# Patient Record
Sex: FEMALE | Race: BLACK OR AFRICAN AMERICAN | NOT HISPANIC OR LATINO | Employment: FULL TIME | URBAN - METROPOLITAN AREA
[De-identification: names, ages, dates, MRNs, and addresses within clinical notes are randomized per-mention and may not be internally consistent; named-entity substitution may affect disease eponyms.]

---

## 2017-01-26 ENCOUNTER — HOSPITAL ENCOUNTER (OUTPATIENT)
Dept: RADIOLOGY | Facility: CLINIC | Age: 47
Discharge: HOME | End: 2017-01-26
Attending: PODIATRIST
Payer: COMMERCIAL

## 2017-01-26 ENCOUNTER — OFFICE VISIT (OUTPATIENT)
Dept: PODIATRY | Facility: CLINIC | Age: 47
End: 2017-01-26
Payer: COMMERCIAL

## 2017-01-26 VITALS — WEIGHT: 245.56 LBS | BODY MASS INDEX: 40.91 KG/M2 | HEIGHT: 65 IN

## 2017-01-26 DIAGNOSIS — M25.571 ACUTE RIGHT ANKLE PAIN: ICD-10-CM

## 2017-01-26 DIAGNOSIS — S93.491A SPRAIN OF OTHER LIGAMENT OF RIGHT ANKLE, INITIAL ENCOUNTER: ICD-10-CM

## 2017-01-26 DIAGNOSIS — M24.573 EQUINUS CONTRACTURE OF ANKLE: ICD-10-CM

## 2017-01-26 DIAGNOSIS — M24.573 EQUINUS CONTRACTURE OF ANKLE: Primary | ICD-10-CM

## 2017-01-26 PROCEDURE — 73610 X-RAY EXAM OF ANKLE: CPT | Mod: 26,RT,S$GLB, | Performed by: RADIOLOGY

## 2017-01-26 PROCEDURE — 73610 X-RAY EXAM OF ANKLE: CPT | Mod: TC,PO,RT

## 2017-01-26 PROCEDURE — 1159F MED LIST DOCD IN RCRD: CPT | Mod: S$GLB,,, | Performed by: PODIATRIST

## 2017-01-26 PROCEDURE — 99999 PR PBB SHADOW E&M-EST. PATIENT-LVL III: CPT | Mod: PBBFAC,,, | Performed by: PODIATRIST

## 2017-01-26 PROCEDURE — 73630 X-RAY EXAM OF FOOT: CPT | Mod: 26,RT,S$GLB, | Performed by: RADIOLOGY

## 2017-01-26 PROCEDURE — 99213 OFFICE O/P EST LOW 20 MIN: CPT | Mod: S$GLB,,, | Performed by: PODIATRIST

## 2017-01-26 PROCEDURE — 73630 X-RAY EXAM OF FOOT: CPT | Mod: TC,PO,RT

## 2017-01-26 RX ORDER — MELOXICAM 15 MG/1
15 TABLET ORAL DAILY
Qty: 30 TABLET | Refills: 0 | Status: SHIPPED | OUTPATIENT
Start: 2017-01-26 | End: 2017-03-29 | Stop reason: ALTCHOICE

## 2017-01-26 RX ORDER — TRAMADOL HYDROCHLORIDE 50 MG/1
TABLET ORAL
COMMUNITY
Start: 2017-01-13 | End: 2017-03-29 | Stop reason: SDUPTHER

## 2017-01-26 NOTE — MR AVS SNAPSHOT
[facility]  [address]  [place] [address]  Phone: [phone]                  Phylicia Vásquez   2017 9:15 AM   Office Visit    Description:  Female : 1970   Provider:  Theron Rodarte DPM   Department:  [facility]           Reason for Visit     Ankle Injury           Diagnoses this Visit        Comments    Equinus contracture of ankle    -  Primary     Acute right ankle pain         Sprain of other ligament of right ankle, initial encounter                To Do List           Future Appointments        Provider Department Dept Phone    2017 10:00 AM [facility] XR1 [facility] [phone]    3/9/2017 9:15 AM Theron Rodarte DPM [facility] [phone]    3/29/2017 1:40 PM Ella Toth MD [facility] [phone]      Goals (5 Years of Data)     None       These Medications        Disp Refills Start End    meloxicam (MOBIC) 15 MG tablet 30 tablet 0 2017     Take 1 tablet (15 mg total) by mouth once daily. - Oral    Pharmacy: [facility] [address] [place] [address] Ph #: [phone]         [facility] On Call     [facility] On Call Nurse Care Line -  Assistance  Registered nurses in the [facility] On Call Center provide clinical advisement, health education, appointment booking, and other advisory services.  Call for this free service at [phone].             Medications           Message regarding Medications     Verify the changes and/or additions to your medication regime listed below are the same as discussed with your clinician today.  If any of these changes or additions are incorrect, please notify your healthcare provider.        START taking these NEW medications        Refills    meloxicam (MOBIC) 15 MG tablet 0    Sig: Take 1 tablet (15 mg total) by mouth once daily.    Class: Normal    Route: Oral      STOP taking these medications     ibuprofen  "(ADVIL,MOTRIN) 600 MG tablet Take 1 tablet (600 mg total) by mouth every 6 (six) hours as needed for Pain.    albuterol 90 mcg/actuation inhaler Inhale 1-2 puffs into the lungs every 6 (six) hours as needed for Wheezing.    tretinoin (RETIN-A) 0.025 % cream Apply topically nightly. Adult acne           Verify that the below list of medications is an accurate representation of the medications you are currently taking.  If none reported, the list may be blank. If incorrect, please contact your healthcare provider. Carry this list with you in case of emergency.           Current Medications     gabapentin (NEURONTIN) 300 MG capsule Take 1 capsule (300 mg total) by mouth 3 (three) times daily.    indapamide (LOZOL) 2.5 MG Tab Take 1 tablet (2.5 mg total) by mouth every evening.    tramadol (ULTRAM) 50 mg tablet     clonazePAM (KLONOPIN) 0.5 MG tablet 1 TWICE DAILY AS NEEDED    meloxicam (MOBIC) 15 MG tablet Take 1 tablet (15 mg total) by mouth once daily.           Clinical Reference Information           Vital Signs - Last Recorded  Most recent update: 1/26/2017  9:13 AM by Pastora Azul LPN    Ht Wt LMP BMI       5' 5" (1.651 m) 111.4 kg (245 lb 9.5 oz) 08/14/2014 40.87 kg/m2       Allergies as of 1/26/2017     Butalbital      Immunizations Administered on Date of Encounter - 1/26/2017     None      Orders Placed During Today's Visit      Normal Orders This Visit    Ambulatory consult to Physical Therapy     Future Labs/Procedures Expected by Expires    X-Ray Ankle Complete Right  1/26/2017 1/26/2018    X-Ray Foot Complete Right  1/26/2017 1/26/2018      "

## 2017-01-26 NOTE — PROGRESS NOTES
Subjective:      Patient ID: Phylicia Vásquez is a 46 y.o. female.    Chief Complaint: Ankle Injury (right)    Sharp deep pain / swelling right ankle 2 weeks after fall in lobby at work.  sudden onset, improving minimally over past 2 weeks, aggravated by increased weight bearing, shoe gear, pressure.  riice and tramadol from [facility] visit helped some - relates negative xrays there  OTC pain med not helping.      Review of Systems   Constitution: Negative for chills, diaphoresis, fever, malaise/fatigue and night sweats.   Cardiovascular: Negative for claudication, cyanosis, leg swelling and syncope.   Skin: Negative for color change, dry skin, nail changes, rash, suspicious lesions and unusual hair distribution.   Musculoskeletal: Positive for joint pain and joint swelling. Negative for falls, muscle cramps, muscle weakness and stiffness.   Gastrointestinal: Negative for constipation, diarrhea, nausea and vomiting.   Neurological: Negative for brief paralysis, disturbances in coordination, focal weakness, numbness, paresthesias, sensory change and tremors.           Objective:      Physical Exam   Constitutional: She appears well-developed and well-nourished. She is cooperative. No distress.   Cardiovascular:   Pulses:       Popliteal pulses are 2+ on the right side, and 2+ on the left side.        Dorsalis pedis pulses are 2+ on the right side, and 2+ on the left side.        Posterior tibial pulses are 2+ on the right side, and 2+ on the left side.   Capillary refill 3 seconds all toes/distal feet, all toes/both feet warm to touch.      Negative lymphadenopathy bilateral popliteal fossa and tarsal tunnel.      Negavie lower extremity edema bilateral.     Musculoskeletal:        Right ankle: She exhibits normal range of motion, no swelling, no ecchymosis, no deformity, no laceration and normal pulse. Tenderness. Achilles tendon normal. Achilles tendon exhibits no pain, no defect and normal Bryant's test  results.   Sharp deep focal pain to palpation ATFL right with focal swelling same without deformity or loss of function.  Mild guarding to exam.    Ankle dorsiflexion decreased at <10 degrees bilateral with moderate increase with knee flexion bilateral.    Otherwise, Normal angle, base, station of gait. All ten toes without clubbing, cyanosis, or signs of ischemia.  No pain to palpation bilateral lower extremities.  Range of motion, stability, muscle strength, and muscle tone normal bilateral feet and legs.     Lymphadenopathy: No inguinal adenopathy noted on the right or left side.   Negative lymphadenopathy bilateral popliteal fossa and tarsal tunnel.   Neurological: She is alert. She has normal strength. She displays no atrophy and no tremor. No sensory deficit. She exhibits normal muscle tone. She displays no seizure activity. Gait normal.   Reflex Scores:       Patellar reflexes are 2+ on the right side and 2+ on the left side.       Achilles reflexes are 2+ on the right side and 2+ on the left side.  Negative tinel sign to percussion sural, superficial peroneal, deep peroneal, saphenous, and posterior tibial nerves right and left ankles and feet.     Skin: Skin is warm, dry and intact. No abrasion, no bruising, no burn, no ecchymosis, no laceration, no lesion and no rash noted. She is not diaphoretic. No cyanosis or erythema. No pallor. Nails show no clubbing.     Skin is normal age and health appropriate color, turgor, texture, and temperature bilateral lower extremities without ulceration, hyperpigmentation, discoloration, masses nodules or cords palpated.  No ecchymosis, erythema, edema, or cardinal signs of infection bilateral lower extremities.               Assessment:       Encounter Diagnoses   Name Primary?    Equinus contracture of ankle Yes    Acute right ankle pain     Sprain of other ligament of right ankle, initial encounter          Plan:       Phylicia was seen today for ankle  injury.    Diagnoses and all orders for this visit:    Equinus contracture of ankle  -     X-Ray Ankle Complete Right; Future  -     X-Ray Foot Complete Right; Future  -     Ambulatory consult to Physical Therapy    Acute right ankle pain  -     X-Ray Ankle Complete Right; Future  -     X-Ray Foot Complete Right; Future  -     Ambulatory consult to Physical Therapy    Sprain of other ligament of right ankle, initial encounter    Other orders  -     meloxicam (MOBIC) 15 MG tablet; Take 1 tablet (15 mg total) by mouth once daily.      I counseled the patient on her conditions, their implications and medical management.        RIICE    fx boot right - ambulate to tolerance - wean to shoes when symptoms allow.    Rx PT, meloxicam.    X-ray today          No Follow-up on file.

## 2017-03-29 ENCOUNTER — OFFICE VISIT (OUTPATIENT)
Dept: INTERNAL MEDICINE | Facility: CLINIC | Age: 47
End: 2017-03-29
Payer: COMMERCIAL

## 2017-03-29 VITALS
DIASTOLIC BLOOD PRESSURE: 70 MMHG | WEIGHT: 241.63 LBS | HEART RATE: 97 BPM | SYSTOLIC BLOOD PRESSURE: 100 MMHG | HEIGHT: 65 IN | BODY MASS INDEX: 40.26 KG/M2 | OXYGEN SATURATION: 98 %

## 2017-03-29 DIAGNOSIS — Z90.711 S/P ABDOMINAL SUPRACERVICAL SUBTOTAL HYSTERECTOMY: ICD-10-CM

## 2017-03-29 DIAGNOSIS — K76.0 FATTY LIVER: ICD-10-CM

## 2017-03-29 DIAGNOSIS — Z00.00 ANNUAL PHYSICAL EXAM: Primary | ICD-10-CM

## 2017-03-29 DIAGNOSIS — E07.9 THYROID DYSFUNCTION: ICD-10-CM

## 2017-03-29 DIAGNOSIS — G96.89 SPINAL CORD CYSTS: ICD-10-CM

## 2017-03-29 DIAGNOSIS — I10 ESSENTIAL HYPERTENSION: ICD-10-CM

## 2017-03-29 DIAGNOSIS — E04.2 MULTINODULAR NON-TOXIC GOITER: ICD-10-CM

## 2017-03-29 DIAGNOSIS — M54.14 THORACIC RADICULOPATHY: ICD-10-CM

## 2017-03-29 DIAGNOSIS — E66.01 MORBID OBESITY, UNSPECIFIED OBESITY TYPE: ICD-10-CM

## 2017-03-29 PROCEDURE — 3074F SYST BP LT 130 MM HG: CPT | Mod: S$GLB,,, | Performed by: INTERNAL MEDICINE

## 2017-03-29 PROCEDURE — 3078F DIAST BP <80 MM HG: CPT | Mod: S$GLB,,, | Performed by: INTERNAL MEDICINE

## 2017-03-29 PROCEDURE — 99999 PR PBB SHADOW E&M-EST. PATIENT-LVL III: CPT | Mod: PBBFAC,,, | Performed by: INTERNAL MEDICINE

## 2017-03-29 PROCEDURE — 99396 PREV VISIT EST AGE 40-64: CPT | Mod: S$GLB,,, | Performed by: INTERNAL MEDICINE

## 2017-03-29 RX ORDER — GABAPENTIN 300 MG/1
300 CAPSULE ORAL 3 TIMES DAILY
Qty: 270 CAPSULE | Refills: 3 | Status: SHIPPED | OUTPATIENT
Start: 2017-03-29 | End: 2017-10-04 | Stop reason: ALTCHOICE

## 2017-03-29 RX ORDER — INDAPAMIDE 2.5 MG/1
2.5 TABLET ORAL NIGHTLY
Qty: 90 TABLET | Refills: 3 | Status: SHIPPED | OUTPATIENT
Start: 2017-03-29 | End: 2018-04-02 | Stop reason: SDUPTHER

## 2017-03-29 RX ORDER — CYCLOBENZAPRINE HCL 5 MG
TABLET ORAL
Refills: 2 | COMMUNITY
Start: 2017-03-14 | End: 2017-05-08 | Stop reason: SDUPTHER

## 2017-03-29 RX ORDER — TRAMADOL HYDROCHLORIDE 50 MG/1
50 TABLET ORAL EVERY 4 HOURS PRN
Qty: 120 TABLET | Refills: 2 | Status: SHIPPED | OUTPATIENT
Start: 2017-03-29 | End: 2017-09-28 | Stop reason: SDUPTHER

## 2017-03-29 NOTE — MR AVS SNAPSHOT
[facility]  [address]  [place] [address]  Phone: [phone]  Fax: [phone]                  Phylicia Vásquez   3/29/2017 1:40 PM   Office Visit    Description:  Female : 1970   Provider:  Ella Toth MD   Department:  [facility]           Reason for Visit     Annual Exam           Diagnoses this Visit        Comments    Annual physical exam    -  Primary     Thyroid dysfunction         Thoracic radiculopathy         Spinal cord cysts         S/P abdominal supracervical subtotal hysterectomy         Morbid obesity, unspecified obesity type         Multinodular non-toxic goiter         Essential hypertension         Fatty liver                To Do List           Goals (5 Years of Data)     None      Follow-Up and Disposition     Return in about 6 months (around 2017).       These Medications        Disp Refills Start End    indapamide (LOZOL) 2.5 MG Tab 90 tablet 3 3/29/2017     Take 1 tablet (2.5 mg total) by mouth every evening. Blood pressure and fluid balance - Oral    Pharmacy: [facility] [address] Ph #: [phone]       gabapentin (NEURONTIN) 300 MG capsule 270 capsule 3 3/29/2017     Take 1 capsule (300 mg total) by mouth 3 (three) times daily. - Oral    Pharmacy: [facility] [address] Ph #: [phone]       tramadol (ULTRAM) 50 mg tablet 120 tablet 2 3/29/2017     Take 1 tablet (50 mg total) by mouth every 4 (four) hours as needed for Pain. - Oral    Pharmacy: [facility] [address] Ph #: [phone]         [facility] On Call     [facility] On Call Nurse Care Line -  Assistance  Registered nurses in the [facility] On Call Center provide clinical advisement, health education, appointment booking, and other advisory  "services.  Call for this free service at [phone].             Medications           Message regarding Medications     Verify the changes and/or additions to your medication regime listed below are the same as discussed with your clinician today.  If any of these changes or additions are incorrect, please notify your healthcare provider.        CHANGE how you are taking these medications     Start Taking Instead of    indapamide (LOZOL) 2.5 MG Tab indapamide (LOZOL) 2.5 MG Tab    Dosage:  Take 1 tablet (2.5 mg total) by mouth every evening. Blood pressure and fluid balance Dosage:  Take 1 tablet (2.5 mg total) by mouth every evening.    Reason for Change:  Reorder     tramadol (ULTRAM) 50 mg tablet tramadol (ULTRAM) 50 mg tablet    Dosage:  Take 1 tablet (50 mg total) by mouth every 4 (four) hours as needed for Pain.     Reason for Change:  Reorder       STOP taking these medications     clonazePAM (KLONOPIN) 0.5 MG tablet 1 TWICE DAILY AS NEEDED    meloxicam (MOBIC) 15 MG tablet Take 1 tablet (15 mg total) by mouth once daily.           Verify that the below list of medications is an accurate representation of the medications you are currently taking.  If none reported, the list may be blank. If incorrect, please contact your healthcare provider. Carry this list with you in case of emergency.           Current Medications     cyclobenzaprine (FLEXERIL) 5 MG tablet     gabapentin (NEURONTIN) 300 MG capsule Take 1 capsule (300 mg total) by mouth 3 (three) times daily.    indapamide (LOZOL) 2.5 MG Tab Take 1 tablet (2.5 mg total) by mouth every evening. Blood pressure and fluid balance    tramadol (ULTRAM) 50 mg tablet Take 1 tablet (50 mg total) by mouth every 4 (four) hours as needed for Pain.           Clinical Reference Information           Your Vitals Were     BP Pulse Height Weight Last Period SpO2    100/70 97 5' 5" (1.651 m) 109.6 kg (241 lb 10 oz) 08/14/2014 98%    BMI                40.21 kg/m2        "   Blood Pressure          Most Recent Value    BP  100/70      Allergies as of 3/29/2017     Butalbital      Immunizations Administered on Date of Encounter - 3/29/2017     None      Orders Placed During Today's Visit     Future Labs/Procedures Expected by Expires    CBC auto differential  3/29/2017 3/29/2018    Comprehensive metabolic panel  3/29/2017 5/28/2018    Hemoglobin A1c  3/29/2017 5/28/2018    Lipid panel  3/29/2017 5/28/2018    TSH  3/29/2017 5/28/2018    Uric acid  3/29/2017 5/28/2018      Instructions    Health Maintenance       Date Due Completion Date    TETANUS VACCINE 3/16/1988 ---    Influenza Vaccine 8/1/2016 10/10/2014    Pap Smear 9/24/2018 9/24/2015    Mammogram 10/7/2018 10/7/2016    Lipid Panel 2/4/2021 2/4/2016        1.) to  Loose weight you must sleep more. Black out sleep mask.   2.) pay attention to cravings  Don't have sweets and salty stuff in house.  3.) Cut back on carbs, Potatoes, bread, white rice cut them out. Eat oats instead.           Language Assistance Services     ATTENTION: Language assistance services are available, free of charge. Please call [phone].      ATENCIÓN: Si habla lakhwinder, tiene a matute disposición servicios gratuitos de asistencia lingüística. Llame al [phone].     CHÚ Ý: N?u b?n nói Ti?ng Vi?t, có các d?ch v? h? tr? ngôn ng? mi?n phí dành cho b?n. G?i s? [phone].         [facility] complies with applicable Federal civil rights laws and does not discriminate on the basis of race, color, national origin, age, disability, or sex.

## 2017-03-29 NOTE — PATIENT INSTRUCTIONS
Health Maintenance       Date Due Completion Date    TETANUS VACCINE 3/16/1988 ---    Influenza Vaccine 8/1/2016 10/10/2014    Pap Smear 9/24/2018 9/24/2015    Mammogram 10/7/2018 10/7/2016    Lipid Panel 2/4/2021 2/4/2016        1.) to  Loose weight you must sleep more. Black out sleep mask.   2.) pay attention to cravings  Don't have sweets and salty stuff in house.  3.) Cut back on carbs, Potatoes, bread, white rice cut them out. Eat oats instead.

## 2017-03-31 NOTE — PROGRESS NOTES
Subjective:       Patient ID: Phylicia Vásquez is a 47 y.o. female.    Chief Complaint: Annual Exam   wellness  She is doing well.   has a past medical history of Anxiety; Back pain; HTN (hypertension) (10/18/2012); Insomnia (10/18/2012); Spinal cord cyst, L1 2014; and Thoracic radiculopathy, bulging disc at T10-11 and T11-12.   Entire chart reviewed, PMx, FHx, and Social History updated and reviewed.    HPI  Review of Systems   Constitutional: Negative for activity change, appetite change, chills, fatigue, fever and unexpected weight change.   HENT: Negative for dental problem, hearing loss, tinnitus, trouble swallowing and voice change.    Eyes: Negative for visual disturbance.   Respiratory: Negative for cough, chest tightness, shortness of breath and wheezing.    Cardiovascular: Negative for chest pain, palpitations and leg swelling.   Gastrointestinal: Negative for abdominal pain, blood in stool, constipation, diarrhea and nausea.   Genitourinary: Negative for difficulty urinating, dysuria, frequency and urgency.   Musculoskeletal: Negative for arthralgias, back pain, gait problem, joint swelling, myalgias, neck pain and neck stiffness.   Skin: Negative for rash.   Neurological: Negative for dizziness, tremors, speech difficulty, weakness, light-headedness and headaches.   Psychiatric/Behavioral: Negative for dysphoric mood and sleep disturbance. The patient is not nervous/anxious.        Objective:      Physical Exam   Constitutional: She is oriented to person, place, and time. She appears well-developed and well-nourished. No distress.   HENT:   Head: Normocephalic and atraumatic.   Right Ear: External ear normal.   Left Ear: External ear normal.   Nose: Nose normal.   Mouth/Throat: Oropharynx is clear and moist. No oropharyngeal exudate.   Eyes: Conjunctivae and EOM are normal. Pupils are equal, round, and reactive to light. Right eye exhibits no discharge. No scleral icterus.   Neck: Normal range of  motion and full passive range of motion without pain. Neck supple. No spinous process tenderness and no muscular tenderness present. Carotid bruit is not present. No thyromegaly present.   Cardiovascular: Normal rate, regular rhythm, S1 normal, S2 normal, normal heart sounds and intact distal pulses.  Exam reveals no gallop and no friction rub.    No murmur heard.  Pulmonary/Chest: Effort normal and breath sounds normal. No respiratory distress. She has no wheezes. She has no rales. She exhibits no tenderness.   Abdominal: Soft. Bowel sounds are normal. She exhibits no distension and no mass. There is no tenderness. There is no rebound and no guarding.   Genitourinary: Pelvic exam was performed with patient supine. Uterus is not deviated, not enlarged, not fixed and not tender. Cervix exhibits no motion tenderness, no discharge and no friability. Right adnexum displays no mass, no tenderness and no fullness. Left adnexum displays no mass, no tenderness and no fullness.   Musculoskeletal: Normal range of motion. She exhibits no edema or tenderness.   Lymphadenopathy:        Head (right side): No submental and no submandibular adenopathy present.        Head (left side): No submental and no submandibular adenopathy present.     She has no cervical adenopathy.        Right cervical: No superficial cervical, no deep cervical and no posterior cervical adenopathy present.       Left cervical: No superficial cervical, no deep cervical and no posterior cervical adenopathy present.        Right axillary: No pectoral and no lateral adenopathy present.        Left axillary: No pectoral and no lateral adenopathy present.       Right: No supraclavicular adenopathy present.        Left: No supraclavicular adenopathy present.   Neurological: She is alert and oriented to person, place, and time. She has normal reflexes. No cranial nerve deficit. She exhibits normal muscle tone. Coordination normal.   Skin: Skin is warm and dry. No  rash noted.   Psychiatric: She has a normal mood and affect. Her behavior is normal. Her mood appears not anxious. Her speech is not rapid and/or pressured. She is not agitated. She does not exhibit a depressed mood.   Normal behavior, thought content, insight and judgement.       Assessment:       1. Annual physical exam    2. Thyroid dysfunction    3. Thoracic radiculopathy    4. Spinal cord cyst, 11 mm CSF cyst, in the conus medullaris on the right side at L1 level. .    5. S/P abdominal supracervical subtotal hysterectomy, continues to have regular menses.    6. Morbid obesity, unspecified obesity type    7. Multinodular non-toxic goiter, Subcentimeter nodules May 2012, anterior fat pad.    8. Essential hypertension    9. Fatty liver        Plan:   Phylicia was seen today for annual exam.    Diagnoses and all orders for this visit:    Annual physical exam  -     CBC auto differential; Future  -     Comprehensive metabolic panel; Future  -     Uric acid; Future  -     Lipid panel; Future  -     Hemoglobin A1c; Future  -     TSH; Future    Thyroid dysfunction    Thoracic radiculopathy    Spinal cord cyst, 11 mm CSF cyst, in the conus medullaris on the right side at L1 level. .    S/P abdominal supracervical subtotal hysterectomy, continues to have regular menses.    Morbid obesity, unspecified obesity type.  There are many barriers to weight loss.  The most significant is a lack of sleep.  She works long hours and commutes to the [place]..) to  Loose weight you must sleep more. Black out sleep mask.   2.) pay attention to cravings  Don't have sweets and salty stuff in house.  3.) Cut back on carbs, Potatoes, bread, white rice cut them out. Eat oats instead.        Multinodular non-toxic goiter, Subcentimeter nodules May 2012, anterior fat pad.    Essential hypertension    Fatty liver    Other orders  -     indapamide (LOZOL) 2.5 MG Tab; Take 1 tablet (2.5 mg total) by mouth every evening. Blood  pressure and fluid balance  -     gabapentin (NEURONTIN) 300 MG capsule; Take 1 capsule (300 mg total) by mouth 3 (three) times daily.  -     tramadol (ULTRAM) 50 mg tablet; Take 1 tablet (50 mg total) by mouth every 4 (four) hours as needed for Pain.    Return in about 6 months (around 9/29/2017).

## 2017-04-11 RX ORDER — CYCLOBENZAPRINE HCL 5 MG
TABLET ORAL
Qty: 90 TABLET | Refills: 0 | Status: SHIPPED | OUTPATIENT
Start: 2017-04-11 | End: 2017-05-16 | Stop reason: SDUPTHER

## 2017-05-08 ENCOUNTER — OFFICE VISIT (OUTPATIENT)
Dept: PODIATRY | Facility: CLINIC | Age: 47
End: 2017-05-08
Payer: COMMERCIAL

## 2017-05-08 VITALS — WEIGHT: 241.88 LBS | HEIGHT: 65 IN | BODY MASS INDEX: 40.3 KG/M2

## 2017-05-08 DIAGNOSIS — B35.1 ONYCHOMYCOSIS DUE TO DERMATOPHYTE: Primary | ICD-10-CM

## 2017-05-08 PROCEDURE — 99999 PR PBB SHADOW E&M-EST. PATIENT-LVL II: CPT | Mod: PBBFAC,,, | Performed by: PODIATRIST

## 2017-05-08 PROCEDURE — 1160F RVW MEDS BY RX/DR IN RCRD: CPT | Mod: S$GLB,,, | Performed by: PODIATRIST

## 2017-05-08 PROCEDURE — 99213 OFFICE O/P EST LOW 20 MIN: CPT | Mod: S$GLB,,, | Performed by: PODIATRIST

## 2017-05-08 RX ORDER — CICLOPIROX 80 MG/ML
SOLUTION TOPICAL NIGHTLY
Qty: 6.6 ML | Refills: 11 | Status: SHIPPED | OUTPATIENT
Start: 2017-05-08 | End: 2018-11-14

## 2017-05-08 NOTE — PROGRESS NOTES
Subjective:      Patient ID: Phylicia Vásquez is a 47 y.o. female.    Chief Complaint: Nail Problem    Phylicia is a 47 y.o. female who presents to the clinic complaining of thick and discolored toenails on both feet.  Gradual onset, worsening over past several weeks, aggravated by increased weight bearing, shoe gear, pressure.  No previous medical treatment.  OTC pain med not helping.  Denies trauma and surgery both hallux nails.   Phylicia is inquiring about treatment options.      Review of Systems   Constitution: Negative for chills, diaphoresis, fever, malaise/fatigue and night sweats.   Cardiovascular: Negative for claudication, cyanosis, leg swelling and syncope.   Skin: Positive for nail changes. Negative for color change, dry skin, rash, suspicious lesions and unusual hair distribution.   Musculoskeletal: Negative for falls, joint pain, joint swelling, muscle cramps, muscle weakness and stiffness.   Gastrointestinal: Negative for constipation, diarrhea, nausea and vomiting.   Neurological: Negative for brief paralysis, disturbances in coordination, focal weakness, numbness, paresthesias, sensory change and tremors.           Objective:      Physical Exam   Constitutional: She appears well-developed and well-nourished. She is cooperative. No distress.   Cardiovascular:   Pulses:       Popliteal pulses are 2+ on the right side, and 2+ on the left side.        Dorsalis pedis pulses are 2+ on the right side, and 2+ on the left side.        Posterior tibial pulses are 2+ on the right side, and 2+ on the left side.   Capillary refill 3 seconds all toes/distal feet, all toes/both feet warm to touch.      Negative lymphadenopathy bilateral popliteal fossa and tarsal tunnel.      Negavie lower extremity edema bilateral.     Musculoskeletal:        Right ankle: She exhibits normal range of motion, no swelling, no ecchymosis, no deformity, no laceration and normal pulse. Tenderness. Achilles tendon normal. Achilles  tendon exhibits no pain, no defect and normal Bryant's test results.   Normal angle, base, station of gait. All ten toes without clubbing, cyanosis, or signs of ischemia.  No pain to palpation bilateral lower extremities.  Range of motion, stability, muscle strength, and muscle tone normal bilateral feet and legs.     Lymphadenopathy:   Negative lymphadenopathy bilateral popliteal fossa and tarsal tunnel.   Neurological: She is alert. She has normal strength. She displays no atrophy and no tremor. No sensory deficit. She exhibits normal muscle tone. She displays no seizure activity. Gait normal.   Reflex Scores:       Patellar reflexes are 2+ on the right side and 2+ on the left side.       Achilles reflexes are 2+ on the right side and 2+ on the left side.  Negative tinel sign to percussion sural, superficial peroneal, deep peroneal, saphenous, and posterior tibial nerves right and left ankles and feet.     Skin: Skin is warm, dry and intact. No abrasion, no bruising, no burn, no ecchymosis, no laceration, no lesion and no rash noted. She is not diaphoretic. No cyanosis or erythema. No pallor. Nails show no clubbing.     Skin is normal age and health appropriate color, turgor, texture, and temperature bilateral lower extremities without ulceration, hyperpigmentation, discoloration, masses nodules or cords palpated.  No ecchymosis, erythema, edema, or cardinal signs of infection bilateral lower extremities.    Toenails 1st, 5th  bilateral are hypertrophic, dystrophic, discolored tanish brown with tan, gray crumbly subungual debris.  Tender to distal nail plate pressure, without periungual skin abnormality of each.               Assessment:       Encounter Diagnosis   Name Primary?    Onychomycosis due to dermatophyte Yes         Plan:       Phylicia was seen today for nail problem.    Diagnoses and all orders for this visit:    Onychomycosis due to dermatophyte    Other orders  -     ciclopirox (PENLAC) 8 % Soln;  Apply topically nightly.      I counseled the patient on her conditions, their implications and medical management.        Rx penlac.    Discussed conservative treatment with shoes of adequate dimensions, material, and style to alleviate symptoms and delay or prevent surgical intervention.            Return if symptoms worsen or fail to improve.

## 2017-05-08 NOTE — MR AVS SNAPSHOT
[facility]  [address]  [place] [address]  Phone: [phone]                  Phylicia Vásquez   2017 8:00 AM   Office Visit    Description:  Female : 1970   Provider:  Theron Rodarte DPM   Department:  [facility]           Reason for Visit     Nail Problem           Diagnoses this Visit        Comments    Onychomycosis due to dermatophyte    -  Primary            To Do List           Future Appointments        Provider Department Dept Phone    2017 8:00 AM Theron Rodarte DPM [facility] [phone]    2017 9:45 AM Tosha Gibbs MD [place] [facility] [phone]    2017 3:20 PM Namita Mcdowell [place] [id number] [place] [facility]/ -[phone]      Goals (5 Years of Data)     None      Follow-Up and Disposition     Return if symptoms worsen or fail to improve.       These Medications        Disp Refills Start End    ciclopirox (PENLAC) 8 % Soln 6.6 mL 11 2017     Apply topically nightly. - Topical    Pharmacy: [facility] [address] [place] [address] Ph #: [phone]         [facility]     [facility] Care [facility] -  Assistance  Unless otherwise directed by your provider, please contact [facility], our nurse care line that is available for  assistance.     Registered nurses in the [facility] provide: appointment scheduling, clinical advisement, health education, and other advisory services.  Call: [phone] (toll free)               Medications           Message regarding Medications     Verify the changes and/or additions to your medication regime listed below are the same as discussed with your clinician today.  If any of these changes or additions are incorrect, please notify your healthcare provider.        START taking these NEW medications        Refills    ciclopirox (PENLAC) 8 % Soln 11    Sig: Apply topically nightly.    Class:  "Normal    Route: Topical           Verify that the below list of medications is an accurate representation of the medications you are currently taking.  If none reported, the list may be blank. If incorrect, please contact your healthcare provider. Carry this list with you in case of emergency.           Current Medications     gabapentin (NEURONTIN) 300 MG capsule Take 1 capsule (300 mg total) by mouth 3 (three) times daily.    indapamide (LOZOL) 2.5 MG Tab Take 1 tablet (2.5 mg total) by mouth every evening. Blood pressure and fluid balance    ciclopirox (PENLAC) 8 % Soln Apply topically nightly.    cyclobenzaprine (FLEXERIL) 5 MG tablet TAKE 1 TABLET(5 MG) BY MOUTH THREE TIMES DAILY AS NEEDED FOR MUSCLE SPASMS    tramadol (ULTRAM) 50 mg tablet Take 1 tablet (50 mg total) by mouth every 4 (four) hours as needed for Pain.           Clinical Reference Information           Your Vitals Were     Height Weight Last Period BMI       5' 5" (1.651 m) 109.7 kg (241 lb 13.5 oz) 08/14/2014 40.25 kg/m2       Allergies as of 5/8/2017     Butalbital      Immunizations Administered on Date of Encounter - 5/8/2017     None      Language Assistance Services     ATTENTION: Language assistance services are available, free of charge. Please call [phone].      ATENCIÓN: Si susanna lakhwinder, tiene a matute disposición servicios gratuitos de asistencia lingüística. Llame al [phone].     [facility] Ý: N?u b?n nói Ti?ng Vi?t, có các d?ch v? h? tr? ngôn ng? mi?n phí dành cho b?n. G?i s? [phone].         [facility] complies with applicable Federal civil rights laws and does not discriminate on the basis of race, color, national origin, age, disability, or sex.        "

## 2017-05-16 RX ORDER — CYCLOBENZAPRINE HCL 5 MG
TABLET ORAL
Qty: 90 TABLET | Refills: 0 | Status: SHIPPED | OUTPATIENT
Start: 2017-05-16 | End: 2017-06-15 | Stop reason: SDUPTHER

## 2017-05-23 ENCOUNTER — OFFICE VISIT (OUTPATIENT)
Dept: DERMATOLOGY | Facility: CLINIC | Age: 47
End: 2017-05-23
Payer: COMMERCIAL

## 2017-05-23 VITALS — HEIGHT: 65 IN | WEIGHT: 241 LBS | BODY MASS INDEX: 40.15 KG/M2

## 2017-05-23 DIAGNOSIS — L21.9 SEBORRHEIC DERMATITIS: ICD-10-CM

## 2017-05-23 DIAGNOSIS — L21.9 SEBORRHEIC DERMATITIS OF SCALP: Primary | ICD-10-CM

## 2017-05-23 PROCEDURE — 99999 PR PBB SHADOW E&M-EST. PATIENT-LVL II: CPT | Mod: PBBFAC,,, | Performed by: DERMATOLOGY

## 2017-05-23 PROCEDURE — 1160F RVW MEDS BY RX/DR IN RCRD: CPT | Mod: S$GLB,,, | Performed by: DERMATOLOGY

## 2017-05-23 PROCEDURE — 99213 OFFICE O/P EST LOW 20 MIN: CPT | Mod: S$GLB,,, | Performed by: DERMATOLOGY

## 2017-05-23 RX ORDER — KETOCONAZOLE 2 G/100G
AEROSOL, FOAM TOPICAL
Qty: 100 G | Refills: 2 | Status: SHIPPED | OUTPATIENT
Start: 2017-05-23 | End: 2018-11-14

## 2017-05-23 RX ORDER — FLUOCINOLONE ACETONIDE 0.11 MG/ML
OIL TOPICAL
Qty: 1 BOTTLE | Refills: 2 | Status: SHIPPED | OUTPATIENT
Start: 2017-05-23 | End: 2017-11-08 | Stop reason: SDUPTHER

## 2017-05-23 RX ORDER — KETOCONAZOLE 20 MG/ML
SHAMPOO, SUSPENSION TOPICAL
Qty: 120 ML | Refills: 5 | Status: SHIPPED | OUTPATIENT
Start: 2017-05-23 | End: 2017-12-10 | Stop reason: SDUPTHER

## 2017-05-23 NOTE — PROGRESS NOTES
"  Subjective:       Patient ID:  Phylicia Vásquez is a 47 y.o. female who presents for   Chief Complaint   Patient presents with    Rash     Face    Dry Skin     scalp     Initial visit  C/o dry scalp and "rashes that come and go" eyebrows and sides of nose    Washes hair every other week, theragel dandruff shampoo          Rash  - Initial  Affected locations: face  Duration: 7 years  Signs / symptoms: itching  Severity: mild  Timing: constant  Aggravated by: nothing  Treatments tried: OTC hydrocortisone cream.  Improvement on treatment: mild    Dry Skin  - Initial  Affected locations: scalp  Duration: 7 years  Signs / symptoms: dryness and itching (flaky)  Severity: mild  Aggravated by: scratching  Treatments tried: OTC dandruff hair products.  Improvement on treatment: no relief        Review of Systems   Constitutional: Positive for night sweats. Negative for fever, chills, weight loss, weight gain, fatigue and malaise.   Skin: Positive for itching, rash and dry skin.   Hematologic/Lymphatic: Does not bruise/bleed easily.        Objective:    Physical Exam   Constitutional: She appears well-developed and well-nourished. No distress.   Neurological: She is alert and oriented to person, place, and time. She is not disoriented.   Psychiatric: She has a normal mood and affect.   Skin:   Areas Examined (abnormalities noted in diagram):   Scalp / Hair Palpated and Inspected  Head / Face Inspection Performed              Diagram Legend     Erythematous scaling macule/papule c/w actinic keratosis       Vascular papule c/w angioma      Pigmented verrucoid papule/plaque c/w seborrheic keratosis      Yellow umbilicated papule c/w sebaceous hyperplasia      Irregularly shaped tan macule c/w lentigo     1-2 mm smooth white papules consistent with Milia      Movable subcutaneous cyst with punctum c/w epidermal inclusion cyst      Subcutaneous movable cyst c/w pilar cyst      Firm pink to brown papule c/w dermatofibroma "      Pedunculated fleshy papule(s) c/w skin tag(s)      Evenly pigmented macule c/w junctional nevus     Mildly variegated pigmented, slightly irregular-bordered macule c/w mildly atypical nevus      Flesh colored to evenly pigmented papule c/w intradermal nevus       Pink pearly papule/plaque c/w basal cell carcinoma      Erythematous hyperkeratotic cursted plaque c/w SCC      Surgical scar with no sign of skin cancer recurrence      Open and closed comedones      Inflammatory papules and pustules      Verrucoid papule consistent consistent with wart     Erythematous eczematous patches and plaques     Dystrophic onycholytic nail with subungual debris c/w onychomycosis     Umbilicated papule    Erythematous-base heme-crusted tan verrucoid plaque consistent with inflamed seborrheic keratosis     Erythematous Silvery Scaling Plaque c/w Psoriasis     See annotation      Assessment / Plan:        Seborrheic dermatitis of scalp  Need more freequent hair washing than QOW    -     ketoconazole (NIZORAL) 2 % shampoo; Wash hair with medicated shampoo at least 1-2x/week - let sit on scalp at least 5 minutes prior to rinsing  Dispense: 120 mL; Refill: 5  -     ketoconazole 2 % Foam; AAA face and scalp daily to BID PRN flare  Dispense: 100 g; Refill: 2  -     fluocinolone (DERMA-SMOOTHE) 0.01 % external oil; Apply to scalp nightly  Dispense: 1 Bottle; Refill: 2    Seborrheic dermatitis, face, mild today  HC 1% PRN flare, use short term only    -     ketoconazole 2 % Foam; AAA face and scalp daily to BID PRN flare  Dispense: 100 g; Refill: 2             Return if symptoms worsen or fail to improve.

## 2017-05-30 ENCOUNTER — OFFICE VISIT (OUTPATIENT)
Dept: OBSTETRICS AND GYNECOLOGY | Facility: CLINIC | Age: 47
End: 2017-05-30
Payer: COMMERCIAL

## 2017-05-30 VITALS
HEART RATE: 61 BPM | BODY MASS INDEX: 40.88 KG/M2 | DIASTOLIC BLOOD PRESSURE: 91 MMHG | HEIGHT: 65 IN | WEIGHT: 245.38 LBS | SYSTOLIC BLOOD PRESSURE: 129 MMHG

## 2017-05-30 DIAGNOSIS — Z01.419 ENCOUNTER FOR WELL WOMAN EXAM WITH ROUTINE GYNECOLOGICAL EXAM: Primary | ICD-10-CM

## 2017-05-30 DIAGNOSIS — Z12.39 BREAST CANCER SCREENING: ICD-10-CM

## 2017-05-30 PROCEDURE — 99999 PR PBB SHADOW E&M-EST. PATIENT-LVL III: CPT | Mod: PBBFAC,,, | Performed by: OBSTETRICS & GYNECOLOGY

## 2017-05-30 PROCEDURE — 99396 PREV VISIT EST AGE 40-64: CPT | Mod: S$GLB,,, | Performed by: OBSTETRICS & GYNECOLOGY

## 2017-05-30 PROCEDURE — 88175 CYTOPATH C/V AUTO FLUID REDO: CPT

## 2017-06-02 NOTE — PROGRESS NOTES
SUBJECTIVE:   47 y.o. female   for annual routine Pap and checkup. Patient's last menstrual period was 2014..  She has no unusual complaints.        Past Medical History:   Diagnosis Date    Anxiety     Back pain     HTN (hypertension) 10/18/2012    Insomnia 10/18/2012    Spinal cord cyst, L1 2014     Thoracic radiculopathy, bulging disc at T10-11 and T11-12      Past Surgical History:   Procedure Laterality Date    BREAST SURGERY      breast biopsy     SECTION      CHOLECYSTECTOMY      COLONOSCOPY N/A 10/5/2015    Procedure: COLONOSCOPY;  Surgeon: JERONIMO Cancino MD;  Location: [address];  Service: Endoscopy;  Laterality: N/A;    HYSTERECTOMY      BC--SUPRACERVICAL     Social History     Social History    Marital status: Single     Spouse name: N/A    Number of children: N/A    Years of education: N/A     Occupational History    Not on file.     Social History Main Topics    Smoking status: Never Smoker    Smokeless tobacco: Never Used    Alcohol use No    Drug use: No    Sexual activity: Yes     Partners: Male     Other Topics Concern    Not on file     Social History Narrative    Has worked at the [employer] since it opened and stayed working through [employer].    2011  from her  and moved to [place] [facility] with her sons to live with her aunt.    Her mother is very helpful and involved in her life.    She has never been a smoker and does not drink.        2016    Her eldest son has been working as a  at the [employer] for one year now.  He is doing well in this capacity.  He is thinking about making this his career.        Her youngest son is doing better academically.  She would like to remain living on the [place] [facility] for his schooling.        Her commuting to work is difficult.     Family History   Problem Relation Age of Onset    Cancer Paternal Aunt     Lupus Paternal Aunt     Hypertension Mother     Liver  disease Father     Alcohol abuse Father     Colon cancer Paternal Uncle     Breast cancer Neg Hx     Ovarian cancer Neg Hx     Melanoma Neg Hx     Psoriasis Neg Hx     Eczema Neg Hx      OB History    Para Term  AB Living   4 2 1 1 2 2   SAB TAB Ectopic Multiple Live Births   2 0 0 0 2      # Outcome Date GA Lbr Saul/2nd Weight Sex Delivery Anes PTL Lv   4      M Vag-Spont  Y IRA      Complications: Fetal distress affecting labor   3 Term     M CS-Unspec  N IRA      Complications: Fetal distress affecting labor   2 SAB            1 SAB                     Current Outpatient Prescriptions   Medication Sig Dispense Refill    ciclopirox (PENLAC) 8 % Soln Apply topically nightly. 6.6 mL 11    cyclobenzaprine (FLEXERIL) 5 MG tablet TAKE 1 TABLET(5 MG) BY MOUTH THREE TIMES DAILY AS NEEDED FOR MUSCLE SPASMS 90 tablet 0    fluocinolone (DERMA-SMOOTHE) 0.01 % external oil Apply to scalp nightly 1 Bottle 2    gabapentin (NEURONTIN) 300 MG capsule Take 1 capsule (300 mg total) by mouth 3 (three) times daily. 270 capsule 3    indapamide (LOZOL) 2.5 MG Tab Take 1 tablet (2.5 mg total) by mouth every evening. Blood pressure and fluid balance 90 tablet 3    ketoconazole (NIZORAL) 2 % shampoo Wash hair with medicated shampoo at least 1-2x/week - let sit on scalp at least 5 minutes prior to rinsing 120 mL 5    ketoconazole 2 % Foam AAA face and scalp daily to BID PRN flare 100 g 2    tramadol (ULTRAM) 50 mg tablet Take 1 tablet (50 mg total) by mouth every 4 (four) hours as needed for Pain. 120 tablet 2     No current facility-administered medications for this visit.      Allergies: Butalbital     ROS:  Constitutional: no weight loss, weight gain, fever, fatigue  Eyes:  No vision changes, glasses/contacts  ENT/Mouth: No ulcers, sinus problems, ears ringing, headache  Cardiovascular: No inability to lie flat, chest pain, exercise intolerance, swelling, heart palpitations  Respiratory: No wheezing,  "coughing blood, shortness of breath, or cough  Gastrointestinal: No diarrhea, bloody stool, nausea/vomiting, constipation, gas, hemorrhoids  Genitourinary: No blood in urine, painful urination, urgency of urination, frequency of urination, incomplete emptying, incontinence, abnormal bleeding, painful periods, heavy periods, vaginal discharge, vaginal odor, painful intercourse, sexual problems, bleeding after intercourse.  Musculoskeletal: No muscle weakness  Skin/Breast: No painful breasts, nipple discharge, masses, rash, ulcers  Neurological: No passing out, seizures, numbness, headache  Endocrine: No diabetes, hypothyroid, hyperthyroid, hot flashes, hair loss, abnormal hair growth, ance  Psychiatric: No depression, crying  Hematologic: No bruises, bleeding, swollen lymph nodes, anemia.      OBJECTIVE:   The patient appears well, alert, oriented x 3, in no distress.  BP (!) 129/91   Pulse 61   Ht 5' 5" (1.651 m)   Wt 111.3 kg (245 lb 6 oz)   LMP 08/14/2014   BMI 40.83 kg/m²   NECK: no thyromegaly, trachea midline  SKIN: no acne, striae, hirsutism  BREAST EXAM: breasts appear normal, no suspicious masses, no skin or nipple changes or axillary nodes  ABDOMEN: no hernias, masses, or hepatosplenomegaly  GENITALIA: normal external genitalia, no erythema, no discharge  URETHRA: normal urethra, normal urethral meatus  VAGINA: Normal  CERVIX: no lesions or cervical motion tenderness  UTERUS: uterus absent  ADNEXA: no mass, fullness, tenderness    \  ASSESSMENT:   well woman    PLAN:   mammogram  pap smear  counseled on breast self exam and mammography screening  return annually or prn  "

## 2017-06-15 RX ORDER — CYCLOBENZAPRINE HCL 5 MG
TABLET ORAL
Qty: 90 TABLET | Refills: 0 | Status: SHIPPED | OUTPATIENT
Start: 2017-06-15 | End: 2017-07-16 | Stop reason: SDUPTHER

## 2017-07-17 RX ORDER — CYCLOBENZAPRINE HCL 5 MG
TABLET ORAL
Qty: 90 TABLET | Refills: 0 | Status: SHIPPED | OUTPATIENT
Start: 2017-07-17 | End: 2017-08-18 | Stop reason: SDUPTHER

## 2017-08-18 RX ORDER — CYCLOBENZAPRINE HCL 5 MG
TABLET ORAL
Qty: 90 TABLET | Refills: 0 | Status: SHIPPED | OUTPATIENT
Start: 2017-08-18 | End: 2017-09-20 | Stop reason: SDUPTHER

## 2017-09-15 ENCOUNTER — HOSPITAL ENCOUNTER (EMERGENCY)
Facility: HOSPITAL | Age: 47
Discharge: HOME | End: 2017-09-15
Payer: COMMERCIAL

## 2017-09-15 VITALS
DIASTOLIC BLOOD PRESSURE: 74 MMHG | SYSTOLIC BLOOD PRESSURE: 122 MMHG | HEIGHT: 65 IN | RESPIRATION RATE: 18 BRPM | BODY MASS INDEX: 40.82 KG/M2 | TEMPERATURE: 100 F | HEART RATE: 68 BPM | WEIGHT: 245 LBS | OXYGEN SATURATION: 99 %

## 2017-09-15 DIAGNOSIS — J06.9 VIRAL UPPER RESPIRATORY ILLNESS: Primary | ICD-10-CM

## 2017-09-15 LAB
FLUAV AG SPEC QL IA: NEGATIVE
FLUBV AG SPEC QL IA: NEGATIVE
SPECIMEN SOURCE: NORMAL

## 2017-09-15 PROCEDURE — 99283 EMERGENCY DEPT VISIT LOW MDM: CPT | Mod: 25

## 2017-09-15 PROCEDURE — 87400 INFLUENZA A/B EACH AG IA: CPT | Mod: 59

## 2017-09-15 PROCEDURE — 96372 THER/PROPH/DIAG INJ SC/IM: CPT

## 2017-09-15 PROCEDURE — 63600175 PHARM REV CODE 636 W HCPCS: Performed by: NURSE PRACTITIONER

## 2017-09-15 RX ORDER — DEXAMETHASONE SODIUM PHOSPHATE 4 MG/ML
8 INJECTION, SOLUTION INTRA-ARTICULAR; INTRALESIONAL; INTRAMUSCULAR; INTRAVENOUS; SOFT TISSUE
Status: COMPLETED | OUTPATIENT
Start: 2017-09-15 | End: 2017-09-15

## 2017-09-15 RX ORDER — BENZONATATE 100 MG/1
100 CAPSULE ORAL 3 TIMES DAILY PRN
Qty: 20 CAPSULE | Refills: 0 | Status: SHIPPED | OUTPATIENT
Start: 2017-09-15 | End: 2017-09-25

## 2017-09-15 RX ORDER — AMLODIPINE BESYLATE 5 MG/1
5 TABLET ORAL DAILY
COMMUNITY
End: 2017-10-04 | Stop reason: SDUPTHER

## 2017-09-15 RX ORDER — FLUTICASONE PROPIONATE 50 MCG
1 SPRAY, SUSPENSION (ML) NASAL 2 TIMES DAILY PRN
Qty: 15 G | Refills: 0 | Status: SHIPPED | OUTPATIENT
Start: 2017-09-15 | End: 2021-04-13

## 2017-09-15 RX ADMIN — DEXAMETHASONE SODIUM PHOSPHATE 8 MG: 4 INJECTION, SOLUTION INTRAMUSCULAR; INTRAVENOUS at 10:09

## 2017-09-16 NOTE — ED PROVIDER NOTES
Encounter Date: 9/15/2017       History     Chief Complaint   Patient presents with    URI     nasal congestion, sore throat, cough x 4 days     Phylicia Vásuqez is a 47 year old female with pmh anxiety, HTN presenting to the ED with a three day history of nasal congestion, rhinorrhea, and sore throat. The patient has taken OTC medications without relief of symptoms. She has had a nonproductive cough with no chest pain or shortness of breath. She had vomiting with coughing 3 days ago with none since. She denies abdominal pain, diarrhea, fever.           Review of patient's allergies indicates:   Allergen Reactions    Butalbital Other (See Comments)     Chest pain       Past Medical History:   Diagnosis Date    Anxiety     Back pain     HTN (hypertension) 10/18/2012    Insomnia 10/18/2012    Spinal cord cyst, L1      Thoracic radiculopathy, bulging disc at T10-11 and T11-12      Past Surgical History:   Procedure Laterality Date    BREAST SURGERY      breast biopsy     SECTION      CHOLECYSTECTOMY      COLONOSCOPY N/A 10/5/2015    Procedure: COLONOSCOPY;  Surgeon: JERONIMO Cancino MD;  Location: [facility] ([address] [place] [address]);  Service: Endoscopy;  Laterality: N/A;    HYSTERECTOMY      BC--SUPRACERVICAL     Family History   Problem Relation Age of Onset    Cancer Paternal Aunt     Lupus Paternal Aunt     Hypertension Mother     Liver disease Father     Alcohol abuse Father     Colon cancer Paternal Uncle     Breast cancer Neg Hx     Ovarian cancer Neg Hx     Melanoma Neg Hx     Psoriasis Neg Hx     Eczema Neg Hx      Social History   Substance Use Topics    Smoking status: Never Smoker    Smokeless tobacco: Never Used    Alcohol use No     Review of Systems   Constitutional: Negative for chills and fever.   HENT: Positive for congestion, ear pain, rhinorrhea and sore throat (upon waking).    Eyes: Negative for pain and redness.   Respiratory: Positive for cough. Negative for shortness of  breath.    Cardiovascular: Negative for chest pain and palpitations.   Gastrointestinal: Positive for vomiting (with coughing 3 days ago). Negative for abdominal pain, diarrhea and nausea.   Genitourinary: Negative for dysuria, flank pain, frequency, hematuria and urgency.   Musculoskeletal: Negative for gait problem, myalgias and neck pain.   Skin: Negative for rash.   Neurological: Negative for dizziness, light-headedness and headaches.       Physical Exam     Initial Vitals [09/15/17 0937]   BP Pulse Resp Temp SpO2   124/68 72 16 99.5 °F (37.5 °C) 98 %      MAP       86.67         Physical Exam    Constitutional: Vital signs are normal. She appears well-developed and well-nourished. She is not diaphoretic. She does not have a sickly appearance. No distress.   HENT:   Head: Normocephalic and atraumatic.   Right Ear: Tympanic membrane normal.   Left Ear: Tympanic membrane normal.   Mouth/Throat: Mucous membranes are normal. No oropharyngeal exudate or posterior oropharyngeal erythema.   Nasal congestion    Eyes: Conjunctivae are normal.   Neck: Normal range of motion and full passive range of motion without pain.   Cardiovascular: Normal rate, regular rhythm and normal heart sounds.   Pulmonary/Chest: Breath sounds normal. No respiratory distress.   Abdominal: Soft. Bowel sounds are normal. There is no tenderness.   Musculoskeletal: Normal range of motion.   Neurological: She is alert. Gait normal.   Skin: Skin is warm and dry. Capillary refill takes less than 2 seconds.   Psychiatric: She has a normal mood and affect.         ED Course   Procedures  Labs Reviewed   INFLUENZA A AND B ANTIGEN                   APC / Resident Notes:   Phylicia Vásquez is a 47 year old female presenting to the ED with upper respiratory symptoms. The patient appears well hydrated and nontoxic. She has no adventitious lung sounds on exam and I do not suspect pneumonia. I do not suspect meningitis, sepsis, strep pharyngitis. Her symptoms  are most consistent with viral upper respiratory illness. I discussed symptomatic treatment with the patient and she verbalized understanding. I do not think that antibiotics are necessary at this time. She was given a steroid shot in the ED and provided prescriptions for tessalon and fluticasone. I discussed specific return precautions and she verbalized understanding. Based on my clinical evaluation, I do not appreciate any immediate, emergent, or life threatening condition or etiology that warrants additional workup today and feel that the patient can be discharged with close follow up care.                 ED Course      Clinical Impression:   The encounter diagnosis was Viral upper respiratory illness.    Disposition:   Disposition: Discharged  Condition: Stable                        Xiomara Cantor NP  09/15/17 [id number]

## 2017-09-20 RX ORDER — CYCLOBENZAPRINE HCL 5 MG
TABLET ORAL
Qty: 90 TABLET | Refills: 0 | Status: SHIPPED | OUTPATIENT
Start: 2017-09-20 | End: 2017-11-02 | Stop reason: SDUPTHER

## 2017-09-28 RX ORDER — TRAMADOL HYDROCHLORIDE 50 MG/1
TABLET ORAL
Qty: 120 TABLET | Refills: 0 | Status: SHIPPED | OUTPATIENT
Start: 2017-09-28 | End: 2017-10-04 | Stop reason: ALTCHOICE

## 2017-10-04 ENCOUNTER — IMMUNIZATION (OUTPATIENT)
Dept: INTERNAL MEDICINE | Facility: CLINIC | Age: 47
End: 2017-10-04
Payer: COMMERCIAL

## 2017-10-04 ENCOUNTER — HOSPITAL ENCOUNTER (OUTPATIENT)
Dept: RADIOLOGY | Facility: HOSPITAL | Age: 47
Discharge: HOME | End: 2017-10-04
Attending: OBSTETRICS & GYNECOLOGY
Payer: COMMERCIAL

## 2017-10-04 ENCOUNTER — OFFICE VISIT (OUTPATIENT)
Dept: INTERNAL MEDICINE | Facility: CLINIC | Age: 47
End: 2017-10-04
Payer: COMMERCIAL

## 2017-10-04 VITALS — WEIGHT: 242 LBS | BODY MASS INDEX: 41.32 KG/M2 | HEIGHT: 64 IN

## 2017-10-04 VITALS
WEIGHT: 242.75 LBS | DIASTOLIC BLOOD PRESSURE: 88 MMHG | HEART RATE: 73 BPM | OXYGEN SATURATION: 93 % | SYSTOLIC BLOOD PRESSURE: 130 MMHG | BODY MASS INDEX: 41.44 KG/M2 | HEIGHT: 64 IN

## 2017-10-04 DIAGNOSIS — K76.0 FATTY LIVER: ICD-10-CM

## 2017-10-04 DIAGNOSIS — I10 ESSENTIAL HYPERTENSION: Primary | ICD-10-CM

## 2017-10-04 DIAGNOSIS — E07.9 THYROID DYSFUNCTION: ICD-10-CM

## 2017-10-04 DIAGNOSIS — E66.01 MORBID OBESITY: ICD-10-CM

## 2017-10-04 DIAGNOSIS — Z12.39 BREAST CANCER SCREENING: ICD-10-CM

## 2017-10-04 DIAGNOSIS — D24.1 PAPILLOMA OF RIGHT BREAST: ICD-10-CM

## 2017-10-04 DIAGNOSIS — Z12.39 SCREENING FOR BREAST CANCER: ICD-10-CM

## 2017-10-04 PROCEDURE — 90471 IMMUNIZATION ADMIN: CPT | Mod: S$GLB,,, | Performed by: INTERNAL MEDICINE

## 2017-10-04 PROCEDURE — 77067 SCR MAMMO BI INCL CAD: CPT | Mod: TC

## 2017-10-04 PROCEDURE — 99214 OFFICE O/P EST MOD 30 MIN: CPT | Mod: 25,S$GLB,, | Performed by: INTERNAL MEDICINE

## 2017-10-04 PROCEDURE — 77067 SCR MAMMO BI INCL CAD: CPT | Mod: 26,,, | Performed by: RADIOLOGY

## 2017-10-04 PROCEDURE — 90686 IIV4 VACC NO PRSV 0.5 ML IM: CPT | Mod: S$GLB,,, | Performed by: INTERNAL MEDICINE

## 2017-10-04 PROCEDURE — 99999 PR PBB SHADOW E&M-EST. PATIENT-LVL IV: CPT | Mod: PBBFAC,,, | Performed by: INTERNAL MEDICINE

## 2017-10-04 RX ORDER — AMLODIPINE BESYLATE 5 MG/1
5 TABLET ORAL DAILY
Qty: 90 TABLET | Refills: 3 | Status: SHIPPED | OUTPATIENT
Start: 2017-10-04 | End: 2018-11-14 | Stop reason: ALTCHOICE

## 2017-10-05 ENCOUNTER — TELEPHONE (OUTPATIENT)
Dept: INTERNAL MEDICINE | Facility: CLINIC | Age: 47
End: 2017-10-05

## 2017-10-05 ENCOUNTER — TELEPHONE (OUTPATIENT)
Dept: RADIOLOGY | Facility: HOSPITAL | Age: 47
End: 2017-10-05

## 2017-10-05 RX ORDER — ERGOCALCIFEROL 1.25 MG/1
50000 CAPSULE ORAL
Qty: 12 CAPSULE | Refills: 1 | Status: SHIPPED | OUTPATIENT
Start: 2017-10-05 | End: 2020-06-04 | Stop reason: ALTCHOICE

## 2017-10-05 NOTE — PROGRESS NOTES
Subjective:       Patient ID: Phylicia Vásquez is a 47 y.o. female.    Chief Complaint: Follow-up  I'm delighted to see that she is happy. Her boyfriend looks very cute.  She has not been checking her blood pressure but she is taking her medication every day.  She is slowly losing weight.  HPI  Review of Systems   Constitutional: Negative for activity change, appetite change, chills, fatigue, fever and unexpected weight change.   HENT: Negative for hearing loss.    Eyes: Negative for visual disturbance.   Respiratory: Negative for cough, chest tightness, shortness of breath and wheezing.    Cardiovascular: Negative for chest pain, palpitations and leg swelling.   Gastrointestinal: Negative for abdominal pain, constipation, nausea and vomiting.   Genitourinary: Negative for dysuria, frequency and urgency.   Musculoskeletal: Negative for arthralgias, back pain, gait problem, joint swelling and myalgias.   Skin: Negative for rash.   Neurological: Negative for light-headedness and headaches.   Psychiatric/Behavioral: Negative for dysphoric mood and sleep disturbance. The patient is not nervous/anxious.        Objective:      Physical Exam   Constitutional: She is oriented to person, place, and time. She appears well-developed and well-nourished. No distress.   HENT:   Head: Normocephalic and atraumatic.   Right Ear: External ear normal.   Left Ear: External ear normal.   Nose: Nose normal.   Mouth/Throat: Oropharynx is clear and moist. No oropharyngeal exudate.   Eyes: Conjunctivae and EOM are normal. Pupils are equal, round, and reactive to light. Right eye exhibits no discharge. No scleral icterus.   Neck: Normal range of motion and full passive range of motion without pain. Neck supple. No spinous process tenderness and no muscular tenderness present. Carotid bruit is not present. No thyromegaly present.   Cardiovascular: Normal rate, regular rhythm, S1 normal, S2 normal, normal heart sounds and intact distal  pulses.  Exam reveals no gallop and no friction rub.    No murmur heard.  Pulmonary/Chest: Effort normal and breath sounds normal. No respiratory distress. She has no wheezes. She has no rales. She exhibits no tenderness.   Abdominal: Soft. Bowel sounds are normal. She exhibits no distension and no mass. There is no tenderness. There is no rebound and no guarding.   Genitourinary: Pelvic exam was performed with patient supine. Uterus is not deviated, not enlarged, not fixed and not tender. Cervix exhibits no motion tenderness, no discharge and no friability. Right adnexum displays no mass, no tenderness and no fullness. Left adnexum displays no mass, no tenderness and no fullness.   Musculoskeletal: Normal range of motion. She exhibits no edema or tenderness.   Lymphadenopathy:        Head (right side): No submental and no submandibular adenopathy present.        Head (left side): No submental and no submandibular adenopathy present.     She has no cervical adenopathy.        Right cervical: No superficial cervical, no deep cervical and no posterior cervical adenopathy present.       Left cervical: No superficial cervical, no deep cervical and no posterior cervical adenopathy present.        Right axillary: No pectoral and no lateral adenopathy present.        Left axillary: No pectoral and no lateral adenopathy present.       Right: No supraclavicular adenopathy present.        Left: No supraclavicular adenopathy present.   Neurological: She is alert and oriented to person, place, and time. She has normal reflexes. No cranial nerve deficit. She exhibits normal muscle tone. Coordination normal.   Skin: Skin is warm and dry. No rash noted.   Psychiatric: She has a normal mood and affect. Her behavior is normal. Her mood appears not anxious. Her speech is not rapid and/or pressured. She is not agitated. She does not exhibit a depressed mood.   Normal behavior, thought content, insight and judgement.       Assessment:        1. Essential hypertension    2. Fatty liver    3. Morbid obesity    4. Papilloma of right breast    5. Thyroid dysfunction    6. Screening for breast cancer        Plan:   Phylicia was seen today for follow-up.    Diagnoses and all orders for this visit:    Essential hypertension  -     CBC auto differential; Future  -     Comprehensive metabolic panel; Future  -     Uric acid; Future  -     Lipid panel; Future  -     Vitamin D; Future    Fatty liver  -     Comprehensive metabolic panel; Future  -     Lipid panel; Future  -     Vitamin D; Future    Morbid obesity  -     Lipid panel; Future  -     Hemoglobin A1c; Future  -     Vitamin D; Future    Papilloma of right breast    Thyroid dysfunction  -     TSH; Future  -     Vitamin D; Future    Screening for breast cancer  -     Mammo Digital Screening Bilat with CAD; Future    Other orders  -     amlodipine (NORVASC) 5 MG tablet; Take 1 tablet (5 mg total) by mouth once daily.

## 2017-10-05 NOTE — TELEPHONE ENCOUNTER
Spoke with patient and explained mammogram findings.Patient expressed understanding of results. Patient is scheduled for a abnormal mammogram follow up appointment at [facility] on 10/6/17

## 2017-10-06 ENCOUNTER — HOSPITAL ENCOUNTER (OUTPATIENT)
Dept: RADIOLOGY | Facility: HOSPITAL | Age: 47
Discharge: HOME | End: 2017-10-06
Attending: OBSTETRICS & GYNECOLOGY
Payer: COMMERCIAL

## 2017-10-06 DIAGNOSIS — R92.8 ABNORMAL MAMMOGRAM: ICD-10-CM

## 2017-10-06 PROCEDURE — 77061 BREAST TOMOSYNTHESIS UNI: CPT | Mod: 26,,, | Performed by: RADIOLOGY

## 2017-10-06 PROCEDURE — 77061 BREAST TOMOSYNTHESIS UNI: CPT | Mod: TC

## 2017-10-06 PROCEDURE — 77065 DX MAMMO INCL CAD UNI: CPT | Mod: 26,,, | Performed by: RADIOLOGY

## 2017-10-22 ENCOUNTER — PATIENT MESSAGE (OUTPATIENT)
Dept: INTERNAL MEDICINE | Facility: CLINIC | Age: 47
End: 2017-10-22

## 2017-11-02 RX ORDER — CYCLOBENZAPRINE HCL 5 MG
TABLET ORAL
Qty: 90 TABLET | Refills: 0 | Status: SHIPPED | OUTPATIENT
Start: 2017-11-02 | End: 2018-03-08 | Stop reason: SDUPTHER

## 2017-11-08 DIAGNOSIS — L21.9 SEBORRHEIC DERMATITIS OF SCALP: ICD-10-CM

## 2017-11-09 RX ORDER — FLUOCINOLONE ACETONIDE 0.11 MG/ML
OIL TOPICAL
Qty: 1 BOTTLE | Refills: 1 | Status: SHIPPED | OUTPATIENT
Start: 2017-11-09 | End: 2018-11-14 | Stop reason: SDUPTHER

## 2017-12-10 DIAGNOSIS — L21.9 SEBORRHEIC DERMATITIS OF SCALP: ICD-10-CM

## 2017-12-10 DIAGNOSIS — L21.9 SEBORRHEIC DERMATITIS: ICD-10-CM

## 2017-12-11 RX ORDER — KETOCONAZOLE 20 MG/ML
SHAMPOO, SUSPENSION TOPICAL
Qty: 120 ML | Refills: 0 | Status: SHIPPED | OUTPATIENT
Start: 2017-12-11 | End: 2017-12-23 | Stop reason: SDUPTHER

## 2017-12-23 DIAGNOSIS — L21.9 SEBORRHEIC DERMATITIS OF SCALP: ICD-10-CM

## 2017-12-23 DIAGNOSIS — L21.9 SEBORRHEIC DERMATITIS: ICD-10-CM

## 2017-12-29 RX ORDER — KETOCONAZOLE 20 MG/ML
SHAMPOO, SUSPENSION TOPICAL
Qty: 120 ML | Refills: 0 | Status: SHIPPED | OUTPATIENT
Start: 2017-12-29 | End: 2018-02-10 | Stop reason: SDUPTHER

## 2018-01-26 DIAGNOSIS — L21.9 SEBORRHEIC DERMATITIS: ICD-10-CM

## 2018-01-26 DIAGNOSIS — L21.9 SEBORRHEIC DERMATITIS OF SCALP: ICD-10-CM

## 2018-01-26 RX ORDER — KETOCONAZOLE 20 MG/ML
SHAMPOO, SUSPENSION TOPICAL
Qty: 120 ML | Refills: 0 | Status: SHIPPED | OUTPATIENT
Start: 2018-01-26 | End: 2018-11-14 | Stop reason: SDUPTHER

## 2018-02-10 ENCOUNTER — HOSPITAL ENCOUNTER (EMERGENCY)
Facility: HOSPITAL | Age: 48
Discharge: HOME | End: 2018-02-10
Attending: EMERGENCY MEDICINE
Payer: COMMERCIAL

## 2018-02-10 VITALS
BODY MASS INDEX: 38.65 KG/M2 | WEIGHT: 232 LBS | DIASTOLIC BLOOD PRESSURE: 85 MMHG | RESPIRATION RATE: 17 BRPM | HEIGHT: 65 IN | TEMPERATURE: 100 F | HEART RATE: 93 BPM | SYSTOLIC BLOOD PRESSURE: 174 MMHG | OXYGEN SATURATION: 99 %

## 2018-02-10 DIAGNOSIS — J10.1 INFLUENZA A: Primary | ICD-10-CM

## 2018-02-10 DIAGNOSIS — L21.9 SEBORRHEIC DERMATITIS OF SCALP: ICD-10-CM

## 2018-02-10 DIAGNOSIS — L21.9 SEBORRHEIC DERMATITIS: ICD-10-CM

## 2018-02-10 LAB
DEPRECATED S PYO AG THROAT QL EIA: NEGATIVE
FLUAV AG SPEC QL IA: POSITIVE
FLUBV AG SPEC QL IA: NEGATIVE
SPECIMEN SOURCE: ABNORMAL

## 2018-02-10 PROCEDURE — 87400 INFLUENZA A/B EACH AG IA: CPT

## 2018-02-10 PROCEDURE — 87880 STREP A ASSAY W/OPTIC: CPT

## 2018-02-10 PROCEDURE — 87081 CULTURE SCREEN ONLY: CPT

## 2018-02-10 PROCEDURE — 25000003 PHARM REV CODE 250: Performed by: EMERGENCY MEDICINE

## 2018-02-10 PROCEDURE — 99284 EMERGENCY DEPT VISIT MOD MDM: CPT

## 2018-02-10 RX ORDER — OSELTAMIVIR PHOSPHATE 75 MG/1
75 CAPSULE ORAL 2 TIMES DAILY
Qty: 10 CAPSULE | Refills: 0 | Status: SHIPPED | OUTPATIENT
Start: 2018-02-10 | End: 2018-02-15

## 2018-02-10 RX ORDER — OSELTAMIVIR PHOSPHATE 75 MG/1
75 CAPSULE ORAL
Status: COMPLETED | OUTPATIENT
Start: 2018-02-10 | End: 2018-02-10

## 2018-02-10 RX ADMIN — OSELTAMIVIR PHOSPHATE 75 MG: 75 CAPSULE ORAL at 02:02

## 2018-02-10 NOTE — ED PROVIDER NOTES
Encounter Date: 2/10/2018       History     Chief Complaint   Patient presents with    Influenza     s/s since Thursday      HPI   Patient is a 47-year-old woman who presents emergency department complaining of cough, congestion, body aches and general malaise since Thursday.  She has had many sick contacts at home and at her work with influenza.  Symptoms are constant, severe.  She is worried because she had a friend in her 30s recently die from influenza.  Review of patient's allergies indicates:   Allergen Reactions    Butalbital Other (See Comments)     Chest pain       Past Medical History:   Diagnosis Date    Anxiety     Back pain     HTN (hypertension) 10/18/2012    Insomnia 10/18/2012    Spinal cord cyst, L1      Thoracic radiculopathy, bulging disc at T10-11 and T11-12      Past Surgical History:   Procedure Laterality Date    BREAST BIOPSY Right 2015     SECTION      CHOLECYSTECTOMY      COLONOSCOPY N/A 10/5/2015    Procedure: COLONOSCOPY;  Surgeon: JERONIMO Cancino MD;  Location: [address];  Service: Endoscopy;  Laterality: N/A;    HYSTERECTOMY      BC--SUPRACERVICAL     Family History   Problem Relation Age of Onset    Cancer Paternal Aunt     Lupus Paternal Aunt     Hypertension Mother     Liver disease Father     Alcohol abuse Father     Colon cancer Paternal Uncle     Breast cancer Neg Hx     Ovarian cancer Neg Hx     Melanoma Neg Hx     Psoriasis Neg Hx     Eczema Neg Hx      Social History   Substance Use Topics    Smoking status: Never Smoker    Smokeless tobacco: Never Used    Alcohol use No     Review of Systems  REVIEW OF SYSTEMS  CONSTITUTIONAL: Positive for fever, general malaise, myalgias  HEENT:  Negative for sore throat.   HEART:   Negative for chest pain..  LUNG:  Positive for cough, congestion  ABDOMEN:  Negative for nausea.   :  No discharge, dysuria  EXTREMITIES:  No swelling  NEURO:  Negative for weakness.   SKIN:  Negative for  rash.  Psych: No depression  HEME: Does not bruise/bleed easily.           Physical Exam     Initial Vitals [02/10/18 0138]   BP Pulse Resp Temp SpO2   (!) 174/85 93 17 99.7 °F (37.6 °C) 99 %      MAP       114.67         Physical Exam  Physical Exam   Nursing note and vitals reviewed.   Constitutional: Oriented to person, place, and time. Appears well-developed and well-nourished.   HENT:   Head: Normocephalic and atraumatic.   Eyes: EOM are normal.   Neck: Normal range of motion. Neck supple.   Cardiovascular: Mild tachycardia, no murmur, rubs or gallops.  Pulmonary/Chest: Effort normal. Clear BS b/l   Abdominal: Soft, Non tender, no distension.   Musculoskeletal: Normal range of motion.   Neurological:Alert and oriented to person, place, and time.   Psychiatric: Normal mood and affect. Behavior is normal.         ED Course   Procedures  Labs Reviewed   INFLUENZA A AND B ANTIGEN - Abnormal; Notable for the following:        Result Value    Influenza A Ag, EIA Positive (*)     All other components within normal limits   THROAT SCREEN, RAPID   CULTURE, STREP A,  THROAT          X-Rays:   Independently Interpreted Readings:   Other Readings:  I independently viewed and interpreted the chest xray as normal appearing cardiac and mediastinal sizes and contours. There are no intrapulmonary masses, infiltrates, pneumothorax or pleural effusions seen. The soft tissues and bony structures appear unremarkable.  My overall impression is no acute cardiopulmonary process.      Medical Decision Making:   Initial Assessment:   Patient's 47-year-old woman with cough, congestion and body aches since Thursday and has been around people with influenza.  Influenza A is positive in the ED.  Chest x-ray is negative.  She has no respiratory distress.  She would like to be treated with Tamiflu after discussion of CDC recommendations and her not meeting criteria.  X-rays were discussed.  Prescribed Tamiflu.  Return precautions discussed.   She is discharged improved in no acute distress.                   ED Course      Clinical Impression:   The encounter diagnosis was Influenza A.                           Jace De Paz MD  02/10/18 1950       Jace De Paz MD  02/10/18 1951

## 2018-02-12 LAB — BACTERIA THROAT CULT: NORMAL

## 2018-02-19 RX ORDER — KETOCONAZOLE 20 MG/ML
SHAMPOO, SUSPENSION TOPICAL
Qty: 120 ML | Refills: 0 | Status: SHIPPED | OUTPATIENT
Start: 2018-02-19 | End: 2018-03-08 | Stop reason: SDUPTHER

## 2018-03-08 ENCOUNTER — OFFICE VISIT (OUTPATIENT)
Dept: INTERNAL MEDICINE | Facility: CLINIC | Age: 48
End: 2018-03-08
Payer: COMMERCIAL

## 2018-03-08 VITALS
DIASTOLIC BLOOD PRESSURE: 82 MMHG | WEIGHT: 245.81 LBS | BODY MASS INDEX: 40.96 KG/M2 | HEART RATE: 78 BPM | SYSTOLIC BLOOD PRESSURE: 130 MMHG | HEIGHT: 65 IN

## 2018-03-08 DIAGNOSIS — R05.9 COUGH: ICD-10-CM

## 2018-03-08 DIAGNOSIS — M54.14 THORACIC RADICULITIS: ICD-10-CM

## 2018-03-08 DIAGNOSIS — M54.50 ACUTE MIDLINE LOW BACK PAIN WITHOUT SCIATICA: Primary | ICD-10-CM

## 2018-03-08 PROCEDURE — 3079F DIAST BP 80-89 MM HG: CPT | Mod: S$GLB,,, | Performed by: INTERNAL MEDICINE

## 2018-03-08 PROCEDURE — 3075F SYST BP GE 130 - 139MM HG: CPT | Mod: S$GLB,,, | Performed by: INTERNAL MEDICINE

## 2018-03-08 PROCEDURE — 99213 OFFICE O/P EST LOW 20 MIN: CPT | Mod: S$GLB,,, | Performed by: INTERNAL MEDICINE

## 2018-03-08 PROCEDURE — 99999 PR PBB SHADOW E&M-EST. PATIENT-LVL III: CPT | Mod: PBBFAC,,, | Performed by: INTERNAL MEDICINE

## 2018-03-08 RX ORDER — CYCLOBENZAPRINE HCL 5 MG
TABLET ORAL
Qty: 180 TABLET | Refills: 0 | Status: SHIPPED | OUTPATIENT
Start: 2018-03-08 | End: 2018-06-07 | Stop reason: SDUPTHER

## 2018-03-08 RX ORDER — GABAPENTIN 300 MG/1
300 CAPSULE ORAL 3 TIMES DAILY PRN
Qty: 270 CAPSULE | Refills: 2 | Status: SHIPPED | OUTPATIENT
Start: 2018-03-08 | End: 2018-12-03 | Stop reason: SDUPTHER

## 2018-03-09 NOTE — PROGRESS NOTES
Subjective:       Patient ID: Phylicia Vásquez is a 47 y.o. female.    Chief Complaint: Low-back Pain (few days )  recurrence of an old problem. Right thoracic radiculopathy.  Brought on by recent cough, which is resolving  HPI  Review of Systems   Constitutional: Negative.  Negative for activity change, appetite change, chills, fatigue, fever and unexpected weight change.   HENT: Negative for hearing loss and tinnitus.    Eyes: Negative for visual disturbance.   Respiratory: Negative for cough, shortness of breath and wheezing.    Cardiovascular: Negative.  Negative for chest pain, palpitations and leg swelling.   Gastrointestinal: Negative for abdominal pain, blood in stool, constipation, diarrhea and nausea.   Genitourinary: Negative for dysuria, frequency and urgency.   Musculoskeletal: Positive for back pain. Negative for neck stiffness.   Skin: Negative for rash.   Neurological: Negative for headaches.   Psychiatric/Behavioral: Negative for dysphoric mood and sleep disturbance. The patient is not nervous/anxious.        Objective:      Physical Exam   Constitutional: She is oriented to person, place, and time. She appears well-developed and well-nourished. No distress.   HENT:   Head: Normocephalic and atraumatic.   Right Ear: External ear normal.   Left Ear: External ear normal.   Nose: Nose normal.   Mouth/Throat: Oropharynx is clear and moist. No oropharyngeal exudate.   Eyes: Conjunctivae and EOM are normal. Pupils are equal, round, and reactive to light. Right eye exhibits no discharge. No scleral icterus.   Neck: Normal range of motion and full passive range of motion without pain. Neck supple. No spinous process tenderness and no muscular tenderness present. Carotid bruit is not present. No thyromegaly present.   Cardiovascular: Normal rate, regular rhythm, S1 normal, S2 normal, normal heart sounds and intact distal pulses.  Exam reveals no gallop and no friction rub.    No murmur  heard.  Pulmonary/Chest: Effort normal and breath sounds normal. No respiratory distress. She has no wheezes. She has no rales. She exhibits no tenderness.   Abdominal: Soft. Bowel sounds are normal. She exhibits no distension and no mass. There is no tenderness. There is no rebound and no guarding.   Genitourinary: Pelvic exam was performed with patient supine. Uterus is not deviated, not enlarged, not fixed and not tender. Cervix exhibits no motion tenderness, no discharge and no friability. Right adnexum displays no mass, no tenderness and no fullness. Left adnexum displays no mass, no tenderness and no fullness.   Musculoskeletal: Normal range of motion. She exhibits no edema or tenderness.   Lymphadenopathy:        Head (right side): No submental and no submandibular adenopathy present.        Head (left side): No submental and no submandibular adenopathy present.     She has no cervical adenopathy.        Right cervical: No superficial cervical, no deep cervical and no posterior cervical adenopathy present.       Left cervical: No superficial cervical, no deep cervical and no posterior cervical adenopathy present.        Right axillary: No pectoral and no lateral adenopathy present.        Left axillary: No pectoral and no lateral adenopathy present.       Right: No supraclavicular adenopathy present.        Left: No supraclavicular adenopathy present.   Neurological: She is alert and oriented to person, place, and time. She has normal reflexes. No cranial nerve deficit. She exhibits normal muscle tone. Coordination normal.   Skin: Skin is warm and dry. No rash noted.   Psychiatric: She has a normal mood and affect. Her behavior is normal. Her mood appears not anxious. Her speech is not rapid and/or pressured. She is not agitated. She does not exhibit a depressed mood.   Normal behavior, thought content, insight and judgement.       Assessment:       1. Acute midline low back pain without sciatica    2.  Thoracic radiculitis    3. Cough        Plan:   Phylicia was seen today for low-back pain.    Diagnoses and all orders for this visit:    Acute midline low back pain without sciatica    Thoracic radiculitis,right flank area. In the past, responded to:    Cough    Other orders  -     cyclobenzaprine (FLEXERIL) 5 MG tablet; TAKE 1 TO 2 TABLET(5 MG) BY MOUTH NIGHTLY AS NEEDED FOR MUSCLE SPASM  -     gabapentin (NEURONTIN) 300 MG capsule; Take 1 capsule (300 mg total) by mouth 3 (three) times daily as needed.

## 2018-04-03 RX ORDER — INDAPAMIDE 2.5 MG/1
2.5 TABLET ORAL NIGHTLY
Qty: 90 TABLET | Refills: 3 | Status: SHIPPED | OUTPATIENT
Start: 2018-04-03 | End: 2018-11-14 | Stop reason: ALTCHOICE

## 2018-04-23 RX ORDER — ERGOCALCIFEROL 1.25 MG/1
CAPSULE ORAL
Qty: 12 CAPSULE | Refills: 0 | OUTPATIENT
Start: 2018-04-23

## 2018-06-07 RX ORDER — CYCLOBENZAPRINE HCL 5 MG
TABLET ORAL
Qty: 180 TABLET | Refills: 0 | Status: SHIPPED | OUTPATIENT
Start: 2018-06-07 | End: 2018-09-06 | Stop reason: SDUPTHER

## 2018-09-06 RX ORDER — CYCLOBENZAPRINE HCL 5 MG
TABLET ORAL
Qty: 180 TABLET | Refills: 0 | Status: SHIPPED | OUTPATIENT
Start: 2018-09-06 | End: 2018-11-14 | Stop reason: SDUPTHER

## 2018-10-05 ENCOUNTER — HOSPITAL ENCOUNTER (OUTPATIENT)
Dept: RADIOLOGY | Facility: HOSPITAL | Age: 48
Discharge: HOME | End: 2018-10-05
Attending: INTERNAL MEDICINE
Payer: COMMERCIAL

## 2018-10-05 VITALS — WEIGHT: 245 LBS | HEIGHT: 65 IN | BODY MASS INDEX: 40.82 KG/M2

## 2018-10-05 DIAGNOSIS — Z12.39 SCREENING FOR BREAST CANCER: ICD-10-CM

## 2018-10-05 PROCEDURE — 77067 SCR MAMMO BI INCL CAD: CPT | Mod: 26,,, | Performed by: RADIOLOGY

## 2018-10-05 PROCEDURE — 77067 SCR MAMMO BI INCL CAD: CPT | Mod: TC

## 2018-10-05 PROCEDURE — 77063 BREAST TOMOSYNTHESIS BI: CPT | Mod: 26,,, | Performed by: RADIOLOGY

## 2018-10-05 PROCEDURE — 77063 BREAST TOMOSYNTHESIS BI: CPT | Mod: TC

## 2018-11-14 ENCOUNTER — OFFICE VISIT (OUTPATIENT)
Dept: INTERNAL MEDICINE | Facility: CLINIC | Age: 48
End: 2018-11-14
Payer: COMMERCIAL

## 2018-11-14 ENCOUNTER — IMMUNIZATION (OUTPATIENT)
Dept: INTERNAL MEDICINE | Facility: CLINIC | Age: 48
End: 2018-11-14
Payer: COMMERCIAL

## 2018-11-14 VITALS
HEART RATE: 69 BPM | HEIGHT: 63 IN | OXYGEN SATURATION: 98 % | BODY MASS INDEX: 43.17 KG/M2 | WEIGHT: 243.63 LBS | SYSTOLIC BLOOD PRESSURE: 136 MMHG | DIASTOLIC BLOOD PRESSURE: 98 MMHG

## 2018-11-14 DIAGNOSIS — Z00.00 ANNUAL PHYSICAL EXAM: Primary | ICD-10-CM

## 2018-11-14 DIAGNOSIS — Z12.11 SCREENING FOR COLON CANCER: ICD-10-CM

## 2018-11-14 DIAGNOSIS — R73.9 HYPERGLYCEMIA: ICD-10-CM

## 2018-11-14 DIAGNOSIS — L21.9 SEBORRHEIC DERMATITIS OF SCALP: ICD-10-CM

## 2018-11-14 DIAGNOSIS — L21.9 SEBORRHEIC DERMATITIS: ICD-10-CM

## 2018-11-14 DIAGNOSIS — I10 ESSENTIAL HYPERTENSION: ICD-10-CM

## 2018-11-14 DIAGNOSIS — M51.34 BULGE OF THORACIC DISC WITHOUT MYELOPATHY: ICD-10-CM

## 2018-11-14 DIAGNOSIS — Z12.11 SCREEN FOR COLON CANCER: ICD-10-CM

## 2018-11-14 PROCEDURE — 90471 IMMUNIZATION ADMIN: CPT | Mod: S$GLB,,, | Performed by: INTERNAL MEDICINE

## 2018-11-14 PROCEDURE — 99999 PR PBB SHADOW E&M-EST. PATIENT-LVL III: CPT | Mod: PBBFAC,,, | Performed by: INTERNAL MEDICINE

## 2018-11-14 PROCEDURE — 90686 IIV4 VACC NO PRSV 0.5 ML IM: CPT | Mod: S$GLB,,, | Performed by: INTERNAL MEDICINE

## 2018-11-14 PROCEDURE — 99396 PREV VISIT EST AGE 40-64: CPT | Mod: S$GLB,,, | Performed by: INTERNAL MEDICINE

## 2018-11-14 PROCEDURE — 3075F SYST BP GE 130 - 139MM HG: CPT | Mod: CPTII,S$GLB,, | Performed by: INTERNAL MEDICINE

## 2018-11-14 PROCEDURE — 3080F DIAST BP >= 90 MM HG: CPT | Mod: CPTII,S$GLB,, | Performed by: INTERNAL MEDICINE

## 2018-11-14 RX ORDER — FLUOCINOLONE ACETONIDE 0.11 MG/ML
OIL TOPICAL
Qty: 1 BOTTLE | Refills: 1 | Status: SHIPPED | OUTPATIENT
Start: 2018-11-14 | End: 2019-12-18 | Stop reason: SDUPTHER

## 2018-11-14 RX ORDER — CYCLOBENZAPRINE HCL 5 MG
TABLET ORAL
Qty: 180 TABLET | Refills: 1 | Status: SHIPPED | OUTPATIENT
Start: 2018-11-14 | End: 2019-02-28 | Stop reason: SDUPTHER

## 2018-11-14 RX ORDER — AMLODIPINE AND VALSARTAN 5; 160 MG/1; MG/1
1 TABLET ORAL EVERY MORNING
Qty: 90 TABLET | Refills: 3 | Status: SHIPPED | OUTPATIENT
Start: 2018-11-14 | End: 2019-01-16 | Stop reason: SDUPTHER

## 2018-11-14 RX ORDER — KETOCONAZOLE 20 MG/ML
SHAMPOO, SUSPENSION TOPICAL
Qty: 120 ML | Refills: 0 | Status: SHIPPED | OUTPATIENT
Start: 2018-11-14 | End: 2019-12-18 | Stop reason: SDUPTHER

## 2018-11-19 NOTE — PROGRESS NOTES
Subjective:       Patient ID: Phylicia Vásquez is a 48 y.o. female.    Chief Complaint: Annual Exam (dizzy spells sometimes) and Back Pain   Wellness visit    Doing well.  See social documentation.    She is managing back pain well.    She has not been monitoring her blood pressure.  HPI  Review of Systems   Constitutional: Negative for fever.   Cardiovascular: Negative for chest pain.   Gastrointestinal: Positive for abdominal pain.   Genitourinary: Negative for dysuria, hematuria and pelvic pain.   Musculoskeletal: Positive for back pain.   Neurological: Negative for weakness, numbness and headaches.       Objective:      Physical Exam   Constitutional: She is oriented to person, place, and time. She appears well-developed and well-nourished. No distress.   HENT:   Head: Normocephalic and atraumatic.   Right Ear: External ear normal.   Left Ear: External ear normal.   Nose: Nose normal.   Mouth/Throat: Oropharynx is clear and moist. No oropharyngeal exudate.   Eyes: Conjunctivae and EOM are normal. Pupils are equal, round, and reactive to light. Right eye exhibits no discharge. No scleral icterus.   Neck: Normal range of motion and full passive range of motion without pain. Neck supple. No spinous process tenderness and no muscular tenderness present. Carotid bruit is not present. No thyromegaly present.   Cardiovascular: Normal rate, regular rhythm, S1 normal, S2 normal, normal heart sounds and intact distal pulses. Exam reveals no gallop and no friction rub.   No murmur heard.  Pulmonary/Chest: Effort normal and breath sounds normal. No respiratory distress. She has no wheezes. She has no rales. She exhibits no tenderness.   Abdominal: Soft. Bowel sounds are normal. She exhibits no distension and no mass. There is no tenderness. There is no rebound and no guarding.   Genitourinary: Pelvic exam was performed with patient supine. Uterus is not deviated, not enlarged, not fixed and not tender. Cervix exhibits  no motion tenderness, no discharge and no friability. Right adnexum displays no mass, no tenderness and no fullness. Left adnexum displays no mass, no tenderness and no fullness.   Musculoskeletal: Normal range of motion. She exhibits no edema or tenderness.   Lymphadenopathy:        Head (right side): No submental and no submandibular adenopathy present.        Head (left side): No submental and no submandibular adenopathy present.     She has no cervical adenopathy.        Right cervical: No superficial cervical, no deep cervical and no posterior cervical adenopathy present.       Left cervical: No superficial cervical, no deep cervical and no posterior cervical adenopathy present.        Right axillary: No pectoral and no lateral adenopathy present.        Left axillary: No pectoral and no lateral adenopathy present.       Right: No supraclavicular adenopathy present.        Left: No supraclavicular adenopathy present.   Neurological: She is alert and oriented to person, place, and time. She has normal reflexes. No cranial nerve deficit. She exhibits normal muscle tone. Coordination normal.   Skin: Skin is warm and dry. No rash noted.   Psychiatric: She has a normal mood and affect. Her behavior is normal. Her mood appears not anxious. Her speech is not rapid and/or pressured. She is not agitated. She does not exhibit a depressed mood.   Normal behavior, thought content, insight and judgement.   Nursing note and vitals reviewed.      Assessment:       1. Annual physical exam    2. Bulge of thoracic disc T10-11 and T11-12    3. Essential hypertension    4. BMI 40.0-44.9, adult    5. Screen for colon cancer,     6. Screening for colon cancer    7. Seborrheic dermatitis of scalp    8. Seborrheic dermatitis    9. Hyperglycemia        Plan:   Phylicia was seen today for annual exam and back pain.    Diagnoses and all orders for this visit:    Annual physical exam  -     Comprehensive metabolic panel; Future  -      CBC auto differential; Future  -     Hemoglobin A1c; Future  -     Lipid panel; Future  -     TSH; Future    Bulge of thoracic disc T10-11 and T11-12    Essential hypertension  -     Lipid panel; Future    BMI 40.0-44.9, adult    Screen for colon cancer,     Screening for colon cancer    Seborrheic dermatitis of scalp  -     ketoconazole (NIZORAL) 2 % shampoo; WASH HAIR WITH MEDICATED SHAMPOO AT LEAST 1 TO 2 TIMES A WEEK, LET SIT ON SCALP AT LEAST 5 MINUTES PRIOR TO RINSING  -     fluocinolone (DERMA-SMOOTHE) 0.01 % external oil; Thin film to AA QHS PRN flare    Seborrheic dermatitis  -     ketoconazole (NIZORAL) 2 % shampoo; WASH HAIR WITH MEDICATED SHAMPOO AT LEAST 1 TO 2 TIMES A WEEK, LET SIT ON SCALP AT LEAST 5 MINUTES PRIOR TO RINSING    Hyperglycemia  -     Hemoglobin A1c; Future    Other orders  -     amlodipine-valsartan (EXFORGE) 5-160 mg per tablet; Take 1 tablet by mouth every morning. Blood pressure  -     cyclobenzaprine (FLEXERIL) 5 MG tablet; TAKE 1 TO 2 TABLETS BY MOUTH EVERY NIGHT AT BEDTIME AS NEEDED FOR MUSCLE SPASM    Follow-up in about 3 months (around 2/14/2019) for after labs and also one year for PE.

## 2018-12-03 RX ORDER — GABAPENTIN 300 MG/1
CAPSULE ORAL
Qty: 270 CAPSULE | Refills: 0 | Status: SHIPPED | OUTPATIENT
Start: 2018-12-03 | End: 2019-02-28 | Stop reason: SDUPTHER

## 2018-12-04 RX ORDER — CYCLOBENZAPRINE HCL 5 MG
TABLET ORAL
Qty: 180 TABLET | Refills: 0 | Status: SHIPPED | OUTPATIENT
Start: 2018-12-04 | End: 2019-03-03 | Stop reason: SDUPTHER

## 2019-01-16 RX ORDER — AMLODIPINE AND VALSARTAN 5; 160 MG/1; MG/1
1 TABLET ORAL EVERY MORNING
Qty: 90 TABLET | Refills: 3 | Status: SHIPPED | OUTPATIENT
Start: 2019-01-16 | End: 2019-05-22 | Stop reason: ALTCHOICE

## 2019-01-16 NOTE — TELEPHONE ENCOUNTER
----- Message from Celine Morgan sent at 1/16/2019  9:53 AM CST -----  Contact: Pt  Pt is requesting a callback regarding her blood pressure medication    amlodipine-valsartan (EXFORGE) 5-160 mg per tablet    Pt can be reached at [phone]    Thanks

## 2019-01-17 ENCOUNTER — PATIENT MESSAGE (OUTPATIENT)
Dept: INTERNAL MEDICINE | Facility: CLINIC | Age: 49
End: 2019-01-17

## 2019-01-17 NOTE — TELEPHONE ENCOUNTER
Spoke with pharmacist and she confirmed that the patients medication was not effected in the current recall, spoke with patient and informed her of the message from pharmacy

## 2019-02-07 ENCOUNTER — LAB VISIT (OUTPATIENT)
Dept: LAB | Facility: HOSPITAL | Age: 49
End: 2019-02-07
Attending: INTERNAL MEDICINE
Payer: COMMERCIAL

## 2019-02-07 DIAGNOSIS — I10 ESSENTIAL HYPERTENSION: ICD-10-CM

## 2019-02-07 DIAGNOSIS — R73.9 HYPERGLYCEMIA: ICD-10-CM

## 2019-02-07 DIAGNOSIS — Z00.00 ANNUAL PHYSICAL EXAM: ICD-10-CM

## 2019-02-07 LAB
ALBUMIN SERPL BCP-MCNC: 4 G/DL
ALP SERPL-CCNC: 65 U/L
ALT SERPL W/O P-5'-P-CCNC: 20 U/L
ANION GAP SERPL CALC-SCNC: 10 MMOL/L
AST SERPL-CCNC: 14 U/L
BASOPHILS # BLD AUTO: 0.04 K/UL
BASOPHILS NFR BLD: 0.4 %
BILIRUB SERPL-MCNC: 0.5 MG/DL
BUN SERPL-MCNC: 8 MG/DL
CALCIUM SERPL-MCNC: 10.1 MG/DL
CHLORIDE SERPL-SCNC: 102 MMOL/L
CHOLEST SERPL-MCNC: 175 MG/DL
CHOLEST/HDLC SERPL: 3.2 {RATIO}
CO2 SERPL-SCNC: 28 MMOL/L
CREAT SERPL-MCNC: 0.8 MG/DL
DIFFERENTIAL METHOD: ABNORMAL
EOSINOPHIL # BLD AUTO: 0.2 K/UL
EOSINOPHIL NFR BLD: 2.5 %
ERYTHROCYTE [DISTWIDTH] IN BLOOD BY AUTOMATED COUNT: 14.9 %
EST. GFR  (AFRICAN AMERICAN): >60 ML/MIN/1.73 M^2
EST. GFR  (NON AFRICAN AMERICAN): >60 ML/MIN/1.73 M^2
ESTIMATED AVG GLUCOSE: 123 MG/DL
GLUCOSE SERPL-MCNC: 90 MG/DL
HBA1C MFR BLD HPLC: 5.9 %
HCT VFR BLD AUTO: 38.4 %
HDLC SERPL-MCNC: 54 MG/DL
HDLC SERPL: 30.9 %
HGB BLD-MCNC: 11.9 G/DL
LDLC SERPL CALC-MCNC: 106.4 MG/DL
LYMPHOCYTES # BLD AUTO: 3.2 K/UL
LYMPHOCYTES NFR BLD: 32.8 %
MCH RBC QN AUTO: 25.5 PG
MCHC RBC AUTO-ENTMCNC: 31 G/DL
MCV RBC AUTO: 82 FL
MONOCYTES # BLD AUTO: 0.4 K/UL
MONOCYTES NFR BLD: 3.9 %
NEUTROPHILS # BLD AUTO: 5.8 K/UL
NEUTROPHILS NFR BLD: 60.1 %
NONHDLC SERPL-MCNC: 121 MG/DL
PLATELET # BLD AUTO: 153 K/UL
PMV BLD AUTO: 12.2 FL
POTASSIUM SERPL-SCNC: 4.1 MMOL/L
PROT SERPL-MCNC: 7.9 G/DL
RBC # BLD AUTO: 4.66 M/UL
SODIUM SERPL-SCNC: 140 MMOL/L
TRIGL SERPL-MCNC: 73 MG/DL
TSH SERPL DL<=0.005 MIU/L-ACNC: 2.91 UIU/ML
WBC # BLD AUTO: 9.61 K/UL

## 2019-02-07 PROCEDURE — 85025 COMPLETE CBC W/AUTO DIFF WBC: CPT

## 2019-02-07 PROCEDURE — 36415 COLL VENOUS BLD VENIPUNCTURE: CPT

## 2019-02-07 PROCEDURE — 80053 COMPREHEN METABOLIC PANEL: CPT

## 2019-02-07 PROCEDURE — 80061 LIPID PANEL: CPT

## 2019-02-07 PROCEDURE — 84443 ASSAY THYROID STIM HORMONE: CPT

## 2019-02-07 PROCEDURE — 83036 HEMOGLOBIN GLYCOSYLATED A1C: CPT

## 2019-02-28 ENCOUNTER — OFFICE VISIT (OUTPATIENT)
Dept: INTERNAL MEDICINE | Facility: CLINIC | Age: 49
End: 2019-02-28
Payer: COMMERCIAL

## 2019-02-28 ENCOUNTER — HOSPITAL ENCOUNTER (OUTPATIENT)
Dept: RADIOLOGY | Facility: HOSPITAL | Age: 49
Discharge: HOME | End: 2019-02-28
Attending: INTERNAL MEDICINE
Payer: COMMERCIAL

## 2019-02-28 VITALS
WEIGHT: 240 LBS | HEIGHT: 63 IN | BODY MASS INDEX: 42.52 KG/M2 | HEART RATE: 72 BPM | SYSTOLIC BLOOD PRESSURE: 132 MMHG | OXYGEN SATURATION: 96 % | DIASTOLIC BLOOD PRESSURE: 72 MMHG

## 2019-02-28 DIAGNOSIS — M54.14 THORACIC RADICULITIS: ICD-10-CM

## 2019-02-28 DIAGNOSIS — M54.14 THORACIC RADICULOPATHY: ICD-10-CM

## 2019-02-28 DIAGNOSIS — M25.551 RIGHT HIP PAIN: Primary | ICD-10-CM

## 2019-02-28 DIAGNOSIS — M51.34 BULGE OF THORACIC DISC WITHOUT MYELOPATHY: ICD-10-CM

## 2019-02-28 DIAGNOSIS — E66.01 MORBID OBESITY: ICD-10-CM

## 2019-02-28 DIAGNOSIS — I10 ESSENTIAL HYPERTENSION: ICD-10-CM

## 2019-02-28 DIAGNOSIS — Z90.711 S/P ABDOMINAL SUPRACERVICAL SUBTOTAL HYSTERECTOMY: ICD-10-CM

## 2019-02-28 DIAGNOSIS — Z01.411 ENCOUNTER FOR GYNECOLOGICAL EXAMINATION WITH ABNORMAL FINDING: ICD-10-CM

## 2019-02-28 DIAGNOSIS — R73.9 HYPERGLYCEMIA: ICD-10-CM

## 2019-02-28 DIAGNOSIS — M25.551 RIGHT HIP PAIN: ICD-10-CM

## 2019-02-28 PROCEDURE — 99214 OFFICE O/P EST MOD 30 MIN: CPT | Mod: S$GLB,,, | Performed by: INTERNAL MEDICINE

## 2019-02-28 PROCEDURE — 73521 X-RAY EXAM HIPS BI 2 VIEWS: CPT | Mod: 26,,, | Performed by: RADIOLOGY

## 2019-02-28 PROCEDURE — 3075F PR MOST RECENT SYSTOLIC BLOOD PRESS GE 130-139MM HG: ICD-10-PCS | Mod: CPTII,S$GLB,, | Performed by: INTERNAL MEDICINE

## 2019-02-28 PROCEDURE — 3075F SYST BP GE 130 - 139MM HG: CPT | Mod: CPTII,S$GLB,, | Performed by: INTERNAL MEDICINE

## 2019-02-28 PROCEDURE — 73521 X-RAY EXAM HIPS BI 2 VIEWS: CPT | Mod: TC

## 2019-02-28 PROCEDURE — 99214 PR OFFICE/OUTPT VISIT, EST, LEVL IV, 30-39 MIN: ICD-10-PCS | Mod: S$GLB,,, | Performed by: INTERNAL MEDICINE

## 2019-02-28 PROCEDURE — 3008F BODY MASS INDEX DOCD: CPT | Mod: CPTII,S$GLB,, | Performed by: INTERNAL MEDICINE

## 2019-02-28 PROCEDURE — 3078F PR MOST RECENT DIASTOLIC BLOOD PRESSURE < 80 MM HG: ICD-10-PCS | Mod: CPTII,S$GLB,, | Performed by: INTERNAL MEDICINE

## 2019-02-28 PROCEDURE — 99999 PR PBB SHADOW E&M-EST. PATIENT-LVL IV: CPT | Mod: PBBFAC,,, | Performed by: INTERNAL MEDICINE

## 2019-02-28 PROCEDURE — 3008F PR BODY MASS INDEX (BMI) DOCUMENTED: ICD-10-PCS | Mod: CPTII,S$GLB,, | Performed by: INTERNAL MEDICINE

## 2019-02-28 PROCEDURE — 3078F DIAST BP <80 MM HG: CPT | Mod: CPTII,S$GLB,, | Performed by: INTERNAL MEDICINE

## 2019-02-28 PROCEDURE — 73521 XR HIPS BILATERAL 2 VIEW INCL AP PELVIS: ICD-10-PCS | Mod: 26,,, | Performed by: RADIOLOGY

## 2019-02-28 PROCEDURE — 99999 PR PBB SHADOW E&M-EST. PATIENT-LVL IV: ICD-10-PCS | Mod: PBBFAC,,, | Performed by: INTERNAL MEDICINE

## 2019-02-28 RX ORDER — CYCLOBENZAPRINE HCL 5 MG
TABLET ORAL
Qty: 180 TABLET | Refills: 1 | Status: SHIPPED | OUTPATIENT
Start: 2019-02-28 | End: 2019-07-23

## 2019-02-28 RX ORDER — GABAPENTIN 300 MG/1
CAPSULE ORAL
Qty: 270 CAPSULE | Refills: 3 | Status: SHIPPED | OUTPATIENT
Start: 2019-02-28 | End: 2019-07-23

## 2019-02-28 RX ORDER — VALSARTAN 160 MG/1
160 TABLET ORAL DAILY
Qty: 90 TABLET | Refills: 3 | Status: SHIPPED | OUTPATIENT
Start: 2019-02-28 | End: 2019-05-22 | Stop reason: SDUPTHER

## 2019-02-28 RX ORDER — AMLODIPINE BESYLATE 5 MG/1
5 TABLET ORAL DAILY
Qty: 90 TABLET | Refills: 3 | Status: ON HOLD | OUTPATIENT
Start: 2019-02-28 | End: 2019-11-21 | Stop reason: CLARIF

## 2019-02-28 NOTE — PROGRESS NOTES
Subjective:       Patient ID: Phylicia Vásquez is a 48 y.o. female.    Chief Complaint: Back Pain (mid back pain, patient currently takes Gabapentin and Flexeril to help relieve.) and Hip Pain (R hip pain (sharp and recurrent) radiates down R leg occurs mainly when walking.)  pAIN IS located in the ant groin  Started this week  No injury  No previous right hip problem  Not managed by normal pain meds, gabapentin   HPI  Review of Systems   Constitutional: Negative for activity change, appetite change, chills, fatigue, fever and unexpected weight change.   HENT: Negative for hearing loss.    Eyes: Negative for visual disturbance.   Respiratory: Negative for cough, chest tightness, shortness of breath and wheezing.    Cardiovascular: Negative for chest pain, palpitations and leg swelling.   Gastrointestinal: Negative for abdominal pain, constipation, nausea and vomiting.   Genitourinary: Negative for dysuria, frequency and urgency.   Musculoskeletal: Negative for arthralgias, back pain, gait problem, joint swelling and myalgias.   Skin: Negative for rash.   Neurological: Negative for light-headedness and headaches.   Psychiatric/Behavioral: Negative for dysphoric mood and sleep disturbance. The patient is not nervous/anxious.        Objective:      Physical Exam   Constitutional: She is oriented to person, place, and time. She appears well-developed and well-nourished. No distress.   HENT:   Head: Normocephalic and atraumatic.   Right Ear: External ear normal.   Left Ear: External ear normal.   Nose: Nose normal.   Mouth/Throat: Oropharynx is clear and moist. No oropharyngeal exudate.   Eyes: Conjunctivae and EOM are normal. Pupils are equal, round, and reactive to light. Right eye exhibits no discharge. No scleral icterus.   Neck: Normal range of motion and full passive range of motion without pain. Neck supple. No spinous process tenderness and no muscular tenderness present. Carotid bruit is not present. No  thyromegaly present.   Cardiovascular: Normal rate, regular rhythm, S1 normal, S2 normal, normal heart sounds and intact distal pulses. Exam reveals no gallop and no friction rub.   No murmur heard.  Pulmonary/Chest: Effort normal and breath sounds normal. No respiratory distress. She has no wheezes. She has no rales. She exhibits no tenderness.   Abdominal: Soft. Bowel sounds are normal. She exhibits no distension and no mass. There is no tenderness. There is no rebound and no guarding.   Genitourinary: Pelvic exam was performed with patient supine. Uterus is not deviated, not enlarged, not fixed and not tender. Cervix exhibits no motion tenderness, no discharge and no friability. Right adnexum displays no mass, no tenderness and no fullness. Left adnexum displays no mass, no tenderness and no fullness.   Musculoskeletal: Normal range of motion. She exhibits no edema or tenderness.   Lymphadenopathy:        Head (right side): No submental and no submandibular adenopathy present.        Head (left side): No submental and no submandibular adenopathy present.     She has no cervical adenopathy.        Right cervical: No superficial cervical, no deep cervical and no posterior cervical adenopathy present.       Left cervical: No superficial cervical, no deep cervical and no posterior cervical adenopathy present.        Right axillary: No pectoral and no lateral adenopathy present.        Left axillary: No pectoral and no lateral adenopathy present.       Right: No supraclavicular adenopathy present.        Left: No supraclavicular adenopathy present.   Neurological: She is alert and oriented to person, place, and time. She has normal reflexes. No cranial nerve deficit. She exhibits normal muscle tone. Coordination normal.   Skin: Skin is warm and dry. No rash noted.   Psychiatric: She has a normal mood and affect. Her behavior is normal. Her mood appears not anxious. Her speech is not rapid and/or pressured. She is not  agitated. She does not exhibit a depressed mood.   Normal behavior, thought content, insight and judgement.       Assessment:       1. Right hip pain    2. Bulge of thoracic disc T10-11 and T11-12    3. BMI 40.0-44.9, adult    4. Essential hypertension    5. Morbid obesity    6. Hyperglycemia    7. S/P abdominal supracervical subtotal hysterectomy, continues to have regular menses.    8. Thoracic radiculopathy    9. Thoracic radiculitis        Plan:   Phylicia was seen today for back pain and hip pain.    Diagnoses and all orders for this visit:    Right hip pain  -     Cancel: X-Ray Hip 2 or 3 views Right; Future  -     X-Ray Hips Bilateral 2 View Incl AP Pelvis; Future    Bulge of thoracic disc T10-11 and T11-12    BMI 40.0-44.9, adult    Essential hypertension    Morbid obesity    Hyperglycemia    S/P abdominal supracervical subtotal hysterectomy, continues to have regular menses.    Thoracic radiculopathy    Thoracic radiculitis      Medication List with Changes/Refills   New Medications    AMLODIPINE (NORVASC) 5 MG TABLET    Take 1 tablet (5 mg total) by mouth once daily.    VALSARTAN (DIOVAN) 160 MG TABLET    Take 1 tablet (160 mg total) by mouth once daily.   Current Medications    AMLODIPINE-VALSARTAN (EXFORGE) 5-160 MG PER TABLET    Take 1 tablet by mouth every morning. Blood pressure    CYCLOBENZAPRINE (FLEXERIL) 5 MG TABLET    TAKE 1 TO 2 TABLETS BY MOUTH EVERY NIGHT AT BEDTIME AS NEEDED FOR MUSCLE SPASM    ERGOCALCIFEROL (ERGOCALCIFEROL) 50,000 UNIT CAP    Take 1 capsule (50,000 Units total) by mouth every 7 days.    FLUOCINOLONE (DERMA-SMOOTHE) 0.01 % EXTERNAL OIL    Thin film to AA QHS PRN flare    FLUTICASONE (FLONASE) 50 MCG/ACTUATION NASAL SPRAY    1 spray by Each Nare route 2 (two) times daily as needed.    KETOCONAZOLE (NIZORAL) 2 % SHAMPOO    WASH HAIR WITH MEDICATED SHAMPOO AT LEAST 1 TO 2 TIMES A WEEK, LET SIT ON SCALP AT LEAST 5 MINUTES PRIOR TO RINSING   Changed and/or Refilled Medications     Modified Medication Previous Medication    CYCLOBENZAPRINE (FLEXERIL) 5 MG TABLET cyclobenzaprine (FLEXERIL) 5 MG tablet       TAKE 1 TO 2 TABLETS BY MOUTH EVERY NIGHT AT BEDTIME AS NEEDED FOR MUSCLE SPASM    TAKE 1 TO 2 TABLETS BY MOUTH EVERY NIGHT AT BEDTIME AS NEEDED FOR MUSCLE SPASM    GABAPENTIN (NEURONTIN) 300 MG CAPSULE gabapentin (NEURONTIN) 300 MG capsule       TAKE 1 CAPSULE(300 MG) BY MOUTH THREE TIMES DAILY AS NEEDED    TAKE 1 CAPSULE(300 MG) BY MOUTH THREE TIMES DAILY AS NEEDED   Hip Adductor Stretch (Flexibility)    1. Sit on the floor. Put the soles of your feet together so your knees are pointed outward.  2. Pull your heels in toward your groin, as close as is comfortable.  3. Put your hands on your knees, and gently push them closer to the floor.  4. Hold for 30 to 60 seconds.  5. Relax and repeat 2 times, or as instructed.  6. Repeat this exercise 3 times a day, or as instructed.  Date Last Reviewed: 3/10/2016  © 9369-2474 [employer]. [address] [place] [address]. All rights reserved. This information is not intended as a substitute for professional medical care. Always follow your healthcare professional's instructions.        Back Exercises: Hip Lift    To start, lie on your back with your knees bent and feet flat on the floor. Dont press your neck or lower back to the floor. Breathe deeply. You should feel comfortable and relaxed in this position:  · Tighten your abdomen and buttocks.  · Slowly raise your hips upward. Be careful not to arch your back.  · Hold for 5 seconds. Lower your hips to the floor.  · Repeat 10 times.  For your safety, check with your healthcare provider before starting an exercise program.   Date Last Reviewed: 8/16/2015  © 0191-4460 [employer]. [address] [place] [address]. All rights reserved. This information is not intended as a substitute for professional medical care. Always follow your healthcare  professional's instructions.        Iliotibial Band Stretch (Flexibility)    7. Stand next to a chair. Hold onto the chair with your right hand for support. Cross your right leg behind your left leg.  8. Lean your right hip toward the right. Feel the stretch at the outside of your hip.  9. Hold for 30 to 60 seconds. Then relax.  10. Repeat 2 times, or as instructed.  11. Switch sides and repeat.  12. Do this 3 times a day, or as instructed.     Tip: Dont bend forward or twist at the waist.   Date Last Reviewed: 3/29/2016  © 9903-5648 [employer]. [address] [place] [address]. All rights reserved. This information is not intended as a substitute for professional medical care. Always follow your healthcare professional's instructions.        Prone Hip Extension     13. Lie on your stomach on the floor with your legs straight. You can lie on a mat or towel. Rest your head on your arms.  14. Raise your right leg a few inches off the floor. Keep the right knee straight. Hold for 5 seconds.  15. Slowly lower your leg back down.  16. Repeat 5 times, or as instructed.  Date Last Reviewed: 3/10/2016  © 4283-2132 [employer]. [address] [place] [address]. All rights reserved. This information is not intended as a substitute for professional medical care. Always follow your healthcare professional's instructions.        Pre-Hip Replacement: Ankle Pumps, Quad Sets, Leg Raises  The following exercises build strength. Youll also use them after surgery to help speed your recovery. Ask your physical therapist or surgeon if you should exercise one or both legs. You may also be given special instructions. And unless youre told otherwise, try to do each exercise at least 5 to10 times, 2 times a day.  Ankle pumps    After surgery, ankle pumps will help prevent circulation problems, such as blood clots. Practice ankle pumps by pointing and flexing your feet.  Quadriceps  sets    Lie in bed with your legs straight. Tighten the muscle at the front of the thigh as you press the back of your knee down toward the bed.    Follow-up in about 9 months (around 11/19/2019) for for physical.

## 2019-02-28 NOTE — PATIENT INSTRUCTIONS
Hip Adductor Stretch (Flexibility)    1. Sit on the floor. Put the soles of your feet together so your knees are pointed outward.  2. Pull your heels in toward your groin, as close as is comfortable.  3. Put your hands on your knees, and gently push them closer to the floor.  4. Hold for 30 to 60 seconds.  5. Relax and repeat 2 times, or as instructed.  6. Repeat this exercise 3 times a day, or as instructed.  Date Last Reviewed: 3/10/2016  © 3474-3087 [employer]. [address] [place] [address]. All rights reserved. This information is not intended as a substitute for professional medical care. Always follow your healthcare professional's instructions.        Back Exercises: Hip Lift    To start, lie on your back with your knees bent and feet flat on the floor. Dont press your neck or lower back to the floor. Breathe deeply. You should feel comfortable and relaxed in this position:  · Tighten your abdomen and buttocks.  · Slowly raise your hips upward. Be careful not to arch your back.  · Hold for 5 seconds. Lower your hips to the floor.  · Repeat 10 times.  For your safety, check with your healthcare provider before starting an exercise program.   Date Last Reviewed: 8/16/2015  © 9715-2239 [employer]. [address] [place] [address]. All rights reserved. This information is not intended as a substitute for professional medical care. Always follow your healthcare professional's instructions.        Iliotibial Band Stretch (Flexibility)    7. Stand next to a chair. Hold onto the chair with your right hand for support. Cross your right leg behind your left leg.  8. Lean your right hip toward the right. Feel the stretch at the outside of your hip.  9. Hold for 30 to 60 seconds. Then relax.  10. Repeat 2 times, or as instructed.  11. Switch sides and repeat.  12. Do this 3 times a day, or as instructed.     Tip: Dont bend forward or twist at the waist.   Date Last  Reviewed: 3/29/2016  © 1781-9505 [employer]. [address] [place] [address]. All rights reserved. This information is not intended as a substitute for professional medical care. Always follow your healthcare professional's instructions.        Prone Hip Extension     13. Lie on your stomach on the floor with your legs straight. You can lie on a mat or towel. Rest your head on your arms.  14. Raise your right leg a few inches off the floor. Keep the right knee straight. Hold for 5 seconds.  15. Slowly lower your leg back down.  16. Repeat 5 times, or as instructed.  Date Last Reviewed: 3/10/2016  © 6669-5997 [employer]. [address] [place] [address]. All rights reserved. This information is not intended as a substitute for professional medical care. Always follow your healthcare professional's instructions.        Pre-Hip Replacement: Ankle Pumps, Quad Sets, Leg Raises  The following exercises build strength. Youll also use them after surgery to help speed your recovery. Ask your physical therapist or surgeon if you should exercise one or both legs. You may also be given special instructions. And unless youre told otherwise, try to do each exercise at least 5 to10 times, 2 times a day.  Ankle pumps    After surgery, ankle pumps will help prevent circulation problems, such as blood clots. Practice ankle pumps by pointing and flexing your feet.  Quadriceps sets    · Lie in bed with your legs straight. Tighten the muscle at the front of the thigh as you press the back of your knee down toward the bed.  · Hold for 5 to 10 seconds. Then relax the leg.  Straight leg raises    · Lie in bed. Bend one leg. Keep your other leg straight on the bed.  · Tighten your thigh muscle and lift your straight leg as high as you can, but not higher than 12 inches. Hold for 5 to 10 seconds. Slowly lower the leg.  Date Last Reviewed: 10/1/2015  © 4333-3345 [employer] [address]  [place] [address]. All rights reserved. This information is not intended as a substitute for professional medical care. Always follow your healthcare professional's instructions.    The 10-year CVD risk score (VIJAY'Agoino, et al., 2008) is: 5.1%    Values used to calculate the score:      Age: 48 years      Sex: Female      Diabetic: No      Tobacco smoker: No      Systolic Blood Pressure: 132 mmHg      Is BP treated: Yes      HDL Cholesterol: 54 mg/dL      Total Cholesterol: 175 mg/dL

## 2019-03-03 RX ORDER — CYCLOBENZAPRINE HCL 5 MG
TABLET ORAL
Qty: 180 TABLET | Refills: 0 | Status: SHIPPED | OUTPATIENT
Start: 2019-03-03 | End: 2019-07-23

## 2019-03-03 RX ORDER — GABAPENTIN 300 MG/1
CAPSULE ORAL
Qty: 270 CAPSULE | Refills: 0 | Status: SHIPPED | OUTPATIENT
Start: 2019-03-03 | End: 2019-07-23

## 2019-05-17 ENCOUNTER — PATIENT OUTREACH (OUTPATIENT)
Dept: ADMINISTRATIVE | Facility: HOSPITAL | Age: 49
End: 2019-05-17

## 2019-05-17 NOTE — PROGRESS NOTES
Chart review completed. Immunizations reconciled, HM modifiers updated, HM duplicate entries deleted, care team updated, old orders deleted. Pt due for pneumo vaccine.

## 2019-05-19 PROBLEM — R73.03 PREDIABETES: Status: ACTIVE | Noted: 2019-05-19

## 2019-05-22 ENCOUNTER — OFFICE VISIT (OUTPATIENT)
Dept: INTERNAL MEDICINE | Facility: CLINIC | Age: 49
End: 2019-05-22
Payer: COMMERCIAL

## 2019-05-22 VITALS
BODY MASS INDEX: 43.12 KG/M2 | WEIGHT: 243.38 LBS | DIASTOLIC BLOOD PRESSURE: 90 MMHG | SYSTOLIC BLOOD PRESSURE: 130 MMHG | HEART RATE: 74 BPM | HEIGHT: 63 IN | OXYGEN SATURATION: 98 %

## 2019-05-22 DIAGNOSIS — E66.01 MORBID OBESITY: ICD-10-CM

## 2019-05-22 DIAGNOSIS — E04.2 MULTINODULAR NON-TOXIC GOITER: ICD-10-CM

## 2019-05-22 DIAGNOSIS — R73.03 PREDIABETES: ICD-10-CM

## 2019-05-22 DIAGNOSIS — I10 ESSENTIAL HYPERTENSION: ICD-10-CM

## 2019-05-22 DIAGNOSIS — M51.34 BULGE OF THORACIC DISC WITHOUT MYELOPATHY: Primary | ICD-10-CM

## 2019-05-22 DIAGNOSIS — K76.0 FATTY LIVER: ICD-10-CM

## 2019-05-22 PROCEDURE — 3080F PR MOST RECENT DIASTOLIC BLOOD PRESSURE >= 90 MM HG: ICD-10-PCS | Mod: CPTII,S$GLB,, | Performed by: INTERNAL MEDICINE

## 2019-05-22 PROCEDURE — 3075F PR MOST RECENT SYSTOLIC BLOOD PRESS GE 130-139MM HG: ICD-10-PCS | Mod: CPTII,S$GLB,, | Performed by: INTERNAL MEDICINE

## 2019-05-22 PROCEDURE — 99214 OFFICE O/P EST MOD 30 MIN: CPT | Mod: S$GLB,,, | Performed by: INTERNAL MEDICINE

## 2019-05-22 PROCEDURE — 3080F DIAST BP >= 90 MM HG: CPT | Mod: CPTII,S$GLB,, | Performed by: INTERNAL MEDICINE

## 2019-05-22 PROCEDURE — 99999 PR PBB SHADOW E&M-EST. PATIENT-LVL IV: ICD-10-PCS | Mod: PBBFAC,,, | Performed by: INTERNAL MEDICINE

## 2019-05-22 PROCEDURE — 99999 PR PBB SHADOW E&M-EST. PATIENT-LVL IV: CPT | Mod: PBBFAC,,, | Performed by: INTERNAL MEDICINE

## 2019-05-22 PROCEDURE — 99214 PR OFFICE/OUTPT VISIT, EST, LEVL IV, 30-39 MIN: ICD-10-PCS | Mod: S$GLB,,, | Performed by: INTERNAL MEDICINE

## 2019-05-22 PROCEDURE — 3008F PR BODY MASS INDEX (BMI) DOCUMENTED: ICD-10-PCS | Mod: CPTII,S$GLB,, | Performed by: INTERNAL MEDICINE

## 2019-05-22 PROCEDURE — 3075F SYST BP GE 130 - 139MM HG: CPT | Mod: CPTII,S$GLB,, | Performed by: INTERNAL MEDICINE

## 2019-05-22 PROCEDURE — 3008F BODY MASS INDEX DOCD: CPT | Mod: CPTII,S$GLB,, | Performed by: INTERNAL MEDICINE

## 2019-05-22 RX ORDER — VALSARTAN 320 MG/1
320 TABLET ORAL DAILY
Qty: 90 TABLET | Refills: 3 | Status: ON HOLD | OUTPATIENT
Start: 2019-05-22 | End: 2019-11-21 | Stop reason: CLARIF

## 2019-05-22 NOTE — PATIENT INSTRUCTIONS
[employer] will start 3 x a week   242 lbs today, try for one half pound of weight loss per week
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
## 2019-07-23 ENCOUNTER — OFFICE VISIT (OUTPATIENT)
Dept: PODIATRY | Facility: CLINIC | Age: 49
End: 2019-07-23
Payer: COMMERCIAL

## 2019-07-23 VITALS
DIASTOLIC BLOOD PRESSURE: 78 MMHG | BODY MASS INDEX: 43.12 KG/M2 | WEIGHT: 243.38 LBS | SYSTOLIC BLOOD PRESSURE: 119 MMHG | HEART RATE: 86 BPM | HEIGHT: 63 IN

## 2019-07-23 DIAGNOSIS — L60.0 INGROWN NAIL: Primary | ICD-10-CM

## 2019-07-23 DIAGNOSIS — M79.675 TOE PAIN, LEFT: ICD-10-CM

## 2019-07-23 DIAGNOSIS — L03.032 PARONYCHIA, TOE, LEFT: ICD-10-CM

## 2019-07-23 PROCEDURE — 3008F BODY MASS INDEX DOCD: CPT | Mod: CPTII,S$GLB,, | Performed by: PODIATRIST

## 2019-07-23 PROCEDURE — 99213 PR OFFICE/OUTPT VISIT, EST, LEVL III, 20-29 MIN: ICD-10-PCS | Mod: S$GLB,,, | Performed by: PODIATRIST

## 2019-07-23 PROCEDURE — 99999 PR PBB SHADOW E&M-EST. PATIENT-LVL III: CPT | Mod: PBBFAC,,, | Performed by: PODIATRIST

## 2019-07-23 PROCEDURE — 3074F SYST BP LT 130 MM HG: CPT | Mod: CPTII,S$GLB,, | Performed by: PODIATRIST

## 2019-07-23 PROCEDURE — 99999 PR PBB SHADOW E&M-EST. PATIENT-LVL III: ICD-10-PCS | Mod: PBBFAC,,, | Performed by: PODIATRIST

## 2019-07-23 PROCEDURE — 3078F PR MOST RECENT DIASTOLIC BLOOD PRESSURE < 80 MM HG: ICD-10-PCS | Mod: CPTII,S$GLB,, | Performed by: PODIATRIST

## 2019-07-23 PROCEDURE — 3008F PR BODY MASS INDEX (BMI) DOCUMENTED: ICD-10-PCS | Mod: CPTII,S$GLB,, | Performed by: PODIATRIST

## 2019-07-23 PROCEDURE — 3074F PR MOST RECENT SYSTOLIC BLOOD PRESSURE < 130 MM HG: ICD-10-PCS | Mod: CPTII,S$GLB,, | Performed by: PODIATRIST

## 2019-07-23 PROCEDURE — 99213 OFFICE O/P EST LOW 20 MIN: CPT | Mod: S$GLB,,, | Performed by: PODIATRIST

## 2019-07-23 PROCEDURE — 3078F DIAST BP <80 MM HG: CPT | Mod: CPTII,S$GLB,, | Performed by: PODIATRIST

## 2019-07-23 RX ORDER — TOBRAMYCIN 3 MG/ML
SOLUTION/ DROPS OPHTHALMIC
Qty: 5 ML | Refills: 3 | Status: SHIPPED | OUTPATIENT
Start: 2019-07-23 | End: 2021-02-11

## 2019-07-23 NOTE — PROGRESS NOTES
Subjective:      Patient ID: Phylicia Vásquez is a 49 y.o. female.    Chief Complaint: Foot Pain (Left)    Sharp throbbing pain left big toe/toenail.  Gradual onset, worsening over past 1-2 weeks, aggravated by increased weight bearing, shoe gear, pressure.  No previous medical treatment.  OTC pain med not helping. Denies trauma, surgery left hallux.    Review of Systems   Constitution: Negative for chills, diaphoresis, fever, malaise/fatigue and night sweats.   Cardiovascular: Negative for claudication, cyanosis, leg swelling and syncope.   Skin: Positive for nail changes. Negative for color change, dry skin, rash, suspicious lesions and unusual hair distribution.   Musculoskeletal: Negative for falls, joint pain, joint swelling, muscle cramps, muscle weakness and stiffness.   Gastrointestinal: Negative for constipation, diarrhea, nausea and vomiting.   Neurological: Negative for brief paralysis, disturbances in coordination, focal weakness, numbness, paresthesias, sensory change and tremors.           Objective:      Physical Exam   Constitutional: She is oriented to person, place, and time. She appears well-developed and well-nourished. She is cooperative. No distress.   Cardiovascular:   Pulses:       Popliteal pulses are 2+ on the right side, and 2+ on the left side.        Dorsalis pedis pulses are 2+ on the right side, and 2+ on the left side.        Posterior tibial pulses are 2+ on the right side, and 2+ on the left side.   Capillary refill 3 seconds all toes/distal feet, all toes/both feet warm to touch.      Negative lymphadenopathy bilateral popliteal fossa and tarsal tunnel.      Negavie lower extremity edema bilateral.     Musculoskeletal:        Right ankle: She exhibits normal range of motion, no swelling, no ecchymosis, no deformity, no laceration and normal pulse. Achilles tendon normal. Achilles tendon exhibits no pain, no defect and normal Bryant's test results.   Normal angle, base,  station of gait. All ten toes without clubbing, cyanosis, or signs of ischemia.  No pain to palpation bilateral lower extremities.  Range of motion, stability, muscle strength, and muscle tone normal bilateral feet and legs.    Lymphadenopathy: No inguinal adenopathy noted on the right or left side.   Negative lymphadenopathy bilateral popliteal fossa and tarsal tunnel.    Negative lymphangitic streaking bilateral feet/ankles/legs.   Neurological: She is alert and oriented to person, place, and time. She has normal strength. She displays no atrophy and no tremor. No sensory deficit. She exhibits normal muscle tone. Gait normal.   Reflex Scores:       Patellar reflexes are 2+ on the right side and 2+ on the left side.       Achilles reflexes are 2+ on the right side and 2+ on the left side.  Negative tinel sign to percussion sural, superficial peroneal, deep peroneal, saphenous, and posterior tibial nerves right and left ankles and feet.     Skin: Skin is warm, dry and intact. Capillary refill takes 2 to 3 seconds. No abrasion, no bruising, no burn, no ecchymosis, no laceration, no lesion and no rash noted. She is not diaphoretic. No cyanosis or erythema. No pallor. Nails show no clubbing.   Visible and palpable ingrowth of toenail lateral border left hallux with pain to palpation, and focal localized erythema and edema,  without ulceration, drainage, pus, tracking, fluctuance, malodor, or cardinal signs infection.    Otherwise, Skin is normal age and health appropriate color, turgor, texture, and temperature bilateral lower extremities without ulceration, hyperpigmentation, discoloration, masses nodules or cords palpated.  No ecchymosis, erythema, edema, or cardinal signs of infection bilateral lower extremities.       Psychiatric: She has a normal mood and affect.             Assessment:       Encounter Diagnoses   Name Primary?    Ingrown nail Yes    Paronychia, toe, left     Toe pain, left          Plan:        Phylicia was seen today for foot pain.    Diagnoses and all orders for this visit:    Ingrown nail    Paronychia, toe, left    Toe pain, left    Other orders  -     tobramycin sulfate 0.3% (TOBREX) 0.3 % ophthalmic solution; 1-2 drops topically twice daily to affected toe(s).      I counseled the patient on her conditions, their implications and medical management.    Rx tobramycin drops bid.  Cover left hallux all times with band aid dressing changing daily.      Discussed conservative treatment with shoes of adequate dimensions, material, and style to alleviate symptoms and delay or prevent surgical intervention.     Utilizing sterile toenail clippers I aggressively debrided the offending nail border approximately 3 mm from its edge and carried the nail plate incision down at an angle in order to wedge out the offending cryptotic portion of the nail plate. The offending border was then removed in toto. The area was cleansed with alcohol. Patient tolerated the procedure well and related significant relief.          Follow up if symptoms worsen or fail to improve.

## 2019-10-16 ENCOUNTER — TELEPHONE (OUTPATIENT)
Dept: INTERNAL MEDICINE | Facility: CLINIC | Age: 49
End: 2019-10-16

## 2019-10-16 DIAGNOSIS — Z12.39 BREAST CANCER SCREENING: Primary | ICD-10-CM

## 2019-10-16 NOTE — TELEPHONE ENCOUNTER
----- Message from Tigre Johnson sent at 10/16/2019  9:50 AM CDT -----  Contact: Pt  Type:  Needs Medical Advice    Who Called: The Pt would like for you to send over her Mammo order.  Please contact the Pt after so that she will know to call us back to schedule it.    Best Call Back Number: [phone]

## 2019-10-24 ENCOUNTER — HOSPITAL ENCOUNTER (OUTPATIENT)
Dept: RADIOLOGY | Facility: HOSPITAL | Age: 49
Discharge: HOME | End: 2019-10-24
Attending: INTERNAL MEDICINE
Payer: COMMERCIAL

## 2019-10-24 VITALS — WEIGHT: 243.38 LBS | HEIGHT: 63 IN | BODY MASS INDEX: 43.12 KG/M2

## 2019-10-24 DIAGNOSIS — Z12.39 BREAST CANCER SCREENING: ICD-10-CM

## 2019-10-24 PROCEDURE — 77063 BREAST TOMOSYNTHESIS BI: CPT | Mod: 26,,, | Performed by: RADIOLOGY

## 2019-10-24 PROCEDURE — 77067 SCR MAMMO BI INCL CAD: CPT | Mod: TC

## 2019-10-24 PROCEDURE — 77067 MAMMO DIGITAL SCREENING BILAT WITH TOMOSYNTHESIS_CAD: ICD-10-PCS | Mod: 26,,, | Performed by: RADIOLOGY

## 2019-10-24 PROCEDURE — 77067 SCR MAMMO BI INCL CAD: CPT | Mod: 26,,, | Performed by: RADIOLOGY

## 2019-10-24 PROCEDURE — 77063 MAMMO DIGITAL SCREENING BILAT WITH TOMOSYNTHESIS_CAD: ICD-10-PCS | Mod: 26,,, | Performed by: RADIOLOGY

## 2019-10-30 ENCOUNTER — TELEPHONE (OUTPATIENT)
Dept: RADIOLOGY | Facility: HOSPITAL | Age: 49
End: 2019-10-30

## 2019-10-30 NOTE — TELEPHONE ENCOUNTER
Spoke with patient and explained mammogram findings.Patient expressed understanding of results. Patient scheduled abnormal mammogram follow up appointment at [facility] [place] on 10/31/2019.

## 2019-10-31 ENCOUNTER — HOSPITAL ENCOUNTER (OUTPATIENT)
Dept: RADIOLOGY | Facility: HOSPITAL | Age: 49
Discharge: HOME | End: 2019-10-31
Attending: INTERNAL MEDICINE
Payer: COMMERCIAL

## 2019-10-31 DIAGNOSIS — R92.8 ABNORMAL MAMMOGRAM: ICD-10-CM

## 2019-10-31 PROCEDURE — 76642 ULTRASOUND BREAST LIMITED: CPT | Mod: TC,PO,RT

## 2019-10-31 PROCEDURE — 76642 US BREAST RIGHT LIMITED: ICD-10-PCS | Mod: 26,RT,, | Performed by: RADIOLOGY

## 2019-10-31 PROCEDURE — 77061 MAMMO DIGITAL DIAGNOSTIC RIGHT WITH TOMOSYNTHESIS_CAD: ICD-10-PCS | Mod: 26,,, | Performed by: RADIOLOGY

## 2019-10-31 PROCEDURE — 77065 DX MAMMO INCL CAD UNI: CPT | Mod: 26,,, | Performed by: RADIOLOGY

## 2019-10-31 PROCEDURE — 77065 DX MAMMO INCL CAD UNI: CPT | Mod: TC,PO

## 2019-10-31 PROCEDURE — 77061 BREAST TOMOSYNTHESIS UNI: CPT | Mod: 26,,, | Performed by: RADIOLOGY

## 2019-10-31 PROCEDURE — 76642 ULTRASOUND BREAST LIMITED: CPT | Mod: 26,RT,, | Performed by: RADIOLOGY

## 2019-10-31 PROCEDURE — 77065 MAMMO DIGITAL DIAGNOSTIC RIGHT WITH TOMOSYNTHESIS_CAD: ICD-10-PCS | Mod: 26,,, | Performed by: RADIOLOGY

## 2019-11-01 ENCOUNTER — TELEPHONE (OUTPATIENT)
Dept: RADIOLOGY | Facility: HOSPITAL | Age: 49
End: 2019-11-01

## 2019-11-01 NOTE — TELEPHONE ENCOUNTER
Spoke with patient. Reviewed breast biopsy procedure and reviewed instructions for breast biopsy. Patient expressed understanding and all questions were answered. Provided patient with my phone number to call for any further concerns or questions.   Patient scheduled breast biopsy at the [facility] on 11/7/2019.

## 2019-11-07 ENCOUNTER — HOSPITAL ENCOUNTER (OUTPATIENT)
Dept: RADIOLOGY | Facility: HOSPITAL | Age: 49
Discharge: HOME | End: 2019-11-07
Attending: INTERNAL MEDICINE
Payer: COMMERCIAL

## 2019-11-07 DIAGNOSIS — R92.8 ABNORMAL MAMMOGRAM: ICD-10-CM

## 2019-11-07 PROCEDURE — 77065 MAMMO DIGITAL DIAGNOSTIC RIGHT WITH CAD: ICD-10-PCS | Mod: 26,RT,, | Performed by: RADIOLOGY

## 2019-11-07 PROCEDURE — 88342 TISSUE SPECIMEN TO PATHOLOGY, RADIOLOGY: ICD-10-PCS | Mod: 26,59,, | Performed by: PATHOLOGY

## 2019-11-07 PROCEDURE — 88360 TISSUE SPECIMEN TO PATHOLOGY, RADIOLOGY: ICD-10-PCS | Mod: 26,,, | Performed by: PATHOLOGY

## 2019-11-07 PROCEDURE — 88305 TISSUE EXAM BY PATHOLOGIST: CPT | Mod: 26,,, | Performed by: PATHOLOGY

## 2019-11-07 PROCEDURE — 19083 BX BREAST 1ST LESION US IMAG: CPT | Mod: RT,,, | Performed by: RADIOLOGY

## 2019-11-07 PROCEDURE — 88305 TISSUE SPECIMEN TO PATHOLOGY, RADIOLOGY: ICD-10-PCS | Mod: 26,,, | Performed by: PATHOLOGY

## 2019-11-07 PROCEDURE — 25000003 PHARM REV CODE 250: Mod: PO | Performed by: INTERNAL MEDICINE

## 2019-11-07 PROCEDURE — 27201068 US BREAST BIOPSY WITH IMAGING 1ST SITE RIGHT: Mod: PO

## 2019-11-07 PROCEDURE — 38505 US BIOPSY LYMPH NODE AXILLA: ICD-10-PCS | Mod: 51,RT,, | Performed by: RADIOLOGY

## 2019-11-07 PROCEDURE — 88360 TUMOR IMMUNOHISTOCHEM/MANUAL: CPT | Performed by: PATHOLOGY

## 2019-11-07 PROCEDURE — 19083 US BREAST BIOPSY WITH IMAGING 1ST SITE RIGHT: ICD-10-PCS | Mod: RT,,, | Performed by: RADIOLOGY

## 2019-11-07 PROCEDURE — 27201068 US BIOPSY LYMPH NODE AXILLA: Mod: PO

## 2019-11-07 PROCEDURE — 88342 IMHCHEM/IMCYTCHM 1ST ANTB: CPT | Mod: 26,59,, | Performed by: PATHOLOGY

## 2019-11-07 PROCEDURE — 88360 TUMOR IMMUNOHISTOCHEM/MANUAL: CPT | Mod: 26,,, | Performed by: PATHOLOGY

## 2019-11-07 PROCEDURE — 38505 NEEDLE BIOPSY LYMPH NODES: CPT | Mod: 51,RT,, | Performed by: RADIOLOGY

## 2019-11-07 PROCEDURE — 77065 DX MAMMO INCL CAD UNI: CPT | Mod: TC,PO,RT

## 2019-11-07 PROCEDURE — 77065 DX MAMMO INCL CAD UNI: CPT | Mod: 26,RT,, | Performed by: RADIOLOGY

## 2019-11-07 RX ORDER — LIDOCAINE HYDROCHLORIDE AND EPINEPHRINE 20; 10 MG/ML; UG/ML
14 INJECTION, SOLUTION INFILTRATION; PERINEURAL ONCE
Status: COMPLETED | OUTPATIENT
Start: 2019-11-07 | End: 2019-11-07

## 2019-11-07 RX ORDER — LIDOCAINE HYDROCHLORIDE 10 MG/ML
4 INJECTION, SOLUTION EPIDURAL; INFILTRATION; INTRACAUDAL; PERINEURAL ONCE
Status: COMPLETED | OUTPATIENT
Start: 2019-11-07 | End: 2019-11-07

## 2019-11-07 RX ADMIN — LIDOCAINE HYDROCHLORIDE,EPINEPHRINE BITARTRATE 14 ML: 20; .01 INJECTION, SOLUTION INFILTRATION; PERINEURAL at 03:11

## 2019-11-07 RX ADMIN — LIDOCAINE HYDROCHLORIDE 4 ML: 10 INJECTION, SOLUTION EPIDURAL; INFILTRATION; INTRACAUDAL; PERINEURAL at 03:11

## 2019-11-08 ENCOUNTER — TELEPHONE (OUTPATIENT)
Dept: SURGERY | Facility: CLINIC | Age: 49
End: 2019-11-08

## 2019-11-08 DIAGNOSIS — C77.3 BREAST CANCER METASTASIZED TO AXILLARY LYMPH NODE, RIGHT: Primary | ICD-10-CM

## 2019-11-08 DIAGNOSIS — C50.911 BREAST CANCER METASTASIZED TO AXILLARY LYMPH NODE, RIGHT: Primary | ICD-10-CM

## 2019-11-08 NOTE — NURSING
Oncology Navigation   Intake  Date of Diagnosis: 10/07/19  Cancer Type: Breast  MD Assigned: Lelo Guaman MD  Internal / External Referral: Internal  Initial Nurse Navigator Contact: 19  Diagnosis to Initial Contact Timeline (days): 32 days  Contact Method: Phone  Date Worked: 19  First Appointment Available: 19  Appointment Date: 19  Schedule to Appointment Timeline (days): 4  First Available Date vs. Scheduled Date (days): 0  Multiple appointments: Yes     Treatment  Current Status: Staging work-up    Procedures: Biopsy;MRI;Mammogram  Biopsy Schedule Date: 19  Mammo Schedule Date: 10/31/19  MRI Schedule Date: 19       Support Systems: Parent;Family members (patient's mother and sister will come to appt)     Acuity  Stage: 1  Treatment Tolerability: Has not started treatment yet/treatment fully completed and side effects resolved  ECO  Comorbidities in Medical History: 2  Hospitalization Within the Past Month: 0   Needed: 0  Support: 0  Verbalizes Financial Concerns: 0  Transportation: 0  Psychological Factors (+1 each): Emotional during conversation  History of noncompliance/frequent no shows and cancellations: 0  Verbalizes the need for more education: 1  Navigation Acuity: 5     Follow Up  Follow up in about 3 days (around 2019) for MRI auth.

## 2019-11-11 ENCOUNTER — TELEPHONE (OUTPATIENT)
Dept: HEMATOLOGY/ONCOLOGY | Facility: CLINIC | Age: 49
End: 2019-11-11

## 2019-11-11 ENCOUNTER — PATIENT OUTREACH (OUTPATIENT)
Dept: ADMINISTRATIVE | Facility: OTHER | Age: 49
End: 2019-11-11

## 2019-11-11 ENCOUNTER — HOSPITAL ENCOUNTER (OUTPATIENT)
Dept: RADIOLOGY | Facility: HOSPITAL | Age: 49
Discharge: HOME | End: 2019-11-11
Attending: INTERNAL MEDICINE
Payer: COMMERCIAL

## 2019-11-11 DIAGNOSIS — C50.911 BREAST CANCER METASTASIZED TO AXILLARY LYMPH NODE, RIGHT: ICD-10-CM

## 2019-11-11 DIAGNOSIS — C77.3 BREAST CANCER METASTASIZED TO AXILLARY LYMPH NODE, RIGHT: ICD-10-CM

## 2019-11-11 PROCEDURE — A9577 INJ MULTIHANCE: HCPCS | Performed by: INTERNAL MEDICINE

## 2019-11-11 PROCEDURE — 25500020 PHARM REV CODE 255: Performed by: INTERNAL MEDICINE

## 2019-11-11 PROCEDURE — 77049 MRI BREAST C-+ W/CAD BI: CPT | Mod: 26,,, | Performed by: RADIOLOGY

## 2019-11-11 PROCEDURE — 77049 MRI BREAST C-+ W/CAD BI: CPT | Mod: TC

## 2019-11-11 PROCEDURE — 77049 MRI BREAST W/WO CONTRAST, W/CAD, BILATERAL: ICD-10-PCS | Mod: 26,,, | Performed by: RADIOLOGY

## 2019-11-11 RX ADMIN — GADOBENATE DIMEGLUMINE 20 ML: 529 INJECTION, SOLUTION INTRAVENOUS at 12:11

## 2019-11-11 NOTE — TELEPHONE ENCOUNTER
Called patient to follow up regarding MRI authorization for today. Explained to patient that her MRI was authorized, however it does appear that she will owe $977 due to her OOP max of $5500 on her insurance. We discussed payment plans and how she can ask for this when she checks in. Confirmed with Simin Cary in financial services that the amount is correct and that a payment plan is available. Patient verbalized understanding. Encouraged her to call with any questions or concerns

## 2019-11-12 ENCOUNTER — OFFICE VISIT (OUTPATIENT)
Dept: SURGERY | Facility: CLINIC | Age: 49
End: 2019-11-12
Payer: COMMERCIAL

## 2019-11-12 VITALS
WEIGHT: 240.06 LBS | SYSTOLIC BLOOD PRESSURE: 148 MMHG | HEART RATE: 84 BPM | HEIGHT: 66 IN | DIASTOLIC BLOOD PRESSURE: 87 MMHG | BODY MASS INDEX: 38.58 KG/M2 | TEMPERATURE: 98 F

## 2019-11-12 VITALS
TEMPERATURE: 98 F | DIASTOLIC BLOOD PRESSURE: 87 MMHG | SYSTOLIC BLOOD PRESSURE: 148 MMHG | WEIGHT: 240.06 LBS | HEART RATE: 84 BPM | HEIGHT: 66 IN | BODY MASS INDEX: 38.58 KG/M2

## 2019-11-12 DIAGNOSIS — Z17.0 CARCINOMA OF AXILLARY TAIL OF RIGHT BREAST IN FEMALE, ESTROGEN RECEPTOR POSITIVE: ICD-10-CM

## 2019-11-12 DIAGNOSIS — C50.611 MALIGNANT NEOPLASM OF AXILLARY TAIL OF RIGHT BREAST IN FEMALE, ESTROGEN RECEPTOR POSITIVE: Primary | ICD-10-CM

## 2019-11-12 DIAGNOSIS — C50.611 CARCINOMA OF AXILLARY TAIL OF RIGHT BREAST IN FEMALE, ESTROGEN RECEPTOR POSITIVE: ICD-10-CM

## 2019-11-12 DIAGNOSIS — Z17.0 MALIGNANT NEOPLASM OF AXILLARY TAIL OF RIGHT BREAST IN FEMALE, ESTROGEN RECEPTOR POSITIVE: Primary | ICD-10-CM

## 2019-11-12 PROCEDURE — 3008F PR BODY MASS INDEX (BMI) DOCUMENTED: ICD-10-PCS | Mod: CPTII,S$GLB,, | Performed by: INTERNAL MEDICINE

## 2019-11-12 PROCEDURE — 3077F SYST BP >= 140 MM HG: CPT | Mod: CPTII,S$GLB,, | Performed by: SURGERY

## 2019-11-12 PROCEDURE — 99999 PR PBB SHADOW E&M-EST. PATIENT-LVL III: CPT | Mod: PBBFAC,,, | Performed by: SURGERY

## 2019-11-12 PROCEDURE — 3079F PR MOST RECENT DIASTOLIC BLOOD PRESSURE 80-89 MM HG: ICD-10-PCS | Mod: CPTII,S$GLB,, | Performed by: INTERNAL MEDICINE

## 2019-11-12 PROCEDURE — 3077F SYST BP >= 140 MM HG: CPT | Mod: CPTII,S$GLB,, | Performed by: INTERNAL MEDICINE

## 2019-11-12 PROCEDURE — 3008F PR BODY MASS INDEX (BMI) DOCUMENTED: ICD-10-PCS | Mod: CPTII,S$GLB,, | Performed by: SURGERY

## 2019-11-12 PROCEDURE — 3079F DIAST BP 80-89 MM HG: CPT | Mod: CPTII,S$GLB,, | Performed by: SURGERY

## 2019-11-12 PROCEDURE — 3077F PR MOST RECENT SYSTOLIC BLOOD PRESSURE >= 140 MM HG: ICD-10-PCS | Mod: CPTII,S$GLB,, | Performed by: SURGERY

## 2019-11-12 PROCEDURE — 99205 OFFICE O/P NEW HI 60 MIN: CPT | Mod: S$GLB,,, | Performed by: INTERNAL MEDICINE

## 2019-11-12 PROCEDURE — 99205 PR OFFICE/OUTPT VISIT, NEW, LEVL V, 60-74 MIN: ICD-10-PCS | Mod: S$GLB,,, | Performed by: SURGERY

## 2019-11-12 PROCEDURE — 99999 PR PBB SHADOW E&M-EST. PATIENT-LVL III: ICD-10-PCS | Mod: PBBFAC,,, | Performed by: INTERNAL MEDICINE

## 2019-11-12 PROCEDURE — 99999 PR PBB SHADOW E&M-EST. PATIENT-LVL III: ICD-10-PCS | Mod: PBBFAC,,, | Performed by: SURGERY

## 2019-11-12 PROCEDURE — 99205 OFFICE O/P NEW HI 60 MIN: CPT | Mod: S$GLB,,, | Performed by: SURGERY

## 2019-11-12 PROCEDURE — 3079F DIAST BP 80-89 MM HG: CPT | Mod: CPTII,S$GLB,, | Performed by: INTERNAL MEDICINE

## 2019-11-12 PROCEDURE — 3008F BODY MASS INDEX DOCD: CPT | Mod: CPTII,S$GLB,, | Performed by: SURGERY

## 2019-11-12 PROCEDURE — 3079F PR MOST RECENT DIASTOLIC BLOOD PRESSURE 80-89 MM HG: ICD-10-PCS | Mod: CPTII,S$GLB,, | Performed by: SURGERY

## 2019-11-12 PROCEDURE — 3008F BODY MASS INDEX DOCD: CPT | Mod: CPTII,S$GLB,, | Performed by: INTERNAL MEDICINE

## 2019-11-12 PROCEDURE — 99999 PR PBB SHADOW E&M-EST. PATIENT-LVL III: CPT | Mod: PBBFAC,,, | Performed by: INTERNAL MEDICINE

## 2019-11-12 PROCEDURE — 99205 PR OFFICE/OUTPT VISIT, NEW, LEVL V, 60-74 MIN: ICD-10-PCS | Mod: S$GLB,,, | Performed by: INTERNAL MEDICINE

## 2019-11-12 PROCEDURE — 3077F PR MOST RECENT SYSTOLIC BLOOD PRESSURE >= 140 MM HG: ICD-10-PCS | Mod: CPTII,S$GLB,, | Performed by: INTERNAL MEDICINE

## 2019-11-12 RX ORDER — OXYCODONE HYDROCHLORIDE 5 MG/1
5 TABLET ORAL EVERY 4 HOURS PRN
Qty: 30 TABLET | Refills: 0 | Status: SHIPPED | OUTPATIENT
Start: 2019-11-12 | End: 2019-12-09 | Stop reason: SDUPTHER

## 2019-11-12 RX ORDER — AMLODIPINE AND VALSARTAN 5; 160 MG/1; MG/1
1 TABLET ORAL EVERY MORNING
Refills: 2 | COMMUNITY
Start: 2019-10-18 | End: 2019-12-19 | Stop reason: SDUPTHER

## 2019-11-12 RX ORDER — CYCLOBENZAPRINE HCL 5 MG
TABLET ORAL
Refills: 0 | COMMUNITY
Start: 2019-10-16 | End: 2020-04-15 | Stop reason: SDUPTHER

## 2019-11-12 RX ORDER — ZOLPIDEM TARTRATE 10 MG/1
10 TABLET ORAL NIGHTLY PRN
Qty: 30 TABLET | Refills: 2 | Status: SHIPPED | OUTPATIENT
Start: 2019-11-12 | End: 2019-12-26 | Stop reason: SDUPTHER

## 2019-11-12 NOTE — Clinical Note
See me in 9 days for chemo.  We need authorization, an echo, labs, and a port.  Dr. Guaman will arrange for the port

## 2019-11-12 NOTE — PROGRESS NOTES
Subjective:       Patient ID: Phylicia Vásquez is a 49 y.o. female.    Chief Complaint: No chief complaint on file.    HPI   This is a 49 year old AA female who was recently found to have a carcinoma that is ER positive, NH positive and HER 2 equivocal.  She is being referred for consideration of neoadjuvant chemotherapy.  Apparently she started experiencing pain in the right axilla.  This led to a mammogram on 10/24 that showed an asymmetry in the right breast.  A breast MRI showed a 22 mm x 21 mm x 10 mm lymph node seen in the right axilla and a 29 mm x 9 mm x 25 mm irregularly shaped, homogeneous mass with irregular margins seen in the right breast at 10 o'clock.  A biopsy was done on 10/7 that showed a carcinoma that was ER positive, NH positive, and HER 2 indeterminate.  FISH is pending.  An axillary node biopsy on the same day was also positive    PMH is significant for hypertension   PSH is significant for a hysterectomy, a cholecystectomy and a C section.  SH: She is .  She works at a [employer].  She does not smoke and does not drink more than socially.  GYN HISTORY: menarche at 12, first live birth at 25, and she is .  She underwent a hysterectomy but still has the ovaries in place.     Review of Systems    Overall she feels OK.  She is anxious about the recent developments.  She complains of insomnia, and pain in the right breast and right axilla since her biopsy.  She is asking for an analgesic and for a sleeping pill.  She denies any  depression, easy bruising, fevers, chills, night  sweats, weight loss, nausea, vomiting, diarrhea, constipation, diplopia, blurred vision, headache, chest pain, palpitations, shortness of breath, left breast pain, abdominal pain, extremity pain, or difficulty ambulating.  The remainder of the ten-point ROS, including general, skin, lymph, H/N, cardiorespiratory, GI, , Neuro, Endocrine, and psychiatric is negative.       Objective:      Physical Exam       She is alert, oriented to time, place, person, pleasant, well      nourished, in no acute physical distress.  She is accompanied by her mother and her two sisters.                                   VITAL SIGNS:  Reviewed                                      HEENT:  Normal.  There are no nasal, oral, lip, gingival, auricular, lid,    or conjunctival lesions.  Mucosae are moist and pink, and there is no        thrush.  Pupils are equal, reactive to light and accommodation.              Extraocular muscle movements are intact.  Dentition is good.  There is no frontal or maxillary tenderness.                                     NECK:  Supple without JVD, adenopathy, or thyromegaly.                       LUNGS:  Clear to auscultation without wheezing, rales, or rhonchi.           CARDIOVASCULAR:  Reveals an S1, S2, no murmurs, no rubs, no gallops.         ABDOMEN:  Soft, nontender, without organomegaly.  Bowel sounds are    present.                                                                     EXTREMITIES:  No cyanosis, clubbing, or edema.                               BREASTS:  Both breasts are large.  I do not palpate any masses, but she does have a palpable right axillary node which is somewhat tender to palpation.                                      LYMPHATIC:  There is no cervical, left axillary, or supraclavicular adenopathy.   SKIN:  Warm and moist, without petechiae, rashes, induration, or ecchymoses.           NEUROLOGIC:  DTRs are 0-1+ bilaterally, symmetrical, motor function is 5/5,  and cranial nerves are  within normal limits.    Assessment:       1. Carcinoma of axillary tail of right breast in female, estrogen receptor positive      2.    Axillary node metastases.  Plan:        I had a long discussion with her and her family.  She has an ER positive carcinoma in the right breast with biopsy proven axillary metastases.  The IHC test for HER 2 was equivocal, while her FISH is pending.  Assuming  that she does not have stage IV disease, I think it would be reasonable to offer neoadjuvant chemotherapy since she is interested in breast conservation therapy.  We will proceed with a PET scan, an echocardiogram, basic labs, and a port placement, and I will see her in a week.  The regimen she will receive will obviously depend on the FISH results. Assuming she is FISH negative, she will be offered the standard neoadjuvant AC followed by taxol.  She will also need referral for genetic counseling.    I will see her again in 1 week to discuss the results and hopefully initiate neoadjuvant chemotherapy.  Her multiple questions were answered to her satisfaction.  She was given prescriptions for oxycodone and ambien to take prn.  I spent approximately 60 minutes reviewing the available records and evaluating the patient, out of which over 50% of the time was spent face to face with the patient in counseling and coordinating this patient's care.

## 2019-11-12 NOTE — PROGRESS NOTES
Breast Surgery  [facility]  Department of Surgery      REFERRING PROVIDER: Ella Toth  [address] [address]    Chief Complaint: Invasive Mammary Carcinoma (Lobular)    Subjective:      Patient ID: Phylicia Vásquez is a 49 y.o. female who presents with with a screening mammogram on 10/16/19 that showed a focal asymmetry in the upper outer quadrant of the right breast in the posterior depth.    Follow-up mammogram (10/31/19) showed an irregular equal density mass with spiculated margins measuring 1.6cm.  US evaluation showed at 10oc 14cm FTN an irregular hypoechoic mass with indistinct margins and posterior acoustic shadowing measuring 0.8cm. A level 1 right axillary adenopathy of a couple nodes were also identified the largest of which measuring 2.1cm.  A ultrasound guided biopsy was performed on 19 with pathology revealing infiltrating mammary carcinoma of the breast and one biopsy positive lymph node.     Patient does routinely do self breast exams.  Patient has not noted a change on breast exam.  Patient denies nipple discharge. Patient denies to previous breast biopsy. Patient denies a personal history of breast cancer.    Patient had a previous right breast biopsy performed in .    Findings at that time were the following:   Tumor size: 2.9 cm   Tumor stgstrstastdstest:st st1st Estrogen Receptor: +   Progesterone Receptor: +   Her-2 olivia: pending   Lymph node status: positive   Lymphatic invasion: positive     GYN History:  Age of menarche was 12. Age of menopause was 37 after hysterectomy.  Patient denies hormonal therapy. Patient is . Age of first live birth was 25. Patient did not breast feed.    Family History:  Paternal aunt had ovarian cancer in her 50s  Father had lung and prostate cancer (smoker)    Past Medical History:   Diagnosis Date    Anxiety     Back pain     HTN (hypertension) 10/18/2012    Insomnia 10/18/2012    Spinal cord cyst, L1 2014      Thoracic radiculopathy, bulging disc at T10-11 and T11-12      Past Surgical History:   Procedure Laterality Date    BREAST BIOPSY Right 2015     SECTION      CHOLECYSTECTOMY      COLONOSCOPY N/A 10/5/2015    Procedure: COLONOSCOPY;  Surgeon: JERONIMO Cancino MD;  Location: [address]);  Service: Endoscopy;  Laterality: N/A;    HYSTERECTOMY      BC--SUPRACERVICAL     Current Outpatient Medications on File Prior to Visit   Medication Sig Dispense Refill    amLODIPine (NORVASC) 5 MG tablet Take 1 tablet (5 mg total) by mouth once daily. 90 tablet 3    ergocalciferol (ERGOCALCIFEROL) 50,000 unit Cap Take 1 capsule (50,000 Units total) by mouth every 7 days. 12 capsule 1    fluocinolone (DERMA-SMOOTHE) 0.01 % external oil Thin film to AA QHS PRN flare 1 Bottle 1    fluticasone (FLONASE) 50 mcg/actuation nasal spray 1 spray by Each Nare route 2 (two) times daily as needed. 15 g 0    ketoconazole (NIZORAL) 2 % shampoo WASH HAIR WITH MEDICATED SHAMPOO AT LEAST 1 TO 2 TIMES A WEEK, LET SIT ON SCALP AT LEAST 5 MINUTES PRIOR TO RINSING 120 mL 0    tobramycin sulfate 0.3% (TOBREX) 0.3 % ophthalmic solution 1-2 drops topically twice daily to affected toe(s). 5 mL 3    valsartan (DIOVAN) 320 MG tablet Take 1 tablet (320 mg total) by mouth once daily. 90 tablet 3     Current Facility-Administered Medications on File Prior to Visit   Medication Dose Route Frequency Provider Last Rate Last Dose    [COMPLETED] gadobenate dimeglumine (MULTIHANCE) injection 20 mL  20 mL Intravenous ONCE PRN Negrito Verde MD   20 mL at 19 1222     Social History     Socioeconomic History    Marital status: Single     Spouse name: Not on file    Number of children: Not on file    Years of education: Not on file    Highest education level: Not on file   Occupational History    Not on file   Social Needs    Financial resource strain: Not hard at all    Food insecurity:     Worry: Never true     Inability:  Never true    Transportation needs:     Medical: No     Non-medical: No   Tobacco Use    Smoking status: Never Smoker    Smokeless tobacco: Never Used   Substance and Sexual Activity    Alcohol use: No     Frequency: Monthly or less     Drinks per session: 1 or 2     Binge frequency: Never    Drug use: No    Sexual activity: Yes     Partners: Male   Lifestyle    Physical activity:     Days per week: 3 days     Minutes per session: 30 min    Stress: To some extent   Relationships    Social connections:     Talks on phone: More than three times a week     Gets together: Once a week     Attends Rastafari service: Not on file     Active member of club or organization: No     Attends meetings of clubs or organizations: Never     Relationship status:    Other Topics Concern    Are you pregnant or think you may be? Not Asked    Breast-feeding Not Asked   Social History Narrative    Has worked at the [employer] since it opened and stayed working through [employer] [place].    2011  from her  and moved to [facility] with her sons to live with her aunt.    Her mother is very helpful and involved in her life.    She has never been a smoker and does not drink.        April 2016    Her eldest son has been working as a  at the [employer] ([employer]) for one year now.  He is doing well in this capacity.  He is thinking about making this his career.    Her youngest son is doing better academically.  She would like to remain living on the [facility] for his schooling.    Her commuting to work is difficult.        October 2017.  For the first time in a long time, she feels happy.  She has a new boyfriend.    November 2108.  Her eldest son has moved to [place], is living with his 1st cousin and has a full-time job with [employer].    Her youngest son is 16 and doing well in school.    She continues to date a very nice man.  She is enjoying living in [place] and commutes to the [facility] where she  "continues to enjoy her employment.     Family History   Problem Relation Age of Onset    Cancer Paternal Aunt     Lupus Paternal Aunt     Hypertension Mother     Liver disease Father     Alcohol abuse Father     Colon cancer Paternal Uncle     Breast cancer Neg Hx     Ovarian cancer Neg Hx     Melanoma Neg Hx     Psoriasis Neg Hx     Eczema Neg Hx         Review of Systems   Constitutional: Negative for fatigue and unexpected weight change.   HENT: Negative.    Eyes: Negative.    Respiratory: Negative for chest tightness and shortness of breath.    Cardiovascular: Negative for chest pain and palpitations.   Gastrointestinal: Negative for abdominal distention and abdominal pain.   Genitourinary: Negative for dysuria, vaginal bleeding and vaginal discharge.   Musculoskeletal: Negative for arthralgias and back pain.   Skin: Negative for color change and rash.   Neurological: Negative for syncope and headaches.     Objective:   BP (!) 148/87 (BP Location: Right arm, Patient Position: Sitting)   Pulse 84   Temp 98.2 °F (36.8 °C)   Ht 5' 6" (1.676 m)   Wt 108.9 kg (240 lb 1.3 oz)   LMP 08/14/2014   BMI 38.75 kg/m²     Physical Exam   Constitutional: She appears well-developed and well-nourished.   HENT:   Head: Normocephalic.   Eyes: No scleral icterus.   Neck: Neck supple. No tracheal deviation present.   Cardiovascular: Normal rate and regular rhythm.    Pulmonary/Chest: Breath sounds normal. No respiratory distress. Right breast exhibits mass. Right breast exhibits no inverted nipple, no nipple discharge, no skin change and no tenderness. Left breast exhibits no inverted nipple, no mass, no nipple discharge, no skin change and no tenderness.       Abdominal: Soft. She exhibits no mass. There is no tenderness.   Musculoskeletal: She exhibits no edema.   Lymphadenopathy:     She has no cervical adenopathy.   Neurological: She is alert.   Skin: No rash noted. No erythema.     Psychiatric: She has a normal " mood and affect.       Radiology review: Images personally reviewed by me in the clinic.   Result:   Mammo Digital Diagnostic Right w/ Michele  US Breast Right Limited     History:  Work-up for abnormal screening mammogram (recall).     Films Compared:  Prior images (if available) were compared.     Findings:  This procedure was performed using tomosynthesis.  Computer-aided detection was utilized in the interpretation of this examination.  Mammo Digital Diagnostic Right w/ Michele  The right breast has scattered areas of fibroglandular density.      Right breast 09:00 o'clock axis middle depth excisional biopsy scar, benign.      In the right breast upper outer quadrant posterior depth, there is an irregular equal density mass with spiculated margins measuring 1.6 cm.  Associated architectural distortion.  This corresponds to the right breast screening mammogram finding.      Limited right breast ultrasound:   In the right breast 10:00 o'clock axis 14 cm from the nipple, there is an irregular hypoechoic mass with indistinct margins and posterior acoustic shadowing measuring 0.8 cm.  No surrounding hypervascularity.  This corresponds to the right breast screening mammogram finding.      Level 1 right axillary adenopathy with a couple of nodes demonstrating cortical thickening and loss of fatty hilum.  The largest node measures 2.1 cm in long axis.      Impression:  1. Right breast 10:00 o'clock axis mass corresponds to the screening mammogram finding. BI-RADS 4: Suspicious. Recommend ultrasound-guided biopsy.      2. Level 1 right axillary adenopathy. BI-RADS 4: Suspicious. Recommend ultrasound-guided biopsy for 1 of the abnormal nodes.         BI-RADS Category:   Right: 4 - Suspicious  Overall: 4 - Suspicious     Recommendation:  1. Ultrasound-guided biopsy for the right breast 10:00 o'clock axis mass.   2. Ultrasound-guided biopsy for 1 of the abnormal level 1 right axillary nodes.     Assessment:       1. Malignant  neoplasm of axillary tail of right breast in female, estrogen receptor positive        Plan:     Options for management were discussed with the patient and her family. We reviewed the existing data noting the equivalency of breast conserving surgery with radiation therapy and mastectomy. We also reviewed the guidelines of the National Comprehensive Cancer Network for Stage 1-2 breast carcinoma. We discussed the need for lumpectomy margins to be negative for carcinoma, the necessity for postoperative radiation therapy after breast conservation in most cases, the possibility of a failed or false negative sentinel lymph node biopsy and the potential need for complete lymphadenectomy for a failed or positive sentinel lymph node biopsy were fully discussed. In the setting of mastectomy, delayed or immediate reconstruction options are available and were discussed.     In the setting of lumpectomy, radiation therapy would be recommended majority of the time.  The duration and treatment side effects were discussed with the patient.  This will coordinated with the radiation oncologist pending final pathology.    We also discussed the role of systemic therapy in the treatment of early stage breast cancer.  We discussed that this is based on tumor biology and chandler status and will be determined based on final pathology.  We discussed that if the cancer is hormone positive, endocrine therapy would be recommended in most cases and its use can reduce the risk of recurrence as well as improve survival. Side effects of treatment were briefly discussed. We also discussed the potential role for chemotherapy based on a number of factors such as tumor phenotype (ER+ vs. triple negative vs. Xav2wqe+) and this would be determined in coordination with the medical oncologist.    We will proceed with neoadjuvant chemotherapy and establishing a medical oncologist for her care.  She will see Dr. Simpson today and if PET is clear of metastatic  distant disease, we will proceed with port placement.  We will consent her for a port placement today.  We will also perform genetic evaluation today after discussion was had in regards to implications of testing, potential results andher questions were answered,    After chemotherapy we will re-assess and evaluate which surgical option to move forward with.  She may want lumpectomy, but will think more about it.    Patient was educated on breast cancer, receptors, wire localization lumpectomy, mastectomy, sentinel lymph node mapping and biopsy, axillary lymph node dissection, reconstruction, breast prosthesis with post-mastectomy bra and radiation therapy. Patient was given patient information binder including [facility] breast cancer treatment brochure.  All her questions were answered.    Total time spent with the patient: 60 minutes.  45 minutes of face to face consultation and 15 minutes of chart review and coordination of care.      ADDENDUM:  Port has been placed.  It was a difficult placement and therefore had to place it on the right side.

## 2019-11-12 NOTE — LETTER
November 24, 2019      BANDAR MARROQUIN  [address]  [facility] [address]           [facility]  [address]  [facility] [address]  Phone: [phone]  Fax: [phone]          Patient: Phylicia Vásquez   MR Number: [id number]   YOB: 1970   Date of Visit: 11/12/2019       Dear Bandar Marroquin:    Thank you for referring Phylicia Vásquez to me for evaluation. Attached you will find relevant portions of my assessment and plan of care.    If you have questions, please do not hesitate to call me. I look forward to following Phylicia Vásquez along with you.    Sincerely,    Lelo Guaman MD    Enclosure  CC:  No Recipients    If you would like to receive this communication electronically, please contact [email or web address] or [phone] to request more information on [employer] Link access.    For providers and/or their staff who would like to refer a patient to [facility], please contact us through our one-stop-shop provider referral line, [facility], at [phone].    If you feel you have received this communication in error or would no longer like to receive these types of communications, please e-mail [email or web address]

## 2019-11-13 ENCOUNTER — LAB VISIT (OUTPATIENT)
Dept: LAB | Facility: HOSPITAL | Age: 49
End: 2019-11-13
Attending: SURGERY
Payer: COMMERCIAL

## 2019-11-13 DIAGNOSIS — Z17.0 MALIGNANT NEOPLASM OF AXILLARY TAIL OF RIGHT BREAST IN FEMALE, ESTROGEN RECEPTOR POSITIVE: ICD-10-CM

## 2019-11-13 DIAGNOSIS — C50.611 MALIGNANT NEOPLASM OF AXILLARY TAIL OF RIGHT BREAST IN FEMALE, ESTROGEN RECEPTOR POSITIVE: ICD-10-CM

## 2019-11-13 DIAGNOSIS — Z17.0 MALIGNANT NEOPLASM OF AXILLARY TAIL OF RIGHT BREAST IN FEMALE, ESTROGEN RECEPTOR POSITIVE: Primary | ICD-10-CM

## 2019-11-13 DIAGNOSIS — Z17.0 CARCINOMA OF AXILLARY TAIL OF RIGHT BREAST IN FEMALE, ESTROGEN RECEPTOR POSITIVE: ICD-10-CM

## 2019-11-13 DIAGNOSIS — C50.611 CARCINOMA OF AXILLARY TAIL OF RIGHT BREAST IN FEMALE, ESTROGEN RECEPTOR POSITIVE: ICD-10-CM

## 2019-11-13 DIAGNOSIS — C50.611 MALIGNANT NEOPLASM OF AXILLARY TAIL OF RIGHT BREAST IN FEMALE, ESTROGEN RECEPTOR POSITIVE: Primary | ICD-10-CM

## 2019-11-13 LAB
ALBUMIN SERPL BCP-MCNC: 3.7 G/DL (ref 3.5–5.2)
ALP SERPL-CCNC: 67 U/L (ref 55–135)
ALT SERPL W/O P-5'-P-CCNC: 22 U/L (ref 10–44)
ANION GAP SERPL CALC-SCNC: 7 MMOL/L (ref 8–16)
AST SERPL-CCNC: 18 U/L (ref 10–40)
BILIRUB SERPL-MCNC: 0.5 MG/DL (ref 0.1–1)
BUN SERPL-MCNC: 9 MG/DL (ref 6–20)
CALCIUM SERPL-MCNC: 9.5 MG/DL (ref 8.7–10.5)
CHLORIDE SERPL-SCNC: 104 MMOL/L (ref 95–110)
CO2 SERPL-SCNC: 30 MMOL/L (ref 23–29)
CREAT SERPL-MCNC: 0.9 MG/DL (ref 0.5–1.4)
ERYTHROCYTE [DISTWIDTH] IN BLOOD BY AUTOMATED COUNT: 15.3 % (ref 11.5–14.5)
EST. GFR  (AFRICAN AMERICAN): >60 ML/MIN/1.73 M^2
EST. GFR  (NON AFRICAN AMERICAN): >60 ML/MIN/1.73 M^2
GLUCOSE SERPL-MCNC: 96 MG/DL (ref 70–110)
HCT VFR BLD AUTO: 37.9 % (ref 37–48.5)
HGB BLD-MCNC: 11.6 G/DL (ref 12–16)
IMM GRANULOCYTES # BLD AUTO: 0.04 K/UL (ref 0–0.04)
MCH RBC QN AUTO: 25.9 PG (ref 27–31)
MCHC RBC AUTO-ENTMCNC: 30.6 G/DL (ref 32–36)
MCV RBC AUTO: 85 FL (ref 82–98)
NEUTROPHILS # BLD AUTO: 5.5 K/UL (ref 1.8–7.7)
PLATELET # BLD AUTO: 227 K/UL (ref 150–350)
PMV BLD AUTO: 11.2 FL (ref 9.2–12.9)
POTASSIUM SERPL-SCNC: 4 MMOL/L (ref 3.5–5.1)
PROT SERPL-MCNC: 7.6 G/DL (ref 6–8.4)
RBC # BLD AUTO: 4.48 M/UL (ref 4–5.4)
SODIUM SERPL-SCNC: 141 MMOL/L (ref 136–145)
WBC # BLD AUTO: 9.15 K/UL (ref 3.9–12.7)

## 2019-11-13 PROCEDURE — 80053 COMPREHEN METABOLIC PANEL: CPT

## 2019-11-13 PROCEDURE — 36415 COLL VENOUS BLD VENIPUNCTURE: CPT

## 2019-11-13 PROCEDURE — 85027 COMPLETE CBC AUTOMATED: CPT

## 2019-11-14 ENCOUNTER — TELEPHONE (OUTPATIENT)
Dept: HEMATOLOGY/ONCOLOGY | Facility: CLINIC | Age: 49
End: 2019-11-14

## 2019-11-14 DIAGNOSIS — C50.011 MALIGNANT NEOPLASM OF AREOLA OF RIGHT BREAST IN FEMALE, UNSPECIFIED ESTROGEN RECEPTOR STATUS: Primary | ICD-10-CM

## 2019-11-14 NOTE — TELEPHONE ENCOUNTER
----- Message from Diego Yañez sent at 11/14/2019  9:42 AM CST -----  Contact: Leigh (Pre-Service)  Staff Message     Caller name: Leigh    Reason for call: Needs to follow up on approval for CT.        Communication Preference: [phone]    Additional Information:

## 2019-11-14 NOTE — TELEPHONE ENCOUNTER
Returned call to number, requested avery, ricarda. Message left for her to contact me back at my desk top number at her convenience. [id number] needed for PET CT scheduled tomorrow per referral message. Information/instructions relayed to Dr. Simpson for completion.

## 2019-11-15 ENCOUNTER — HOSPITAL ENCOUNTER (OUTPATIENT)
Dept: RADIOLOGY | Facility: HOSPITAL | Age: 49
Discharge: HOME | End: 2019-11-15
Attending: INTERNAL MEDICINE
Payer: COMMERCIAL

## 2019-11-15 ENCOUNTER — HOSPITAL ENCOUNTER (OUTPATIENT)
Dept: CARDIOLOGY | Facility: CLINIC | Age: 49
Discharge: HOME | End: 2019-11-15
Attending: INTERNAL MEDICINE
Payer: COMMERCIAL

## 2019-11-15 VITALS
WEIGHT: 240 LBS | HEART RATE: 66 BPM | SYSTOLIC BLOOD PRESSURE: 138 MMHG | HEIGHT: 66 IN | BODY MASS INDEX: 38.57 KG/M2 | DIASTOLIC BLOOD PRESSURE: 78 MMHG

## 2019-11-15 DIAGNOSIS — Z17.0 CARCINOMA OF AXILLARY TAIL OF RIGHT BREAST IN FEMALE, ESTROGEN RECEPTOR POSITIVE: ICD-10-CM

## 2019-11-15 DIAGNOSIS — C50.011 MALIGNANT NEOPLASM OF AREOLA OF RIGHT BREAST IN FEMALE, UNSPECIFIED ESTROGEN RECEPTOR STATUS: ICD-10-CM

## 2019-11-15 DIAGNOSIS — C50.611 CARCINOMA OF AXILLARY TAIL OF RIGHT BREAST IN FEMALE, ESTROGEN RECEPTOR POSITIVE: ICD-10-CM

## 2019-11-15 LAB
ASCENDING AORTA: 3.13 CM
AV INDEX (PROSTH): 1.09
AV MEAN GRADIENT: 4 MMHG
AV PEAK GRADIENT: 7 MMHG
AV VALVE AREA: 3.89 CM2
AV VELOCITY RATIO: 1.01
BSA FOR ECHO PROCEDURE: 2.25 M2
CV ECHO LV RWT: 0.4 CM
DOP CALC AO PEAK VEL: 1.32 M/S
DOP CALC AO VTI: 28.8 CM
DOP CALC LVOT AREA: 3.6 CM2
DOP CALC LVOT DIAMETER: 2.13 CM
DOP CALC LVOT PEAK VEL: 1.33 M/S
DOP CALC LVOT STROKE VOLUME: 112.15 CM3
DOP CALCLVOT PEAK VEL VTI: 31.49 CM
E WAVE DECELERATION TIME: 202.57 MSEC
E/A RATIO: 1.39
E/E' RATIO: 11.68 M/S
ECHO LV POSTERIOR WALL: 0.81 CM (ref 0.6–1.1)
FRACTIONAL SHORTENING: 40 % (ref 28–44)
INTERVENTRICULAR SEPTUM: 1.05 CM (ref 0.6–1.1)
IVRT: 0.09 MSEC
LA MAJOR: 4.54 CM
LA MINOR: 4.3 CM
LA WIDTH: 3.87 CM
LEFT ATRIUM SIZE: 3.3 CM
LEFT ATRIUM VOLUME INDEX: 22.2 ML/M2
LEFT ATRIUM VOLUME: 47.95 CM3
LEFT INTERNAL DIMENSION IN SYSTOLE: 2.47 CM (ref 2.1–4)
LEFT VENTRICLE DIASTOLIC VOLUME INDEX: 34.26 ML/M2
LEFT VENTRICLE DIASTOLIC VOLUME: 74.04 ML
LEFT VENTRICLE MASS INDEX: 55 G/M2
LEFT VENTRICLE SYSTOLIC VOLUME INDEX: 10 ML/M2
LEFT VENTRICLE SYSTOLIC VOLUME: 21.62 ML
LEFT VENTRICULAR INTERNAL DIMENSION IN DIASTOLE: 4.1 CM (ref 3.5–6)
LEFT VENTRICULAR MASS: 119.4 G
LV LATERAL E/E' RATIO: 9.25 M/S
LV SEPTAL E/E' RATIO: 15.86 M/S
MV PEAK A VEL: 0.8 M/S
MV PEAK E VEL: 1.11 M/S
PISA TR MAX VEL: 2.18 M/S
PULM VEIN S/D RATIO: 0.91
PV PEAK D VEL: 0.65 M/S
PV PEAK S VEL: 0.59 M/S
RA MAJOR: 4.65 CM
RA PRESSURE: 3 MMHG
RA WIDTH: 3.57 CM
RETIRED EF AND QEF - SEE NOTES: 59 %
RIGHT VENTRICULAR END-DIASTOLIC DIMENSION: 3.62 CM
RV TISSUE DOPPLER FREE WALL SYSTOLIC VELOCITY 1 (APICAL 4 CHAMBER VIEW): 10.55 CM/S
SINUS: 3.18 CM
STJ: 2.9 CM
TDI LATERAL: 0.12 M/S
TDI SEPTAL: 0.07 M/S
TDI: 0.1 M/S
TR MAX PG: 19 MMHG
TRICUSPID ANNULAR PLANE SYSTOLIC EXCURSION: 2.31 CM
TV REST PULMONARY ARTERY PRESSURE: 22 MMHG

## 2019-11-15 PROCEDURE — 71260 CT CHEST ABDOMEN PELVIS WITH CONTRAST (XPD): ICD-10-PCS | Mod: 26,,, | Performed by: RADIOLOGY

## 2019-11-15 PROCEDURE — 93306 TTE W/DOPPLER COMPLETE: CPT | Mod: 26,,, | Performed by: INTERNAL MEDICINE

## 2019-11-15 PROCEDURE — 71260 CT THORAX DX C+: CPT | Mod: 26,,, | Performed by: RADIOLOGY

## 2019-11-15 PROCEDURE — 74177 CT CHEST ABDOMEN PELVIS WITH CONTRAST (XPD): ICD-10-PCS | Mod: 26,,, | Performed by: RADIOLOGY

## 2019-11-15 PROCEDURE — 25500020 PHARM REV CODE 255: Performed by: INTERNAL MEDICINE

## 2019-11-15 PROCEDURE — 74177 CT ABD & PELVIS W/CONTRAST: CPT | Mod: 26,,, | Performed by: RADIOLOGY

## 2019-11-15 PROCEDURE — 93306 ECHO (CUPID ONLY): ICD-10-PCS | Mod: 26,,, | Performed by: INTERNAL MEDICINE

## 2019-11-15 PROCEDURE — 74177 CT ABD & PELVIS W/CONTRAST: CPT | Mod: TC

## 2019-11-15 PROCEDURE — 93306 TTE W/DOPPLER COMPLETE: CPT

## 2019-11-15 RX ADMIN — IOHEXOL 15 ML: 350 INJECTION, SOLUTION INTRAVENOUS at 01:11

## 2019-11-15 RX ADMIN — IOHEXOL 100 ML: 350 INJECTION, SOLUTION INTRAVENOUS at 03:11

## 2019-11-15 RX ADMIN — IOHEXOL 15 ML: 350 INJECTION, SOLUTION INTRAVENOUS at 02:11

## 2019-11-18 ENCOUNTER — TELEPHONE (OUTPATIENT)
Dept: HEMATOLOGY/ONCOLOGY | Facility: CLINIC | Age: 49
End: 2019-11-18

## 2019-11-18 NOTE — NURSING
Oncology Navigation   Intake  Date of Diagnosis: 10/07/19  Cancer Type: Breast  MD Assigned: Lelo Guaman MD  Internal / External Referral: Internal  Initial Nurse Navigator Contact: 19  Diagnosis to Initial Contact Timeline (days): 32 days  Contact Method: Phone  Date Worked: 19  First Appointment Available: 19  Appointment Date: 19  Schedule to Appointment Timeline (days): 4  First Available Date vs. Scheduled Date (days): 0  Multiple appointments: Yes     Treatment  Current Status: Staging work-up    Procedures: Bone scan;CT  Biopsy Schedule Date: 19  Bone Scan Schedule Date: 19  CT Schedule Date: 11/15/19  Mammo Schedule Date: 10/31/19  MRI Schedule Date: 19       ER: Positive  NJ: Positive  Her2: Negative  Support Systems: Parent;Family members (patient's mother and sister will come to appt)     Acuity  Stage: 1  Systemic Treatment - predicted or initiated: Chemotherapy regimen with multiple drugs (+1)  Treatment Tolerability: Has not started treatment yet/treatment fully completed and side effects resolved  ECO  Comorbidities in Medical History: 2  Hospitalization Within the Past Month: 0   Needed: 0  Support: 0  Verbalizes Financial Concerns: 0  Transportation: 0  Psychological Factors (+1 each): Verbalizes need for emotional support  History of noncompliance/frequent no shows and cancellations: 0  Verbalizes the need for more education: 1  Other Factors (+1 for Each): 2 (verbalizes that she is overwhelmed with number of appts, going to try and work through chemo but concerned about how well that will go)  Navigation Acuity: 8     Follow Up  Follow up in about 1 day (around 2019).

## 2019-11-18 NOTE — Clinical Note
Hey, Ms. Vela wanted to cancel PT for now. She is still interested just overwhelmed at the moment. She knows she can reschedule anytime.

## 2019-11-18 NOTE — TELEPHONE ENCOUNTER
Patient called to review upcoming appointments and verify everything that is needed has been scheduled. Discussed that bone scan is still missing from her scheduled appts. Spoke to Jannie in Nuclear Medicine, got bone scan for tomorrow. Instructed patient on appt time and location and instructions. Also told her I would check the status of auth in the morning and call her if she should not go due to lack of authorization. Patient verbalized understanding of all information.     Patient also asked to cancel her PT appointment for now. She states she does still want to proceed with consult, but just can't handle the number of appointments she has currently. Told her it is always available to her in the future.

## 2019-11-19 ENCOUNTER — HOSPITAL ENCOUNTER (OUTPATIENT)
Dept: RADIOLOGY | Facility: HOSPITAL | Age: 49
Discharge: HOME | End: 2019-11-19
Attending: INTERNAL MEDICINE
Payer: COMMERCIAL

## 2019-11-19 DIAGNOSIS — C50.011 MALIGNANT NEOPLASM OF AREOLA OF RIGHT BREAST IN FEMALE, UNSPECIFIED ESTROGEN RECEPTOR STATUS: ICD-10-CM

## 2019-11-19 PROCEDURE — 78306 NM BONE SCAN WHOLE BODY: ICD-10-PCS | Mod: 26,,, | Performed by: RADIOLOGY

## 2019-11-19 PROCEDURE — 78306 BONE IMAGING WHOLE BODY: CPT | Mod: 26,,, | Performed by: RADIOLOGY

## 2019-11-19 PROCEDURE — A9503 TC99M MEDRONATE: HCPCS

## 2019-11-19 NOTE — PROGRESS NOTES
Subjective:       Patient ID: Phylicia Vásquez is a 49 y.o. female.    Chief Complaint: No chief complaint on file.    HPI   Mrs Lua returns today for follow up.  Her FISH was negative, and her staging bone scan and CT of the chest, abdomen, and pelvis showed no evidence of metastatic disease.  Her EF is 60 %.  On her CBC, the WBCs are 9,100 /mm3, Hg 11.6 gr/dl, Ht 37.9 % and platelets 227K.  Her bilirubin is 0.5 mg/dl.      Briefly, she is a 49 year old AA female who was recently found to have a carcinoma that is ER positive, KS positive and HER 2 equivocal.  Apparently she started experiencing pain in the right axilla.  This led to a mammogram on 10/24 that showed an asymmetry in the right breast.  A breast MRI showed a 22 mm x 21 mm x 10 mm lymph node seen in the right axilla and a 29 mm x 9 mm x 25 mm irregularly shaped, homogeneous mass with irregular margins seen in the right breast at 10 o'clock.  A biopsy was done on 10/7 that showed a carcinoma that was ER positive, KS positive, and HER 2 indeterminate.  FISH was negative.  An axillary node biopsy on the same day was also positive      Review of Systems    Overall she feels OK.  She is anxious about the recent developments.  She denies any  depression, easy bruising, fevers, chills, night  sweats, weight loss, nausea, vomiting, diarrhea, constipation, diplopia, blurred vision, headache, chest pain, palpitations, shortness of breath, left breast pain, abdominal pain, extremity pain, or difficulty ambulating.  The remainder of the ten-point ROS, including general, skin, lymph, H/N, cardiorespiratory, GI, , Neuro, Endocrine, and psychiatric is negative.       Objective:      Physical Exam      She is alert, oriented to time, place, person, pleasant, well      nourished, in no acute physical distress.  She is accompanied by her mother and her two sisters.                                   VITAL SIGNS:  Reviewed                                       HEENT:  Normal.  There are no nasal, oral, lip, gingival, auricular, lid,    or conjunctival lesions.  Mucosae are moist and pink, and there is no        thrush.  Pupils are equal, reactive to light and accommodation.              Extraocular muscle movements are intact.  Dentition is good.  There is no frontal or maxillary tenderness.                                     NECK:  Supple without JVD, adenopathy, or thyromegaly.                       LUNGS:  Clear to auscultation without wheezing, rales, or rhonchi.           CARDIOVASCULAR:  Reveals an S1, S2, no murmurs, no rubs, no gallops.         ABDOMEN:  Soft, nontender, without organomegaly.  Bowel sounds are    present.                                                                     EXTREMITIES:  No cyanosis, clubbing, or edema.                               BREASTS:  Both breasts are large.  There is a palpable mass in the upper outer quadrant of the right breast.  She also has a palpable right axillary node.                                      LYMPHATIC:  There is no cervical, left axillary, or supraclavicular adenopathy.   SKIN:  Warm and moist, without petechiae, rashes, induration, or ecchymoses.           NEUROLOGIC:  DTRs are 0-1+ bilaterally, symmetrical, motor function is 5/5,  and cranial nerves are  within normal limits.    Assessment:       1. Carcinoma of axillary tail of right breast in female, estrogen receptor positive, clinically T2N1M0     2.    Axillary node metastases.  Plan:        I had a long discussion with her and her family.  She has a right breast cancinoma with biopsy proven axillary metastases.  It would be reasonable to offer neoadjuvant chemotherapy since she is interested in breast conservation therapy.  She is scheduled for a port placement tomorrow by Dr. Guaman.  She will start her neoadjuvant AC followed by taxol on Monday 11/25 (at her request).  She was given neutropenic precautions and prescriptions for  dexamethasone and zofran.  I will see her in three weeks for cycle 2.  Consent form signed.  Her multiple questions were answered to her satisfaction.

## 2019-11-20 ENCOUNTER — OFFICE VISIT (OUTPATIENT)
Dept: HEMATOLOGY/ONCOLOGY | Facility: CLINIC | Age: 49
End: 2019-11-20
Payer: COMMERCIAL

## 2019-11-20 ENCOUNTER — ANESTHESIA EVENT (OUTPATIENT)
Dept: SURGERY | Facility: HOSPITAL | Age: 49
End: 2019-11-20
Payer: COMMERCIAL

## 2019-11-20 ENCOUNTER — TELEPHONE (OUTPATIENT)
Dept: SURGERY | Facility: CLINIC | Age: 49
End: 2019-11-20

## 2019-11-20 VITALS
RESPIRATION RATE: 16 BRPM | SYSTOLIC BLOOD PRESSURE: 127 MMHG | HEIGHT: 66 IN | WEIGHT: 239.19 LBS | OXYGEN SATURATION: 97 % | DIASTOLIC BLOOD PRESSURE: 74 MMHG | BODY MASS INDEX: 38.44 KG/M2 | TEMPERATURE: 99 F | HEART RATE: 101 BPM

## 2019-11-20 DIAGNOSIS — Z17.0 CARCINOMA OF AXILLARY TAIL OF RIGHT BREAST IN FEMALE, ESTROGEN RECEPTOR POSITIVE: Primary | ICD-10-CM

## 2019-11-20 DIAGNOSIS — C50.611 CARCINOMA OF AXILLARY TAIL OF RIGHT BREAST IN FEMALE, ESTROGEN RECEPTOR POSITIVE: Primary | ICD-10-CM

## 2019-11-20 PROCEDURE — 3008F PR BODY MASS INDEX (BMI) DOCUMENTED: ICD-10-PCS | Mod: CPTII,S$GLB,, | Performed by: INTERNAL MEDICINE

## 2019-11-20 PROCEDURE — 99999 PR PBB SHADOW E&M-EST. PATIENT-LVL III: CPT | Mod: PBBFAC,,, | Performed by: INTERNAL MEDICINE

## 2019-11-20 PROCEDURE — 99215 OFFICE O/P EST HI 40 MIN: CPT | Mod: S$GLB,,, | Performed by: INTERNAL MEDICINE

## 2019-11-20 PROCEDURE — 99999 PR PBB SHADOW E&M-EST. PATIENT-LVL III: ICD-10-PCS | Mod: PBBFAC,,, | Performed by: INTERNAL MEDICINE

## 2019-11-20 PROCEDURE — 3074F SYST BP LT 130 MM HG: CPT | Mod: CPTII,S$GLB,, | Performed by: INTERNAL MEDICINE

## 2019-11-20 PROCEDURE — 3074F PR MOST RECENT SYSTOLIC BLOOD PRESSURE < 130 MM HG: ICD-10-PCS | Mod: CPTII,S$GLB,, | Performed by: INTERNAL MEDICINE

## 2019-11-20 PROCEDURE — 3078F PR MOST RECENT DIASTOLIC BLOOD PRESSURE < 80 MM HG: ICD-10-PCS | Mod: CPTII,S$GLB,, | Performed by: INTERNAL MEDICINE

## 2019-11-20 PROCEDURE — 3078F DIAST BP <80 MM HG: CPT | Mod: CPTII,S$GLB,, | Performed by: INTERNAL MEDICINE

## 2019-11-20 PROCEDURE — 99215 PR OFFICE/OUTPT VISIT, EST, LEVL V, 40-54 MIN: ICD-10-PCS | Mod: S$GLB,,, | Performed by: INTERNAL MEDICINE

## 2019-11-20 PROCEDURE — 3008F BODY MASS INDEX DOCD: CPT | Mod: CPTII,S$GLB,, | Performed by: INTERNAL MEDICINE

## 2019-11-20 RX ORDER — DEXAMETHASONE 4 MG/1
TABLET ORAL
Qty: 12 TABLET | Refills: 3 | Status: SHIPPED | OUTPATIENT
Start: 2019-11-20 | End: 2020-02-05 | Stop reason: ALTCHOICE

## 2019-11-20 RX ORDER — HEPARIN 100 UNIT/ML
500 SYRINGE INTRAVENOUS
Status: CANCELLED | OUTPATIENT
Start: 2019-11-25

## 2019-11-20 RX ORDER — SODIUM CHLORIDE 0.9 % (FLUSH) 0.9 %
10 SYRINGE (ML) INJECTION
Status: CANCELLED | OUTPATIENT
Start: 2019-11-25

## 2019-11-20 RX ORDER — DOXORUBICIN HYDROCHLORIDE 2 MG/ML
60 INJECTION, SOLUTION INTRAVENOUS
Status: CANCELLED | OUTPATIENT
Start: 2019-11-25

## 2019-11-20 RX ORDER — ONDANSETRON HYDROCHLORIDE 8 MG/1
TABLET, FILM COATED ORAL
Qty: 30 TABLET | Refills: 2 | Status: SHIPPED | OUTPATIENT
Start: 2019-11-20 | End: 2021-04-16

## 2019-11-20 NOTE — PRE-PROCEDURE INSTRUCTIONS
PREOP INSTRUCTIONS:No solid food ,milk or milk products for 8 hours prior to procedure.Clear liquids are allowed up to 2 hours before procedure.Clear liquids are:water,apple juice,pedialyte,gatorade,& jello.Shower instructions as well as directions to the 2nd floor [facility] Center were given.Patient encouraged to wear loose fitting,comfortable clothing.Medication instructions for pm prior to and am of procedure reviewed.Instructed patient to avoid taking vitamins,supplements,aspirin and ibuprofen the morning of surgery.Patient stated an understanding.    Patient denies any side effects or issues with anesthesia or sedation.    Patient does not know arrival time.Explained that this information comes from the surgeon's office and if they haven't heard from them by 2 or 3 pm to call the office.Patient stated an understanding.

## 2019-11-20 NOTE — TELEPHONE ENCOUNTER
Call to pt to inform of surgery arrival time tomorrow. Call went to voice mail. Message left for pt to call back.

## 2019-11-20 NOTE — TELEPHONE ENCOUNTER
Pt returns my call after leaving a message regarding surgery arrival time tomorrow 11/21/19. Pt to report to the 2nd floor [facility] at 5:15 AM.

## 2019-11-21 ENCOUNTER — HOSPITAL ENCOUNTER (OUTPATIENT)
Facility: HOSPITAL | Age: 49
Discharge: HOME | End: 2019-11-21
Attending: SURGERY | Admitting: SURGERY
Payer: COMMERCIAL

## 2019-11-21 ENCOUNTER — ANESTHESIA (OUTPATIENT)
Dept: SURGERY | Facility: HOSPITAL | Age: 49
End: 2019-11-21
Payer: COMMERCIAL

## 2019-11-21 ENCOUNTER — PATIENT MESSAGE (OUTPATIENT)
Dept: HEMATOLOGY/ONCOLOGY | Facility: CLINIC | Age: 49
End: 2019-11-21

## 2019-11-21 VITALS
DIASTOLIC BLOOD PRESSURE: 70 MMHG | BODY MASS INDEX: 40.8 KG/M2 | HEIGHT: 64 IN | SYSTOLIC BLOOD PRESSURE: 136 MMHG | WEIGHT: 239 LBS | RESPIRATION RATE: 14 BRPM | OXYGEN SATURATION: 100 % | HEART RATE: 85 BPM | TEMPERATURE: 98 F

## 2019-11-21 DIAGNOSIS — C50.911 PRIMARY INVASIVE MALIGNANT NEOPLASM OF FEMALE BREAST, RIGHT: Primary | ICD-10-CM

## 2019-11-21 PROCEDURE — A4216 STERILE WATER/SALINE, 10 ML: HCPCS | Performed by: SURGERY

## 2019-11-21 PROCEDURE — D9220A PRA ANESTHESIA: ICD-10-PCS | Mod: ANES,,, | Performed by: ANESTHESIOLOGY

## 2019-11-21 PROCEDURE — 36000707: Performed by: SURGERY

## 2019-11-21 PROCEDURE — 71000044 HC DOSC ROUTINE RECOVERY FIRST HOUR: Performed by: SURGERY

## 2019-11-21 PROCEDURE — 63600175 PHARM REV CODE 636 W HCPCS: Performed by: STUDENT IN AN ORGANIZED HEALTH CARE EDUCATION/TRAINING PROGRAM

## 2019-11-21 PROCEDURE — 25000003 PHARM REV CODE 250: Performed by: NURSE ANESTHETIST, CERTIFIED REGISTERED

## 2019-11-21 PROCEDURE — D9220A PRA ANESTHESIA: ICD-10-PCS | Mod: CRNA,,, | Performed by: NURSE ANESTHETIST, CERTIFIED REGISTERED

## 2019-11-21 PROCEDURE — 77001 FLUOROGUIDE FOR VEIN DEVICE: CPT | Mod: 26,,, | Performed by: SURGERY

## 2019-11-21 PROCEDURE — 63600175 PHARM REV CODE 636 W HCPCS: Performed by: SURGERY

## 2019-11-21 PROCEDURE — S0020 INJECTION, BUPIVICAINE HYDRO: HCPCS | Performed by: SURGERY

## 2019-11-21 PROCEDURE — 71000016 HC POSTOP RECOV ADDL HR: Performed by: SURGERY

## 2019-11-21 PROCEDURE — 37000009 HC ANESTHESIA EA ADD 15 MINS: Performed by: SURGERY

## 2019-11-21 PROCEDURE — 71000015 HC POSTOP RECOV 1ST HR: Performed by: SURGERY

## 2019-11-21 PROCEDURE — 25000003 PHARM REV CODE 250: Performed by: SURGERY

## 2019-11-21 PROCEDURE — 36561 INSERT TUNNELED CV CATH: CPT | Mod: RT,,, | Performed by: SURGERY

## 2019-11-21 PROCEDURE — D9220A PRA ANESTHESIA: Mod: ANES,,, | Performed by: ANESTHESIOLOGY

## 2019-11-21 PROCEDURE — 25000003 PHARM REV CODE 250: Performed by: STUDENT IN AN ORGANIZED HEALTH CARE EDUCATION/TRAINING PROGRAM

## 2019-11-21 PROCEDURE — 36561 PR INSERT TUNNELED CV CATH WITH PORT: ICD-10-PCS | Mod: RT,,, | Performed by: SURGERY

## 2019-11-21 PROCEDURE — 63600175 PHARM REV CODE 636 W HCPCS: Performed by: NURSE ANESTHETIST, CERTIFIED REGISTERED

## 2019-11-21 PROCEDURE — 37000008 HC ANESTHESIA 1ST 15 MINUTES: Performed by: SURGERY

## 2019-11-21 PROCEDURE — 36000706: Performed by: SURGERY

## 2019-11-21 PROCEDURE — D9220A PRA ANESTHESIA: Mod: CRNA,,, | Performed by: NURSE ANESTHETIST, CERTIFIED REGISTERED

## 2019-11-21 PROCEDURE — C1788 PORT, INDWELLING, IMP: HCPCS | Performed by: SURGERY

## 2019-11-21 PROCEDURE — 77001 CHG FLUOROGUIDE CNTRL VEN ACCESS,PLACE,REPLACE,REMOVE: ICD-10-PCS | Mod: 26,,, | Performed by: SURGERY

## 2019-11-21 DEVICE — KIT POWERPORT SINGLE 8FR: Type: IMPLANTABLE DEVICE | Site: CHEST | Status: FUNCTIONAL

## 2019-11-21 RX ORDER — DEXAMETHASONE SODIUM PHOSPHATE 4 MG/ML
INJECTION, SOLUTION INTRA-ARTICULAR; INTRALESIONAL; INTRAMUSCULAR; INTRAVENOUS; SOFT TISSUE
Status: DISCONTINUED | OUTPATIENT
Start: 2019-11-21 | End: 2019-11-21

## 2019-11-21 RX ORDER — PROPOFOL 10 MG/ML
VIAL (ML) INTRAVENOUS CONTINUOUS PRN
Status: DISCONTINUED | OUTPATIENT
Start: 2019-11-21 | End: 2019-11-21

## 2019-11-21 RX ORDER — SODIUM CHLORIDE 9 MG/ML
INJECTION, SOLUTION INTRAVENOUS CONTINUOUS
Status: DISCONTINUED | OUTPATIENT
Start: 2019-11-21 | End: 2019-11-21 | Stop reason: HOSPADM

## 2019-11-21 RX ORDER — SODIUM CHLORIDE 9 MG/ML
INJECTION, SOLUTION INTRAMUSCULAR; INTRAVENOUS; SUBCUTANEOUS
Status: DISCONTINUED | OUTPATIENT
Start: 2019-11-21 | End: 2019-11-21 | Stop reason: HOSPADM

## 2019-11-21 RX ORDER — HEPARIN SODIUM (PORCINE) LOCK FLUSH IV SOLN 100 UNIT/ML 100 UNIT/ML
SOLUTION INTRAVENOUS
Status: DISCONTINUED | OUTPATIENT
Start: 2019-11-21 | End: 2019-11-21 | Stop reason: HOSPADM

## 2019-11-21 RX ORDER — CEFAZOLIN SODIUM 1 G/3ML
2 INJECTION, POWDER, FOR SOLUTION INTRAMUSCULAR; INTRAVENOUS
Status: COMPLETED | OUTPATIENT
Start: 2019-11-21 | End: 2019-11-21

## 2019-11-21 RX ORDER — LIDOCAINE HCL/PF 100 MG/5ML
SYRINGE (ML) INTRAVENOUS
Status: DISCONTINUED | OUTPATIENT
Start: 2019-11-21 | End: 2019-11-21

## 2019-11-21 RX ORDER — PROPOFOL 10 MG/ML
VIAL (ML) INTRAVENOUS
Status: DISCONTINUED | OUTPATIENT
Start: 2019-11-21 | End: 2019-11-21

## 2019-11-21 RX ORDER — GLYCOPYRROLATE 0.2 MG/ML
INJECTION INTRAMUSCULAR; INTRAVENOUS
Status: DISCONTINUED | OUTPATIENT
Start: 2019-11-21 | End: 2019-11-21

## 2019-11-21 RX ORDER — MIDAZOLAM HYDROCHLORIDE 1 MG/ML
INJECTION INTRAMUSCULAR; INTRAVENOUS
Status: DISCONTINUED | OUTPATIENT
Start: 2019-11-21 | End: 2019-11-21

## 2019-11-21 RX ORDER — FENTANYL CITRATE 50 UG/ML
25 INJECTION, SOLUTION INTRAMUSCULAR; INTRAVENOUS EVERY 5 MIN PRN
Status: DISCONTINUED | OUTPATIENT
Start: 2019-11-21 | End: 2019-11-21 | Stop reason: HOSPADM

## 2019-11-21 RX ORDER — HYDROCODONE BITARTRATE AND ACETAMINOPHEN 5; 325 MG/1; MG/1
1 TABLET ORAL ONCE AS NEEDED
Status: COMPLETED | OUTPATIENT
Start: 2019-11-21 | End: 2019-11-21

## 2019-11-21 RX ORDER — ONDANSETRON 2 MG/ML
4 INJECTION INTRAMUSCULAR; INTRAVENOUS EVERY 12 HOURS PRN
Status: DISCONTINUED | OUTPATIENT
Start: 2019-11-21 | End: 2019-11-21 | Stop reason: HOSPADM

## 2019-11-21 RX ORDER — LIDOCAINE HYDROCHLORIDE 10 MG/ML
1 INJECTION, SOLUTION EPIDURAL; INFILTRATION; INTRACAUDAL; PERINEURAL ONCE
Status: COMPLETED | OUTPATIENT
Start: 2019-11-21 | End: 2019-11-21

## 2019-11-21 RX ORDER — ONDANSETRON HYDROCHLORIDE 2 MG/ML
INJECTION, SOLUTION INTRAMUSCULAR; INTRAVENOUS
Status: DISCONTINUED | OUTPATIENT
Start: 2019-11-21 | End: 2019-11-21

## 2019-11-21 RX ORDER — FENTANYL CITRATE 50 UG/ML
INJECTION, SOLUTION INTRAMUSCULAR; INTRAVENOUS
Status: DISCONTINUED | OUTPATIENT
Start: 2019-11-21 | End: 2019-11-21

## 2019-11-21 RX ORDER — BUPIVACAINE HYDROCHLORIDE 5 MG/ML
INJECTION, SOLUTION EPIDURAL; INTRACAUDAL
Status: DISCONTINUED | OUTPATIENT
Start: 2019-11-21 | End: 2019-11-21 | Stop reason: HOSPADM

## 2019-11-21 RX ORDER — GABAPENTIN 100 MG/1
100 CAPSULE ORAL 3 TIMES DAILY
COMMUNITY
End: 2020-02-24

## 2019-11-21 RX ADMIN — PROPOFOL 30 MG: 10 INJECTION, EMULSION INTRAVENOUS at 07:11

## 2019-11-21 RX ADMIN — LIDOCAINE HYDROCHLORIDE 1 MG: 10 INJECTION, SOLUTION EPIDURAL; INFILTRATION; INTRACAUDAL; PERINEURAL at 06:11

## 2019-11-21 RX ADMIN — ONDANSETRON 4 MG: 2 INJECTION, SOLUTION INTRAMUSCULAR; INTRAVENOUS at 07:11

## 2019-11-21 RX ADMIN — FENTANYL CITRATE 25 MCG: 50 INJECTION, SOLUTION INTRAMUSCULAR; INTRAVENOUS at 07:11

## 2019-11-21 RX ADMIN — GLYCOPYRROLATE 0.2 MG: 0.2 INJECTION, SOLUTION INTRAMUSCULAR; INTRAVENOUS at 07:11

## 2019-11-21 RX ADMIN — SODIUM CHLORIDE, SODIUM GLUCONATE, SODIUM ACETATE, POTASSIUM CHLORIDE, MAGNESIUM CHLORIDE, SODIUM PHOSPHATE, DIBASIC, AND POTASSIUM PHOSPHATE: .53; .5; .37; .037; .03; .012; .00082 INJECTION, SOLUTION INTRAVENOUS at 08:11

## 2019-11-21 RX ADMIN — PROPOFOL 30 MG: 10 INJECTION, EMULSION INTRAVENOUS at 06:11

## 2019-11-21 RX ADMIN — PROPOFOL 20 MG: 10 INJECTION, EMULSION INTRAVENOUS at 07:11

## 2019-11-21 RX ADMIN — HYDROCODONE BITARTRATE AND ACETAMINOPHEN 1 TABLET: 5; 325 TABLET ORAL at 09:11

## 2019-11-21 RX ADMIN — DEXAMETHASONE SODIUM PHOSPHATE 8 MG: 4 INJECTION, SOLUTION INTRAMUSCULAR; INTRAVENOUS at 07:11

## 2019-11-21 RX ADMIN — PROPOFOL 20 MG: 10 INJECTION, EMULSION INTRAVENOUS at 09:11

## 2019-11-21 RX ADMIN — MIDAZOLAM HYDROCHLORIDE 2 MG: 1 INJECTION, SOLUTION INTRAMUSCULAR; INTRAVENOUS at 06:11

## 2019-11-21 RX ADMIN — CEFAZOLIN 2 G: 330 INJECTION, POWDER, FOR SOLUTION INTRAMUSCULAR; INTRAVENOUS at 07:11

## 2019-11-21 RX ADMIN — PROPOFOL 20 MG: 10 INJECTION, EMULSION INTRAVENOUS at 08:11

## 2019-11-21 RX ADMIN — PROPOFOL 175 MCG/KG/MIN: 10 INJECTION, EMULSION INTRAVENOUS at 06:11

## 2019-11-21 RX ADMIN — SODIUM CHLORIDE: 0.9 INJECTION, SOLUTION INTRAVENOUS at 06:11

## 2019-11-21 RX ADMIN — ONDANSETRON 4 MG: 2 INJECTION INTRAMUSCULAR; INTRAVENOUS at 09:11

## 2019-11-21 RX ADMIN — LIDOCAINE HYDROCHLORIDE 100 MG: 20 INJECTION, SOLUTION INTRAVENOUS at 06:11

## 2019-11-21 NOTE — TELEPHONE ENCOUNTER
Attempted to reach patient to to discuss her questions.  No answer.   Follow up on her appointments. Currently not scheduled as referral for chemo still pending.   Will respond via message to alert patient.

## 2019-11-21 NOTE — ANESTHESIA POSTPROCEDURE EVALUATION
Anesthesia Post Evaluation    Patient: Phylicia Vásquez    Procedure(s) Performed: Procedure(s) (LRB):  [id number]-CATH Fluoro needed (Right)    Final Anesthesia Type: general    Patient location during evaluation: PACU  Patient participation: Yes- Able to Participate  Level of consciousness: awake and alert and oriented  Post-procedure vital signs: reviewed and stable  Pain management: adequate  Airway patency: patent    PONV status at discharge: No PONV  Anesthetic complications: no      Cardiovascular status: blood pressure returned to baseline and hemodynamically stable  Respiratory status: unassisted  Hydration status: euvolemic  Follow-up not needed.          Vitals Value Taken Time   /85 11/21/2019  9:31 AM   Temp 36.1 °C (97 °F) 11/21/2019  9:25 AM   Pulse 106 11/21/2019  9:33 AM   Resp 22 11/21/2019  9:33 AM   SpO2 99 % 11/21/2019  9:33 AM   Vitals shown include unvalidated device data.      No case tracking events are documented in the log.      Pain/George Score: George Score: 7 (11/21/2019  9:25 AM)

## 2019-11-21 NOTE — TRANSFER OF CARE
"Anesthesia Transfer of Care Note    Patient: Phylicia Vásquez    Procedure(s) Performed: Procedure(s) (LRB):  [id number]-CATH Fluoro needed (Right)    Patient location: [facility]    Anesthesia Type: general    Transport from OR: Transported from OR on 6-10 L/min O2 by face mask with adequate spontaneous ventilation    Post pain: adequate analgesia    Post assessment: no apparent anesthetic complications and tolerated procedure well    Post vital signs: stable    Level of consciousness: responds to stimulation and sedated    Nausea/Vomiting: no nausea/vomiting    Complications: none    Transfer of care protocol was followed      Last vitals:   Visit Vitals  /76 (BP Location: Left arm, Patient Position: Lying)   Pulse 71   Temp 37.1 °C (98.7 °F) (Oral)   Resp 16   Ht 5' 4" (1.626 m)   Wt 108.4 kg (239 lb)   LMP 08/14/2014   SpO2 97%   Breastfeeding? No   BMI 41.02 kg/m²     "

## 2019-11-21 NOTE — H&P
Patient seen and examined. H&P from 11/21/19 has been reviewed and is included below.    I agree with the findings and no changes have occurred since H&P was written.    Anesthesia/Surgery risks, benefits, and alternative options discussed and understood by the patient who wishes to proceed.    To OR for port-a-cath placement.      Callie Robbins MD  General Surgery Resident, PGY-I  Pager: [phone]  11/21/2019 6:41 AM      Breast Surgery   [facility]   Department of Surgery   REFERRING PROVIDER: [place] [address]   Chief Complaint: Invasive Mammary Carcinoma       Subjective:   Patient ID: Phylicia Vásquez is a 49 y.o. female who presents with with a screening mammogram on 10/16/19 that showed a focal asymmetry in the upper outer quadrant of the right breast in the posterior depth.   Follow-up mammogram (10/31/19) showed an irregular equal density mass with spiculated margins measuring 1.6cm. US evaluation showed at 10oc 14cm FTN an irregular hypoechoic mass with indistinct margins and posterior acoustic shadowing measuring 0.8cm. A level 1 right axillary adenopathy of a couple nodes were also identified the largest of which measuring 2.1cm. A ultrasound guided biopsy was performed on 11/7/19 with pathology revealing infiltrating mammary carcinoma of the breast and one biopsy positive lymph node.   Patient does routinely do self breast exams. Patient has not noted a change on breast exam. Patient denies nipple discharge. Patient denies to previous breast biopsy. Patient denies a personal history of breast cancer.   Patient had a previous right breast biopsy performed in 2015.   Findings at that time were the following:   Tumor size: 2.9 cm   Tumor grade:    Estrogen Receptor: +   Progesterone Receptor: +   Her-2 olivia: pending   Lymph node status: positive   Lymphatic invasion: positive   GYN History:   Age of menarche was 12. Age of menopause was 37  after hysterectomy. Patient denies hormonal therapy. Patient is . Age of first live birth was 25. Patient did not breast feed.   Family History:   Paternal aunt had ovarian cancer in her 50s   Father had lung and prostate cancer (smoker)        Past Medical History:   Diagnosis Date    Anxiety     Back pain     HTN (hypertension) 10/18/2012    Insomnia 10/18/2012    Spinal cord cyst, L1      Thoracic radiculopathy, bulging disc at T10-11 and T11-12            Past Surgical History:   Procedure Laterality Date    BREAST BIOPSY Right 2015     SECTION      CHOLECYSTECTOMY      COLONOSCOPY N/A 10/5/2015    Procedure: COLONOSCOPY; Surgeon: JERONIMO Cancino MD; Location: [facility] ([address] [place] [address]); Service: Endoscopy; Laterality: N/A;    HYSTERECTOMY      BC--SUPRACERVICAL            Current Outpatient Medications on File Prior to Visit   Medication Sig Dispense Refill    amLODIPine (NORVASC) 5 MG tablet Take 1 tablet (5 mg total) by mouth once daily. 90 tablet 3    ergocalciferol (ERGOCALCIFEROL) 50,000 unit Cap Take 1 capsule (50,000 Units total) by mouth every 7 days. 12 capsule 1    fluocinolone (DERMA-SMOOTHE) 0.01 % external oil Thin film to AA QHS PRN flare 1 Bottle 1    fluticasone (FLONASE) 50 mcg/actuation nasal spray 1 spray by Each Nare route 2 (two) times daily as needed. 15 g 0    ketoconazole (NIZORAL) 2 % shampoo WASH HAIR WITH MEDICATED SHAMPOO AT LEAST 1 TO 2 TIMES A WEEK, LET SIT ON SCALP AT LEAST 5 MINUTES PRIOR TO RINSING 120 mL 0    tobramycin sulfate 0.3% (TOBREX) 0.3 % ophthalmic solution 1-2 drops topically twice daily to affected toe(s). 5 mL 3    valsartan (DIOVAN) 320 MG tablet Take 1 tablet (320 mg total) by mouth once daily. 90 tablet 3               Current Facility-Administered Medications on File Prior to Visit   Medication Dose Route Frequency Provider Last Rate Last Dose    [COMPLETED] gadobenate dimeglumine (MULTIHANCE) injection 20 mL 20 mL Intravenous  ONCE PRN Negrito Verde MD  20 mL at 11/11/19 1222           Family History   Problem Relation Age of Onset    Cancer Paternal Aunt     Lupus Paternal Aunt     Hypertension Mother     Liver disease Father     Alcohol abuse Father     Colon cancer Paternal Uncle     Breast cancer Neg Hx     Ovarian cancer Neg Hx     Melanoma Neg Hx     Psoriasis Neg Hx     Eczema Neg Hx      Review of Systems   Constitutional: Negative for fatigue and unexpected weight change.   HENT: Negative.   Eyes: Negative.   Respiratory: Negative for chest tightness and shortness of breath.   Cardiovascular: Negative for chest pain and palpitations.   Gastrointestinal: Negative for abdominal distention and abdominal pain.   Genitourinary: Negative for dysuria, vaginal bleeding and vaginal discharge.   Musculoskeletal: Negative for arthralgias and back pain.   Skin: Negative for color change and rash.   Neurological: Negative for syncope and headaches.     Objective:   LMP 08/14/2014   Physical Exam   Pulmonary/Chest: Right breast exhibits mass. Right breast exhibits no inverted nipple, no nipple discharge, no skin change and no tenderness. Left breast exhibits no inverted nipple, no mass, no nipple discharge, no skin change and no tenderness.         Radiology review: Images personally reviewed by me in the clinic.   Result:   Mammo Digital Diagnostic Right w/ Michele   US Breast Right Limited   History:   Work-up for abnormal screening mammogram (recall).   Films Compared:   Prior images (if available) were compared.   Findings:   This procedure was performed using tomosynthesis. Computer-aided detection was utilized in the interpretation of this examination.   Mammo Digital Diagnostic Right w/ Michele   The right breast has scattered areas of fibroglandular density.   Right breast 09:00 o'clock axis middle depth excisional biopsy scar, benign.   In the right breast upper outer quadrant posterior depth, there is an irregular equal  density mass with spiculated margins measuring 1.6 cm. Associated architectural distortion. This corresponds to the right breast screening mammogram finding.   Limited right breast ultrasound:   In the right breast 10:00 o'clock axis 14 cm from the nipple, there is an irregular hypoechoic mass with indistinct margins and posterior acoustic shadowing measuring 0.8 cm. No surrounding hypervascularity. This corresponds to the right breast screening mammogram finding.   Level 1 right axillary adenopathy with a couple of nodes demonstrating cortical thickening and loss of fatty hilum. The largest node measures 2.1 cm in long axis.   Impression:   1. Right breast 10:00 o'clock axis mass corresponds to the screening mammogram finding. BI-RADS 4: Suspicious. Recommend ultrasound-guided biopsy.   2. Level 1 right axillary adenopathy. BI-RADS 4: Suspicious. Recommend ultrasound-guided biopsy for 1 of the abnormal nodes.   BI-RADS Category:   Right: 4 - Suspicious   Overall: 4 - Suspicious   Recommendation:   1. Ultrasound-guided biopsy for the right breast 10:00 o'clock axis mass.   2. Ultrasound-guided biopsy for 1 of the abnormal level 1 right axillary nodes.   Assessment:     1. Malignant neoplasm of axillary tail of right breast in female, estrogen receptor positive      Plan:   Options for management were discussed with the patient and her family. We reviewed the existing data noting the equivalency of breast conserving surgery with radiation therapy and mastectomy. We also reviewed the guidelines of the National Comprehensive Cancer Network for Stage 2 breast carcinoma. We discussed the need for lumpectomy margins to be negative for carcinoma, the necessity for postoperative radiation therapy after breast conservation in most cases, the possibility of a failed or false negative sentinel lymph node biopsy and the potential need for complete lymphadenectomy for a failed or positive sentinel lymph node biopsy were fully  discussed. In the setting of mastectomy, delayed or immediate reconstruction options are available and were discussed.   In the setting of lumpectomy, radiation therapy would be recommended majority of the time. The duration and treatment side effects were discussed with the patient. This will coordinated with the radiation oncologist pending final pathology.   We also discussed the role of systemic therapy in the treatment of early stage breast cancer. We discussed that this is based on tumor biology and chandler status and will be determined based on final pathology. We discussed that if the cancer is hormone positive, endocrine therapy would be recommended in most cases and its use can reduce the risk of recurrence as well as improve survival. Side effects of treatment were briefly discussed. We also discussed the potential role for chemotherapy based on a number of factors such as tumor phenotype (ER+ vs. triple negative vs. Gih2ufd+) and this would be determined in coordination with the medical oncologist.   We will proceed with neoadjuvant chemotherapy and establishing a medical oncologist for her care. We will consent her for a port placement today.   After chemotherapy we will re-assess and evaluate which surgical option to move forward with.   Patient was educated on breast cancer, receptors, wire localization lumpectomy, mastectomy, sentinel lymph node mapping and biopsy, axillary lymph node dissection, reconstruction, breast prosthesis with post-mastectomy bra and radiation therapy. Patient was given patient information binder including [facility] breast cancer treatment brochure. All her questions were answered.   Total time spent with the patient: 60 minutes. 40 minutes of face to face consultation and 20 minutes of chart review and coordination of care.    TO OR TODAY FOR PORT-A-CATH PLACEMENT

## 2019-11-21 NOTE — OP NOTE
Central Line Placement Procedure Note        Physician: Surgeon(s) and Role:     * Lelo Guaman MD - Primary     * Callie Robbins MD - Resident - Assisting    Procedure:   1.Central Line Placement port a cath right with ultrasound and fluoroscopy    Date: 11/21/2019    Location: right internal jugular vein    Anesthesia: Local and Moderate Sedation     Pre-Op Diagnosis:  Right breast cancer, estrogen positive    Post-op Diagnosis:  Same    Full Drape Used: Yes    Procedure:  After informed consent and timeout was performed, the patient was taken to the operating room. Patient was placed supine on the table and arms were tucked by her side. Ultrasound was used to ensure the left internal jugular was patent.   Next, local anesthesia was injected and a small nick was made in the skin in the left neck. Ultrasound was used for guidance and an 18-gauge hollow needle was used to access the vessel. Wire was inserted through the access needle and fluoroscopy and ultrasound were used to confirm the wire was in the correct position.  Fluoro was then used to identify the tip of the wire which looped repeatedly in the upper chest and would not go down into the SVC regardless of positioning and maneuvers.  We then decided to convert to the right side.  Ultrasound was used to ensure the right internal jugular vein was patent. Next, local anesthesia was injected and a small nick was made in the skin in the right neck. Ultrasound was used for guidance and an 18-gauge hollow needle was used to access the vessel. Wire was inserted through the access needle and fluoroscopy and ultrasound were used to confirm the wire was in the correct position. Next, a counter incision was made.  Following this local anesthetic was injected on the anterior right chest.  A small incision was made and the port pocket was created using electrocautery. Next,  the catheter was tunneled from the anterior right chest. Then over the wire, the vessel was  dilated and the catheter was passed into the vessel via the Seldinger technique under direct fluoroscopic guidance. The catheter was confirmed to be in proper position in the SVC using fluoroscopy. The catheter was aspirated and blood return was good. Next, the catheter was cut to the appropriate length and attached to the port. Again, blood return was noted, and we then flushed the catheter with heparinized saline. The port was sutured in place using a 2-0 vicryl. The port incision was closed using interuppted 3-0 vicryl followed by running 4-0 vicryl subcuticular.  All skin incisions were closed with a 4-0 vicryl. Dermabond was applied and all counts were correct at the end of the procedure. Patient tolerated the procedure well. A chest x-ray will be performed in the recovery room.    Confirmation: Fluoroscopy intraop    Complications/Comments:  none    Estimated Blood Loss: 2 cc    Disposition: PACU in stable condition    Attestation: I was present for the entire procedure assisted by a qualified resident.

## 2019-11-21 NOTE — ANESTHESIA PREPROCEDURE EVALUATION
11/21/2019  Phylicia Vásquez is a 49 y.o., female scheduled for Port placement.     Anesthesia Evaluation    I have reviewed the Patient Summary Reports.     I have reviewed the Medications.     Review of Systems  Anesthesia Hx:  No problems with previous Anesthesia  History of prior surgery of interest to airway management or planning: Previous anesthesia: MAC, General hysterectomy 2007 with general anesthesia.  Procedure performed at an [facility].  colonoscopy 10/5/15 with MAC.  Procedure performed at an [facility]. Denies Family Hx of Anesthesia complications.   Denies Personal Hx of Anesthesia complications.   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 Denies Tobacco Use. Alcohol Use: Pt consumes rare occasion,    Hematology/Oncology:         -- Denies Anemia: -- Coag Disorders: Denies Bleeding Disorder:   --  Denies Cancer in past history:    EENT/Dental:   Denies Eye Symptoms  Denies ear symptoms  Denies Jaw Problems   Cardiovascular:   Hypertension  Functional Capacity 3 METS, walking; able to walk up 1-2 flights of stairs;denies CP, SOB    Pulmonary:  Pulmonary Normal  Denies Asthma.  Denies Obstructive Sleep Apnea (RAFITA)   Renal/:  Denies Renal Symptoms/Infections/Stones    Hepatic/GI:   Liver Disease,  Denies Hepatic/GI Symptoms    Musculoskeletal:  Joint Disease:  Recent right ankle sprain Thoracic Spine Disorders Bulge of thoracic disc in 2014-resolved   Neurological:   Neuromuscular Disease,  Denies Pain  Denies Seizure Disorder  Denies CVA - Cerebrovasular Accident    Endocrine:  Denies Diabetes  Thyroid Disease, Goiter, imaginig studies available    Dermatological:  Dry patches of skin on face  Psych:  Anxiety Disorder.          Physical Exam  General:  Well nourished, Morbid Obesity    Airway/Jaw/Neck:  Airway Findings: Mouth Opening: Normal Tongue: Normal   General Airway Assessment: Adult  Mallampati: III  Improves to II with phonation.  TM Distance: Normal, at least 6 cm  Jaw/Neck Findings:     Neck ROM: Normal ROM  Neck Findings:  Goiter, mod.      Dental:  Dental Findings: In tact    Chest/Lungs:  Chest/Lungs Findings: Clear to auscultation, Normal Respiratory Rate     Heart/Vascular:  Heart Findings: Rate: Normal  Rhythm: Regular Rhythm  Sounds: Normal        Mental Status:  Mental Status Findings:  Cooperative         Anesthesia Plan  Type of Anesthesia, risks & benefits discussed:  Anesthesia Type:  MAC, general  Patient's Preference:   Intra-op Monitoring Plan: standard ASA monitors  Intra-op Monitoring Plan Comments:   Post Op Pain Control Plan: multimodal analgesia  Post Op Pain Control Plan Comments:   Induction:   IV  Beta Blocker:  Patient is not currently on a Beta-Blocker (No further documentation required).       Informed Consent: Patient understands risks and agrees with Anesthesia plan.  Questions answered. Anesthesia consent signed with patient.  ASA Score: 3     Day of Surgery Review of History & Physical: I have interviewed and examined the patient. I have reviewed the patient's H&P dated:  There are no significant changes.          Ready For Surgery From Anesthesia Perspective.

## 2019-11-21 NOTE — PLAN OF CARE
PT is AAOx4. VSS. NAD. IV discontinued. Discharge instructions given. Verbalized understanding. States pain and nausea tolerable. Discharged home with family.

## 2019-11-22 ENCOUNTER — TELEPHONE (OUTPATIENT)
Dept: HEMATOLOGY/ONCOLOGY | Facility: CLINIC | Age: 49
End: 2019-11-22

## 2019-11-22 NOTE — TELEPHONE ENCOUNTER
Returned call to pt.   Pt calling to check on status of her chemo auth- pt called her insurance company and was informed hadn't received anything.   Informed pt would message auth team and check status.   Pt stated needs to fax over her FMLA and Mike forms- nurse provided fax #.   Pt asked what she can do for her port as was placed yesterday and c/o soreness- informed pt can take ibuprofen OTC and soreness should resolve in next few days.   Pt verbalized understanding.       Message fwd.           ----- Message from Nieves Diggs sent at 11/22/2019  8:13 AM CST -----  Contact: Jojo cordova/Anupam Uribe called stated that she needed prior authorization for chemo on Monday 11/25    Jojo stated she would like for us to call the pt back with the information    Pt contact [phone]

## 2019-11-22 NOTE — BRIEF OP NOTE
[facility]  Brief Operative Note     SUMMARY     Surgery Date: 11/21/2019     Surgeon(s) and Role:     * Lelo Guaman MD - Primary     * Callie Robbins MD - Resident - Assisting        Pre-op Diagnosis:  Malignant neoplasm of axillary tail of right breast in female, estrogen receptor positive [C50.611, Z17.0]    Post-op Diagnosis:  Post-Op Diagnosis Codes:     * Malignant neoplasm of axillary tail of right breast in female, estrogen receptor positive [C50.611, Z17.0]    Procedure(s) (LRB):  [id number]-CATH Fluoro needed (Right)    Anesthesia: General    Description of the findings of the procedure: Insertion of port-a-cath in right internal jugular    Findings/Key Components: Right port-a-cath placed into the right internal jugular under direct visualization with fluoroscopy and ultrasound intra-operatively. Placement confirmed with post-operative chest x-ray.    Estimated Blood Loss: Minimal         Specimens:   Specimen (12h ago, onward)    None          Discharge Note    SUMMARY     Admit Date: 11/21/2019    Discharge Date and Time: 11/21/2019 11:11 AM    Hospital Course (synopsis of major diagnoses, care, treatment, and services provided during the course of the hospital stay): Patient presented for scheduled port-a-cath placement today. Tolerated the procedure well, without complication. Awoken from sedation in OR and transferred to recovery. Plan to discharge home today with follow up appointment in 2 weeks.       Final Diagnosis: Post-Op Diagnosis Codes:     * Malignant neoplasm of axillary tail of right breast in female, estrogen receptor positive [C50.611, Z17.0]    Disposition: Home or Self Care    Follow Up/Patient Instructions:     Medications:  Reconciled Home Medications:      Medication List      CONTINUE taking these medications    amlodipine-valsartan 5-160 mg per tablet  Commonly known as:  EXFORGE  Take 1 tablet by mouth every morning.     cyclobenzaprine 5 MG  tablet  Commonly known as:  FLEXERIL  TAKE 1 TO 2 TABLETS BY MOUTH DAILY AT BEDTIME AS NEEDED     dexAMETHasone 4 MG Tab  Commonly known as:  DECADRON  Take 2 tablets by mouth every 12 hours for 3 days post chemotherapy     ergocalciferol 50,000 unit Cap  Commonly known as:  ERGOCALCIFEROL  Take 1 capsule (50,000 Units total) by mouth every 7 days.     fluocinolone 0.01 % external oil  Commonly known as:  DERMA-SMOOTHE  Thin film to AA QHS PRN flare     fluticasone propionate 50 mcg/actuation nasal spray  Commonly known as:  FLONASE  1 spray by Each Nare route 2 (two) times daily as needed.     gabapentin 100 MG capsule  Commonly known as:  NEURONTIN  Take 100 mg by mouth 3 (three) times daily.     ketoconazole 2 % shampoo  Commonly known as:  NIZORAL  WASH HAIR WITH MEDICATED SHAMPOO AT LEAST 1 TO 2 TIMES A WEEK, LET SIT ON SCALP AT LEAST 5 MINUTES PRIOR TO RINSING     ondansetron 8 MG tablet  Commonly known as:  ZOFRAN  Take 1 tablet by mouth every 12 hours x 3 days post chemotherapy followed by 1 tablet by mouth every 12 hours as needed for nausea.     oxyCODONE 5 MG immediate release tablet  Commonly known as:  ROXICODONE  Take 1 tablet (5 mg total) by mouth every 4 (four) hours as needed for Pain.     tobramycin sulfate 0.3% 0.3 % ophthalmic solution  Commonly known as:  TOBREX  1-2 drops topically twice daily to affected toe(s).     zolpidem 10 mg Tab  Commonly known as:  AMBIEN  Take 1 tablet (10 mg total) by mouth nightly as needed.          Discharge Procedure Orders   Notify your health care provider if you experience any of the following:  increased confusion or weakness     Notify your health care provider if you experience any of the following:  persistent dizziness, light-headedness, or visual disturbances     Notify your health care provider if you experience any of the following:  worsening rash     Notify your health care provider if you experience any of the following:  severe persistent headache      Notify your health care provider if you experience any of the following:  difficulty breathing or increased cough     Notify your health care provider if you experience any of the following:  redness, tenderness, or signs of infection (pain, swelling, redness, odor or green/yellow discharge around incision site)     Notify your health care provider if you experience any of the following:  severe uncontrolled pain     Notify your health care provider if you experience any of the following:  persistent nausea and vomiting or diarrhea     Notify your health care provider if you experience any of the following:  temperature >100.4     No dressing needed   Order Comments: Okay to shower 24 hours following surgery. Do not submerge wound in water for 2 weeks (no swimming or bathing in bath tub).   Skin glue covering incision will fall off on its own in 2-3 weeks. Do not peel the glue off.     Activity as tolerated

## 2019-11-24 PROBLEM — Z17.0 MALIGNANT NEOPLASM OF AXILLARY TAIL OF RIGHT BREAST IN FEMALE, ESTROGEN RECEPTOR POSITIVE: Status: ACTIVE | Noted: 2019-11-24

## 2019-11-24 PROBLEM — C50.611 MALIGNANT NEOPLASM OF AXILLARY TAIL OF RIGHT BREAST IN FEMALE, ESTROGEN RECEPTOR POSITIVE: Status: ACTIVE | Noted: 2019-11-24

## 2019-11-25 ENCOUNTER — TELEPHONE (OUTPATIENT)
Dept: SURGERY | Facility: CLINIC | Age: 49
End: 2019-11-25

## 2019-11-25 NOTE — TELEPHONE ENCOUNTER
Return call to pt's mother after she called and left a message Friday 11/22/19 after 5:00 pm. Mother stats that pt was to start chemo today, but auth from insurance has not been received yet. Per pt's mother, the insurance company told them that they have not received a request for chemo. Informed mother that the has been a request that is being worked on now, but just not authorized yet. Informed mother that she will need to call medical oncology to check the status. Mother states they are actually in Dr Simpson's office now.

## 2019-11-26 ENCOUNTER — TELEPHONE (OUTPATIENT)
Dept: HEMATOLOGY/ONCOLOGY | Facility: CLINIC | Age: 49
End: 2019-11-26

## 2019-11-26 ENCOUNTER — TELEPHONE (OUTPATIENT)
Dept: SURGERY | Facility: CLINIC | Age: 49
End: 2019-11-26

## 2019-11-26 ENCOUNTER — PATIENT MESSAGE (OUTPATIENT)
Dept: HEMATOLOGY/ONCOLOGY | Facility: CLINIC | Age: 49
End: 2019-11-26

## 2019-11-26 NOTE — TELEPHONE ENCOUNTER
Returned call to Anupam. Spoke with representative to inform them patient is now approved and scheduled to begin her chemotherapy.     ----- Message from Diaz Aguirre sent at 11/26/2019  1:36 PM CST -----  Contact: anahi cordova/aunpam   Calling in regards to pt wanting to start chemo and told her it would take 2 weeks     Call back: [phone]   REF#[id number]

## 2019-11-26 NOTE — TELEPHONE ENCOUNTER
Call to pt to inform of genetic test results. Results received as NEGATIVE. 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 Explained to pt what this means and that a copy of the official report from [employer] to be mailed to pt today. Pt also informed that she can call [employer] directly with any questions concerning test results. Pt voice understanding.

## 2019-11-27 ENCOUNTER — INFUSION (OUTPATIENT)
Dept: INFUSION THERAPY | Facility: HOSPITAL | Age: 49
End: 2019-11-27
Attending: INTERNAL MEDICINE
Payer: COMMERCIAL

## 2019-11-27 ENCOUNTER — PATIENT MESSAGE (OUTPATIENT)
Dept: HEMATOLOGY/ONCOLOGY | Facility: CLINIC | Age: 49
End: 2019-11-27

## 2019-11-27 VITALS
DIASTOLIC BLOOD PRESSURE: 77 MMHG | RESPIRATION RATE: 16 BRPM | BODY MASS INDEX: 40.65 KG/M2 | TEMPERATURE: 98 F | WEIGHT: 238.13 LBS | HEART RATE: 68 BPM | SYSTOLIC BLOOD PRESSURE: 122 MMHG | HEIGHT: 64 IN

## 2019-11-27 DIAGNOSIS — C50.611 CARCINOMA OF AXILLARY TAIL OF RIGHT BREAST IN FEMALE, ESTROGEN RECEPTOR POSITIVE: Primary | ICD-10-CM

## 2019-11-27 DIAGNOSIS — Z17.0 CARCINOMA OF AXILLARY TAIL OF RIGHT BREAST IN FEMALE, ESTROGEN RECEPTOR POSITIVE: Primary | ICD-10-CM

## 2019-11-27 PROCEDURE — 96376 TX/PRO/DX INJ SAME DRUG ADON: CPT

## 2019-11-27 PROCEDURE — 96411 CHEMO IV PUSH ADDL DRUG: CPT

## 2019-11-27 PROCEDURE — 63600175 PHARM REV CODE 636 W HCPCS: Performed by: INTERNAL MEDICINE

## 2019-11-27 PROCEDURE — A4216 STERILE WATER/SALINE, 10 ML: HCPCS | Performed by: INTERNAL MEDICINE

## 2019-11-27 PROCEDURE — 96367 TX/PROPH/DG ADDL SEQ IV INF: CPT

## 2019-11-27 PROCEDURE — 25000003 PHARM REV CODE 250: Performed by: INTERNAL MEDICINE

## 2019-11-27 PROCEDURE — 96413 CHEMO IV INFUSION 1 HR: CPT

## 2019-11-27 RX ORDER — DOXORUBICIN HYDROCHLORIDE 2 MG/ML
60 INJECTION, SOLUTION INTRAVENOUS
Status: COMPLETED | OUTPATIENT
Start: 2019-11-27 | End: 2019-11-27

## 2019-11-27 RX ORDER — HEPARIN 100 UNIT/ML
500 SYRINGE INTRAVENOUS
Status: DISCONTINUED | OUTPATIENT
Start: 2019-11-27 | End: 2019-11-27 | Stop reason: HOSPADM

## 2019-11-27 RX ORDER — SODIUM CHLORIDE 0.9 % (FLUSH) 0.9 %
10 SYRINGE (ML) INJECTION
Status: DISCONTINUED | OUTPATIENT
Start: 2019-11-27 | End: 2019-11-27 | Stop reason: HOSPADM

## 2019-11-27 RX ORDER — LORAZEPAM 2 MG/ML
0.5 INJECTION INTRAMUSCULAR
Status: COMPLETED | OUTPATIENT
Start: 2019-11-27 | End: 2019-11-27

## 2019-11-27 RX ADMIN — SODIUM CHLORIDE: 9 INJECTION, SOLUTION INTRAVENOUS at 01:11

## 2019-11-27 RX ADMIN — DOXORUBICIN HYDROCHLORIDE 136 MG: 2 INJECTION, SOLUTION INTRAVENOUS at 02:11

## 2019-11-27 RX ADMIN — HEPARIN 500 UNITS: 100 SYRINGE at 03:11

## 2019-11-27 RX ADMIN — APREPITANT 130 MG: 130 INJECTION, EMULSION INTRAVENOUS at 01:11

## 2019-11-27 RX ADMIN — LORAZEPAM 0.5 MG: 2 INJECTION INTRAMUSCULAR; INTRAVENOUS at 02:11

## 2019-11-27 RX ADMIN — CYCLOPHOSPHAMIDE 1350 MG: 1 INJECTION, POWDER, FOR SOLUTION INTRAVENOUS; ORAL at 02:11

## 2019-11-27 RX ADMIN — DEXAMETHASONE SODIUM PHOSPHATE: 4 INJECTION, SOLUTION INTRA-ARTICULAR; INTRALESIONAL; INTRAMUSCULAR; INTRAVENOUS; SOFT TISSUE at 02:11

## 2019-11-27 RX ADMIN — Medication 10 ML: at 03:11

## 2019-11-27 NOTE — PLAN OF CARE
Patient here for C1D1 A/C.  Tolerated well.  No reactions noted.  Answered questions as needed.  Unable to document on R chest PAC due to an error in computer, excellent blood return noted.  Ambulated off unit in NAD.

## 2019-11-29 ENCOUNTER — PATIENT MESSAGE (OUTPATIENT)
Dept: HEMATOLOGY/ONCOLOGY | Facility: CLINIC | Age: 49
End: 2019-11-29

## 2019-12-02 DIAGNOSIS — C50.611 CARCINOMA OF AXILLARY TAIL OF RIGHT BREAST IN FEMALE, ESTROGEN RECEPTOR POSITIVE: Primary | ICD-10-CM

## 2019-12-02 DIAGNOSIS — C50.011 MALIGNANT NEOPLASM OF AREOLA OF RIGHT BREAST IN FEMALE, UNSPECIFIED ESTROGEN RECEPTOR STATUS: ICD-10-CM

## 2019-12-02 DIAGNOSIS — Z17.0 CARCINOMA OF AXILLARY TAIL OF RIGHT BREAST IN FEMALE, ESTROGEN RECEPTOR POSITIVE: Primary | ICD-10-CM

## 2019-12-03 ENCOUNTER — PATIENT MESSAGE (OUTPATIENT)
Dept: HEMATOLOGY/ONCOLOGY | Facility: CLINIC | Age: 49
End: 2019-12-03

## 2019-12-07 ENCOUNTER — NURSE TRIAGE (OUTPATIENT)
Dept: ADMINISTRATIVE | Facility: CLINIC | Age: 49
End: 2019-12-07

## 2019-12-07 DIAGNOSIS — R11.0 NAUSEA: Primary | ICD-10-CM

## 2019-12-07 DIAGNOSIS — R11.0 NAUSEA: ICD-10-CM

## 2019-12-07 RX ORDER — PROMETHAZINE HYDROCHLORIDE 25 MG/1
25 TABLET ORAL EVERY 6 HOURS PRN
Qty: 30 TABLET | Refills: 2 | Status: SHIPPED | OUTPATIENT
Start: 2019-12-07 | End: 2020-01-06

## 2019-12-07 NOTE — TELEPHONE ENCOUNTER
"Chemo Wednesday before thanksgiving, persistent nausea since.   temp 98.2   [facility] [address], wants to know if anything else can be prescribed  Drinking plenty of fluids, urinating regularly  Has lost some weight over the past two weeks due to nausea.  Discussed home care advice for nausea and to be seen within next two weeks per protocol.  Spoke to on call provider who agrees to send in phenergan to [facility] on [address] per patient request. Advised patient  Reason for Disposition   Nausea is a chronic symptom (recurrent or ongoing AND present > 4 weeks)    Additional Information   Negative: Shock suspected (e.g., cold/pale/clammy skin, too weak to stand, low BP, rapid pulse)   Negative: Difficult to awaken or acting confused (e.g., disoriented, slurred speech)   Negative: Sounds like a life-threatening emergency to the triager   Negative: Chest pain   Negative: Vomiting occurs only while coughing   Negative: Constipation is main symptom   Negative: Diarrhea is main symptom   Negative: [1] Vomiting AND [2] contains red blood or black ("coffee ground") material  (Exception: few red streaks in vomit that only happened once)   Negative: [1] Vomiting AND [2] recent head injury (within last 3 days)   Negative: [1] Vomiting AND [2] recent abdominal injury (within last 3 days)   Negative: [1] Vomiting AND [2] insulin-dependent diabetes (Type I) AND [3] glucose > 400 mg/dl (22 mmol/l)   Negative: [1] Neutropenia known or suspected (e.g., recent cancer chemotherapy) AND [2] fever > 100.4 F (38.0 C)   Negative: [1] Neutropenia known or suspected (e.g., recent cancer chemotherapy) AND [2] vomiting   Negative: SEVERE vomiting (e.g., 6 or more times / day)   Negative: [1] MODERATE vomiting (e.g., 3 - 5 times/day) AND [2] age > 60   Negative: [1] Vomiting AND [2] severe headache (e.g., excruciating) (Exception: same as previous migraines)   Negative: [1] Drinking very little AND [2] dehydration suspected " (e.g., no urine > 12 hours, very dry mouth, very lightheaded)   Negative: Weight loss > 5 lbs (2.3 kg) in one week (or 5 pounds under target weight)   Negative: Patient sounds very sick or weak to the triager   Negative: [1] Vomiting AND [2] abdomen looks much more swollen than usual   Negative: [1] Vomiting AND [2] contains bile (green color)   Negative: [1] Vomiting AND [2] high-risk adult (e.g., brain tumor, V-P shunt, dialysis)   Negative: [1] Constant abdominal pain AND [2] present > 2 hours   Negative: [1] Fever > 101 F (38.3 C) AND [2] age > 60   Negative: [1] Fever > 100.0 F (37.8 C) AND [2] bedridden (e.g., nursing home patient, CVA, chronic illness, recovering from surgery)   Negative: [1] Fever > 100.0 F (37.8 C) AND [2] diabetes mellitus or weak immune system (e.g., HIV positive, cancer chemo, splenectomy, chronic steroids)   Negative: [1] Fever > 101 F (38.3 C) AND [2] co-morbidity risk factor for serious infection (e.g., COPD, heart failure, liver failure, renal failure with dialysis )   Negative: Taking any of the following medications: digoxin (Lanoxin), lithium, theophylline, phenytoin (Dilantin)   Negative: Fever present > 3 days (72 hours)   Negative: [1] MILD or MODERATE vomiting  (e.g., 1 - 5 times/day) AND [2] present > 48 hours (2 days)   Negative: Vomiting a prescription medication    Protocols used: CANCER - NAUSEA AND VOMITING-A-AH

## 2019-12-08 NOTE — TELEPHONE ENCOUNTER
Pt's Rx sent to a closed pharmacy. I called Rx Phenergan in to pt's preferred pharmacy that is open 24 hours.    Reason for Disposition   Caller requesting a refill, no triage required, and triager able to refill per unit policy    Protocols used: MEDICATION QUESTION CALL-A-

## 2019-12-09 ENCOUNTER — PATIENT MESSAGE (OUTPATIENT)
Dept: HEMATOLOGY/ONCOLOGY | Facility: CLINIC | Age: 49
End: 2019-12-09

## 2019-12-09 DIAGNOSIS — C50.611 CARCINOMA OF AXILLARY TAIL OF RIGHT BREAST IN FEMALE, ESTROGEN RECEPTOR POSITIVE: ICD-10-CM

## 2019-12-09 DIAGNOSIS — Z17.0 CARCINOMA OF AXILLARY TAIL OF RIGHT BREAST IN FEMALE, ESTROGEN RECEPTOR POSITIVE: ICD-10-CM

## 2019-12-09 RX ORDER — OXYCODONE HYDROCHLORIDE 5 MG/1
5 TABLET ORAL EVERY 4 HOURS PRN
Qty: 30 TABLET | Refills: 0 | Status: SHIPPED | OUTPATIENT
Start: 2019-12-09 | End: 2019-12-18 | Stop reason: SDUPTHER

## 2019-12-12 ENCOUNTER — TELEPHONE (OUTPATIENT)
Dept: HEMATOLOGY/ONCOLOGY | Facility: CLINIC | Age: 49
End: 2019-12-12

## 2019-12-12 NOTE — TELEPHONE ENCOUNTER
----- Message from Felicia Christianson sent at 12/12/2019 12:44 PM CST -----  Contact: Nithya ( Susan Gifford) tel:  [phone] desk.   Returning a call to Callie .   The Disability Paperwork was not signed.    Needs to speak to you about this.    Trying to help pt. So that she can get paid.     pls call .

## 2019-12-13 NOTE — TELEPHONE ENCOUNTER
Returned call to [facility], with Susan HENDRICKS To inform her that documents were in process and should be submitted no later this afternoon after reviewing with provider and having paper signed.    She expressed gratitude.

## 2019-12-15 ENCOUNTER — HOSPITAL ENCOUNTER (EMERGENCY)
Facility: HOSPITAL | Age: 49
Discharge: HOME | End: 2019-12-15
Attending: EMERGENCY MEDICINE
Payer: COMMERCIAL

## 2019-12-15 VITALS
TEMPERATURE: 99 F | HEART RATE: 91 BPM | BODY MASS INDEX: 38.41 KG/M2 | WEIGHT: 225 LBS | HEIGHT: 64 IN | RESPIRATION RATE: 15 BRPM | SYSTOLIC BLOOD PRESSURE: 129 MMHG | OXYGEN SATURATION: 100 % | DIASTOLIC BLOOD PRESSURE: 92 MMHG

## 2019-12-15 DIAGNOSIS — C50.611 CARCINOMA OF AXILLARY TAIL OF RIGHT BREAST IN FEMALE, ESTROGEN RECEPTOR POSITIVE: Primary | ICD-10-CM

## 2019-12-15 DIAGNOSIS — Z17.0 CARCINOMA OF AXILLARY TAIL OF RIGHT BREAST IN FEMALE, ESTROGEN RECEPTOR POSITIVE: Primary | ICD-10-CM

## 2019-12-15 DIAGNOSIS — M54.9 BACK PAIN: ICD-10-CM

## 2019-12-15 DIAGNOSIS — R07.9 CHEST PAIN: ICD-10-CM

## 2019-12-15 DIAGNOSIS — Z92.21 HISTORY OF CHEMOTHERAPY: ICD-10-CM

## 2019-12-15 LAB
ALBUMIN SERPL BCP-MCNC: 3.4 G/DL (ref 3.5–5.2)
ALP SERPL-CCNC: 66 U/L (ref 55–135)
ALT SERPL W/O P-5'-P-CCNC: 29 U/L (ref 10–44)
ANION GAP SERPL CALC-SCNC: 6 MMOL/L (ref 8–16)
ANISOCYTOSIS BLD QL SMEAR: SLIGHT
AST SERPL-CCNC: 15 U/L (ref 10–40)
BACTERIA #/AREA URNS AUTO: NORMAL /HPF
BASOPHILS # BLD AUTO: ABNORMAL K/UL (ref 0–0.2)
BASOPHILS NFR BLD: 0 % (ref 0–1.9)
BILIRUB SERPL-MCNC: 0.2 MG/DL (ref 0.1–1)
BILIRUB UR QL STRIP: NEGATIVE
BNP SERPL-MCNC: 13 PG/ML (ref 0–99)
BUN SERPL-MCNC: 5 MG/DL (ref 6–30)
BUN SERPL-MCNC: 6 MG/DL (ref 6–20)
CALCIUM SERPL-MCNC: 9.5 MG/DL (ref 8.7–10.5)
CHLORIDE SERPL-SCNC: 101 MMOL/L (ref 95–110)
CHLORIDE SERPL-SCNC: 105 MMOL/L (ref 95–110)
CLARITY UR REFRACT.AUTO: ABNORMAL
CO2 SERPL-SCNC: 29 MMOL/L (ref 23–29)
COLOR UR AUTO: YELLOW
CREAT SERPL-MCNC: 0.7 MG/DL (ref 0.5–1.4)
CREAT SERPL-MCNC: 0.8 MG/DL (ref 0.5–1.4)
DIFFERENTIAL METHOD: ABNORMAL
EOSINOPHIL # BLD AUTO: ABNORMAL K/UL (ref 0–0.5)
EOSINOPHIL NFR BLD: 0 % (ref 0–8)
ERYTHROCYTE [DISTWIDTH] IN BLOOD BY AUTOMATED COUNT: 15.3 % (ref 11.5–14.5)
EST. GFR  (AFRICAN AMERICAN): >60 ML/MIN/1.73 M^2
EST. GFR  (NON AFRICAN AMERICAN): >60 ML/MIN/1.73 M^2
GLUCOSE SERPL-MCNC: 135 MG/DL (ref 70–110)
GLUCOSE SERPL-MCNC: 137 MG/DL (ref 70–110)
GLUCOSE UR QL STRIP: NEGATIVE
HCT VFR BLD AUTO: 35.3 % (ref 37–48.5)
HCT VFR BLD CALC: 34 %PCV (ref 36–54)
HGB BLD-MCNC: 10.7 G/DL (ref 12–16)
HGB UR QL STRIP: NEGATIVE
HYPOCHROMIA BLD QL SMEAR: ABNORMAL
IMM GRANULOCYTES # BLD AUTO: ABNORMAL K/UL (ref 0–0.04)
IMM GRANULOCYTES NFR BLD AUTO: ABNORMAL % (ref 0–0.5)
KETONES UR QL STRIP: NEGATIVE
LEUKOCYTE ESTERASE UR QL STRIP: ABNORMAL
LYMPHOCYTES # BLD AUTO: ABNORMAL K/UL (ref 1–4.8)
LYMPHOCYTES NFR BLD: 27 % (ref 18–48)
MCH RBC QN AUTO: 25.9 PG (ref 27–31)
MCHC RBC AUTO-ENTMCNC: 30.3 G/DL (ref 32–36)
MCV RBC AUTO: 86 FL (ref 82–98)
MICROSCOPIC COMMENT: NORMAL
MONOCYTES # BLD AUTO: ABNORMAL K/UL (ref 0.3–1)
MONOCYTES NFR BLD: 4 % (ref 4–15)
MYELOCYTES NFR BLD MANUAL: 1 %
NEUTROPHILS NFR BLD: 61 % (ref 38–73)
NEUTS BAND NFR BLD MANUAL: 7 %
NITRITE UR QL STRIP: NEGATIVE
NRBC BLD-RTO: 0 /100 WBC
OVALOCYTES BLD QL SMEAR: ABNORMAL
PH UR STRIP: 7 [PH] (ref 5–8)
PLATELET # BLD AUTO: 240 K/UL (ref 150–350)
PMV BLD AUTO: 11.5 FL (ref 9.2–12.9)
POC IONIZED CALCIUM: 1.23 MMOL/L (ref 1.06–1.42)
POC TCO2 (MEASURED): 28 MMOL/L (ref 23–29)
POIKILOCYTOSIS BLD QL SMEAR: SLIGHT
POLYCHROMASIA BLD QL SMEAR: ABNORMAL
POTASSIUM BLD-SCNC: 4.1 MMOL/L (ref 3.5–5.1)
POTASSIUM SERPL-SCNC: 4.2 MMOL/L (ref 3.5–5.1)
PROT SERPL-MCNC: 6.8 G/DL (ref 6–8.4)
PROT UR QL STRIP: NEGATIVE
RBC # BLD AUTO: 4.13 M/UL (ref 4–5.4)
RBC #/AREA URNS AUTO: 1 /HPF (ref 0–4)
SAMPLE: ABNORMAL
SODIUM BLD-SCNC: 140 MMOL/L (ref 136–145)
SODIUM SERPL-SCNC: 140 MMOL/L (ref 136–145)
SP GR UR STRIP: 1.02 (ref 1–1.03)
SQUAMOUS #/AREA URNS AUTO: 5 /HPF
TROPONIN I SERPL DL<=0.01 NG/ML-MCNC: 0.01 NG/ML (ref 0–0.03)
TROPONIN I SERPL DL<=0.01 NG/ML-MCNC: 0.02 NG/ML (ref 0–0.03)
URN SPEC COLLECT METH UR: ABNORMAL
WBC # BLD AUTO: 7.09 K/UL (ref 3.9–12.7)
WBC #/AREA URNS AUTO: 5 /HPF (ref 0–5)

## 2019-12-15 PROCEDURE — 99285 EMERGENCY DEPT VISIT HI MDM: CPT | Mod: 25

## 2019-12-15 PROCEDURE — 93010 EKG 12-LEAD: ICD-10-PCS | Mod: ,,, | Performed by: INTERNAL MEDICINE

## 2019-12-15 PROCEDURE — 93010 ELECTROCARDIOGRAM REPORT: CPT | Mod: ,,, | Performed by: INTERNAL MEDICINE

## 2019-12-15 PROCEDURE — 80053 COMPREHEN METABOLIC PANEL: CPT

## 2019-12-15 PROCEDURE — 25500020 PHARM REV CODE 255: Performed by: EMERGENCY MEDICINE

## 2019-12-15 PROCEDURE — 85027 COMPLETE CBC AUTOMATED: CPT

## 2019-12-15 PROCEDURE — 99285 EMERGENCY DEPT VISIT HI MDM: CPT | Mod: ,,, | Performed by: EMERGENCY MEDICINE

## 2019-12-15 PROCEDURE — 96374 THER/PROPH/DIAG INJ IV PUSH: CPT | Mod: 59

## 2019-12-15 PROCEDURE — 80047 BASIC METABLC PNL IONIZED CA: CPT | Mod: 59

## 2019-12-15 PROCEDURE — 83880 ASSAY OF NATRIURETIC PEPTIDE: CPT

## 2019-12-15 PROCEDURE — 99285 PR EMERGENCY DEPT VISIT,LEVEL V: ICD-10-PCS | Mod: ,,, | Performed by: EMERGENCY MEDICINE

## 2019-12-15 PROCEDURE — 81001 URINALYSIS AUTO W/SCOPE: CPT

## 2019-12-15 PROCEDURE — 96376 TX/PRO/DX INJ SAME DRUG ADON: CPT | Mod: 59

## 2019-12-15 PROCEDURE — 63600175 PHARM REV CODE 636 W HCPCS: Performed by: PHYSICIAN ASSISTANT

## 2019-12-15 PROCEDURE — 25000003 PHARM REV CODE 250: Performed by: PHYSICIAN ASSISTANT

## 2019-12-15 PROCEDURE — 84484 ASSAY OF TROPONIN QUANT: CPT | Mod: 91

## 2019-12-15 PROCEDURE — 93005 ELECTROCARDIOGRAM TRACING: CPT

## 2019-12-15 PROCEDURE — 85007 BL SMEAR W/DIFF WBC COUNT: CPT

## 2019-12-15 RX ORDER — MORPHINE SULFATE 4 MG/ML
4 INJECTION, SOLUTION INTRAMUSCULAR; INTRAVENOUS
Status: COMPLETED | OUTPATIENT
Start: 2019-12-15 | End: 2019-12-15

## 2019-12-15 RX ORDER — ASPIRIN 325 MG
325 TABLET ORAL
Status: COMPLETED | OUTPATIENT
Start: 2019-12-15 | End: 2019-12-15

## 2019-12-15 RX ADMIN — MORPHINE SULFATE 4 MG: 4 INJECTION INTRAVENOUS at 09:12

## 2019-12-15 RX ADMIN — MORPHINE SULFATE 4 MG: 4 INJECTION INTRAVENOUS at 11:12

## 2019-12-15 RX ADMIN — MORPHINE SULFATE 4 MG: 4 INJECTION INTRAVENOUS at 01:12

## 2019-12-15 RX ADMIN — IOHEXOL 75 ML: 350 INJECTION, SOLUTION INTRAVENOUS at 10:12

## 2019-12-15 RX ADMIN — ASPIRIN 325 MG ORAL TABLET 325 MG: 325 PILL ORAL at 09:12

## 2019-12-15 NOTE — ED PROVIDER NOTES
Encounter Date: 12/15/2019       History     Chief Complaint   Patient presents with    R sided pain     breast cancer patient - reports R breast is where her cancer is - on active chemo      49-year-old female with PMHx of HTN, anxiety, spinal cord cyst, thoracic radiculopathy, and carcinoma of R breast (initiated neoadjuvant chemotherapy 19) who presents to the ED with c/o R breast pain. Per pt, she was sitting on the couch about 1 hour PTA, when she suddenly developed severe right sighted sharp pain radiating from her right sided back to her right breast, which has been persistent since. Her pain is worse with deep breathing and is rated 10/10 currently. She has not taken anything for her pain. Per pt, she never picked up her Roxicodone, prescribed to her 6 days ago by Dr. Verde (Oncology). She has had intermittent nausea, generalized weakness, and fatigue since receiving chemo 2.5 weeks ago. She also reports urinary frequency without dysuria for the past few days. Denies leg swelling/pain, abdominal pain, dysuria, hematuria, blood in stool, melena, HA, numbness, or any other medical complaints.     The history is provided by the patient.     Review of patient's allergies indicates:   Allergen Reactions    Butalbital Other (See Comments)     Chest pain       Past Medical History:   Diagnosis Date    Anxiety     Back pain     HTN (hypertension) 10/18/2012    Insomnia 10/18/2012    Spinal cord cyst, L1      Thoracic radiculopathy, bulging disc at T10-11 and T11-12      Past Surgical History:   Procedure Laterality Date    BREAST BIOPSY Right      SECTION      CHOLECYSTECTOMY      COLONOSCOPY N/A 10/5/2015    Procedure: COLONOSCOPY;  Surgeon: JERONIMO Cancino MD;  Location: [facility] ([address] [place] [address]);  Service: Endoscopy;  Laterality: N/A;    HYSTERECTOMY      BC--SUPRACERVICAL    INSERTION OF TUNNELED CENTRAL VENOUS CATHETER (CVC) WITH SUBCUTANEOUS PORT Right 2019     Procedure: [id number]-G-CATH Fluoro needed;  Surgeon: Lelo Guaman MD;  Location: [facility] OR [address] [place] [address];  Service: General;  Laterality: Right;  Right internal jugular.      Family History   Problem Relation Age of Onset    Cancer Paternal Aunt     Lupus Paternal Aunt     Hypertension Mother     Liver disease Father     Alcohol abuse Father     Colon cancer Paternal Uncle     Breast cancer Neg Hx     Ovarian cancer Neg Hx     Melanoma Neg Hx     Psoriasis Neg Hx     Eczema Neg Hx      Social History     Tobacco Use    Smoking status: Never Smoker    Smokeless tobacco: Never Used   Substance Use Topics    Alcohol use: No     Frequency: Monthly or less     Drinks per session: 1 or 2     Binge frequency: Never    Drug use: No     Review of Systems   Constitutional: Positive for fatigue. Negative for chills and fever.   HENT: Negative for congestion.    Eyes: Negative for visual disturbance.   Respiratory: Positive for shortness of breath. Negative for cough.    Cardiovascular: Positive for chest pain.   Gastrointestinal: Negative for abdominal pain, blood in stool, constipation, diarrhea and vomiting.   Endocrine:        +iright breast pain   Genitourinary: Positive for frequency. Negative for dysuria, flank pain and hematuria.   Musculoskeletal: Positive for back pain. Negative for neck pain.   Skin: Negative for pallor.   Neurological: Positive for weakness (generalized). Negative for dizziness, light-headedness, numbness and headaches.   Psychiatric/Behavioral: Negative for confusion.       Physical Exam     Initial Vitals [12/15/19 0843]   BP Pulse Resp Temp SpO2   (!) 179/106 84 16 99 °F (37.2 °C) 99 %      MAP       --         Physical Exam    Nursing note and vitals reviewed.  Constitutional: She appears well-developed and well-nourished.   HENT:   Head: Normocephalic and atraumatic.   Eyes: Conjunctivae and EOM are normal.   Cardiovascular: Normal rate, regular rhythm, normal heart sounds and  intact distal pulses.   DP pulses 2+ bilaterally.    Pulmonary/Chest: Breath sounds normal. She has no wheezes. She has no rhonchi. She has no rales.   Tenderness to palpation over right anterior axilla. Port scar well healed without erythema, induration, or TTP.    Abdominal: Soft. There is no tenderness. There is no rebound and no guarding.   Musculoskeletal: Normal range of motion. She exhibits no edema or tenderness.   Scant amount of pitting edema in bilateral LEs. No LE TTP. No midline C, T, or L spinal tenderness.    Neurological: She is alert and oriented to person, place, and time. She has normal strength.   Skin: Skin is warm.   Psychiatric: She has a normal mood and affect.         ED Course   Procedures  Labs Reviewed   CBC W/ AUTO DIFFERENTIAL - Abnormal; Notable for the following components:       Result Value    Hemoglobin 10.7 (*)     Hematocrit 35.3 (*)     Mean Corpuscular Hemoglobin 25.9 (*)     Mean Corpuscular Hemoglobin Conc 30.3 (*)     RDW 15.3 (*)     All other components within normal limits   COMPREHENSIVE METABOLIC PANEL - Abnormal; Notable for the following components:    Glucose 135 (*)     Albumin 3.4 (*)     Anion Gap 6 (*)     All other components within normal limits   URINALYSIS, REFLEX TO URINE CULTURE - Abnormal; Notable for the following components:    Appearance, UA Hazy (*)     Leukocytes, UA Trace (*)     All other components within normal limits    Narrative:     Preferred Collection Type->Urine, Clean Catch   ISTAT PROCEDURE - Abnormal; Notable for the following components:    POC Glucose 137 (*)     POC BUN 5 (*)     POC Hematocrit 34 (*)     All other components within normal limits   TROPONIN I   B-TYPE NATRIURETIC PEPTIDE   TROPONIN I   URINALYSIS MICROSCOPIC    Narrative:     Preferred Collection Type->Urine, Clean Catch        ECG Results          EKG 12-lead (Final result)  Result time 12/15/19 11:12:13    Final result by Interface, Lab In [facility] (12/15/19 11:12:13)                  Narrative:    Test Reason : R07.9,    Vent. Rate : 064 BPM     Atrial Rate : 064 BPM     P-R Int : 144 ms          QRS Dur : 076 ms      QT Int : 398 ms       P-R-T Axes : 036 001 014 degrees     QTc Int : 410 ms    Normal sinus rhythm  Normal ECG  When compared with ECG of 06-OCT-2014 09:16,  Vent. rate has decreased BY  58 BPM  Nonspecific T wave abnormality no longer evident in Anterior leads  Confirmed by NAVARRO ALMARAZ MD ([id number]) on 12/15/2019 11:12:02 AM    Referred By: [email or web address]   SELF           Confirmed By:NAVARRO ALMARAZ MD                            Imaging Results           X-Ray Cervical Spine AP And Lateral (Final result)  Result time 12/15/19 12:21:18    Final result by Arvind Gonzalez MD (12/15/19 12:21:18)                 Impression:      This report was flagged in Epic as abnormal.    1. Findings may reflect osteophyte avulsion involving the anterior inferior endplate of C2 versus degenerative change.  No recent exams are available for direct comparison, unknown chronicity noting no overlying edema.  Correlation with any focal tenderness is recommended.  Please see above for additional findings.      Electronically signed by: Arvind Gonzalez MD  Date:    12/15/2019  Time:    12:21             Narrative:    EXAMINATION:  XR CERVICAL SPINE AP LATERAL    CLINICAL HISTORY:  Dorsalgia, unspecified    TECHNIQUE:  AP, lateral and open mouth views of the cervical spine were performed.    COMPARISON:  MRI 09/22/2014    FINDINGS:  C7 is obscured on lateral view by overlying soft tissues.    Allowing for the above, lateral imaging demonstrates adequate alignment of the cervical spine without significant vertebral body height loss or disc space height loss.  There is a fragmented appearance to the anterior inferior endplate of C2, not present on examination 09/22/2014 although no recent exams are available for comparison.  Osteophyte avulsion is a consideration.  The facet joints are aligned.  AP  spinal alignment is grossly unremarkable.  There is a right central venous catheter.  The lung apices are grossly clear.  The lateral masses of C1 are in anatomic relationship with C2.  The odontoid is suboptimally evaluated on frontal view, appears grossly intact on lateral view.  The paraspinous and hypopharyngeal soft tissues are unremarkable.                               X-Ray Thoracic Spine AP And Lateral (Final result)  Result time 12/15/19 12:17:16    Final result by Arvind Gonzalez MD (12/15/19 12:17:16)                 Impression:      1. Habitus limited exam, no definite acute displaced fracture or dislocation of the thoracic spine.      Electronically signed by: Arvind Gonzalez MD  Date:    12/15/2019  Time:    12:17             Narrative:    EXAMINATION:  XR THORACIC SPINE AP LATERAL    CLINICAL HISTORY:  Dorsalgia, unspecified    TECHNIQUE:  AP and lateral views of the thoracic spine were performed.    COMPARISON:  CT 12/15/2019    FINDINGS:  Two views.    Secondary to habitus, the spine is obscured on lateral imaging.  No definite significant vertebral body height loss or disc space height loss.  AP spinal alignment is unremarkable noting thoracic degenerative change.  No definite acute displaced rib fracture.  Right central venous catheter tip projects over the distal SVC.  Surgical change overlies the right upper quadrant.                               CTA Chest Non-Coronary (PE Study) (Final result)  Result time 12/15/19 10:51:00    Final result by Amando Tafoya MD (12/15/19 10:51:00)                 Impression:      No evidence of pulmonary thromboembolism.    Electronically signed by resident: Tamika Mata  Date:    12/15/2019  Time:    10:21    Electronically signed by: Amando Tafoya MD  Date:    12/15/2019  Time:    10:51             Narrative:    EXAMINATION:  CTA CHEST NON CORONARY    CLINICAL HISTORY:  Chest pain, acute, PE suspected, low pretest prob;    TECHNIQUE:  Low dose axial  images, sagittal and coronal reformations were obtained from the thoracic inlet to the lung bases following the IV administration of 75 mL of Omnipaque 350.  Contrast timing was optimized to evaluate the pulmonary arteries.    COMPARISON:  Radiograph chest AP portable 11/21/2019, CT chest abdomen pelvis 11/15/2019    FINDINGS:  Pulmonary vasculature: Satisfactory opacification of the pulmonary arterial system with no filling defect to the segmental level.    Aorta: Left-sided aortic arch.  No aneurysm and no significant atherosclerosis    Base of Neck /thoracic soft tissue: There is a right-sided chest port in place.  Bilateral prominent axillary lymph nodes in this patient with known breast cancer    Thoracic soft tissues: Normal.    Heart: Normal size. No effusion.    Ev/Mediastinum: No pathologic chandler enlargement.  The main pulmonary artery is enlarged measuring 3.6 cm in diameter consistent with but not specific for pulmonary arterial hypertension.    Airways: Patent.    Lungs/Pleura: Symmetrically expanded without focal mass or consolidation.  No pneumothorax or pleural effusion.    Esophagus: Normal.    Upper Abdomen: No abnormality of the partially imaged upper abdomen.    Bones: No acute fracture. No suspicious lytic or sclerotic lesions.                                 Medical Decision Making:   History:   Old Medical Records: I decided to obtain old medical records.  Old Records Summarized: records from clinic visits.       <> Summary of Records: Clinic visit 12/25/2019 with Dr. Verde- planned to place port the next day and start neoadjuvant chemotherapy. First chemo treatment 11/27/19.   Initial Assessment:   49-year-old female with PMHx of HTN, anxiety, spinal cord cyst, thoracic radiculopathy, and carcinoma of R breast (initiated neoadjuvant chemotherapy 11/27/19) who presents to the ED with c/o R breast pain. While at rest an hour ago, she suddenly developed right back pain radiating to her  right breast. Hypertensive at 179/106. RRR. Lungs CTA bilaterally. Abdomen soft and nontender. Tenderness to palpation over right anterior axilla. Port scar well healed without erythema, induration, or TTP.   Differential Diagnosis:   DDx includes but is not limited to malignancy, pain related to chronic pain, PE, ACS, CHF exacerbation, electrolyte abnormality, anemia, UTI.   Independently Interpreted Test(s):   I have ordered and independently interpreted X-rays - see summary below.  I have ordered and independently interpreted EKG Reading(s) - see summary below  Clinical Tests:   Lab Tests: Reviewed and Ordered  Radiological Study: Ordered and Reviewed  Medical Tests: Ordered and Reviewed  ED Management:  Will obtain cardiac work up including, troponin x 2, BNP, CBC, CMP, EKG, and chest CTA to r/o PE.WIll also check UA. Will give 325 mg ASA and 4 mg IV morphine.     EKG independently interpreted with NSR at rate of 64. No STEMI. BNP 13. Troponin 0.013, will trend as pt's symptoms began 1 hour PTA. Electrolytes within normal limits. Cr 0.8. Hemoglobin 10.7, stable compared to previous. WBC 7.09. UA with trace leukocytes, 5 WBC, and rare bacteria on microscopy. Chest CTA with no evidence of PE.     Discussed with Oncologist on-call, who reviewed patient's chart. They recommended cervical and thoracic x-rays to r/o bone mets. However, they have a low suspicion as patient had negative bone scan 11/19/2019. Reviewed UA results, no indication to start patient on ABx at this time. If second troponin and x-rays are negative, then they are comfortable with having patient follow up in Oncology cinic in 3 days.     Repeat troponin negative. Cervical x-rays with findings may reflect osteophyte avulsion involving the anterior inferior endplate of C2 versus degenerative change. No overlying midline cervical spinal tenderness. BP improved to 137/79. Pt is hemodynamically stable and in no acute distress. She is stable for  discharge with instructions to follow up with Oncology in 3 days. Pt should  refills of Roxicodone at [facility] tomorrow. She has a few left over pills that should last her through today. Strict ER return precautions given. All questions answered. Pt expressed understanding and is agreeable with the plan.       I have discussed the treatment and management of this patient with my supervising physician, and we agree on the plan of care.      Liliana Marti PA-C    Other:   I have discussed this case with another health care provider.       <> Summary of the Discussion: Oncology              Attending Attestation:     Physician Attestation Statement for NP/PA:   I have conducted a face to face encounter with this patient in addition to the NP/PA, due to Medical Complexity    Other NP/PA Attestation Additions:    History of Present Illness: chest pain                                 Clinical Impression:       ICD-10-CM ICD-9-CM   1. Carcinoma of axillary tail of right breast in female, estrogen receptor positive C50.611 174.6    Z17.0 V86.0   2. Chest pain R07.9 786.50   3. Back pain M54.9 724.5   4. History of chemotherapy Z92.21 V87.41         Disposition:   Disposition: Discharged  Condition: Stable                     Liliana Marti PA-C  12/15/19 [id number]       Guille Wellington III, MD  12/16/19 [id number]

## 2019-12-15 NOTE — ED NOTES
Patient identifiers verified and correct for Phylicia Vásquez.    LOC: The patient is awake, alert and aware of environment with an appropriate affect, the patient is oriented x 3 and speaking appropriately.  APPEARANCE: Patient resting comfortably and in no acute distress, patient is clean and well groomed, patient's clothing is properly fastened.  SKIN: The skin is warm and dry, color consistent with ethnicity, patient has normal skin turgor and moist mucus membranes, skin intact, no breakdown or bruising noted.  MUSCULOSKELETAL: Patient moving all extremities spontaneously, no obvious swelling or deformities noted.  RESPIRATORY: Airway is open and patent, respirations are spontaneous, patient has a normal effort and rate, no accessory muscle use noted, bilateral breath sounds present.  CARDIAC: Patient has a normal rate and regular rhythm, no periphreal edema noted, capillary refill < 3 seconds.  ABDOMEN: Soft and non tender to palpation, no distention noted, normoactive bowel sounds present in all four quadrants.  NEUROLOGIC: PERRL, 3 mm bilaterally, eyes open spontaneously, behavior appropriate to situation, follows commands, facial expression symmetrical, bilateral hand grasp equal and even, purposeful motor response noted, normal sensation in all extremities when touched with a finger.

## 2019-12-15 NOTE — ED NOTES
Pt reports right upper back pain x 45 minutes. She reports she was seated at table talking to her mother when the pain began. Pt reports pain is worse with deep inspiration. She denies Cough, fever, chills, SOB. Recent diagnosis of right breast CA for which has received one round of chemo 11/27/19.

## 2019-12-17 ENCOUNTER — PATIENT MESSAGE (OUTPATIENT)
Dept: HEMATOLOGY/ONCOLOGY | Facility: CLINIC | Age: 49
End: 2019-12-17

## 2019-12-17 NOTE — PROGRESS NOTES
"Answers for HPI/ROS submitted by the patient on 12/17/2019   appetite change : Yes  unexpected weight change: Yes  visual disturbance: Yes  cough: No  shortness of breath: Yes  chest pain: No  abdominal pain: No  diarrhea: Yes  frequency: Yes  back pain: Yes  rash: Yes  headaches: No  adenopathy: No  nervous/ anxious: Yes  Subjective:       Patient ID: Phylicia Vásquez is a 49 y.o. female.    Chief Complaint: No chief complaint on file.    HPI   Mrs Lua returns today for follow up. She presents for cycle 2 of AC. Her EF is 60 %.  On her CBC, the WBCs are 10,000 /mm3, Hg 10.9 gr/dl, Ht 35.9 % and platelets 247K.  Her bilirubin is 0.2 mg/dl.    Patient started with pain in the right breast that radiates to the right back on Sunday morning. She is taking roxicodone 3 times a day and was seen in the ER for the pain. Cervical x-ray shows, "may reflect osteophyte avulsion involving the anterior inferior endplate of C2 versus degenerative change." Right breast is swollen, tender to touch. Denies redness and drainage from the right breast. Shortness of breath with exertion. Denies fevers and chills.       Briefly, she is a 49 year old AA female who was recently found to have a carcinoma that is ER positive, MA positive and HER 2 equivocal.  Apparently she started experiencing pain in the right axilla.  This led to a mammogram on 10/24 that showed an asymmetry in the right breast.  A breast MRI showed a 22 mm x 21 mm x 10 mm lymph node seen in the right axilla and a 29 mm x 9 mm x 25 mm irregularly shaped, homogeneous mass with irregular margins seen in the right breast at 10 o'clock.    A biopsy was done on 10/7 that showed a carcinoma that was ER positive, MA positive, and HER 2 indeterminate.  FISH was negative.  An axillary node biopsy on the same day was also positive. Her FISH was negative, and her staging bone scan and CT of the chest, abdomen, and pelvis showed no evidence of metastatic disease.        Review " of Systems   Constitutional: Positive for fatigue and unexpected weight change. Negative for appetite change, chills and fever.   HENT: Negative for nosebleeds.    Eyes: Positive for visual disturbance.   Respiratory: Positive for shortness of breath (with exertion). Negative for cough.    Cardiovascular: Negative for chest pain.   Gastrointestinal: Positive for constipation (mirralax ) and nausea. Negative for abdominal pain, blood in stool, diarrhea and vomiting.   Genitourinary: Positive for frequency. Negative for hematuria.   Musculoskeletal: Positive for back pain (radiating from right breast).   Skin: Positive for rash (chronic on face; new on scalp ).   Neurological: Positive for light-headedness (from roxicodone). Negative for headaches.   Hematological: Negative for adenopathy.   Psychiatric/Behavioral: The patient is nervous/anxious.            Objective:      Physical Exam   Constitutional: She is oriented to person, place, and time. She appears well-developed and well-nourished. No distress.   HENT:   Head: Normocephalic.   Mouth/Throat: Oropharynx is clear and moist. No oropharyngeal exudate.   Eyes: Pupils are equal, round, and reactive to light.   Neck: Normal range of motion.   Cardiovascular: Normal rate, regular rhythm and normal heart sounds.   Pulmonary/Chest: Effort normal and breath sounds normal.   BREASTS:  Both breasts are large.  There is a palpable mass in the upper outer quadrant of the right breast.  She also has a palpable right axillary node.   Abdominal: Soft. Normal appearance and bowel sounds are normal. She exhibits no distension. There is no tenderness.   Musculoskeletal: She exhibits no edema.   Neurological: She is alert and oriented to person, place, and time.   Skin: Skin is warm and dry. No rash noted.   Psychiatric: She has a normal mood and affect. Her behavior is normal.   Nursing note and vitals reviewed.                                                                          Assessment:       1. Carcinoma of axillary tail of right breast in female, estrogen receptor positive, clinically T2N1M0     2.    Axillary node metastases.  Plan:     1-2. Cycle 2 of AC; She was given neutropenic precautions and these were reinforced with the holidays.  Dr. Verde will see her in three weeks for cycle 3.  3. Rash history of since 2017 - previously tried ketoconazole shampoo; will re-prescribe those medications      Patient is in agreement with the proposed treatment plan. All questions were answered to the patient's satisfaction. Patient knows to call clinic for any new or worsening symptoms and if anything is needed before the next clinic visit.          Toya Quintana, FNP-C  Hematology & Medical Oncology   [facility] [place] [address]  ph. [phone]  Fax. [phone]    Patient dicussed with collaborating physician, Dr. Verde.

## 2019-12-18 ENCOUNTER — OFFICE VISIT (OUTPATIENT)
Dept: HEMATOLOGY/ONCOLOGY | Facility: CLINIC | Age: 49
End: 2019-12-18
Payer: COMMERCIAL

## 2019-12-18 ENCOUNTER — INFUSION (OUTPATIENT)
Dept: INFUSION THERAPY | Facility: HOSPITAL | Age: 49
End: 2019-12-18
Attending: INTERNAL MEDICINE
Payer: COMMERCIAL

## 2019-12-18 VITALS
SYSTOLIC BLOOD PRESSURE: 131 MMHG | BODY MASS INDEX: 38.19 KG/M2 | DIASTOLIC BLOOD PRESSURE: 77 MMHG | HEART RATE: 77 BPM | OXYGEN SATURATION: 97 % | TEMPERATURE: 98 F | WEIGHT: 237.63 LBS | HEIGHT: 66 IN | RESPIRATION RATE: 16 BRPM

## 2019-12-18 VITALS
WEIGHT: 237.63 LBS | RESPIRATION RATE: 19 BRPM | HEIGHT: 66 IN | TEMPERATURE: 98 F | DIASTOLIC BLOOD PRESSURE: 77 MMHG | OXYGEN SATURATION: 99 % | HEART RATE: 62 BPM | BODY MASS INDEX: 38.19 KG/M2 | SYSTOLIC BLOOD PRESSURE: 137 MMHG

## 2019-12-18 DIAGNOSIS — Z17.0 MALIGNANT NEOPLASM OF AXILLARY TAIL OF RIGHT BREAST IN FEMALE, ESTROGEN RECEPTOR POSITIVE: ICD-10-CM

## 2019-12-18 DIAGNOSIS — L21.9 SEBORRHEIC DERMATITIS OF SCALP: ICD-10-CM

## 2019-12-18 DIAGNOSIS — C50.611 CARCINOMA OF AXILLARY TAIL OF RIGHT BREAST IN FEMALE, ESTROGEN RECEPTOR POSITIVE: Primary | ICD-10-CM

## 2019-12-18 DIAGNOSIS — Z17.0 CARCINOMA OF AXILLARY TAIL OF RIGHT BREAST IN FEMALE, ESTROGEN RECEPTOR POSITIVE: Primary | ICD-10-CM

## 2019-12-18 DIAGNOSIS — C50.011 MALIGNANT NEOPLASM OF AREOLA OF RIGHT BREAST IN FEMALE, UNSPECIFIED ESTROGEN RECEPTOR STATUS: ICD-10-CM

## 2019-12-18 DIAGNOSIS — C50.611 MALIGNANT NEOPLASM OF AXILLARY TAIL OF RIGHT BREAST IN FEMALE, ESTROGEN RECEPTOR POSITIVE: ICD-10-CM

## 2019-12-18 DIAGNOSIS — Z17.0 CARCINOMA OF AXILLARY TAIL OF RIGHT BREAST IN FEMALE, ESTROGEN RECEPTOR POSITIVE: ICD-10-CM

## 2019-12-18 DIAGNOSIS — I10 ESSENTIAL HYPERTENSION: ICD-10-CM

## 2019-12-18 DIAGNOSIS — L21.9 SEBORRHEIC DERMATITIS: ICD-10-CM

## 2019-12-18 DIAGNOSIS — C50.611 CARCINOMA OF AXILLARY TAIL OF RIGHT BREAST IN FEMALE, ESTROGEN RECEPTOR POSITIVE: ICD-10-CM

## 2019-12-18 LAB
ALBUMIN SERPL BCP-MCNC: 3.6 G/DL (ref 3.5–5.2)
ALP SERPL-CCNC: 74 U/L (ref 55–135)
ALT SERPL W/O P-5'-P-CCNC: 28 U/L (ref 10–44)
ANION GAP SERPL CALC-SCNC: 6 MMOL/L (ref 8–16)
AST SERPL-CCNC: 18 U/L (ref 10–40)
BILIRUB SERPL-MCNC: 0.2 MG/DL (ref 0.1–1)
BUN SERPL-MCNC: 8 MG/DL (ref 6–20)
CALCIUM SERPL-MCNC: 9.3 MG/DL (ref 8.7–10.5)
CHLORIDE SERPL-SCNC: 103 MMOL/L (ref 95–110)
CO2 SERPL-SCNC: 29 MMOL/L (ref 23–29)
CREAT SERPL-MCNC: 0.7 MG/DL (ref 0.5–1.4)
ERYTHROCYTE [DISTWIDTH] IN BLOOD BY AUTOMATED COUNT: 15.4 % (ref 11.5–14.5)
EST. GFR  (AFRICAN AMERICAN): >60 ML/MIN/1.73 M^2
EST. GFR  (NON AFRICAN AMERICAN): >60 ML/MIN/1.73 M^2
GLUCOSE SERPL-MCNC: 106 MG/DL (ref 70–110)
HCT VFR BLD AUTO: 35.9 % (ref 37–48.5)
HGB BLD-MCNC: 10.9 G/DL (ref 12–16)
IMM GRANULOCYTES # BLD AUTO: 0.45 K/UL (ref 0–0.04)
MCH RBC QN AUTO: 26.1 PG (ref 27–31)
MCHC RBC AUTO-ENTMCNC: 30.4 G/DL (ref 32–36)
MCV RBC AUTO: 86 FL (ref 82–98)
NEUTROPHILS # BLD AUTO: 7.2 K/UL (ref 1.8–7.7)
PLATELET # BLD AUTO: 247 K/UL (ref 150–350)
PMV BLD AUTO: 11.9 FL (ref 9.2–12.9)
POTASSIUM SERPL-SCNC: 4.1 MMOL/L (ref 3.5–5.1)
PROT SERPL-MCNC: 7.2 G/DL (ref 6–8.4)
RBC # BLD AUTO: 4.18 M/UL (ref 4–5.4)
SODIUM SERPL-SCNC: 138 MMOL/L (ref 136–145)
WBC # BLD AUTO: 10.45 K/UL (ref 3.9–12.7)

## 2019-12-18 PROCEDURE — 63600175 PHARM REV CODE 636 W HCPCS: Performed by: NURSE PRACTITIONER

## 2019-12-18 PROCEDURE — 80053 COMPREHEN METABOLIC PANEL: CPT

## 2019-12-18 PROCEDURE — 99999 PR PBB SHADOW E&M-EST. PATIENT-LVL IV: ICD-10-PCS | Mod: PBBFAC,,, | Performed by: NURSE PRACTITIONER

## 2019-12-18 PROCEDURE — 63600175 PHARM REV CODE 636 W HCPCS: Performed by: INTERNAL MEDICINE

## 2019-12-18 PROCEDURE — 99214 OFFICE O/P EST MOD 30 MIN: CPT | Mod: S$GLB,,, | Performed by: NURSE PRACTITIONER

## 2019-12-18 PROCEDURE — 85027 COMPLETE CBC AUTOMATED: CPT

## 2019-12-18 PROCEDURE — 99999 PR PBB SHADOW E&M-EST. PATIENT-LVL IV: CPT | Mod: PBBFAC,,, | Performed by: NURSE PRACTITIONER

## 2019-12-18 PROCEDURE — 96367 TX/PROPH/DG ADDL SEQ IV INF: CPT

## 2019-12-18 PROCEDURE — 96375 TX/PRO/DX INJ NEW DRUG ADDON: CPT

## 2019-12-18 PROCEDURE — 96413 CHEMO IV INFUSION 1 HR: CPT

## 2019-12-18 PROCEDURE — 99214 PR OFFICE/OUTPT VISIT, EST, LEVL IV, 30-39 MIN: ICD-10-PCS | Mod: S$GLB,,, | Performed by: NURSE PRACTITIONER

## 2019-12-18 PROCEDURE — 96411 CHEMO IV PUSH ADDL DRUG: CPT

## 2019-12-18 PROCEDURE — 25000003 PHARM REV CODE 250: Performed by: INTERNAL MEDICINE

## 2019-12-18 PROCEDURE — A4216 STERILE WATER/SALINE, 10 ML: HCPCS | Performed by: INTERNAL MEDICINE

## 2019-12-18 RX ORDER — KETOCONAZOLE 20 MG/ML
SHAMPOO, SUSPENSION TOPICAL
Qty: 120 ML | Refills: 1 | Status: SHIPPED | OUTPATIENT
Start: 2019-12-19 | End: 2020-05-13

## 2019-12-18 RX ORDER — HEPARIN 100 UNIT/ML
500 SYRINGE INTRAVENOUS
Status: COMPLETED | OUTPATIENT
Start: 2019-12-18 | End: 2019-12-18

## 2019-12-18 RX ORDER — DOXORUBICIN HYDROCHLORIDE 2 MG/ML
60 INJECTION, SOLUTION INTRAVENOUS
Status: CANCELLED | OUTPATIENT
Start: 2019-12-18

## 2019-12-18 RX ORDER — SODIUM CHLORIDE 0.9 % (FLUSH) 0.9 %
10 SYRINGE (ML) INJECTION
Status: CANCELLED | OUTPATIENT
Start: 2019-12-18

## 2019-12-18 RX ORDER — OXYCODONE HYDROCHLORIDE 5 MG/1
5 TABLET ORAL EVERY 8 HOURS PRN
Qty: 60 TABLET | Refills: 0 | Status: SHIPPED | OUTPATIENT
Start: 2019-12-18 | End: 2020-01-08

## 2019-12-18 RX ORDER — LORAZEPAM 2 MG/ML
0.5 INJECTION INTRAMUSCULAR ONCE
Status: COMPLETED | OUTPATIENT
Start: 2019-12-18 | End: 2019-12-18

## 2019-12-18 RX ORDER — SODIUM CHLORIDE 0.9 % (FLUSH) 0.9 %
10 SYRINGE (ML) INJECTION
Status: DISCONTINUED | OUTPATIENT
Start: 2019-12-18 | End: 2019-12-18 | Stop reason: HOSPADM

## 2019-12-18 RX ORDER — DOXORUBICIN HYDROCHLORIDE 2 MG/ML
60 INJECTION, SOLUTION INTRAVENOUS
Status: COMPLETED | OUTPATIENT
Start: 2019-12-18 | End: 2019-12-18

## 2019-12-18 RX ORDER — HEPARIN 100 UNIT/ML
500 SYRINGE INTRAVENOUS
Status: CANCELLED | OUTPATIENT
Start: 2019-12-18

## 2019-12-18 RX ORDER — LORAZEPAM 2 MG/ML
0.5 INJECTION INTRAMUSCULAR ONCE
Status: CANCELLED
Start: 2019-12-18

## 2019-12-18 RX ORDER — FLUOCINOLONE ACETONIDE 0.11 MG/ML
OIL TOPICAL
Qty: 118.28 ML | Refills: 1 | Status: SHIPPED | OUTPATIENT
Start: 2019-12-18 | End: 2021-02-11

## 2019-12-18 RX ORDER — HEPARIN 100 UNIT/ML
500 SYRINGE INTRAVENOUS
Status: DISCONTINUED | OUTPATIENT
Start: 2019-12-18 | End: 2019-12-18 | Stop reason: HOSPADM

## 2019-12-18 RX ORDER — KETOCONAZOLE 20 MG/ML
SHAMPOO, SUSPENSION TOPICAL
Qty: 120 ML | Refills: 0 | Status: SHIPPED | OUTPATIENT
Start: 2019-12-18 | End: 2020-05-13

## 2019-12-18 RX ADMIN — LORAZEPAM 0.5 MG: 2 INJECTION, SOLUTION INTRAMUSCULAR; INTRAVENOUS at 02:12

## 2019-12-18 RX ADMIN — APREPITANT 130 MG: 130 INJECTION, EMULSION INTRAVENOUS at 12:12

## 2019-12-18 RX ADMIN — CYCLOPHOSPHAMIDE 1350 MG: 1 INJECTION, POWDER, FOR SOLUTION INTRAVENOUS; ORAL at 01:12

## 2019-12-18 RX ADMIN — DEXAMETHASONE SODIUM PHOSPHATE: 4 INJECTION, SOLUTION INTRA-ARTICULAR; INTRALESIONAL; INTRAMUSCULAR; INTRAVENOUS; SOFT TISSUE at 12:12

## 2019-12-18 RX ADMIN — HEPARIN 500 UNITS: 100 SYRINGE at 10:12

## 2019-12-18 RX ADMIN — SODIUM CHLORIDE: 0.9 INJECTION, SOLUTION INTRAVENOUS at 12:12

## 2019-12-18 RX ADMIN — DOXORUBICIN HYDROCHLORIDE 136 MG: 2 INJECTION, SOLUTION INTRAVENOUS at 01:12

## 2019-12-18 RX ADMIN — Medication 10 ML: at 10:12

## 2019-12-18 RX ADMIN — HEPARIN 500 UNITS: 100 SYRINGE at 03:12

## 2019-12-18 NOTE — PLAN OF CARE
Pt ambulatory to clinic with sister. Pleasant and talkative. Pt states was in the ER on Saturday for breast and back pain. Was d/c home from ER. Pt reports profound nausea on day 3 and 4 after chemo last treatment. Encouraged to try other natural alternatives such as cj. Instructed to keep on regular schedule with phenergan as well. Pt agreed. Pt requesting Ativan during treatment. Toya Quintana, NP notified and order was placed. Treatment completed. Pt tolerated well. Port flushed with ns and heparin. Ambulatory from clinic in [facility].

## 2019-12-19 ENCOUNTER — PATIENT MESSAGE (OUTPATIENT)
Dept: INTERNAL MEDICINE | Facility: CLINIC | Age: 49
End: 2019-12-19

## 2019-12-19 RX ORDER — AMLODIPINE AND VALSARTAN 5; 160 MG/1; MG/1
1 TABLET ORAL EVERY MORNING
Qty: 90 TABLET | Refills: 1 | Status: SHIPPED | OUTPATIENT
Start: 2019-12-19 | End: 2020-01-14

## 2019-12-19 NOTE — TELEPHONE ENCOUNTER
Refill Authorization Note     is requesting a refill authorization.    Brief assessment and rationale for refill: APPROVE: prr       Comments:   Requested Prescriptions   Pending Prescriptions Disp Refills    amlodipine-valsartan (EXFORGE) 5-160 mg per tablet 90 tablet 1     Sig: Take 1 tablet by mouth every morning.       Cardiovascular: CCB + ARB Combos Passed - 12/19/2019  1:11 PM        Passed - Patient is at least 18 years old        Passed - Negative Pregnancy Status Check        Passed - Last BP in normal range within 360 days     BP Readings from Last 3 Encounters:   12/18/19 137/77   12/18/19 131/77   12/15/19 (!) 129/92              Passed - Office visit in past 12 months or future 90 days     Recent Outpatient Visits            Yesterday Carcinoma of axillary tail of right breast in female, estrogen receptor positive    [facility] Toya Quintana NP    4 weeks ago Carcinoma of axillary tail of right breast in female, estrogen receptor positive    [place] Negrito Verde MD    1 month ago Carcinoma of axillary tail of right breast in female, estrogen receptor positive    [facility] Negrito [facility]    1 month ago Malignant neoplasm of axillary tail of right breast in female, estrogen receptor positive    [facility] Lelo Guaman MD    4 months ago [facility]    [place] - Podiatry Theron Rodarte, DPM          Future Appointments              In 2 weeks INJECTION, [facility] INFUS[facility], [facility]    In 2 weeks MD [facility] - Hematology Oncology, [facility]    In 2 weeks [facility], [facility]MO [facility], [place] [facility]    In 1 month MD Manuel Huerta nikolai - Internal Medicine, [facility]                Passed - K in normal range and within 360 days     POC Potassium   Date Value Ref Range Status   12/15/2019 4.1 3.5 - 5.1 mmol/L Final      Potassium   Date Value Ref Range Status   12/18/2019 4.1 3.5 - 5.1 mmol/L Final   12/15/2019 4.2 3.5 - 5.1 mmol/L Final   11/13/2019 4.0 3.5 - 5.1 mmol/L Final              Passed - Cr is 1.4 or below and within 360 days     Creatinine   Date Value Ref Range Status   12/18/2019 0.7 0.5 - 1.4 mg/dL Final   12/15/2019 0.8 0.5 - 1.4 mg/dL Final   11/13/2019 0.9 0.5 - 1.4 mg/dL Final     POC Creatinine   Date Value Ref Range Status   12/15/2019 0.7 0.5 - 1.4 mg/dL Final              Passed - eGFR within 360 days     eGFR if non    Date Value Ref Range Status   12/18/2019 >60.0 >60 mL/min/1.73 m^2 Final     Comment:     Calculation used to obtain the estimated glomerular filtration  rate (eGFR) is the CKD-EPI equation.      12/15/2019 >60.0 >60 mL/min/1.73 m^2 Final     Comment:     Calculation used to obtain the estimated glomerular filtration  rate (eGFR) is the CKD-EPI equation.      11/13/2019 >60.0 >60 mL/min/1.73 m^2 Final     Comment:     Calculation used to obtain the estimated glomerular filtration  rate (eGFR) is the CKD-EPI equation.

## 2019-12-24 LAB — INTEGRATED BRAC ANALYSIS: NORMAL

## 2019-12-26 ENCOUNTER — PATIENT MESSAGE (OUTPATIENT)
Dept: HEMATOLOGY/ONCOLOGY | Facility: CLINIC | Age: 49
End: 2019-12-26

## 2019-12-26 DIAGNOSIS — C50.611 CARCINOMA OF AXILLARY TAIL OF RIGHT BREAST IN FEMALE, ESTROGEN RECEPTOR POSITIVE: ICD-10-CM

## 2019-12-26 DIAGNOSIS — Z17.0 CARCINOMA OF AXILLARY TAIL OF RIGHT BREAST IN FEMALE, ESTROGEN RECEPTOR POSITIVE: ICD-10-CM

## 2019-12-26 RX ORDER — ZOLPIDEM TARTRATE 10 MG/1
10 TABLET ORAL NIGHTLY PRN
Qty: 30 TABLET | Refills: 2 | Status: SHIPPED | OUTPATIENT
Start: 2019-12-26 | End: 2019-12-27 | Stop reason: SDUPTHER

## 2019-12-27 DIAGNOSIS — Z17.0 CARCINOMA OF AXILLARY TAIL OF RIGHT BREAST IN FEMALE, ESTROGEN RECEPTOR POSITIVE: ICD-10-CM

## 2019-12-27 DIAGNOSIS — C50.611 CARCINOMA OF AXILLARY TAIL OF RIGHT BREAST IN FEMALE, ESTROGEN RECEPTOR POSITIVE: ICD-10-CM

## 2019-12-27 RX ORDER — ZOLPIDEM TARTRATE 10 MG/1
10 TABLET ORAL NIGHTLY PRN
Qty: 30 TABLET | Refills: 2 | Status: SHIPPED | OUTPATIENT
Start: 2019-12-27 | End: 2020-03-17 | Stop reason: SDUPTHER

## 2020-01-08 ENCOUNTER — INFUSION (OUTPATIENT)
Dept: INFUSION THERAPY | Facility: HOSPITAL | Age: 50
End: 2020-01-08
Attending: INTERNAL MEDICINE
Payer: COMMERCIAL

## 2020-01-08 ENCOUNTER — OFFICE VISIT (OUTPATIENT)
Dept: HEMATOLOGY/ONCOLOGY | Facility: CLINIC | Age: 50
End: 2020-01-08
Payer: COMMERCIAL

## 2020-01-08 VITALS
OXYGEN SATURATION: 97 % | BODY MASS INDEX: 38.3 KG/M2 | HEART RATE: 74 BPM | HEIGHT: 66 IN | SYSTOLIC BLOOD PRESSURE: 144 MMHG | TEMPERATURE: 98 F | DIASTOLIC BLOOD PRESSURE: 90 MMHG | RESPIRATION RATE: 16 BRPM | WEIGHT: 238.31 LBS

## 2020-01-08 VITALS
RESPIRATION RATE: 18 BRPM | BODY MASS INDEX: 38.3 KG/M2 | DIASTOLIC BLOOD PRESSURE: 82 MMHG | SYSTOLIC BLOOD PRESSURE: 123 MMHG | WEIGHT: 238.31 LBS | HEIGHT: 66 IN | TEMPERATURE: 98 F | HEART RATE: 71 BPM

## 2020-01-08 DIAGNOSIS — Z17.0 CARCINOMA OF AXILLARY TAIL OF RIGHT BREAST IN FEMALE, ESTROGEN RECEPTOR POSITIVE: Primary | ICD-10-CM

## 2020-01-08 DIAGNOSIS — Z17.0 MALIGNANT NEOPLASM OF AXILLARY TAIL OF RIGHT BREAST IN FEMALE, ESTROGEN RECEPTOR POSITIVE: ICD-10-CM

## 2020-01-08 DIAGNOSIS — T45.1X5A ANEMIA ASSOCIATED WITH CHEMOTHERAPY: ICD-10-CM

## 2020-01-08 DIAGNOSIS — C50.611 MALIGNANT NEOPLASM OF AXILLARY TAIL OF RIGHT BREAST IN FEMALE, ESTROGEN RECEPTOR POSITIVE: ICD-10-CM

## 2020-01-08 DIAGNOSIS — D64.81 ANEMIA ASSOCIATED WITH CHEMOTHERAPY: ICD-10-CM

## 2020-01-08 DIAGNOSIS — C50.611 CARCINOMA OF AXILLARY TAIL OF RIGHT BREAST IN FEMALE, ESTROGEN RECEPTOR POSITIVE: Primary | ICD-10-CM

## 2020-01-08 LAB
ALBUMIN SERPL BCP-MCNC: 3.5 G/DL (ref 3.5–5.2)
ALP SERPL-CCNC: 66 U/L (ref 55–135)
ALT SERPL W/O P-5'-P-CCNC: 20 U/L (ref 10–44)
ANION GAP SERPL CALC-SCNC: 7 MMOL/L (ref 8–16)
AST SERPL-CCNC: 16 U/L (ref 10–40)
BILIRUB SERPL-MCNC: 0.2 MG/DL (ref 0.1–1)
BUN SERPL-MCNC: 7 MG/DL (ref 6–20)
CALCIUM SERPL-MCNC: 9.4 MG/DL (ref 8.7–10.5)
CHLORIDE SERPL-SCNC: 104 MMOL/L (ref 95–110)
CO2 SERPL-SCNC: 30 MMOL/L (ref 23–29)
CREAT SERPL-MCNC: 0.7 MG/DL (ref 0.5–1.4)
ERYTHROCYTE [DISTWIDTH] IN BLOOD BY AUTOMATED COUNT: 16.2 % (ref 11.5–14.5)
EST. GFR  (AFRICAN AMERICAN): >60 ML/MIN/1.73 M^2
EST. GFR  (NON AFRICAN AMERICAN): >60 ML/MIN/1.73 M^2
GLUCOSE SERPL-MCNC: 124 MG/DL (ref 70–110)
HCT VFR BLD AUTO: 34.4 % (ref 37–48.5)
HGB BLD-MCNC: 10.5 G/DL (ref 12–16)
IMM GRANULOCYTES # BLD AUTO: 0.58 K/UL (ref 0–0.04)
MCH RBC QN AUTO: 26.1 PG (ref 27–31)
MCHC RBC AUTO-ENTMCNC: 30.5 G/DL (ref 32–36)
MCV RBC AUTO: 85 FL (ref 82–98)
NEUTROPHILS # BLD AUTO: 6.4 K/UL (ref 1.8–7.7)
PLATELET # BLD AUTO: 246 K/UL (ref 150–350)
PMV BLD AUTO: 11.5 FL (ref 9.2–12.9)
POTASSIUM SERPL-SCNC: 4.1 MMOL/L (ref 3.5–5.1)
PROT SERPL-MCNC: 7 G/DL (ref 6–8.4)
RBC # BLD AUTO: 4.03 M/UL (ref 4–5.4)
SODIUM SERPL-SCNC: 141 MMOL/L (ref 136–145)
WBC # BLD AUTO: 9.49 K/UL (ref 3.9–12.7)

## 2020-01-08 PROCEDURE — 99215 OFFICE O/P EST HI 40 MIN: CPT | Mod: S$GLB,,, | Performed by: INTERNAL MEDICINE

## 2020-01-08 PROCEDURE — 96375 TX/PRO/DX INJ NEW DRUG ADDON: CPT

## 2020-01-08 PROCEDURE — 3008F PR BODY MASS INDEX (BMI) DOCUMENTED: ICD-10-PCS | Mod: CPTII,S$GLB,, | Performed by: INTERNAL MEDICINE

## 2020-01-08 PROCEDURE — 25000003 PHARM REV CODE 250: Performed by: INTERNAL MEDICINE

## 2020-01-08 PROCEDURE — 63600175 PHARM REV CODE 636 W HCPCS: Performed by: INTERNAL MEDICINE

## 2020-01-08 PROCEDURE — A4216 STERILE WATER/SALINE, 10 ML: HCPCS | Performed by: INTERNAL MEDICINE

## 2020-01-08 PROCEDURE — 99215 PR OFFICE/OUTPT VISIT, EST, LEVL V, 40-54 MIN: ICD-10-PCS | Mod: S$GLB,,, | Performed by: INTERNAL MEDICINE

## 2020-01-08 PROCEDURE — 63600175 PHARM REV CODE 636 W HCPCS: Mod: TB | Performed by: INTERNAL MEDICINE

## 2020-01-08 PROCEDURE — 3008F BODY MASS INDEX DOCD: CPT | Mod: CPTII,S$GLB,, | Performed by: INTERNAL MEDICINE

## 2020-01-08 PROCEDURE — 99999 PR PBB SHADOW E&M-EST. PATIENT-LVL IV: CPT | Mod: PBBFAC,,, | Performed by: INTERNAL MEDICINE

## 2020-01-08 PROCEDURE — 96413 CHEMO IV INFUSION 1 HR: CPT

## 2020-01-08 PROCEDURE — 3080F DIAST BP >= 90 MM HG: CPT | Mod: CPTII,S$GLB,, | Performed by: INTERNAL MEDICINE

## 2020-01-08 PROCEDURE — 96411 CHEMO IV PUSH ADDL DRUG: CPT

## 2020-01-08 PROCEDURE — 3074F SYST BP LT 130 MM HG: CPT | Mod: CPTII,S$GLB,, | Performed by: INTERNAL MEDICINE

## 2020-01-08 PROCEDURE — 96367 TX/PROPH/DG ADDL SEQ IV INF: CPT

## 2020-01-08 PROCEDURE — 99999 PR PBB SHADOW E&M-EST. PATIENT-LVL IV: ICD-10-PCS | Mod: PBBFAC,,, | Performed by: INTERNAL MEDICINE

## 2020-01-08 PROCEDURE — 85027 COMPLETE CBC AUTOMATED: CPT

## 2020-01-08 PROCEDURE — 3080F PR MOST RECENT DIASTOLIC BLOOD PRESSURE >= 90 MM HG: ICD-10-PCS | Mod: CPTII,S$GLB,, | Performed by: INTERNAL MEDICINE

## 2020-01-08 PROCEDURE — 3074F PR MOST RECENT SYSTOLIC BLOOD PRESSURE < 130 MM HG: ICD-10-PCS | Mod: CPTII,S$GLB,, | Performed by: INTERNAL MEDICINE

## 2020-01-08 PROCEDURE — 80053 COMPREHEN METABOLIC PANEL: CPT

## 2020-01-08 RX ORDER — LORAZEPAM 2 MG/ML
0.5 INJECTION INTRAMUSCULAR ONCE
Status: COMPLETED | OUTPATIENT
Start: 2020-01-08 | End: 2020-01-08

## 2020-01-08 RX ORDER — DOXORUBICIN HYDROCHLORIDE 2 MG/ML
60 INJECTION, SOLUTION INTRAVENOUS
Status: COMPLETED | OUTPATIENT
Start: 2020-01-08 | End: 2020-01-08

## 2020-01-08 RX ORDER — HEPARIN 100 UNIT/ML
500 SYRINGE INTRAVENOUS
Status: COMPLETED | OUTPATIENT
Start: 2020-01-08 | End: 2020-01-08

## 2020-01-08 RX ORDER — SODIUM CHLORIDE 0.9 % (FLUSH) 0.9 %
10 SYRINGE (ML) INJECTION
Status: COMPLETED | OUTPATIENT
Start: 2020-01-08 | End: 2020-01-08

## 2020-01-08 RX ORDER — DOXORUBICIN HYDROCHLORIDE 2 MG/ML
60 INJECTION, SOLUTION INTRAVENOUS
Status: CANCELLED | OUTPATIENT
Start: 2020-01-08

## 2020-01-08 RX ORDER — SODIUM CHLORIDE 0.9 % (FLUSH) 0.9 %
10 SYRINGE (ML) INJECTION
Status: CANCELLED | OUTPATIENT
Start: 2020-01-08

## 2020-01-08 RX ORDER — HEPARIN 100 UNIT/ML
500 SYRINGE INTRAVENOUS
Status: CANCELLED | OUTPATIENT
Start: 2020-01-08

## 2020-01-08 RX ORDER — SODIUM CHLORIDE 0.9 % (FLUSH) 0.9 %
10 SYRINGE (ML) INJECTION
Status: DISCONTINUED | OUTPATIENT
Start: 2020-01-08 | End: 2020-01-08 | Stop reason: HOSPADM

## 2020-01-08 RX ORDER — LORAZEPAM 2 MG/ML
0.5 INJECTION INTRAMUSCULAR ONCE
Status: CANCELLED
Start: 2020-01-08

## 2020-01-08 RX ORDER — HEPARIN 100 UNIT/ML
500 SYRINGE INTRAVENOUS
Status: DISCONTINUED | OUTPATIENT
Start: 2020-01-08 | End: 2020-01-08 | Stop reason: HOSPADM

## 2020-01-08 RX ORDER — OXYCODONE HYDROCHLORIDE 5 MG/1
5 TABLET ORAL EVERY 6 HOURS PRN
Qty: 30 TABLET | Refills: 0 | Status: SHIPPED | OUTPATIENT
Start: 2020-01-08 | End: 2020-01-16 | Stop reason: SDUPTHER

## 2020-01-08 RX ADMIN — APREPITANT 130 MG: 130 INJECTION, EMULSION INTRAVENOUS at 10:01

## 2020-01-08 RX ADMIN — Medication 10 ML: at 12:01

## 2020-01-08 RX ADMIN — DOXORUBICIN HYDROCHLORIDE 136 MG: 2 INJECTION, SOLUTION INTRAVENOUS at 11:01

## 2020-01-08 RX ADMIN — CYCLOPHOSPHAMIDE 1350 MG: 1 INJECTION, POWDER, FOR SOLUTION INTRAVENOUS; ORAL at 11:01

## 2020-01-08 RX ADMIN — DEXAMETHASONE SODIUM PHOSPHATE: 4 INJECTION, SOLUTION INTRA-ARTICULAR; INTRALESIONAL; INTRAMUSCULAR; INTRAVENOUS; SOFT TISSUE at 10:01

## 2020-01-08 RX ADMIN — SODIUM CHLORIDE: 0.9 INJECTION, SOLUTION INTRAVENOUS at 11:01

## 2020-01-08 RX ADMIN — LORAZEPAM 0.5 MG: 2 INJECTION, SOLUTION INTRAMUSCULAR; INTRAVENOUS at 10:01

## 2020-01-08 RX ADMIN — HEPARIN 500 UNITS: 100 SYRINGE at 12:01

## 2020-01-08 RX ADMIN — Medication 10 ML: at 08:01

## 2020-01-08 RX ADMIN — HEPARIN 500 UNITS: 100 SYRINGE at 08:01

## 2020-01-08 NOTE — PROGRESS NOTES
Subjective:       Patient ID: Phylicia Vásquez is a 49 y.o. female.    Chief Complaint: No chief complaint on file.    HPI   Mrs Lua returns today for follow up.  She has so far received 2 cycles of neoadjuvant Adriamycin and cyclophosphamide.  She is due for cycle 3 today.  On her CBC, her white cells are 9400 per cubic mm, hemoglobin 10.5 grams/deciliter, hematocrit 34.4% and platelets 671551 per cubic mm.  Her bilirubin is 0.2 mg/dl.      Briefly, she is a 49 year old AA female who was recently found to have a carcinoma that is ER positive, AL positive and HER 2 equivocal.  Her cancer is ER positive, AL positive, and HER 2 indeterminate.  FISH was negative.  An axillary node biopsy was also positive.  She is being treated with neoadjuvant chemotherapy and she is due for cycle 3 of AC.    Apparently during cycle 2 she did not take dexamethasone or Zofran.  She states that she was unaware that she had refills for those.  She experienced significant nausea.  She also experienced pain in the right breast and right axilla for which she was given oxycodone today on a p.r.n. basis.  She is asking for refill on the oxycodone.    Review of Systems    Overall she feels fair.  She complains of pain in that right breast which is sharp and intermittent.  She is asking for a refill on the oxycodone.  She is anxious about the upcoming chemotherapy.  She denies any depression, easy bruising, fevers, chills, night  sweats, weight loss, vomiting, diarrhea, constipation, diplopia, blurred vision, headache, chest pain, palpitations, shortness of breath, left breast pain, abdominal pain, extremity pain, or difficulty ambulating.  The remainder of the ten-point ROS, including general, skin, lymph, H/N, cardiorespiratory, GI, , Neuro, Endocrine, and psychiatric is negative.       Objective:      Physical Exam      She is alert, oriented to time, place, person, pleasant, well      nourished, in no acute physical distress.  She  is accompanied by her daughter.                                   VITAL SIGNS:  Reviewed                                      HEENT:  Alopecia is noted.  There are no nasal, oral, lip, gingival, auricular, lid,    or conjunctival lesions.  Mucosae are moist and pink, and there is no        thrush.  Pupils are equal, reactive to light and accommodation.              Extraocular muscle movements are intact.  Dentition is good.  There is no frontal or maxillary tenderness.                                     NECK:  Supple without JVD, adenopathy, or thyromegaly.                       LUNGS:  Clear to auscultation without wheezing, rales, or rhonchi.           CARDIOVASCULAR:  Reveals an S1, S2, no murmurs, no rubs, no gallops.         ABDOMEN:  Soft, nontender, without organomegaly.  Bowel sounds are    present.                                                                     EXTREMITIES:  No cyanosis, clubbing, or edema.                               BREASTS:  Both breasts are large.  There is a palpable mass in the upper outer quadrant of the right breast.  There is also a palpable right axillary node.                                      LYMPHATIC:  There is no cervical, left axillary, or supraclavicular adenopathy.   SKIN:  Warm and moist, without petechiae, rashes, induration, or ecchymoses.           NEUROLOGIC:  DTRs are 0-1+ bilaterally, symmetrical, motor function is 5/5,  and cranial nerves are  within normal limits.    Assessment:       1. Carcinoma of axillary tail of right breast in female, estrogen receptor positive, clinically T2N1M0     2.    Axillary node metastases.  3.      Chemotherapy-induced anemia  4.      Pain, intermittent, in the right breast.  Plan:        I had a long discussion with her and her daughter.  She will proceed with cycle 3 of AC today.  I have explained to her that she has refills for dexamethasone and Zofran waiting for her to  at the pharmacy.  She voiced  understanding.  I have told her that if she is still experiencing nausea despite dexamethasone and Zofran, we will add Phenergan to her antiemetic regimen.  She stated that during cycle 1 she did fine and did not experience any nausea.  It was only during cycle 2 that she did not take the dexamethasone and Zofran.  She will observe neutropenic precautions and she will return to see me in 3 weeks with repeat labs for cycle 4.  She was given a prescription for thirty 5 mg oxycodone tablets.  Her multiple questions were answered to her satisfaction.

## 2020-01-09 ENCOUNTER — PATIENT OUTREACH (OUTPATIENT)
Dept: ADMINISTRATIVE | Facility: HOSPITAL | Age: 50
End: 2020-01-09

## 2020-01-09 ENCOUNTER — PATIENT MESSAGE (OUTPATIENT)
Dept: HEMATOLOGY/ONCOLOGY | Facility: CLINIC | Age: 50
End: 2020-01-09

## 2020-01-09 ENCOUNTER — TELEPHONE (OUTPATIENT)
Dept: HEMATOLOGY/ONCOLOGY | Facility: CLINIC | Age: 50
End: 2020-01-09

## 2020-01-09 NOTE — TELEPHONE ENCOUNTER
----- Message from Jannie Perez sent at 1/9/2020  1:09 PM CST -----  Contact: Pt   Pt is requesting a call back from either Nurse Ledesma or Joann due to medical issue     Pt can be reached at [phone]

## 2020-01-09 NOTE — TELEPHONE ENCOUNTER
Returned call to patient to discuss her concerns. Patient reports that she had a fall this morning resulting in trauma to knee. She was wrapped up in comforter when trying to get out of bed, she got caught resulting in falling forward with all weight landing on her left knee.   Patient reports soreness, however, she is able to bend and has FROM. She denies swelling, bruising, open skin sore, numbness/tingling.   She is using topical Bengay cream which has alleviated some of the pain.   Last chemo treatment was yesterday.   Discussed with patient that her counts prior to chemo were stable (no thrombocytopenia present which would be my biggest concern).   Given her ability to move and absence of other symptoms, unlikely area is fractured, though did advise patient to closely monitor her extremity and if any changes noted, to reach back out to us or to her PCP for appropriate measures/imaging.  Advised ice, elevation, rest, compression (ace wrap or knee brace) and to continue topical bengay cream.   Also recommended aleve if needed (however, to avoid this or limit use to prevent masking fevers about a week out).  She expressed understanding and appreciation.

## 2020-01-10 ENCOUNTER — PATIENT MESSAGE (OUTPATIENT)
Dept: HEMATOLOGY/ONCOLOGY | Facility: CLINIC | Age: 50
End: 2020-01-10

## 2020-01-14 RX ORDER — AMLODIPINE AND VALSARTAN 5; 160 MG/1; MG/1
TABLET ORAL
Qty: 90 TABLET | Refills: 1 | Status: SHIPPED | OUTPATIENT
Start: 2020-01-14 | End: 2020-01-17 | Stop reason: SDUPTHER

## 2020-01-14 NOTE — PROGRESS NOTES
Refill Authorization Note     is requesting a refill authorization.    Brief assessment and rationale for refill: APPROVE: prr                                         Comments:   Requested Prescriptions   Pending Prescriptions Disp Refills    amlodipine-valsartan (EXFORGE) 5-160 mg per tablet [Pharmacy Med Name: AMLODIPINE-VALSARTAN 5-160 MG] 90 tablet 1     Sig: TAKE 1 TABLET BY MOUTH EVERY MORNING FOR BLOOD PRESSURE       Cardiovascular: CCB + ARB Combos Passed - 1/13/2020  1:45 AM        Passed - Patient is at least 18 years old        Passed - Negative Pregnancy Status Check        Passed - Last BP in normal range within 360 days     BP Readings from Last 3 Encounters:   01/08/20 123/82   01/08/20 (!) 144/90   12/18/19 137/77              Passed - Office visit in past 12 months or future 90 days     Recent Outpatient Visits            6 days ago Carcinoma of axillary tail of right breast in female, estrogen receptor positive    [facility] Negrito Verde MD    3 weeks ago Carcinoma of axillary tail of right breast in female, estrogen receptor positive    [facility] Toya Quintana NP    1 month ago Carcinoma of axillary tail of right breast in female, estrogen receptor positive    [facility] [facility] Negrito Verde MD    2 months ago Carcinoma of axillary tail of right breast in female, estrogen receptor positive    [facility] Negrito Verde MD    2 months ago Malignant neoplasm of axillary tail of right breast in female, estrogen receptor positive    [facility] Lelo Guaman MD          Future Appointments              In 1 week MD [facility] - Internal Medicine, [facility]    In 2 weeks LAB, HEMONC CANCER BLDG [facility]    In 2 weeks Negrito Verde MD [facility]ology Oncology, [facility]    In 2 weeks CHEMO 29, [facility]; NURSE 9, [facility] CHEMO  [facility]                Passed - K in normal range and within 360 days     POC Potassium   Date Value Ref Range Status   12/15/2019 4.1 3.5 - 5.1 mmol/L Final     Potassium   Date Value Ref Range Status   01/08/2020 4.1 3.5 - 5.1 mmol/L Final   12/18/2019 4.1 3.5 - 5.1 mmol/L Final   12/15/2019 4.2 3.5 - 5.1 mmol/L Final              Passed - Cr is 1.4 or below and within 360 days     Creatinine   Date Value Ref Range Status   01/08/2020 0.7 0.5 - 1.4 mg/dL Final   12/18/2019 0.7 0.5 - 1.4 mg/dL Final   12/15/2019 0.8 0.5 - 1.4 mg/dL Final     POC Creatinine   Date Value Ref Range Status   12/15/2019 0.7 0.5 - 1.4 mg/dL Final              Passed - eGFR within 360 days     eGFR if non    Date Value Ref Range Status   01/08/2020 >60.0 >60 mL/min/1.73 m^2 Final     Comment:     Calculation used to obtain the estimated glomerular filtration  rate (eGFR) is the CKD-EPI equation.      12/18/2019 >60.0 >60 mL/min/1.73 m^2 Final     Comment:     Calculation used to obtain the estimated glomerular filtration  rate (eGFR) is the CKD-EPI equation.      12/15/2019 >60.0 >60 mL/min/1.73 m^2 Final     Comment:     Calculation used to obtain the estimated glomerular filtration  rate (eGFR) is the CKD-EPI equation.        eGFR if    Date Value Ref Range Status   01/08/2020 >60.0 >60 mL/min/1.73 m^2 Final   12/18/2019 >60.0 >60 mL/min/1.73 m^2 Final   12/15/2019 >60.0 >60 mL/min/1.73 m^2 Final

## 2020-01-16 ENCOUNTER — PATIENT MESSAGE (OUTPATIENT)
Dept: HEMATOLOGY/ONCOLOGY | Facility: CLINIC | Age: 50
End: 2020-01-16

## 2020-01-16 DIAGNOSIS — G89.3 NEOPLASM RELATED PAIN: Primary | ICD-10-CM

## 2020-01-16 DIAGNOSIS — Z17.0 CARCINOMA OF AXILLARY TAIL OF RIGHT BREAST IN FEMALE, ESTROGEN RECEPTOR POSITIVE: ICD-10-CM

## 2020-01-16 DIAGNOSIS — C50.611 CARCINOMA OF AXILLARY TAIL OF RIGHT BREAST IN FEMALE, ESTROGEN RECEPTOR POSITIVE: ICD-10-CM

## 2020-01-16 RX ORDER — OXYCODONE HYDROCHLORIDE 10 MG/1
10 TABLET ORAL EVERY 6 HOURS PRN
Qty: 60 TABLET | Refills: 0 | Status: SHIPPED | OUTPATIENT
Start: 2020-01-16 | End: 2020-02-06 | Stop reason: SDUPTHER

## 2020-01-16 NOTE — TELEPHONE ENCOUNTER
Reached out to patient to discuss her pain management in detail.  Patient states that she is having a breast pain in the right breast (currently affected by cancer). The pain has been present since the initiation of the Taxol chemotherapy, and has not progressed/worsened. The pain is an occasional shooting pain that radiates from the side of the breast toward the nipple.   She was initially prescribed 5 mg q6, however, did not have an adequate response to 1, and doubled up to 2 tablets (10 mg) and has only been requiring about two times per day.   Plan to increase oxy to 10 mg (1 tablet) every 6 hours.   Discussed in detail with patient and explain that one table will now be equivalent to the 2 tablets. She preferred it this way and verbalized understanding. Prescription to be submitted to [facility] per her request.  No further questions/concerns at this time.   Communicated discussion and request with Tehresa Dickson NP who will manage prescription.

## 2020-01-17 NOTE — TELEPHONE ENCOUNTER
Patient has changed pharmacies       Please resend medication to [facility]

## 2020-01-20 RX ORDER — AMLODIPINE AND VALSARTAN 5; 160 MG/1; MG/1
1 TABLET ORAL EVERY MORNING
Qty: 90 TABLET | Refills: 1 | Status: SHIPPED | OUTPATIENT
Start: 2020-01-20 | End: 2020-01-23

## 2020-01-20 NOTE — PROGRESS NOTES
Refill Authorization Note     is requesting a refill authorization.    Brief assessment and rationale for refill: APPROVE: prr                Medication reconciliation completed: No                         Comments:   Requested Prescriptions   Pending Prescriptions Disp Refills    amlodipine-valsartan (EXFORGE) 5-160 mg per tablet 90 tablet 1     Sig: Take 1 tablet by mouth every morning.       Cardiovascular: CCB + ARB Combos Passed - 1/17/2020 12:24 PM        Passed - Patient is at least 18 years old        Passed - Negative Pregnancy Status Check        Passed - Last BP in normal range within 360 days     BP Readings from Last 3 Encounters:   01/08/20 123/82   01/08/20 (!) 144/90   12/18/19 137/77              Passed - Office visit in past 12 months or future 90 days     Recent Outpatient Visits            1 week ago Carcinoma of axillary tail of right breast in female, estrogen receptor positive    [facility] Negrito Verde MD    1 month ago Carcinoma of axillary tail of right breast in female, estrogen receptor positive    [facility] Toya Quintana NP    2 months ago Carcinoma of axillary tail of right breast in female, estrogen receptor positive    [facility] Negrito Verde MD    2 months ago Carcinoma of axillary tail of right breast in female, estrogen receptor positive    [facility] Negrito Verde MD    2 months ago Malignant neoplasm of axillary tail of right breast in female, estrogen receptor positive    [facility] Lelo Guaman MD          Future Appointments              In 3 days MD [facility] - Internal Medicine, [facility]    In 1 week LAB, HEMONC CANCER BLDG [facility]    In 1 week Negrito Verde MD [facility]ology Oncology, [facility]    In 1 week CHEMO 29, [facility]; NURSE 9, [facility] CHEMO [facility]  [facility]                Passed - K in normal range and within 360 days     POC Potassium   Date Value Ref Range Status   12/15/2019 4.1 3.5 - 5.1 mmol/L Final     Potassium   Date Value Ref Range Status   01/08/2020 4.1 3.5 - 5.1 mmol/L Final   12/18/2019 4.1 3.5 - 5.1 mmol/L Final   12/15/2019 4.2 3.5 - 5.1 mmol/L Final              Passed - Cr is 1.4 or below and within 360 days     Creatinine   Date Value Ref Range Status   01/08/2020 0.7 0.5 - 1.4 mg/dL Final   12/18/2019 0.7 0.5 - 1.4 mg/dL Final   12/15/2019 0.8 0.5 - 1.4 mg/dL Final     POC Creatinine   Date Value Ref Range Status   12/15/2019 0.7 0.5 - 1.4 mg/dL Final              Passed - eGFR within 360 days     eGFR if non    Date Value Ref Range Status   01/08/2020 >60.0 >60 mL/min/1.73 m^2 Final     Comment:     Calculation used to obtain the estimated glomerular filtration  rate (eGFR) is the CKD-EPI equation.      12/18/2019 >60.0 >60 mL/min/1.73 m^2 Final     Comment:     Calculation used to obtain the estimated glomerular filtration  rate (eGFR) is the CKD-EPI equation.      12/15/2019 >60.0 >60 mL/min/1.73 m^2 Final     Comment:     Calculation used to obtain the estimated glomerular filtration  rate (eGFR) is the CKD-EPI equation.        eGFR if    Date Value Ref Range Status   01/08/2020 >60.0 >60 mL/min/1.73 m^2 Final   12/18/2019 >60.0 >60 mL/min/1.73 m^2 Final   12/15/2019 >60.0 >60 mL/min/1.73 m^2 Final

## 2020-01-23 ENCOUNTER — OFFICE VISIT (OUTPATIENT)
Dept: INTERNAL MEDICINE | Facility: CLINIC | Age: 50
End: 2020-01-23
Payer: COMMERCIAL

## 2020-01-23 VITALS
HEIGHT: 64 IN | WEIGHT: 237.44 LBS | HEART RATE: 74 BPM | DIASTOLIC BLOOD PRESSURE: 88 MMHG | BODY MASS INDEX: 40.54 KG/M2 | SYSTOLIC BLOOD PRESSURE: 130 MMHG | OXYGEN SATURATION: 99 %

## 2020-01-23 DIAGNOSIS — Z17.0 MALIGNANT NEOPLASM OF AXILLARY TAIL OF RIGHT BREAST IN FEMALE, ESTROGEN RECEPTOR POSITIVE: ICD-10-CM

## 2020-01-23 DIAGNOSIS — Z90.711 S/P ABDOMINAL SUPRACERVICAL SUBTOTAL HYSTERECTOMY: ICD-10-CM

## 2020-01-23 DIAGNOSIS — C50.611 MALIGNANT NEOPLASM OF AXILLARY TAIL OF RIGHT BREAST IN FEMALE, ESTROGEN RECEPTOR POSITIVE: ICD-10-CM

## 2020-01-23 DIAGNOSIS — I10 ESSENTIAL HYPERTENSION: ICD-10-CM

## 2020-01-23 DIAGNOSIS — Z00.00 ANNUAL PHYSICAL EXAM: Primary | ICD-10-CM

## 2020-01-23 DIAGNOSIS — E66.01 MORBID OBESITY: ICD-10-CM

## 2020-01-23 DIAGNOSIS — E07.9 THYROID DYSFUNCTION: ICD-10-CM

## 2020-01-23 DIAGNOSIS — K76.0 FATTY LIVER: ICD-10-CM

## 2020-01-23 DIAGNOSIS — R73.9 HYPERGLYCEMIA: ICD-10-CM

## 2020-01-23 DIAGNOSIS — Z12.11 SCREEN FOR COLON CANCER: ICD-10-CM

## 2020-01-23 DIAGNOSIS — E04.2 MULTINODULAR NON-TOXIC GOITER: ICD-10-CM

## 2020-01-23 DIAGNOSIS — M51.34 BULGE OF THORACIC DISC WITHOUT MYELOPATHY: ICD-10-CM

## 2020-01-23 PROCEDURE — 3075F SYST BP GE 130 - 139MM HG: CPT | Mod: CPTII,S$GLB,, | Performed by: INTERNAL MEDICINE

## 2020-01-23 PROCEDURE — 3079F DIAST BP 80-89 MM HG: CPT | Mod: CPTII,S$GLB,, | Performed by: INTERNAL MEDICINE

## 2020-01-23 PROCEDURE — 3075F PR MOST RECENT SYSTOLIC BLOOD PRESS GE 130-139MM HG: ICD-10-PCS | Mod: CPTII,S$GLB,, | Performed by: INTERNAL MEDICINE

## 2020-01-23 PROCEDURE — 99999 PR PBB SHADOW E&M-EST. PATIENT-LVL IV: ICD-10-PCS | Mod: PBBFAC,,, | Performed by: INTERNAL MEDICINE

## 2020-01-23 PROCEDURE — 99396 PREV VISIT EST AGE 40-64: CPT | Mod: S$GLB,,, | Performed by: INTERNAL MEDICINE

## 2020-01-23 PROCEDURE — 99999 PR PBB SHADOW E&M-EST. PATIENT-LVL IV: CPT | Mod: PBBFAC,,, | Performed by: INTERNAL MEDICINE

## 2020-01-23 PROCEDURE — 99396 PR PREVENTIVE VISIT,EST,40-64: ICD-10-PCS | Mod: S$GLB,,, | Performed by: INTERNAL MEDICINE

## 2020-01-23 PROCEDURE — 3079F PR MOST RECENT DIASTOLIC BLOOD PRESSURE 80-89 MM HG: ICD-10-PCS | Mod: CPTII,S$GLB,, | Performed by: INTERNAL MEDICINE

## 2020-01-23 RX ORDER — AMLODIPINE AND VALSARTAN 5; 320 MG/1; MG/1
1 TABLET ORAL DAILY
Qty: 90 TABLET | Refills: 3 | Status: SHIPPED | OUTPATIENT
Start: 2020-01-23 | End: 2020-08-27 | Stop reason: SDUPTHER

## 2020-01-23 RX ORDER — GABAPENTIN 100 MG/1
100 CAPSULE ORAL 3 TIMES DAILY PRN
Qty: 90 CAPSULE | Refills: 11 | Status: SHIPPED | OUTPATIENT
Start: 2020-01-23 | End: 2020-05-13

## 2020-01-23 NOTE — PROGRESS NOTES
Subjective:       Patient ID: Phylicia Vásquez is a 49 y.o. female.    Chief Complaint: Annual Exam   She presents for routine annual physical  This dictation was performed using using MModal.  She gets a routine annual mammogram.  This year, she had some tenderness in her right axillary area and her mammogram showed an abnormality.  In November, 2019 she was diagnosed with breast cancer.  She is currently undergoing chemotherapy.  Surgery will follow.  A decision has not yet been made on radiation.  She has moved from [place] to live with her parents to undergo this treatment.  The chemotherapy has her short of breath when walking only 1 block, cloudy vision, difficulty with both constipation and diarrhea, severe nausea following chemotherapy, headache weakness and sadness.  Her oncology team assures her that she will improve.    Both of her sons ages 23 and 18 are living together on the [place] without any older adults supervising.  She is concerned about her 18-year-old son.  HPI  Review of Systems   Constitutional: Positive for activity change. Negative for unexpected weight change.   HENT: Negative for hearing loss, rhinorrhea and trouble swallowing.    Eyes: Positive for visual disturbance. Negative for discharge.   Respiratory: Negative for wheezing.    Cardiovascular: Negative for chest pain and palpitations.   Gastrointestinal: Positive for constipation and diarrhea. Negative for blood in stool and vomiting.   Endocrine: Negative for polydipsia and polyuria.   Genitourinary: Negative for difficulty urinating, dysuria, hematuria and menstrual problem.   Musculoskeletal: Negative for arthralgias, joint swelling and neck pain.   Neurological: Positive for weakness and headaches.   Psychiatric/Behavioral: Positive for dysphoric mood. Negative for confusion.       Objective:      Physical Exam   Constitutional: She is oriented to person, place, and time. She appears well-developed and well-nourished.  No distress.   HENT:   Head: Normocephalic and atraumatic.   Right Ear: External ear normal.   Left Ear: External ear normal.   Nose: Nose normal.   Mouth/Throat: Oropharynx is clear and moist. No oropharyngeal exudate.   Eyes: Pupils are equal, round, and reactive to light. Conjunctivae and EOM are normal. Right eye exhibits no discharge. No scleral icterus.   Neck: Normal range of motion and full passive range of motion without pain. Neck supple. No spinous process tenderness and no muscular tenderness present. Carotid bruit is not present. No thyromegaly present.   Cardiovascular: Normal rate, regular rhythm, S1 normal, S2 normal, normal heart sounds and intact distal pulses. Exam reveals no gallop and no friction rub.   No murmur heard.  Pulmonary/Chest: Effort normal and breath sounds normal. No respiratory distress. She has no wheezes. She has no rales. She exhibits no tenderness.   Abdominal: Soft. Bowel sounds are normal. She exhibits no distension and no mass. There is no tenderness. There is no rebound and no guarding.   Genitourinary: Pelvic exam was performed with patient supine. Uterus is not deviated, not enlarged, not fixed and not tender. Cervix exhibits no motion tenderness, no discharge and no friability. Right adnexum displays no mass, no tenderness and no fullness. Left adnexum displays no mass, no tenderness and no fullness.   Musculoskeletal: Normal range of motion. She exhibits no edema or tenderness.   Lymphadenopathy:        Head (right side): No submental and no submandibular adenopathy present.        Head (left side): No submental and no submandibular adenopathy present.     She has no cervical adenopathy.        Right cervical: No superficial cervical, no deep cervical and no posterior cervical adenopathy present.       Left cervical: No superficial cervical, no deep cervical and no posterior cervical adenopathy present.        Right axillary: No pectoral and no lateral adenopathy  present.        Left axillary: No pectoral and no lateral adenopathy present.       Right: No supraclavicular adenopathy present.        Left: No supraclavicular adenopathy present.   Neurological: She is alert and oriented to person, place, and time. She has normal reflexes. No cranial nerve deficit. She exhibits normal muscle tone. Coordination normal.   Skin: Skin is warm and dry. No rash noted.   Psychiatric: She has a normal mood and affect. Her behavior is normal. Her mood appears not anxious. Her speech is not rapid and/or pressured. She is not agitated. She does not exhibit a depressed mood.   Normal behavior, thought content, insight and judgement.   Nursing note and vitals reviewed.      Assessment:       1. Annual physical exam    2. Morbid obesity    3. Essential hypertension    4. Fatty liver    5. Hyperglycemia    6. Bulge of thoracic disc T10-11 and T11-12    7. Malignant neoplasm of axillary tail of right breast in female, estrogen receptor positive    8. Multinodular non-toxic goiter, Subcentimeter nodules May 2012, anterior fat pad.    9. S/P abdominal supracervical subtotal hysterectomy, continues to have regular menses.     10. Screen for colon cancer,     11. Thyroid dysfunction        Plan:   Phylicia was seen today for annual exam.    Diagnoses and all orders for this visit:    Annual physical exam    Morbid obesity, she is trying to eat a very healthy diet.  She is tracking her steps per day and will gradually increase her activity level.    Essential hypertension, her blood pressure is elevated today and she is encouraged to monitor her blood pressure.  Her blood pressure medication will be increased today.    Fatty liver, monitor    Hyperglycemia, monitor     Bulge of thoracic disc T10-11 and T11-12, stable    Malignant neoplasm of axillary tail of right breast in female, estrogen receptor positive, in the middle of chemotherapy    Multinodular non-toxic goiter, Subcentimeter  nodules May 2012, anterior fat pad.  Stable.    S/P abdominal supracervical subtotal hysterectomy, continues to have regular menses.  she has a gynecologist and is up-to-date with cancer screenings.    Screen for colon cancer,     Thyroid dysfunction, monitor     Other orders, gabapentin may help her stabilize her mood and reduce anxiety as well as help her with sleep and peripheral pain. She has had some symptoms over the years suggestive of mild fibromyalgia.  -     gabapentin (NEURONTIN) 100 MG capsule; Take 1 capsule (100 mg total) by mouth 3 (three) times daily as needed (for anxiety take one nightly to sleep).  -     amlodipine-valsartan (EXFORGE) 5-320 mg per tablet; Take 1 tablet by mouth once daily.  Medication List with Changes/Refills   New Medications    AMLODIPINE-VALSARTAN (EXFORGE) 5-320 MG PER TABLET    Take 1 tablet by mouth once daily.    GABAPENTIN (NEURONTIN) 100 MG CAPSULE    Take 1 capsule (100 mg total) by mouth 3 (three) times daily as needed (for anxiety take one nightly to sleep).   Current Medications    CYCLOBENZAPRINE (FLEXERIL) 5 MG TABLET    TAKE 1 TO 2 TABLETS BY MOUTH DAILY AT BEDTIME AS NEEDED    DEXAMETHASONE (DECADRON) 4 MG TAB    Take 2 tablets by mouth every 12 hours for 3 days post chemotherapy    ERGOCALCIFEROL (ERGOCALCIFEROL) 50,000 UNIT CAP    Take 1 capsule (50,000 Units total) by mouth every 7 days.    FLUOCINOLONE (DERMA-SMOOTHE) 0.01 % EXTERNAL OIL    apply a thin film to affected area every night at bedtime as needed for flare    FLUTICASONE (FLONASE) 50 MCG/ACTUATION NASAL SPRAY    1 spray by Each Nare route 2 (two) times daily as needed.    GABAPENTIN (NEURONTIN) 100 MG CAPSULE    Take 100 mg by mouth 3 (three) times daily.    KETOCONAZOLE (NIZORAL) 2 % SHAMPOO    WASH HAIR WITH MEDICATED SHAMPOO AT LEAST 1 TO 2 TIMES A WEEK, LET SIT ON SCALP AT LEAST 5 MINUTES PRIOR TO RINSING    KETOCONAZOLE (NIZORAL) 2 % SHAMPOO    use topically twice a week as  directed    ONDANSETRON (ZOFRAN) 8 MG TABLET    Take 1 tablet by mouth every 12 hours x 3 days post chemotherapy followed by 1 tablet by mouth every 12 hours as needed for nausea.    OXYCODONE (ROXICODONE) 10 MG TAB IMMEDIATE RELEASE TABLET    Take 1 tablet (10 mg total) by mouth every 6 (six) hours as needed for Pain.    TOBRAMYCIN SULFATE 0.3% (TOBREX) 0.3 % OPHTHALMIC SOLUTION    1-2 drops topically twice daily to affected toe(s).    ZOLPIDEM (AMBIEN) 10 MG TAB    Take 1 tablet (10 mg total) by mouth nightly as needed.   Discontinued Medications    AMLODIPINE-VALSARTAN (EXFORGE) 5-160 MG PER TABLET    Take 1 tablet by mouth every morning.     Follow up in about 3 months (around 4/23/2020).

## 2020-01-27 ENCOUNTER — PATIENT MESSAGE (OUTPATIENT)
Dept: HEMATOLOGY/ONCOLOGY | Facility: CLINIC | Age: 50
End: 2020-01-27

## 2020-01-28 ENCOUNTER — PATIENT MESSAGE (OUTPATIENT)
Dept: HEMATOLOGY/ONCOLOGY | Facility: CLINIC | Age: 50
End: 2020-01-28

## 2020-01-28 ENCOUNTER — TELEPHONE (OUTPATIENT)
Dept: HEMATOLOGY/ONCOLOGY | Facility: CLINIC | Age: 50
End: 2020-01-28

## 2020-01-28 NOTE — TELEPHONE ENCOUNTER
Reached out to HR department this morning to provide additional information regarding patient STD benefits.   At the time, her HR Rep did not answer, Jazmine Bryant.  Left voicemail with my direct extension to have her return my call and discuss what specific documentation was needed.   At later time, HR Rep, Jazmine returned my call.(1128am)  No documents request, verbalization of plan of care was provided and updates adjusted on their end.    She voiced appreciation, and understands to call me back for any further updates or questions she may have.    She expressed gratitude.

## 2020-01-31 ENCOUNTER — OFFICE VISIT (OUTPATIENT)
Dept: HEMATOLOGY/ONCOLOGY | Facility: CLINIC | Age: 50
End: 2020-01-31
Payer: COMMERCIAL

## 2020-01-31 ENCOUNTER — LAB VISIT (OUTPATIENT)
Dept: LAB | Facility: HOSPITAL | Age: 50
End: 2020-01-31
Attending: INTERNAL MEDICINE
Payer: COMMERCIAL

## 2020-01-31 ENCOUNTER — INFUSION (OUTPATIENT)
Dept: INFUSION THERAPY | Facility: HOSPITAL | Age: 50
End: 2020-01-31
Attending: INTERNAL MEDICINE
Payer: COMMERCIAL

## 2020-01-31 VITALS
HEIGHT: 63 IN | HEART RATE: 76 BPM | TEMPERATURE: 99 F | DIASTOLIC BLOOD PRESSURE: 93 MMHG | BODY MASS INDEX: 41.25 KG/M2 | WEIGHT: 232.81 LBS | RESPIRATION RATE: 18 BRPM | SYSTOLIC BLOOD PRESSURE: 143 MMHG | OXYGEN SATURATION: 98 %

## 2020-01-31 VITALS
HEIGHT: 63 IN | HEART RATE: 90 BPM | RESPIRATION RATE: 18 BRPM | BODY MASS INDEX: 41.25 KG/M2 | SYSTOLIC BLOOD PRESSURE: 134 MMHG | WEIGHT: 232.81 LBS | TEMPERATURE: 98 F | OXYGEN SATURATION: 98 % | DIASTOLIC BLOOD PRESSURE: 92 MMHG

## 2020-01-31 DIAGNOSIS — C50.611 CARCINOMA OF AXILLARY TAIL OF RIGHT BREAST IN FEMALE, ESTROGEN RECEPTOR POSITIVE: Primary | ICD-10-CM

## 2020-01-31 DIAGNOSIS — Z17.0 CARCINOMA OF AXILLARY TAIL OF RIGHT BREAST IN FEMALE, ESTROGEN RECEPTOR POSITIVE: Primary | ICD-10-CM

## 2020-01-31 DIAGNOSIS — E07.9 THYROID DYSFUNCTION: ICD-10-CM

## 2020-01-31 DIAGNOSIS — C50.611 CARCINOMA OF AXILLARY TAIL OF RIGHT BREAST IN FEMALE, ESTROGEN RECEPTOR POSITIVE: ICD-10-CM

## 2020-01-31 DIAGNOSIS — I10 ESSENTIAL HYPERTENSION: ICD-10-CM

## 2020-01-31 DIAGNOSIS — Z17.0 CARCINOMA OF AXILLARY TAIL OF RIGHT BREAST IN FEMALE, ESTROGEN RECEPTOR POSITIVE: ICD-10-CM

## 2020-01-31 LAB
ALBUMIN SERPL BCP-MCNC: 3.6 G/DL (ref 3.5–5.2)
ALP SERPL-CCNC: 83 U/L (ref 55–135)
ALT SERPL W/O P-5'-P-CCNC: 28 U/L (ref 10–44)
ANION GAP SERPL CALC-SCNC: 9 MMOL/L (ref 8–16)
AST SERPL-CCNC: 16 U/L (ref 10–40)
BILIRUB SERPL-MCNC: 0.2 MG/DL (ref 0.1–1)
BUN SERPL-MCNC: 7 MG/DL (ref 6–20)
CALCIUM SERPL-MCNC: 9.7 MG/DL (ref 8.7–10.5)
CHLORIDE SERPL-SCNC: 104 MMOL/L (ref 95–110)
CO2 SERPL-SCNC: 30 MMOL/L (ref 23–29)
CREAT SERPL-MCNC: 0.8 MG/DL (ref 0.5–1.4)
ERYTHROCYTE [DISTWIDTH] IN BLOOD BY AUTOMATED COUNT: 17 % (ref 11.5–14.5)
EST. GFR  (AFRICAN AMERICAN): >60 ML/MIN/1.73 M^2
EST. GFR  (NON AFRICAN AMERICAN): >60 ML/MIN/1.73 M^2
ESTIMATED AVG GLUCOSE: 137 MG/DL (ref 68–131)
GLUCOSE SERPL-MCNC: 154 MG/DL (ref 70–110)
HBA1C MFR BLD HPLC: 6.4 % (ref 4–5.6)
HCT VFR BLD AUTO: 35.7 % (ref 37–48.5)
HGB BLD-MCNC: 10.7 G/DL (ref 12–16)
IMM GRANULOCYTES # BLD AUTO: 0.42 K/UL (ref 0–0.04)
MCH RBC QN AUTO: 26 PG (ref 27–31)
MCHC RBC AUTO-ENTMCNC: 30 G/DL (ref 32–36)
MCV RBC AUTO: 87 FL (ref 82–98)
NEUTROPHILS # BLD AUTO: 9.3 K/UL (ref 1.8–7.7)
PLATELET # BLD AUTO: 292 K/UL (ref 150–350)
PMV BLD AUTO: 11.9 FL (ref 9.2–12.9)
POTASSIUM SERPL-SCNC: 4 MMOL/L (ref 3.5–5.1)
PROT SERPL-MCNC: 7.4 G/DL (ref 6–8.4)
RBC # BLD AUTO: 4.12 M/UL (ref 4–5.4)
SODIUM SERPL-SCNC: 143 MMOL/L (ref 136–145)
TSH SERPL DL<=0.005 MIU/L-ACNC: 1.18 UIU/ML (ref 0.4–4)
WBC # BLD AUTO: 12.15 K/UL (ref 3.9–12.7)

## 2020-01-31 PROCEDURE — 3080F DIAST BP >= 90 MM HG: CPT | Mod: CPTII,S$GLB,, | Performed by: INTERNAL MEDICINE

## 2020-01-31 PROCEDURE — 84443 ASSAY THYROID STIM HORMONE: CPT

## 2020-01-31 PROCEDURE — 3008F PR BODY MASS INDEX (BMI) DOCUMENTED: ICD-10-PCS | Mod: CPTII,S$GLB,, | Performed by: INTERNAL MEDICINE

## 2020-01-31 PROCEDURE — 99215 OFFICE O/P EST HI 40 MIN: CPT | Mod: S$GLB,,, | Performed by: INTERNAL MEDICINE

## 2020-01-31 PROCEDURE — 83036 HEMOGLOBIN GLYCOSYLATED A1C: CPT

## 2020-01-31 PROCEDURE — 96375 TX/PRO/DX INJ NEW DRUG ADDON: CPT

## 2020-01-31 PROCEDURE — 3080F PR MOST RECENT DIASTOLIC BLOOD PRESSURE >= 90 MM HG: ICD-10-PCS | Mod: CPTII,S$GLB,, | Performed by: INTERNAL MEDICINE

## 2020-01-31 PROCEDURE — 96367 TX/PROPH/DG ADDL SEQ IV INF: CPT

## 2020-01-31 PROCEDURE — 85027 COMPLETE CBC AUTOMATED: CPT

## 2020-01-31 PROCEDURE — 3077F PR MOST RECENT SYSTOLIC BLOOD PRESSURE >= 140 MM HG: ICD-10-PCS | Mod: CPTII,S$GLB,, | Performed by: INTERNAL MEDICINE

## 2020-01-31 PROCEDURE — 99999 PR PBB SHADOW E&M-EST. PATIENT-LVL IV: ICD-10-PCS | Mod: PBBFAC,,, | Performed by: INTERNAL MEDICINE

## 2020-01-31 PROCEDURE — 99999 PR PBB SHADOW E&M-EST. PATIENT-LVL IV: CPT | Mod: PBBFAC,,, | Performed by: INTERNAL MEDICINE

## 2020-01-31 PROCEDURE — 96413 CHEMO IV INFUSION 1 HR: CPT

## 2020-01-31 PROCEDURE — 36415 COLL VENOUS BLD VENIPUNCTURE: CPT

## 2020-01-31 PROCEDURE — 99215 PR OFFICE/OUTPT VISIT, EST, LEVL V, 40-54 MIN: ICD-10-PCS | Mod: S$GLB,,, | Performed by: INTERNAL MEDICINE

## 2020-01-31 PROCEDURE — 80053 COMPREHEN METABOLIC PANEL: CPT

## 2020-01-31 PROCEDURE — 96411 CHEMO IV PUSH ADDL DRUG: CPT

## 2020-01-31 PROCEDURE — 63600175 PHARM REV CODE 636 W HCPCS: Performed by: INTERNAL MEDICINE

## 2020-01-31 PROCEDURE — 3077F SYST BP >= 140 MM HG: CPT | Mod: CPTII,S$GLB,, | Performed by: INTERNAL MEDICINE

## 2020-01-31 PROCEDURE — 3008F BODY MASS INDEX DOCD: CPT | Mod: CPTII,S$GLB,, | Performed by: INTERNAL MEDICINE

## 2020-01-31 RX ORDER — SODIUM CHLORIDE 0.9 % (FLUSH) 0.9 %
10 SYRINGE (ML) INJECTION
Status: CANCELLED | OUTPATIENT
Start: 2020-01-31

## 2020-01-31 RX ORDER — DEXAMETHASONE 4 MG/1
TABLET ORAL
Qty: 40 TABLET | Refills: 0 | Status: SHIPPED | OUTPATIENT
Start: 2020-01-31 | End: 2021-02-11

## 2020-01-31 RX ORDER — PROMETHAZINE HYDROCHLORIDE 25 MG/1
25 TABLET ORAL EVERY 6 HOURS PRN
Qty: 40 TABLET | Refills: 0 | Status: SHIPPED | OUTPATIENT
Start: 2020-01-31 | End: 2020-04-04 | Stop reason: SDUPTHER

## 2020-01-31 RX ORDER — DOXORUBICIN HYDROCHLORIDE 2 MG/ML
60 INJECTION, SOLUTION INTRAVENOUS
Status: CANCELLED | OUTPATIENT
Start: 2020-01-31

## 2020-01-31 RX ORDER — DOXORUBICIN HYDROCHLORIDE 2 MG/ML
60 INJECTION, SOLUTION INTRAVENOUS
Status: COMPLETED | OUTPATIENT
Start: 2020-01-31 | End: 2020-01-31

## 2020-01-31 RX ORDER — HEPARIN 100 UNIT/ML
500 SYRINGE INTRAVENOUS
Status: DISCONTINUED | OUTPATIENT
Start: 2020-01-31 | End: 2020-01-31 | Stop reason: HOSPADM

## 2020-01-31 RX ORDER — HEPARIN 100 UNIT/ML
500 SYRINGE INTRAVENOUS
Status: CANCELLED | OUTPATIENT
Start: 2020-01-31

## 2020-01-31 RX ORDER — LORAZEPAM 2 MG/ML
0.5 INJECTION INTRAMUSCULAR ONCE
Status: CANCELLED
Start: 2020-01-31

## 2020-01-31 RX ORDER — SODIUM CHLORIDE 0.9 % (FLUSH) 0.9 %
10 SYRINGE (ML) INJECTION
Status: DISCONTINUED | OUTPATIENT
Start: 2020-01-31 | End: 2020-01-31 | Stop reason: HOSPADM

## 2020-01-31 RX ORDER — LORAZEPAM 2 MG/ML
0.5 INJECTION INTRAMUSCULAR ONCE
Status: COMPLETED | OUTPATIENT
Start: 2020-01-31 | End: 2020-01-31

## 2020-01-31 RX ADMIN — SODIUM CHLORIDE: 0.9 INJECTION, SOLUTION INTRAVENOUS at 08:01

## 2020-01-31 RX ADMIN — LORAZEPAM 0.5 MG: 2 INJECTION INTRAMUSCULAR; INTRAVENOUS at 09:01

## 2020-01-31 RX ADMIN — CYCLOPHOSPHAMIDE 1350 MG: 1 INJECTION, POWDER, FOR SOLUTION INTRAVENOUS; ORAL at 10:01

## 2020-01-31 RX ADMIN — APREPITANT 130 MG: 130 INJECTION, EMULSION INTRAVENOUS at 09:01

## 2020-01-31 RX ADMIN — DOXORUBICIN HYDROCHLORIDE 136 MG: 2 INJECTION, SOLUTION INTRAVENOUS at 09:01

## 2020-01-31 RX ADMIN — DEXAMETHASONE SODIUM PHOSPHATE: 4 INJECTION, SOLUTION INTRA-ARTICULAR; INTRALESIONAL; INTRAMUSCULAR; INTRAVENOUS; SOFT TISSUE at 09:01

## 2020-01-31 RX ADMIN — HEPARIN 500 UNITS: 100 SYRINGE at 11:01

## 2020-01-31 NOTE — PROGRESS NOTES
Subjective:       Patient ID: Phylicia Vásquez is a 49 y.o. female.    Chief Complaint: No chief complaint on file.    HPI   Mrs Lua returns today for follow up.  She has so far received 3 cycles of neoadjuvant Adriamycin and cyclophosphamide.  She is due for cycle 4 today.  Today's CBC shows a WBC count of 12,100 per cubic mm, hemoglobin 10.7 grams/deciliter, hematocrit 35.7% and platelets 098911 per cubic mm.  Her bilirubin is 0.2 mg/dl.      Briefly, she is a 49 year old AA female who was recently found to have a carcinoma that is ER positive, CO positive and HER 2 equivocal.  Her cancer is ER positive, CO positive, and HER 2 indeterminate.  FISH was negative.  An axillary node biopsy was also positive.  She is being treated with neoadjuvant chemotherapy and she is due for cycle 4 of AC.      Review of Systems    Overall she feels OK. .  She again experienced significant nausea despite taken dexamethasone and Zofran.  Her symptoms lasted for several days.  She is anxious about the upcoming chemotherapy.  She denies any depression, easy bruising, fevers, chills, night  sweats, weight loss, vomiting, diarrhea, constipation, diplopia, blurred vision, headache, chest pain, palpitations, shortness of breath, left breast pain, abdominal pain, extremity pain, or difficulty ambulating.  The remainder of the ten-point ROS, including general, skin, lymph, H/N, cardiorespiratory, GI, , Neuro, Endocrine, and psychiatric is negative.       Objective:      Physical Exam      She is alert, oriented to time, place, person, pleasant, well      nourished, in no acute physical distress.  She is accompanied by her son and her mother.                                    VITAL SIGNS:  Reviewed                                      HEENT:  Alopecia is noted.  There are no nasal, oral, lip, gingival, auricular, lid,    or conjunctival lesions.  Mucosae are moist and pink, and there is no        thrush.  Pupils are equal,  reactive to light and accommodation.              Extraocular muscle movements are intact.  Dentition is good.  There is no frontal or maxillary tenderness.                                     NECK:  Supple without JVD, adenopathy, or thyromegaly.                       LUNGS:  Clear to auscultation without wheezing, rales, or rhonchi.           CARDIOVASCULAR:  Reveals an S1, S2, no murmurs, no rubs, no gallops.         ABDOMEN:  Soft, nontender, without organomegaly.  Bowel sounds are    present.                                                                     EXTREMITIES:  No cyanosis, clubbing, or edema.                               BREASTS:  Both breasts are large.  There previously documented mass in the upper outer quadrant of the right breast is no longer palpable..  There is still a palpable right axillary node.                                      LYMPHATIC:  There is no cervical, left axillary, or supraclavicular adenopathy.   SKIN:  Warm and moist, without petechiae, rashes, induration, or ecchymoses.           NEUROLOGIC:  DTRs are 0-1+ bilaterally, symmetrical, motor function is 5/5,  and cranial nerves are  within normal limits.    Assessment:       1. Carcinoma of axillary tail of right breast in female, estrogen receptor positive, clinically T2N1M0     2.    Axillary node metastases.  3.      Chemotherapy-induced anemia    Plan:        I had a long discussion with her.  She will proceed with cycle 4 of AC today.  I have asked her to take dexamethasone and Zofran as instructed, and I have sent a prescription for Phenergan to her pharmacy to take on a p.r.n. basis.  She will receive the 1st cycle of Taxol 3 weeks from now and she was given a prescription for dexamethasone premedications.  I will see her the day prior to chemotherapy, on February 20th.  Her questions were answered to her satisfaction.

## 2020-02-04 ENCOUNTER — PATIENT MESSAGE (OUTPATIENT)
Dept: INTERNAL MEDICINE | Facility: CLINIC | Age: 50
End: 2020-02-04

## 2020-02-04 ENCOUNTER — PATIENT MESSAGE (OUTPATIENT)
Dept: HEMATOLOGY/ONCOLOGY | Facility: CLINIC | Age: 50
End: 2020-02-04

## 2020-02-04 DIAGNOSIS — R73.9 HYPERGLYCEMIA: Primary | ICD-10-CM

## 2020-02-04 NOTE — TELEPHONE ENCOUNTER
To: Ella Hunter MD  Subject: Test Results    I have a question about TSH resulted on 1/31/20, 8:27 AM.    How's my test results from my thyroid       I have a question about HEMOGLOBIN A1C resulted on 1/31/20, 8:09 AM.     Do I have diabetes?

## 2020-02-06 ENCOUNTER — TELEPHONE (OUTPATIENT)
Dept: HEMATOLOGY/ONCOLOGY | Facility: CLINIC | Age: 50
End: 2020-02-06

## 2020-02-06 ENCOUNTER — OFFICE VISIT (OUTPATIENT)
Dept: HEMATOLOGY/ONCOLOGY | Facility: CLINIC | Age: 50
End: 2020-02-06
Payer: COMMERCIAL

## 2020-02-06 ENCOUNTER — OFFICE VISIT (OUTPATIENT)
Dept: PSYCHIATRY | Facility: CLINIC | Age: 50
End: 2020-02-06
Payer: COMMERCIAL

## 2020-02-06 VITALS
HEIGHT: 63 IN | HEART RATE: 81 BPM | OXYGEN SATURATION: 97 % | BODY MASS INDEX: 41.05 KG/M2 | TEMPERATURE: 99 F | SYSTOLIC BLOOD PRESSURE: 132 MMHG | RESPIRATION RATE: 18 BRPM | DIASTOLIC BLOOD PRESSURE: 85 MMHG | WEIGHT: 231.69 LBS

## 2020-02-06 DIAGNOSIS — G47.00 INSOMNIA, UNSPECIFIED TYPE: ICD-10-CM

## 2020-02-06 DIAGNOSIS — C50.611 MALIGNANT NEOPLASM OF AXILLARY TAIL OF RIGHT BREAST IN FEMALE, ESTROGEN RECEPTOR POSITIVE: ICD-10-CM

## 2020-02-06 DIAGNOSIS — I10 ESSENTIAL HYPERTENSION: ICD-10-CM

## 2020-02-06 DIAGNOSIS — C50.611 CARCINOMA OF AXILLARY TAIL OF RIGHT BREAST IN FEMALE, ESTROGEN RECEPTOR POSITIVE: ICD-10-CM

## 2020-02-06 DIAGNOSIS — F43.23 ADJUSTMENT DISORDER WITH MIXED ANXIETY AND DEPRESSED MOOD: Primary | ICD-10-CM

## 2020-02-06 DIAGNOSIS — R11.0 NAUSEA: Primary | ICD-10-CM

## 2020-02-06 DIAGNOSIS — G47.9 SLEEP DISORDER: ICD-10-CM

## 2020-02-06 DIAGNOSIS — Z17.0 MALIGNANT NEOPLASM OF AXILLARY TAIL OF RIGHT BREAST IN FEMALE, ESTROGEN RECEPTOR POSITIVE: ICD-10-CM

## 2020-02-06 DIAGNOSIS — E66.01 MORBID OBESITY: ICD-10-CM

## 2020-02-06 DIAGNOSIS — Z17.0 CARCINOMA OF AXILLARY TAIL OF RIGHT BREAST IN FEMALE, ESTROGEN RECEPTOR POSITIVE: ICD-10-CM

## 2020-02-06 DIAGNOSIS — G89.3 CANCER RELATED PAIN: ICD-10-CM

## 2020-02-06 DIAGNOSIS — G89.3 NEOPLASM RELATED PAIN: ICD-10-CM

## 2020-02-06 PROBLEM — R73.9 HYPERGLYCEMIA: Status: RESOLVED | Noted: 2018-11-14 | Resolved: 2020-02-06

## 2020-02-06 PROCEDURE — 99999 PR PBB SHADOW E&M-EST. PATIENT-LVL II: ICD-10-PCS | Mod: PBBFAC,,, | Performed by: PSYCHOLOGIST

## 2020-02-06 PROCEDURE — 99214 OFFICE O/P EST MOD 30 MIN: CPT | Mod: S$GLB,,, | Performed by: NURSE PRACTITIONER

## 2020-02-06 PROCEDURE — 99214 PR OFFICE/OUTPT VISIT, EST, LEVL IV, 30-39 MIN: ICD-10-PCS | Mod: S$GLB,,, | Performed by: NURSE PRACTITIONER

## 2020-02-06 PROCEDURE — 99999 PR PBB SHADOW E&M-EST. PATIENT-LVL IV: CPT | Mod: PBBFAC,,, | Performed by: NURSE PRACTITIONER

## 2020-02-06 PROCEDURE — 90791 PR PSYCHIATRIC DIAGNOSTIC EVALUATION: ICD-10-PCS | Mod: S$GLB,,, | Performed by: PSYCHOLOGIST

## 2020-02-06 PROCEDURE — 99999 PR PBB SHADOW E&M-EST. PATIENT-LVL II: CPT | Mod: PBBFAC,,, | Performed by: PSYCHOLOGIST

## 2020-02-06 PROCEDURE — 90791 PSYCH DIAGNOSTIC EVALUATION: CPT | Mod: S$GLB,,, | Performed by: PSYCHOLOGIST

## 2020-02-06 PROCEDURE — 99999 PR PBB SHADOW E&M-EST. PATIENT-LVL IV: ICD-10-PCS | Mod: PBBFAC,,, | Performed by: NURSE PRACTITIONER

## 2020-02-06 RX ORDER — FAMOTIDINE 10 MG/1
10 TABLET ORAL DAILY
COMMUNITY
End: 2021-02-11

## 2020-02-06 RX ORDER — TEMAZEPAM 15 MG/1
15 CAPSULE ORAL NIGHTLY PRN
Qty: 30 CAPSULE | Refills: 0 | Status: SHIPPED | OUTPATIENT
Start: 2020-02-06 | End: 2020-02-06 | Stop reason: CLARIF

## 2020-02-06 RX ORDER — ONDANSETRON 8 MG/1
8 TABLET, ORALLY DISINTEGRATING ORAL EVERY 6 HOURS PRN
Qty: 30 TABLET | Refills: 1 | Status: SHIPPED | OUTPATIENT
Start: 2020-02-06 | End: 2021-02-05

## 2020-02-06 RX ORDER — MORPHINE SULFATE 15 MG/1
15 TABLET, FILM COATED, EXTENDED RELEASE ORAL 2 TIMES DAILY
Qty: 30 TABLET | Refills: 0 | Status: SHIPPED | OUTPATIENT
Start: 2020-02-06 | End: 2020-03-10 | Stop reason: SDUPTHER

## 2020-02-06 RX ORDER — OXYCODONE HYDROCHLORIDE 10 MG/1
10 TABLET ORAL EVERY 4 HOURS PRN
Qty: 120 TABLET | Refills: 0 | Status: SHIPPED | OUTPATIENT
Start: 2020-02-06 | End: 2020-03-10 | Stop reason: SDUPTHER

## 2020-02-06 RX ORDER — MIRTAZAPINE 15 MG/1
15 TABLET, ORALLY DISINTEGRATING ORAL NIGHTLY
Qty: 30 TABLET | Refills: 2 | Status: SHIPPED | OUTPATIENT
Start: 2020-02-06 | End: 2020-08-27

## 2020-02-06 NOTE — TELEPHONE ENCOUNTER
Contacted patient per her mother's call to discuss her symptoms and complaints at this time  Yesterday patient messaged about having what she described to be acid reflux, where she is having some regurgitation and stomach upset but not active vomiting.  Patient on steroids for chemo, concerned of steroid induced acid reflux, and suggestions made for pepcid OTC and tums.   Patient calling back today with ongoing stomach discomfort, localized to what is described to be epigastric region and regurgitation.  She has taken one dose of mag citrate for bowel promotion as she has not had a bowel movement for 2 days and felt to be constipated. Successfully had a loose, large volume BM this morning and does report some relief.  She is also reporting some ongoing breast tenderness to her affected breast. She says that the pain does not seem to be as responsive to the 10 mg of oxy q6 that she has been taking.   She said that she noted to have gotten worse during last breast examination.  Per mother's note patient has been having more frequent crying spells and depressed appearance.   Evaluated patient via phone, who does report feeling more down and having more episodes of crying spells as of lately with less desire to do anything.     OFfered patient an UC visit today in clinic to evaluate all of her reported symptoms and suggested the idea of having the Psychologist see her in clinic for a meet and greet for possible consideration of referral.  Patient agreeable to plans and will report to clinic for 1 p.m. For possible labs and visit with SAM Pittman.  She expressed gratitude.

## 2020-02-06 NOTE — LETTER
February 9, 2020        Negrito Verde MD  [address] [facility] [address]             [facility]  [address] [facility] [address]  Phone: [phone]  Fax: [phone]   Patient: Phyilcia Vásquez   MR Number: [id number]   YOB: 1970   Date of Visit: 2/6/2020       Dear Dr. Verde:    Thank you for referring Phylicia Vásquez to me for evaluation. Below are the relevant portions of my assessment and plan of care.     Phylicia Vásquez is a 49 y.o. female referred by Dr. Vedre for psychological evaluation and treatment.  Ms. Vásquez appears to be coping with difficulty with her cancer treatment.  Mood protective strategies during cancer treatment were discussed.   She was encouraged to continue with pleasant events scheduling and to increase exercise. She was also encouraged to decrease engagement with social media, which is currently increasing her dysphoria. Importance of sleep hygiene/sleep restriction discussed.  She is not currently interested in regular CBT or supportive therapy, but is aware of available resources to address future needs.    If you have questions, please do not hesitate to call me. I look forward to following Phylicia along with you.    Sincerely,      Cem Hilton, PhD           CC  Toya Quintana NP

## 2020-02-06 NOTE — PROGRESS NOTES
Subjective:       Patient ID: Phylicia Vásquez is a 49 y.o. female.    Chief Complaint: Carcinoma of axillary tail of right breast in female, estrog; Abdominal Pain; and Breast Cancer    HPI   Mrs Lua returns today for urgent care visit.   She has received 4 cycles of A/C with the most recent being 2/1.  Today she states her stomach has been off since her last chemotherapy. Patient is nauseated, but she is not vomiting. She also notes she is pitting up a lot of mucous. She notes a soreness pain that radiates from the epigastric region and out to the rib cage. She was started on pepcid yesterday. Decreased appetite. Milk of citrate and had a large bowel movement - loose.      She does note she has pain in the right fingers - sharp pain. She has been taking her pain medication more often.     Briefly, she is a 49 year old AA female who was recently found to have a carcinoma that is ER positive, SD positive and HER 2 equivocal.  Her cancer is ER positive, SD positive, and HER 2 indeterminate.  FISH was negative.  An axillary node biopsy was also positive.  She is being treated with neoadjuvant chemotherapy.      Review of Systems   Constitutional: Positive for appetite change (decreased), fatigue and fever (low grade no higher than 99.2 ). Negative for activity change, chills, diaphoresis and unexpected weight change.   Respiratory: Negative for cough and shortness of breath.    Cardiovascular: Negative for chest pain, palpitations and leg swelling.   Gastrointestinal: Positive for abdominal pain (epigastric pain), diarrhea (loose bowel movement; took milk of citrate) and nausea. Negative for abdominal distention, blood in stool, constipation and vomiting.   Musculoskeletal: Negative for arthralgias, back pain and myalgias.   Skin: Negative for pallor and rash.   Allergic/Immunologic: Negative for immunocompromised state.   Neurological: Negative for dizziness, weakness, light-headedness, numbness and  headaches.        Sharp pain going down the right arm   Hematological: Negative for adenopathy. Does not bruise/bleed easily.   Psychiatric/Behavioral: Positive for dysphoric mood. Negative for confusion. The patient is nervous/anxious.            Objective:      Physical Exam   Constitutional: She is oriented to person, place, and time. She appears well-developed and well-nourished. No distress.   HENT:   Head: Normocephalic.   Mouth/Throat: Oropharynx is clear and moist. No oropharyngeal exudate.   Eyes: Pupils are equal, round, and reactive to light.   Neck: Normal range of motion.   Cardiovascular: Normal rate, regular rhythm and normal heart sounds.   Pulmonary/Chest: Effort normal and breath sounds normal.   Abdominal: Soft. Normal appearance and bowel sounds are normal. She exhibits no distension. There is no tenderness.   Musculoskeletal: She exhibits no edema.   Neurological: She is alert and oriented to person, place, and time.   Skin: Skin is warm and dry. No rash noted.   Psychiatric: She has a normal mood and affect. Her behavior is normal.   Nursing note and vitals reviewed.        Assessment:       1. Nausea  ondansetron (ZOFRAN-ODT) 8 MG TbDL   2. Cancer related pain  morphine (MS CONTIN) 15 MG 12 hr tablet   3. Carcinoma of axillary tail of right breast in female, estrogen receptor positive  oxyCODONE (ROXICODONE) 10 mg Tab immediate release tablet   4. Neoplasm related pain  oxyCODONE (ROXICODONE) 10 mg Tab immediate release tablet   5. Sleep disorder  temazepam (RESTORIL) 15 mg Cap   6. Insomnia, unspecified type     7. Malignant neoplasm of axillary tail of right breast in female, estrogen receptor positive     8. BMI 40.0-44.9, adult     9. Morbid obesity     10. Essential hypertension        Plan:     1. Recommended taking zofran and phenergan every 6 hours alternating the medications so that she takes one every 3 hours.    2. Started on long acting pain medication MS Contin 15 mg BID in  addition to 10 mg of oxycodone every 4 hours. Discussed that we will wean these off as the pain starts to subside. She has been in this pain since her diagnosis and it is now inadequately treated.    3. She has completed 4 cycles of A/C and will receive the 1st cycle of Taxol 2 weeks from now and she was given a prescription for dexamethasone premedications. Dr. Simpson will see patient on February 20th.      4. See number 4    5. Recommended to switch from Ambien 10 mg to Restoril this alleviates her insomnia.     7. See number 3     8. Diet and exercise    9. See number 8    10. Monitored today; continue current medication and follow up with PCP     Return to clinic in 2 weeks with MD appointment and labs.     Patient is in agreement with the proposed treatment plan. All questions were answered to the patient's satisfaction. Patient knows to call clinic for any new or worsening symptoms and if anything is needed before the next clinic visit.          Toya Quintana, FNP-C  Hematology & Medical Oncology   [facility] [place] [address]  ph. [phone]  Fax. [phone]    Patient dicussed with collaborating physician, Dr. Verde.

## 2020-02-06 NOTE — TELEPHONE ENCOUNTER
----- Message from Toya Quintana NP sent at 2/6/2020 10:55 AM CST -----  Contact: Jenny (mother)  Yes  ----- Message -----  From: Callie Hernández, RN  Sent: 2/6/2020  10:52 AM CST  To: Toya Quintana NP    Possible urgent care    ----- Message -----  From: Diego Yañez  Sent: 2/6/2020  10:27 AM CST  To: Mehreen Mahan Staff    Who is calling:: Jenny     Provider treating:: Dr Simpson    Current symptom:: Pt is going through a deep depression, crying a lot. Also she's in a lot of pain, because the pain medication is wearing off too fast.    Are you currently receiving treatment for your diagnosis:: Yes    When was your last treatment:: 01/31    How long have you had the symptom:: 1wk     Communication preference:: [phone] or [phone]    Additional Info::

## 2020-02-07 ENCOUNTER — DOCUMENTATION ONLY (OUTPATIENT)
Dept: PSYCHIATRY | Facility: CLINIC | Age: 50
End: 2020-02-07

## 2020-02-07 ENCOUNTER — HOSPITAL ENCOUNTER (OUTPATIENT)
Dept: RADIOLOGY | Facility: HOSPITAL | Age: 50
Discharge: HOME | End: 2020-02-07
Attending: NURSE PRACTITIONER
Payer: COMMERCIAL

## 2020-02-07 DIAGNOSIS — G47.00 INSOMNIA, UNSPECIFIED TYPE: ICD-10-CM

## 2020-02-07 DIAGNOSIS — F06.31 DEPRESSION DUE TO PHYSICAL ILLNESS: ICD-10-CM

## 2020-02-07 DIAGNOSIS — G47.9 SLEEP DISORDER: ICD-10-CM

## 2020-02-07 DIAGNOSIS — Z17.0 CARCINOMA OF AXILLARY TAIL OF RIGHT BREAST IN FEMALE, ESTROGEN RECEPTOR POSITIVE: ICD-10-CM

## 2020-02-07 DIAGNOSIS — C50.611 CARCINOMA OF AXILLARY TAIL OF RIGHT BREAST IN FEMALE, ESTROGEN RECEPTOR POSITIVE: ICD-10-CM

## 2020-02-07 DIAGNOSIS — Z17.0 CARCINOMA OF AXILLARY TAIL OF RIGHT BREAST IN FEMALE, ESTROGEN RECEPTOR POSITIVE: Primary | ICD-10-CM

## 2020-02-07 DIAGNOSIS — C50.611 CARCINOMA OF AXILLARY TAIL OF RIGHT BREAST IN FEMALE, ESTROGEN RECEPTOR POSITIVE: Primary | ICD-10-CM

## 2020-02-07 PROCEDURE — 76642 ULTRASOUND BREAST LIMITED: CPT | Mod: TC,PO,RT

## 2020-02-07 PROCEDURE — 76642 US BREAST RIGHT LIMITED: ICD-10-PCS | Mod: 26,RT,, | Performed by: RADIOLOGY

## 2020-02-07 PROCEDURE — 76642 ULTRASOUND BREAST LIMITED: CPT | Mod: 26,RT,, | Performed by: RADIOLOGY

## 2020-02-09 ENCOUNTER — PATIENT OUTREACH (OUTPATIENT)
Dept: ADMINISTRATIVE | Facility: OTHER | Age: 50
End: 2020-02-09

## 2020-02-09 PROBLEM — F43.23 ADJUSTMENT DISORDER WITH MIXED ANXIETY AND DEPRESSED MOOD: Status: ACTIVE | Noted: 2020-02-09
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
## 2020-02-10 ENCOUNTER — TELEPHONE (OUTPATIENT)
Dept: HEMATOLOGY/ONCOLOGY | Facility: CLINIC | Age: 50
End: 2020-02-10

## 2020-02-10 NOTE — TELEPHONE ENCOUNTER
Called patient to let her know that her ultrasound showed that the mass in the right breast has decreased in size by roughly half; she is responding appropriately to treatment. There are no new masses present. She stated this information alleviated some fear. She notes the nausea has subsided and that the pain medication has started to work and she is in less pain. She notes she is feeling a lot better than last week. She will see GYN on Wednesday for an irritation in the vaginal area. She will be see in Onc in 10 days, but knows to call us if she has any questions or concerns.

## 2020-02-11 ENCOUNTER — PATIENT MESSAGE (OUTPATIENT)
Dept: ONCOLOGY | Facility: HOSPITAL | Age: 50
End: 2020-02-11

## 2020-02-11 NOTE — TELEPHONE ENCOUNTER
In response to msg received from psych/onc noting that patient may benefit from financial services, SW called patient and scheduled an appt to meet her immediately following her next clinic appt on 3/20/20 to discuss financial needs and available assistance.  Patient was given SW contact information.

## 2020-02-12 ENCOUNTER — OFFICE VISIT (OUTPATIENT)
Dept: OBSTETRICS AND GYNECOLOGY | Facility: CLINIC | Age: 50
End: 2020-02-12
Payer: COMMERCIAL

## 2020-02-12 VITALS
BODY MASS INDEX: 40.51 KG/M2 | DIASTOLIC BLOOD PRESSURE: 90 MMHG | HEIGHT: 63 IN | WEIGHT: 228.63 LBS | SYSTOLIC BLOOD PRESSURE: 124 MMHG

## 2020-02-12 DIAGNOSIS — Z01.419 WELL WOMAN EXAM WITH ROUTINE GYNECOLOGICAL EXAM: Primary | ICD-10-CM

## 2020-02-12 DIAGNOSIS — N89.8 VAGINAL IRRITATION: ICD-10-CM

## 2020-02-12 DIAGNOSIS — C50.919 MALIGNANT NEOPLASM OF FEMALE BREAST, UNSPECIFIED ESTROGEN RECEPTOR STATUS, UNSPECIFIED LATERALITY, UNSPECIFIED SITE OF BREAST: ICD-10-CM

## 2020-02-12 DIAGNOSIS — Z12.4 PAP SMEAR FOR CERVICAL CANCER SCREENING: ICD-10-CM

## 2020-02-12 PROCEDURE — 87624 HPV HI-RISK TYP POOLED RSLT: CPT

## 2020-02-12 PROCEDURE — 99999 PR PBB SHADOW E&M-EST. PATIENT-LVL III: CPT | Mod: PBBFAC,,, | Performed by: OBSTETRICS & GYNECOLOGY

## 2020-02-12 PROCEDURE — 3080F DIAST BP >= 90 MM HG: CPT | Mod: CPTII,S$GLB,, | Performed by: OBSTETRICS & GYNECOLOGY

## 2020-02-12 PROCEDURE — 3074F SYST BP LT 130 MM HG: CPT | Mod: CPTII,S$GLB,, | Performed by: OBSTETRICS & GYNECOLOGY

## 2020-02-12 PROCEDURE — 99396 PR PREVENTIVE VISIT,EST,40-64: ICD-10-PCS | Mod: S$GLB,,, | Performed by: OBSTETRICS & GYNECOLOGY

## 2020-02-12 PROCEDURE — 99396 PREV VISIT EST AGE 40-64: CPT | Mod: S$GLB,,, | Performed by: OBSTETRICS & GYNECOLOGY

## 2020-02-12 PROCEDURE — 3074F PR MOST RECENT SYSTOLIC BLOOD PRESSURE < 130 MM HG: ICD-10-PCS | Mod: CPTII,S$GLB,, | Performed by: OBSTETRICS & GYNECOLOGY

## 2020-02-12 PROCEDURE — 88175 CYTOPATH C/V AUTO FLUID REDO: CPT | Performed by: PATHOLOGY

## 2020-02-12 PROCEDURE — 87481 CANDIDA DNA AMP PROBE: CPT | Mod: 59

## 2020-02-12 PROCEDURE — 3080F PR MOST RECENT DIASTOLIC BLOOD PRESSURE >= 90 MM HG: ICD-10-PCS | Mod: CPTII,S$GLB,, | Performed by: OBSTETRICS & GYNECOLOGY

## 2020-02-12 PROCEDURE — 99999 PR PBB SHADOW E&M-EST. PATIENT-LVL III: ICD-10-PCS | Mod: PBBFAC,,, | Performed by: OBSTETRICS & GYNECOLOGY

## 2020-02-12 NOTE — LETTER
February 12, 2020      Ella Toth MD  [address]  [facility] [address]           [facility]  [address]  [place] [address]  Phone: [phone]          Patient: Phylicia Vásquez   MR Number: [id number]   YOB: 1970   Date of Visit: 2/12/2020       Dear Dr. Ella Toth:    Thank you for referring Phylicia Vásquez to me for evaluation. Attached you will find relevant portions of my assessment and plan of care.    If you have questions, please do not hesitate to call me. I look forward to following Phylicia Vásquez along with you.    Sincerely,    Sophia Dempsey MD    Enclosure  CC:  No Recipients    If you would like to receive this communication electronically, please contact [email or web address] or [phone] to request more information on [employer] Link access.    For providers and/or their staff who would like to refer a patient to [facility], please contact us through our one-stop-shop provider referral line, [facility], at [phone].    If you feel you have received this communication in error or would no longer like to receive these types of communications, please e-mail [email or web address]

## 2020-02-12 NOTE — PROGRESS NOTES
Subjective:       Patient ID: Phylicia Vásquez is a 49 y.o. female.    Chief Complaint:  Well Woman      History of Present Illness  HPI  This 49 yr old P5 female is here for WWE.  She was recently diagnosed with stage 2 Adenocarcinoma of the Breast in and has started her chemo.  She ie ER/RI pos and Her2 indeterminate.  She had supracervical hyst in the past and continued to have spotting one day a month since then.  With the chemo, she has started having hot flashes just from her head.  We discussed and this might subside after chemo.  She understands that we have a survivorship clinic at [facility].  We had a long discussion on all of this.  She has no gyn complaints but for some recent vaginal irritation since chemo.  Will do affirm    GYN & OB History  Patient's last menstrual period was 2014.   Date of Last Pap: 2017    OB History    Para Term  AB Living   7 5 4 1 2 2   SAB TAB Ectopic Multiple Live Births   2 0 0 0 2      # Outcome Date GA Lbr Saul/2nd Weight Sex Delivery Anes PTL Lv   7 Term            6 Term            5 Term            4      M Vag-Spont  Y IRA      Complications: Fetal distress affecting labor   3 Term     M CS-Unspec  N IRA      Complications: Fetal distress affecting labor   2 SAB            1 SAB                Review of Systems  Review of Systems   Constitutional: Negative for chills and fever.   Respiratory: Negative for shortness of breath.    Cardiovascular: Negative for chest pain.   Gastrointestinal: Negative for abdominal pain, nausea and vomiting.   Endocrine: Positive for heat intolerance.   Genitourinary: Negative for difficulty urinating, dyspareunia, genital sores, menstrual problem, pelvic pain, vaginal bleeding, vaginal discharge and vaginal pain.   Skin: Negative for wound.   Hematological: Negative for adenopathy.           Objective:   Physical Exam  Normal external genetalia no lesions or redness or swelling  Normal vaginal vault  with no lestions or redness or swelling  Cervix intact and normal about 4 cm  Normal BME, uterus surgically absent, BME WNL no masses or tenderness     Assessment:        1. Well woman exam with routine gynecological exam    2. Pap smear for cervical cancer screening    3. Vaginal irritation    4. Malignant neoplasm of female breast, unspecified estrogen receptor status, unspecified laterality, unspecified site of breast               Plan:      Pt to contact us in few months when she is finished with her chemo, surgery for survivorship clinic  Pap today   Affirm today

## 2020-02-13 LAB
BACTERIAL VAGINOSIS DNA: POSITIVE
CANDIDA GLABRATA DNA: NEGATIVE
CANDIDA KRUSEI DNA: NEGATIVE
CANDIDA RRNA VAG QL PROBE: NEGATIVE
T VAGINALIS RRNA GENITAL QL PROBE: POSITIVE

## 2020-02-14 ENCOUNTER — PATIENT MESSAGE (OUTPATIENT)
Dept: OBSTETRICS AND GYNECOLOGY | Facility: CLINIC | Age: 50
End: 2020-02-14

## 2020-02-14 DIAGNOSIS — N76.0 BV (BACTERIAL VAGINOSIS): ICD-10-CM

## 2020-02-14 DIAGNOSIS — B96.89 BV (BACTERIAL VAGINOSIS): ICD-10-CM

## 2020-02-14 DIAGNOSIS — B37.31 YEAST VAGINITIS: Primary | ICD-10-CM

## 2020-02-14 RX ORDER — FLUCONAZOLE 150 MG/1
150 TABLET ORAL ONCE
Qty: 1 TABLET | Refills: 1 | Status: SHIPPED | OUTPATIENT
Start: 2020-02-14 | End: 2020-02-15

## 2020-02-14 RX ORDER — TINIDAZOLE 500 MG/1
2 TABLET ORAL ONCE
Qty: 4 TABLET | Refills: 1 | OUTPATIENT
Start: 2020-02-14 | End: 2020-02-14

## 2020-02-14 RX ORDER — TINIDAZOLE 500 MG/1
2 TABLET ORAL ONCE
Qty: 4 TABLET | Refills: 0 | Status: SHIPPED | OUTPATIENT
Start: 2020-02-14 | End: 2020-02-15

## 2020-02-17 LAB
HPV HR 12 DNA SPEC QL NAA+PROBE: NEGATIVE
HPV16 AG SPEC QL: NEGATIVE
HPV18 DNA SPEC QL NAA+PROBE: NEGATIVE

## 2020-02-20 ENCOUNTER — OFFICE VISIT (OUTPATIENT)
Dept: HEMATOLOGY/ONCOLOGY | Facility: CLINIC | Age: 50
End: 2020-02-20
Payer: COMMERCIAL

## 2020-02-20 ENCOUNTER — LAB VISIT (OUTPATIENT)
Dept: LAB | Facility: HOSPITAL | Age: 50
End: 2020-02-20
Attending: INTERNAL MEDICINE
Payer: COMMERCIAL

## 2020-02-20 VITALS
OXYGEN SATURATION: 97 % | TEMPERATURE: 98 F | DIASTOLIC BLOOD PRESSURE: 79 MMHG | WEIGHT: 229.06 LBS | SYSTOLIC BLOOD PRESSURE: 116 MMHG | BODY MASS INDEX: 40.59 KG/M2 | HEIGHT: 63 IN | HEART RATE: 58 BPM | RESPIRATION RATE: 16 BRPM

## 2020-02-20 DIAGNOSIS — C50.611 CARCINOMA OF AXILLARY TAIL OF RIGHT BREAST IN FEMALE, ESTROGEN RECEPTOR POSITIVE: ICD-10-CM

## 2020-02-20 DIAGNOSIS — T45.1X5A ANTINEOPLASTIC CHEMOTHERAPY INDUCED ANEMIA: ICD-10-CM

## 2020-02-20 DIAGNOSIS — Z17.0 CARCINOMA OF AXILLARY TAIL OF RIGHT BREAST IN FEMALE, ESTROGEN RECEPTOR POSITIVE: ICD-10-CM

## 2020-02-20 DIAGNOSIS — Z17.0 MALIGNANT NEOPLASM OF AXILLARY TAIL OF RIGHT BREAST IN FEMALE, ESTROGEN RECEPTOR POSITIVE: Primary | ICD-10-CM

## 2020-02-20 DIAGNOSIS — D64.81 ANTINEOPLASTIC CHEMOTHERAPY INDUCED ANEMIA: ICD-10-CM

## 2020-02-20 DIAGNOSIS — C50.611 MALIGNANT NEOPLASM OF AXILLARY TAIL OF RIGHT BREAST IN FEMALE, ESTROGEN RECEPTOR POSITIVE: Primary | ICD-10-CM

## 2020-02-20 LAB
ALBUMIN SERPL BCP-MCNC: 3.4 G/DL (ref 3.5–5.2)
ALP SERPL-CCNC: 63 U/L (ref 55–135)
ALT SERPL W/O P-5'-P-CCNC: 20 U/L (ref 10–44)
ANION GAP SERPL CALC-SCNC: 7 MMOL/L (ref 8–16)
AST SERPL-CCNC: 18 U/L (ref 10–40)
BILIRUB SERPL-MCNC: 0.2 MG/DL (ref 0.1–1)
BUN SERPL-MCNC: 10 MG/DL (ref 6–20)
CALCIUM SERPL-MCNC: 9.4 MG/DL (ref 8.7–10.5)
CHLORIDE SERPL-SCNC: 105 MMOL/L (ref 95–110)
CO2 SERPL-SCNC: 29 MMOL/L (ref 23–29)
CREAT SERPL-MCNC: 0.8 MG/DL (ref 0.5–1.4)
ERYTHROCYTE [DISTWIDTH] IN BLOOD BY AUTOMATED COUNT: 17.4 % (ref 11.5–14.5)
EST. GFR  (AFRICAN AMERICAN): >60 ML/MIN/1.73 M^2
EST. GFR  (NON AFRICAN AMERICAN): >60 ML/MIN/1.73 M^2
GLUCOSE SERPL-MCNC: 141 MG/DL (ref 70–110)
HCT VFR BLD AUTO: 34.1 % (ref 37–48.5)
HGB BLD-MCNC: 10 G/DL (ref 12–16)
IMM GRANULOCYTES # BLD AUTO: 0.33 K/UL (ref 0–0.04)
MCH RBC QN AUTO: 25.6 PG (ref 27–31)
MCHC RBC AUTO-ENTMCNC: 29.3 G/DL (ref 32–36)
MCV RBC AUTO: 87 FL (ref 82–98)
NEUTROPHILS # BLD AUTO: 5.7 K/UL (ref 1.8–7.7)
PLATELET # BLD AUTO: 268 K/UL (ref 150–350)
PMV BLD AUTO: 11.9 FL (ref 9.2–12.9)
POTASSIUM SERPL-SCNC: 3.8 MMOL/L (ref 3.5–5.1)
PROT SERPL-MCNC: 7 G/DL (ref 6–8.4)
RBC # BLD AUTO: 3.91 M/UL (ref 4–5.4)
SODIUM SERPL-SCNC: 141 MMOL/L (ref 136–145)
WBC # BLD AUTO: 8.22 K/UL (ref 3.9–12.7)

## 2020-02-20 PROCEDURE — 99999 PR PBB SHADOW E&M-EST. PATIENT-LVL V: CPT | Mod: PBBFAC,,, | Performed by: INTERNAL MEDICINE

## 2020-02-20 PROCEDURE — 3008F PR BODY MASS INDEX (BMI) DOCUMENTED: ICD-10-PCS | Mod: CPTII,S$GLB,, | Performed by: INTERNAL MEDICINE

## 2020-02-20 PROCEDURE — 99215 OFFICE O/P EST HI 40 MIN: CPT | Mod: S$GLB,,, | Performed by: INTERNAL MEDICINE

## 2020-02-20 PROCEDURE — 85027 COMPLETE CBC AUTOMATED: CPT

## 2020-02-20 PROCEDURE — 99215 PR OFFICE/OUTPT VISIT, EST, LEVL V, 40-54 MIN: ICD-10-PCS | Mod: S$GLB,,, | Performed by: INTERNAL MEDICINE

## 2020-02-20 PROCEDURE — 80053 COMPREHEN METABOLIC PANEL: CPT

## 2020-02-20 PROCEDURE — 36415 COLL VENOUS BLD VENIPUNCTURE: CPT

## 2020-02-20 PROCEDURE — 99999 PR PBB SHADOW E&M-EST. PATIENT-LVL V: ICD-10-PCS | Mod: PBBFAC,,, | Performed by: INTERNAL MEDICINE

## 2020-02-20 PROCEDURE — 3078F PR MOST RECENT DIASTOLIC BLOOD PRESSURE < 80 MM HG: ICD-10-PCS | Mod: CPTII,S$GLB,, | Performed by: INTERNAL MEDICINE

## 2020-02-20 PROCEDURE — 3008F BODY MASS INDEX DOCD: CPT | Mod: CPTII,S$GLB,, | Performed by: INTERNAL MEDICINE

## 2020-02-20 PROCEDURE — 3074F PR MOST RECENT SYSTOLIC BLOOD PRESSURE < 130 MM HG: ICD-10-PCS | Mod: CPTII,S$GLB,, | Performed by: INTERNAL MEDICINE

## 2020-02-20 PROCEDURE — 3074F SYST BP LT 130 MM HG: CPT | Mod: CPTII,S$GLB,, | Performed by: INTERNAL MEDICINE

## 2020-02-20 PROCEDURE — 3078F DIAST BP <80 MM HG: CPT | Mod: CPTII,S$GLB,, | Performed by: INTERNAL MEDICINE

## 2020-02-20 RX ORDER — DIPHENHYDRAMINE HYDROCHLORIDE 50 MG/ML
50 INJECTION INTRAMUSCULAR; INTRAVENOUS ONCE AS NEEDED
Status: CANCELLED | OUTPATIENT
Start: 2020-02-21

## 2020-02-20 RX ORDER — EPINEPHRINE 0.3 MG/.3ML
0.3 INJECTION SUBCUTANEOUS ONCE AS NEEDED
Status: CANCELLED | OUTPATIENT
Start: 2020-02-21

## 2020-02-20 RX ORDER — SODIUM CHLORIDE 0.9 % (FLUSH) 0.9 %
10 SYRINGE (ML) INJECTION
Status: CANCELLED | OUTPATIENT
Start: 2020-02-21

## 2020-02-20 RX ORDER — FAMOTIDINE 10 MG/ML
20 INJECTION INTRAVENOUS
Status: CANCELLED | OUTPATIENT
Start: 2020-02-21

## 2020-02-20 RX ORDER — HEPARIN 100 UNIT/ML
500 SYRINGE INTRAVENOUS
Status: CANCELLED | OUTPATIENT
Start: 2020-02-21

## 2020-02-20 NOTE — PROGRESS NOTES
Subjective:       Patient ID: Phylicia Vásquez is a 49 y.o. female.    Chief Complaint: No chief complaint on file.    HPI   Mrs Lua returns today for follow up.  She has so far received 4 cycles of neoadjuvant Adriamycin and cyclophosphamide.  She is due for cycle 1 of taxol tomorrow.  Today's CBC shows a WBC count of 8,200 per cubic mm, hemoglobin 10.0 grams/deciliter, hematocrit 34.0 % and platelets 268,000 per cubic mm.  Her bilirubin is 0.2 mg/dl.      Briefly, she is a 49 year old AA female who was recently found to have a carcinoma that is ER positive, AK positive and HER 2 equivocal.  Her cancer is ER positive, AK positive, and HER 2 indeterminate.  FISH was negative.  An axillary node biopsy was also positive.  She is being treated with neoadjuvant chemotherapy and she is due for cycle 1 of Taxol.    Two weeks ago she was evaluated by the NP for right breast pain. An ultrasound was ordered, and it showed an enlarged lymph node in her right axilla with a marker.  It also showed that the mass in the breast has decreased to 7 x 4 x 4 mm        Review of Systems    Overall she feels OK.   She again experienced significant nausea despite taken dexamethasone and Zofran.  Her symptoms lasted for several days.  She is anxious about the upcoming Taxol chemotherapy.  She denies any depression, easy bruising, fevers, chills, night  sweats, weight loss, vomiting, diarrhea, constipation, diplopia, blurred vision, headache, chest pain, palpitations, shortness of breath, left breast pain, abdominal pain, extremity pain, or difficulty ambulating.  The remainder of the ten-point ROS, including general, skin, lymph, H/N, cardiorespiratory, GI, , Neuro, Endocrine, and psychiatric is negative.       Objective:      Physical Exam      She is alert, oriented to time, place, person, pleasant, well      nourished, in no acute physical distress.  She is accompanied by her mother.                                    VITAL  SIGNS:  Reviewed                                      HEENT:  Alopecia is noted.  There are no nasal, oral, lip, gingival, auricular, lid,    or conjunctival lesions.  Mucosae are moist and pink, and there is no        thrush.  Pupils are equal, reactive to light and accommodation.              Extraocular muscle movements are intact.  Dentition is good.  There is no frontal or maxillary tenderness.                                     NECK:  Supple without JVD, adenopathy, or thyromegaly.                       LUNGS:  Clear to auscultation without wheezing, rales, or rhonchi.           CARDIOVASCULAR:  Reveals an S1, S2, no murmurs, no rubs, no gallops.         ABDOMEN:  Soft, nontender, without organomegaly.  Bowel sounds are    present.                                                                     EXTREMITIES:  No cyanosis, clubbing, or edema.                               BREASTS:  Both breasts are large.  There previously documented mass in the upper outer quadrant of the right breast is no longer palpable..  There is still a palpable right axillary node.                                      LYMPHATIC:  There is no cervical, left axillary, or supraclavicular adenopathy.   SKIN:  Warm and moist, without petechiae, rashes, induration, or ecchymoses.           NEUROLOGIC:  DTRs are 0-1+ bilaterally, symmetrical, motor function is 5/5,  and cranial nerves are  within normal limits.    Assessment:       1. Carcinoma of axillary tail of right breast in female, estrogen receptor positive, clinically T2N1M0     2.    Axillary node metastases.  3.      Chemotherapy-induced anemia    Plan:        I had a long discussion with her.  She will take the dexamethasone tonight and tomorrow morning and will present to receive the 1st infusion of Taxol.  I had a long discussion about the potential side effects.  She will observe neutropenic precautions and see me in 22 days when she will be due for her 2nd infusion.  Mrs.  Fahad stated that she she would prefer to undergo double mastectomies.  I pointed out to her that the chance of a cure is the same regardless of whether she undergo a lumpectomy with radiation, mastectomy, or bilateral mastectomies.  She voiced understanding.  Her questions were answered to her satisfaction.

## 2020-02-20 NOTE — Clinical Note
See me in 3 weeks with labs for chemotherapy.  If I am on the service I will see her during the lunch break

## 2020-02-21 ENCOUNTER — DOCUMENTATION ONLY (OUTPATIENT)
Dept: HEMATOLOGY/ONCOLOGY | Facility: CLINIC | Age: 50
End: 2020-02-21

## 2020-02-21 ENCOUNTER — INFUSION (OUTPATIENT)
Dept: INFUSION THERAPY | Facility: HOSPITAL | Age: 50
End: 2020-02-21
Attending: INTERNAL MEDICINE
Payer: COMMERCIAL

## 2020-02-21 VITALS
TEMPERATURE: 99 F | OXYGEN SATURATION: 98 % | DIASTOLIC BLOOD PRESSURE: 76 MMHG | HEART RATE: 91 BPM | RESPIRATION RATE: 18 BRPM | SYSTOLIC BLOOD PRESSURE: 112 MMHG

## 2020-02-21 DIAGNOSIS — C50.611 CARCINOMA OF AXILLARY TAIL OF RIGHT BREAST IN FEMALE, ESTROGEN RECEPTOR POSITIVE: Primary | ICD-10-CM

## 2020-02-21 DIAGNOSIS — Z17.0 CARCINOMA OF AXILLARY TAIL OF RIGHT BREAST IN FEMALE, ESTROGEN RECEPTOR POSITIVE: Primary | ICD-10-CM

## 2020-02-21 PROCEDURE — S0028 INJECTION, FAMOTIDINE, 20 MG: HCPCS | Performed by: INTERNAL MEDICINE

## 2020-02-21 PROCEDURE — 96415 CHEMO IV INFUSION ADDL HR: CPT

## 2020-02-21 PROCEDURE — 96367 TX/PROPH/DG ADDL SEQ IV INF: CPT

## 2020-02-21 PROCEDURE — 96413 CHEMO IV INFUSION 1 HR: CPT

## 2020-02-21 PROCEDURE — 96375 TX/PRO/DX INJ NEW DRUG ADDON: CPT

## 2020-02-21 PROCEDURE — 63600175 PHARM REV CODE 636 W HCPCS: Performed by: INTERNAL MEDICINE

## 2020-02-21 PROCEDURE — 25000003 PHARM REV CODE 250: Performed by: INTERNAL MEDICINE

## 2020-02-21 RX ORDER — EPINEPHRINE 0.3 MG/.3ML
0.3 INJECTION SUBCUTANEOUS ONCE AS NEEDED
Status: DISCONTINUED | OUTPATIENT
Start: 2020-02-21 | End: 2020-02-21 | Stop reason: HOSPADM

## 2020-02-21 RX ORDER — SODIUM CHLORIDE 0.9 % (FLUSH) 0.9 %
10 SYRINGE (ML) INJECTION
Status: DISCONTINUED | OUTPATIENT
Start: 2020-02-21 | End: 2020-02-21 | Stop reason: HOSPADM

## 2020-02-21 RX ORDER — FAMOTIDINE 10 MG/ML
20 INJECTION INTRAVENOUS
Status: COMPLETED | OUTPATIENT
Start: 2020-02-21 | End: 2020-02-21

## 2020-02-21 RX ORDER — DIPHENHYDRAMINE HYDROCHLORIDE 50 MG/ML
50 INJECTION INTRAMUSCULAR; INTRAVENOUS ONCE AS NEEDED
Status: DISCONTINUED | OUTPATIENT
Start: 2020-02-21 | End: 2020-02-21 | Stop reason: HOSPADM

## 2020-02-21 RX ORDER — HEPARIN 100 UNIT/ML
500 SYRINGE INTRAVENOUS
Status: DISCONTINUED | OUTPATIENT
Start: 2020-02-21 | End: 2020-02-21 | Stop reason: HOSPADM

## 2020-02-21 RX ADMIN — DIPHENHYDRAMINE HYDROCHLORIDE 50 MG: 50 INJECTION, SOLUTION INTRAMUSCULAR; INTRAVENOUS at 10:02

## 2020-02-21 RX ADMIN — FAMOTIDINE 20 MG: 10 INJECTION, SOLUTION INTRAVENOUS at 10:02

## 2020-02-21 RX ADMIN — HEPARIN 500 UNITS: 100 SYRINGE at 01:02

## 2020-02-21 RX ADMIN — PACLITAXEL 378 MG: 6 INJECTION, SOLUTION INTRAVENOUS at 10:02

## 2020-02-21 RX ADMIN — SODIUM CHLORIDE: 0.9 INJECTION, SOLUTION INTRAVENOUS at 09:02

## 2020-02-21 NOTE — PLAN OF CARE
Patient tolerated Taxol infusion without complications. VS stable throughout infusion. Labs reviewed prior to treatment. Port flushed, heparin locked, and de-accessed. AVS declined. Discharged home.

## 2020-02-21 NOTE — PROGRESS NOTES
PARVIN faxed referral to [facility] requesting rental assistance.  Will f/u with [facility].

## 2020-02-24 ENCOUNTER — PATIENT MESSAGE (OUTPATIENT)
Dept: HEMATOLOGY/ONCOLOGY | Facility: CLINIC | Age: 50
End: 2020-02-24

## 2020-02-24 DIAGNOSIS — G62.0 DRUG-INDUCED POLYNEUROPATHY: Primary | ICD-10-CM

## 2020-02-24 RX ORDER — GABAPENTIN 300 MG/1
300 CAPSULE ORAL 2 TIMES DAILY
Qty: 60 CAPSULE | Refills: 11 | Status: SHIPPED | OUTPATIENT
Start: 2020-02-24 | End: 2020-04-15 | Stop reason: SDUPTHER

## 2020-02-24 NOTE — PROGRESS NOTES
Called patient about her [facility] message.   Patient states that she received her first dose of taxol on Friday 2/21/2020 without complications. She stated she work up on Saturday with pain in her legs and in her joints. She described the pain as 10/10 and sharp pain. She is on long acting pain medication (MS Contin) and takes break through oxycodone, which she is taking without relief. The pain has been stable since Saturday. She has been using her mom's walker to get around due to the pain I recommend she try gabapentin for peripheral neuropathy given that is a side effect of taxol, although it is early in her treatment. I sent in the prescription to [facility] and I will call the patient on Wednesday to see how she her pain is. Patient is agreeable to this plan and stated she did not want to come today given her fatigue and pain.

## 2020-02-27 ENCOUNTER — PATIENT MESSAGE (OUTPATIENT)
Dept: HEMATOLOGY/ONCOLOGY | Facility: CLINIC | Age: 50
End: 2020-02-27

## 2020-02-27 ENCOUNTER — DOCUMENTATION ONLY (OUTPATIENT)
Dept: HEMATOLOGY/ONCOLOGY | Facility: CLINIC | Age: 50
End: 2020-02-27

## 2020-02-27 DIAGNOSIS — K12.30 MUCOSITIS: Primary | ICD-10-CM

## 2020-02-27 NOTE — PROGRESS NOTES
SW spoke with patient to report that JORGE will not be able to assist her with rent because she lives in [facility] [place].  PARVIN will follow-up by researching other rent  assistance options.  JORGE provided the patient $125 in transportation assistance and will repeat this assistance for this month as well for a total of $250 in assistance.

## 2020-03-03 ENCOUNTER — DOCUMENTATION ONLY (OUTPATIENT)
Dept: HEMATOLOGY/ONCOLOGY | Facility: CLINIC | Age: 50
End: 2020-03-03

## 2020-03-03 NOTE — PROGRESS NOTES
SW sent check request to [employer]. Payable on behalf of patient, who is requesting reimbursement for rent payments.  This will help patient maintain stable housing.

## 2020-03-10 DIAGNOSIS — C50.611 CARCINOMA OF AXILLARY TAIL OF RIGHT BREAST IN FEMALE, ESTROGEN RECEPTOR POSITIVE: ICD-10-CM

## 2020-03-10 DIAGNOSIS — G89.3 CANCER RELATED PAIN: ICD-10-CM

## 2020-03-10 DIAGNOSIS — G89.3 NEOPLASM RELATED PAIN: ICD-10-CM

## 2020-03-10 DIAGNOSIS — Z17.0 CARCINOMA OF AXILLARY TAIL OF RIGHT BREAST IN FEMALE, ESTROGEN RECEPTOR POSITIVE: ICD-10-CM

## 2020-03-10 RX ORDER — OXYCODONE HYDROCHLORIDE 10 MG/1
10 TABLET ORAL EVERY 4 HOURS PRN
Qty: 120 TABLET | Refills: 0 | Status: SHIPPED | OUTPATIENT
Start: 2020-03-10 | End: 2020-04-02

## 2020-03-10 RX ORDER — MORPHINE SULFATE 15 MG/1
15 TABLET, FILM COATED, EXTENDED RELEASE ORAL 2 TIMES DAILY
Qty: 30 TABLET | Refills: 0 | Status: SHIPPED | OUTPATIENT
Start: 2020-03-10 | End: 2020-05-13

## 2020-03-11 LAB
FINAL PATHOLOGIC DIAGNOSIS: ABNORMAL
Lab: ABNORMAL

## 2020-03-11 NOTE — PROGRESS NOTES
Subjective:       Patient ID: Phylicia Vásquez is a 49 y.o. female.    Chief Complaint: No chief complaint on file.    HPI   Mrs Lua returns today for follow up.  She has so far received 4 cycles of neoadjuvant Adriamycin and cyclophosphamide in 1 cycle of Taxol.  She is due for cycle 2 of taxol tomorrow.  Today's CBC shows a WBC count of 13,700 per cubic mm, hemoglobin 11.2 grams/deciliter, hematocrit 35.9 % and platelets 232,000 per cubic mm.  Her bilirubin is 0.2 mg/dl.      Briefly, she is a 49 year old AA female who was recently found to have a carcinoma that is ER positive, NC positive and HER 2 equivocal, but negative by  FISH.  An axillary node biopsy was also positive.  She is being treated with neoadjuvant chemotherapy and she is due for cycle 2 of Taxol.    Last month she was evaluated by the NP for right breast pain. An ultrasound was ordered, and it showed an enlarged lymph node in her right axilla with a marker.  It also showed that the mass in the breast has decreased to 7 x 4 x 4 mm.        Review of Systems    Overall she feels OK.   She states that she experienced significant leg pain with the Taxol that lasted several days.  She is asking for an analgesic.  Her symptoms lasted for several days.  She is anxious but she denies any depression, easy bruising, fevers, chills, night  sweats, weight loss, vomiting, diarrhea, constipation, diplopia, blurred vision, headache, chest pain, palpitations, shortness of breath, left breast pain, abdominal pain, extremity pain, or difficulty ambulating.  The remainder of the ten-point ROS, including general, skin, lymph, H/N, cardiorespiratory, GI, , Neuro, Endocrine, and psychiatric is negative.       Objective:      Physical Exam      She is alert, oriented to time, place, person, pleasant, well      nourished, in no acute physical distress.                                   VITAL SIGNS:  Reviewed                                      HEENT:  Alopecia  is noted.  There are no nasal, oral, lip, gingival, auricular, lid,    or conjunctival lesions.  Mucosae are moist and pink, and there is no        thrush.  Pupils are equal, reactive to light and accommodation.              Extraocular muscle movements are intact.  Dentition is good.  There is no frontal or maxillary tenderness.                                     NECK:  Supple without JVD, adenopathy, or thyromegaly.                       LUNGS:  Clear to auscultation without wheezing, rales, or rhonchi.           CARDIOVASCULAR:  Reveals an S1, S2, no murmurs, no rubs, no gallops.         ABDOMEN:  Soft, nontender, without organomegaly.  Bowel sounds are    present.                                                                     EXTREMITIES:  No cyanosis, clubbing, or edema.                               BREASTS:  Both breasts are large.  There previously documented mass in the upper outer quadrant of the right breast is no longer palpable..  There is still a palpable right axillary node.                                      LYMPHATIC:  There is no cervical, left axillary, or supraclavicular adenopathy.   SKIN:  Warm and moist, without petechiae, rashes, induration, or ecchymoses.           NEUROLOGIC:  DTRs are 0-1+ bilaterally, symmetrical, motor function is 5/5,  and cranial nerves are  within normal limits.    Assessment:       1. Carcinoma of axillary tail of right breast in female, estrogen receptor positive, clinically T2N1M0     2.    Axillary node metastases.  3.      Chemotherapy-induced anemia    Plan:        I had a long discussion with her.  She will proceed with the 2nd cycle of Taxol, observe neutropenic precautions, and see me with labs in 3 weeks for cycle 3.  I have given a prescription for oxycodone 5 mg tablets with instructions to take for pain every 4 hr after the Taxol infusion.  Her questions were answered to his satisfaction.

## 2020-03-12 ENCOUNTER — PATIENT MESSAGE (OUTPATIENT)
Dept: ONCOLOGY | Facility: HOSPITAL | Age: 50
End: 2020-03-12

## 2020-03-12 ENCOUNTER — LAB VISIT (OUTPATIENT)
Dept: LAB | Facility: HOSPITAL | Age: 50
End: 2020-03-12
Attending: INTERNAL MEDICINE
Payer: COMMERCIAL

## 2020-03-12 ENCOUNTER — OFFICE VISIT (OUTPATIENT)
Dept: HEMATOLOGY/ONCOLOGY | Facility: CLINIC | Age: 50
End: 2020-03-12
Payer: COMMERCIAL

## 2020-03-12 ENCOUNTER — TELEPHONE (OUTPATIENT)
Dept: HEMATOLOGY/ONCOLOGY | Facility: CLINIC | Age: 50
End: 2020-03-12

## 2020-03-12 ENCOUNTER — INFUSION (OUTPATIENT)
Dept: INFUSION THERAPY | Facility: HOSPITAL | Age: 50
End: 2020-03-12
Attending: INTERNAL MEDICINE
Payer: COMMERCIAL

## 2020-03-12 VITALS
HEIGHT: 63 IN | TEMPERATURE: 99 F | DIASTOLIC BLOOD PRESSURE: 89 MMHG | WEIGHT: 229.25 LBS | BODY MASS INDEX: 40.62 KG/M2 | HEART RATE: 93 BPM | SYSTOLIC BLOOD PRESSURE: 122 MMHG

## 2020-03-12 VITALS
DIASTOLIC BLOOD PRESSURE: 69 MMHG | RESPIRATION RATE: 18 BRPM | SYSTOLIC BLOOD PRESSURE: 122 MMHG | TEMPERATURE: 98 F | HEART RATE: 94 BPM

## 2020-03-12 DIAGNOSIS — D64.81 ANTINEOPLASTIC CHEMOTHERAPY INDUCED ANEMIA: ICD-10-CM

## 2020-03-12 DIAGNOSIS — C50.611 CARCINOMA OF AXILLARY TAIL OF RIGHT BREAST IN FEMALE, ESTROGEN RECEPTOR POSITIVE: Primary | ICD-10-CM

## 2020-03-12 DIAGNOSIS — Z17.0 MALIGNANT NEOPLASM OF AXILLARY TAIL OF RIGHT BREAST IN FEMALE, ESTROGEN RECEPTOR POSITIVE: ICD-10-CM

## 2020-03-12 DIAGNOSIS — C50.611 MALIGNANT NEOPLASM OF AXILLARY TAIL OF RIGHT BREAST IN FEMALE, ESTROGEN RECEPTOR POSITIVE: ICD-10-CM

## 2020-03-12 DIAGNOSIS — R52 PAIN: ICD-10-CM

## 2020-03-12 DIAGNOSIS — Z17.0 CARCINOMA OF AXILLARY TAIL OF RIGHT BREAST IN FEMALE, ESTROGEN RECEPTOR POSITIVE: Primary | ICD-10-CM

## 2020-03-12 DIAGNOSIS — T45.1X5A ANTINEOPLASTIC CHEMOTHERAPY INDUCED ANEMIA: ICD-10-CM

## 2020-03-12 LAB
ALBUMIN SERPL BCP-MCNC: 3.8 G/DL (ref 3.5–5.2)
ALP SERPL-CCNC: 83 U/L (ref 55–135)
ALT SERPL W/O P-5'-P-CCNC: 30 U/L (ref 10–44)
ANION GAP SERPL CALC-SCNC: 10 MMOL/L (ref 8–16)
AST SERPL-CCNC: 19 U/L (ref 10–40)
BILIRUB SERPL-MCNC: 0.3 MG/DL (ref 0.1–1)
BUN SERPL-MCNC: 11 MG/DL (ref 6–20)
CALCIUM SERPL-MCNC: 10 MG/DL (ref 8.7–10.5)
CHLORIDE SERPL-SCNC: 105 MMOL/L (ref 95–110)
CO2 SERPL-SCNC: 25 MMOL/L (ref 23–29)
CREAT SERPL-MCNC: 0.8 MG/DL (ref 0.5–1.4)
ERYTHROCYTE [DISTWIDTH] IN BLOOD BY AUTOMATED COUNT: 17.2 % (ref 11.5–14.5)
EST. GFR  (AFRICAN AMERICAN): >60 ML/MIN/1.73 M^2
EST. GFR  (NON AFRICAN AMERICAN): >60 ML/MIN/1.73 M^2
GLUCOSE SERPL-MCNC: 191 MG/DL (ref 70–110)
HCT VFR BLD AUTO: 35.9 % (ref 37–48.5)
HGB BLD-MCNC: 11.2 G/DL (ref 12–16)
IMM GRANULOCYTES # BLD AUTO: 0.79 K/UL (ref 0–0.04)
MCH RBC QN AUTO: 26.7 PG (ref 27–31)
MCHC RBC AUTO-ENTMCNC: 31.2 G/DL (ref 32–36)
MCV RBC AUTO: 86 FL (ref 82–98)
NEUTROPHILS # BLD AUTO: 11.8 K/UL (ref 1.8–7.7)
PLATELET # BLD AUTO: 232 K/UL (ref 150–350)
PMV BLD AUTO: 12.7 FL (ref 9.2–12.9)
POTASSIUM SERPL-SCNC: 3.9 MMOL/L (ref 3.5–5.1)
PROT SERPL-MCNC: 7.9 G/DL (ref 6–8.4)
RBC # BLD AUTO: 4.19 M/UL (ref 4–5.4)
SODIUM SERPL-SCNC: 140 MMOL/L (ref 136–145)
WBC # BLD AUTO: 13.74 K/UL (ref 3.9–12.7)

## 2020-03-12 PROCEDURE — 3079F DIAST BP 80-89 MM HG: CPT | Mod: CPTII,S$GLB,, | Performed by: INTERNAL MEDICINE

## 2020-03-12 PROCEDURE — 96367 TX/PROPH/DG ADDL SEQ IV INF: CPT

## 2020-03-12 PROCEDURE — 3008F BODY MASS INDEX DOCD: CPT | Mod: CPTII,S$GLB,, | Performed by: INTERNAL MEDICINE

## 2020-03-12 PROCEDURE — 3074F PR MOST RECENT SYSTOLIC BLOOD PRESSURE < 130 MM HG: ICD-10-PCS | Mod: CPTII,S$GLB,, | Performed by: INTERNAL MEDICINE

## 2020-03-12 PROCEDURE — 80053 COMPREHEN METABOLIC PANEL: CPT

## 2020-03-12 PROCEDURE — 96375 TX/PRO/DX INJ NEW DRUG ADDON: CPT

## 2020-03-12 PROCEDURE — 99999 PR PBB SHADOW E&M-EST. PATIENT-LVL III: CPT | Mod: PBBFAC,,, | Performed by: INTERNAL MEDICINE

## 2020-03-12 PROCEDURE — S0028 INJECTION, FAMOTIDINE, 20 MG: HCPCS | Performed by: INTERNAL MEDICINE

## 2020-03-12 PROCEDURE — 3079F PR MOST RECENT DIASTOLIC BLOOD PRESSURE 80-89 MM HG: ICD-10-PCS | Mod: CPTII,S$GLB,, | Performed by: INTERNAL MEDICINE

## 2020-03-12 PROCEDURE — 25000003 PHARM REV CODE 250: Performed by: INTERNAL MEDICINE

## 2020-03-12 PROCEDURE — 96415 CHEMO IV INFUSION ADDL HR: CPT

## 2020-03-12 PROCEDURE — 36415 COLL VENOUS BLD VENIPUNCTURE: CPT

## 2020-03-12 PROCEDURE — 3074F SYST BP LT 130 MM HG: CPT | Mod: CPTII,S$GLB,, | Performed by: INTERNAL MEDICINE

## 2020-03-12 PROCEDURE — 63600175 PHARM REV CODE 636 W HCPCS: Performed by: INTERNAL MEDICINE

## 2020-03-12 PROCEDURE — 99215 PR OFFICE/OUTPT VISIT, EST, LEVL V, 40-54 MIN: ICD-10-PCS | Mod: S$GLB,,, | Performed by: INTERNAL MEDICINE

## 2020-03-12 PROCEDURE — 96413 CHEMO IV INFUSION 1 HR: CPT

## 2020-03-12 PROCEDURE — 99215 OFFICE O/P EST HI 40 MIN: CPT | Mod: S$GLB,,, | Performed by: INTERNAL MEDICINE

## 2020-03-12 PROCEDURE — 3008F PR BODY MASS INDEX (BMI) DOCUMENTED: ICD-10-PCS | Mod: CPTII,S$GLB,, | Performed by: INTERNAL MEDICINE

## 2020-03-12 PROCEDURE — 85027 COMPLETE CBC AUTOMATED: CPT

## 2020-03-12 PROCEDURE — 99999 PR PBB SHADOW E&M-EST. PATIENT-LVL III: ICD-10-PCS | Mod: PBBFAC,,, | Performed by: INTERNAL MEDICINE

## 2020-03-12 RX ORDER — DIPHENHYDRAMINE HYDROCHLORIDE 50 MG/ML
50 INJECTION INTRAMUSCULAR; INTRAVENOUS ONCE AS NEEDED
Status: CANCELLED | OUTPATIENT
Start: 2020-03-12

## 2020-03-12 RX ORDER — SODIUM CHLORIDE 0.9 % (FLUSH) 0.9 %
10 SYRINGE (ML) INJECTION
Status: DISCONTINUED | OUTPATIENT
Start: 2020-03-12 | End: 2020-03-12 | Stop reason: HOSPADM

## 2020-03-12 RX ORDER — SODIUM CHLORIDE 0.9 % (FLUSH) 0.9 %
10 SYRINGE (ML) INJECTION
Status: CANCELLED | OUTPATIENT
Start: 2020-03-12

## 2020-03-12 RX ORDER — HEPARIN 100 UNIT/ML
500 SYRINGE INTRAVENOUS
Status: CANCELLED | OUTPATIENT
Start: 2020-03-12

## 2020-03-12 RX ORDER — EPINEPHRINE 0.3 MG/.3ML
0.3 INJECTION SUBCUTANEOUS ONCE AS NEEDED
Status: DISCONTINUED | OUTPATIENT
Start: 2020-03-12 | End: 2020-03-12 | Stop reason: HOSPADM

## 2020-03-12 RX ORDER — DIPHENHYDRAMINE HYDROCHLORIDE 50 MG/ML
50 INJECTION INTRAMUSCULAR; INTRAVENOUS ONCE AS NEEDED
Status: DISCONTINUED | OUTPATIENT
Start: 2020-03-12 | End: 2020-03-12 | Stop reason: HOSPADM

## 2020-03-12 RX ORDER — EPINEPHRINE 0.3 MG/.3ML
0.3 INJECTION SUBCUTANEOUS ONCE AS NEEDED
Status: CANCELLED | OUTPATIENT
Start: 2020-03-12

## 2020-03-12 RX ORDER — FAMOTIDINE 10 MG/ML
20 INJECTION INTRAVENOUS
Status: COMPLETED | OUTPATIENT
Start: 2020-03-12 | End: 2020-03-12

## 2020-03-12 RX ORDER — OXYCODONE HYDROCHLORIDE 5 MG/1
5 TABLET ORAL EVERY 4 HOURS PRN
Qty: 60 TABLET | Refills: 0 | Status: SHIPPED | OUTPATIENT
Start: 2020-03-12 | End: 2020-04-01 | Stop reason: SDUPTHER

## 2020-03-12 RX ORDER — FAMOTIDINE 10 MG/ML
20 INJECTION INTRAVENOUS
Status: CANCELLED | OUTPATIENT
Start: 2020-03-12

## 2020-03-12 RX ORDER — HEPARIN 100 UNIT/ML
500 SYRINGE INTRAVENOUS
Status: DISCONTINUED | OUTPATIENT
Start: 2020-03-12 | End: 2020-03-12 | Stop reason: HOSPADM

## 2020-03-12 RX ADMIN — HEPARIN 500 UNITS: 100 SYRINGE at 02:03

## 2020-03-12 RX ADMIN — Medication 50 MG: at 10:03

## 2020-03-12 RX ADMIN — FAMOTIDINE 20 MG: 10 INJECTION, SOLUTION INTRAVENOUS at 10:03

## 2020-03-12 RX ADMIN — PACLITAXEL 378 MG: 6 INJECTION, SOLUTION INTRAVENOUS at 10:03

## 2020-03-12 RX ADMIN — SODIUM CHLORIDE: 9 INJECTION, SOLUTION INTRAVENOUS at 10:03

## 2020-03-12 NOTE — PLAN OF CARE
Pt tolerated infusion today. VSS throughout. AVS Refused, will get online. Port flushed, hep-locked and de-accessed. No questions at this time.

## 2020-03-12 NOTE — PLAN OF CARE
Having numbness and pain in hands, MD aware. Pain medication prescribed. Monitoring. VSS. Port accessed with ease, blood return present. Infusing without difficulty.

## 2020-03-12 NOTE — TELEPHONE ENCOUNTER
Called patient to schedule 3 week follow up and lab and infusion. Scheduled for 4/2.        ----- Message from Negrito Verde MD sent at 3/12/2020  9:17 AM CDT -----  See me in 3 weeks with labs for chemotherapy

## 2020-03-13 DIAGNOSIS — T45.1X5A ANTINEOPLASTIC CHEMOTHERAPY INDUCED ANEMIA: ICD-10-CM

## 2020-03-13 DIAGNOSIS — Z17.0 CARCINOMA OF AXILLARY TAIL OF RIGHT BREAST IN FEMALE, ESTROGEN RECEPTOR POSITIVE: Primary | ICD-10-CM

## 2020-03-13 DIAGNOSIS — D64.81 ANTINEOPLASTIC CHEMOTHERAPY INDUCED ANEMIA: ICD-10-CM

## 2020-03-13 DIAGNOSIS — C50.611 CARCINOMA OF AXILLARY TAIL OF RIGHT BREAST IN FEMALE, ESTROGEN RECEPTOR POSITIVE: Primary | ICD-10-CM

## 2020-03-16 ENCOUNTER — PATIENT MESSAGE (OUTPATIENT)
Dept: OBSTETRICS AND GYNECOLOGY | Facility: CLINIC | Age: 50
End: 2020-03-16

## 2020-03-16 ENCOUNTER — PATIENT MESSAGE (OUTPATIENT)
Dept: HEMATOLOGY/ONCOLOGY | Facility: CLINIC | Age: 50
End: 2020-03-16

## 2020-03-16 DIAGNOSIS — K12.30 MUCOSITIS: ICD-10-CM

## 2020-03-17 DIAGNOSIS — C50.611 CARCINOMA OF AXILLARY TAIL OF RIGHT BREAST IN FEMALE, ESTROGEN RECEPTOR POSITIVE: ICD-10-CM

## 2020-03-17 DIAGNOSIS — Z17.0 CARCINOMA OF AXILLARY TAIL OF RIGHT BREAST IN FEMALE, ESTROGEN RECEPTOR POSITIVE: ICD-10-CM

## 2020-03-17 RX ORDER — ZOLPIDEM TARTRATE 10 MG/1
10 TABLET ORAL NIGHTLY PRN
Qty: 30 TABLET | Refills: 2 | Status: SHIPPED | OUTPATIENT
Start: 2020-03-17 | End: 2020-06-25 | Stop reason: SDUPTHER

## 2020-03-18 ENCOUNTER — TELEPHONE (OUTPATIENT)
Dept: INTERNAL MEDICINE | Facility: CLINIC | Age: 50
End: 2020-03-18

## 2020-03-18 ENCOUNTER — PATIENT MESSAGE (OUTPATIENT)
Dept: INTERNAL MEDICINE | Facility: CLINIC | Age: 50
End: 2020-03-18

## 2020-03-18 ENCOUNTER — PATIENT MESSAGE (OUTPATIENT)
Dept: HEMATOLOGY/ONCOLOGY | Facility: CLINIC | Age: 50
End: 2020-03-18

## 2020-03-18 NOTE — TELEPHONE ENCOUNTER
Telephone calls  Two more chemos to go.    Look for a decongestant in the house, Duane's , warm compress to ear, lost of fluids, steamy shower

## 2020-03-18 NOTE — TELEPHONE ENCOUNTER
----- Message from RONALD Rodriguez sent at 3/18/2020 10:59 AM CDT -----  Regarding: [facility] visit   Hi, I just spoke with this patient via telemed     She has a sore throat and ear pressure, no fever.  However, he sister who has been bringing her to appts is pending testing for COVID19.     Patient technically does not meet criteria per CDC but would appreciate any other recommendatinos from her team.     Thanks again     RONALD [facility] provider, helping with [facility]

## 2020-03-19 ENCOUNTER — PATIENT MESSAGE (OUTPATIENT)
Dept: ONCOLOGY | Facility: HOSPITAL | Age: 50
End: 2020-03-19

## 2020-03-19 ENCOUNTER — DOCUMENTATION ONLY (OUTPATIENT)
Dept: HEMATOLOGY/ONCOLOGY | Facility: CLINIC | Age: 50
End: 2020-03-19

## 2020-03-19 NOTE — PROGRESS NOTES
PARVIN skyped Summer Jimenez, accts payable.  Explained that Ms. Vásquez has not received her patient assistance check and asked if there is anyway to track this, verify address, etc.  PARVIN will await her answer and follow-up accordingly.

## 2020-03-20 ENCOUNTER — DOCUMENTATION ONLY (OUTPATIENT)
Dept: HEMATOLOGY/ONCOLOGY | Facility: CLINIC | Age: 50
End: 2020-03-20

## 2020-03-20 NOTE — PROGRESS NOTES
PARVIN called patient in response to her Epic msg.  Informed patient that Celeste Cherry responded to PARVIN email stating that check # [id number] had been issued on 3/13/20 and mailed to [place], [id number].  Patient acknowledged that this is the correct address and said she has been staying with her mother at another address.  She said she would contact her son and ask him to check through the mail that goes to her permanent residence.

## 2020-03-24 ENCOUNTER — PATIENT MESSAGE (OUTPATIENT)
Dept: HEMATOLOGY/ONCOLOGY | Facility: CLINIC | Age: 50
End: 2020-03-24

## 2020-03-25 ENCOUNTER — PATIENT MESSAGE (OUTPATIENT)
Dept: HEMATOLOGY/ONCOLOGY | Facility: CLINIC | Age: 50
End: 2020-03-25

## 2020-03-25 NOTE — TELEPHONE ENCOUNTER
Reached out to patient per her request.  Per patient her symptoms have improved, throat discomfort responded to her duke's solution. She denies ear pain.   She reports that her sister has not gotten her covid19 results back yet (been over a week or longer).  Advised that her sister maybe contact the facility.  Advised patient that she would still be getting chemo as long as labs and presentation stable.  Did explain policies implemented within our clinic (strict no visitor policy.)  Labs and appointment moved up one day. New times confirmed. Patient agreeable to virtual visit.  She verbalized appreciation.   No further questions/concerns at this time.

## 2020-03-31 ENCOUNTER — PATIENT MESSAGE (OUTPATIENT)
Dept: HEMATOLOGY/ONCOLOGY | Facility: CLINIC | Age: 50
End: 2020-03-31

## 2020-03-31 DIAGNOSIS — C50.611 CARCINOMA OF AXILLARY TAIL OF RIGHT BREAST IN FEMALE, ESTROGEN RECEPTOR POSITIVE: ICD-10-CM

## 2020-03-31 DIAGNOSIS — T45.1X5A ANTINEOPLASTIC CHEMOTHERAPY INDUCED ANEMIA: ICD-10-CM

## 2020-03-31 DIAGNOSIS — Z17.0 CARCINOMA OF AXILLARY TAIL OF RIGHT BREAST IN FEMALE, ESTROGEN RECEPTOR POSITIVE: ICD-10-CM

## 2020-03-31 DIAGNOSIS — R52 PAIN: ICD-10-CM

## 2020-03-31 DIAGNOSIS — D64.81 ANTINEOPLASTIC CHEMOTHERAPY INDUCED ANEMIA: ICD-10-CM

## 2020-03-31 DIAGNOSIS — G89.3 NEOPLASM RELATED PAIN: ICD-10-CM

## 2020-04-01 ENCOUNTER — OFFICE VISIT (OUTPATIENT)
Dept: HEMATOLOGY/ONCOLOGY | Facility: CLINIC | Age: 50
End: 2020-04-01
Payer: COMMERCIAL

## 2020-04-01 ENCOUNTER — LAB VISIT (OUTPATIENT)
Dept: LAB | Facility: HOSPITAL | Age: 50
End: 2020-04-01
Attending: INTERNAL MEDICINE
Payer: COMMERCIAL

## 2020-04-01 ENCOUNTER — PATIENT MESSAGE (OUTPATIENT)
Dept: HEMATOLOGY/ONCOLOGY | Facility: CLINIC | Age: 50
End: 2020-04-01

## 2020-04-01 ENCOUNTER — TELEPHONE (OUTPATIENT)
Dept: HEMATOLOGY/ONCOLOGY | Facility: CLINIC | Age: 50
End: 2020-04-01

## 2020-04-01 DIAGNOSIS — Z17.0 CARCINOMA OF AXILLARY TAIL OF RIGHT BREAST IN FEMALE, ESTROGEN RECEPTOR POSITIVE: ICD-10-CM

## 2020-04-01 DIAGNOSIS — T45.1X5A ANTINEOPLASTIC CHEMOTHERAPY INDUCED ANEMIA: ICD-10-CM

## 2020-04-01 DIAGNOSIS — Z17.0 CARCINOMA OF AXILLARY TAIL OF RIGHT BREAST IN FEMALE, ESTROGEN RECEPTOR POSITIVE: Primary | ICD-10-CM

## 2020-04-01 DIAGNOSIS — D64.81 ANTINEOPLASTIC CHEMOTHERAPY INDUCED ANEMIA: ICD-10-CM

## 2020-04-01 DIAGNOSIS — C50.611 CARCINOMA OF AXILLARY TAIL OF RIGHT BREAST IN FEMALE, ESTROGEN RECEPTOR POSITIVE: Primary | ICD-10-CM

## 2020-04-01 DIAGNOSIS — C50.611 CARCINOMA OF AXILLARY TAIL OF RIGHT BREAST IN FEMALE, ESTROGEN RECEPTOR POSITIVE: ICD-10-CM

## 2020-04-01 LAB
ALBUMIN SERPL BCP-MCNC: 3.6 G/DL (ref 3.5–5.2)
ALP SERPL-CCNC: 66 U/L (ref 55–135)
ALT SERPL W/O P-5'-P-CCNC: 24 U/L (ref 10–44)
ANION GAP SERPL CALC-SCNC: 10 MMOL/L (ref 8–16)
AST SERPL-CCNC: 19 U/L (ref 10–40)
BILIRUB SERPL-MCNC: 0.4 MG/DL (ref 0.1–1)
BUN SERPL-MCNC: 8 MG/DL (ref 6–20)
CALCIUM SERPL-MCNC: 9.8 MG/DL (ref 8.7–10.5)
CHLORIDE SERPL-SCNC: 104 MMOL/L (ref 95–110)
CO2 SERPL-SCNC: 26 MMOL/L (ref 23–29)
CREAT SERPL-MCNC: 0.8 MG/DL (ref 0.5–1.4)
ERYTHROCYTE [DISTWIDTH] IN BLOOD BY AUTOMATED COUNT: 16.6 % (ref 11.5–14.5)
EST. GFR  (AFRICAN AMERICAN): >60 ML/MIN/1.73 M^2
EST. GFR  (NON AFRICAN AMERICAN): >60 ML/MIN/1.73 M^2
GLUCOSE SERPL-MCNC: 133 MG/DL (ref 70–110)
HCT VFR BLD AUTO: 33.9 % (ref 37–48.5)
HGB BLD-MCNC: 10.2 G/DL (ref 12–16)
IMM GRANULOCYTES # BLD AUTO: 0.18 K/UL (ref 0–0.04)
MCH RBC QN AUTO: 26 PG (ref 27–31)
MCHC RBC AUTO-ENTMCNC: 30.1 G/DL (ref 32–36)
MCV RBC AUTO: 87 FL (ref 82–98)
NEUTROPHILS # BLD AUTO: 7.7 K/UL (ref 1.8–7.7)
PLATELET # BLD AUTO: 209 K/UL (ref 150–350)
PMV BLD AUTO: 11.4 FL (ref 9.2–12.9)
POTASSIUM SERPL-SCNC: 4.1 MMOL/L (ref 3.5–5.1)
PROT SERPL-MCNC: 7.3 G/DL (ref 6–8.4)
RBC # BLD AUTO: 3.92 M/UL (ref 4–5.4)
SODIUM SERPL-SCNC: 140 MMOL/L (ref 136–145)
WBC # BLD AUTO: 10.1 K/UL (ref 3.9–12.7)

## 2020-04-01 PROCEDURE — 99214 OFFICE O/P EST MOD 30 MIN: CPT | Mod: 95,,, | Performed by: INTERNAL MEDICINE

## 2020-04-01 PROCEDURE — 85027 COMPLETE CBC AUTOMATED: CPT

## 2020-04-01 PROCEDURE — 36415 COLL VENOUS BLD VENIPUNCTURE: CPT

## 2020-04-01 PROCEDURE — 99214 PR OFFICE/OUTPT VISIT, EST, LEVL IV, 30-39 MIN: ICD-10-PCS | Mod: 95,,, | Performed by: INTERNAL MEDICINE

## 2020-04-01 PROCEDURE — 80053 COMPREHEN METABOLIC PANEL: CPT

## 2020-04-01 RX ORDER — HEPARIN 100 UNIT/ML
500 SYRINGE INTRAVENOUS
Status: CANCELLED | OUTPATIENT
Start: 2020-04-02

## 2020-04-01 RX ORDER — OXYCODONE HYDROCHLORIDE 5 MG/1
5 TABLET ORAL EVERY 4 HOURS PRN
Qty: 60 TABLET | Refills: 0 | Status: SHIPPED | OUTPATIENT
Start: 2020-04-01 | End: 2020-04-02

## 2020-04-01 RX ORDER — EPINEPHRINE 0.3 MG/.3ML
0.3 INJECTION SUBCUTANEOUS ONCE AS NEEDED
Status: CANCELLED | OUTPATIENT
Start: 2020-04-02

## 2020-04-01 RX ORDER — DIPHENHYDRAMINE HYDROCHLORIDE 50 MG/ML
50 INJECTION INTRAMUSCULAR; INTRAVENOUS ONCE AS NEEDED
Status: CANCELLED | OUTPATIENT
Start: 2020-04-02

## 2020-04-01 RX ORDER — SODIUM CHLORIDE 0.9 % (FLUSH) 0.9 %
10 SYRINGE (ML) INJECTION
Status: CANCELLED | OUTPATIENT
Start: 2020-04-02

## 2020-04-01 RX ORDER — FAMOTIDINE 10 MG/ML
20 INJECTION INTRAVENOUS
Status: CANCELLED | OUTPATIENT
Start: 2020-04-02

## 2020-04-01 NOTE — PROGRESS NOTES
Subjective:       Patient ID: Phylicia Vásquez is a 50 y.o. female.    Chief Complaint: No chief complaint on file.    HPI     The patient location is:home  The chief complaint leading to consultation is:  Neoadjuvant chemotherapy for breast cancer.    Visit type: Virtual visit with synchronous audio and video  Total time spent with patient:13 minutes plus 15 minutes to review the chart in preparation for the video conference  Each patient to whom he or she provides medical services by telemedicine is:  (1) informed of the relationship between the physician and patient and the respective role of any other health care provider with respect to management of the patient; and (2) notified that he or she may decline to receive medical services by telemedicine and may withdraw from such care at any time.      Mrs Lua returns today for her 3rd cycle of neoadjuvant Taxol.  Due to the Corona virus epidemic we switched to a virtual visit prior to the administration of chemotherapy..  She has so far received 4 cycles of neoadjuvant Adriamycin and cyclophosphamide and 2 cycles of Taxol.  She is due for cycle 3 of taxol tomorrow.  Today's CBC shows a WBC count of 10,100 per cubic mm, hemoglobin 12.2 grams/deciliter, hematocrit 30.1 % and platelets 209,000 per cubic mm.  Her bilirubin is 0.4mg/dl.      Briefly, she is a 49 year old AA female who was recently found to have a carcinoma that is ER positive, [place] positive and HER 2 equivocal, but negative by  FISH.  An axillary node biopsy was also positive.  She is being treated with neoadjuvant chemotherapy and she is due for cycle 3 of Taxol.      Review of Systems      Overall she feels OK.   She states that she experienced significant leg pain with the Taxol that lasted several days.  She is also experiencing constipation.  In addition she mentions mild numbness of her toes.  She is anxious but she denies any depression, easy bruising, fevers, chills, night  sweats, weight  loss, vomiting, diarrhea, constipation, diplopia, blurred vision, headache, chest pain, palpitations, shortness of breath, left breast pain, abdominal pain, extremity pain, or difficulty ambulating.  The remainder of the ten-point ROS, including general, skin, lymph, H/N, cardiorespiratory, GI, , Neuro, Endocrine, and psychiatric is negative.       Objective:      Physical Exam      Deferred.  This was a virtual visit.    Assessment:       1. Carcinoma of axillary tail of right breast in female, estrogen receptor positive, clinically T2N1M0     2.    Axillary node metastases.  3.      Chemotherapy-induced anemia    Plan:        I had a long discussion with her.  She will proceed with the 3rd cycle of Taxol, observe neutropenic precautions, and we will arrange for another virtual visit in 3 weeks prior to her 4th cycle of neoadjuvant Taxol.  Her questions were answered to his satisfaction.

## 2020-04-01 NOTE — TELEPHONE ENCOUNTER
Called patient scheduled 3 week follow up with labs and virtual visit and infusion.        ----- Message from Negrito Verde MD sent at 4/1/2020  1:16 PM CDT -----  Labs in virtual visit in 3 weeks.  Chemotherapy the next day.

## 2020-04-02 ENCOUNTER — PATIENT MESSAGE (OUTPATIENT)
Dept: HEMATOLOGY/ONCOLOGY | Facility: CLINIC | Age: 50
End: 2020-04-02

## 2020-04-02 ENCOUNTER — INFUSION (OUTPATIENT)
Dept: INFUSION THERAPY | Facility: HOSPITAL | Age: 50
End: 2020-04-02
Attending: INTERNAL MEDICINE
Payer: COMMERCIAL

## 2020-04-02 VITALS
DIASTOLIC BLOOD PRESSURE: 84 MMHG | HEART RATE: 101 BPM | SYSTOLIC BLOOD PRESSURE: 147 MMHG | TEMPERATURE: 99 F | HEIGHT: 64 IN | RESPIRATION RATE: 18 BRPM | BODY MASS INDEX: 38.5 KG/M2 | WEIGHT: 225.5 LBS

## 2020-04-02 DIAGNOSIS — Z17.0 CARCINOMA OF AXILLARY TAIL OF RIGHT BREAST IN FEMALE, ESTROGEN RECEPTOR POSITIVE: ICD-10-CM

## 2020-04-02 DIAGNOSIS — C50.611 CARCINOMA OF AXILLARY TAIL OF RIGHT BREAST IN FEMALE, ESTROGEN RECEPTOR POSITIVE: ICD-10-CM

## 2020-04-02 DIAGNOSIS — G89.3 NEOPLASM RELATED PAIN: ICD-10-CM

## 2020-04-02 DIAGNOSIS — Z17.0 CARCINOMA OF AXILLARY TAIL OF RIGHT BREAST IN FEMALE, ESTROGEN RECEPTOR POSITIVE: Primary | ICD-10-CM

## 2020-04-02 DIAGNOSIS — C50.611 CARCINOMA OF AXILLARY TAIL OF RIGHT BREAST IN FEMALE, ESTROGEN RECEPTOR POSITIVE: Primary | ICD-10-CM

## 2020-04-02 DIAGNOSIS — K12.30 MUCOSITIS: ICD-10-CM

## 2020-04-02 PROCEDURE — 96375 TX/PRO/DX INJ NEW DRUG ADDON: CPT

## 2020-04-02 PROCEDURE — A4216 STERILE WATER/SALINE, 10 ML: HCPCS | Performed by: INTERNAL MEDICINE

## 2020-04-02 PROCEDURE — 96415 CHEMO IV INFUSION ADDL HR: CPT

## 2020-04-02 PROCEDURE — 96413 CHEMO IV INFUSION 1 HR: CPT

## 2020-04-02 PROCEDURE — 63600175 PHARM REV CODE 636 W HCPCS: Performed by: INTERNAL MEDICINE

## 2020-04-02 PROCEDURE — 96367 TX/PROPH/DG ADDL SEQ IV INF: CPT

## 2020-04-02 PROCEDURE — 25000003 PHARM REV CODE 250: Performed by: INTERNAL MEDICINE

## 2020-04-02 PROCEDURE — S0028 INJECTION, FAMOTIDINE, 20 MG: HCPCS | Performed by: INTERNAL MEDICINE

## 2020-04-02 RX ORDER — OXYCODONE HYDROCHLORIDE 10 MG/1
10 TABLET ORAL EVERY 4 HOURS PRN
Qty: 120 TABLET | Refills: 0 | Status: SHIPPED | OUTPATIENT
Start: 2020-04-02 | End: 2020-06-04 | Stop reason: ALTCHOICE

## 2020-04-02 RX ORDER — FAMOTIDINE 10 MG/ML
20 INJECTION INTRAVENOUS
Status: COMPLETED | OUTPATIENT
Start: 2020-04-02 | End: 2020-04-02

## 2020-04-02 RX ORDER — EPINEPHRINE 0.3 MG/.3ML
0.3 INJECTION SUBCUTANEOUS ONCE AS NEEDED
Status: DISCONTINUED | OUTPATIENT
Start: 2020-04-02 | End: 2020-04-02 | Stop reason: HOSPADM

## 2020-04-02 RX ORDER — HEPARIN 100 UNIT/ML
500 SYRINGE INTRAVENOUS
Status: DISCONTINUED | OUTPATIENT
Start: 2020-04-02 | End: 2020-04-02 | Stop reason: HOSPADM

## 2020-04-02 RX ORDER — DIPHENHYDRAMINE HYDROCHLORIDE 50 MG/ML
50 INJECTION INTRAMUSCULAR; INTRAVENOUS ONCE AS NEEDED
Status: DISCONTINUED | OUTPATIENT
Start: 2020-04-02 | End: 2020-04-02 | Stop reason: HOSPADM

## 2020-04-02 RX ORDER — SODIUM CHLORIDE 0.9 % (FLUSH) 0.9 %
10 SYRINGE (ML) INJECTION
Status: DISCONTINUED | OUTPATIENT
Start: 2020-04-02 | End: 2020-04-02 | Stop reason: HOSPADM

## 2020-04-02 RX ADMIN — SODIUM CHLORIDE: 0.9 INJECTION, SOLUTION INTRAVENOUS at 09:04

## 2020-04-02 RX ADMIN — HEPARIN 500 UNITS: 100 SYRINGE at 12:04

## 2020-04-02 RX ADMIN — PACLITAXEL 378 MG: 6 INJECTION, SOLUTION INTRAVENOUS at 09:04

## 2020-04-02 RX ADMIN — DIPHENHYDRAMINE HYDROCHLORIDE 50 MG: 50 INJECTION INTRAMUSCULAR; INTRAVENOUS at 09:04

## 2020-04-02 RX ADMIN — FAMOTIDINE 20 MG: 10 INJECTION INTRAVENOUS at 09:04

## 2020-04-02 RX ADMIN — Medication 10 ML: at 12:04

## 2020-04-02 NOTE — PLAN OF CARE
0910-Labs , hx, and medications reviewed, patient had visit with MD yesterday. Assessment completed. Discussed plan of care with patient. Patient in agreement. Chair reclined and warm blanket and snack offered.

## 2020-04-02 NOTE — PLAN OF CARE
1253-Patient tolerated treatment well. Discharged without complaints or S/S of adverse event.  Instructed to call provider for any questions or concerns.

## 2020-04-04 DIAGNOSIS — Z17.0 CARCINOMA OF AXILLARY TAIL OF RIGHT BREAST IN FEMALE, ESTROGEN RECEPTOR POSITIVE: ICD-10-CM

## 2020-04-04 DIAGNOSIS — C50.611 CARCINOMA OF AXILLARY TAIL OF RIGHT BREAST IN FEMALE, ESTROGEN RECEPTOR POSITIVE: ICD-10-CM

## 2020-04-06 ENCOUNTER — PATIENT MESSAGE (OUTPATIENT)
Dept: OBSTETRICS AND GYNECOLOGY | Facility: CLINIC | Age: 50
End: 2020-04-06

## 2020-04-06 RX ORDER — PROMETHAZINE HYDROCHLORIDE 25 MG/1
25 TABLET ORAL EVERY 6 HOURS PRN
Qty: 40 TABLET | Refills: 0 | Status: SHIPPED | OUTPATIENT
Start: 2020-04-06 | End: 2021-04-16

## 2020-04-15 ENCOUNTER — PATIENT MESSAGE (OUTPATIENT)
Dept: INTERNAL MEDICINE | Facility: CLINIC | Age: 50
End: 2020-04-15

## 2020-04-15 DIAGNOSIS — G62.0 DRUG-INDUCED POLYNEUROPATHY: ICD-10-CM

## 2020-04-15 RX ORDER — GABAPENTIN 300 MG/1
300 CAPSULE ORAL 2 TIMES DAILY
Qty: 60 CAPSULE | Refills: 11 | Status: SHIPPED | OUTPATIENT
Start: 2020-04-15 | End: 2020-08-27 | Stop reason: ALTCHOICE

## 2020-04-15 RX ORDER — CYCLOBENZAPRINE HCL 5 MG
TABLET ORAL
Qty: 30 TABLET | Refills: 2 | Status: SHIPPED | OUTPATIENT
Start: 2020-04-15 | End: 2020-08-25 | Stop reason: SDUPTHER

## 2020-04-15 NOTE — TELEPHONE ENCOUNTER
Spoke with MsDerrek Fahad she is having pain in the left lower side of her back. The pain started on yesterday. She would like to know what she can do for the pain. Please Advise.

## 2020-04-16 ENCOUNTER — PATIENT MESSAGE (OUTPATIENT)
Dept: INTERNAL MEDICINE | Facility: CLINIC | Age: 50
End: 2020-04-16

## 2020-04-17 ENCOUNTER — PATIENT MESSAGE (OUTPATIENT)
Dept: HEMATOLOGY/ONCOLOGY | Facility: CLINIC | Age: 50
End: 2020-04-17

## 2020-04-21 ENCOUNTER — TELEPHONE (OUTPATIENT)
Dept: HEMATOLOGY/ONCOLOGY | Facility: CLINIC | Age: 50
End: 2020-04-21

## 2020-04-21 DIAGNOSIS — Z13.9 ENCOUNTER FOR SCREENING: Primary | ICD-10-CM

## 2020-04-21 DIAGNOSIS — Z51.11 ENCOUNTER FOR ANTINEOPLASTIC CHEMOTHERAPY: ICD-10-CM

## 2020-04-21 NOTE — PROGRESS NOTES
Subjective:       Patient ID: Phylicia Vásquez is a 50 y.o. female.    Chief Complaint: No chief complaint on file.    HPI   The patient location is:home  The chief complaint leading to consultation is:  Neoadjuvant chemotherapy for breast cancer.    Visit type: Virtual visit with synchronous audio and video  Total time spent with patient:11 minutes plus 15 minutes to review the chart in preparation for the video conference  Each patient to whom he or she provides medical services by telemedicine is:  (1) informed of the relationship between the physician and patient and the respective role of any other health care provider with respect to management of the patient; and (2) notified that he or she may decline to receive medical services by telemedicine and may withdraw from such care at any time.      Mrs Lua returns today for her 4rd cycle of neoadjuvant Taxol.  Due to the Corona virus epidemic we switched to a virtual visit.  She has so far received 4 cycles of neoadjuvant Adriamycin and cyclophosphamide and 3 cycles of Taxol.  She is due for cycle 4 of taxol tomorrow.      Today's CBC shows a WBC count of 9,200 per cubic mm, hemoglobin 10.6 grams/deciliter, hematocrit 36.7 % and platelets 211,000 per cubic mm.  Her bilirubin is 0.3 mg/dl.      Briefly, she is a 49 year old AA female who was recently found to have a carcinoma that is ER positive, [place] positive and HER 2 equivocal, but negative by  FISH.  An axillary node biopsy was also positive.  She is being treated with neoadjuvant chemotherapy and she is due for cycle 4 of Taxol.      Review of Systems    Overall she feels OK.   She states that she experienced significant leg pain with the Taxol that lasted several days.  She is also experiencing mild tingling of her fingertips and toes.  She is anxious but she denies any depression, easy bruising, fevers, chills, night  sweats, weight loss, vomiting, diarrhea, constipation, diplopia, blurred vision,  headache, chest pain, palpitations, shortness of breath, left breast pain, abdominal pain, extremity pain, or difficulty ambulating.  The remainder of the ten-point ROS, including general, skin, lymph, H/N, cardiorespiratory, GI, , Neuro, Endocrine, and psychiatric is negative.       Objective:      Physical Exam    Deferred.  This was a virtual visit.    Assessment:       1. Carcinoma of axillary tail of right breast in female, estrogen receptor positive, clinically T2N1M0     2.    Axillary node metastases.  3.      Chemotherapy-induced anemia  4.      Taxol induced neuropathy  Plan:        I had a long discussion with her.  She will proceed with the fourth cycle of Taxol, observe neutropenic precautions, and will be referred to Dr. Guaman for her preop workup.  Assuming that she will have surgery in late May, I will see her in mid June for follow-up.  Her multiple questions were answered to her satisfaction.  Duration of visit was 25 min including the time to review her chart in preparation for the teleconference.

## 2020-04-22 ENCOUNTER — OFFICE VISIT (OUTPATIENT)
Dept: HEMATOLOGY/ONCOLOGY | Facility: CLINIC | Age: 50
End: 2020-04-22
Payer: COMMERCIAL

## 2020-04-22 ENCOUNTER — LAB VISIT (OUTPATIENT)
Dept: LAB | Facility: HOSPITAL | Age: 50
End: 2020-04-22
Attending: INTERNAL MEDICINE
Payer: COMMERCIAL

## 2020-04-22 ENCOUNTER — CLINICAL SUPPORT (OUTPATIENT)
Dept: HEMATOLOGY/ONCOLOGY | Facility: CLINIC | Age: 50
End: 2020-04-22
Payer: COMMERCIAL

## 2020-04-22 ENCOUNTER — TELEPHONE (OUTPATIENT)
Dept: HEMATOLOGY/ONCOLOGY | Facility: CLINIC | Age: 50
End: 2020-04-22

## 2020-04-22 ENCOUNTER — OFFICE VISIT (OUTPATIENT)
Dept: INTERNAL MEDICINE | Facility: CLINIC | Age: 50
End: 2020-04-22
Payer: COMMERCIAL

## 2020-04-22 ENCOUNTER — PATIENT MESSAGE (OUTPATIENT)
Dept: HEMATOLOGY/ONCOLOGY | Facility: CLINIC | Age: 50
End: 2020-04-22

## 2020-04-22 DIAGNOSIS — Z17.0 CARCINOMA OF AXILLARY TAIL OF RIGHT BREAST IN FEMALE, ESTROGEN RECEPTOR POSITIVE: ICD-10-CM

## 2020-04-22 DIAGNOSIS — G62.9 NEUROPATHY: ICD-10-CM

## 2020-04-22 DIAGNOSIS — Z17.0 CARCINOMA OF AXILLARY TAIL OF RIGHT BREAST IN FEMALE, ESTROGEN RECEPTOR POSITIVE: Primary | ICD-10-CM

## 2020-04-22 DIAGNOSIS — T45.1X5A ANTINEOPLASTIC CHEMOTHERAPY INDUCED ANEMIA: ICD-10-CM

## 2020-04-22 DIAGNOSIS — C50.611 CARCINOMA OF AXILLARY TAIL OF RIGHT BREAST IN FEMALE, ESTROGEN RECEPTOR POSITIVE: Primary | ICD-10-CM

## 2020-04-22 DIAGNOSIS — D64.81 ANTINEOPLASTIC CHEMOTHERAPY INDUCED ANEMIA: ICD-10-CM

## 2020-04-22 DIAGNOSIS — B96.89 BACTERIAL VAGINOSIS: ICD-10-CM

## 2020-04-22 DIAGNOSIS — N76.0 BACTERIAL VAGINOSIS: ICD-10-CM

## 2020-04-22 DIAGNOSIS — R73.03 PREDIABETES: ICD-10-CM

## 2020-04-22 DIAGNOSIS — I10 ESSENTIAL HYPERTENSION: ICD-10-CM

## 2020-04-22 DIAGNOSIS — Z51.11 ENCOUNTER FOR ANTINEOPLASTIC CHEMOTHERAPY: ICD-10-CM

## 2020-04-22 DIAGNOSIS — C50.611 CARCINOMA OF AXILLARY TAIL OF RIGHT BREAST IN FEMALE, ESTROGEN RECEPTOR POSITIVE: ICD-10-CM

## 2020-04-22 DIAGNOSIS — Z13.9 ENCOUNTER FOR SCREENING: ICD-10-CM

## 2020-04-22 PROBLEM — C50.911: Status: RESOLVED | Noted: 2019-11-21 | Resolved: 2020-04-22

## 2020-04-22 LAB
ALBUMIN SERPL BCP-MCNC: 3.7 G/DL (ref 3.5–5.2)
ALP SERPL-CCNC: 72 U/L (ref 55–135)
ALT SERPL W/O P-5'-P-CCNC: 27 U/L (ref 10–44)
ANION GAP SERPL CALC-SCNC: 10 MMOL/L (ref 8–16)
AST SERPL-CCNC: 17 U/L (ref 10–40)
BILIRUB SERPL-MCNC: 0.3 MG/DL (ref 0.1–1)
BUN SERPL-MCNC: 9 MG/DL (ref 6–20)
CALCIUM SERPL-MCNC: 9.7 MG/DL (ref 8.7–10.5)
CHLORIDE SERPL-SCNC: 102 MMOL/L (ref 95–110)
CO2 SERPL-SCNC: 28 MMOL/L (ref 23–29)
CREAT SERPL-MCNC: 0.8 MG/DL (ref 0.5–1.4)
ERYTHROCYTE [DISTWIDTH] IN BLOOD BY AUTOMATED COUNT: 15.9 % (ref 11.5–14.5)
EST. GFR  (AFRICAN AMERICAN): >60 ML/MIN/1.73 M^2
EST. GFR  (NON AFRICAN AMERICAN): >60 ML/MIN/1.73 M^2
GLUCOSE SERPL-MCNC: 157 MG/DL (ref 70–110)
HCT VFR BLD AUTO: 35.7 % (ref 37–48.5)
HGB BLD-MCNC: 10.6 G/DL (ref 12–16)
IMM GRANULOCYTES # BLD AUTO: 0.16 K/UL (ref 0–0.04)
MCH RBC QN AUTO: 25.9 PG (ref 27–31)
MCHC RBC AUTO-ENTMCNC: 29.7 G/DL (ref 32–36)
MCV RBC AUTO: 87 FL (ref 82–98)
NEUTROPHILS # BLD AUTO: 6.6 K/UL (ref 1.8–7.7)
PLATELET # BLD AUTO: 211 K/UL (ref 150–350)
PMV BLD AUTO: 10.9 FL (ref 9.2–12.9)
POTASSIUM SERPL-SCNC: 4.3 MMOL/L (ref 3.5–5.1)
PROT SERPL-MCNC: 7.3 G/DL (ref 6–8.4)
RBC # BLD AUTO: 4.1 M/UL (ref 4–5.4)
SARS-COV-2 RNA RESP QL NAA+PROBE: NOT DETECTED
SODIUM SERPL-SCNC: 140 MMOL/L (ref 136–145)
WBC # BLD AUTO: 9.26 K/UL (ref 3.9–12.7)

## 2020-04-22 PROCEDURE — 99214 OFFICE O/P EST MOD 30 MIN: CPT | Mod: 95,,, | Performed by: INTERNAL MEDICINE

## 2020-04-22 PROCEDURE — 99214 PR OFFICE/OUTPT VISIT, EST, LEVL IV, 30-39 MIN: ICD-10-PCS | Mod: 95,,, | Performed by: INTERNAL MEDICINE

## 2020-04-22 PROCEDURE — 99213 OFFICE O/P EST LOW 20 MIN: CPT | Mod: 95,,, | Performed by: INTERNAL MEDICINE

## 2020-04-22 PROCEDURE — 36415 COLL VENOUS BLD VENIPUNCTURE: CPT

## 2020-04-22 PROCEDURE — 80053 COMPREHEN METABOLIC PANEL: CPT

## 2020-04-22 PROCEDURE — 99213 PR OFFICE/OUTPT VISIT, EST, LEVL III, 20-29 MIN: ICD-10-PCS | Mod: 95,,, | Performed by: INTERNAL MEDICINE

## 2020-04-22 PROCEDURE — 85027 COMPLETE CBC AUTOMATED: CPT

## 2020-04-22 PROCEDURE — U0002 COVID-19 LAB TEST NON-CDC: HCPCS

## 2020-04-22 RX ORDER — DIPHENHYDRAMINE HYDROCHLORIDE 50 MG/ML
50 INJECTION INTRAMUSCULAR; INTRAVENOUS ONCE AS NEEDED
Status: CANCELLED | OUTPATIENT
Start: 2020-04-23

## 2020-04-22 RX ORDER — EPINEPHRINE 0.3 MG/.3ML
0.3 INJECTION SUBCUTANEOUS ONCE AS NEEDED
Status: CANCELLED | OUTPATIENT
Start: 2020-04-23

## 2020-04-22 RX ORDER — METRONIDAZOLE 500 MG/1
500 TABLET ORAL 2 TIMES DAILY
Qty: 14 TABLET | Refills: 0 | Status: SHIPPED | OUTPATIENT
Start: 2020-04-22 | End: 2020-04-29

## 2020-04-22 RX ORDER — HEPARIN 100 UNIT/ML
500 SYRINGE INTRAVENOUS
Status: CANCELLED | OUTPATIENT
Start: 2020-04-23

## 2020-04-22 RX ORDER — SODIUM CHLORIDE 0.9 % (FLUSH) 0.9 %
10 SYRINGE (ML) INJECTION
Status: CANCELLED | OUTPATIENT
Start: 2020-04-23

## 2020-04-22 RX ORDER — FAMOTIDINE 10 MG/ML
20 INJECTION INTRAVENOUS
Status: CANCELLED | OUTPATIENT
Start: 2020-04-23

## 2020-04-22 NOTE — TELEPHONE ENCOUNTER
Communicated with [facility] via [facility]     ----- Message from Stefano Escobedo DNP sent at 4/22/2020 11:45 AM CDT -----  To:  Staff of Dr. Simpson:    Please phone this patient to let her know that her COVID-19 test came back negative and that, based on that result, she can proceed with treatment as indicated by her treatment provider.  If my assistance is needed, don't hesitate to reach out.      Thanks,  Stefano Escobedo DNP, APRN, FNP-BC, AOCNP  [facility], 3rd Floor  [facility] [address], [place]  [address]  [phone]

## 2020-04-22 NOTE — PROGRESS NOTES
To:  Staff of Dr. Simpson:    Please phone this patient to let her know that her COVID-19 test came back negative and that, based on that result, she can proceed with treatment as indicated by her treatment provider.  If my assistance is needed, don't hesitate to reach out.      Thanks,  Stefano Escobedo, DNP, APRN, FNP-BC, AOCNP  [facility], 3rd Floor  [facility] [address], [place]  [address]  [phone]

## 2020-04-22 NOTE — PROGRESS NOTES
Him next Subjective:       Patient ID: Phylicia Vásquez is a 50 y.o. female.    Chief Complaint: No chief complaint on file.   She is undergoing chemotherapy for breast cancer has 1 more round of chemotherapy to go and then late May or early June she will be having her surgery.  So far she is having trouble with peripheral neuropathy numbness in her hands and feet with pins and needle sensation which her doctor has told her is related to Taxol.  She is no longer having anemia and no shortness of breath.  She is trying to walk around the block every day.  She is fraught with obesity all of her adult life and she is losing a little bit a weight but feels healthy and strong.    She complains of a recurrence of vaginal itching which has been going on for couple of days.  She had been tested and was positive for bacterial vaginosis    She is trying to limit carbohydrates because of her prediabetes and will be getting A1c recheck  HPI  Review of Systems   Constitutional: Negative.  Negative for activity change, appetite change, chills, fatigue, fever and unexpected weight change.   HENT: Negative for hearing loss and tinnitus.    Eyes: Negative for visual disturbance.   Respiratory: Negative for cough, shortness of breath and wheezing.    Cardiovascular: Negative.  Negative for chest pain, palpitations and leg swelling.   Gastrointestinal: Negative for abdominal pain, blood in stool, constipation, diarrhea and nausea.   Genitourinary: Negative for dysuria, frequency and urgency.   Musculoskeletal: Negative for back pain and neck stiffness.   Skin: Negative for rash.   Neurological: Negative for headaches.   Psychiatric/Behavioral: Negative for dysphoric mood and sleep disturbance. The patient is not nervous/anxious.        Objective:      Physical Exam  she reports a blood pressure that has mostly been less than 140 systolic and less than 80 diastolic  Assessment:       1. Carcinoma of axillary tail of right breast in  female, estrogen receptor positive    2. Essential hypertension    3. Neuropathy    4. Prediabetes    5. Bacterial vaginosis        Plan:   Diagnoses and all orders for this visit:    Carcinoma of axillary tail of right breast in female, estrogen receptor positive, under treatment with surgery once she finishes chemotherapy that will probably take place in late May or early June.  She is off from work.    Essential hypertension, encourage blood pressure monitoring.  Continue amlodipine and valsartan 5-3 year 20 mg tablet daily.    Neuropathy, related to Taxol.    Prediabetes, monitor with A1c scheduled in May.    Bacterial vaginosis, re-treat with metronidazole 500 mg twice daily for 7 days    Other orders, follow-up in late July.  -     metroNIDAZOLE (FLAGYL) 500 MG tablet; Take 1 tablet (500 mg total) by mouth 2 (two) times daily. for 7 days    The patient location is: home  The chief complaint leading to consultation is:  Chronic disease management  Visit type: audiovisual  Total time spent with patient: 20  min  Each patient to whom he or she provides medical services by telemedicine is:  (1) informed of the relationship between the physician and patient and the respective role of any other health care provider with respect to management of the patient; and (2) notified that he or she may decline to receive medical services by telemedicine and may withdraw from such care at any time.    Notes:

## 2020-04-23 ENCOUNTER — INFUSION (OUTPATIENT)
Dept: INFUSION THERAPY | Facility: HOSPITAL | Age: 50
End: 2020-04-23
Attending: INTERNAL MEDICINE
Payer: COMMERCIAL

## 2020-04-23 ENCOUNTER — TELEPHONE (OUTPATIENT)
Dept: INTERNAL MEDICINE | Facility: CLINIC | Age: 50
End: 2020-04-23

## 2020-04-23 VITALS
HEIGHT: 64 IN | BODY MASS INDEX: 38.91 KG/M2 | SYSTOLIC BLOOD PRESSURE: 120 MMHG | HEART RATE: 101 BPM | DIASTOLIC BLOOD PRESSURE: 79 MMHG | WEIGHT: 227.94 LBS | TEMPERATURE: 98 F | RESPIRATION RATE: 18 BRPM

## 2020-04-23 DIAGNOSIS — Z17.0 CARCINOMA OF AXILLARY TAIL OF RIGHT BREAST IN FEMALE, ESTROGEN RECEPTOR POSITIVE: Primary | ICD-10-CM

## 2020-04-23 DIAGNOSIS — C50.611 CARCINOMA OF AXILLARY TAIL OF RIGHT BREAST IN FEMALE, ESTROGEN RECEPTOR POSITIVE: Primary | ICD-10-CM

## 2020-04-23 PROCEDURE — 25000003 PHARM REV CODE 250: Performed by: INTERNAL MEDICINE

## 2020-04-23 PROCEDURE — 96415 CHEMO IV INFUSION ADDL HR: CPT

## 2020-04-23 PROCEDURE — 96413 CHEMO IV INFUSION 1 HR: CPT

## 2020-04-23 PROCEDURE — 96367 TX/PROPH/DG ADDL SEQ IV INF: CPT

## 2020-04-23 PROCEDURE — S0028 INJECTION, FAMOTIDINE, 20 MG: HCPCS | Performed by: INTERNAL MEDICINE

## 2020-04-23 PROCEDURE — A4216 STERILE WATER/SALINE, 10 ML: HCPCS | Performed by: INTERNAL MEDICINE

## 2020-04-23 PROCEDURE — 96375 TX/PRO/DX INJ NEW DRUG ADDON: CPT

## 2020-04-23 PROCEDURE — 63600175 PHARM REV CODE 636 W HCPCS: Performed by: INTERNAL MEDICINE

## 2020-04-23 RX ORDER — HEPARIN 100 UNIT/ML
500 SYRINGE INTRAVENOUS
Status: DISCONTINUED | OUTPATIENT
Start: 2020-04-23 | End: 2020-04-23 | Stop reason: HOSPADM

## 2020-04-23 RX ORDER — FAMOTIDINE 10 MG/ML
20 INJECTION INTRAVENOUS
Status: COMPLETED | OUTPATIENT
Start: 2020-04-23 | End: 2020-04-23

## 2020-04-23 RX ORDER — EPINEPHRINE 0.3 MG/.3ML
0.3 INJECTION SUBCUTANEOUS ONCE AS NEEDED
Status: DISCONTINUED | OUTPATIENT
Start: 2020-04-23 | End: 2020-04-23 | Stop reason: HOSPADM

## 2020-04-23 RX ORDER — SODIUM CHLORIDE 0.9 % (FLUSH) 0.9 %
10 SYRINGE (ML) INJECTION
Status: DISCONTINUED | OUTPATIENT
Start: 2020-04-23 | End: 2020-04-23 | Stop reason: HOSPADM

## 2020-04-23 RX ORDER — DIPHENHYDRAMINE HYDROCHLORIDE 50 MG/ML
50 INJECTION INTRAMUSCULAR; INTRAVENOUS ONCE AS NEEDED
Status: DISCONTINUED | OUTPATIENT
Start: 2020-04-23 | End: 2020-04-23 | Stop reason: HOSPADM

## 2020-04-23 RX ADMIN — HEPARIN 500 UNITS: 100 SYRINGE at 11:04

## 2020-04-23 RX ADMIN — DIPHENHYDRAMINE HYDROCHLORIDE 50 MG: 50 INJECTION, SOLUTION INTRAMUSCULAR; INTRAVENOUS at 07:04

## 2020-04-23 RX ADMIN — SODIUM CHLORIDE: 0.9 INJECTION, SOLUTION INTRAVENOUS at 07:04

## 2020-04-23 RX ADMIN — FAMOTIDINE 20 MG: 10 INJECTION INTRAVENOUS at 07:04

## 2020-04-23 RX ADMIN — PACLITAXEL 378 MG: 6 INJECTION, SOLUTION INTRAVENOUS at 07:04

## 2020-04-23 RX ADMIN — Medication 10 ML: at 11:04

## 2020-04-23 NOTE — TELEPHONE ENCOUNTER
----- Message from Ella Toth MD sent at 4/22/2020  2:35 PM CDT -----  Please schedule a follow-up visit either in person or virtually toward the end of July

## 2020-04-23 NOTE — PLAN OF CARE
Patient tolerated Taxol well today. NAD noted upon discharge. Port + blood return present, flushed, hep locked and deaccessed. Participated in bell ringing ceremony. Declined avs. Verbalized understanding to call MD for any questions/concerns. Discharged home, ambulated independently.

## 2020-04-23 NOTE — PLAN OF CARE
Problem: Adult Inpatient Plan of Care  Goal: Optimal Comfort and Wellbeing  Intervention: Provide Person-Centered Care  Flowsheets (Taken 4/23/2020 0754)  Trust Relationship/Rapport: questions encouraged; care explained; choices provided; reassurance provided; emotional support provided; thoughts/feelings acknowledged; empathic listening provided; questions answered

## 2020-04-27 ENCOUNTER — PATIENT MESSAGE (OUTPATIENT)
Dept: HEMATOLOGY/ONCOLOGY | Facility: CLINIC | Age: 50
End: 2020-04-27

## 2020-04-28 ENCOUNTER — PATIENT MESSAGE (OUTPATIENT)
Dept: OBSTETRICS AND GYNECOLOGY | Facility: CLINIC | Age: 50
End: 2020-04-28

## 2020-04-30 ENCOUNTER — DOCUMENTATION ONLY (OUTPATIENT)
Dept: SURGERY | Facility: CLINIC | Age: 50
End: 2020-04-30

## 2020-04-30 ENCOUNTER — TELEPHONE (OUTPATIENT)
Dept: SURGERY | Facility: CLINIC | Age: 50
End: 2020-04-30

## 2020-04-30 DIAGNOSIS — C50.611 MALIGNANT NEOPLASM OF AXILLARY TAIL OF RIGHT BREAST IN FEMALE, ESTROGEN RECEPTOR POSITIVE: Primary | ICD-10-CM

## 2020-04-30 DIAGNOSIS — Z17.0 MALIGNANT NEOPLASM OF AXILLARY TAIL OF RIGHT BREAST IN FEMALE, ESTROGEN RECEPTOR POSITIVE: Primary | ICD-10-CM

## 2020-04-30 NOTE — TELEPHONE ENCOUNTER
----- Message from Lelo Guaman MD sent at 4/23/2020 10:24 AM CDT -----  Can we get her set up?    ----- Message -----  From: Negrito Verde MD  Sent: 4/22/2020  11:36 AM CDT  To: MD Lelo Ellis,  Last round of neoadjuvant chemotherapy will be tomorrow.  Thanks,    Negrito

## 2020-04-30 NOTE — TELEPHONE ENCOUNTER
Call to pt to set up appts to have a psot chemo MRI and see Dr Guaman same day. Pt scheduled on 5/13/2020 at 12:45 pm for a breast MRI followed by appt with Dr Guaman at 2:30 pm.

## 2020-05-06 ENCOUNTER — LAB VISIT (OUTPATIENT)
Dept: LAB | Facility: HOSPITAL | Age: 50
End: 2020-05-06
Attending: INTERNAL MEDICINE
Payer: COMMERCIAL

## 2020-05-06 DIAGNOSIS — R73.9 HYPERGLYCEMIA: ICD-10-CM

## 2020-05-06 LAB
ESTIMATED AVG GLUCOSE: 123 MG/DL (ref 68–131)
HBA1C MFR BLD HPLC: 5.9 % (ref 4–5.6)

## 2020-05-06 PROCEDURE — 83036 HEMOGLOBIN GLYCOSYLATED A1C: CPT

## 2020-05-06 PROCEDURE — 36415 COLL VENOUS BLD VENIPUNCTURE: CPT

## 2020-05-08 ENCOUNTER — PATIENT MESSAGE (OUTPATIENT)
Dept: HEMATOLOGY/ONCOLOGY | Facility: CLINIC | Age: 50
End: 2020-05-08

## 2020-05-12 ENCOUNTER — PATIENT OUTREACH (OUTPATIENT)
Dept: ADMINISTRATIVE | Facility: OTHER | Age: 50
End: 2020-05-12

## 2020-05-13 ENCOUNTER — OFFICE VISIT (OUTPATIENT)
Dept: SURGERY | Facility: CLINIC | Age: 50
End: 2020-05-13
Payer: COMMERCIAL

## 2020-05-13 ENCOUNTER — HOSPITAL ENCOUNTER (OUTPATIENT)
Dept: RADIOLOGY | Facility: HOSPITAL | Age: 50
Discharge: HOME | End: 2020-05-13
Attending: SURGERY
Payer: COMMERCIAL

## 2020-05-13 VITALS
BODY MASS INDEX: 38.91 KG/M2 | DIASTOLIC BLOOD PRESSURE: 76 MMHG | WEIGHT: 227.94 LBS | HEART RATE: 66 BPM | HEIGHT: 64 IN | SYSTOLIC BLOOD PRESSURE: 119 MMHG

## 2020-05-13 DIAGNOSIS — Z17.0 MALIGNANT NEOPLASM OF AXILLARY TAIL OF RIGHT BREAST IN FEMALE, ESTROGEN RECEPTOR POSITIVE: ICD-10-CM

## 2020-05-13 DIAGNOSIS — C50.611 MALIGNANT NEOPLASM OF AXILLARY TAIL OF RIGHT BREAST IN FEMALE, ESTROGEN RECEPTOR POSITIVE: Primary | ICD-10-CM

## 2020-05-13 DIAGNOSIS — Z17.0 MALIGNANT NEOPLASM OF AXILLARY TAIL OF RIGHT BREAST IN FEMALE, ESTROGEN RECEPTOR POSITIVE: Primary | ICD-10-CM

## 2020-05-13 DIAGNOSIS — C50.611 MALIGNANT NEOPLASM OF AXILLARY TAIL OF RIGHT BREAST IN FEMALE, ESTROGEN RECEPTOR POSITIVE: ICD-10-CM

## 2020-05-13 PROCEDURE — A9577 INJ MULTIHANCE: HCPCS | Performed by: SURGERY

## 2020-05-13 PROCEDURE — 3078F PR MOST RECENT DIASTOLIC BLOOD PRESSURE < 80 MM HG: ICD-10-PCS | Mod: CPTII,S$GLB,, | Performed by: SURGERY

## 2020-05-13 PROCEDURE — 99999 PR PBB SHADOW E&M-EST. PATIENT-LVL III: ICD-10-PCS | Mod: PBBFAC,,, | Performed by: SURGERY

## 2020-05-13 PROCEDURE — 77049 MRI BREAST W/WO CONTRAST, W/CAD, BILATERAL: ICD-10-PCS | Mod: 26,,, | Performed by: RADIOLOGY

## 2020-05-13 PROCEDURE — 77049 MRI BREAST C-+ W/CAD BI: CPT | Mod: TC

## 2020-05-13 PROCEDURE — 99213 PR OFFICE/OUTPT VISIT, EST, LEVL III, 20-29 MIN: ICD-10-PCS | Mod: S$GLB,,, | Performed by: SURGERY

## 2020-05-13 PROCEDURE — 3074F PR MOST RECENT SYSTOLIC BLOOD PRESSURE < 130 MM HG: ICD-10-PCS | Mod: CPTII,S$GLB,, | Performed by: SURGERY

## 2020-05-13 PROCEDURE — 99999 PR PBB SHADOW E&M-EST. PATIENT-LVL III: CPT | Mod: PBBFAC,,, | Performed by: SURGERY

## 2020-05-13 PROCEDURE — 3074F SYST BP LT 130 MM HG: CPT | Mod: CPTII,S$GLB,, | Performed by: SURGERY

## 2020-05-13 PROCEDURE — 99213 OFFICE O/P EST LOW 20 MIN: CPT | Mod: S$GLB,,, | Performed by: SURGERY

## 2020-05-13 PROCEDURE — 25500020 PHARM REV CODE 255: Performed by: SURGERY

## 2020-05-13 PROCEDURE — 3008F PR BODY MASS INDEX (BMI) DOCUMENTED: ICD-10-PCS | Mod: CPTII,S$GLB,, | Performed by: SURGERY

## 2020-05-13 PROCEDURE — 77049 MRI BREAST C-+ W/CAD BI: CPT | Mod: 26,,, | Performed by: RADIOLOGY

## 2020-05-13 PROCEDURE — 3078F DIAST BP <80 MM HG: CPT | Mod: CPTII,S$GLB,, | Performed by: SURGERY

## 2020-05-13 PROCEDURE — 3008F BODY MASS INDEX DOCD: CPT | Mod: CPTII,S$GLB,, | Performed by: SURGERY

## 2020-05-13 RX ADMIN — GADOBENATE DIMEGLUMINE 20 ML: 529 INJECTION, SOLUTION INTRAVENOUS at 01:05

## 2020-05-13 NOTE — H&P (VIEW-ONLY)
Breast Surgery  [facility]  Department of Surgery       Phylicia Vásquez is a 50 y.o. female with Stage T2N1M0 invasive lobular carcinoma of the RIGHT breast, ER+/CA +/HEr2 olivia negative.    She initially presented with a screening mammogram 10/16/19 that showed a focal asymmetry in the upper outer quadrant of the right breast in the posterior depth. Patient never felt a mass but she was having tenderness at her right lateral breast.  Follow-up MMG (10/31/19) showed an irregular equal density mass with spiculated margins measuring 1.6cm.  US evaluation showed an irregular hypoechoic mass at 10 o'clock right breast, N+14 cm. A level 1 right axillary adenopathy of a couple nodes were also identified the largest of which measuring 2.1cm.  A ultrasound guided biopsy was performed on 19 with pathology revealing infiltrating mammary carcinoma of the breast and one biopsy positive lymph node.     Patient had a previous right breast biopsy performed in  consistent with an intraductal papilloma which was excised by Dr. Jaime.    Genetic testing was negative with a VUS mutation.     Interval History:  She completed neoadjuvant chemotherapy with Adriamycin, Cyclophosphamide, and Taxol under Dr. Verde. She presents today for preoperative evaluation. Breast MRI performed today showed smaller lymph nodes and mildly less enhancement but no significant change in the size of the tumor.      GYN History:  Age of menarche was 12. Age of menopause was 37 after hysterectomy.  Patient denies hormonal therapy. Patient is . Age of first live birth was 25. Patient did not breast feed.    Family History:  Paternal aunt had ovarian cancer in her 50s  Father had lung and prostate cancer (smoker)    Past Medical History:   Diagnosis Date    Anxiety     Back pain     HTN (hypertension) 10/18/2012    Hx of psychiatric care     Ambien, Klonopin    Insomnia 10/18/2012    Malignant neoplasm of axillary tail of  right breast in female, estrogen receptor positive 2019    Primary invasive malignant neoplasm of female breast, right 2019    Psychiatric problem     S/P abdominal supracervical subtotal hysterectomy, continues to have regular menses. -2014    Spinal cord cyst, L1      Thoracic radiculopathy, bulging disc at T10-11 and T11-12      Past Surgical History:   Procedure Laterality Date    BREAST BIOPSY Right 2015     SECTION      CHOLECYSTECTOMY      COLONOSCOPY N/A 10/5/2015    Procedure: COLONOSCOPY;  Surgeon: JERONIMO Cancino MD;  Location: [facility] (4TH FLR);  Service: Endoscopy;  Laterality: N/A;    HYSTERECTOMY      BC--SUPRACERVICAL    INSERTION OF TUNNELED CENTRAL VENOUS CATHETER (CVC) WITH SUBCUTANEOUS PORT Right 2019    Procedure: [id number] Fluoro needed;  Surgeon: Lelo Guaman MD;  Location: [facility] OR 2ND FLR;  Service: General;  Laterality: Right;  Right internal jugular.      Current Outpatient Medications on File Prior to Visit   Medication Sig Dispense Refill    amlodipine-valsartan (EXFORGE) 5-320 mg per tablet Take 1 tablet by mouth once daily. 90 tablet 3    cyclobenzaprine (FLEXERIL) 5 MG tablet TAKE 1 TO 2 TABLETS BY MOUTH DAILY AT BEDTIME AS NEEDED 30 tablet 2    dexAMETHasone (DECADRON) 4 MG Tab Take 5 tablets 12 hours prior to chemotherapy and another 5 tablets 6 hours prior to each cycle of taxol chemotherapy 40 tablet 0    magic mouthwash diphen/antac/lidoc/nysta Take 10 mLs by mouth 4 (four) times daily. 240 mL 1    ergocalciferol (ERGOCALCIFEROL) 50,000 unit Cap Take 1 capsule (50,000 Units total) by mouth every 7 days. 12 capsule 1    famotidine (PEPCID) 10 MG tablet Take 10 mg by mouth once daily.      fluocinolone (DERMA-SMOOTHE) 0.01 % external oil apply a thin film to affected area every night at bedtime as needed for flare 118.28 mL 1    fluticasone (FLONASE) 50 mcg/actuation nasal spray 1 spray by Each Nare  route 2 (two) times daily as needed. 15 g 0    gabapentin (NEURONTIN) 100 MG capsule Take 1 capsule (100 mg total) by mouth 3 (three) times daily as needed (for anxiety take one nightly to sleep). 90 capsule 11    gabapentin (NEURONTIN) 300 MG capsule Take 1 capsule (300 mg total) by mouth 2 (two) times daily. 60 capsule 11    ketoconazole (NIZORAL) 2 % shampoo WASH HAIR WITH MEDICATED SHAMPOO AT LEAST 1 TO 2 TIMES A WEEK, LET SIT ON SCALP AT LEAST 5 MINUTES PRIOR TO RINSING 120 mL 0    ketoconazole (NIZORAL) 2 % shampoo use topically twice a week as directed 120 mL 1    mirtazapine (REMERON SOL-TAB) 15 MG disintegrating tablet Take 1 tablet (15 mg total) by mouth nightly. 30 tablet 2    morphine (MS CONTIN) 15 MG 12 hr tablet Take 1 tablet (15 mg total) by mouth 2 (two) times daily. 30 tablet 0    ondansetron (ZOFRAN) 8 MG tablet Take 1 tablet by mouth every 12 hours x 3 days post chemotherapy followed by 1 tablet by mouth every 12 hours as needed for nausea. 30 tablet 2    ondansetron (ZOFRAN-ODT) 8 MG TbDL Dissolve 1 tablet (8 mg total) by mouth every 6 (six) hours as needed (Nausea). 30 tablet 1    oxyCODONE (ROXICODONE) 10 mg Tab immediate release tablet Take 1 tablet (10 mg total) by mouth every 4 (four) hours as needed for Pain. 120 tablet 0    promethazine (PHENERGAN) 25 MG tablet Take 1 tablet (25 mg total) by mouth every 6 (six) hours as needed for Nausea. 40 tablet 0    tobramycin sulfate 0.3% (TOBREX) 0.3 % ophthalmic solution 1-2 drops topically twice daily to affected toe(s). 5 mL 3    zolpidem (AMBIEN) 10 mg Tab Take 1 tablet (10 mg total) by mouth nightly as needed. 30 tablet 2     No current facility-administered medications on file prior to visit.      Social History     Socioeconomic History    Marital status: Single     Spouse name: Not on file    Number of children: 2    Years of education: Not on file    Highest education level: Not on file   Occupational History    Not on file    Social Needs    Financial resource strain: Not hard at all    Food insecurity:     Worry: Never true     Inability: Never true    Transportation needs:     Medical: No     Non-medical: No   Tobacco Use    Smoking status: Never Smoker    Smokeless tobacco: Never Used   Substance and Sexual Activity    Alcohol use: No     Frequency: Monthly or less     Drinks per session: 1 or 2     Binge frequency: Never    Drug use: No    Sexual activity: Yes     Partners: Male   Lifestyle    Physical activity:     Days per week: 3 days     Minutes per session: 30 min    Stress: To some extent   Relationships    Social connections:     Talks on phone: More than three times a week     Gets together: Once a week     Attends Gnosticism service: Not on file     Active member of club or organization: No     Attends meetings of clubs or organizations: Never     Relationship status:    Other Topics Concern    Are you pregnant or think you may be? Not Asked    Breast-feeding Not Asked    Patient feels they ought to cut down on drinking/drug use Not Asked    Patient annoyed by others criticizing their drinking/drug use Not Asked    Patient has felt bad or guilty about drinking/drug use Not Asked    Patient has had a drink/used drugs as an eye opener in the AM Not Asked   Social History Narrative    Has worked at the [employer] since it opened and stayed working through [place].    2011  from her  and moved to [facility] with her sons to live with her aunt.    Her mother is very helpful and involved in her life.    She has never been a smoker and does not drink.        April 2016    Her eldest son has been working as a  at the [employer] for one year now.  He is doing well in this capacity.  He is thinking about making this his career.    Her youngest son is doing better academically.  She would like to remain living on the [facility] for his schooling.    Her commuting to  "work is difficult.        October 2017.  For the first time in a long time, she feels happy.  She has a new boyfriend.    November 2108.  Her eldest son has moved to [place], is living with his 1st cousin and has a full-time job with [employer].    Her youngest son is 16 and doing well in school.    She continues to date a very nice man.  She is enjoying living in [place] and commutes to the [facility] where she continues to enjoy her employment.        February 2020. Living with her mother due to her breast cancer treatment. Both sons living together in [place]. Younger son struggling emotionally and academically.     Family History   Problem Relation Age of Onset    Cancer Paternal Aunt     Lupus Paternal Aunt     Hypertension Mother     Depression Mother     Liver disease Father     Alcohol abuse Father     Colon cancer Paternal Uncle     Depression Son     Breast cancer Neg Hx     Ovarian cancer Neg Hx     Melanoma Neg Hx     Psoriasis Neg Hx     Eczema Neg Hx         Review of Systems   Constitutional: Negative for fatigue and unexpected weight change.   HENT: Negative.    Eyes: Negative.    Respiratory: Negative for chest tightness and shortness of breath.    Cardiovascular: Negative for chest pain and palpitations.   Gastrointestinal: Negative for abdominal distention and abdominal pain.   Genitourinary: Negative for dysuria, vaginal bleeding and vaginal discharge.   Musculoskeletal: Negative for arthralgias and back pain.   Skin: Negative for color change and rash.   Neurological: Negative for syncope and headaches.     Objective:   /76 (BP Location: Right arm, Patient Position: Sitting, BP Method: Large (Automatic))   Pulse 66   Ht 5' 4" (1.626 m)   Wt 103.4 kg (227 lb 15.3 oz)   LMP 08/14/2014   BMI 39.13 kg/m²     Physical Exam:  HEENT: Normocephalic, atraumatic.    Gen: alert and oriented; no acute distress.  Breast: About 1 cm tender, subdominant mass at 11 o'clock position of right " breast, N+8 cm.  No left breast masses. Scar near right lateral NAC (from previous excision of intraductal papilloma).  No nipple discharge, nipple inversion, or skin changes bilaterally.  Lymph: No adjacent axillary lymphadenopathy bilaterally. Fullness is present laterally, but no specific mass.  No LAD    Radiology review: Images personally reviewed by me in the clinic.   Result:   Mammo Digital Diagnostic Right w/ Michele  US Breast Right Limited  History:  Work-up for abnormal screening mammogram (recall).  Films Compared:  Prior images (if available) were compared.  Findings:  This procedure was performed using tomosynthesis.  Computer-aided detection was utilized in the interpretation of this examination.  Mammo Digital Diagnostic Right w/ Michele  The right breast has scattered areas of fibroglandular density.      Right breast 09:00 o'clock axis middle depth excisional biopsy scar, benign.      In the right breast upper outer quadrant posterior depth, there is an irregular equal density mass with spiculated margins measuring 1.6 cm.  Associated architectural distortion.  This corresponds to the right breast screening mammogram finding.      Limited right breast ultrasound:   In the right breast 10:00 o'clock axis 14 cm from the nipple, there is an irregular hypoechoic mass with indistinct margins and posterior acoustic shadowing measuring 0.8 cm.  No surrounding hypervascularity.  This corresponds to the right breast screening mammogram finding.      Level 1 right axillary adenopathy with a couple of nodes demonstrating cortical thickening and loss of fatty hilum.  The largest node measures 2.1 cm in long axis.      Impression:  1. Right breast 10:00 o'clock axis mass corresponds to the screening mammogram finding. BI-RADS 4: Suspicious. Recommend ultrasound-guided biopsy.   2. Level 1 right axillary adenopathy. BI-RADS 4: Suspicious. Recommend ultrasound-guided biopsy for 1 of the abnormal nodes.   BI-RADS  Category:   Right: 4 - Suspicious  Overall: 4 - Suspicious       5/13/2020 Breast MRI:  Right  There is a 19 mm x 5 mm x 5 mm lymph node seen in the right axilla. Compared to the previous study, the axillary lymph node decreased in size. This is associated with a biopsy marker.      Second previously described thickened axillary lymph node has decreased slightly in size, now measuring approximately 5mm in short axis.      There is a 30 mm x 29 mm x 8 mm irregularly shaped, heterogeneous mass with indistinct and irregular margins seen in the right breast at 10 o'clock, 10.7 cm from the nipple and 4.3 cm from the chest wall. Delayed phase is persistent. Allowing for differences in patient positioning, this is not significantly changed in size, but demonstrates interval decrease in enhancement, suggesting some response to therapy.      Left  There is no evidence of suspicious masses, abnormal enhancement, or other abnormal findings.     Impression:     Right  Mass: Right breast 30 mm x 29 mm x 8 mm mass at the 10 o'clock position.  Allowing for differences in patient positioning, this is not significantly changed in size, but demonstrates interval decrease in enhancement, suggesting some response to therapy.     Assessment: 6 - Known biopsy, proven malignancy.         Lymph Node: Right axilla 19 mm x 5 mm x 5 mm lymph node which was previously biopsied and adjacent previously thickened node have both decreased in size and now appear similar to contralateral lymph nodes, suggesting some response to therapy. Assessment: 6 - Known biopsy, proven malignancy.      Left  There is no MR evidence of malignancy in the left breast.     BI-RADS Category:   Overall: 6 - Known Biopsy-Proven Malignancy     Assessment:       Ms. Vásquez is a 50 year old female with ER/TN+ Her2 -- Invasive Lobular Carcinoma of the right breast, s/p neoadjuvant chemotherapy with ACT, presents today to discuss a surgical treatment plan.  Plan:      Options for management were discussed with the patient and her family. We reviewed the existing data noting the equivalency of breast conserving surgery with radiation therapy and mastectomy. We also reviewed the guidelines of the National Comprehensive Cancer Network. We discussed the need for lumpectomy margins to be negative for carcinoma, the necessity for postoperative radiation therapy after breast conservation in most cases, the possibility of a failed or false negative sentinel lymph node biopsy and the potential need for complete lymphadenectomy for a failed or positive sentinel lymph node biopsy were fully discussed. In the setting of mastectomy, delayed or immediate reconstruction options are available and were discussed.     The patient opted to proceed with a right mastectomy and sentinel lymph node biopsy with a left prophylactic mastectomy.  She is interested in ROWAN flap reconstruction, but we discussed that this would need to be delayed incase she needs radiation therapy.  If she has several positive lymph nodes or a large tumor on final pathology, she will require adjuvant radiation.  We recommended that she have tissue expanders placed until she is able to have the ROWAN flap reconstruction.  We will remove her mediport as well since she will not require adjuvant chemotherapy.     All questions and concerns were addressed and consent was signed for surgery.  We recommended that she meet with one of our plastic surgeons, Dr. Arita or Dr. Ramos, to discuss this further.  After that we will decide on a definitive surgery date.    Jacque Winn PA-C      I have seen and examined the patient and agree with the PAs plann as above.  Wants bilateral mastectomy.  Will proceed with SLNB as I do not feel LAD on exam today and there is evidence of improvement on MRI.  There does appear to be residual disease so may need XRT especially if persistent chandler disease is present.  Port is on same side  so will also need to remove at time of surgery.    Lelo Guaman MD  Breast Surgical Oncology
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
## 2020-05-13 NOTE — PROGRESS NOTES
Breast Surgery  [facility], [place]  Department of Surgery       Phylicia Vásquez is a 50 y.o. female with Stage T2N1M0 invasive lobular carcinoma of the RIGHT breast, ER+/WY +/HEr2 olivia negative.    She initially presented with a screening mammogram 10/16/19 that showed a focal asymmetry in the upper outer quadrant of the right breast in the posterior depth. Patient never felt a mass but she was having tenderness at her right lateral breast.  Follow-up MMG (10/31/19) showed an irregular equal density mass with spiculated margins measuring 1.6cm.  US evaluation showed an irregular hypoechoic mass at 10 o'clock right breast, N+14 cm. A level 1 right axillary adenopathy of a couple nodes were also identified the largest of which measuring 2.1cm.  A ultrasound guided biopsy was performed on 19 with pathology revealing infiltrating mammary carcinoma of the breast and one biopsy positive lymph node.     Patient had a previous right breast biopsy performed in  consistent with an intraductal papilloma which was excised by Dr. Jaime.    Genetic testing was negative with a VUS mutation.     Interval History:  She completed neoadjuvant chemotherapy with Adriamycin, Cyclophosphamide, and Taxol under Dr. Verde. She presents today for preoperative evaluation. Breast MRI performed today showed smaller lymph nodes and mildly less enhancement but no significant change in the size of the tumor.      GYN History:  Age of menarche was 12. Age of menopause was 37 after hysterectomy.  Patient denies hormonal therapy. Patient is . Age of first live birth was 25. Patient did not breast feed.    Family History:  Paternal aunt had ovarian cancer in her 50s  Father had lung and prostate cancer (smoker)    Past Medical History:   Diagnosis Date    Anxiety     Back pain     HTN (hypertension) 10/18/2012    Hx of psychiatric care     Ambien, Klonopin    Insomnia 10/18/2012    Malignant neoplasm of axillary tail of  right breast in female, estrogen receptor positive 2019    Primary invasive malignant neoplasm of female breast, right 2019    Psychiatric problem     S/P abdominal supracervical subtotal hysterectomy, continues to have regular menses. -2014    Spinal cord cyst, L1      Thoracic radiculopathy, bulging disc at T10-11 and T11-12      Past Surgical History:   Procedure Laterality Date    BREAST BIOPSY Right 2015     SECTION      CHOLECYSTECTOMY      COLONOSCOPY N/A 10/5/2015    Procedure: COLONOSCOPY;  Surgeon: JERONIMO Cancino MD;  Location: [facility] (4TH FLR);  Service: Endoscopy;  Laterality: N/A;    HYSTERECTOMY      BC--SUPRACERVICAL    INSERTION OF TUNNELED CENTRAL VENOUS CATHETER (CVC) WITH SUBCUTANEOUS PORT Right 2019    Procedure: [id number] Fluoro needed;  Surgeon: Lelo Guaman MD;  Location: [facility] OR 2ND FLR;  Service: General;  Laterality: Right;  Right internal jugular.      Current Outpatient Medications on File Prior to Visit   Medication Sig Dispense Refill    amlodipine-valsartan (EXFORGE) 5-320 mg per tablet Take 1 tablet by mouth once daily. 90 tablet 3    cyclobenzaprine (FLEXERIL) 5 MG tablet TAKE 1 TO 2 TABLETS BY MOUTH DAILY AT BEDTIME AS NEEDED 30 tablet 2    dexAMETHasone (DECADRON) 4 MG Tab Take 5 tablets 12 hours prior to chemotherapy and another 5 tablets 6 hours prior to each cycle of taxol chemotherapy 40 tablet 0    magic mouthwash diphen/antac/lidoc/nysta Take 10 mLs by mouth 4 (four) times daily. 240 mL 1    ergocalciferol (ERGOCALCIFEROL) 50,000 unit Cap Take 1 capsule (50,000 Units total) by mouth every 7 days. 12 capsule 1    famotidine (PEPCID) 10 MG tablet Take 10 mg by mouth once daily.      fluocinolone (DERMA-SMOOTHE) 0.01 % external oil apply a thin film to affected area every night at bedtime as needed for flare 118.28 mL 1    fluticasone (FLONASE) 50 mcg/actuation nasal spray 1 spray by Each Nare  route 2 (two) times daily as needed. 15 g 0    gabapentin (NEURONTIN) 100 MG capsule Take 1 capsule (100 mg total) by mouth 3 (three) times daily as needed (for anxiety take one nightly to sleep). 90 capsule 11    gabapentin (NEURONTIN) 300 MG capsule Take 1 capsule (300 mg total) by mouth 2 (two) times daily. 60 capsule 11    ketoconazole (NIZORAL) 2 % shampoo WASH HAIR WITH MEDICATED SHAMPOO AT LEAST 1 TO 2 TIMES A WEEK, LET SIT ON SCALP AT LEAST 5 MINUTES PRIOR TO RINSING 120 mL 0    ketoconazole (NIZORAL) 2 % shampoo use topically twice a week as directed 120 mL 1    mirtazapine (REMERON SOL-TAB) 15 MG disintegrating tablet Take 1 tablet (15 mg total) by mouth nightly. 30 tablet 2    morphine (MS CONTIN) 15 MG 12 hr tablet Take 1 tablet (15 mg total) by mouth 2 (two) times daily. 30 tablet 0    ondansetron (ZOFRAN) 8 MG tablet Take 1 tablet by mouth every 12 hours x 3 days post chemotherapy followed by 1 tablet by mouth every 12 hours as needed for nausea. 30 tablet 2    ondansetron (ZOFRAN-ODT) 8 MG TbDL Dissolve 1 tablet (8 mg total) by mouth every 6 (six) hours as needed (Nausea). 30 tablet 1    oxyCODONE (ROXICODONE) 10 mg Tab immediate release tablet Take 1 tablet (10 mg total) by mouth every 4 (four) hours as needed for Pain. 120 tablet 0    promethazine (PHENERGAN) 25 MG tablet Take 1 tablet (25 mg total) by mouth every 6 (six) hours as needed for Nausea. 40 tablet 0    tobramycin sulfate 0.3% (TOBREX) 0.3 % ophthalmic solution 1-2 drops topically twice daily to affected toe(s). 5 mL 3    zolpidem (AMBIEN) 10 mg Tab Take 1 tablet (10 mg total) by mouth nightly as needed. 30 tablet 2     No current facility-administered medications on file prior to visit.      Social History     Socioeconomic History    Marital status: Single     Spouse name: Not on file    Number of children: 2    Years of education: Not on file    Highest education level: Not on file   Occupational History    Not on file    Social Needs    Financial resource strain: Not hard at all    Food insecurity:     Worry: Never true     Inability: Never true    Transportation needs:     Medical: No     Non-medical: No   Tobacco Use    Smoking status: Never Smoker    Smokeless tobacco: Never Used   Substance and Sexual Activity    Alcohol use: No     Frequency: Monthly or less     Drinks per session: 1 or 2     Binge frequency: Never    Drug use: No    Sexual activity: Yes     Partners: Male   Lifestyle    Physical activity:     Days per week: 3 days     Minutes per session: 30 min    Stress: To some extent   Relationships    Social connections:     Talks on phone: More than three times a week     Gets together: Once a week     Attends Sikhism service: Not on file     Active member of club or organization: No     Attends meetings of clubs or organizations: Never     Relationship status:    Other Topics Concern    Are you pregnant or think you may be? Not Asked    Breast-feeding Not Asked    Patient feels they ought to cut down on drinking/drug use Not Asked    Patient annoyed by others criticizing their drinking/drug use Not Asked    Patient has felt bad or guilty about drinking/drug use Not Asked    Patient has had a drink/used drugs as an eye opener in the AM Not Asked   Social History Narrative    Has worked at the [employer] since it opened and stayed working through [place].    2011  from her  and moved to [place] with her sons to live with her aunt.    Her mother is very helpful and involved in her life.    She has never been a smoker and does not drink.        April 2016    Her eldest son has been working as a  at the [employer] for one year now.  He is doing well in this capacity.  He is thinking about making this his career.    Her youngest son is doing better academically.  She would like to remain living on the [place] for his schooling.    Her commuting to  "work is difficult.        October 2017.  For the first time in a long time, she feels happy.  She has a new boyfriend.    November 2108.  Her eldest son has moved to [place], is living with his 1st cousin and has a full-time job with [employer].    Her youngest son is 16 and doing well in school.    She continues to date a very nice man.  She is enjoying living in [place] and commutes to the [facility] where she continues to enjoy her employment.        February 2020. Living with her mother due to her breast cancer treatment. Both sons living together in [place]. Younger son struggling emotionally and academically.     Family History   Problem Relation Age of Onset    Cancer Paternal Aunt     Lupus Paternal Aunt     Hypertension Mother     Depression Mother     Liver disease Father     Alcohol abuse Father     Colon cancer Paternal Uncle     Depression Son     Breast cancer Neg Hx     Ovarian cancer Neg Hx     Melanoma Neg Hx     Psoriasis Neg Hx     Eczema Neg Hx         Review of Systems   Constitutional: Negative for fatigue and unexpected weight change.   HENT: Negative.    Eyes: Negative.    Respiratory: Negative for chest tightness and shortness of breath.    Cardiovascular: Negative for chest pain and palpitations.   Gastrointestinal: Negative for abdominal distention and abdominal pain.   Genitourinary: Negative for dysuria, vaginal bleeding and vaginal discharge.   Musculoskeletal: Negative for arthralgias and back pain.   Skin: Negative for color change and rash.   Neurological: Negative for syncope and headaches.     Objective:   /76 (BP Location: Right arm, Patient Position: Sitting, BP Method: Large (Automatic))   Pulse 66   Ht 5' 4" (1.626 m)   Wt 103.4 kg (227 lb 15.3 oz)   LMP 08/14/2014   BMI 39.13 kg/m²     Physical Exam:  HEENT: Normocephalic, atraumatic.    Gen: alert and oriented; no acute distress.  Breast: About 1 cm tender, subdominant mass at 11 o'clock position of right " breast, N+8 cm.  No left breast masses. Scar near right lateral NAC (from previous excision of intraductal papilloma).  No nipple discharge, nipple inversion, or skin changes bilaterally.  Lymph: No adjacent axillary lymphadenopathy bilaterally. Fullness is present laterally, but no specific mass.  No LAD    Radiology review: Images personally reviewed by me in the clinic.   Result:   Mammo Digital Diagnostic Right w/ Michele  US Breast Right Limited  History:  Work-up for abnormal screening mammogram (recall).  Films Compared:  Prior images (if available) were compared.  Findings:  This procedure was performed using tomosynthesis.  Computer-aided detection was utilized in the interpretation of this examination.  Mammo Digital Diagnostic Right w/ Michele  The right breast has scattered areas of fibroglandular density.      Right breast 09:00 o'clock axis middle depth excisional biopsy scar, benign.      In the right breast upper outer quadrant posterior depth, there is an irregular equal density mass with spiculated margins measuring 1.6 cm.  Associated architectural distortion.  This corresponds to the right breast screening mammogram finding.      Limited right breast ultrasound:   In the right breast 10:00 o'clock axis 14 cm from the nipple, there is an irregular hypoechoic mass with indistinct margins and posterior acoustic shadowing measuring 0.8 cm.  No surrounding hypervascularity.  This corresponds to the right breast screening mammogram finding.      Level 1 right axillary adenopathy with a couple of nodes demonstrating cortical thickening and loss of fatty hilum.  The largest node measures 2.1 cm in long axis.      Impression:  1. Right breast 10:00 o'clock axis mass corresponds to the screening mammogram finding. BI-RADS 4: Suspicious. Recommend ultrasound-guided biopsy.   2. Level 1 right axillary adenopathy. BI-RADS 4: Suspicious. Recommend ultrasound-guided biopsy for 1 of the abnormal nodes.   BI-RADS  Category:   Right: 4 - Suspicious  Overall: 4 - Suspicious       5/13/2020 Breast MRI:  Right  There is a 19 mm x 5 mm x 5 mm lymph node seen in the right axilla. Compared to the previous study, the axillary lymph node decreased in size. This is associated with a biopsy marker.      Second previously described thickened axillary lymph node has decreased slightly in size, now measuring approximately 5mm in short axis.      There is a 30 mm x 29 mm x 8 mm irregularly shaped, heterogeneous mass with indistinct and irregular margins seen in the right breast at 10 o'clock, 10.7 cm from the nipple and 4.3 cm from the chest wall. Delayed phase is persistent. Allowing for differences in patient positioning, this is not significantly changed in size, but demonstrates interval decrease in enhancement, suggesting some response to therapy.      Left  There is no evidence of suspicious masses, abnormal enhancement, or other abnormal findings.     Impression:     Right  Mass: Right breast 30 mm x 29 mm x 8 mm mass at the 10 o'clock position.  Allowing for differences in patient positioning, this is not significantly changed in size, but demonstrates interval decrease in enhancement, suggesting some response to therapy.     Assessment: 6 - Known biopsy, proven malignancy.         Lymph Node: Right axilla 19 mm x 5 mm x 5 mm lymph node which was previously biopsied and adjacent previously thickened node have both decreased in size and now appear similar to contralateral lymph nodes, suggesting some response to therapy. Assessment: 6 - Known biopsy, proven malignancy.      Left  There is no MR evidence of malignancy in the left breast.     BI-RADS Category:   Overall: 6 - Known Biopsy-Proven Malignancy     Assessment:       Ms. Vásquez is a 50 year old female with ER/OK+ Her2 -- Invasive Lobular Carcinoma of the right breast, s/p neoadjuvant chemotherapy with ACT, presents today to discuss a surgical treatment plan.  Plan:      Options for management were discussed with the patient and her family. We reviewed the existing data noting the equivalency of breast conserving surgery with radiation therapy and mastectomy. We also reviewed the guidelines of the National Comprehensive Cancer Network. We discussed the need for lumpectomy margins to be negative for carcinoma, the necessity for postoperative radiation therapy after breast conservation in most cases, the possibility of a failed or false negative sentinel lymph node biopsy and the potential need for complete lymphadenectomy for a failed or positive sentinel lymph node biopsy were fully discussed. In the setting of mastectomy, delayed or immediate reconstruction options are available and were discussed.     The patient opted to proceed with a right mastectomy and sentinel lymph node biopsy with a left prophylactic mastectomy.  She is interested in ROWAN flap reconstruction, but we discussed that this would need to be delayed incase she needs radiation therapy.  If she has several positive lymph nodes or a large tumor on final pathology, she will require adjuvant radiation.  We recommended that she have tissue expanders placed until she is able to have the ROWAN flap reconstruction.  We will remove her mediport as well since she will not require adjuvant chemotherapy.     All questions and concerns were addressed and consent was signed for surgery.  We recommended that she meet with one of our plastic surgeons, Dr. Arita or Dr. Ramos, to discuss this further.  After that we will decide on a definitive surgery date.    Jacque Winn PA-C      I have seen and examined the patient and agree with the PAs plann as above.  Wants bilateral mastectomy.  Will proceed with SLNB as I do not feel LAD on exam today and there is evidence of improvement on MRI.  There does appear to be residual disease so may need XRT especially if persistent chandler disease is present.  Port is on same side  so will also need to remove at time of surgery.    Lelo Guaman MD  Breast Surgical Oncology

## 2020-05-15 ENCOUNTER — TELEPHONE (OUTPATIENT)
Dept: SURGERY | Facility: CLINIC | Age: 50
End: 2020-05-15

## 2020-05-15 ENCOUNTER — LAB VISIT (OUTPATIENT)
Dept: LAB | Facility: HOSPITAL | Age: 50
End: 2020-05-15
Attending: SURGERY
Payer: COMMERCIAL

## 2020-05-15 DIAGNOSIS — Z01.818 PREOP TESTING: Primary | ICD-10-CM

## 2020-05-15 DIAGNOSIS — C50.611 MALIGNANT NEOPLASM OF AXILLARY TAIL OF RIGHT BREAST IN FEMALE, ESTROGEN RECEPTOR POSITIVE: Primary | ICD-10-CM

## 2020-05-15 DIAGNOSIS — Z17.0 MALIGNANT NEOPLASM OF AXILLARY TAIL OF RIGHT BREAST IN FEMALE, ESTROGEN RECEPTOR POSITIVE: Primary | ICD-10-CM

## 2020-05-15 DIAGNOSIS — Z01.818 PREOP TESTING: ICD-10-CM

## 2020-05-15 LAB
ALBUMIN SERPL BCP-MCNC: 3.9 G/DL (ref 3.5–5.2)
ALP SERPL-CCNC: 61 U/L (ref 55–135)
ALT SERPL W/O P-5'-P-CCNC: 19 U/L (ref 10–44)
ANION GAP SERPL CALC-SCNC: 9 MMOL/L (ref 8–16)
AST SERPL-CCNC: 20 U/L (ref 10–40)
BILIRUB SERPL-MCNC: 0.4 MG/DL (ref 0.1–1)
BUN SERPL-MCNC: 7 MG/DL (ref 6–20)
CALCIUM SERPL-MCNC: 9.4 MG/DL (ref 8.7–10.5)
CHLORIDE SERPL-SCNC: 105 MMOL/L (ref 95–110)
CO2 SERPL-SCNC: 27 MMOL/L (ref 23–29)
CREAT SERPL-MCNC: 0.8 MG/DL (ref 0.5–1.4)
ERYTHROCYTE [DISTWIDTH] IN BLOOD BY AUTOMATED COUNT: 15.8 % (ref 11.5–14.5)
EST. GFR  (AFRICAN AMERICAN): >60 ML/MIN/1.73 M^2
EST. GFR  (NON AFRICAN AMERICAN): >60 ML/MIN/1.73 M^2
GLUCOSE SERPL-MCNC: 81 MG/DL (ref 70–110)
HCT VFR BLD AUTO: 35.8 % (ref 37–48.5)
HGB BLD-MCNC: 10.7 G/DL (ref 12–16)
IMM GRANULOCYTES # BLD AUTO: 0.09 K/UL (ref 0–0.04)
MCH RBC QN AUTO: 25.4 PG (ref 27–31)
MCHC RBC AUTO-ENTMCNC: 29.9 G/DL (ref 32–36)
MCV RBC AUTO: 85 FL (ref 82–98)
NEUTROPHILS # BLD AUTO: 6.1 K/UL (ref 1.8–7.7)
PLATELET # BLD AUTO: 247 K/UL (ref 150–350)
PMV BLD AUTO: 11.9 FL (ref 9.2–12.9)
POTASSIUM SERPL-SCNC: 4.2 MMOL/L (ref 3.5–5.1)
PROT SERPL-MCNC: 7.7 G/DL (ref 6–8.4)
RBC # BLD AUTO: 4.22 M/UL (ref 4–5.4)
SODIUM SERPL-SCNC: 141 MMOL/L (ref 136–145)
WBC # BLD AUTO: 9.45 K/UL (ref 3.9–12.7)

## 2020-05-15 PROCEDURE — 85027 COMPLETE CBC AUTOMATED: CPT

## 2020-05-15 PROCEDURE — 36415 COLL VENOUS BLD VENIPUNCTURE: CPT

## 2020-05-15 PROCEDURE — 80053 COMPREHEN METABOLIC PANEL: CPT

## 2020-05-15 NOTE — TELEPHONE ENCOUNTER
Call to pt per Dr Guaman to have her come in today for updated labs prior to potential surgery 5/20/2020. Pt to have labs drawn at [facility] at 11:10 am today.

## 2020-05-18 ENCOUNTER — HOSPITAL ENCOUNTER (OUTPATIENT)
Dept: PREADMISSION TESTING | Facility: OTHER | Age: 50
Discharge: HOME | End: 2020-05-18
Attending: SURGERY
Payer: COMMERCIAL

## 2020-05-18 ENCOUNTER — ANESTHESIA EVENT (OUTPATIENT)
Dept: SURGERY | Facility: OTHER | Age: 50
End: 2020-05-18
Payer: COMMERCIAL

## 2020-05-18 VITALS
OXYGEN SATURATION: 100 % | DIASTOLIC BLOOD PRESSURE: 83 MMHG | SYSTOLIC BLOOD PRESSURE: 117 MMHG | TEMPERATURE: 98 F | HEART RATE: 73 BPM | WEIGHT: 227.94 LBS | BODY MASS INDEX: 38.91 KG/M2 | HEIGHT: 64 IN

## 2020-05-18 RX ORDER — CELECOXIB 200 MG/1
400 CAPSULE ORAL
Status: CANCELLED | OUTPATIENT
Start: 2020-05-18 | End: 2020-05-18

## 2020-05-18 RX ORDER — ACETAMINOPHEN 500 MG
1000 TABLET ORAL
Status: CANCELLED | OUTPATIENT
Start: 2020-05-18 | End: 2020-05-18

## 2020-05-18 RX ORDER — SODIUM CHLORIDE, SODIUM LACTATE, POTASSIUM CHLORIDE, CALCIUM CHLORIDE 600; 310; 30; 20 MG/100ML; MG/100ML; MG/100ML; MG/100ML
INJECTION, SOLUTION INTRAVENOUS CONTINUOUS
Status: CANCELLED | OUTPATIENT
Start: 2020-05-18

## 2020-05-18 NOTE — DISCHARGE INSTRUCTIONS
Information to Prepare you for your Surgery    PRE-ADMIT TESTING -  [phone]    [address]  [facility]          Your surgery has been scheduled at [facility]. We are pleased to have the opportunity to serve you. For Further Information please call [phone].    On the day of surgery please report to the Information Desk on the 1st floor.    · CONTACT YOUR PHYSICIAN'S OFFICE THE DAY PRIOR TO YOUR SURGERY TO OBTAIN YOUR ARRIVAL TIME.     · The evening before surgery do not eat anything after 9 p.m. ( this includes hard candy, chewing gum and mints).  You may only have GATORADE, POWERADE AND WATER  from 9 p.m. until you leave your home.   DO NOT DRINK ANY LIQUIDS ON THE WAY TO THE HOSPITAL.      SPECIAL MEDICATION INSTRUCTIONS: TAKE medications checked off by the Anesthesiologist on your Medication List.    Angiogram Patients: Take medications as instructed by your physician, including aspirin.     Surgery Patients:    If you take ASPIRIN - Your PHYSICIAN/SURGEON will need to inform you IF/OR when you need to stop taking aspirin prior to your surgery.     Do Not take any medications containing IBUPROFEN.  Do Not Wear any make-up or dark nail polish   (especially eye make-up) to surgery. If you come to surgery with makeup on you will be required to remove the makeup or nail polish.    Do not shave your surgical area at least 5 days prior to your surgery. The surgical prep will be performed at the hospital according to Infection Control regulations.    Leave all valuables at home.   Do Not wear any jewelry or watches, including any metal in body piercings. Jewelry must be removed prior to coming to the hospital.  There is a possibility that rings that are unable to be removed may be cut off if they are on the surgical extremity.    Contact Lens must be removed before surgery. Either do not wear the contact lens or bring a case and solution for  storage.  Please bring a container for eyeglasses or dentures as required.  Bring any paperwork your physician has provided, such as consent forms,  history and physicals, doctor's orders, etc.   Bring comfortable clothes that are loose fitting to wear upon discharge. Take into consideration the type of surgery being performed.  Maintain your diet as advised per your physician the day prior to surgery.      Adequate rest the night before surgery is advised.   Park in the Parking lot behind the hospital or in the [place] [facility] across the street from the parking lot. Parking is complimentary.  If you will be discharged the same day as your procedure, please arrange for a responsible adult to drive you home or to accompany you if traveling by taxi.   YOU WILL NOT BE PERMITTED TO DRIVE OR TO LEAVE THE HOSPITAL ALONE AFTER SURGERY.   It is strongly recommended that you arrange for someone to remain with you for the first 24 hrs following your surgery.    The Surgeon will speak to your family/visitor after your surgery regarding the outcome of your surgery and post op care.  The Surgeon may speak to you after your surgery, but there is a possibility you may not remember the details.  Please check with your family members regarding the conversation with the Surgeon.    We strongly recommend whoever is bringing you home be present for discharge instructions.  This will ensure a thorough understanding for your post op home care.    EACH PATIENT IS ALLOWED TWO FAMILY MEMBERS OR VISITORS IN THE ROOM AND IN THE WAITING ROOMS WHILE YOU ARE IN SURGERY. ALL CHILDREN MUST ALWAYS BE ACCOMPANIED BY AN ADULT.    Thank you for your cooperation.  The Staff of [facility].                Bathing Instructions with Hibiclens     Shower the evening before and morning of your procedure with Hibiclens:   Wash your face with water and your regular face wash/soap   Apply Hibiclens directly on your skin or on a  wet washcloth and wash gently. When showering: Move away from the shower stream when applying Hibiclens to avoid rinsing off too soon.   Rinse thoroughly with warm water   Do not dilute Hibiclens         Dry off as usual, do not use any deodorant, powder, body lotions, perfume, after shave or cologne.

## 2020-05-18 NOTE — ANESTHESIA PREPROCEDURE EVALUATION
05/18/2020  Phylicia Vásquez is a 50 y.o., female.    Anesthesia Evaluation    I have reviewed the Patient Summary Reports.    I have reviewed the Nursing Notes.   I have reviewed the Medications.     Review of Systems  Anesthesia Hx:  No problems with previous Anesthesia  Denies Family Hx of Anesthesia complications.   Denies Personal Hx of Anesthesia complications.   Social:  Non-Smoker    Hematology/Oncology:  Hematology Normal      Current/Recent Cancer. Breast   EENT/Dental:EENT/Dental Normal   Cardiovascular:   Exercise tolerance: good Hypertension, well controlled    Pulmonary:  Pulmonary Normal    Renal/:  Renal/ Normal     Musculoskeletal:   Spinal cord stimulator Spine Disorders: thoracic and lumbar    Neurological:  Neurology Normal    Endocrine:  Endocrine Normal    Dermatological:  Skin Normal    Psych:   anxiety depression          Physical Exam  General:  Morbid Obesity    Airway/Jaw/Neck:  Airway Findings: Mouth Opening: Normal Tongue: Normal  General Airway Assessment: Adult  Mallampati: I  TM Distance: Normal, at least 6 cm  Jaw/Neck Findings:  Neck ROM: Normal ROM      Dental:  Dental Findings: In tact              Anesthesia Plan  Type of Anesthesia, risks & benefits discussed:  Anesthesia Type:  general  Patient's Preference:   Intra-op Monitoring Plan: standard ASA monitors  Intra-op Monitoring Plan Comments:   Post Op Pain Control Plan: multimodal analgesia  Post Op Pain Control Plan Comments:   Induction:   IV  Beta Blocker:         Informed Consent: Patient understands risks and agrees with Anesthesia plan.  Questions answered. Anesthesia consent signed with patient.  ASA Score: 3     Day of Surgery Review of History & Physical:    H&P update referred to the surgeon.     Anesthesia Plan Notes: Recent labs, EKG from dec 2019 in [facility]        Ready For Surgery From Anesthesia  Perspective.

## 2020-05-19 ENCOUNTER — TELEPHONE (OUTPATIENT)
Dept: SURGERY | Facility: CLINIC | Age: 50
End: 2020-05-19

## 2020-05-19 NOTE — TELEPHONE ENCOUNTER
----- Message from Tigre Johnson sent at 5/19/2020 10:44 AM CDT -----  Contact: Pt       The Pt states that she would like to find out what non detectable means for a test result she received.  If John could call her back please.    Phone # [phone]

## 2020-05-19 NOTE — TELEPHONE ENCOUNTER
Return call to pt. Informed that her Covid was reported out as non detectable and may proceed with surgery tomorrow as scheduled. Pt to report to [facility] at 5:00 am on 5/20/2020 for surgery. Pt voiced understanding.

## 2020-05-20 ENCOUNTER — HOSPITAL ENCOUNTER (OUTPATIENT)
Facility: OTHER | Age: 50
Discharge: HOME | End: 2020-05-21
Attending: SURGERY | Admitting: SURGERY
Payer: COMMERCIAL

## 2020-05-20 ENCOUNTER — HOSPITAL ENCOUNTER (OUTPATIENT)
Dept: RADIOLOGY | Facility: OTHER | Age: 50
Discharge: HOME | End: 2020-05-20
Attending: SURGERY
Payer: COMMERCIAL

## 2020-05-20 ENCOUNTER — ANESTHESIA (OUTPATIENT)
Dept: SURGERY | Facility: OTHER | Age: 50
End: 2020-05-20
Payer: COMMERCIAL

## 2020-05-20 DIAGNOSIS — Z01.818 PREOP TESTING: Primary | ICD-10-CM

## 2020-05-20 DIAGNOSIS — R92.8 ABNORMAL MAMMOGRAM: ICD-10-CM

## 2020-05-20 DIAGNOSIS — C50.611 MALIGNANT NEOPLASM OF AXILLARY TAIL OF RIGHT FEMALE BREAST, UNSPECIFIED ESTROGEN RECEPTOR STATUS: ICD-10-CM

## 2020-05-20 DIAGNOSIS — Z17.0 MALIGNANT NEOPLASM OF AXILLARY TAIL OF RIGHT BREAST IN FEMALE, ESTROGEN RECEPTOR POSITIVE: ICD-10-CM

## 2020-05-20 DIAGNOSIS — C50.611 MALIGNANT NEOPLASM OF AXILLARY TAIL OF RIGHT BREAST IN FEMALE, ESTROGEN RECEPTOR POSITIVE: ICD-10-CM

## 2020-05-20 PROCEDURE — 88360 TUMOR IMMUNOHISTOCHEM/MANUAL: CPT | Mod: 59 | Performed by: PATHOLOGY

## 2020-05-20 PROCEDURE — 88341 IMHCHEM/IMCYTCHM EA ADD ANTB: CPT | Performed by: PATHOLOGY

## 2020-05-20 PROCEDURE — 38792 RA TRACER ID OF SENTINL NODE: CPT | Mod: 59,RT,, | Performed by: SURGERY

## 2020-05-20 PROCEDURE — 63600175 PHARM REV CODE 636 W HCPCS: Performed by: NURSE ANESTHETIST, CERTIFIED REGISTERED

## 2020-05-20 PROCEDURE — C9290 INJ, BUPIVACAINE LIPOSOME: HCPCS | Performed by: SURGERY

## 2020-05-20 PROCEDURE — 38900 PR INTRAOPERATIVE SENTINEL LYMPH NODE ID W DYE INJECTION: ICD-10-PCS | Mod: RT,,, | Performed by: SURGERY

## 2020-05-20 PROCEDURE — 38900 IO MAP OF SENT LYMPH NODE: CPT | Mod: RT,,, | Performed by: SURGERY

## 2020-05-20 PROCEDURE — 25000003 PHARM REV CODE 250: Performed by: ANESTHESIOLOGY

## 2020-05-20 PROCEDURE — A9520 TC99 TILMANOCEPT DIAG 0.5MCI: HCPCS

## 2020-05-20 PROCEDURE — 63600175 PHARM REV CODE 636 W HCPCS: Performed by: ANESTHESIOLOGY

## 2020-05-20 PROCEDURE — 63600175 PHARM REV CODE 636 W HCPCS: Performed by: SURGERY

## 2020-05-20 PROCEDURE — 36000706: Performed by: SURGERY

## 2020-05-20 PROCEDURE — C1789 PROSTHESIS, BREAST, IMP: HCPCS | Performed by: SURGERY

## 2020-05-20 PROCEDURE — 88342 IMHCHEM/IMCYTCHM 1ST ANTB: CPT | Mod: 59 | Performed by: PATHOLOGY

## 2020-05-20 PROCEDURE — 71000033 HC RECOVERY, INTIAL HOUR: Performed by: SURGERY

## 2020-05-20 PROCEDURE — 88342 CHG IMMUNOCYTOCHEMISTRY: ICD-10-PCS | Mod: 26,,, | Performed by: PATHOLOGY

## 2020-05-20 PROCEDURE — 38792 PR IDENTIFY SENTINEL 2DE: ICD-10-PCS | Mod: 59,RT,, | Performed by: SURGERY

## 2020-05-20 PROCEDURE — 88307 PR  SURG PATH,LEVEL V: ICD-10-PCS | Mod: 26,,, | Performed by: PATHOLOGY

## 2020-05-20 PROCEDURE — C1729 CATH, DRAINAGE: HCPCS | Performed by: SURGERY

## 2020-05-20 PROCEDURE — 36000707: Performed by: SURGERY

## 2020-05-20 PROCEDURE — 63600175 PHARM REV CODE 636 W HCPCS: Performed by: PLASTIC SURGERY

## 2020-05-20 PROCEDURE — 19303 MAST SIMPLE COMPLETE: CPT | Mod: RT,,, | Performed by: SURGERY

## 2020-05-20 PROCEDURE — 88331 PATH CONSLTJ SURG 1 BLK 1SPC: CPT | Performed by: PATHOLOGY

## 2020-05-20 PROCEDURE — 71000039 HC RECOVERY, EACH ADD'L HOUR: Performed by: SURGERY

## 2020-05-20 PROCEDURE — 19303 PR MASTECTOMY, SIMPLE, COMPLETE: ICD-10-PCS | Mod: RT,,, | Performed by: SURGERY

## 2020-05-20 PROCEDURE — 94799 UNLISTED PULMONARY SVC/PX: CPT

## 2020-05-20 PROCEDURE — 25000003 PHARM REV CODE 250: Performed by: NURSE ANESTHETIST, CERTIFIED REGISTERED

## 2020-05-20 PROCEDURE — 25000003 PHARM REV CODE 250: Performed by: PLASTIC SURGERY

## 2020-05-20 PROCEDURE — 88307 TISSUE EXAM BY PATHOLOGIST: CPT | Mod: 26,,, | Performed by: PATHOLOGY

## 2020-05-20 PROCEDURE — 88331 PATH CONSLTJ SURG 1 BLK 1SPC: CPT | Mod: 26,,, | Performed by: PATHOLOGY

## 2020-05-20 PROCEDURE — 88331 PR  PATH CONSULT IN SURG,W FRZ SEC: ICD-10-PCS | Mod: 26,,, | Performed by: PATHOLOGY

## 2020-05-20 PROCEDURE — 27201423 OPTIME MED/SURG SUP & DEVICES STERILE SUPPLY: Performed by: SURGERY

## 2020-05-20 PROCEDURE — 38525 PR BIOPSY/REM LYMPH NODES, AXILLARY: ICD-10-PCS | Mod: 51,RT,, | Performed by: SURGERY

## 2020-05-20 PROCEDURE — 37000009 HC ANESTHESIA EA ADD 15 MINS: Performed by: SURGERY

## 2020-05-20 PROCEDURE — 88342 IMHCHEM/IMCYTCHM 1ST ANTB: CPT | Mod: 26,,, | Performed by: PATHOLOGY

## 2020-05-20 PROCEDURE — 88307 TISSUE EXAM BY PATHOLOGIST: CPT | Performed by: PATHOLOGY

## 2020-05-20 PROCEDURE — 88341 IMHCHEM/IMCYTCHM EA ADD ANTB: CPT | Mod: 26,,, | Performed by: PATHOLOGY

## 2020-05-20 PROCEDURE — 37000008 HC ANESTHESIA 1ST 15 MINUTES: Performed by: SURGERY

## 2020-05-20 PROCEDURE — 38525 BIOPSY/REMOVAL LYMPH NODES: CPT | Mod: 51,RT,, | Performed by: SURGERY

## 2020-05-20 PROCEDURE — 88341 PR IHC OR ICC EACH ADD'L SINGLE ANTIBODY  STAINPR: ICD-10-PCS | Mod: 26,,, | Performed by: PATHOLOGY

## 2020-05-20 DEVICE — IMPLANTABLE DEVICE: Type: IMPLANTABLE DEVICE | Site: BREAST | Status: FUNCTIONAL

## 2020-05-20 DEVICE — IMPLANTABLE DEVICE
Type: IMPLANTABLE DEVICE | Site: BREAST | Status: NON-FUNCTIONAL
Removed: 2021-04-21

## 2020-05-20 RX ORDER — SODIUM CHLORIDE 0.9 % (FLUSH) 0.9 %
3 SYRINGE (ML) INJECTION
Status: DISCONTINUED | OUTPATIENT
Start: 2020-05-20 | End: 2020-05-20

## 2020-05-20 RX ORDER — CYCLOBENZAPRINE HCL 10 MG
10 TABLET ORAL 3 TIMES DAILY
Status: DISCONTINUED | OUTPATIENT
Start: 2020-05-20 | End: 2020-05-21 | Stop reason: HOSPADM

## 2020-05-20 RX ORDER — HYDROMORPHONE HYDROCHLORIDE 2 MG/ML
0.4 INJECTION, SOLUTION INTRAMUSCULAR; INTRAVENOUS; SUBCUTANEOUS EVERY 5 MIN PRN
Status: DISCONTINUED | OUTPATIENT
Start: 2020-05-20 | End: 2020-05-20 | Stop reason: HOSPADM

## 2020-05-20 RX ORDER — DEXAMETHASONE SODIUM PHOSPHATE 4 MG/ML
INJECTION, SOLUTION INTRA-ARTICULAR; INTRALESIONAL; INTRAMUSCULAR; INTRAVENOUS; SOFT TISSUE
Status: DISCONTINUED | OUTPATIENT
Start: 2020-05-20 | End: 2020-05-20

## 2020-05-20 RX ORDER — FENTANYL CITRATE 50 UG/ML
INJECTION, SOLUTION INTRAMUSCULAR; INTRAVENOUS
Status: DISCONTINUED | OUTPATIENT
Start: 2020-05-20 | End: 2020-05-20

## 2020-05-20 RX ORDER — ROCURONIUM BROMIDE 10 MG/ML
INJECTION, SOLUTION INTRAVENOUS
Status: DISCONTINUED | OUTPATIENT
Start: 2020-05-20 | End: 2020-05-20

## 2020-05-20 RX ORDER — DOCUSATE SODIUM 100 MG/1
100 CAPSULE, LIQUID FILLED ORAL EVERY 12 HOURS
Status: DISCONTINUED | OUTPATIENT
Start: 2020-05-20 | End: 2020-05-21 | Stop reason: HOSPADM

## 2020-05-20 RX ORDER — SODIUM CHLORIDE, SODIUM LACTATE, POTASSIUM CHLORIDE, CALCIUM CHLORIDE 600; 310; 30; 20 MG/100ML; MG/100ML; MG/100ML; MG/100ML
INJECTION, SOLUTION INTRAVENOUS CONTINUOUS
Status: DISCONTINUED | OUTPATIENT
Start: 2020-05-20 | End: 2020-05-21 | Stop reason: HOSPADM

## 2020-05-20 RX ORDER — CEPHALEXIN 500 MG/1
500 CAPSULE ORAL EVERY 6 HOURS
Qty: 28 CAPSULE | Refills: 0 | Status: SHIPPED | OUTPATIENT
Start: 2020-05-20 | End: 2020-05-20 | Stop reason: SDUPTHER

## 2020-05-20 RX ORDER — MEPERIDINE HYDROCHLORIDE 25 MG/ML
12.5 INJECTION INTRAMUSCULAR; INTRAVENOUS; SUBCUTANEOUS ONCE AS NEEDED
Status: DISCONTINUED | OUTPATIENT
Start: 2020-05-20 | End: 2020-05-20 | Stop reason: HOSPADM

## 2020-05-20 RX ORDER — PHENYLEPHRINE HYDROCHLORIDE 10 MG/ML
INJECTION INTRAVENOUS
Status: DISCONTINUED | OUTPATIENT
Start: 2020-05-20 | End: 2020-05-20

## 2020-05-20 RX ORDER — CEFAZOLIN SODIUM 2 G/50ML
2 SOLUTION INTRAVENOUS
Status: DISCONTINUED | OUTPATIENT
Start: 2020-05-20 | End: 2020-05-20 | Stop reason: SDUPTHER

## 2020-05-20 RX ORDER — DIPHENHYDRAMINE HCL 25 MG
25 CAPSULE ORAL EVERY 4 HOURS PRN
Status: DISCONTINUED | OUTPATIENT
Start: 2020-05-20 | End: 2020-05-21 | Stop reason: HOSPADM

## 2020-05-20 RX ORDER — ACETAMINOPHEN 325 MG/1
650 TABLET ORAL EVERY 4 HOURS PRN
Status: DISCONTINUED | OUTPATIENT
Start: 2020-05-20 | End: 2020-05-21 | Stop reason: HOSPADM

## 2020-05-20 RX ORDER — SODIUM CHLORIDE 9 MG/ML
INJECTION, SOLUTION INTRAVENOUS CONTINUOUS
Status: DISCONTINUED | OUTPATIENT
Start: 2020-05-20 | End: 2020-05-20

## 2020-05-20 RX ORDER — SODIUM CHLORIDE, SODIUM LACTATE, POTASSIUM CHLORIDE, CALCIUM CHLORIDE 600; 310; 30; 20 MG/100ML; MG/100ML; MG/100ML; MG/100ML
INJECTION, SOLUTION INTRAVENOUS CONTINUOUS
Status: DISCONTINUED | OUTPATIENT
Start: 2020-05-20 | End: 2020-05-20

## 2020-05-20 RX ORDER — LIDOCAINE HYDROCHLORIDE 20 MG/ML
INJECTION INTRAVENOUS
Status: DISCONTINUED | OUTPATIENT
Start: 2020-05-20 | End: 2020-05-20

## 2020-05-20 RX ORDER — ZOLPIDEM TARTRATE 5 MG/1
10 TABLET ORAL NIGHTLY PRN
Status: DISCONTINUED | OUTPATIENT
Start: 2020-05-20 | End: 2020-05-21 | Stop reason: HOSPADM

## 2020-05-20 RX ORDER — DEXAMETHASONE 4 MG/1
4 TABLET ORAL DAILY
Status: DISCONTINUED | OUTPATIENT
Start: 2020-05-20 | End: 2020-05-20 | Stop reason: ALTCHOICE

## 2020-05-20 RX ORDER — GABAPENTIN 300 MG/1
300 CAPSULE ORAL 2 TIMES DAILY
Status: DISCONTINUED | OUTPATIENT
Start: 2020-05-20 | End: 2020-05-21 | Stop reason: HOSPADM

## 2020-05-20 RX ORDER — OXYCODONE HYDROCHLORIDE 5 MG/1
5 TABLET ORAL EVERY 4 HOURS PRN
Status: DISCONTINUED | OUTPATIENT
Start: 2020-05-20 | End: 2020-05-21 | Stop reason: HOSPADM

## 2020-05-20 RX ORDER — POLYETHYLENE GLYCOL 3350 17 G/17G
17 POWDER, FOR SOLUTION ORAL DAILY
Status: DISCONTINUED | OUTPATIENT
Start: 2020-05-20 | End: 2020-05-21 | Stop reason: HOSPADM

## 2020-05-20 RX ORDER — ONDANSETRON 2 MG/ML
4 INJECTION INTRAMUSCULAR; INTRAVENOUS DAILY PRN
Status: DISCONTINUED | OUTPATIENT
Start: 2020-05-20 | End: 2020-05-20 | Stop reason: HOSPADM

## 2020-05-20 RX ORDER — OXYCODONE HYDROCHLORIDE 5 MG/1
10 TABLET ORAL EVERY 4 HOURS PRN
Status: DISCONTINUED | OUTPATIENT
Start: 2020-05-20 | End: 2020-05-21 | Stop reason: HOSPADM

## 2020-05-20 RX ORDER — ISOSULFAN BLUE 50 MG/5ML
INJECTION, SOLUTION SUBCUTANEOUS
Status: DISCONTINUED | OUTPATIENT
Start: 2020-05-20 | End: 2020-05-20 | Stop reason: HOSPADM

## 2020-05-20 RX ORDER — CEPHALEXIN 500 MG/1
500 CAPSULE ORAL EVERY 6 HOURS
Qty: 32 CAPSULE | Refills: 0 | Status: SHIPPED | OUTPATIENT
Start: 2020-05-20 | End: 2020-05-28

## 2020-05-20 RX ORDER — MUPIROCIN 20 MG/G
OINTMENT TOPICAL 2 TIMES DAILY
Status: DISCONTINUED | OUTPATIENT
Start: 2020-05-20 | End: 2020-05-21 | Stop reason: HOSPADM

## 2020-05-20 RX ORDER — FAMOTIDINE 10 MG/1
10 TABLET ORAL DAILY
Status: DISCONTINUED | OUTPATIENT
Start: 2020-05-20 | End: 2020-05-20 | Stop reason: DRUGHIGH

## 2020-05-20 RX ORDER — OXYCODONE AND ACETAMINOPHEN 5; 325 MG/1; MG/1
1 TABLET ORAL EVERY 6 HOURS PRN
Qty: 30 TABLET | Refills: 0 | Status: ON HOLD | OUTPATIENT
Start: 2020-05-20 | End: 2020-06-05 | Stop reason: HOSPADM

## 2020-05-20 RX ORDER — MIDAZOLAM HYDROCHLORIDE 1 MG/ML
INJECTION INTRAMUSCULAR; INTRAVENOUS
Status: DISCONTINUED | OUTPATIENT
Start: 2020-05-20 | End: 2020-05-20

## 2020-05-20 RX ORDER — CEFAZOLIN SODIUM 1 G/3ML
2 INJECTION, POWDER, FOR SOLUTION INTRAMUSCULAR; INTRAVENOUS
Status: COMPLETED | OUTPATIENT
Start: 2020-05-20 | End: 2020-05-20

## 2020-05-20 RX ORDER — CELECOXIB 200 MG/1
400 CAPSULE ORAL
Status: COMPLETED | OUTPATIENT
Start: 2020-05-20 | End: 2020-05-20

## 2020-05-20 RX ORDER — NEOSTIGMINE METHYLSULFATE 1 MG/ML
INJECTION, SOLUTION INTRAVENOUS
Status: DISCONTINUED | OUTPATIENT
Start: 2020-05-20 | End: 2020-05-20

## 2020-05-20 RX ORDER — METOCLOPRAMIDE HYDROCHLORIDE 5 MG/ML
5 INJECTION INTRAMUSCULAR; INTRAVENOUS EVERY 6 HOURS PRN
Status: DISCONTINUED | OUTPATIENT
Start: 2020-05-20 | End: 2020-05-21 | Stop reason: HOSPADM

## 2020-05-20 RX ORDER — OXYCODONE HYDROCHLORIDE 5 MG/1
5 TABLET ORAL
Status: DISCONTINUED | OUTPATIENT
Start: 2020-05-20 | End: 2020-05-20 | Stop reason: HOSPADM

## 2020-05-20 RX ORDER — ACETAMINOPHEN 500 MG
1000 TABLET ORAL
Status: COMPLETED | OUTPATIENT
Start: 2020-05-20 | End: 2020-05-20

## 2020-05-20 RX ORDER — ONDANSETRON 2 MG/ML
INJECTION INTRAMUSCULAR; INTRAVENOUS
Status: DISCONTINUED | OUTPATIENT
Start: 2020-05-20 | End: 2020-05-20

## 2020-05-20 RX ORDER — PROPOFOL 10 MG/ML
VIAL (ML) INTRAVENOUS
Status: DISCONTINUED | OUTPATIENT
Start: 2020-05-20 | End: 2020-05-20

## 2020-05-20 RX ORDER — LIDOCAINE HYDROCHLORIDE 10 MG/ML
1 INJECTION, SOLUTION EPIDURAL; INFILTRATION; INTRACAUDAL; PERINEURAL ONCE
Status: DISCONTINUED | OUTPATIENT
Start: 2020-05-20 | End: 2020-05-20 | Stop reason: HOSPADM

## 2020-05-20 RX ORDER — OXYCODONE AND ACETAMINOPHEN 5; 325 MG/1; MG/1
1 TABLET ORAL EVERY 6 HOURS PRN
Qty: 28 TABLET | Refills: 0 | Status: SHIPPED | OUTPATIENT
Start: 2020-05-20 | End: 2020-05-20 | Stop reason: SDUPTHER

## 2020-05-20 RX ORDER — FAMOTIDINE 20 MG/1
20 TABLET, FILM COATED ORAL 2 TIMES DAILY
Status: DISCONTINUED | OUTPATIENT
Start: 2020-05-20 | End: 2020-05-21 | Stop reason: HOSPADM

## 2020-05-20 RX ORDER — CEFAZOLIN SODIUM 2 G/50ML
2 SOLUTION INTRAVENOUS
Status: DISCONTINUED | OUTPATIENT
Start: 2020-05-20 | End: 2020-05-21 | Stop reason: HOSPADM

## 2020-05-20 RX ORDER — ONDANSETRON 8 MG/1
8 TABLET, ORALLY DISINTEGRATING ORAL EVERY 8 HOURS PRN
Status: DISCONTINUED | OUTPATIENT
Start: 2020-05-20 | End: 2020-05-21 | Stop reason: HOSPADM

## 2020-05-20 RX ORDER — ENOXAPARIN SODIUM 100 MG/ML
40 INJECTION SUBCUTANEOUS EVERY 24 HOURS
Status: DISCONTINUED | OUTPATIENT
Start: 2020-05-20 | End: 2020-05-21 | Stop reason: HOSPADM

## 2020-05-20 RX ORDER — GLYCOPYRROLATE 0.2 MG/ML
INJECTION INTRAMUSCULAR; INTRAVENOUS
Status: DISCONTINUED | OUTPATIENT
Start: 2020-05-20 | End: 2020-05-20

## 2020-05-20 RX ADMIN — SODIUM CHLORIDE, SODIUM LACTATE, POTASSIUM CHLORIDE, AND CALCIUM CHLORIDE: 600; 310; 30; 20 INJECTION, SOLUTION INTRAVENOUS at 09:05

## 2020-05-20 RX ADMIN — FENTANYL CITRATE 50 MCG: 50 INJECTION, SOLUTION INTRAMUSCULAR; INTRAVENOUS at 07:05

## 2020-05-20 RX ADMIN — CEFAZOLIN SODIUM 2 G: 2 SOLUTION INTRAVENOUS at 03:05

## 2020-05-20 RX ADMIN — HYDROMORPHONE HYDROCHLORIDE 0.4 MG: 2 INJECTION, SOLUTION INTRAMUSCULAR; INTRAVENOUS; SUBCUTANEOUS at 12:05

## 2020-05-20 RX ADMIN — CEFAZOLIN SODIUM 2 G: 2 SOLUTION INTRAVENOUS at 11:05

## 2020-05-20 RX ADMIN — CYCLOBENZAPRINE HYDROCHLORIDE 10 MG: 10 TABLET, FILM COATED ORAL at 03:05

## 2020-05-20 RX ADMIN — OXYCODONE HYDROCHLORIDE 10 MG: 5 TABLET ORAL at 10:05

## 2020-05-20 RX ADMIN — ROCURONIUM BROMIDE 10 MG: 10 INJECTION, SOLUTION INTRAVENOUS at 10:05

## 2020-05-20 RX ADMIN — MUPIROCIN: 20 OINTMENT TOPICAL at 09:05

## 2020-05-20 RX ADMIN — HYDROMORPHONE HYDROCHLORIDE 0.4 MG: 2 INJECTION, SOLUTION INTRAMUSCULAR; INTRAVENOUS; SUBCUTANEOUS at 03:05

## 2020-05-20 RX ADMIN — MIDAZOLAM HYDROCHLORIDE 2 MG: 1 INJECTION, SOLUTION INTRAMUSCULAR; INTRAVENOUS at 07:05

## 2020-05-20 RX ADMIN — LIDOCAINE HYDROCHLORIDE 75 MG: 20 INJECTION, SOLUTION INTRAVENOUS at 07:05

## 2020-05-20 RX ADMIN — POLYETHYLENE GLYCOL 3350 17 G: 17 POWDER, FOR SOLUTION ORAL at 04:05

## 2020-05-20 RX ADMIN — PROPOFOL 150 MG: 10 INJECTION, EMULSION INTRAVENOUS at 07:05

## 2020-05-20 RX ADMIN — SODIUM CHLORIDE, SODIUM LACTATE, POTASSIUM CHLORIDE, AND CALCIUM CHLORIDE: 600; 310; 30; 20 INJECTION, SOLUTION INTRAVENOUS at 06:05

## 2020-05-20 RX ADMIN — OXYCODONE HYDROCHLORIDE 10 MG: 5 TABLET ORAL at 05:05

## 2020-05-20 RX ADMIN — GABAPENTIN 300 MG: 300 CAPSULE ORAL at 09:05

## 2020-05-20 RX ADMIN — NEOSTIGMINE METHYLSULFATE 5 MG: 1 INJECTION INTRAVENOUS at 11:05

## 2020-05-20 RX ADMIN — PHENYLEPHRINE HYDROCHLORIDE 200 MCG: 10 INJECTION INTRAVENOUS at 10:05

## 2020-05-20 RX ADMIN — HYDROMORPHONE HYDROCHLORIDE 0.4 MG: 2 INJECTION, SOLUTION INTRAMUSCULAR; INTRAVENOUS; SUBCUTANEOUS at 02:05

## 2020-05-20 RX ADMIN — FAMOTIDINE 20 MG: 20 TABLET, FILM COATED ORAL at 09:05

## 2020-05-20 RX ADMIN — SODIUM CHLORIDE, SODIUM LACTATE, POTASSIUM CHLORIDE, AND CALCIUM CHLORIDE: .6; .31; .03; .02 INJECTION, SOLUTION INTRAVENOUS at 03:05

## 2020-05-20 RX ADMIN — HYDROMORPHONE HYDROCHLORIDE 0.4 MG: 2 INJECTION, SOLUTION INTRAMUSCULAR; INTRAVENOUS; SUBCUTANEOUS at 01:05

## 2020-05-20 RX ADMIN — ACETAMINOPHEN 1000 MG: 500 TABLET, FILM COATED ORAL at 05:05

## 2020-05-20 RX ADMIN — DEXAMETHASONE SODIUM PHOSPHATE 4 MG: 4 INJECTION, SOLUTION INTRAMUSCULAR; INTRAVENOUS at 07:05

## 2020-05-20 RX ADMIN — OXYCODONE HYDROCHLORIDE 5 MG: 5 TABLET ORAL at 12:05

## 2020-05-20 RX ADMIN — ONDANSETRON 4 MG: 2 INJECTION INTRAMUSCULAR; INTRAVENOUS at 11:05

## 2020-05-20 RX ADMIN — CYCLOBENZAPRINE HYDROCHLORIDE 10 MG: 10 TABLET, FILM COATED ORAL at 09:05

## 2020-05-20 RX ADMIN — ROCURONIUM BROMIDE 30 MG: 10 INJECTION, SOLUTION INTRAVENOUS at 07:05

## 2020-05-20 RX ADMIN — FENTANYL CITRATE 50 MCG: 50 INJECTION, SOLUTION INTRAMUSCULAR; INTRAVENOUS at 08:05

## 2020-05-20 RX ADMIN — CEFAZOLIN 2 G: 330 INJECTION, POWDER, FOR SOLUTION INTRAMUSCULAR; INTRAVENOUS at 07:05

## 2020-05-20 RX ADMIN — SODIUM CHLORIDE, SODIUM LACTATE, POTASSIUM CHLORIDE, AND CALCIUM CHLORIDE: .6; .31; .03; .02 INJECTION, SOLUTION INTRAVENOUS at 11:05

## 2020-05-20 RX ADMIN — ENOXAPARIN SODIUM 40 MG: 100 INJECTION SUBCUTANEOUS at 05:05

## 2020-05-20 RX ADMIN — FENTANYL CITRATE 50 MCG: 50 INJECTION, SOLUTION INTRAMUSCULAR; INTRAVENOUS at 10:05

## 2020-05-20 RX ADMIN — CELECOXIB 400 MG: 200 CAPSULE ORAL at 05:05

## 2020-05-20 RX ADMIN — IBUPROFEN 800 MG: 800 INJECTION INTRAVENOUS at 01:05

## 2020-05-20 RX ADMIN — DOCUSATE SODIUM 100 MG: 100 CAPSULE, LIQUID FILLED ORAL at 09:05

## 2020-05-20 RX ADMIN — GLYCOPYRROLATE 0.8 MG: 0.2 INJECTION, SOLUTION INTRAMUSCULAR; INTRAVENOUS at 11:05

## 2020-05-20 NOTE — ANESTHESIA POSTPROCEDURE EVALUATION
Anesthesia Post Evaluation    Patient: Phylicia Vásquez    Procedure(s) Performed: Procedure(s) (LRB):  MASTECTOMY, UNILATERAL (Right)  INJECTION, FOR SENTINEL NODE IDENTIFICATION (Right)  BIOPSY, LYMPH NODE, SENTINEL (Right)  INSERTION, TISSUE EXPANDER, BREAST (Right)    Final Anesthesia Type: general    Patient location during evaluation: PACU  Patient participation: Yes- Able to Participate  Level of consciousness: awake and alert  Post-procedure vital signs: reviewed and stable  Pain management: adequate  Airway patency: patent    PONV status at discharge: No PONV  Anesthetic complications: no      Cardiovascular status: blood pressure returned to baseline  Respiratory status: unassisted  Hydration status: euvolemic  Follow-up not needed.          Vitals Value Taken Time   /56 5/20/2020 12:58 PM   Temp 37.1 °C (98.7 °F) 5/20/2020 12:10 PM   Pulse 89 5/20/2020  1:00 PM   Resp 18 5/20/2020 12:10 PM   SpO2 99 % 5/20/2020 12:58 PM   Vitals shown include unvalidated device data.      No case tracking events are documented in the log.      Pain/George Score: Pain Rating Prior to Med Admin: 8 (5/20/2020 12:32 PM)  Pain Rating Post Med Admin: 7 (5/20/2020 12:40 PM)  George Score: 8 (5/20/2020 12:40 PM)

## 2020-05-20 NOTE — PLAN OF CARE
AAOx4. VSS and afebrile. Pt free from falls, injury, and skin breakdown. Pain controlled via PO prn medication. R breast remains warm, swollen/ firm to palpation (MD/ charge RN aware) with no acute changes noted. Surgical bra in place. SUZAN drain x1 putting out sanguineous drainage, drain care performed. Tolerating ordered diet. Voiding spontaneously without difficulty. IVFs infusing as ordered. POC reviewed with patient and all questions answered. Pt has call light in reach, bed alarm on, bed brakes on, side rails up x2, bed in low position, SCDs on, and nonskid socks on.  Purposeful hourly rounding and safety maintained. Cont to monitor.

## 2020-05-20 NOTE — ANESTHESIA PROCEDURE NOTES
Intubation  Performed by: Verenice Sanchez CRNA  Authorized by: Rashid Singh MD     Intubation:     Induction:  Intravenous    Intubated:  Postinduction    Mask Ventilation:  Easy with oral airway    Attempts:  1    Attempted By:  CRNA    Method of Intubation:  Video laryngoscopy    Blade:  Antonio 3    Laryngeal View Grade: Grade I - full view of chords      Difficult Airway Encountered?: No      Complications:  None    Airway Device:  Oral endotracheal tube    Airway Device Size:  7.0    Style/Cuff Inflation:  Cuffed    Inflation Amount (mL):  4    Tube secured:  22    Secured at:  The lips    Placement Verified By:  Capnometry    Complicating Factors:  Obesity and short neck    Findings Post-Intubation:  BS equal bilateral and atraumatic/condition of teeth unchanged

## 2020-05-20 NOTE — DISCHARGE SUMMARY
[facility]  Plastic Surgery  Discharge Summary      Patient Name: Phylicia Vásquez  MRN: [id number]  Admission Date: 5/20/2020  Hospital Length of Stay: 0 days  Discharge Date and Time:  05/20/2020 12:00 PM  Attending Physician: Lelo Guaman MD   Discharging Provider: Radbeh Torabi, MD  Primary Care Provider: Ella Hunter MD     Procedure(s) (LRB):  MASTECTOMY, UNILATERAL (Right)  INJECTION, FOR SENTINEL NODE IDENTIFICATION (Right)  BIOPSY, LYMPH NODE, SENTINEL (Right)  INSERTION, TISSUE EXPANDER, BREAST (Right)     Hospital Course: patient admitted to [facility] underwent above procedure. She tolerated the procedure well. Following recovery from anesthesia, she was discharged home.       Pending Diagnostic Studies:     Procedure Component Value Units Date/Time    Specimen to Pathology, Surgery Breast [[id number]] Collected:  05/20/20 0845    Order Status:  Sent Lab Status:  In process Updated:  05/20/20 1142        Final Active Diagnoses:    Diagnosis Date Noted POA    PRINCIPAL PROBLEM:  Malignant neoplasm of axillary tail of right female breast [C50.611] 11/24/2019 Unknown    Preop testing [Z01.818] 05/20/2020 Not Applicable      Problems Resolved During this Admission:      Discharged Condition: good    Disposition: Home or Self Care    Follow Up:    Patient Instructions:      COVID-19 Routine Screening   Standing Status: Future Number of Occurrences: 1 Standing Exp. Date: 07/14/21     Order Specific Question Answer Comments   Is the patient symptomatic? No      Lifting restrictions     Notify your health care provider if you experience any of the following:  temperature >100.4     Notify your health care provider if you experience any of the following:  persistent nausea and vomiting or diarrhea     Notify your health care provider if you experience any of the following:  severe uncontrolled pain     Notify your health care provider if you experience any of the following:  redness,  tenderness, or signs of infection (pain, swelling, redness, odor or green/yellow discharge around incision site)     Notify your health care provider if you experience any of the following:  difficulty breathing or increased cough     Notify your health care provider if you experience any of the following:  severe persistent headache     Notify your health care provider if you experience any of the following:  worsening rash     Notify your health care provider if you experience any of the following:  persistent dizziness, light-headedness, or visual disturbances     Notify your health care provider if you experience any of the following:  increased confusion or weakness     No dressing needed     Activity as tolerated     Medications:  Reconciled Home Medications:      Medication List      START taking these medications    cephALEXin 500 MG capsule  Commonly known as:  KEFLEX  Take 1 capsule (500 mg total) by mouth every 6 (six) hours. for 7 days     oxyCODONE-acetaminophen 5-325 mg per tablet  Commonly known as:  PERCOCET  Take 1 tablet by mouth every 6 (six) hours as needed for Pain.        CONTINUE taking these medications    amlodipine-valsartan 5-320 mg per tablet  Commonly known as:  EXFORGE  Take 1 tablet by mouth once daily.     cyclobenzaprine 5 MG tablet  Commonly known as:  FLEXERIL  TAKE 1 TO 2 TABLETS BY MOUTH DAILY AT BEDTIME AS NEEDED     dexAMETHasone 4 MG Tab  Commonly known as:  DECADRON  Take 5 tablets 12 hours prior to chemotherapy and another 5 tablets 6 hours prior to each cycle of taxol chemotherapy     ergocalciferol 50,000 unit Cap  Commonly known as:  ERGOCALCIFEROL  Take 1 capsule (50,000 Units total) by mouth every 7 days.     famotidine 10 MG tablet  Commonly known as:  PEPCID  Take 10 mg by mouth once daily.     fluocinolone 0.01 % external oil  Commonly known as:  DERMA-SMOOTHE  apply a thin film to affected area every night at bedtime as needed for flare     fluticasone propionate 50  mcg/actuation nasal spray  Commonly known as:  FLONASE  1 spray by Each Nare route 2 (two) times daily as needed.     gabapentin 300 MG capsule  Commonly known as:  NEURONTIN  Take 1 capsule (300 mg total) by mouth 2 (two) times daily.     magic mouthwash diphen/antac/lidoc/nysta  Take 10 mLs by mouth 4 (four) times daily.     mirtazapine 15 MG disintegrating tablet  Commonly known as:  REMERON SOL-TAB  Take 1 tablet (15 mg total) by mouth nightly.     ondansetron 8 MG tablet  Commonly known as:  ZOFRAN  Take 1 tablet by mouth every 12 hours x 3 days post chemotherapy followed by 1 tablet by mouth every 12 hours as needed for nausea.     ondansetron 8 MG Tbdl  Commonly known as:  ZOFRAN-ODT  Dissolve 1 tablet (8 mg total) by mouth every 6 (six) hours as needed (Nausea).     oxyCODONE 10 mg Tab immediate release tablet  Commonly known as:  ROXICODONE  Take 1 tablet (10 mg total) by mouth every 4 (four) hours as needed for Pain.     promethazine 25 MG tablet  Commonly known as:  PHENERGAN  Take 1 tablet (25 mg total) by mouth every 6 (six) hours as needed for Nausea.     tobramycin sulfate 0.3% 0.3 % ophthalmic solution  Commonly known as:  TOBREX  1-2 drops topically twice daily to affected toe(s).     zolpidem 10 mg Tab  Commonly known as:  AMBIEN  Take 1 tablet (10 mg total) by mouth nightly as needed.            Radbeh Torabi, MD  Plastic Surgery  [facility]

## 2020-05-20 NOTE — OR NURSING
Pt resting with eyes closed, awakens to verbal stimuli, rates right breast pain 5-10 and VSS, ready for transfer to inpatient room. Report called to DARINEL Borrego and family updated. Pt transported to room 348 in bed with transporter and RN @ bedside

## 2020-05-20 NOTE — TRANSFER OF CARE
"Anesthesia Transfer of Care Note    Patient: Phylicia Vásquez    Procedure(s) Performed: Procedure(s) (LRB):  MASTECTOMY, UNILATERAL (Right)  INJECTION, FOR SENTINEL NODE IDENTIFICATION (Right)  BIOPSY, LYMPH NODE, SENTINEL (Right)  INSERTION, TISSUE EXPANDER, BREAST (Right)    Patient location: PACU    Anesthesia Type: general    Transport from OR: Transported from OR on 2-3 L/min O2 by NC with adequate spontaneous ventilation    Post pain: adequate analgesia    Post assessment: no apparent anesthetic complications and tolerated procedure well    Post vital signs: stable    Level of consciousness: awake, alert and oriented    Nausea/Vomiting: no nausea/vomiting    Complications: none    Transfer of care protocol was followed      Last vitals:   Visit Vitals  /73 (BP Location: Left arm, Patient Position: Lying)   Pulse 99   Temp 37.1 °C (98.7 °F) (Oral)   Resp 18   Ht 5' 4" (1.626 m)   Wt 103.4 kg (227 lb 15.3 oz)   LMP 08/14/2014   SpO2 100%   Breastfeeding? No   BMI 39.13 kg/m²     "

## 2020-05-20 NOTE — OP NOTE
Operative Note     5/20/2020    PRE-OP DIAGNOSIS: Malignant neoplasm of axillary tail of right female breast,  estrogen receptor status positive       POST-OP DIAGNOSIS: Post-Op Diagnosis Codes:     * Malignant neoplasm of axillary tail of right female breast,  estrogen receptor status positive    Procedure(s):  MASTECTOMY, UNILATERAL  INJECTION, FOR SENTINEL NODE IDENTIFICATION  BIOPSY, LYMPH NODE, SENTINEL    INSERTION, TISSUE EXPANDER, BREAST  - to be dictated separately by Dr. Ramos    SURGEON: Surgeon(s) and Role:  Panel 1:     * Lelo Guaman MD - Primary  Resident - Augusta Clarke  Panel 2:     * Pablo Ramos MD - Primary    ANESTHESIA: General     OPERATIVE FINDINGS: Healthy appearing mastectomy flaps at the completion of the procedure with 2 negative nodes on frozen section, one being the clipped node.  The pathologist did have difficulty reading the chandler material and cut additional block, but still did not see any viable tumor.  We deferred axillary dissection.    INDICATION FOR PROCEDURE: This patient presents with a history of invasive lobular carcinoma of the right breast    PROCEDURE IN DETAIL:  Phylicia Vásquez is a 50 y.o. female brought to the operating room for definitive surgery of invasive lobular carcinoma of the right breast.  The patient has elected to undergo right simple mastectomy with sentinel lymph node biopsy for chandler assessment. The patient was informed of the possible risks and complications of the procedure, including but not limited to anesthetic risks, bleeding, infection, and need for additional surgery.  The patient concurred with the proposed plan, and has given informed consent.  The site of surgery was properly noted/marked in the preoperative holding area.    The patient was then brought to the operating room and placed in the supine position with both upper extremities extended.  local and general anesthesia was administered. Perioperative antibiotics were  administered consisting of Ancef and a time out was performed confirming the patient, site, and procedure.   The patient's right breast was injected with technetium to facilitate sentinel lymph node identification.The right chest and axilla was then prepped and draped in the usual sterile fashion.    We then turned our attention to the right breast where an elliptical incision was fashioned to incorporate the nipple areolar complex.  The incision was made with a plasma-blade and extended through the subcutaneous tissues with plasmablade.  Skin flaps were raised to the clavicle superiorly.  We then  turned our attention to the right axilla.  Blue dye was injected prior to the incision in the usual subareolar fashion. The gamma probe was used to identify an area of increased radioactivity within the lower axilla. The clavipectoral sheath was sharply incised to reveal the level I axillary lymph nodes. The probe was used to identify a single node with increased radioactivity.  This node was brought into the operative field and carefully dissected free of the surrounding lymphovascular structures.  The highest ex vivo count of the node was 37 and this was the clipped node verified with specimen radiograph.  The node was then sent to pathology for frozen section evaluation, labeled as sentinel node #1.  A total of 2 axillary sentinel nodes and 0 axillary non-sentinel nodes were identified, excised and submitted to pathology.  Bed counts were obtained to confirm that the 10% rule had not been violated.   The wound was irrigated with normal saline, and all bleeding points were secured with Bovie electrocautery.     We then proceeded to raise the remainder of the flaps to the lateral border of the sternum medially, to the inframammary fold inferiorly, and to the anterior border of the latissimus dorsi muscle laterally. The breast tissue was sharply excised off the chest wall taking care to incorporate the pectoralis fascia  while leaving the serratus fascia behind.  The resulting mastectomy specimen was marked using a short stitch superiorly and long stitch laterally.  The breast was sent to pathology for permanent evaluation.      Frozen section chandler evaluation revealed no evidence of metastatic disease.  Therefore, the operative field was irrigated with normal saline and all bleeding points were secured with Bovie electrocautery. The case was then turned over to plastic surgery for the reconstruction and closure.    Dermabond was applied. A post surgical bra was placed on the patient. At the end of the operation, all sponge, instrument, and needle counts x 2 were correct.    ESTIMATED BLOOD LOSS: less than 50 mL    COMPLICATIONS: none    DISPOSITION: PACU - hemodynamically stable.    ATTESTATION:   I performed the procedure.

## 2020-05-20 NOTE — OR NURSING
Pt states pain is getting better but she's worried about pain going home and stated she was told she could spend one night in the hospital and she would like to. Dr. Zhong notified and awaiting admit orders.    No answer on mom's cell phone, sister, Cristina, updated on pt status.

## 2020-05-20 NOTE — OR NURSING
Right breast noted to be swollen and firm to palpation. Dr. Ramos to bedside to assess pt and states all is WNL - will continue to monitor.

## 2020-05-20 NOTE — NURSING
Report received from Nestor TENA. Pt arrived to floor via bed with RN. Pt AAOx4, VSS on 2L NC. Pt oriented to room. R breast warm, swollen/firm to palpation - Dr. Marin aware, nursing to continue to monitor site overnight and contact physician for any concerns. Charge RN made aware, assessed patient at bedside. Prineo tape noted to site with abd pads and surgical bra in place. SUZAN drain x1 with biopatch/tegaderm noted draining sanguineous fluid. Kerlex in place under R arm upon arrival. R lymph alert/ exparel bands noted.  IVF started. Scds applied. Safety maintained. Call light within reach, bed alarm on, bed low and locked, side rails up x2, and nonskid socks on. Cont to monitor.

## 2020-05-20 NOTE — OP NOTE
OPERATIVE REPORT    Patient Phylicia Vásquez  MRN: [id number]  Date of surgery: 5/20/2020    PREOPERATIVE DIAGNOSIS  Active Hospital Problems    Diagnosis  POA    *Malignant neoplasm of axillary tail of right female breast [C50.611]  Unknown    Preop testing [Z01.818]  Not Applicable      Resolved Hospital Problems   No resolved problems to display.       POSTOPERATIVE DIAGNOSIS  Active Hospital Problems    Diagnosis  POA    *Malignant neoplasm of axillary tail of right female breast [C50.611]  Unknown    Preop testing [Z01.818]  Not Applicable      Resolved Hospital Problems   No resolved problems to display.       PROCEDURE PERFORMED  1. BREAST RECONSTRUCTION, IMMEDIATE, WITH TISSUE EXPANDER, Right, prepectoral with Flex HD wrap    SURGEON  Dr. Ramos    ASSISTANT  Dr. Zhong    ANESTHESIA  General    SPECIMENS  Right breast: 1226 gm    CULTURES  None    DRAINS AND TUBES  19 Fr Sav Drains x1    COMPLICATIONS  None    COUNTS  Correct    DISPOSITION: Stable condition to post anesthesia care unit.    INDICATIONS  This patient is a 49 yo F with right breast cancer scheduled to undergo right simple skin sparing mastectomy with SLNB. After discussion of the surgical options and timing of reconstruction, the patient preferred immediate, pre-pectoral implant-based reconstruction with two-stage reconstruction; she understands the location of expander placement will depend in part upon  the viability of mastectomy skin flaps. She understands acellular dermal matrix may be utilized in these procedures. After discussion of the risks, benefits, alternatives, expected outcomes, and potential complications, informed consent was obtained. Necessary perioperative restrictions, as well as expected recovery, potential disability, and plan for secondary procedures were all reviewed and questions answered.    OPERATIVE PROCEDURE  The patient was met in the preoperative holding area. Interval assessment of her health  revealed no change since the preoperative exam and review of her preoperative laboratory testing identified no contraindications for elective surgery. Markings of the IMF, midline, medial and lateral breast borders were performed.    The patient was brought to the operating room and placed supine on the operating room table. Time out and verification procedures were performed confirming her identity, diagnosis, nature and sites of procedures, as well as availability of necessary equipment. The patient received prophylactic antibiotics intravenously. Sequential compressive devices placed on each lower extremity were deployed for venous thromboembolic protection. The patient was then sedated and intubated without complication. Arms were positioned away from the body axis, secured to arm boards, and then the anterior chest and abdomen were prepped and draped in sterile fashion.    Dr Guaman performed a simple skin-sparing mastectomy. The mastectomy specimens were weighed: 1226gm on the right.  [place] nodes were negative. The skin flaps were of a satisfactory thickness with good gross vascularity.  Hemostasis and irrigation of the mastectomy pocket was performed.     TISSUE EXPANDER INSERTION  Exparel was used for anesthesia as a field block and for intercostal nerve blocks, using 20 ml of Exparel diluted to a final volume of 40 ml, using half on each side. Attention was then turned towards insertion of tissue expanders in the pre-pectoral plane.  To do this, the breast footprint was outlined within the mastectomy pocket and measurements taken for appropriate expander size.  A 800cc expander was chosen and irrigated with triple antibiotic solution.    The skin was re-prepped with betadine, new sterile towels were placed, and surgeon's gloves were changed. The mastectomy pocket was irrigated thoroughly with triple antibiotic solution.  Acellular dermal matrix was needed for anterior coverage.  A 20 x 20 cm sheet of  thick perforated ADM was opened, irrigated with triple antibiotic solution three times. On the back table, the ADM was used to wrap the anterior wall of the tissue expander.  The expander-ADM construct was then inserted, sutured in place using suture tabs and additional tacking sutures of 2-0 PDS from the ADM to the chest wall superiorly, inferomedially and inferolaterally.  The entire pocket was then again irrigated with triple antibiotic solution and hemostasis confirmed.  Closed suction drains was placed through small incision tunneled inferior to the lateral IMF.    The skin was tailor tacked and excess skin was excised. The pocket was closed with interrupted 2-0 vicryl suture in the deep plane followed by 3-0 deep dermal interrupted monocryl and running 3-0 monoderm quil subcuticular. The incision was reinforced with skin glue tape and with sterile dressings.   She was then awakened and when breathing spontaneously and able to protect her airway, she was extubated and taken to recovery room in good condition.

## 2020-05-20 NOTE — PROGRESS NOTES
Pharmacist Renal Dose Adjustment Note    Phylicia Vásquez is a 50 y.o. female being treated with the medication famotidine.    Patient Data:    Vital Signs (Most Recent):  Temp: 98.5 °F (36.9 °C) (05/20/20 1555)  Pulse: 68 (05/20/20 1555)  Resp: 17 (05/20/20 1555)  BP: 110/61 (05/20/20 1555)  SpO2: 95 % (05/20/20 1555) Vital Signs (72h Range):  Temp:  [97.9 °F (36.6 °C)-98.7 °F (37.1 °C)]   Pulse:  []   Resp:  [16-18]   BP: ()/(52-83)   SpO2:  [93 %-100 %]      Recent Labs   Lab 05/15/20  1102   CREATININE 0.8     Serum creatinine: 0.8 mg/dL 05/15/20 1102  Estimated creatinine clearance: 98.5 mL/min    Medication:famotidine 10mg daily will be changed to famotidine 20mg twice daily     Pharmacist's Name: Claudia Rivera  Pharmacist's Extension: [phone]

## 2020-05-20 NOTE — INTERVAL H&P NOTE
The patient has been examined and the H&P has been reviewed:  We have decided to focus on unilateral mastectomy until final reconstruction stage.  Proceed to OR today.    I concur with the findings and no changes have occurred since H&P was written.    Anesthesia/Surgery risks, benefits and alternative options discussed and understood by patient/family.          Active Hospital Problems    Diagnosis  POA    Preop testing [Z01.818]  Not Applicable      Resolved Hospital Problems   No resolved problems to display.

## 2020-05-21 ENCOUNTER — PATIENT MESSAGE (OUTPATIENT)
Dept: INTERNAL MEDICINE | Facility: CLINIC | Age: 50
End: 2020-05-21

## 2020-05-21 VITALS
SYSTOLIC BLOOD PRESSURE: 128 MMHG | BODY MASS INDEX: 38.91 KG/M2 | WEIGHT: 227.94 LBS | HEART RATE: 63 BPM | OXYGEN SATURATION: 99 % | TEMPERATURE: 98 F | RESPIRATION RATE: 1 BRPM | DIASTOLIC BLOOD PRESSURE: 91 MMHG | HEIGHT: 64 IN

## 2020-05-21 PROCEDURE — 25000003 PHARM REV CODE 250: Performed by: PLASTIC SURGERY

## 2020-05-21 PROCEDURE — 63600175 PHARM REV CODE 636 W HCPCS: Performed by: PLASTIC SURGERY

## 2020-05-21 PROCEDURE — 94761 N-INVAS EAR/PLS OXIMETRY MLT: CPT

## 2020-05-21 PROCEDURE — 94799 UNLISTED PULMONARY SVC/PX: CPT

## 2020-05-21 RX ADMIN — OXYCODONE HYDROCHLORIDE 10 MG: 5 TABLET ORAL at 03:05

## 2020-05-21 RX ADMIN — OXYCODONE HYDROCHLORIDE 5 MG: 5 TABLET ORAL at 08:05

## 2020-05-21 RX ADMIN — DOCUSATE SODIUM 100 MG: 100 CAPSULE, LIQUID FILLED ORAL at 08:05

## 2020-05-21 RX ADMIN — POLYETHYLENE GLYCOL 3350 17 G: 17 POWDER, FOR SOLUTION ORAL at 08:05

## 2020-05-21 RX ADMIN — CYCLOBENZAPRINE HYDROCHLORIDE 10 MG: 10 TABLET, FILM COATED ORAL at 08:05

## 2020-05-21 RX ADMIN — GABAPENTIN 300 MG: 300 CAPSULE ORAL at 08:05

## 2020-05-21 RX ADMIN — FAMOTIDINE 20 MG: 20 TABLET, FILM COATED ORAL at 08:05

## 2020-05-21 RX ADMIN — OXYCODONE HYDROCHLORIDE 5 MG: 5 TABLET ORAL at 12:05

## 2020-05-21 RX ADMIN — SODIUM CHLORIDE, SODIUM LACTATE, POTASSIUM CHLORIDE, AND CALCIUM CHLORIDE: .6; .31; .03; .02 INJECTION, SOLUTION INTRAVENOUS at 08:05

## 2020-05-21 RX ADMIN — CEFAZOLIN SODIUM 2 G: 2 SOLUTION INTRAVENOUS at 08:05

## 2020-05-21 NOTE — NURSING
Patient prepared for DC to home. DC instructions and education reviewed. PIV removed with canula intact.  AVS printed and provided to patient. Patient escorted via wheelchair in [facility].

## 2020-05-21 NOTE — PLAN OF CARE
Patient remains free from injury and fall throughout shift. Vitals stable. Patient is oriented x 4 and ambulatory. Pain reported and medication administered per orders. Incision site checks every 4 hours.  No acute changes to note. Bed locked in lowest position with side rails up x 2.  Call light within reach of patient. Purposeful rounding maintained throughout shift. Will continue to monitor.

## 2020-05-21 NOTE — PLAN OF CARE
05/21/20 1531   Final Note   Assessment Type Final Discharge Note   Anticipated Discharge Disposition Home   What phone number can be called within the next 1-3 days to see how you are doing after discharge?   ([phone])   Hospital Follow Up  Appt(s) scheduled? No

## 2020-05-22 ENCOUNTER — TELEPHONE (OUTPATIENT)
Dept: HEMATOLOGY/ONCOLOGY | Facility: CLINIC | Age: 50
End: 2020-05-22

## 2020-05-22 NOTE — TELEPHONE ENCOUNTER
Called patient to schedule follow up we scheduled in clinic on 6/8. Mailed appt slip.      ----- Message from John Mayes RN sent at 5/14/2020  8:03 AM CDT -----  Tr Edmonds has already seen Dr Zambrano in past, (Feb 2020). Dr Guaman saw her for surgery [[place] yesterday. Needs f/u appt with Dr Zambrano.     Thank You,   John    ----- Message -----  From: Lelo Guaman MD  Sent: 5/13/2020   5:15 PM CDT  To: John Mayes RN    Needs joseluis

## 2020-05-23 ENCOUNTER — NURSE TRIAGE (OUTPATIENT)
Dept: ADMINISTRATIVE | Facility: CLINIC | Age: 50
End: 2020-05-23

## 2020-05-23 NOTE — TELEPHONE ENCOUNTER
Reason for Disposition   [1] Caller has URGENT question AND [2] triager unable to answer question    Additional Information   Negative: Sounds like a life-threatening emergency to the triager   Negative: Chest pain   Negative: Difficulty breathing   Negative: Surgical incision symptoms and questions   Negative: [1] Discomfort (pain, burning or stinging) when passing urine AND [2] male   Negative: [1] Discomfort (pain, burning or stinging) when passing urine AND [2] female   Negative: Constipation   Negative: New or worsening leg (calf, thigh) pain   Negative: New or worsening leg swelling   Negative: Dizziness is severe, or persists > 24 hours after surgery   Negative: Pain, redness, swelling, or pus at IV Site   Negative: Symptoms arising from use of a urinary catheter (Agrawal or Coude)   Negative: Cast problems or questions   Negative: Medication question   Negative: [1] Widespread rash AND [2] bright red, sunburn-like   Negative: [1] Severe headache AND [2] after spinal (epidural) anesthesia   Negative: [1] Vomiting AND [2] persists > 4 hours   Negative: [1] Vomiting AND [2] abdomen looks much more swollen than usual   Negative: [1] Drinking very little AND [2] dehydration suspected (e.g., no urine > 12 hours, very dry mouth, very lightheaded)   Negative: Patient sounds very sick or weak to the triager   Negative: Sounds like a serious complication to the triager   Negative: Fever > 100.5 F (38.1 C)   Negative: [1] SEVERE post-op pain (e.g., excruciating, pain scale 8-10) AND [2] not controlled with pain medications    Protocols used: POST-OP SYMPTOMS AND [id number]  5/20 mastectomy   pt have sudden swelling in bilat feet and bilat hands , afeb, no pain. no SOB. pt had drain tube on R side. Emptying q 4 hours. Last output=1.5 ml. Pt eating and drinking fine. Uop 3-4 times today. passing Bms. Incision no redness no drainage. Last pm had two sharp pain behind R breast. No pain now. Legs  swollen to from knees to toes. Spoke with dr trivedi, no leg pain. not warm to touch. Swelling same of both legs. MD rec for pt to walk around more. Needs to be seen if worsening pain, concerns about incision. Drain output changes in qty or character, febrile. Pt notified. call back with questions

## 2020-05-25 ENCOUNTER — TELEPHONE (OUTPATIENT)
Dept: SURGERY | Facility: CLINIC | Age: 50
End: 2020-05-25

## 2020-05-25 NOTE — TELEPHONE ENCOUNTER
Call to pt after receiving a message that pt called [facility] on Call over the weekend for swelling of bilateral lower extremities. Per pt, swelling has resolved. Pt doing well today. Drain output approximately 40-50 cc per day. Per pt states drain puts out more during the day than at night. Asked when she is due to see plastic surgery. Pt does not have an appt yet. Number to Dr Marin's office given to pt. Post op with Dr Guaman 6/3/2020.

## 2020-05-26 RX ORDER — TOBRAMYCIN 3 MG/ML
SOLUTION/ DROPS OPHTHALMIC
Qty: 5 ML | Refills: 3 | Status: CANCELLED | OUTPATIENT
Start: 2020-05-26

## 2020-05-27 LAB
FINAL PATHOLOGIC DIAGNOSIS: NORMAL
FROZEN SECTION DIAGNOSIS: NORMAL
FROZEN SECTION FOOTNOTE: NORMAL
GROSS: NORMAL
Lab: NORMAL

## 2020-06-01 DIAGNOSIS — K12.30 MUCOSITIS: ICD-10-CM

## 2020-06-02 NOTE — PROGRESS NOTES
Breast Surgery  [facility]  Department of Surgery       Phylicia Vásquez is a 50 y.o. female with Stage T2N1M0 invasive lobular carcinoma of the RIGHT breast, ER+/OR +/HEr2 olivia negative.    She initially presented with a screening mammogram 10/16/19 that showed a focal asymmetry in the upper outer quadrant of the right breast in the posterior depth. Patient never felt a mass but she was having tenderness at her right lateral breast.  Follow-up MMG (10/31/19) showed an irregular equal density mass with spiculated margins measuring 1.6cm.  US evaluation showed an irregular hypoechoic mass at 10 o'clock right breast, N+14 cm. A level 1 right axillary adenopathy of a couple nodes were also identified the largest of which measuring 2.1cm.  A ultrasound guided biopsy was performed on 19 with pathology revealing infiltrating mammary carcinoma of the breast and one biopsy positive lymph node.     Patient had a previous right breast biopsy performed in  consistent with an intraductal papilloma which was excised by Dr. Jaime.    Genetic testing was negative with a VUS mutation.     Interval History 6/3/20:  She underwent R mastectomy with SLNB and tissue expander with plastics on 20. Path demonstrates negative margins, but 2 positive LNs that were negative on frozen section.     GYN History:  Age of menarche was 12. Age of menopause was 37 after hysterectomy.  Patient denies hormonal therapy. Patient is . Age of first live birth was 25. Patient did not breast feed.    Family History:  Paternal aunt had ovarian cancer in her 50s  Father had lung and prostate cancer (smoker)    Past Medical History:   Diagnosis Date    Anxiety     Back pain     Goiter     HTN (hypertension) 10/18/2012    Hx antineoplastic chemo     last dose 20    Hx of psychiatric care     Ambien, Klonopin    Insomnia 10/18/2012    Malignant neoplasm of axillary tail of right breast in female, estrogen receptor  positive 2019    Primary invasive malignant neoplasm of female breast, right 2019    Psychiatric problem     S/P abdominal supracervical subtotal hysterectomy, continues to have regular menses. -2014    Spinal cord cyst, L1 2014     Thoracic radiculopathy, bulging disc at T10-11 and T11-12      Past Surgical History:   Procedure Laterality Date    BREAST BIOPSY Right 2015     SECTION      CHOLECYSTECTOMY      COLONOSCOPY N/A 10/5/2015    Procedure: COLONOSCOPY;  Surgeon: JERONIMO Cancino MD;  Location: [facility] (4TH FLR);  Service: Endoscopy;  Laterality: N/A;    HYSTERECTOMY      BC--SUPRACERVICAL    INJECTION FOR SENTINEL NODE IDENTIFICATION Right 2020    Procedure: INJECTION, FOR SENTINEL NODE IDENTIFICATION;  Surgeon: Lelo Guaman MD;  Location: [facility];  Service: General;  Laterality: Right;    INSERTION OF BREAST TISSUE EXPANDER Right 2020    Procedure: INSERTION, TISSUE EXPANDER, BREAST;  Surgeon: Pablo Ramos MD;  Location: [facility];  Service: Plastics;  Laterality: Right;    INSERTION OF TUNNELED CENTRAL VENOUS CATHETER (CVC) WITH SUBCUTANEOUS PORT Right 2019    Procedure: [id number] Fluoro needed;  Surgeon: Lelo Guaman MD;  Location: [facility] 2ND FLR;  Service: General;  Laterality: Right;  Right internal jugular.     SENTINEL LYMPH NODE BIOPSY Right 2020    Procedure: BIOPSY, LYMPH NODE, SENTINEL;  Surgeon: Lelo Guaman MD;  Location: [facility];  Service: General;  Laterality: Right;    UNILATERAL MASTECTOMY Right 2020    Procedure: MASTECTOMY, UNILATERAL;  Surgeon: Lelo Guaman MD;  Location: [facility];  Service: General;  Laterality: Right;     Current Outpatient Medications on File Prior to Visit   Medication Sig Dispense Refill    amlodipine-valsartan (EXFORGE) 5-320 mg per tablet Take 1 tablet by mouth once daily. 90 tablet 3    cyclobenzaprine (FLEXERIL) 5 MG tablet TAKE 1 TO 2 TABLETS BY MOUTH DAILY AT  BEDTIME AS NEEDED 30 tablet 2    dexAMETHasone (DECADRON) 4 MG Tab Take 5 tablets 12 hours prior to chemotherapy and another 5 tablets 6 hours prior to each cycle of taxol chemotherapy 40 tablet 0    ergocalciferol (ERGOCALCIFEROL) 50,000 unit Cap Take 1 capsule (50,000 Units total) by mouth every 7 days. 12 capsule 1    famotidine (PEPCID) 10 MG tablet Take 10 mg by mouth once daily.      fluocinolone (DERMA-SMOOTHE) 0.01 % external oil apply a thin film to affected area every night at bedtime as needed for flare 118.28 mL 1    fluticasone (FLONASE) 50 mcg/actuation nasal spray 1 spray by Each Nare route 2 (two) times daily as needed. 15 g 0    gabapentin (NEURONTIN) 300 MG capsule Take 1 capsule (300 mg total) by mouth 2 (two) times daily. 60 capsule 11    magic mouthwash diphen/antac/lidoc/nysta Take 10 mLs by mouth 4 (four) times daily. 240 mL 1    mirtazapine (REMERON SOL-TAB) 15 MG disintegrating tablet Take 1 tablet (15 mg total) by mouth nightly. 30 tablet 2    ondansetron (ZOFRAN) 8 MG tablet Take 1 tablet by mouth every 12 hours x 3 days post chemotherapy followed by 1 tablet by mouth every 12 hours as needed for nausea. 30 tablet 2    ondansetron (ZOFRAN-ODT) 8 MG TbDL Dissolve 1 tablet (8 mg total) by mouth every 6 (six) hours as needed (Nausea). 30 tablet 1    oxyCODONE (ROXICODONE) 10 mg Tab immediate release tablet Take 1 tablet (10 mg total) by mouth every 4 (four) hours as needed for Pain. 120 tablet 0    oxyCODONE-acetaminophen (PERCOCET) 5-325 mg per tablet Take 1 tablet by mouth every 6 (six) hours as needed for Pain. 30 tablet 0    promethazine (PHENERGAN) 25 MG tablet Take 1 tablet (25 mg total) by mouth every 6 (six) hours as needed for Nausea. 40 tablet 0    tobramycin sulfate 0.3% (TOBREX) 0.3 % ophthalmic solution 1-2 drops topically twice daily to affected toe(s). 5 mL 3    zolpidem (AMBIEN) 10 mg Tab Take 1 tablet (10 mg total) by mouth nightly as needed. 30 tablet 2     No  current facility-administered medications on file prior to visit.      Social History     Socioeconomic History    Marital status: Single     Spouse name: Not on file    Number of children: 2    Years of education: Not on file    Highest education level: Not on file   Occupational History    Not on file   Social Needs    Financial resource strain: Not hard at all    Food insecurity:     Worry: Never true     Inability: Never true    Transportation needs:     Medical: No     Non-medical: No   Tobacco Use    Smoking status: Never Smoker    Smokeless tobacco: Never Used   Substance and Sexual Activity    Alcohol use: No     Frequency: Monthly or less     Drinks per session: 1 or 2     Binge frequency: Never    Drug use: No    Sexual activity: Yes     Partners: Male   Lifestyle    Physical activity:     Days per week: 3 days     Minutes per session: 30 min    Stress: To some extent   Relationships    Social connections:     Talks on phone: More than three times a week     Gets together: Once a week     Attends [employer] service: Not on file     Active member of club or organization: No     Attends meetings of clubs or organizations: Never     Relationship status:    Other Topics Concern    Are you pregnant or think you may be? Not Asked    Breast-feeding Not Asked    Patient feels they ought to cut down on drinking/drug use Not Asked    Patient annoyed by others criticizing their drinking/drug use Not Asked    Patient has felt bad or guilty about drinking/drug use Not Asked    Patient has had a drink/used drugs as an eye opener in the AM Not Asked   Social History Narrative    Has worked at the [employer] since it opened and stayed working through [place].    2011  from her  and moved to [facility] with her sons to live with her aunt.    Her mother is very helpful and involved in her life.    She has never been a smoker and does not drink.        April 2016     Her eldest son has been working as a  at the [employer] for one year now.  He is doing well in this capacity.  He is thinking about making this his career.    Her youngest son is doing better academically.  She would like to remain living on the [facility] for his schooling.    Her commuting to work is difficult.        October 2017.  For the first time in a long time, she feels happy.  She has a new boyfriend.    November 2108.  Her eldest son has moved to [place], is living with his 1st cousin and has a full-time job with [employer].    Her youngest son is 16 and doing well in school.    She continues to date a very nice man.  She is enjoying living in [place] and commutes to the [facility] where she continues to enjoy her employment.        February 2020. Living with her mother due to her breast cancer treatment. Both sons living together in [place]. Younger son struggling emotionally and academically.     Family History   Problem Relation Age of Onset    Cancer Paternal Aunt     Lupus Paternal Aunt     Hypertension Mother     Depression Mother     Liver disease Father     Alcohol abuse Father     Colon cancer Paternal Uncle     Depression Son     Breast cancer Neg Hx     Ovarian cancer Neg Hx     Melanoma Neg Hx     Psoriasis Neg Hx     Eczema Neg Hx         Review of Systems   Constitutional: Negative for fatigue and unexpected weight change.   HENT: Negative.    Eyes: Negative.    Respiratory: Negative for chest tightness and shortness of breath.    Cardiovascular: Negative for chest pain and palpitations.   Gastrointestinal: Negative for abdominal distention and abdominal pain.   Genitourinary: Negative for dysuria, vaginal bleeding and vaginal discharge.   Musculoskeletal: Negative for arthralgias and back pain.   Skin: Negative for color change and rash.   Neurological: Negative for syncope and headaches.     Objective:   LMP 08/14/2014     Physical Exam:  HEENT: Normocephalic,  atraumatic.    Gen: alert and oriented; no acute distress.  Breast: Incision CDI, drain serous, healing well.    Radiology review: Images personally reviewed by me in the clinic.   Result:   Mammo Digital Diagnostic Right w/ Michele  US Breast Right Limited  History:  Work-up for abnormal screening mammogram (recall).  Films Compared:  Prior images (if available) were compared.  Findings:  This procedure was performed using tomosynthesis.  Computer-aided detection was utilized in the interpretation of this examination.  Mammo Digital Diagnostic Right w/ Michele  The right breast has scattered areas of fibroglandular density.      Right breast 09:00 o'clock axis middle depth excisional biopsy scar, benign.      In the right breast upper outer quadrant posterior depth, there is an irregular equal density mass with spiculated margins measuring 1.6 cm.  Associated architectural distortion.  This corresponds to the right breast screening mammogram finding.      Limited right breast ultrasound:   In the right breast 10:00 o'clock axis 14 cm from the nipple, there is an irregular hypoechoic mass with indistinct margins and posterior acoustic shadowing measuring 0.8 cm.  No surrounding hypervascularity.  This corresponds to the right breast screening mammogram finding.      Level 1 right axillary adenopathy with a couple of nodes demonstrating cortical thickening and loss of fatty hilum.  The largest node measures 2.1 cm in long axis.      Impression:  1. Right breast 10:00 o'clock axis mass corresponds to the screening mammogram finding. BI-RADS 4: Suspicious. Recommend ultrasound-guided biopsy.   2. Level 1 right axillary adenopathy. BI-RADS 4: Suspicious. Recommend ultrasound-guided biopsy for 1 of the abnormal nodes.   BI-RADS Category:   Right: 4 - Suspicious  Overall: 4 - Suspicious       5/13/2020 Breast MRI:  Right  There is a 19 mm x 5 mm x 5 mm lymph node seen in the right axilla. Compared to the previous study, the  axillary lymph node decreased in size. This is associated with a biopsy marker.      Second previously described thickened axillary lymph node has decreased slightly in size, now measuring approximately 5mm in short axis.      There is a 30 mm x 29 mm x 8 mm irregularly shaped, heterogeneous mass with indistinct and irregular margins seen in the right breast at 10 o'clock, 10.7 cm from the nipple and 4.3 cm from the chest wall. Delayed phase is persistent. Allowing for differences in patient positioning, this is not significantly changed in size, but demonstrates interval decrease in enhancement, suggesting some response to therapy.      Left  There is no evidence of suspicious masses, abnormal enhancement, or other abnormal findings.     Impression:     Right  Mass: Right breast 30 mm x 29 mm x 8 mm mass at the 10 o'clock position.  Allowing for differences in patient positioning, this is not significantly changed in size, but demonstrates interval decrease in enhancement, suggesting some response to therapy.     Assessment: 6 - Known biopsy, proven malignancy.         Lymph Node: Right axilla 19 mm x 5 mm x 5 mm lymph node which was previously biopsied and adjacent previously thickened node have both decreased in size and now appear similar to contralateral lymph nodes, suggesting some response to therapy. Assessment: 6 - Known biopsy, proven malignancy.      Left  There is no MR evidence of malignancy in the left breast.     BI-RADS Category:   Overall: 6 - Known Biopsy-Proven Malignancy     Pathology  1. RIGHT AXILLARY SENTINEL LYMPH NODE #1, EXCISION:  One lymph node positive for metastatic invasive lobular carcinoma (1/1) involving nearly all lymph node  tissue.  Focus of metastatic disease measures 2.0 cm in greatest dimension.  Extranodal extension is identified.  Epithelial immunostain panel (AE1/AE3, WSK, CAM 5.2) examined with appropriate controls.  2. RIGHT BREAST, SIMPLE MASTECTOMY:  Invasive lobular  carcinoma, Maty histologic grade 2 (tubular formation: 3, nuclear pleomorphism: 2,  mitotic activity: 1), 3.0 cm, status post neoadjuvant treatment.  Lobular carcinoma in situ.  Intralesional previous biopsy site changes are identified.  Surgical margins are negative for malignancy.  Skin and nipple uninvolved by malignancy.  Comment: Infiltrating carcinoma with single filing and incipient single cell morphology is diffusely scattered  across a stellate shaped fibrous area measuring 3.0 cm in greatest dimension. Tumor cells are highlighted  by CK7 stains which aid in the assessment of disease extent and margin status. Because of neoadjuvant  treatment status, breast biomarkers are repeated. See results below.  3. RIGHT AXILLARY SENTINEL LYMPH NODE #2, EXCISION:  One lymph node positive for metastatic invasive lobular carcinoma (1/1) involving nearly all lymph node  tissue.  Focus of metastatic disease measures 1.5 cm in greatest dimension.    Assessment:       Ms. Vásquez is a 50 year old female with ER/CA+ Her2 -- Invasive Lobular Carcinoma of the right breast, s/p neoadjuvant chemotherapy with ACT, presents today to discuss a surgical treatment plan. She underwent R mastectomy with SLNB and tissue expander with plastics on 5/20/20.   Plan:     - Schedule for ALND Friday  - Will need radition therapy as well, referral to rad onc  - L breast mammo in October 2020  - Please call with questions or concerns

## 2020-06-03 ENCOUNTER — LAB VISIT (OUTPATIENT)
Dept: SURGERY | Facility: CLINIC | Age: 50
End: 2020-06-03
Payer: COMMERCIAL

## 2020-06-03 ENCOUNTER — OFFICE VISIT (OUTPATIENT)
Dept: SURGERY | Facility: CLINIC | Age: 50
End: 2020-06-03
Payer: COMMERCIAL

## 2020-06-03 VITALS — DIASTOLIC BLOOD PRESSURE: 68 MMHG | HEART RATE: 72 BPM | SYSTOLIC BLOOD PRESSURE: 116 MMHG

## 2020-06-03 DIAGNOSIS — Z01.818 PREOP TESTING: ICD-10-CM

## 2020-06-03 DIAGNOSIS — Z01.818 PREOP TESTING: Primary | ICD-10-CM

## 2020-06-03 DIAGNOSIS — C50.611 CARCINOMA OF AXILLARY TAIL OF RIGHT BREAST IN FEMALE, ESTROGEN RECEPTOR POSITIVE: ICD-10-CM

## 2020-06-03 DIAGNOSIS — C50.611 MALIGNANT NEOPLASM OF AXILLARY TAIL OF RIGHT BREAST IN FEMALE, ESTROGEN RECEPTOR POSITIVE: Primary | ICD-10-CM

## 2020-06-03 DIAGNOSIS — Z17.0 CARCINOMA OF AXILLARY TAIL OF RIGHT BREAST IN FEMALE, ESTROGEN RECEPTOR POSITIVE: ICD-10-CM

## 2020-06-03 DIAGNOSIS — Z17.0 MALIGNANT NEOPLASM OF AXILLARY TAIL OF RIGHT BREAST IN FEMALE, ESTROGEN RECEPTOR POSITIVE: Primary | ICD-10-CM

## 2020-06-03 PROCEDURE — 99999 PR PBB SHADOW E&M-EST. PATIENT-LVL III: ICD-10-PCS | Mod: PBBFAC,,, | Performed by: SURGERY

## 2020-06-03 PROCEDURE — U0003 INFECTIOUS AGENT DETECTION BY NUCLEIC ACID (DNA OR RNA); SEVERE ACUTE RESPIRATORY SYNDROME CORONAVIRUS 2 (SARS-COV-2) (CORONAVIRUS DISEASE [COVID-19]), AMPLIFIED PROBE TECHNIQUE, MAKING USE OF HIGH THROUGHPUT TECHNOLOGIES AS DESCRIBED BY CMS-2020-01-R: HCPCS

## 2020-06-03 PROCEDURE — 99024 POSTOP FOLLOW-UP VISIT: CPT | Mod: S$GLB,,, | Performed by: SURGERY

## 2020-06-03 PROCEDURE — 99024 PR POST-OP FOLLOW-UP VISIT: ICD-10-PCS | Mod: S$GLB,,, | Performed by: SURGERY

## 2020-06-03 PROCEDURE — 99999 PR PBB SHADOW E&M-EST. PATIENT-LVL III: CPT | Mod: PBBFAC,,, | Performed by: SURGERY

## 2020-06-03 RX ORDER — ZOLPIDEM TARTRATE 10 MG/1
1 TABLET ORAL NIGHTLY PRN
COMMUNITY
Start: 2020-03-17 | End: 2020-06-04 | Stop reason: SDUPTHER

## 2020-06-04 ENCOUNTER — TELEPHONE (OUTPATIENT)
Dept: SURGERY | Facility: CLINIC | Age: 50
End: 2020-06-04

## 2020-06-04 LAB — SARS-COV-2 RNA RESP QL NAA+PROBE: NOT DETECTED

## 2020-06-04 RX ORDER — ALUMINUM HYDROXIDE, MAGNESIUM HYDROXIDE, AND SIMETHICONE 2400; 240; 2400 MG/30ML; MG/30ML; MG/30ML
SUSPENSION ORAL EVERY 6 HOURS PRN
COMMUNITY
End: 2021-04-16 | Stop reason: ALTCHOICE

## 2020-06-05 ENCOUNTER — HOSPITAL ENCOUNTER (OUTPATIENT)
Facility: HOSPITAL | Age: 50
Discharge: HOME | End: 2020-06-05
Attending: SURGERY | Admitting: SURGERY
Payer: COMMERCIAL

## 2020-06-05 ENCOUNTER — ANESTHESIA (OUTPATIENT)
Dept: SURGERY | Facility: HOSPITAL | Age: 50
End: 2020-06-05
Payer: COMMERCIAL

## 2020-06-05 ENCOUNTER — ANESTHESIA EVENT (OUTPATIENT)
Dept: SURGERY | Facility: HOSPITAL | Age: 50
End: 2020-06-05
Payer: COMMERCIAL

## 2020-06-05 VITALS
TEMPERATURE: 97 F | SYSTOLIC BLOOD PRESSURE: 127 MMHG | DIASTOLIC BLOOD PRESSURE: 80 MMHG | WEIGHT: 225 LBS | HEART RATE: 53 BPM | OXYGEN SATURATION: 100 % | HEIGHT: 64 IN | BODY MASS INDEX: 38.41 KG/M2 | RESPIRATION RATE: 17 BRPM

## 2020-06-05 DIAGNOSIS — C50.919 MALIGNANT NEOPLASM OF BREAST: ICD-10-CM

## 2020-06-05 PROCEDURE — 36000707: Performed by: SURGERY

## 2020-06-05 PROCEDURE — 88341 IMHCHEM/IMCYTCHM EA ADD ANTB: CPT | Performed by: PATHOLOGY

## 2020-06-05 PROCEDURE — 27201423 OPTIME MED/SURG SUP & DEVICES STERILE SUPPLY: Performed by: SURGERY

## 2020-06-05 PROCEDURE — D9220A PRA ANESTHESIA: ICD-10-PCS | Mod: CRNA,,, | Performed by: NURSE ANESTHETIST, CERTIFIED REGISTERED

## 2020-06-05 PROCEDURE — 88307 TISSUE EXAM BY PATHOLOGIST: CPT | Mod: 26,,, | Performed by: PATHOLOGY

## 2020-06-05 PROCEDURE — 37000008 HC ANESTHESIA 1ST 15 MINUTES: Performed by: SURGERY

## 2020-06-05 PROCEDURE — 37000009 HC ANESTHESIA EA ADD 15 MINS: Performed by: SURGERY

## 2020-06-05 PROCEDURE — 27200651 HC AIRWAY, LMA: Performed by: NURSE ANESTHETIST, CERTIFIED REGISTERED

## 2020-06-05 PROCEDURE — D9220A PRA ANESTHESIA: Mod: CRNA,,, | Performed by: NURSE ANESTHETIST, CERTIFIED REGISTERED

## 2020-06-05 PROCEDURE — 94761 N-INVAS EAR/PLS OXIMETRY MLT: CPT

## 2020-06-05 PROCEDURE — 71000015 HC POSTOP RECOV 1ST HR: Performed by: SURGERY

## 2020-06-05 PROCEDURE — 88342 IMHCHEM/IMCYTCHM 1ST ANTB: CPT | Mod: 26,,, | Performed by: PATHOLOGY

## 2020-06-05 PROCEDURE — 38525 BIOPSY/REMOVAL LYMPH NODES: CPT | Mod: 58,RT,, | Performed by: SURGERY

## 2020-06-05 PROCEDURE — 88307 TISSUE EXAM BY PATHOLOGIST: CPT | Performed by: PATHOLOGY

## 2020-06-05 PROCEDURE — 88307 PR  SURG PATH,LEVEL V: ICD-10-PCS | Mod: 26,,, | Performed by: PATHOLOGY

## 2020-06-05 PROCEDURE — 71000016 HC POSTOP RECOV ADDL HR: Performed by: SURGERY

## 2020-06-05 PROCEDURE — 25000003 PHARM REV CODE 250: Performed by: ANESTHESIOLOGY

## 2020-06-05 PROCEDURE — 88341 IMHCHEM/IMCYTCHM EA ADD ANTB: CPT | Mod: 26,,, | Performed by: PATHOLOGY

## 2020-06-05 PROCEDURE — 36000706: Performed by: SURGERY

## 2020-06-05 PROCEDURE — 25000003 PHARM REV CODE 250: Performed by: NURSE ANESTHETIST, CERTIFIED REGISTERED

## 2020-06-05 PROCEDURE — 88342 IMHCHEM/IMCYTCHM 1ST ANTB: CPT | Performed by: PATHOLOGY

## 2020-06-05 PROCEDURE — C1729 CATH, DRAINAGE: HCPCS | Performed by: SURGERY

## 2020-06-05 PROCEDURE — 88342 CHG IMMUNOCYTOCHEMISTRY: ICD-10-PCS | Mod: 26,,, | Performed by: PATHOLOGY

## 2020-06-05 PROCEDURE — 25000003 PHARM REV CODE 250: Performed by: SURGERY

## 2020-06-05 PROCEDURE — 88341 PR IHC OR ICC EACH ADD'L SINGLE ANTIBODY  STAINPR: ICD-10-PCS | Mod: 26,,, | Performed by: PATHOLOGY

## 2020-06-05 PROCEDURE — 63600175 PHARM REV CODE 636 W HCPCS: Performed by: NURSE ANESTHETIST, CERTIFIED REGISTERED

## 2020-06-05 PROCEDURE — 63600175 PHARM REV CODE 636 W HCPCS: Performed by: STUDENT IN AN ORGANIZED HEALTH CARE EDUCATION/TRAINING PROGRAM

## 2020-06-05 PROCEDURE — 71000045 HC DOSC ROUTINE RECOVERY EA ADD'L HR: Performed by: SURGERY

## 2020-06-05 PROCEDURE — 71000044 HC DOSC ROUTINE RECOVERY FIRST HOUR: Performed by: SURGERY

## 2020-06-05 PROCEDURE — D9220A PRA ANESTHESIA: ICD-10-PCS | Mod: ANES,,, | Performed by: ANESTHESIOLOGY

## 2020-06-05 PROCEDURE — 38525 PR BIOPSY/REM LYMPH NODES, AXILLARY: ICD-10-PCS | Mod: 58,RT,, | Performed by: SURGERY

## 2020-06-05 PROCEDURE — 63600175 PHARM REV CODE 636 W HCPCS: Performed by: ANESTHESIOLOGY

## 2020-06-05 PROCEDURE — D9220A PRA ANESTHESIA: Mod: ANES,,, | Performed by: ANESTHESIOLOGY

## 2020-06-05 RX ORDER — LIDOCAINE HYDROCHLORIDE 20 MG/ML
INJECTION, SOLUTION EPIDURAL; INFILTRATION; INTRACAUDAL; PERINEURAL
Status: DISCONTINUED | OUTPATIENT
Start: 2020-06-05 | End: 2020-06-05

## 2020-06-05 RX ORDER — HYDROMORPHONE HYDROCHLORIDE 1 MG/ML
0.2 INJECTION, SOLUTION INTRAMUSCULAR; INTRAVENOUS; SUBCUTANEOUS EVERY 5 MIN PRN
Status: DISCONTINUED | OUTPATIENT
Start: 2020-06-05 | End: 2020-06-05 | Stop reason: HOSPADM

## 2020-06-05 RX ORDER — CEFAZOLIN SODIUM 1 G/3ML
2 INJECTION, POWDER, FOR SOLUTION INTRAMUSCULAR; INTRAVENOUS
Status: COMPLETED | OUTPATIENT
Start: 2020-06-05 | End: 2020-06-05

## 2020-06-05 RX ORDER — SODIUM CHLORIDE 0.9 % (FLUSH) 0.9 %
10 SYRINGE (ML) INJECTION
Status: DISCONTINUED | OUTPATIENT
Start: 2020-06-05 | End: 2020-06-05 | Stop reason: HOSPADM

## 2020-06-05 RX ORDER — PROPOFOL 10 MG/ML
VIAL (ML) INTRAVENOUS
Status: DISCONTINUED | OUTPATIENT
Start: 2020-06-05 | End: 2020-06-05

## 2020-06-05 RX ORDER — ONDANSETRON 2 MG/ML
4 INJECTION INTRAMUSCULAR; INTRAVENOUS DAILY PRN
Status: DISCONTINUED | OUTPATIENT
Start: 2020-06-05 | End: 2020-06-05 | Stop reason: HOSPADM

## 2020-06-05 RX ORDER — DEXAMETHASONE SODIUM PHOSPHATE 4 MG/ML
INJECTION, SOLUTION INTRA-ARTICULAR; INTRALESIONAL; INTRAMUSCULAR; INTRAVENOUS; SOFT TISSUE
Status: DISCONTINUED | OUTPATIENT
Start: 2020-06-05 | End: 2020-06-05

## 2020-06-05 RX ORDER — GLYCOPYRROLATE 0.2 MG/ML
INJECTION INTRAMUSCULAR; INTRAVENOUS
Status: DISCONTINUED | OUTPATIENT
Start: 2020-06-05 | End: 2020-06-05

## 2020-06-05 RX ORDER — FENTANYL CITRATE 50 UG/ML
INJECTION, SOLUTION INTRAMUSCULAR; INTRAVENOUS
Status: DISCONTINUED | OUTPATIENT
Start: 2020-06-05 | End: 2020-06-05

## 2020-06-05 RX ORDER — FENTANYL CITRATE 50 UG/ML
25 INJECTION, SOLUTION INTRAMUSCULAR; INTRAVENOUS EVERY 5 MIN PRN
Status: COMPLETED | OUTPATIENT
Start: 2020-06-05 | End: 2020-06-05

## 2020-06-05 RX ORDER — MIDAZOLAM HYDROCHLORIDE 1 MG/ML
INJECTION, SOLUTION INTRAMUSCULAR; INTRAVENOUS
Status: DISCONTINUED | OUTPATIENT
Start: 2020-06-05 | End: 2020-06-05

## 2020-06-05 RX ORDER — KETOROLAC TROMETHAMINE 30 MG/ML
INJECTION, SOLUTION INTRAMUSCULAR; INTRAVENOUS
Status: DISCONTINUED | OUTPATIENT
Start: 2020-06-05 | End: 2020-06-05

## 2020-06-05 RX ORDER — OXYCODONE AND ACETAMINOPHEN 5; 325 MG/1; MG/1
1 TABLET ORAL EVERY 4 HOURS PRN
Qty: 20 TABLET | Refills: 0 | Status: SHIPPED | OUTPATIENT
Start: 2020-06-05 | End: 2020-06-12 | Stop reason: SDUPTHER

## 2020-06-05 RX ORDER — BUPIVACAINE HYDROCHLORIDE 2.5 MG/ML
INJECTION, SOLUTION EPIDURAL; INFILTRATION; INTRACAUDAL
Status: DISCONTINUED | OUTPATIENT
Start: 2020-06-05 | End: 2020-06-05 | Stop reason: HOSPADM

## 2020-06-05 RX ADMIN — HYDROMORPHONE HYDROCHLORIDE 0.2 MG: 1 INJECTION, SOLUTION INTRAMUSCULAR; INTRAVENOUS; SUBCUTANEOUS at 04:06

## 2020-06-05 RX ADMIN — KETOROLAC TROMETHAMINE 30 MG: 30 INJECTION, SOLUTION INTRAMUSCULAR; INTRAVENOUS at 02:06

## 2020-06-05 RX ADMIN — CEFAZOLIN 2 G: 330 INJECTION, POWDER, FOR SOLUTION INTRAMUSCULAR; INTRAVENOUS at 12:06

## 2020-06-05 RX ADMIN — MIDAZOLAM HYDROCHLORIDE 2 MG: 1 INJECTION, SOLUTION INTRAMUSCULAR; INTRAVENOUS at 11:06

## 2020-06-05 RX ADMIN — PROPOFOL 50 MG: 10 INJECTION, EMULSION INTRAVENOUS at 11:06

## 2020-06-05 RX ADMIN — SODIUM CHLORIDE, SODIUM GLUCONATE, SODIUM ACETATE, POTASSIUM CHLORIDE, MAGNESIUM CHLORIDE, SODIUM PHOSPHATE, DIBASIC, AND POTASSIUM PHOSPHATE: .53; .5; .37; .037; .03; .012; .00082 INJECTION, SOLUTION INTRAVENOUS at 11:06

## 2020-06-05 RX ADMIN — HYDROMORPHONE HYDROCHLORIDE 0.2 MG: 1 INJECTION, SOLUTION INTRAMUSCULAR; INTRAVENOUS; SUBCUTANEOUS at 03:06

## 2020-06-05 RX ADMIN — DEXAMETHASONE SODIUM PHOSPHATE 4 MG: 4 INJECTION, SOLUTION INTRAMUSCULAR; INTRAVENOUS at 02:06

## 2020-06-05 RX ADMIN — FENTANYL CITRATE 50 MCG: 50 INJECTION, SOLUTION INTRAMUSCULAR; INTRAVENOUS at 02:06

## 2020-06-05 RX ADMIN — FENTANYL CITRATE 25 MCG: 50 INJECTION INTRAMUSCULAR; INTRAVENOUS at 03:06

## 2020-06-05 RX ADMIN — PROPOFOL 200 MG: 10 INJECTION, EMULSION INTRAVENOUS at 11:06

## 2020-06-05 RX ADMIN — HYDROMORPHONE HYDROCHLORIDE 0.2 MG: 1 INJECTION, SOLUTION INTRAMUSCULAR; INTRAVENOUS; SUBCUTANEOUS at 06:06

## 2020-06-05 RX ADMIN — HYDROMORPHONE HYDROCHLORIDE 0.2 MG: 1 INJECTION, SOLUTION INTRAMUSCULAR; INTRAVENOUS; SUBCUTANEOUS at 05:06

## 2020-06-05 RX ADMIN — FENTANYL CITRATE 50 MCG: 50 INJECTION, SOLUTION INTRAMUSCULAR; INTRAVENOUS at 12:06

## 2020-06-05 RX ADMIN — PROPOFOL 50 MG: 10 INJECTION, EMULSION INTRAVENOUS at 12:06

## 2020-06-05 RX ADMIN — LIDOCAINE HYDROCHLORIDE 100 MG: 20 INJECTION, SOLUTION EPIDURAL; INFILTRATION; INTRACAUDAL at 11:06

## 2020-06-05 RX ADMIN — GLYCOPYRROLATE 0.2 MG: 0.2 INJECTION INTRAMUSCULAR; INTRAVENOUS at 12:06

## 2020-06-05 RX ADMIN — ONDANSETRON 4 MG: 2 INJECTION, SOLUTION INTRAMUSCULAR; INTRAVENOUS at 02:06

## 2020-06-05 NOTE — PLAN OF CARE
Discharge instructions given and explained to patient with verbalization of understanding all instructions. Prescription given and explained next time and doses of each medication. Patients v/s stable, denies n/v and tolerating po, rates pain level tolerable, IV removed for patient discharge home.

## 2020-06-05 NOTE — ANESTHESIA PREPROCEDURE EVALUATION
06/05/2020  Phylicia Vásquez is a 50 y.o., female.    Anesthesia Evaluation    I have reviewed the Patient Summary Reports.    I have reviewed the Nursing Notes.    I have reviewed the Medications.     Review of Systems  Anesthesia Hx:  No problems with previous Anesthesia  Denies Family Hx of Anesthesia complications.   Denies Personal Hx of Anesthesia complications.   Social:  Non-Smoker    Hematology/Oncology:  Hematology Normal      Current/Recent Cancer. Breast   EENT/Dental:EENT/Dental Normal   Cardiovascular:   Exercise tolerance: good Hypertension, well controlled    Pulmonary:  Pulmonary Normal    Renal/:  Renal/ Normal     Hepatic/GI:   Liver Disease,    Musculoskeletal:   Spinal cord stimulator Spine Disorders: thoracic and lumbar    Neurological:   Neuromuscular Disease,    Endocrine:  Endocrine Normal    Dermatological:  Skin Normal    Psych:   anxiety depression          Physical Exam  General:  Morbid Obesity    Airway/Jaw/Neck:  Airway Findings: Mouth Opening: Normal Tongue: Normal  General Airway Assessment: Adult  Mallampati: I  TM Distance: Normal, at least 6 cm  Jaw/Neck Findings:  Neck ROM: Normal ROM      Dental:  Dental Findings: In tact              Anesthesia Plan  Type of Anesthesia, risks & benefits discussed:  Anesthesia Type:  general  Patient's Preference:   Intra-op Monitoring Plan: standard ASA monitors  Intra-op Monitoring Plan Comments:   Post Op Pain Control Plan: multimodal analgesia  Post Op Pain Control Plan Comments:   Induction:   IV  Beta Blocker:         Informed Consent: Patient understands risks and agrees with Anesthesia plan.  Questions answered. Anesthesia consent signed with patient.  ASA Score: 3     Day of Surgery Review of History & Physical:    H&P update referred to the surgeon.     Anesthesia Plan Notes: Recent labs, EKG from dec 2019 in  [facility]        Ready For Surgery From Anesthesia Perspective.

## 2020-06-05 NOTE — INTERVAL H&P NOTE
The patient has been examined and the H&P has been reviewed:    I concur with the findings and changes have been noted since the H&P was written:  Path for R mastectomy with SLNB demonstrated negative margins but 2/2 positive LNs. She returns today for R axillary dissection. Otherwise, she is doing well postop.     Anesthesia/Surgery risks, benefits and alternative options discussed and understood by patient/family.          Active Hospital Problems    Diagnosis  POA    Malignant neoplasm of breast [C50.919]  Yes      Resolved Hospital Problems   No resolved problems to display.

## 2020-06-05 NOTE — DISCHARGE INSTRUCTIONS
POSTOPERATIVE INSTRUCTIONS FOLLOWING   AXILLARY LYMPH NODE DISSECTION    The following are post-operative instructions that will help you to recover from your surgery.  Please read over these instructions carefully and contact us if we can answer any of your questions or concerns.    Post-op care/Dressing/breast binder (surgi-bra)  A surgical bra may be placed around your chest after your surgery.  If you are given the bra, please wear it for the first 48 hours after surgery. After 48 hours you can remove your surgical bra and dressing to shower/cleanse the chest wall with antibacterial soap and warm water. Do not take a tub bath and do not soak the surgical site for at least 2 weeks.     The final pathology report will be available approximately 7-10 days after your surgery.  Our office will call you with your pathology report when it becomes available.    If blue dye was used to locate your sentinel lymph nodes, your urine and stool may be blue-green in color for 1 or 2 days.    Please wear the surgical bra as close to 24 hours a day as possible until your post-operative clinic appointment.  If the elastic around the bra irritates your skin, you may wear a soft t-shirt underneath the bra. You may shower AFTER the drains are removed.  Please sponge bathe until then. Please do not remove the white strips of tape (steri-strips) that cover your incision.  They will be removed at your clinic visit. You may go without wearing the bra long enough to bath, to launder and dry the bra. If you have fluffy filler placed inside the bra, the filler should be removed whenever the bra is taken off. Please reinsert the fluffy filler, or insert the new soft filler, under the bra when you put the bra back on.  If the bra is extremely uncomfortable, you may wear a supportive sports bra instead after 2 days.    Activity    You will be able to do much of your own personal care, such as bathing, dressing, preparing simple meals,  etc.   A short walk each day will help with your recovery   You may find that you need to take rest breaks between activities, but you should not need to stay in bed for prolonged periods of time during the day. A good rule during this time is to listen to your body, do what is comfortable, and stop and rest when your feel tired.  If it hurts, dont do it.   Return to taking your daily medications as prescribed   Please avoid activities that require moderate to heavy lifting (grocery shopping) or pushing/pulling (vacuuming) and repetitive motions (such as washing windows). Do not lift anything heavier than a gallon of milk.   Following a lymph node dissection, dont avoid using your arm, but dont exercise your arm until after your first post-operative visit.  At your first post-op visit, you will be given arm exercises to regain movement and flexibility.  You may be referred to physical therapy if needed.   You may restart driving when you are no longer on narcotics and you feel safe turning the wheel and stopping quickly.   You will need to be out of work approximately 2-6 weeks depending on your particular surgery and how well you are recovering.  We will evaluate how you are doing at the first post-op appointment.  This is a good time to ask when you may return to work and what activities you may do.    Medication for pain   You will be given a prescription for pain medication. You should not drive or operate machinery while taking these.  Please take prescription pain medication (narcotics) with food.  Narcotics can cause, or worsen, constipation.  You will need to increase your fluid intake, eat high fiber foods (such as fruits and bran) and make sure that you are up and walking. You may need to take an over the counter stool softener for constipation.   Short term use of an icepack may be helpful to decrease discomfort and swelling, particularly to the armpit after lymph node surgery.   A small  pillow positioned in the armpit may also decrease discomfort after lymph node surgery.   If you are given a prescription for antibiotics, take them as prescribed.    How to care for your Drain(s)  1. Wash hands--STRIP or milk the drainage tube as it comes out of your body toward the bulb.   a. Beginning where the drain comes out of your body, hold drainage tubing with one hand and with the other, stretch and release tubing an inch at time while moving downward with both hands toward the bulb.  b. Do this 2-3 times before emptying the bulb.  2. Remove the stopper from the bulbs port  (drainage port)  3. Pour the drainage in the measuring cup provided by the nurse  4. Flatten/squeeze the bulb to create a vacuum and replace the stopper before letting go of the bulb.  5. Record the date, time and amount of drainage in ccs (not ounces) each time bulb is emptied. If you have more than one drain, record each separately.  6. Discard the drainage into the toilet after measuring and then wash hands.  7. Empty bulbs 2-3 times/day or as needed if it fills up before 8 hours.  8. Remember to bring the output record with you to your doctors appointment.    Please report the following:   Temperature greater than 101 degrees   Discharge or bad odor from the wound   Excessive bleeding, such as saturated bloody dressing or extreme bruising   Redness at incision and/or drain sites   Swelling or buildup of fluid around incision   Persistent fevers, chills, nausea, vomiting, or diarrhea    Additional information  Your surgeon will see you approximately 2 weeks following your surgery.  If this follow-up appointment has not been made, please call the office.    If you have any questions or problems, please call my office or my nurse.    Dr. Tawana Soto, DARINEL Mayes, DARINEL Mayes, DARINEL Aguillon,  RN  [phone]    Sheri Mcknight PA-C  [phone]  Ynes Siegel RN  [phone]    After hours and on weekends, you may call the main [facility] line at [phone] and ask to have the general surgery resident paged or have me paged      Lymphedema Risk Reduction    Lymphedema is a swelling of a part of the body, caused by an insufficient lymphatic system and an accumulation of fluid in the bodys tissues.  Lymphedema may occur when normal drainage of fluid is disrupted, such as an infection, injury, cancer, scar tissue, or removal of lymph nodes.    If you had a full axillary lymph node dissection procedure, you may be at greater risk for lymphedema.     For those patients having a sentinel lymph node biopsy, these risks may be smaller and the recommendations are provided for your review and consideration.    The following list contains recommendations for reducing your risk of developing lymphedema.    I. Skin Care--avoid trauma/injury to reduce infection risk   Keep the hand and arm on the side of surgery clean and dry   Pay attention to nail care and do not cut cuticles   Avoid punctures, such as injections and blood draws from you on the side of your surgery   Wear gloves while doing activities that may cause skin injury (washing dishes, gardening, etc.)   If scratches or punctures occur, wash area with soap and water, and observe for signs of infections (redness, drainage, swelling)   If a rash, itching, redness, pain, increased skin temperatures, fever, or flu-like symptoms occur, contact your physician immediately for early treatment of a possible infection  II. Activity/Lifestyle   Gradually build up the duration and intensity of any activity or exercise   Take frequent rest periods during activity to allow for arm recovery   Monitor your arm and upper body during and after activity for any change in size, shape, tissue, texture, soreness,  heaviness, or firmness  III. Avoid constriction of your arm on the side of your surgery   Avoid having blood pressure taken on the arm on the side of your surgery   Wear loose fitting jewelry and clothing   Be careful not to rest a heavy purse, luggage, or grocery bags on that arm   When you return to wearing a bra, make sure that it is well fitted and not too tight

## 2020-06-05 NOTE — BRIEF OP NOTE
[facility]  Brief Operative Note    Surgery Date: 6/5/2020     Surgeon(s) and Role:     * Lelo Guaman MD - Primary     * Amando Mcgowan MD - Resident - Assisting        Pre-op Diagnosis:  Malignant neoplasm of axillary tail of right breast in female, estrogen receptor positive [C50.611, Z17.0]    Post-op Diagnosis:  Post-Op Diagnosis Codes:     * Malignant neoplasm of axillary tail of right breast in female, estrogen receptor positive [C50.611, Z17.0]    Procedure(s) (LRB):  LYMPHADENECTOMY, AXILLARY (Right)    Anesthesia: General    Description of the findings of the procedure(s): R axillary lymph node dissection    Estimated Blood Loss: 5 cc         Specimens:   Right axillary contents

## 2020-06-05 NOTE — OP NOTE
DATE OF PROCEDURE: 6/5/2020    SURGEON: Surgeon(s) and Role:     * Lelo Guaman MD - Primary     * Amando Mcgowan MD - Resident - Assisting    PREOPERATIVE DIAGNOSIS: Invasive breast carcinoma of the right breast axillary tail    POSTOPERATIVE DIAGNOSIS: same    ANESTHESIA: local and general    PROCEDURES PERFORMED:   Right Axillary Lymphadenectomy      PROCEDURE IN DETAIL:   The patient underwent informed consent.  The films were reviewed.    She was then brought to the Operating Room and placed in the supine position.    The patient was placed supine and general anesthetic was administered. The right arm, right breast, and chest were prepped and draped in standard fashion. An  incision was made in the right axilla. An axillary dissection was performed with removal of the associated lymph nodes and surrounding adipose tissue. This included levels I and II. This was accomplished by exposing the axillary vein superiorly. Small venous tributaries, lymphatics, and vessels were clipped and ligated or cauterized and divided. The subscapularis muscle was skeletonized. The long thoracic and thoracodorsal neurovascular bundles were identified and preserved. The tissue between the long thoracic and thoracodorsal bundle was removed.  Of note, at the end of the dissection, level 3 nodes were palpated and no abnormal nodes were noted.  We did not enter the expander pocket at all during the procedure.    The specimen was submitted to pathology. The wound was irrigated. A 19 Uzbek drain was placed in the axillary space. The skin incision was closed in layers with a 3-0 Vicryl suture for the deep dermis followed by a a running 4-0 monocryl used for the subcuticular closure. The drains were secured with sutures. Dermabond was applied along with a dry bulky gauze dressing.    Instrument, sponge, and needle counts were correct at closure and at the conclusion of the case.     ESTIMATED BLOOD LOSS: 25ml    COMPLICATIONS:  none    DISPOSITION:  PACU--hemodynamically stable    ATTESTATION:  I was present and scrubbed for the entire procedure.

## 2020-06-06 NOTE — ANESTHESIA POSTPROCEDURE EVALUATION
Anesthesia Post Evaluation    Patient: Phylicia Vásquez    Procedure(s) Performed: Procedure(s) (LRB):  LYMPHADENECTOMY, AXILLARY (Right)    Final Anesthesia Type: general    Patient location during evaluation: PACU  Patient participation: Yes- Able to Participate  Level of consciousness: awake and alert  Post-procedure vital signs: reviewed and stable  Pain management: adequate  Airway patency: patent    PONV status at discharge: No PONV  Anesthetic complications: no      Cardiovascular status: hemodynamically stable  Respiratory status: unassisted, spontaneous ventilation and room air  Hydration status: euvolemic  Follow-up not needed.          Vitals Value Taken Time   /80 6/5/2020  5:31 PM   Temp 36.3 °C (97.3 °F) 6/5/2020  2:59 PM   Pulse 53 6/5/2020  5:45 PM   Resp 17 6/5/2020  5:45 PM   SpO2 100 % 6/5/2020  5:45 PM         No case tracking events are documented in the log.      Pain/George Score: Pain Rating Prior to Med Admin: 7 (6/5/2020  6:13 PM)  George Score: 9 (6/5/2020  4:00 PM)

## 2020-06-08 ENCOUNTER — OFFICE VISIT (OUTPATIENT)
Dept: PSYCHIATRY | Facility: CLINIC | Age: 50
End: 2020-06-08
Payer: COMMERCIAL

## 2020-06-08 DIAGNOSIS — F43.23 ADJUSTMENT DISORDER WITH MIXED ANXIETY AND DEPRESSED MOOD: Primary | ICD-10-CM

## 2020-06-08 PROCEDURE — 90834 PR PSYCHOTHERAPY W/PATIENT, 45 MIN: ICD-10-PCS | Mod: 95,,, | Performed by: PSYCHOLOGIST

## 2020-06-08 PROCEDURE — 90834 PSYTX W PT 45 MINUTES: CPT | Mod: 95,,, | Performed by: PSYCHOLOGIST

## 2020-06-08 NOTE — PROGRESS NOTES
Telemedicine PSYCHO-ONCOLOGY NOTE/ Individual Psychotherapy   (patient not on premises due to COVID-19 restrictions)    Consultation started: 6/8/2020 at 1:01 PM   The chief complaint leading to consultation is: adaptation to disease and treatment  The patient location is:  Patient home  Virtual visit with synchronous audio and video    Patient alone at the time of consultation      Each patient provided medical services by telemedicine is:  (1) informed of the relationship between the physician and patient and the respective role of any other health care provider with respect to management of the patient; and (2) notified that he or she may decline to receive medical services by telemedicine and may withdraw from such care at any time.    Crisis Disclaimer: Patient was informed that due to the virtual nature of the visit, that if a crisis develops, protocols will be implemented to ensure patient safety, including but not limited to: 1) Initiating a welfare check with local Law Enforcement, 2) Calling 1/National Crisis Hotline, and/or 3) Initiating PEC/CEC procedures.     Date: 6/8/2020   Site of therapist:  [facility]         Therapeutic Intervention: Met with patient.  Outpatient - Supportive psychotherapy 45 min - CPT Code 23614    Referring provider:    Chief complaint/reason for encounter: depression and anxiety   Met with patient to evaluate psychosocial adaptation to survivorship of breast cancer with metastasis    Patient was last seen by me on 2/6/2020    Objective:      Phylicia Vásquez arrived promptly for the session (via video).  Ms. Vásquez was seated throughout the session. The patient was fully cooperative throughout the session.  Appearance: age appropriate, casually  dressed, well groomed  Behavior/Cooperation: friendly and cooperative  Speech: normal in rate, volume, and tone and appropriate quality, quantity and organization of sentences  Mood: dysthymic  Affect: dysphoric  Thought  "Process: goal-directed, logical  Thought Content: normal,  No delusions or paranoia; did not appear to be responding to internal stimuli during the session  Orientation: grossly intact  Memory: Grossly intact  Attention Span/Concentration: Attends to session without distraction; reports no difficulty  Fund of Knowledge: average  Estimate of Intelligence: average from verbal skills and history  Cognition: grossly intact  Insight: patient has awareness of illness; good insight into own behavior and behavior of others  Judgment: the patient's behavior is adequate to circumstances      Interval history and content of current session: Patient discussed completion of chemotherapy and recent ALND. Discussed current adaptation to disease and treatment status and family's adaptation to disease and treatment status. Reports to be coping with moderate difficulty ("such a shock," "really thought I was done," "already trying to cope without my breast").  She is currently struggling to sleep due to discomfort of drainage tubes (sleep good with Ambien otherwise). Evaluated cognitive response, paying particular attention to negative intrusive thoughts of a persistent and detrimental nature. Thoughts of this type are limited, with mild distress. Identified and evaluated psychosocial and environmental stressors secondary to diagnosis and treatment.     Focused on providing cognitive behavioral therapy to address negative cognitions. Examined proactive behaviors that may be implemented to minimize or ameliorate psychosocial stressors.  Discussed focus on social connection despite COVID and continued physical activity during radiation treatment. Patient encouraged to connect with family/friends on topics other than cancer.    Future goals explored- travel/cruise, back to work, pescatarian diet; additional life stressor- cousin decided to sell home patient was living in. NOw older son living with her, younger with father, and they had " to sell all their furniture.       Risk parameters:   Patient reports no suicidal ideation  Patient reports no homicidal ideation  Patient reports no self-injurious behavior  Patient reports no violent behavior   Safety needs:  None at this time      Verbal deficits: None     Patient's response to intervention:The patient's response to intervention is accepting, motivated.     Progress toward goals and other mental status changes:  The patient's progress toward goals is good.      Progress to date:Progress as Expected      Goals from last visit: Attempted, partially met      Patient reported outcomes:      Distress Thermometer:   Distress Score    Distress Score: 4        Practical Problems Physical Problems   : No Appearance: Yes   Housing: Yes Bathing / Dressing: No   Insurance / Financial: Yes Breathing: No    Transportation: Yes  Changes in Urination: No    Work / School: Yes  Constipation: No   Treatment Decisions: Yes  Diarrhea: No     Eating: Yes    Family Problems Fatigue: Yes    Dealing with Children: Yes Feeling Swollen: Yes    Dealing with Partner: No Fevers: No    Ability to Have Children: No  Getting Around: No       Indigestion: No     Memory / Concentration: Yes   Emotional Problems Mouth Sores: Yes    Depression: Yes  Nausea: No    Fears: Yes  Nose Dry / Congested: No    Nervousness: Yes  Pain: Yes    Sadness: Yes Sexual: No    Worry: Yes Skin Dry / Itchy: Yes    Loss of Interest in Usual Activities: No Sleep: No     Substance Abuse: No    Spiritual/Religions Concerns Tingling in Hands / Feet: Yes   Spritual / Latter day Concerns: No         Other Problems              PHQ-9=  Initial visit: 11    PHQ ANSWERS    Q1. Little interest or pleasure in doing things: Not at all (06/06/20 2002)  Q2. Feeling down, depressed, or hopeless: Several days (06/06/20 2002)  Q3. Trouble falling or staying asleep, or sleeping too much: More than half the days (06/06/20 2002)  Q4. Feeling tired or having  little energy: Not at all (06/06/20 2002)  Q5. Poor appetite or overeating: Several days (06/06/20 2002)  Q6. Feeling bad about yourself - or that you are a failure or have let yourself or your family down: More than half the days (06/06/20 2002)  Q7. Trouble concentrating on things, such as reading the newspaper or watching television: More than half the days (06/06/20 2002)  Q8. Moving or speaking so slowly that other people could have noticed. Or the opposite - being so fidgety or restless that you have been moving around a lot more than usual: Not at all (06/06/20 2002)  Q9.      PHQ8 Score : 8 (06/06/20 2002)  PHQ-9 Total Score: 8 (06/06/20 2002)        BRAD-7= Initial visit: 10     GAD7 6/6/2020   1. Feeling nervous, anxious, or on edge? 2   2. Not being able to stop or control worrying? 0   3. Worrying too much about different things? 2   4. Trouble relaxing? 1   5. Being so restless that it is hard to sit still? 1   6. Becoming easily annoyed or irritable? 1   7. Feeling afraid as if something awful might happen? 1   BRAD-7 Score 8         Client Strengths: verbal, intelligent, successful, good social support, good insight, commitment to wellness, strong sang, strong cultural traditions      Treatment Plan:individual psychotherapy  · Target symptoms: depression, anxiety   · Why chosen therapy is appropriate versus another modality: relevant to diagnosis, patient responds to this modality, evidence based practice  · Outcome monitoring methods: self-report, checklist/rating scale  · Therapeutic intervention type: behavior modifying psychotherapy, supportive psychotherapy  · Prognosis: Good      Behavioral goals:    Exercise:  Keep walking around the block   Stress management: puzzles, cooking shows   Social engagement: family, online support group   Nutrition: investigate plant-based diets/Mediterranean    Smoking Cessation:   Therapy:  Decrease cancer talk    Return to clinic: 2 weeks     Length of Service  (minutes direct face-to-face contact): 45      ICD-10-CM ICD-9-CM   1. Adjustment disorder with mixed anxiety and depressed mood F43.23 309.28         Cem Hilton, PhD  [place] License #[id number]

## 2020-06-10 LAB
FINAL PATHOLOGIC DIAGNOSIS: NORMAL
GROSS: NORMAL

## 2020-06-15 NOTE — PROGRESS NOTES
Breast Surgery  [facility]  Department of Surgery       Phylicia Vásquez is a 50 y.o. female with Stage T2N1M0 invasive lobular carcinoma of the RIGHT breast, ER+/RI +/HEr2 olivia negative.    She initially presented with a screening mammogram 10/16/19 that showed a focal asymmetry in the upper outer quadrant of the right breast in the posterior depth. Patient never felt a mass but she was having tenderness at her right lateral breast.  Follow-up MMG (10/31/19) showed an irregular equal density mass with spiculated margins measuring 1.6cm.  US evaluation showed an irregular hypoechoic mass at 10 o'clock right breast, N+14 cm. A level 1 right axillary adenopathy of a couple nodes were also identified the largest of which measuring 2.1cm.  A ultrasound guided biopsy was performed on 19 with pathology revealing infiltrating mammary carcinoma of the breast and one biopsy positive lymph node.     Patient had a previous right breast biopsy performed in  consistent with an intraductal papilloma which was excised by Dr. Jaime.    Genetic testing was negative with a VUS mutation.     Interval History 20:  She underwent R ALND on 20. Path demonstrates 5/5 LNs negative.     GYN History:  Age of menarche was 12. Age of menopause was 37 after hysterectomy.  Patient denies hormonal therapy. Patient is . Age of first live birth was 25. Patient did not breast feed.    Family History:  Paternal aunt had ovarian cancer in her 50s  Father had lung and prostate cancer (smoker)    Past Medical History:   Diagnosis Date    Anxiety     Back pain     Goiter     HTN (hypertension) 10/18/2012    Hx antineoplastic chemo     last dose 20    Hx of psychiatric care     Ambien, Klonopin    Insomnia 10/18/2012    Malignant neoplasm of axillary tail of right breast in female, estrogen receptor positive 2019    Primary invasive malignant neoplasm of female breast, right 2019    Psychiatric  problem     S/P abdominal supracervical subtotal hysterectomy, continues to have regular menses. 2014    Spinal cord cyst, L1      Thoracic radiculopathy, bulging disc at T10-11 and T11-12      Past Surgical History:   Procedure Laterality Date    AXILLARY NODE DISSECTION Right 2020    Procedure: LYMPHADENECTOMY, AXILLARY;  Surgeon: Lelo Guaman MD;  Location: [facility] OR 2ND FLR;  Service: General;  Laterality: Right;    BREAST BIOPSY Right      SECTION      CHOLECYSTECTOMY      COLONOSCOPY N/A 10/5/2015    Procedure: COLONOSCOPY;  Surgeon: JERONIMO Cancino MD;  Location: [facility] ENDO (4TH FLR);  Service: Endoscopy;  Laterality: N/A;    HYSTERECTOMY      BC--SUPRACERVICAL    INJECTION FOR SENTINEL NODE IDENTIFICATION Right 2020    Procedure: INJECTION, FOR SENTINEL NODE IDENTIFICATION;  Surgeon: Lelo Guaman MD;  Location: [facility];  Service: General;  Laterality: Right;    INSERTION OF BREAST TISSUE EXPANDER Right 2020    Procedure: INSERTION, TISSUE EXPANDER, BREAST;  Surgeon: Pablo Ramos MD;  Location: [facility];  Service: Plastics;  Laterality: Right;    INSERTION OF TUNNELED CENTRAL VENOUS CATHETER (CVC) WITH SUBCUTANEOUS PORT Right 2019    Procedure: SGLGLCGHK-PYUH-N-CATH Fluoro needed;  Surgeon: Lelo Guaman MD;  Location: [facility] OR 2ND FLR;  Service: General;  Laterality: Right;  Right internal jugular.     SENTINEL LYMPH NODE BIOPSY Right 2020    Procedure: BIOPSY, LYMPH NODE, SENTINEL;  Surgeon: Lelo Guaman MD;  Location: [facility];  Service: General;  Laterality: Right;    UNILATERAL MASTECTOMY Right 2020    Procedure: MASTECTOMY, UNILATERAL;  Surgeon: Lelo Guaman MD;  Location: [facility];  Service: General;  Laterality: Right;     Current Outpatient Medications on File Prior to Visit   Medication Sig Dispense Refill    aluminum & magnesium hydroxide-simethicone (MYLANTA MAX STRENGTH) 400-400-40 mg/5 mL suspension Take by  mouth every 6 (six) hours as needed for Indigestion.      amlodipine-valsartan (EXFORGE) 5-320 mg per tablet Take 1 tablet by mouth once daily. 90 tablet 3    ascorbic acid (VITAMIN C ORAL) Take by mouth every morning.      BIOTIN, BULK, MISC by Misc.(Non-Drug; Combo Route) route every morning.      cephALEXin (KEFLEX) 500 MG capsule TAKE 1 CAPSULE TWICE DAILY 10 capsule 0    cyanocobalamin, vitamin B-12, (VITAMIN B-12 ORAL) Take by mouth every morning.      cyclobenzaprine (FLEXERIL) 5 MG tablet TAKE 1 TO 2 TABLETS BY MOUTH DAILY AT BEDTIME AS NEEDED 30 tablet 2    dexAMETHasone (DECADRON) 4 MG Tab Take 5 tablets 12 hours prior to chemotherapy and another 5 tablets 6 hours prior to each cycle of taxol chemotherapy 40 tablet 0    famotidine (PEPCID) 10 MG tablet Take 10 mg by mouth once daily.      fluocinolone (DERMA-SMOOTHE) 0.01 % external oil apply a thin film to affected area every night at bedtime as needed for flare (Patient not taking: Reported on 6/3/2020) 118.28 mL 1    fluticasone (FLONASE) 50 mcg/actuation nasal spray 1 spray by Each Nare route 2 (two) times daily as needed. 15 g 0    gabapentin (NEURONTIN) 300 MG capsule Take 1 capsule (300 mg total) by mouth 2 (two) times daily. 60 capsule 11    magic mouthwash diphen/antac/lidoc/nysta Take 10 mLs by mouth 4 (four) times daily. 240 mL 1    mirtazapine (REMERON SOL-TAB) 15 MG disintegrating tablet Take 1 tablet (15 mg total) by mouth nightly. 30 tablet 2    ondansetron (ZOFRAN) 8 MG tablet Take 1 tablet by mouth every 12 hours x 3 days post chemotherapy followed by 1 tablet by mouth every 12 hours as needed for nausea. (Patient not taking: Reported on 6/3/2020) 30 tablet 2    ondansetron (ZOFRAN-ODT) 8 MG TbDL Dissolve 1 tablet (8 mg total) by mouth every 6 (six) hours as needed (Nausea). 30 tablet 1    oxyCODONE-acetaminophen (PERCOCET) 5-325 mg per tablet take one tablet by mouth every 4 hours as needed for pain 30 tablet 0     promethazine (PHENERGAN) 25 MG tablet Take 1 tablet (25 mg total) by mouth every 6 (six) hours as needed for Nausea. 40 tablet 0    tobramycin sulfate 0.3% (TOBREX) 0.3 % ophthalmic solution 1-2 drops topically twice daily to affected toe(s). 5 mL 3    zolpidem (AMBIEN) 10 mg Tab Take 1 tablet (10 mg total) by mouth nightly as needed. 30 tablet 2     No current facility-administered medications on file prior to visit.      Social History     Socioeconomic History    Marital status: Single     Spouse name: Not on file    Number of children: 2    Years of education: Not on file    Highest education level: Not on file   Occupational History    Not on file   Social Needs    Financial resource strain: Not hard at all    Food insecurity     Worry: Never true     Inability: Never true    Transportation needs     Medical: No     Non-medical: No   Tobacco Use    Smoking status: Never Smoker    Smokeless tobacco: Never Used   Substance and Sexual Activity    Alcohol use: No     Frequency: Monthly or less     Drinks per session: 1 or 2     Binge frequency: Never    Drug use: No    Sexual activity: Yes     Partners: Male   Lifestyle    Physical activity     Days per week: 3 days     Minutes per session: 30 min    Stress: To some extent   Relationships    Social connections     Talks on phone: More than three times a week     Gets together: Once a week     Attends Zoroastrianism service: Not on file     Active member of club or organization: No     Attends meetings of clubs or organizations: Never     Relationship status:    Other Topics Concern    Are you pregnant or think you may be? Not Asked    Breast-feeding Not Asked    Patient feels they ought to cut down on drinking/drug use Not Asked    Patient annoyed by others criticizing their drinking/drug use Not Asked    Patient has felt bad or guilty about drinking/drug use Not Asked    Patient has had a drink/used drugs as an eye opener in the AM Not  Asked   Social History Narrative    Has worked at the [employer] since it opened and stayed working through [employer] [place].    2011  from her  and moved to [place] with her sons to live with her aunt.    Her mother is very helpful and involved in her life.    She has never been a smoker and does not drink.        April 2016    Her eldest son has been working as a  at the [employer] [place] for one year now.  He is doing well in this capacity.  He is thinking about making this his career.    Her youngest son is doing better academically.  She would like to remain living on the [place] for his schooling.    Her commuting to work is difficult.        October 2017.  For the first time in a long time, she feels happy.  She has a new boyfriend.    November 2108.  Her eldest son has moved to [place], is living with his 1st cousin and has a full-time job with [employer].    Her youngest son is 16 and doing well in school.    She continues to date a very nice man.  She is enjoying living in [place] and commutes to the [facility] where she continues to enjoy her employment.        February 2020. Living with her mother due to her breast cancer treatment. Both sons living together in [place]. Younger son struggling emotionally and academically.     Family History   Problem Relation Age of Onset    Cancer Paternal Aunt     Lupus Paternal Aunt     Hypertension Mother     Depression Mother     Liver disease Father     Alcohol abuse Father     Colon cancer Paternal Uncle     Depression Son     Breast cancer Neg Hx     Ovarian cancer Neg Hx     Melanoma Neg Hx     Psoriasis Neg Hx     Eczema Neg Hx         Review of Systems   Constitutional: Negative for fatigue and unexpected weight change.   HENT: Negative.    Eyes: Negative.    Respiratory: Negative for chest tightness and shortness of breath.    Cardiovascular: Negative for chest pain and palpitations.   Gastrointestinal: Negative for  "abdominal distention and abdominal pain.   Genitourinary: Negative for dysuria, vaginal bleeding and vaginal discharge.   Musculoskeletal: Negative for arthralgias and back pain.   Skin: Negative for color change and rash.   Neurological: Negative for syncope and headaches.     Objective:   LMP 08/14/2014   /72 (BP Location: Left arm, Patient Position: Sitting, BP Method: Large (Automatic))   Pulse 63   Temp 99 °F (37.2 °C) (Oral)   Ht 5' 4" (1.626 m)   Wt 103.7 kg (228 lb 9.9 oz)   LMP 08/14/2014   BMI 39.24 kg/m²     Physical Exam:  HEENT: Normocephalic, atraumatic.    Gen: alert and oriented; no acute distress.  Breast: Incision CDI, drain serous, healing well.    Radiology review: Images personally reviewed by me in the clinic.   Result:   Mammo Digital Diagnostic Right w/ Michele  US Breast Right Limited  History:  Work-up for abnormal screening mammogram (recall).  Films Compared:  Prior images (if available) were compared.  Findings:  This procedure was performed using tomosynthesis.  Computer-aided detection was utilized in the interpretation of this examination.  Mammo Digital Diagnostic Right w/ Michele  The right breast has scattered areas of fibroglandular density.      Right breast 09:00 o'clock axis middle depth excisional biopsy scar, benign.      In the right breast upper outer quadrant posterior depth, there is an irregular equal density mass with spiculated margins measuring 1.6 cm.  Associated architectural distortion.  This corresponds to the right breast screening mammogram finding.      Limited right breast ultrasound:   In the right breast 10:00 o'clock axis 14 cm from the nipple, there is an irregular hypoechoic mass with indistinct margins and posterior acoustic shadowing measuring 0.8 cm.  No surrounding hypervascularity.  This corresponds to the right breast screening mammogram finding.      Level 1 right axillary adenopathy with a couple of nodes demonstrating cortical thickening " and loss of fatty hilum.  The largest node measures 2.1 cm in long axis.      Impression:  1. Right breast 10:00 o'clock axis mass corresponds to the screening mammogram finding. BI-RADS 4: Suspicious. Recommend ultrasound-guided biopsy.   2. Level 1 right axillary adenopathy. BI-RADS 4: Suspicious. Recommend ultrasound-guided biopsy for 1 of the abnormal nodes.   BI-RADS Category:   Right: 4 - Suspicious  Overall: 4 - Suspicious       5/13/2020 Breast MRI:  Right  There is a 19 mm x 5 mm x 5 mm lymph node seen in the right axilla. Compared to the previous study, the axillary lymph node decreased in size. This is associated with a biopsy marker.      Second previously described thickened axillary lymph node has decreased slightly in size, now measuring approximately 5mm in short axis.      There is a 30 mm x 29 mm x 8 mm irregularly shaped, heterogeneous mass with indistinct and irregular margins seen in the right breast at 10 o'clock, 10.7 cm from the nipple and 4.3 cm from the chest wall. Delayed phase is persistent. Allowing for differences in patient positioning, this is not significantly changed in size, but demonstrates interval decrease in enhancement, suggesting some response to therapy.      Left  There is no evidence of suspicious masses, abnormal enhancement, or other abnormal findings.     Impression:     Right  Mass: Right breast 30 mm x 29 mm x 8 mm mass at the 10 o'clock position.  Allowing for differences in patient positioning, this is not significantly changed in size, but demonstrates interval decrease in enhancement, suggesting some response to therapy.     Assessment: 6 - Known biopsy, proven malignancy.         Lymph Node: Right axilla 19 mm x 5 mm x 5 mm lymph node which was previously biopsied and adjacent previously thickened node have both decreased in size and now appear similar to contralateral lymph nodes, suggesting some response to therapy. Assessment: 6 - Known biopsy, proven  malignancy.      Left  There is no MR evidence of malignancy in the left breast.     BI-RADS Category:   Overall: 6 - Known Biopsy-Proven Malignancy     Pathology 5/20/20  1. RIGHT AXILLARY SENTINEL LYMPH NODE #1, EXCISION:  One lymph node positive for metastatic invasive lobular carcinoma (1/1) involving nearly all lymph node  tissue.  Focus of metastatic disease measures 2.0 cm in greatest dimension.  Extranodal extension is identified.  Epithelial immunostain panel (AE1/AE3, WSK, CAM 5.2) examined with appropriate controls.  2. RIGHT BREAST, SIMPLE MASTECTOMY:  Invasive lobular carcinoma, Maty histologic grade 2 (tubular formation: 3, nuclear pleomorphism: 2,  mitotic activity: 1), 3.0 cm, status post neoadjuvant treatment.  Lobular carcinoma in situ.  Intralesional previous biopsy site changes are identified.  Surgical margins are negative for malignancy.  Skin and nipple uninvolved by malignancy.  Comment: Infiltrating carcinoma with single filing and incipient single cell morphology is diffusely scattered  across a stellate shaped fibrous area measuring 3.0 cm in greatest dimension. Tumor cells are highlighted  by CK7 stains which aid in the assessment of disease extent and margin status. Because of neoadjuvant  treatment status, breast biomarkers are repeated. See results below.  3. RIGHT AXILLARY SENTINEL LYMPH NODE #2, EXCISION:  One lymph node positive for metastatic invasive lobular carcinoma (1/1) involving nearly all lymph node  tissue.  Focus of metastatic disease measures 1.5 cm in greatest dimension.    Pathology 6/5/20  Right axillary contents, lymphadenectomy:  No metastatic carcinoma identified in 5 lymph nodes (0/5)  Immunostains for AE1/AE3 and WSK with appropriate controls performed on blocks a D, E, and G are  interpreted as negative and support the diagnosis.  Fat necrosis      Assessment:       Ms. Vásquez is a 50 year old female with ER/NY+ Her2 -- Invasive Lobular Carcinoma of the  right breast, s/p neoadjuvant chemotherapy with ACT, presents today to discuss a surgical treatment plan. She underwent R mastectomy with SLNB and tissue expander with plastics on 5/20/20. She then underwent ALND on 6/5/20.   Plan:     - F/u in 6 months for CBE  - Will need radition therapy as well, referral to rad onc  - L breast mammo in October 2020  - continue F/u with med onc for endocrine therapy  - Please call with questions or concerns.  COntinue self breast exam.

## 2020-06-16 ENCOUNTER — OFFICE VISIT (OUTPATIENT)
Dept: SURGERY | Facility: CLINIC | Age: 50
End: 2020-06-16
Payer: COMMERCIAL

## 2020-06-16 VITALS
SYSTOLIC BLOOD PRESSURE: 120 MMHG | BODY MASS INDEX: 39.03 KG/M2 | HEIGHT: 64 IN | TEMPERATURE: 99 F | WEIGHT: 228.63 LBS | DIASTOLIC BLOOD PRESSURE: 72 MMHG | HEART RATE: 63 BPM

## 2020-06-16 DIAGNOSIS — C50.611 CARCINOMA OF AXILLARY TAIL OF RIGHT BREAST IN FEMALE, ESTROGEN RECEPTOR POSITIVE: Primary | ICD-10-CM

## 2020-06-16 DIAGNOSIS — Z17.0 CARCINOMA OF AXILLARY TAIL OF RIGHT BREAST IN FEMALE, ESTROGEN RECEPTOR POSITIVE: Primary | ICD-10-CM

## 2020-06-16 PROCEDURE — 99024 PR POST-OP FOLLOW-UP VISIT: ICD-10-PCS | Mod: S$GLB,,, | Performed by: SURGERY

## 2020-06-16 PROCEDURE — 99999 PR PBB SHADOW E&M-EST. PATIENT-LVL IV: ICD-10-PCS | Mod: PBBFAC,,, | Performed by: SURGERY

## 2020-06-16 PROCEDURE — 99999 PR PBB SHADOW E&M-EST. PATIENT-LVL IV: CPT | Mod: PBBFAC,,, | Performed by: SURGERY

## 2020-06-16 PROCEDURE — 99024 POSTOP FOLLOW-UP VISIT: CPT | Mod: S$GLB,,, | Performed by: SURGERY

## 2020-06-17 ENCOUNTER — TELEPHONE (OUTPATIENT)
Dept: RADIATION ONCOLOGY | Facility: CLINIC | Age: 50
End: 2020-06-17

## 2020-06-17 ENCOUNTER — DOCUMENTATION ONLY (OUTPATIENT)
Dept: SURGERY | Facility: CLINIC | Age: 50
End: 2020-06-17

## 2020-06-17 DIAGNOSIS — Z17.0 MALIGNANT NEOPLASM OF AXILLARY TAIL OF RIGHT BREAST IN FEMALE, ESTROGEN RECEPTOR POSITIVE: Primary | ICD-10-CM

## 2020-06-17 DIAGNOSIS — C50.611 MALIGNANT NEOPLASM OF AXILLARY TAIL OF RIGHT BREAST IN FEMALE, ESTROGEN RECEPTOR POSITIVE: Primary | ICD-10-CM

## 2020-06-17 NOTE — NURSING
Referral placed for Dr. Zacarias, message sent to staff to schedule appt. Appt scheduled for 6/25/2020.       Oncology Navigation   Intake      Treatment  Treatment Type(s): Surgery  Chemotherapy Regimen: 4 cycles of neoadjuvant Adriamycin and cyclophosphamide and 4 cycles of Taxol.    Surgery: 5/20/2020 right mastectomy with SLNB/ 6/5/2020 ALND  Surgeon Name: Dr. Guaman  Surgery Schedule Date: 05/20/20          General Referrals: Radiation oncology (message sent to Dr. Redman)        Acuity      Follow Up  Follow up in about 10 days (around 6/27/2020) for f/u after appt with Dr. Zacarias.

## 2020-06-18 ENCOUNTER — NURSE TRIAGE (OUTPATIENT)
Dept: ADMINISTRATIVE | Facility: CLINIC | Age: 50
End: 2020-06-18

## 2020-06-18 ENCOUNTER — OFFICE VISIT (OUTPATIENT)
Dept: HEMATOLOGY/ONCOLOGY | Facility: CLINIC | Age: 50
End: 2020-06-18
Payer: COMMERCIAL

## 2020-06-18 DIAGNOSIS — Z17.0 CARCINOMA OF AXILLARY TAIL OF RIGHT BREAST IN FEMALE, ESTROGEN RECEPTOR POSITIVE: Primary | ICD-10-CM

## 2020-06-18 DIAGNOSIS — C50.611 CARCINOMA OF AXILLARY TAIL OF RIGHT BREAST IN FEMALE, ESTROGEN RECEPTOR POSITIVE: Primary | ICD-10-CM

## 2020-06-18 PROCEDURE — 99214 OFFICE O/P EST MOD 30 MIN: CPT | Mod: 95,,, | Performed by: INTERNAL MEDICINE

## 2020-06-18 PROCEDURE — 99214 PR OFFICE/OUTPT VISIT, EST, LEVL IV, 30-39 MIN: ICD-10-PCS | Mod: 95,,, | Performed by: INTERNAL MEDICINE

## 2020-06-18 NOTE — TELEPHONE ENCOUNTER
Post Procedural Symptom Tracker. Pt had an office visit on 6/16/20. Per symptom tracker protocol, no contact made. No follow up needed.      Reason for Disposition   Patient already left for the hospital/clinic    Additional Information   Negative: Caller has already spoken with the PCP (or office), and has no further questions   Negative: Caller has already spoken with another triager and has no further questions   Negative: Caller has already spoken with another triager or PCP (or office), and has further questions and triager able to answer questions.   Negative: Busy signal.  First attempt to contact caller.  Follow-up call scheduled within 15 minutes.   Negative: No answer.  First attempt to contact caller.  Follow-up call scheduled within 15 minutes.   Negative: Message left on identified voicemail   Negative: Message left on unidentified voice mail. Phone number verified.   Negative: Message left with person in household   Negative: Wrong number reached. Phone number verified.   Negative: Second attempt to contact family AND no contact made. Phone number verified.   Negative: Cell phone out of range. Phone number verified.    Protocols used: NO CONTACT OR DUPLICATE CONTACT CALL-A-OH

## 2020-06-18 NOTE — PROGRESS NOTES
Subjective:       Patient ID: Phylicia Vásquez is a 50 y.o. female.    Chief Complaint: No chief complaint on file.    HPI     The patient location is: Home  The chief complaint leading to consultation is: Continuation of treatment for breast cancer    Visit type: Video    Face to Face time with patient: 10 minutes with an additional 20 minutes non-face to face time preparing to see the patient (eg, review of tests), Obtaining and/or reviewing separately obtained history, Documenting clinical information in the electronic or other health record, Independently interpreting results (not separately reported) and communicating results to the patient/family/caregiver, or Care coordination (not separately reported).         Each patient to whom he or she provides medical services by telemedicine is:  (1) informed of the relationship between the physician and patient and the respective role of any other health care provider with respect to management of the patient; and (2) notified that he or she may decline to receive medical services by telemedicine and may withdraw from such care at any time.      Mrs Lua had an appointment with me today, however, due to the COVID-19 pandemic she was switched to a virtual visit..  On May 20th she underwent a mastectomy and sentinel lymph node biopsy.  She had 3 cm of residual carcinoma along with LCIS.  In addition, 2 of 2 sentinel lymph nodes were positive.  She subsequently underwent a full axillary dissection on 06/05/2020.  Five of 5 lymph nodes that were removed were negative.  She has made an uneventful recovery and she is here discuss adjuvant treatment options.      Briefly, she is a 49 year old AA female who was recently found to have a carcinoma that is ER positive, MI positive and HER 2 equivocal.  Her cancer is ER positive, MI positive, and HER 2 indeterminate.  FISH was negative.  An axillary node biopsy was also positive.  She is being treated with standard  neoadjuvant chemotherapy prior to the operation.    Of note, Mrs. Vásquez had undergone a hysterectomy at our facility in 2012.  It is unclear whether the ovaries were removed.      Review of Systems    Overall she feels OK.   She states that she has almost completely recovered from the recent axillary dissection.  She denies any depression, easy bruising, fevers, chills, night  sweats, weight loss, vomiting, diarrhea, constipation, diplopia, blurred vision, headache, chest pain, palpitations, shortness of breath, left breast pain, abdominal pain, extremity pain, or difficulty ambulating.  The remainder of the ten-point ROS, including general, skin, lymph, H/N, cardiorespiratory, GI, , Neuro, Endocrine, and psychiatric is negative.       Objective:      Physical Exam      Deferred.  This was a virtual visit.    Assessment:       1. Carcinoma of axillary tail of right breast in female, estrogen receptor positive, clinically T2N1M0     2.    Axillary node metastases.    Plan:        I had a long discussion with her.  I recommended that she receive post mastectomy radiation therapy.  Mrs. Vásquez stated that she has a virtual appointment with a radiation oncologist in June 26.  I briefly discussed with her the option of additional chemotherapy with capecitabine or on the clinical trial of capecitabine versus carboplatin.  We also discussed the fact that she will need adjuvant hormonal therapy.  We will attempt to locate the pathology report from the prior hysterectomy to determine whether she had bilateral oophorectomies which with the been rather unlikely at her age.  I have asked Mrs. Vásquez to return in see me in July 6 for a physical visit.  Her multiple questions were answered to her satisfaction.

## 2020-06-24 ENCOUNTER — OFFICE VISIT (OUTPATIENT)
Dept: PSYCHIATRY | Facility: CLINIC | Age: 50
End: 2020-06-24
Payer: COMMERCIAL

## 2020-06-24 DIAGNOSIS — F43.23 ADJUSTMENT DISORDER WITH MIXED ANXIETY AND DEPRESSED MOOD: Primary | ICD-10-CM

## 2020-06-24 PROCEDURE — 90834 PSYTX W PT 45 MINUTES: CPT | Mod: 95,,, | Performed by: PSYCHOLOGIST

## 2020-06-24 PROCEDURE — 90834 PR PSYCHOTHERAPY W/PATIENT, 45 MIN: ICD-10-PCS | Mod: 95,,, | Performed by: PSYCHOLOGIST

## 2020-06-24 NOTE — PROGRESS NOTES
Telemedicine PSYCHO-ONCOLOGY NOTE/ Individual Psychotherapy   (patient not on premises due to COVID-19 restrictions)    Consultation started: 6/24/2020 at 3:04 PM   The chief complaint leading to consultation is: adaptation to disease and treatment  The patient location is:  Patient home  Virtual visit with synchronous audio and video    Patient alone at the time of consultation      Each patient provided medical services by telemedicine is:  (1) informed of the relationship between the physician and patient and the respective role of any other health care provider with respect to management of the patient; and (2) notified that he or she may decline to receive medical services by telemedicine and may withdraw from such care at any time.    Crisis Disclaimer: Patient was informed that due to the virtual nature of the visit, that if a crisis develops, protocols will be implemented to ensure patient safety, including but not limited to: 1) Initiating a welfare check with local Law Enforcement, 2) Calling 1/National Crisis Hotline, and/or 3) Initiating PEC/CEC procedures.     Date: 6/24/2020   Site of therapist:  [facility]         Therapeutic Intervention: Met with patient.  Outpatient - Supportive psychotherapy 45 min - CPT Code 90190    Referring provider:    Chief complaint/reason for encounter: depression and anxiety   Met with patient to evaluate psychosocial adaptation to survivorship of breast cancer with metastasis    Patient was last seen by me on 6/8/2020    Objective:      hPylicia Vásquez arrived promptly for the session (via video).  Ms. Vásquez was seated throughout the session. The patient was fully cooperative throughout the session.  Appearance: age appropriate, casually  dressed, well groomed  Behavior/Cooperation: friendly and cooperative  Speech: normal in rate, volume, and tone and appropriate quality, quantity and organization of sentences  Mood: dysthymic  Affect: dysphoric  Thought  "Process: goal-directed, logical  Thought Content: normal,  No delusions or paranoia; did not appear to be responding to internal stimuli during the session  Orientation: grossly intact  Memory: Grossly intact  Attention Span/Concentration: Attends to session without distraction; reports no difficulty  Fund of Knowledge: average  Estimate of Intelligence: average from verbal skills and history  Cognition: grossly intact  Insight: patient has awareness of illness; good insight into own behavior and behavior of others  Judgment: the patient's behavior is adequate to circumstances      Interval history and content of current session: Patient discussed recent notification of potential HER2 status (indeterminate) and need for additional chemotherapy before or after radiation. Discussed current adaptation to disease and treatment status and family's adaptation to disease and treatment status. Reports to be coping with moderate difficulty.  She states she is confused and "shut down during my appointment." She has always been fearful of HER2 results due to commercials on TV. She is sad that she did not have family members with her for the video visit to help her retain and understand the information provided. Reaction normalized. Evaluated cognitive response, paying particular attention to negative intrusive thoughts of a persistent and detrimental nature. Thoughts of this type are present, with moderate distress. Identified and evaluated psychosocial and environmental stressors secondary to diagnosis and treatment.  Importance of pursuing only selective information online and writing down questions for providers reinforced.     Focused on providing cognitive behavioral therapy to address negative cognitions. Examined proactive behaviors that may be implemented to minimize or ameliorate psychosocial stressors.  Discussed focus on social connection despite COVID and continued physical activity. (She has been doing this for " several months.)  Patient encouraged to plan regular connection with family/friends.    Patient struggling with neuropathic pain. States gabapentin not helping. Encouraged her to discuss with Dr. Verde.       Risk parameters:   Patient reports no suicidal ideation  Patient reports no homicidal ideation  Patient reports no self-injurious behavior  Patient reports no violent behavior   Safety needs:  None at this time      Verbal deficits: None     Patient's response to intervention:The patient's response to intervention is accepting, motivated.     Progress toward goals and other mental status changes:  The patient's progress toward goals is good.      Progress to date:Progress as Expected      Goals from last visit: Attempted, partially met      Patient reported outcomes:      Distress Thermometer:   Distress Score    Distress Score: 5        Practical Problems Physical Problems   : No Appearance: No   Housing: No Bathing / Dressing: No   Insurance / Financial: No Breathing: No    Transportation: No  Changes in Urination: Yes    Work / School: Yes  Constipation: No   Treatment Decisions: Yes  Diarrhea: No     Eating: No    Family Problems Fatigue: Yes    Dealing with Children: Yes Feeling Swollen: No    Dealing with Partner: No Fevers: No    Ability to Have Children: No  Getting Around: No       Indigestion: No     Memory / Concentration: Yes   Emotional Problems Mouth Sores: Yes    Depression: Yes  Nausea: No    Fears: Yes  Nose Dry / Congested: No    Nervousness: Yes  Pain: Yes    Sadness: Yes Sexual: No    Worry: Yes Skin Dry / Itchy: No    Loss of Interest in Usual Activities: No Sleep: Yes     Substance Abuse: No    Spiritual/Religions Concerns Tingling in Hands / Feet: Yes   Spritual / Taoist Concerns: No         Other Problems              PHQ-9=  Initial visit: 11    PHQ ANSWERS    Q1. Little interest or pleasure in doing things: Not at all (06/22/20 0806)  Q2. Feeling down, depressed, or  hopeless: Several days (06/22/20 0806)  Q3. Trouble falling or staying asleep, or sleeping too much: Several days (06/22/20 0806)  Q4. Feeling tired or having little energy: Several days (06/22/20 0806)  Q5. Poor appetite or overeating: Not at all (06/22/20 0806)  Q6. Feeling bad about yourself - or that you are a failure or have let yourself or your family down: Not at all (06/22/20 0806)  Q7. Trouble concentrating on things, such as reading the newspaper or watching television: Several days (06/22/20 0806)  Q8. Moving or speaking so slowly that other people could have noticed. Or the opposite - being so fidgety or restless that you have been moving around a lot more than usual: Not at all (06/22/20 0806)  Q9. Thoughts that you would be better off dead, or of hurting yourself in some way: Not at all (06/22/20 0806)    PHQ8 Score : 4 (06/22/20 0806)  PHQ-9 Total Score: 4 (06/22/20 0806)        BRAD-7= Initial visit: 10     GAD7 6/22/2020   1. Feeling nervous, anxious, or on edge? 1   2. Not being able to stop or control worrying? 1   3. Worrying too much about different things? 1   4. Trouble relaxing? 1   5. Being so restless that it is hard to sit still? 0   6. Becoming easily annoyed or irritable? 2   7. Feeling afraid as if something awful might happen? 1   BRAD-7 Score 7         Client Strengths: verbal, intelligent, successful, good social support, good insight, commitment to wellness, strong sang, strong cultural traditions      Treatment Plan:individual psychotherapy  · Target symptoms: depression, anxiety   · Why chosen therapy is appropriate versus another modality: relevant to diagnosis, patient responds to this modality, evidence based practice  · Outcome monitoring methods: self-report, checklist/rating scale  · Therapeutic intervention type: behavior modifying psychotherapy, supportive psychotherapy  · Prognosis: Good      Behavioral goals:    Exercise:  Keep walking daily around the block   Stress  management: puzzles, cooking shows   Social engagement: family, online support group   Nutrition: investigate plant-based diets/Mediterranean    Smoking Cessation:   Therapy:  Decrease cancer talk    Write down questions for MDs    Contact Dr. Verde about neuropathic pain (Dr. Montalvo in future?)    Return to clinic: 2 weeks     Length of Service (minutes direct face-to-face contact): 45      ICD-10-CM ICD-9-CM   1. Adjustment disorder with mixed anxiety and depressed mood  F43.23 309.28         Cem Hilton, PhD  [place] License #[id number]

## 2020-06-25 ENCOUNTER — OFFICE VISIT (OUTPATIENT)
Dept: RADIATION ONCOLOGY | Facility: CLINIC | Age: 50
End: 2020-06-25
Payer: COMMERCIAL

## 2020-06-25 DIAGNOSIS — C50.611 CARCINOMA OF AXILLARY TAIL OF RIGHT BREAST IN FEMALE, ESTROGEN RECEPTOR POSITIVE: ICD-10-CM

## 2020-06-25 DIAGNOSIS — C50.611 MALIGNANT NEOPLASM OF AXILLARY TAIL OF RIGHT BREAST IN FEMALE, ESTROGEN RECEPTOR POSITIVE: ICD-10-CM

## 2020-06-25 DIAGNOSIS — Z17.0 MALIGNANT NEOPLASM OF AXILLARY TAIL OF RIGHT BREAST IN FEMALE, ESTROGEN RECEPTOR POSITIVE: ICD-10-CM

## 2020-06-25 DIAGNOSIS — Z17.0 CARCINOMA OF AXILLARY TAIL OF RIGHT BREAST IN FEMALE, ESTROGEN RECEPTOR POSITIVE: ICD-10-CM

## 2020-06-25 PROCEDURE — 99204 OFFICE O/P NEW MOD 45 MIN: CPT | Mod: 95,,, | Performed by: RADIOLOGY

## 2020-06-25 PROCEDURE — 99204 PR OFFICE/OUTPT VISIT, NEW, LEVL IV, 45-59 MIN: ICD-10-PCS | Mod: 95,,, | Performed by: RADIOLOGY

## 2020-06-25 RX ORDER — ZOLPIDEM TARTRATE 10 MG/1
10 TABLET ORAL NIGHTLY PRN
Qty: 30 TABLET | Refills: 2 | Status: SHIPPED | OUTPATIENT
Start: 2020-06-25 | End: 2020-09-20 | Stop reason: SDUPTHER

## 2020-06-25 NOTE — PROGRESS NOTES
The patient location is:  home  The chief complaint leading to consultation is:  Right breast cancer    Visit type: audiovisual    Face to Face time with patient:  30 min  Sixty minutes of total time spent on the encounter, which includes face to face time and non-face to face time preparing to see the patient (eg, review of tests), Obtaining and/or reviewing separately obtained history, Documenting clinical information in the electronic or other health record, Independently interpreting results (not separately reported) and communicating results to the patient/family/caregiver, or Care coordination (not separately reported).     Each patient to whom he or she provides medical services by telemedicine is:  (1) informed of the relationship between the physician and patient and the respective role of any other health care provider with respect to management of the patient; and (2) notified that he or she may decline to receive medical services by telemedicine and may withdraw from such care at any time.    REFERRING PHYSICIAN:   Lelo Guaman M.D., Negrito Verde M.D    DIAGNOSIS:  cT2 N1a M0 ([id number]), grade 2, invasive lobular carcinoma of the right breast    HISTORY OF PRESENT ILLNESS:   Ms. Vásquez is a 50-year-old female who was recently diagnosed with right breast cancer after an abnormal mammogram in October 2019 which revealed a focal asymmetry in the upper outer quadrant of the right breast.  A diagnostic mammogram revealed a 1.6 cm mass in the upper outer quadrant.  There is level 1 right axillary adenopathy with a couple of nodes demonstrating cortical thickening and loss of fatty hilum with the largest node measuring 2.1 cm.  A core needle biopsy of the right breast mass and right axillary lymph node was done on November 7, 2019.  Pathology revealed the right breast with invasive mammary carcinoma, intermediate grade.  The right axillary lymph node was also positive for carcinoma.  This lesion is ER  positive (greater than 90%), NC positive (greater than 90%), and HER2 negative, with Ki-67 index of 20%.  An MRI of the bilateral breasts on November 11, 2020 revealed a 29 x 9 x 25 mm mass in the right breast in the upper outer quadrant as well as 2 enlarged lymph nodes in the right axilla with the largest 1 measuring 22 x 21 x 10 mm. A CT of the chest, abdomen, and pelvis as well as a bone scan on November 15, 2019 were negative for metastatic disease.    She received neoadjuvant chemotherapy under the care of Dr. Verde.  A repeat MRI of the bilateral breasts on May 13, 2020 revealed no change in the size of the right breast mass.  The 2 right axillary lymph nodes had minimal decrease in size.  She underwent right mastectomy, sentinel node biopsy, and temporary tissue expander placement on May 20, 2020.  Pathology revealed the right breast with residual 3 cm, grade 2, invasive lobular carcinoma with associated lobular carcinoma in situ.  The closest margin is 6 mm posteriorly.  2/2 sentinel lymph nodes were found to have metastatic invasive lobular carcinoma with the largest focus measuring 2 cm and the other measuring 1.5 cm, with extranodal extension.  She underwent right axillary dissection on June 5, 2020 which revealed additional 5 lymph nodes negative for involvement.  She is here today for discussion regarding postmastectomy radiation.    At present, she is healing from the surgery without any unexpected side effects.  She reports tenderness of the right axillary region due to recent surgery.  She reports neuropathy in her toes and fingers secondary to chemotherapy.  She denies right breast mound pain, edema, erythema, or discharge.  She also feet denies fever, night sweats, or weight loss.    REVIEW OF SYSTEMS:  As above.  In addition, patient denies headaches, visual problems, dizziness, chest pain, shortness of breath, cough, nausea, vomiting, diarrhea, or any new bony pains. Patient also denies  easy bruising, skin rashes, or numbness or tingling.    GYN HISTORY:   Menarche age 12.  .  Menopause at age 37 after hysterectomy.  She denies hormone replacement therapy.    ECO-1    PAST MEDICAL HISTORY:  Past Medical History:   Diagnosis Date    Anxiety     Back pain     Goiter     HTN (hypertension) 10/18/2012    Hx antineoplastic chemo     last dose 20    Hx of psychiatric care     Ambien, Klonopin    Insomnia 10/18/2012    Malignant neoplasm of axillary tail of right breast in female, estrogen receptor positive 2019    Primary invasive malignant neoplasm of female breast, right 2019    Psychiatric problem     S/P abdominal supracervical subtotal hysterectomy, continues to have regular menses. -2014    Spinal cord cyst, L1      Thoracic radiculopathy, bulging disc at T10-11 and T11-12        PAST SURGICAL HISTORY:  Past Surgical History:   Procedure Laterality Date    AXILLARY NODE DISSECTION Right 2020    Procedure: LYMPHADENECTOMY, AXILLARY;  Surgeon: Lelo Guaman MD;  Location: [facility] 2ND FLR;  Service: General;  Laterality: Right;    BREAST BIOPSY Right      SECTION      CHOLECYSTECTOMY      COLONOSCOPY N/A 10/5/2015    Procedure: COLONOSCOPY;  Surgeon: JERONIMO Cancino MD;  Location: [facility] ENDO (4TH FLR);  Service: Endoscopy;  Laterality: N/A;    HYSTERECTOMY      BC--SUPRACERVICAL    INJECTION FOR SENTINEL NODE IDENTIFICATION Right 2020    Procedure: INJECTION, FOR SENTINEL NODE IDENTIFICATION;  Surgeon: Lelo Guaman MD;  Location: [facility];  Service: General;  Laterality: Right;    INSERTION OF BREAST TISSUE EXPANDER Right 2020    Procedure: INSERTION, TISSUE EXPANDER, BREAST;  Surgeon: Pablo Ramos MD;  Location: [facility];  Service: Plastics;  Laterality: Right;    INSERTION OF TUNNELED CENTRAL VENOUS CATHETER (CVC) WITH SUBCUTANEOUS PORT Right 2019    Procedure: GOHPOSASU-POLO-B-CATH Fluoro  needed;  Surgeon: Lelo Guaman MD;  [address];  Service: General;  Laterality: Right;  Right internal jugular.     SENTINEL LYMPH NODE BIOPSY Right 5/20/2020    Procedure: BIOPSY, LYMPH NODE, SENTINEL;  Surgeon: Lelo Guaman MD;  Location: [facility];  Service: General;  Laterality: Right;    UNILATERAL MASTECTOMY Right 5/20/2020    Procedure: MASTECTOMY, UNILATERAL;  Surgeon: Lelo Guaman MD;  Location: [facility];  Service: General;  Laterality: Right;       ALLERGIES:   Review of patient's allergies indicates:   Allergen Reactions    Butalbital Other (See Comments)     Chest pain         MEDICATIONS:  Current Outpatient Medications   Medication Sig    aluminum & magnesium hydroxide-simethicone (MYLANTA MAX STRENGTH) 400-400-40 mg/5 mL suspension Take by mouth every 6 (six) hours as needed for Indigestion.    amlodipine-valsartan (EXFORGE) 5-320 mg per tablet Take 1 tablet by mouth once daily.    ascorbic acid (VITAMIN C ORAL) Take by mouth every morning.    BIOTIN, BULK, MISC by Misc.(Non-Drug; Combo Route) route every morning.    cephALEXin (KEFLEX) 500 MG capsule TAKE 1 CAPSULE TWICE DAILY    cyanocobalamin, vitamin B-12, (VITAMIN B-12 ORAL) Take by mouth every morning.    cyclobenzaprine (FLEXERIL) 5 MG tablet TAKE 1 TO 2 TABLETS BY MOUTH DAILY AT BEDTIME AS NEEDED    dexAMETHasone (DECADRON) 4 MG Tab Take 5 tablets 12 hours prior to chemotherapy and another 5 tablets 6 hours prior to each cycle of taxol chemotherapy    famotidine (PEPCID) 10 MG tablet Take 10 mg by mouth once daily.    fluocinolone (DERMA-SMOOTHE) 0.01 % external oil apply a thin film to affected area every night at bedtime as needed for flare    fluticasone (FLONASE) 50 mcg/actuation nasal spray 1 spray by Each Nare route 2 (two) times daily as needed.    gabapentin (NEURONTIN) 300 MG capsule Take 1 capsule (300 mg total) by mouth 2 (two) times daily.    magic mouthwash diphen/antac/lidoc/nysta Take 10 mLs by  mouth 4 (four) times daily.    mirtazapine (REMERON SOL-TAB) 15 MG disintegrating tablet Take 1 tablet (15 mg total) by mouth nightly.    ondansetron (ZOFRAN) 8 MG tablet Take 1 tablet by mouth every 12 hours x 3 days post chemotherapy followed by 1 tablet by mouth every 12 hours as needed for nausea.    ondansetron (ZOFRAN-ODT) 8 MG TbDL Dissolve 1 tablet (8 mg total) by mouth every 6 (six) hours as needed (Nausea).    oxyCODONE-acetaminophen (PERCOCET) 5-325 mg per tablet take one tablet by mouth every 4 hours as needed for pain    promethazine (PHENERGAN) 25 MG tablet Take 1 tablet (25 mg total) by mouth every 6 (six) hours as needed for Nausea.    tobramycin sulfate 0.3% (TOBREX) 0.3 % ophthalmic solution 1-2 drops topically twice daily to affected toe(s).    zolpidem (AMBIEN) 10 mg Tab Take 1 tablet (10 mg total) by mouth nightly as needed.     No current facility-administered medications for this visit.        SOCIAL HISTORY:  Social History     Socioeconomic History    Marital status: Single     Spouse name: Not on file    Number of children: 2    Years of education: Not on file    Highest education level: Not on file   Occupational History    Not on file   Social Needs    Financial resource strain: Not hard at all    Food insecurity     Worry: Never true     Inability: Never true    Transportation needs     Medical: No     Non-medical: No   Tobacco Use    Smoking status: Never Smoker    Smokeless tobacco: Never Used   Substance and Sexual Activity    Alcohol use: No     Frequency: Monthly or less     Drinks per session: 1 or 2     Binge frequency: Never    Drug use: No    Sexual activity: Yes     Partners: Male   Lifestyle    Physical activity     Days per week: 3 days     Minutes per session: 30 min    Stress: To some extent   Relationships    Social connections     Talks on phone: More than three times a week     Gets together: Once a week     Attends Baptism service: Not on file      Active member of club or organization: No     Attends meetings of clubs or organizations: Never     Relationship status:    Other Topics Concern    Are you pregnant or think you may be? Not Asked    Breast-feeding Not Asked    Patient feels they ought to cut down on drinking/drug use Not Asked    Patient annoyed by others criticizing their drinking/drug use Not Asked    Patient has felt bad or guilty about drinking/drug use Not Asked    Patient has had a drink/used drugs as an eye opener in the AM Not Asked   Social History Narrative    Has worked at the [employer] since it opened and stayed working through [place].    2011  from her  and moved to [facility] with her sons to live with her aunt.    Her mother is very helpful and involved in her life.    She has never been a smoker and does not drink.        April 2016    Her eldest son has been working as a  at the [employer] for one year now.  He is doing well in this capacity.  He is thinking about making this his career.    Her youngest son is doing better academically.  She would like to remain living on the [facility] for his schooling.    Her commuting to work is difficult.        October 2017.  For the first time in a long time, she feels happy.  She has a new boyfriend.    November 2108.  Her eldest son has moved to [place], is living with his 1st cousin and has a full-time job with [employer].    Her youngest son is 16 and doing well in school.    She continues to date a very nice man.  She is enjoying living in [place] and commutes to the [facility] where she continues to enjoy her employment.        February 2020. Living with her mother due to her breast cancer treatment. Both sons living together in [place]. Younger son struggling emotionally and academically.       FAMILY HISTORY:  Family History   Problem Relation Age of Onset    Cancer Paternal Aunt     Lupus Paternal Aunt     Hypertension  Mother     Depression Mother     Liver disease Father     Alcohol abuse Father     Colon cancer Paternal Uncle     Depression Son     Breast cancer Neg Hx     Ovarian cancer Neg Hx     Melanoma Neg Hx     Psoriasis Neg Hx     Eczema Neg Hx          PHYSICAL EXAMINATION:  There were no vitals filed for this visit.There is no height or weight on file to calculate BMI.  GENERAL: Patient is alert and oriented, in no acute distress.  HEENT:Extraocular muscles are intact.    BREAST EXAM:  Deferred    ASSESSMENT:   This is a 50-year-old female with clinical T2 N1 M0 ([id number]), grade 2, invasive lobular carcinoma of the right breast who underwent neoadjuvant chemotherapy followed by right mastectomy and sentinel node biopsy as well as temporary tissue expander placement on May 20, 2020, with residual 3 cm primary lesion with 2/2 sentinel lymph nodes with macro metastatic involvement with extracapsular extension, followed by right axillary dissection on June 5, 2020 with additional 5 lymph nodes without involvement, ER/IL positive, her 2 negative, Ki-67 index 20%.    PLAN:   After review of the available pathology and images of the radiological studies, Ms. Vásquez is noted to have minimal response to neoadjuvant chemotherapy with residual primary lesion and lymphadenopathy at the time of mastectomy.  There was also extracapsular extension noted in the lymph nodes.  To reduce her chance of locoregional recurrence, she is recommended to undergo postmastectomy radiation to the right breast mound and draining lymph nodes including the right supraclavicular, right axillary, and right internal mammary region.  I plan to deliver approximately 5040 cGy.  She will also require endocrine therapy and possible adjuvant chemotherapy per Dr. Verde.    The risks, benefits, and side effects of radiation were explained in detail to the patient.  All questions were answered.  I plan to see the patient back for radiation  planning CT in approximately 2-3 weeks.    Psychosocial Distress screening score of 1noted and reviewed. No intervention indicated.    I spent approximately 60 minutes reviewing the available records and evaluating the patient, out of which over 50% of the time was spent face to face with the patient in counseling and coordinating this patient's care.

## 2020-06-25 NOTE — PATIENT INSTRUCTIONS
Radiation Therapy Treatment  Radiation therapy can help you in your fight against cancer. It begins with a session to discuss treatment with your doctor. If you and your doctor decide on radiation, you will return for a simulation. The simulation is a planning session that helps the doctor target your cancer. He or she will design a radiation plan to protect normal tissues. When the simulation and plan are completed, you will begin your daily treatments. Treatment is usually once daily Monday to Friday. It takes less than a half an hour. Sometimes you may need radiation twice a day, with usually 6 hours between treatments. After the course of radiation is complete, you will be scheduled for follow-up appointments. This is to make sure the cancer is under control. The follow-ups will also make sure that any side effects from the treatment are taken care of.  Radiation therapy uses high-energy X-rays to kill cancer cells.   Your treatment planning visit: The simulation  Your radiation therapy team uses a special machine called a simulator to map out your treatment. The simulator is usually an X-ray machine (fluoroscopy), CT scanner, MRI scanner, or PET-CT scanner machine. Laser lights act as guides to help position your body accurately. During this visit:  · The team figures out the best position for your body. They make notes in your chart so youll be placed the same way each time.  · You may use special devices to keep your body correctly positioned and still during treatment. These may include molds, masks, rests, and blocks.  · The team makes ink marks on your skin. These will help you get in the same position for each treatment. Tiny permanent tattoos may also be used.   · Markers such as metal balls or wires may be put on or in your body. Sometime these are taped to the skin to help with the imaging process. These work with the X-rays to position your body. The markers are removed when the visit is  over.  After the team has the imaging and data, the information is sent into the computer planning system. Your doctor and the team of physicists and dosimetrists design a treatment field. The field will best target your cancer and how it might spread. It will also help limit radiation to nearby normal tissues.  Your treatments  Each treatment usually takes 10 to 30 minutes. You may need to change into a hospital gown. The radiation therapist puts you in the correct position on the treatment table, then leaves the room. Sometimes you may need more imaging before each treatment. The machine may take digital X-rays or a CT scan to help make sure you are lined up correctly. During treatment, lie as still as you can and breathe normally. You will hear noises coming from the machine. You can talk to the radiation therapist, who watches you from the control room on a TV monitor. After treatment, the therapist will help you off the table. You can then get dressed and go back to your normal activities.  Date Last Reviewed: 1/14/2016 © 2000-2017 [employer]. [address] [place] [address]. All rights reserved. This information is not intended as a substitute for professional medical care. Always follow your healthcare professional's instructions.        Discharge Instructions for Radiation Therapy  Radiation therapy uses high-energy X-rays to kill cancer cells and help you in your fight against cancer. Radiation destroys cancer cells gradually, over time. The goal of therapy is to focus on and kill as many cancer cells as possible. Radiation can also damage or kill some of the normal cells that are closest to the tumor. Damaged normal cells can repair themselves, often within a few days.  Caring for your skin  You should ask your healthcare provider for specific products that he or she recommends for washing and bathing. In general, use a mild nondetergent soap and warm (not hot) water to clean the  "area receiving radiation. Pat the region dry rather than rubbing.  Your healthcare provider may give you products to moisturize the skin and prevent infection. The goal is to prevent cracks or breaks in the skin that may be sensitive from treatment:   · Dont be surprised if your treatment causes skin redness, and a type of "sunburn" over time. Some radiation treatments can cause this.   · Ask your therapy team what lotion to use. Also ask for directions about when and how to apply it.  · Avoid prolonged or direct sunlight on the treated area. Ask your therapy team about using a sunscreen. You do not have to avoid going outside altogether, but must take appropriate precautions.  · Dont remove ink marks unless your radiation therapist says its OK. Dont scrub or use soap on the marks when you wash. Let water run over them and pat them dry.  · Protect your skin from heat or cold. Avoid hot tubs, saunas, heating pads, or ice packs.  · Avoid clothing that causes friction or rubbing on the skin.  Fighting fatigue  Radiation therapy may cause you to feel tired. Your body is working hard to heal and repair itself. To feel better, try these things:  · Do light exercise each day. Take short walks.  · Plan tasks for the times when you tend to have the most energy. Ask for help when you need it.  · Relax before you go to bed. This will help you sleep better. Try reading or listening to soothing music.  Coping with appetite changes  Here are ways to cope:  · Tell your therapy team if you find it hard to eat or you have no appetite. You may be referred to a nutritionist to help you with meal planning.  · Radiation to certain internal sites can cause nausea, depending on the location of treatment. This can affect your appetite. Think of healthy eating as part of your treatment. Try these tips:  ¨ Eat slowly.  ¨ Eat small meals several times a day.  ¨ Eat more food when youre feeling better.  ¨ Ask others to keep you company " when you eat.  ¨ Stock up on easy-to-prepare foods.  ¨ Eat foods high in protein and calories. Your healthcare provider may recommend liquid meal supplements.  ¨ Drink plenty of water and other fluids.  ¨ Ask your healthcare provider before taking any vitamins or over-the-counter supplements. Such products are not regulated by the FDA and can sometimes interfere with your treatments.   Dealing with other side effects  Here are suggestions to deal with other side effects:   · Be prepared for hair loss in the area being treated. The hair loss may be permanent. Be sure to discuss this with your healthcare provider.  · Sip cool water if your mouth or throat becomes dry or sore. Ice chips may also help.  · Tell your healthcare provider if you have diarrhea or constipation. You may be given a special diet.  · If you have trouble swallowing liquids, tell your healthcare provider.  Follow-up  Make a follow-up appointment as directed by your healthcare provider.     When to call your healthcare provider  Call your healthcare provider right away if you have any of the following:  · Unexpected headaches  · Trouble concentrating  · Ongoing fatigue  · Wheezing, shortness of breath, or trouble breathing  · Pain that doesnt go away, especially if its always in the same place  · New or unusual lumps, bumps, or swelling  · Dizziness or lightheadedness  · Unusual rashes, bruises, or bleeding  · Fever of 100.4°F (38°C) or higher, or chills  · Nausea and vomiting  · Diarrhea that doesnt improve with time  · Skin breakdown; significant pain due to skin irritation   Date Last Reviewed: 1/13/2016  © 5840-9732 [employer]. [address] [place] [address], [place] [address]. All rights reserved. This information is not intended as a substitute for professional medical care. Always follow your healthcare professional's instructions.        Radiation Therapy: Managing Short-Term Side Effects     Take short walks daily.   Radiation  therapy uses high-energy X-rays or particles to kill cancer cells. Some normal cells can also be affected. This causes side effects such as dry skin, tiredness (fatigue), or changes in your appetite. Most side effects go away when your radiation therapy is over.  Having side effects of radiation therapy does not mean that your cancer is getting worse or that therapy isnt working.   Caring for your skin  Skin problems may happen where your body gets radiation. Your skin may become dry, itchy, red, and peeling. It may darken in that spot, like a tan. To care for your skin:  · Dont scrub on the treatment area. Clean that area of the skin every day. Use warm water and mild soap, or as your healthcare provider advises. Pat the skin afterward or let it air dry.  · Ask your therapy team what lotion to use and when to use it.  · Keep the treated area out of the sun. Ask your team about using a sunscreen.  · Don't remove ink marks unless your radiation therapist says you can. Dont scrub the marks when you wash. Let water run over them and pat them dry.  · Protect your skin from heat or cold. Avoid hot tubs, saunas, hot pads, and ice packs.  · Wear soft, loose clothing to avoid rubbing skin.  Fighting tiredness  The cancer itself or the radiation therapy may cause you to feel tired. Your body is working hard to heal and repair itself. To feel better:  · Try light exercise each day. Take short walks.  · Plan tasks for the times when you tend to have the most energy. Ask for help when you need it.  · Relax before you go to bed to sleep better. Try reading or listening to soothing music.  · Be sure to let your cancer care team know if you continue to have fatigue that is not getting better. They may be able to offer ways to help.   Coping with appetite changes  Tell your therapy team if you find it hard to eat or have no appetite. You may need to see a nutritionist. This is a healthcare provider with special training in meal  planning. To keep your strength up, you need to eat well and maintain your weight. Think of healthy eating as part of your treatment. Try these tips:  · Eat slowly.  · Eat small meals several times a day.  · Eat more food when youre feeling better, even if it is not mealtime.  · Ask others to keep you company when you eat.  · Stock up on easy-to-prepare foods.  · Eat foods high in protein and calories.  · Drink plenty of water and other fluids.  · Ask your healthcare provider before taking any vitamins.  Site-specific side effects  These side effects include the following:   · You may lose hair in the area being treated. The hair often grows back after treatment.  · Your mouth or throat can become dry or sore if your head or neck is being treated. Sip cool water to help ease discomfort.  · Nausea and bowel changes can happen with radiation to the pelvic region. Tell your healthcare provider if you have nausea, diarrhea, or constipation. You may need to take medicine or follow a special diet.  Talk with your healthcare team  Radiation therapy can also have other side effects, including some that might not show up until years later. Be sure to talk with your healthcare team about what to expect with the type of radiation therapy you are getting, including when you should call them with concerns.   Date Last Reviewed: 5/1/2016  © 4820-2751 [employer]. [address] [place] [address], [place] [address]. All rights reserved. This information is not intended as a substitute for professional medical care. Always follow your healthcare professional's instructions.

## 2020-06-25 NOTE — LETTER
June 25, 2020      Lelo Guaman MD  [address] [facility] [address]           [facility] - Radiation Oncology  [address]  [place] [address]  Phone: [phone]          Patient: Phylicia Vásquez   MR Number: [id number]   YOB: 1970   Date of Visit: 6/25/2020       Dear Dr. Lelo Guaman:    Thank you for referring Phylicia Vásquez to me for evaluation. Attached you will find relevant portions of my assessment and plan of care.    If you have questions, please do not hesitate to call me. I look forward to following Phylicia Vásquez along with you.    Sincerely,    Jaquelin Zacarias MD    Enclosure  CC:  No Recipients    If you would like to receive this communication electronically, please contact [email or web address] or [phone] to request more information on [employer] access.    For providers and/or their staff who would like to refer a patient to [facility], please contact us through our one-stop-shop provider referral line, [facility], at [phone].    If you feel you have received this communication in error or would no longer like to receive these types of communications, please e-mail [email or web address]

## 2020-06-28 PROBLEM — C50.919 MALIGNANT NEOPLASM OF BREAST: Status: RESOLVED | Noted: 2020-06-05 | Resolved: 2020-06-28

## 2020-07-01 ENCOUNTER — APPOINTMENT (OUTPATIENT)
Dept: RADIATION THERAPY | Facility: OTHER | Age: 50
End: 2020-07-01
Attending: RADIOLOGY
Payer: COMMERCIAL

## 2020-07-06 ENCOUNTER — OFFICE VISIT (OUTPATIENT)
Dept: HEMATOLOGY/ONCOLOGY | Facility: CLINIC | Age: 50
End: 2020-07-06
Payer: COMMERCIAL

## 2020-07-06 VITALS
HEART RATE: 65 BPM | SYSTOLIC BLOOD PRESSURE: 126 MMHG | DIASTOLIC BLOOD PRESSURE: 65 MMHG | TEMPERATURE: 98 F | WEIGHT: 228.81 LBS | RESPIRATION RATE: 18 BRPM | BODY MASS INDEX: 40.54 KG/M2 | OXYGEN SATURATION: 100 % | HEIGHT: 63 IN

## 2020-07-06 DIAGNOSIS — Z17.0 CARCINOMA OF AXILLARY TAIL OF RIGHT BREAST IN FEMALE, ESTROGEN RECEPTOR POSITIVE: Primary | ICD-10-CM

## 2020-07-06 DIAGNOSIS — C50.611 CARCINOMA OF AXILLARY TAIL OF RIGHT BREAST IN FEMALE, ESTROGEN RECEPTOR POSITIVE: Primary | ICD-10-CM

## 2020-07-06 PROCEDURE — 99999 PR PBB SHADOW E&M-EST. PATIENT-LVL V: CPT | Mod: PBBFAC,,, | Performed by: INTERNAL MEDICINE

## 2020-07-06 PROCEDURE — 3008F PR BODY MASS INDEX (BMI) DOCUMENTED: ICD-10-PCS | Mod: CPTII,S$GLB,, | Performed by: INTERNAL MEDICINE

## 2020-07-06 PROCEDURE — 3074F SYST BP LT 130 MM HG: CPT | Mod: CPTII,S$GLB,, | Performed by: INTERNAL MEDICINE

## 2020-07-06 PROCEDURE — 3074F PR MOST RECENT SYSTOLIC BLOOD PRESSURE < 130 MM HG: ICD-10-PCS | Mod: CPTII,S$GLB,, | Performed by: INTERNAL MEDICINE

## 2020-07-06 PROCEDURE — 99215 OFFICE O/P EST HI 40 MIN: CPT | Mod: S$GLB,,, | Performed by: INTERNAL MEDICINE

## 2020-07-06 PROCEDURE — 3008F BODY MASS INDEX DOCD: CPT | Mod: CPTII,S$GLB,, | Performed by: INTERNAL MEDICINE

## 2020-07-06 PROCEDURE — 99999 PR PBB SHADOW E&M-EST. PATIENT-LVL V: ICD-10-PCS | Mod: PBBFAC,,, | Performed by: INTERNAL MEDICINE

## 2020-07-06 PROCEDURE — 3078F DIAST BP <80 MM HG: CPT | Mod: CPTII,S$GLB,, | Performed by: INTERNAL MEDICINE

## 2020-07-06 PROCEDURE — 99215 PR OFFICE/OUTPT VISIT, EST, LEVL V, 40-54 MIN: ICD-10-PCS | Mod: S$GLB,,, | Performed by: INTERNAL MEDICINE

## 2020-07-06 PROCEDURE — 3078F PR MOST RECENT DIASTOLIC BLOOD PRESSURE < 80 MM HG: ICD-10-PCS | Mod: CPTII,S$GLB,, | Performed by: INTERNAL MEDICINE

## 2020-07-06 NOTE — PROGRESS NOTES
Subjective:       Patient ID: Phylicia Vásquez is a 50 y.o. female.    Chief Complaint: No chief complaint on file.    HPI   Mrs Lua returns today for follow up.  She is scheduled for simulation on July 14th.  Briefly, she is a 50 year old AA female who was recently found to have a carcinoma that is ER positive, LA positive and HER 2 equivocal, but negative by  FISH.  An axillary node biopsy was also positive.  She received standard neoadjuvant chemotherapy consisting of 4 cycles of AC and 4 cycles of Taxol prior to undergoing a mastectomy. At the time of her mastectomy she had a 3 cm residual tumor while the 2 sentinel lymph nodes were positive.  A subsequent axillary node dissection showed no evidence of further chandler involvement.  I had seen her last month and has had recommended that she proceed with radiation therapy prior to initiating adjuvant chemotherapy with capecitabine.      Review of Systems    Overall she feels fair.   She complains of sharp pains of brief duration in the right breast.  She also complains of persistent neuropathic symptoms involving her fingers and her toes.  Of note, she is on gabapentin 300 mg 3 times a day.    She is anxious but she denies any depression, easy bruising, fevers, chills, night  sweats, weight loss, vomiting, diarrhea, constipation, diplopia, blurred vision, headache, chest pain, palpitations, shortness of breath, left breast pain, abdominal pain, extremity pain, or difficulty ambulating.  The remainder of the ten-point ROS, including general, skin, lymph, H/N, cardiorespiratory, GI, , Neuro, Endocrine, and psychiatric is negative.       Objective:      Physical Exam      She is alert, oriented to time, place, person, pleasant, well      nourished, in no acute physical distress.                                   VITAL SIGNS:  Reviewed                                      HEENT:  Alopecia is noted.  There are no nasal, oral, lip, gingival, auricular, lid,     or conjunctival lesions.  Mucosae are moist and pink, and there is no        thrush.  Pupils are equal, reactive to light and accommodation.              Extraocular muscle movements are intact.  Dentition is good.  There is no frontal or maxillary tenderness.                                     NECK:  Supple without JVD, adenopathy, or thyromegaly.                       LUNGS:  Clear to auscultation without wheezing, rales, or rhonchi.           CARDIOVASCULAR:  Reveals an S1, S2, no murmurs, no rubs, no gallops.         ABDOMEN:  Soft, nontender, without organomegaly.  Bowel sounds are    present.                                                                     EXTREMITIES:  No cyanosis, clubbing, or edema.                               BREASTS:  Both breasts are large.  She is status post right non nipple sparing mastectomy with a tissue expander                                      LYMPHATIC:  There is no cervical, left axillary, or supraclavicular adenopathy.   SKIN:  Warm and moist, without petechiae, rashes, induration, or ecchymoses.           NEUROLOGIC:  DTRs are 0-1+ bilaterally, symmetrical, motor function is 5/5,  and cranial nerves are  within normal limits.    Assessment:       1. Carcinoma of axillary tail of right breast in female, estrogen receptor positive, pathologically T2N1M0 after neoadjuvant chemotherapy     2.    Axillary node metastases.  3.      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  She may proceed with radiation therapy and I will see her again in early September.  We discussed the option of adjuvant capecitabine followed by adjuvant hormonal therapy versus adjuvant hormonal therapy without capecitabine.  It is unclear at this point whether she had bilateral oophorectomies at the time of her hysterectomy.  We will ask her gynecologist, Dr. Dempsey, to comment.  Finally, in regards to the neuropathy, she was instructed to increase  her gabapentin to 600 mg t.i.d..  Her multiple questions were answered to her satisfaction.        12:22 p.m. ADDENDUM  I was able to retrieve her pathology report from the hysterectomy in 2007.  It was a simple hysterectomy and there is no mention of the ovaries having been removed.

## 2020-07-07 ENCOUNTER — OFFICE VISIT (OUTPATIENT)
Dept: PSYCHIATRY | Facility: CLINIC | Age: 50
End: 2020-07-07
Payer: COMMERCIAL

## 2020-07-07 DIAGNOSIS — F43.23 ADJUSTMENT DISORDER WITH MIXED ANXIETY AND DEPRESSED MOOD: Primary | ICD-10-CM

## 2020-07-07 PROCEDURE — 90834 PSYTX W PT 45 MINUTES: CPT | Mod: 95,,, | Performed by: PSYCHOLOGIST

## 2020-07-07 PROCEDURE — 90834 PR PSYCHOTHERAPY W/PATIENT, 45 MIN: ICD-10-PCS | Mod: 95,,, | Performed by: PSYCHOLOGIST

## 2020-07-08 NOTE — PROGRESS NOTES
Telemedicine PSYCHO-ONCOLOGY NOTE/ Individual Psychotherapy   (patient not on premises due to COVID-19 restrictions)    Consultation started: 7/7/2020 at 3:04 PM   The chief complaint leading to consultation is: adaptation to disease and treatment  The patient location is:  Patient home  Virtual visit with synchronous audio and video    Patient alone at the time of consultation      Each patient provided medical services by telemedicine is:  (1) informed of the relationship between the physician and patient and the respective role of any other health care provider with respect to management of the patient; and (2) notified that he or she may decline to receive medical services by telemedicine and may withdraw from such care at any time.    Crisis Disclaimer: Patient was informed that due to the virtual nature of the visit, that if a crisis develops, protocols will be implemented to ensure patient safety, including but not limited to: 1) Initiating a welfare check with local Law Enforcement, 2) Calling 1/National Crisis Hotline, and/or 3) Initiating PEC/CEC procedures.     Date: 7/7/2020   Site of therapist:  [facility]         Therapeutic Intervention: Met with patient.  Outpatient - Supportive psychotherapy 45 min - CPT Code 61305    Referring provider:    Chief complaint/reason for encounter: depression and anxiety   Met with patient to evaluate psychosocial adaptation to survivorship of breast cancer with metastasis    Patient was last seen by me on 6/24/2020    Objective:      Phylicia Vásquez arrived promptly for the session (via video).  Ms. Vásquez was seated throughout the session. The patient was fully cooperative throughout the session.  Appearance: age appropriate, casually  dressed, well groomed  Behavior/Cooperation: friendly and cooperative  Speech: normal in rate, volume, and tone and appropriate quality, quantity and organization of sentences  Mood: dysthymic  Affect: dysphoric  Thought  "Process: goal-directed, logical  Thought Content: normal,  No delusions or paranoia; did not appear to be responding to internal stimuli during the session  Orientation: grossly intact  Memory: Grossly intact  Attention Span/Concentration: Attends to session without distraction; reports no difficulty  Fund of Knowledge: average  Estimate of Intelligence: average from verbal skills and history  Cognition: grossly intact  Insight: patient has awareness of illness; good insight into own behavior and behavior of others  Judgment: the patient's behavior is adequate to circumstances      Interval history and content of current session: Patient discussed recent medical visits and preparation for radiation treatment. Discussed current adaptation to disease and treatment status and family's adaptation to disease and treatment status. Reports to be coping with moderate difficulty.  She states she is having significant strain with her younger son.  Discussed assertive and self-protective reactions to cope with interactions with him.  Potential referrals will be provided.  She discussed associated decline in mood and decreased activity level.  Evaluated cognitive response, paying particular attention to negative intrusive thoughts of a persistent and detrimental nature. Thoughts of this type are present, with moderate distress ("I will never get out of mom's house and get my life back."). Identified and evaluated psychosocial and environmental stressors secondary to diagnosis and treatment.  Focused on providing cognitive behavioral therapy to address negative cognitions. Examined proactive behaviors that may be implemented to minimize or ameliorate psychosocial stressors.  Discussed focus on social connection despite COVID and continued physical activity.    Patient struggling with neuropathic pain. States gabapentin still not helping. Encouraged her to discuss with Dr. Verde.       Risk parameters:   Patient reports no " suicidal ideation  Patient reports no homicidal ideation  Patient reports no self-injurious behavior  Patient reports no violent behavior   Safety needs:  None at this time      Verbal deficits: None     Patient's response to intervention:The patient's response to intervention is accepting, motivated.     Progress toward goals and other mental status changes:  The patient's progress toward goals is good.      Progress to date:Progress as Expected      Goals from last visit: Attempted, partially met      Patient reported outcomes:      Distress Thermometer:   Distress Score    Distress Score: 5        Practical Problems Physical Problems   : No Appearance: Yes   Housing: No Bathing / Dressing: No   Insurance / Financial: No Breathing: No    Transportation: No  Changes in Urination: No    Work / School: No  Constipation: No   Treatment Decisions: Yes  Diarrhea: No     Eating: No    Family Problems Fatigue: Yes    Dealing with Children: Yes Feeling Swollen: No    Dealing with Partner: No Fevers: No    Ability to Have Children: No  Getting Around: No       Indigestion: No     Memory / Concentration: Yes   Emotional Problems Mouth Sores: No    Depression: Yes  Nausea: No    Fears: Yes  Nose Dry / Congested: No    Nervousness: Yes  Pain: Yes    Sadness: Yes Sexual: No    Worry: Yes Skin Dry / Itchy: No    Loss of Interest in Usual Activities: No Sleep: No     Substance Abuse: No    Spiritual/Religions Concerns Tingling in Hands / Feet: Yes   Spritual / Yazidism Concerns: No         Other Problems              PHQ-9=  Initial visit: 11    PHQ ANSWERS    Q1. Little interest or pleasure in doing things: Several days (07/07/20 1404)  Q2. Feeling down, depressed, or hopeless: Several days (07/07/20 1404)  Q3. Trouble falling or staying asleep, or sleeping too much: Not at all (07/07/20 1404)  Q4. Feeling tired or having little energy: Several days (07/07/20 1404)  Q5. Poor appetite or overeating: Not at all  (07/07/20 1404)  Q6. Feeling bad about yourself - or that you are a failure or have let yourself or your family down: Several days (07/07/20 1404)  Q7. Trouble concentrating on things, such as reading the newspaper or watching television: Several days (07/07/20 1404)  Q8. Moving or speaking so slowly that other people could have noticed. Or the opposite - being so fidgety or restless that you have been moving around a lot more than usual: Not at all (07/07/20 1404)  Q9. Thoughts that you would be better off dead, or of hurting yourself in some way: Not at all (07/07/20 1404)    PHQ8 Score : 5 (07/07/20 1404)  PHQ-9 Total Score: 5 (07/07/20 1404)        BRAD-7= Initial visit: 10     GAD7 7/7/2020   1. Feeling nervous, anxious, or on edge? 2   2. Not being able to stop or control worrying? 1   3. Worrying too much about different things? 1   4. Trouble relaxing? 1   5. Being so restless that it is hard to sit still? 0   6. Becoming easily annoyed or irritable? 2   7. Feeling afraid as if something awful might happen? 1   BRAD-7 Score 8         Client Strengths: verbal, intelligent, successful, good social support, good insight, commitment to wellness, strong sang, strong cultural traditions      Treatment Plan:individual psychotherapy  · Target symptoms: depression, anxiety   · Why chosen therapy is appropriate versus another modality: relevant to diagnosis, patient responds to this modality, evidence based practice  · Outcome monitoring methods: self-report, checklist/rating scale  · Therapeutic intervention type: behavior modifying psychotherapy, supportive psychotherapy  · Prognosis: Good      Behavioral goals:    Exercise:  Keep walking daily around the block   Stress management: puzzles, cooking shows   Social engagement: family, online support group   Nutrition: investigate plant-based diets   Therapy:  Decrease cancer talk    Write down questions for MDs before visits    Dr. Montalvo in future?      Return to  clinic: 1 month     Length of Service (minutes direct face-to-face contact): 45      ICD-10-CM ICD-9-CM   1. Adjustment disorder with mixed anxiety and depressed mood  F43.23 [phone]         Cem Hilton, PhD  [place] License #[id number]

## 2020-07-10 ENCOUNTER — HOSPITAL ENCOUNTER (EMERGENCY)
Facility: HOSPITAL | Age: 50
Discharge: HOME | End: 2020-07-10
Attending: EMERGENCY MEDICINE
Payer: COMMERCIAL

## 2020-07-10 VITALS
DIASTOLIC BLOOD PRESSURE: 82 MMHG | OXYGEN SATURATION: 100 % | SYSTOLIC BLOOD PRESSURE: 140 MMHG | TEMPERATURE: 99 F | RESPIRATION RATE: 18 BRPM | HEART RATE: 64 BPM

## 2020-07-10 DIAGNOSIS — R07.9 CHEST PAIN: ICD-10-CM

## 2020-07-10 LAB
ALBUMIN SERPL BCP-MCNC: 3.9 G/DL (ref 3.5–5.2)
ALP SERPL-CCNC: 67 U/L (ref 55–135)
ALT SERPL W/O P-5'-P-CCNC: 36 U/L (ref 10–44)
ANION GAP SERPL CALC-SCNC: 9 MMOL/L (ref 8–16)
AST SERPL-CCNC: 29 U/L (ref 10–40)
BASOPHILS # BLD AUTO: 0.06 K/UL (ref 0–0.2)
BASOPHILS NFR BLD: 0.9 % (ref 0–1.9)
BILIRUB SERPL-MCNC: 0.3 MG/DL (ref 0.1–1)
BNP SERPL-MCNC: <10 PG/ML (ref 0–99)
BUN SERPL-MCNC: 10 MG/DL (ref 6–20)
CALCIUM SERPL-MCNC: 9.7 MG/DL (ref 8.7–10.5)
CHLORIDE SERPL-SCNC: 105 MMOL/L (ref 95–110)
CO2 SERPL-SCNC: 27 MMOL/L (ref 23–29)
CREAT SERPL-MCNC: 0.8 MG/DL (ref 0.5–1.4)
DIFFERENTIAL METHOD: ABNORMAL
EOSINOPHIL # BLD AUTO: 0.3 K/UL (ref 0–0.5)
EOSINOPHIL NFR BLD: 3.8 % (ref 0–8)
ERYTHROCYTE [DISTWIDTH] IN BLOOD BY AUTOMATED COUNT: 15.7 % (ref 11.5–14.5)
EST. GFR  (AFRICAN AMERICAN): >60 ML/MIN/1.73 M^2
EST. GFR  (NON AFRICAN AMERICAN): >60 ML/MIN/1.73 M^2
GLUCOSE SERPL-MCNC: 93 MG/DL (ref 70–110)
HCT VFR BLD AUTO: 36.3 % (ref 37–48.5)
HGB BLD-MCNC: 11.3 G/DL (ref 12–16)
IMM GRANULOCYTES # BLD AUTO: 0.01 K/UL (ref 0–0.04)
IMM GRANULOCYTES NFR BLD AUTO: 0.1 % (ref 0–0.5)
LYMPHOCYTES # BLD AUTO: 1.8 K/UL (ref 1–4.8)
LYMPHOCYTES NFR BLD: 26.3 % (ref 18–48)
MAGNESIUM SERPL-MCNC: 2.1 MG/DL (ref 1.6–2.6)
MCH RBC QN AUTO: 25.5 PG (ref 27–31)
MCHC RBC AUTO-ENTMCNC: 31.1 G/DL (ref 32–36)
MCV RBC AUTO: 82 FL (ref 82–98)
MONOCYTES # BLD AUTO: 0.6 K/UL (ref 0.3–1)
MONOCYTES NFR BLD: 8.3 % (ref 4–15)
NEUTROPHILS # BLD AUTO: 4.2 K/UL (ref 1.8–7.7)
NEUTROPHILS NFR BLD: 60.6 % (ref 38–73)
NRBC BLD-RTO: 0 /100 WBC
PHOSPHATE SERPL-MCNC: 4.5 MG/DL (ref 2.7–4.5)
PLATELET # BLD AUTO: 212 K/UL (ref 150–350)
PMV BLD AUTO: 12.9 FL (ref 9.2–12.9)
POTASSIUM SERPL-SCNC: 4.3 MMOL/L (ref 3.5–5.1)
PROT SERPL-MCNC: 7.7 G/DL (ref 6–8.4)
RBC # BLD AUTO: 4.44 M/UL (ref 4–5.4)
SODIUM SERPL-SCNC: 141 MMOL/L (ref 136–145)
TROPONIN I SERPL DL<=0.01 NG/ML-MCNC: 0.01 NG/ML (ref 0–0.03)
TROPONIN I SERPL DL<=0.01 NG/ML-MCNC: 0.01 NG/ML (ref 0–0.03)
TSH SERPL DL<=0.005 MIU/L-ACNC: 1.71 UIU/ML (ref 0.4–4)
WBC # BLD AUTO: 6.89 K/UL (ref 3.9–12.7)

## 2020-07-10 PROCEDURE — 83880 ASSAY OF NATRIURETIC PEPTIDE: CPT

## 2020-07-10 PROCEDURE — 84443 ASSAY THYROID STIM HORMONE: CPT

## 2020-07-10 PROCEDURE — 36000 PLACE NEEDLE IN VEIN: CPT

## 2020-07-10 PROCEDURE — 99284 EMERGENCY DEPT VISIT MOD MDM: CPT | Mod: ,,, | Performed by: EMERGENCY MEDICINE

## 2020-07-10 PROCEDURE — 84100 ASSAY OF PHOSPHORUS: CPT

## 2020-07-10 PROCEDURE — 99285 EMERGENCY DEPT VISIT HI MDM: CPT | Mod: 25

## 2020-07-10 PROCEDURE — 83735 ASSAY OF MAGNESIUM: CPT

## 2020-07-10 PROCEDURE — 93010 EKG 12-LEAD: ICD-10-PCS | Mod: ,,, | Performed by: INTERNAL MEDICINE

## 2020-07-10 PROCEDURE — 99284 PR EMERGENCY DEPT VISIT,LEVEL IV: ICD-10-PCS | Mod: ,,, | Performed by: EMERGENCY MEDICINE

## 2020-07-10 PROCEDURE — 93010 ELECTROCARDIOGRAM REPORT: CPT | Mod: ,,, | Performed by: INTERNAL MEDICINE

## 2020-07-10 PROCEDURE — 85025 COMPLETE CBC W/AUTO DIFF WBC: CPT

## 2020-07-10 PROCEDURE — 80053 COMPREHEN METABOLIC PANEL: CPT

## 2020-07-10 PROCEDURE — 84484 ASSAY OF TROPONIN QUANT: CPT | Mod: 91

## 2020-07-10 PROCEDURE — 93005 ELECTROCARDIOGRAM TRACING: CPT

## 2020-07-10 RX ORDER — ACETAMINOPHEN 500 MG
1000 TABLET ORAL
Status: DISCONTINUED | OUTPATIENT
Start: 2020-07-10 | End: 2020-07-10

## 2020-07-10 NOTE — ED PROVIDER NOTES
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 this morning.  She reports about 4 episodes today.  Last episode occurred about 1 hr prior to arrival and lasted about 1 min induration and spontaneously resolved.  Pain is not worse with exertion.  She has not taken any medication for the pain.  Denies worsening or alleviating factors.  She does report palpitations that occur after the episodes of chest pain which also last a few minutes at a time. Denies CP of this nature in the past. She denies shortness of breath, diaphoresis, abdominal pain, nausea, vomiting, diarrhea, dysuria.        Review of patient's allergies indicates:   Allergen Reactions    Butalbital Other (See Comments)     Chest pain       Past Medical History:   Diagnosis Date    Anxiety     Back pain     Goiter     HTN (hypertension) 10/18/2012    Hx antineoplastic chemo     last dose 4/23/20    Hx of psychiatric care     Ambien, Klonopin    Insomnia 10/18/2012    Malignant neoplasm of axillary tail of right breast in female, estrogen receptor positive 11/24/2019    Primary invasive malignant neoplasm of female breast, right 11/21/2019    Psychiatric problem     S/P abdominal supracervical subtotal hysterectomy, continues to have regular menses.  4/2/2014    Spinal cord cyst, L1 2014     Thoracic radiculopathy, bulging disc at T10-11 and T11-12      Past Surgical History:   Procedure Laterality Date    AXILLARY NODE DISSECTION Right 6/5/2020    Procedure: LYMPHADENECTOMY, AXILLARY;  Surgeon: Lelo Guaman MD;  Location: [facility] OR [address] [place] [address];  Service: General;  Laterality: Right;    BREAST  BIOPSY Right      SECTION      CHOLECYSTECTOMY      COLONOSCOPY N/A 10/5/2015    Procedure: COLONOSCOPY;  Surgeon: JERONIMO Cancino MD;  Location: [facility] ENDO (4TH FLR);  Service: Endoscopy;  Laterality: N/A;    HYSTERECTOMY      BC--SUPRACERVICAL    INJECTION FOR SENTINEL NODE IDENTIFICATION Right 2020    Procedure: INJECTION, FOR SENTINEL NODE IDENTIFICATION;  Surgeon: Lelo Guaman MD;  Location: [facility];  Service: General;  Laterality: Right;    INSERTION OF BREAST TISSUE EXPANDER Right 2020    Procedure: INSERTION, TISSUE EXPANDER, BREAST;  Surgeon: Pablo Ramos MD;  Location: [facility];  Service: Plastics;  Laterality: Right;    INSERTION OF TUNNELED CENTRAL VENOUS CATHETER (CVC) WITH SUBCUTANEOUS PORT Right 2019    Procedure: ZWCLRLBIP-NEAK-S-CATH Fluoro needed;  Surgeon: Lelo Guaman MD;  Location: [facility] OR 2ND FLR;  Service: General;  Laterality: Right;  Right internal jugular.     SENTINEL LYMPH NODE BIOPSY Right 2020    Procedure: BIOPSY, LYMPH NODE, SENTINEL;  Surgeon: Lelo Guaman MD;  Location: [facility];  Service: General;  Laterality: Right;    UNILATERAL MASTECTOMY Right 2020    Procedure: MASTECTOMY, UNILATERAL;  Surgeon: Lelo Guaman MD;  Location: [facility];  Service: General;  Laterality: Right;     Family History   Problem Relation Age of Onset    Cancer Paternal Aunt     Lupus Paternal Aunt     Hypertension Mother     Depression Mother     Liver disease Father     Alcohol abuse Father     Colon cancer Paternal Uncle     Depression Son     Breast cancer Neg Hx     Ovarian cancer Neg Hx     Melanoma Neg Hx     Psoriasis Neg Hx     Eczema Neg Hx      Social History     Tobacco Use    Smoking status: Never Smoker    Smokeless tobacco: Never Used   Substance Use Topics    Alcohol use: No     Frequency: Monthly or less     Drinks per session: 1 or 2     Binge frequency: Never    Drug use: No     Review of Systems    Constitutional: Negative for chills and fever.   HENT: Negative for congestion and sore throat.    Eyes: Negative for visual disturbance.   Respiratory: Negative for cough and shortness of breath.    Cardiovascular: Positive for chest pain.   Gastrointestinal: Negative for abdominal pain, diarrhea, nausea and vomiting.   Genitourinary: Negative for dysuria.   Musculoskeletal: Negative for back pain.   Skin: Negative for rash.   Neurological: Negative for weakness and headaches.   Hematological: Does not bruise/bleed easily.       Physical Exam     Initial Vitals [07/10/20 1605]   BP Pulse Resp Temp SpO2   131/71 74 15 99 °F (37.2 °C) 99 %      MAP       --         Physical Exam    Nursing note and vitals reviewed.  Constitutional: She appears well-developed and well-nourished. She is not diaphoretic. No distress.   HENT:   Head: Normocephalic and atraumatic.   Mouth/Throat: Oropharynx is clear and moist.   Eyes: Conjunctivae and EOM are normal. Pupils are equal, round, and reactive to light.   Neck: Normal range of motion. Neck supple. No JVD present.   Cardiovascular: Normal rate, regular rhythm, normal heart sounds and intact distal pulses. Exam reveals no gallop and no friction rub.    No murmur heard.  Pulmonary/Chest: Breath sounds normal. She has no wheezes. She has no rhonchi. She has no rales. She exhibits no tenderness.   Well- healing surgical incisions to anterior chest.    Abdominal: Soft. Bowel sounds are normal. There is no abdominal tenderness.   Musculoskeletal: Normal range of motion.   Neurological: She is alert and oriented to person, place, and time. She has normal strength. No cranial nerve deficit or sensory deficit. GCS score is 15. GCS eye subscore is 4. GCS verbal subscore is 5. GCS motor subscore is 6.   Skin: Skin is warm and dry. Capillary refill takes less than 2 seconds.   Psychiatric: She has a normal mood and affect. Her behavior is normal. Judgment and thought content normal.          ED Course   Procedures  Labs Reviewed   CBC W/ AUTO DIFFERENTIAL - Abnormal; Notable for the following components:       Result Value    Hemoglobin 11.3 (*)     Hematocrit 36.3 (*)     Mean Corpuscular Hemoglobin 25.5 (*)     Mean Corpuscular Hemoglobin Conc 31.1 (*)     RDW 15.7 (*)     All other components within normal limits   COMPREHENSIVE METABOLIC PANEL   TROPONIN I   B-TYPE NATRIURETIC PEPTIDE   MAGNESIUM   PHOSPHORUS   TSH   TROPONIN I     EKG Readings: (Independently Interpreted)   Initial Reading: No STEMI. Rhythm: Normal Sinus Rhythm. Heart Rate: 65.     ECG Results          EKG 12-lead (In process)  Result time 07/10/20 16:21:11    In process by Interface, Lab In [facility] (07/10/20 16:21:11)                 Narrative:    Test Reason : R07.9,    Vent. Rate : 065 BPM     Atrial Rate : 065 BPM     P-R Int : 156 ms          QRS Dur : 092 ms      QT Int : 406 ms       P-R-T Axes : 039 -02 020 degrees     QTc Int : 422 ms    Normal sinus rhythm  Minimal voltage criteria for LVH, may be normal variant  Borderline Abnormal ECG  When compared with ECG of 15-DEC-2019 08:58,  No significant change was found    Referred By: [email or web address]   SELF           Confirmed By:                             Imaging Results          X-Ray Chest PA And Lateral (In process)                  Medical Decision Making:   History:   Old Medical Records: I decided to obtain old medical records.  Initial Assessment:   Emergent evaluation of a 50-year-old female presents to the emergency department with complaints of intermittent, sharp, substernal chest pain that began about 1:00 a.m. this morning.  Patient reports 4 episodes chest pain today.  The most recent episode lasted about 1 min and spontaneously resolved.  Differential Diagnosis:   Differential diagnosis includes but is not limited to ACS, PE, electrolyte abnormality, anemia, nausea, pneumothorax, pericarditis.  Independently Interpreted Test(s):   I have ordered and  independently interpreted X-rays - see prior notes.  I have ordered and independently interpreted EKG Reading(s) - see prior notes  Clinical Tests:   Lab Tests: Ordered and Reviewed  Radiological Study: Ordered and Reviewed  Medical Tests: Reviewed and Ordered  ED Management:  Patient is asymptomatic in the emergency department.  Initial troponin negative.  Repeat troponin and chest x-ray pending.  I have considered but do not suspect pulmonary embolism in the absence of dyspnea, tachycardia, hypoxia.  If repeat troponin is negative, will discharge with outpatient cardiology follow-up.  Due to shift change, will sign out to oncoming ED provider who will continue to monitor until final disposition is reached.  I have discussed this with the patient who is agreeable with this plan.  All questions answered.   The patient's history, physical exam, and plan of care was discussed with and agreed upon with my supervising physician.                      ED Course as of Jul 10 1928   Fri Jul 10, 2020   1759 WBC: 6.89 [JM]   1759 Hemoglobin(!): 11.3 [JM]   1759 Hematocrit(!): 36.3 [JM]   1759 Platelets: 212 [JM]   1806 Sodium: 141 [JM]   1806 Potassium: 4.3 [JM]   1806 Glucose: 93 [JM]   1806 BUN, Bld: 10 [JM]   1806 Creatinine: 0.8 [JM]   1806 Magnesium: 2.1 [JM]   1806 Phosphorus: 4.5 [JM]   1806 BNP: <10 [JM]   1817 Troponin I: 0.007 [JM]      ED Course User Index  [JM] Eda Ye PA-C                Clinical Impression:     1. Chest pain                                   Eda Ye PA-C  07/10/20 1928

## 2020-07-11 NOTE — PROVIDER PROGRESS NOTES - EMERGENCY DEPT.
Encounter Date: 7/10/2020    ED Physician Progress Notes           Patient signed out to me by my colleague with instructions to follow-up pending work-up. Please see main ED note for previous ED stay documentation. Patient signed out to me with 2nd troponin pending.    Work-up shows Trop negative x2. She denies any chest pain on reassessment and feeling at her baseline health. Stable for outpatient follow-up with PCP for further evaluation and cardiac risk stratification. Patient expresses understanding and agreeable to the plan. Return to ED precautions given for new, worsening, or concerning symptoms.

## 2020-07-11 NOTE — DISCHARGE INSTRUCTIONS
No emergent findings on your ED work-up today. Follow-up with your primary care provider for further evaluation and cardiac risk stratification.     Return to the emergency room for new, worsening, or concerning symptoms.     Our goal in the emergency department is to always give you outstanding care and exceptional service. You may receive a survey by mail or e-mail in the next week regarding your experience in our ED. We would greatly appreciate your completing and returning the survey. Your feedback provides us with a way to recognize our staff who give very good care and it helps us learn how to improve when your experience was below our aspiration of excellence.

## 2020-07-13 ENCOUNTER — TELEPHONE (OUTPATIENT)
Dept: INFUSION THERAPY | Facility: HOSPITAL | Age: 50
End: 2020-07-13

## 2020-07-13 ENCOUNTER — PATIENT MESSAGE (OUTPATIENT)
Dept: INTERNAL MEDICINE | Facility: CLINIC | Age: 50
End: 2020-07-13

## 2020-07-14 ENCOUNTER — HOSPITAL ENCOUNTER (OUTPATIENT)
Dept: RADIATION THERAPY | Facility: HOSPITAL | Age: 50
Discharge: HOME | End: 2020-07-14
Attending: RADIOLOGY
Payer: COMMERCIAL

## 2020-07-14 PROCEDURE — 77290 THER RAD SIMULAJ FIELD CPLX: CPT | Mod: TC | Performed by: RADIOLOGY

## 2020-07-14 PROCEDURE — 77333 RADIATION TREATMENT AID(S): CPT | Mod: 26,59,, | Performed by: RADIOLOGY

## 2020-07-14 PROCEDURE — 77014 HC CT GUIDANCE RADIATION THERAPY FLDS PLACEMENT: CPT | Mod: TC | Performed by: RADIOLOGY

## 2020-07-14 PROCEDURE — 77334 PR  RADN TREATMENT AID(S) COMPLX: ICD-10-PCS | Mod: 26,,, | Performed by: RADIOLOGY

## 2020-07-14 PROCEDURE — 77334 RADIATION TREATMENT AID(S): CPT | Mod: TC | Performed by: RADIOLOGY

## 2020-07-14 PROCEDURE — 77263 THER RADIOLOGY TX PLNG CPLX: CPT | Mod: ,,, | Performed by: RADIOLOGY

## 2020-07-14 PROCEDURE — 77290 PR  SET RADN THERAPY FIELD COMPLEX: ICD-10-PCS | Mod: 26,,, | Performed by: RADIOLOGY

## 2020-07-14 PROCEDURE — 77290 THER RAD SIMULAJ FIELD CPLX: CPT | Mod: 26,,, | Performed by: RADIOLOGY

## 2020-07-14 PROCEDURE — 77334 RADIATION TREATMENT AID(S): CPT | Mod: 26,,, | Performed by: RADIOLOGY

## 2020-07-14 PROCEDURE — 77333 PR  RADN TREATMENT AID(S) INTERM: ICD-10-PCS | Mod: 26,59,, | Performed by: RADIOLOGY

## 2020-07-14 PROCEDURE — 77263 PR  RADIATION THERAPY PLAN COMPLEX: ICD-10-PCS | Mod: ,,, | Performed by: RADIOLOGY

## 2020-07-14 PROCEDURE — 77333 RADIATION TREATMENT AID(S): CPT | Mod: TC,59 | Performed by: RADIOLOGY

## 2020-07-17 PROCEDURE — 77300 RADIATION THERAPY DOSE PLAN: CPT | Mod: 26,,, | Performed by: RADIOLOGY

## 2020-07-17 PROCEDURE — 77334 PR  RADN TREATMENT AID(S) COMPLX: ICD-10-PCS | Mod: 26,,, | Performed by: RADIOLOGY

## 2020-07-17 PROCEDURE — 77334 RADIATION TREATMENT AID(S): CPT | Mod: 26,,, | Performed by: RADIOLOGY

## 2020-07-17 PROCEDURE — 77295 3-D RADIOTHERAPY PLAN: CPT | Mod: TC | Performed by: RADIOLOGY

## 2020-07-17 PROCEDURE — 77295 PR 3D RADIOTHERAPY PLAN: ICD-10-PCS | Mod: 26,,, | Performed by: RADIOLOGY

## 2020-07-17 PROCEDURE — 77300 RADIATION THERAPY DOSE PLAN: CPT | Mod: TC | Performed by: RADIOLOGY

## 2020-07-17 PROCEDURE — 77300 PR RADIATION THERAPY,DOSIMETRY PLAN: ICD-10-PCS | Mod: 26,,, | Performed by: RADIOLOGY

## 2020-07-17 PROCEDURE — 77295 3-D RADIOTHERAPY PLAN: CPT | Mod: 26,,, | Performed by: RADIOLOGY

## 2020-07-17 PROCEDURE — 77334 RADIATION TREATMENT AID(S): CPT | Mod: TC | Performed by: RADIOLOGY

## 2020-07-20 ENCOUNTER — TELEPHONE (OUTPATIENT)
Dept: HEMATOLOGY/ONCOLOGY | Facility: CLINIC | Age: 50
End: 2020-07-20

## 2020-07-20 NOTE — TELEPHONE ENCOUNTER
Called patient she will be starting radiation on Tuesday 7/21. She scheduled a follow up for 7 weeks starting   On 7/27 thru 9/1.      ----- Message from Cem Hilton, PhD sent at 7/7/2020  3:47 PM CDT -----  Pawel Edmonds-  Happy Tuesday!  Could you please reach out to Ms. Vásquez sometime at the end of next week after she gets her radiation schedule to see when she wants to reschedule with me?  (She can decide on the frequency- however soon she thinks she needs after the rads schedule is set.)  Thanks!ML

## 2020-07-21 PROCEDURE — 77280 PR  SET RADN THERAPY FIELD SIMPLE: ICD-10-PCS | Mod: 26,,, | Performed by: RADIOLOGY

## 2020-07-21 PROCEDURE — 77280 THER RAD SIMULAJ FIELD SMPL: CPT | Mod: 26,,, | Performed by: RADIOLOGY

## 2020-07-21 PROCEDURE — 77280 THER RAD SIMULAJ FIELD SMPL: CPT | Mod: TC | Performed by: RADIOLOGY

## 2020-07-22 PROCEDURE — 77412 RADIATION TX DELIVERY LVL 3: CPT | Performed by: RADIOLOGY

## 2020-07-23 PROCEDURE — 77412 RADIATION TX DELIVERY LVL 3: CPT | Performed by: RADIOLOGY

## 2020-07-24 PROCEDURE — 77412 RADIATION TX DELIVERY LVL 3: CPT | Performed by: RADIOLOGY

## 2020-07-27 ENCOUNTER — DOCUMENTATION ONLY (OUTPATIENT)
Dept: RADIATION ONCOLOGY | Facility: CLINIC | Age: 50
End: 2020-07-27

## 2020-07-27 ENCOUNTER — OFFICE VISIT (OUTPATIENT)
Dept: PSYCHIATRY | Facility: CLINIC | Age: 50
End: 2020-07-27
Payer: COMMERCIAL

## 2020-07-27 DIAGNOSIS — F43.23 ADJUSTMENT DISORDER WITH MIXED ANXIETY AND DEPRESSED MOOD: Primary | ICD-10-CM

## 2020-07-27 DIAGNOSIS — Z01.818 PREOP TESTING: ICD-10-CM

## 2020-07-27 PROCEDURE — 90834 PR PSYCHOTHERAPY W/PATIENT, 45 MIN: ICD-10-PCS | Mod: 95,,, | Performed by: PSYCHOLOGIST

## 2020-07-27 PROCEDURE — 77412 RADIATION TX DELIVERY LVL 3: CPT | Performed by: RADIOLOGY

## 2020-07-27 PROCEDURE — 90834 PSYTX W PT 45 MINUTES: CPT | Mod: 95,,, | Performed by: PSYCHOLOGIST

## 2020-07-27 PROCEDURE — 77336 RADIATION PHYSICS CONSULT: CPT | Performed by: RADIOLOGY

## 2020-07-27 NOTE — PROGRESS NOTES
Telemedicine PSYCHO-ONCOLOGY NOTE/ Individual Psychotherapy   (patient not on premises due to COVID-19 restrictions)    Consultation started: 7/27/2020 at 2:02 PM   The chief complaint leading to consultation is: adaptation to disease and treatment  The patient location is:  Patient home  Virtual visit with synchronous audio and video    Patient alone at the time of consultation      Each patient provided medical services by telemedicine is:  (1) informed of the relationship between the physician and patient and the respective role of any other health care provider with respect to management of the patient; and (2) notified that he or she may decline to receive medical services by telemedicine and may withdraw from such care at any time.    Crisis Disclaimer: Patient was informed that due to the virtual nature of the visit, that if a crisis develops, protocols will be implemented to ensure patient safety, including but not limited to: 1) Initiating a welfare check with local Law Enforcement, 2) Calling 1/National Crisis Hotline, and/or 3) Initiating PEC/CEC procedures.     Date: 7/27/2020   Site of therapist:  [facility]         Therapeutic Intervention: Met with patient.  Outpatient - Supportive psychotherapy 45 min - CPT Code 00383    Referring provider:    Chief complaint/reason for encounter: depression and anxiety   Met with patient to evaluate psychosocial adaptation to survivorship of breast cancer with metastasis    Patient was last seen by me on 7/7/2020    Objective:      Phylicia Vásquez arrived promptly for the session (via video).  Ms. Vásquez was seated throughout the session. The patient was fully cooperative throughout the session.  Appearance: age appropriate, casually  dressed, well groomed  Behavior/Cooperation: friendly and cooperative  Speech: normal in rate, volume, and tone and appropriate quality, quantity and organization of sentences  Mood: dysthymic  Affect: euthymic  Thought  "Process: goal-directed, logical  Thought Content: normal,  No delusions or paranoia; did not appear to be responding to internal stimuli during the session  Orientation: grossly intact  Memory: Grossly intact  Attention Span/Concentration: Attends to session without distraction; reports no difficulty  Fund of Knowledge: average  Estimate of Intelligence: average from verbal skills and history  Cognition: grossly intact  Insight: patient has awareness of illness; good insight into own behavior and behavior of others  Judgment: the patient's behavior is adequate to circumstances      Interval history and content of current session: Patient discussed recent medical visits and start of radiation treatment. Discussed current adaptation to disease and treatment status and family's adaptation to disease and treatment status. Reports to be coping in an adequate manner. She was somewhat distressed last week, as her children both decided to move to [place] (Lasha to [facility], younger-Medardo? to [place]).  She is challenging her distorted thinking that "cancer has taken everything from me." Areas of sparing and strengthening (felt social connection) explored. Examined proactive behaviors that may be implemented to minimize or ameliorate psychosocial stressors.  Discussed focus on social connection despite COVID and continued physical activity.     Risk parameters:   Patient reports no suicidal ideation  Patient reports no homicidal ideation  Patient reports no self-injurious behavior  Patient reports no violent behavior   Safety needs:  None at this time      Verbal deficits: None     Patient's response to intervention:The patient's response to intervention is accepting, motivated.     Progress toward goals and other mental status changes:  The patient's progress toward goals is good.      Progress to date:Progress as Expected      Goals from last visit: Attempted, partially met      Patient reported outcomes:      Distress " Thermometer:   Distress Score    Distress Score: 5        Practical Problems Physical Problems   : No Appearance: Yes   Housing: No Bathing / Dressing: No   Insurance / Financial: No Breathing: No    Transportation: No  Changes in Urination: No    Work / School: No  Constipation: No   Treatment Decisions: No  Diarrhea: No     Eating: No    Family Problems Fatigue: Yes    Dealing with Children: No Feeling Swollen: No    Dealing with Partner: No Fevers: No    Ability to Have Children: No  Getting Around: No       Indigestion: No     Memory / Concentration: Yes   Emotional Problems Mouth Sores: Yes    Depression: Yes  Nausea: No    Fears: Yes  Nose Dry / Congested: No    Nervousness: No  Pain: Yes    Sadness: Yes Sexual: No    Worry: No Skin Dry / Itchy: No    Loss of Interest in Usual Activities: No Sleep: Yes     Substance Abuse: No    Spiritual/Religions Concerns Tingling in Hands / Feet: Yes   Spritual / [employer]ybeterian Concerns: No         Other Problems              PHQ-9=  Initial visit: 11    PHQ ANSWERS    Q1. Little interest or pleasure in doing things: (P) Not at all (07/27/20 1221)  Q2. Feeling down, depressed, or hopeless: (P) Several days (07/27/20 1221)  Q3. Trouble falling or staying asleep, or sleeping too much: (P) Several days (07/27/20 1221)  Q4. Feeling tired or having little energy: (P) Several days (07/27/20 1221)  Q5. Poor appetite or overeating: (P) Not at all (07/27/20 1221)  Q6. Feeling bad about yourself - or that you are a failure or have let yourself or your family down: (P) Several days (07/27/20 1221)  Q7. Trouble concentrating on things, such as reading the newspaper or watching television: (P) Several days (07/27/20 1221)  Q8. Moving or speaking so slowly that other people could have noticed. Or the opposite - being so fidgety or restless that you have been moving around a lot more than usual: (P) Several days (07/27/20 1221)  Q9.      PHQ8 Score : (P) 6 (07/27/20 1221)  PHQ-9  Total Score: (P) 6 (07/27/20 1221)        BRDA-7= Initial visit: 10     GAD7 7/27/2020   1. Feeling nervous, anxious, or on edge? 0   2. Not being able to stop or control worrying? 1   3. Worrying too much about different things? 1   4. Trouble relaxing? 0   5. Being so restless that it is hard to sit still? 0   6. Becoming easily annoyed or irritable? 1   7. Feeling afraid as if something awful might happen? 1   BRAD-7 Score 4         Client Strengths: verbal, intelligent, successful, good social support, good insight, commitment to wellness, strong sang, strong cultural traditions      Treatment Plan:individual psychotherapy  · Target symptoms: depression, anxiety   · Why chosen therapy is appropriate versus another modality: relevant to diagnosis, patient responds to this modality, evidence based practice  · Outcome monitoring methods: self-report, checklist/rating scale  · Therapeutic intervention type: behavior modifying psychotherapy, supportive psychotherapy  · Prognosis: Good      Behavioral goals:    Exercise:  Keep walking daily around the block (before radiation)   Stress management: puzzles, cooking shows   Social engagement: plan to see one of cousins in park over weekend   Nutrition: investigate plant-based diets   Therapy:  Decrease cancer talk and reading    Dr. Montalvo in future?      Return to clinic: 2 weeks     Length of Service (minutes direct face-to-face contact): 45      ICD-10-CM ICD-9-CM   1. Adjustment disorder with mixed anxiety and depressed mood  F43.23 309.28   2. Preop testing  Z01.818 V72.84         Cem Hilton, PhD  [place] License #[id number]

## 2020-07-28 PROCEDURE — 77412 RADIATION TX DELIVERY LVL 3: CPT | Performed by: RADIOLOGY

## 2020-07-29 PROCEDURE — 77417 THER RADIOLOGY PORT IMAGE(S): CPT | Performed by: RADIOLOGY

## 2020-07-29 PROCEDURE — 77412 RADIATION TX DELIVERY LVL 3: CPT | Performed by: RADIOLOGY

## 2020-07-30 ENCOUNTER — PATIENT OUTREACH (OUTPATIENT)
Dept: ADMINISTRATIVE | Facility: OTHER | Age: 50
End: 2020-07-30

## 2020-07-30 PROCEDURE — 77412 RADIATION TX DELIVERY LVL 3: CPT | Performed by: RADIOLOGY

## 2020-07-30 NOTE — PROGRESS NOTES
Patient's chart was reviewed for overdue IBJAL topics.  Immunizations reconciled.    Orders placed n/a  Tasked appts n/a  Labs Linked n/a

## 2020-07-31 PROCEDURE — 77412 RADIATION TX DELIVERY LVL 3: CPT | Performed by: RADIOLOGY

## 2020-08-03 ENCOUNTER — OFFICE VISIT (OUTPATIENT)
Dept: OPTOMETRY | Facility: CLINIC | Age: 50
End: 2020-08-03
Payer: COMMERCIAL

## 2020-08-03 ENCOUNTER — APPOINTMENT (OUTPATIENT)
Dept: RADIATION THERAPY | Facility: OTHER | Age: 50
End: 2020-08-03
Attending: RADIOLOGY
Payer: COMMERCIAL

## 2020-08-03 ENCOUNTER — DOCUMENTATION ONLY (OUTPATIENT)
Dept: RADIATION ONCOLOGY | Facility: CLINIC | Age: 50
End: 2020-08-03

## 2020-08-03 DIAGNOSIS — I10 ESSENTIAL HYPERTENSION: Primary | ICD-10-CM

## 2020-08-03 DIAGNOSIS — H52.4 PRESBYOPIA: ICD-10-CM

## 2020-08-03 PROCEDURE — 92004 COMPRE OPH EXAM NEW PT 1/>: CPT | Mod: S$GLB,,, | Performed by: OPTOMETRIST

## 2020-08-03 PROCEDURE — 99999 PR PBB SHADOW E&M-EST. PATIENT-LVL II: ICD-10-PCS | Mod: PBBFAC,,, | Performed by: OPTOMETRIST

## 2020-08-03 PROCEDURE — 77412 RADIATION TX DELIVERY LVL 3: CPT | Performed by: RADIOLOGY

## 2020-08-03 PROCEDURE — 92015 PR REFRACTION: ICD-10-PCS | Mod: S$GLB,,, | Performed by: OPTOMETRIST

## 2020-08-03 PROCEDURE — 92015 DETERMINE REFRACTIVE STATE: CPT | Mod: S$GLB,,, | Performed by: OPTOMETRIST

## 2020-08-03 PROCEDURE — 92004 PR EYE EXAM, NEW PATIENT,COMPREHESV: ICD-10-PCS | Mod: S$GLB,,, | Performed by: OPTOMETRIST

## 2020-08-03 PROCEDURE — 77336 RADIATION PHYSICS CONSULT: CPT | Performed by: RADIOLOGY

## 2020-08-03 PROCEDURE — 99999 PR PBB SHADOW E&M-EST. PATIENT-LVL II: CPT | Mod: PBBFAC,,, | Performed by: OPTOMETRIST

## 2020-08-03 RX ORDER — NYSTATIN 100000 [USP'U]/ML
SUSPENSION ORAL
COMMUNITY
Start: 2020-07-18 | End: 2021-02-11

## 2020-08-03 NOTE — PROGRESS NOTES
HPI     Last eye exam was over 10 years ago.   Pt here for routine eye exam.    Pt states she is going through radiation for breast cancer and her vision   has gotten blurry.   Pt states a couple of days ago she started experienced a flash of light   OU. Pt states has not happened since.   Patient denies diplopia, headaches, floaters, and pain.  No eye drops, and no eye sx.         Last edited by Tarah Dunham, OD on 8/3/2020  1:22 PM. (History)            Assessment /Plan     For exam results, see Encounter Report.    Essential hypertension    Presbyopia          1.  No retinopathy--monitor yearly.  BP control.  Eye health normal OU.  2.  Bifocal rx given.  Ok to use OTC readers.

## 2020-08-04 PROCEDURE — 77412 RADIATION TX DELIVERY LVL 3: CPT | Performed by: RADIOLOGY

## 2020-08-05 ENCOUNTER — OFFICE VISIT (OUTPATIENT)
Dept: PSYCHIATRY | Facility: CLINIC | Age: 50
End: 2020-08-05
Payer: COMMERCIAL

## 2020-08-05 DIAGNOSIS — F43.23 ADJUSTMENT DISORDER WITH MIXED ANXIETY AND DEPRESSED MOOD: Primary | ICD-10-CM

## 2020-08-05 PROCEDURE — 77417 THER RADIOLOGY PORT IMAGE(S): CPT | Performed by: RADIOLOGY

## 2020-08-05 PROCEDURE — 77412 RADIATION TX DELIVERY LVL 3: CPT | Performed by: RADIOLOGY

## 2020-08-05 PROCEDURE — 90834 PSYTX W PT 45 MINUTES: CPT | Mod: 95,,, | Performed by: PSYCHOLOGIST

## 2020-08-05 PROCEDURE — 90834 PR PSYCHOTHERAPY W/PATIENT, 45 MIN: ICD-10-PCS | Mod: 95,,, | Performed by: PSYCHOLOGIST

## 2020-08-06 PROCEDURE — 77412 RADIATION TX DELIVERY LVL 3: CPT | Performed by: RADIOLOGY

## 2020-08-06 NOTE — PROGRESS NOTES
Telemedicine PSYCHO-ONCOLOGY NOTE/ Individual Psychotherapy   (patient not on premises due to COVID-19 restrictions)    Consultation started: 8/5/2020 at 3:15 PM  Via [employer]  The chief complaint leading to consultation is: adaptation to disease and treatment  The patient location is:  Patient home  Virtual visit with synchronous audio and video    Patient alone at the time of consultation      Each patient provided medical services by telemedicine is:  (1) informed of the relationship between the physician and patient and the respective role of any other health care provider with respect to management of the patient; and (2) notified that he or she may decline to receive medical services by telemedicine and may withdraw from such care at any time.    Crisis Disclaimer: Patient was informed that due to the virtual nature of the visit, that if a crisis develops, protocols will be implemented to ensure patient safety, including but not limited to: 1) Initiating a welfare check with local Law Enforcement, 2) Calling 1/National Crisis Hotline, and/or 3) Initiating PEC/CEC procedures.     Date: 8/5/2020   Site of therapist:  [facility]         Therapeutic Intervention: Met with patient.  Outpatient - Supportive psychotherapy 45 min - CPT Code 54593    Referring provider:    Chief complaint/reason for encounter: depression and anxiety   Met with patient to evaluate psychosocial adaptation to survivorship of breast cancer with metastasis    Patient was last seen by me on 7/27/2020    Objective:      Phylicia Vásquez arrived promptly for the session (via video).  Ms. Vásquez was seated throughout the session. The patient was fully cooperative throughout the session.  Appearance: age appropriate, casually  dressed, well groomed  Behavior/Cooperation: friendly and cooperative  Speech: normal in rate, volume, and tone and appropriate quality, quantity and organization of sentences  Mood: dysthymic  Affect:  euthymic  Thought Process: goal-directed, logical  Thought Content: normal,  No delusions or paranoia; did not appear to be responding to internal stimuli during the session  Orientation: grossly intact  Memory: Grossly intact  Attention Span/Concentration: Attends to session without distraction; reports no difficulty  Fund of Knowledge: average  Estimate of Intelligence: average from verbal skills and history  Cognition: grossly intact  Insight: patient has awareness of illness; good insight into own behavior and behavior of others  Judgment: the patient's behavior is adequate to circumstances      Interval history and content of current session: Patient discussed coping with radiation treatment and fatigue. Discussed current adaptation to disease and treatment status and family's adaptation to disease and treatment status. Reports to be coping in an adequate manner. Examined proactive behaviors that may be implemented to minimize or ameliorate psychosocial stressors.  Discussed focus on social connection despite COVID and continued physical activity to minimize fatigue. She was also encouraged to break up household tasks and do them in stages, rather than exhausting herself. Waiting until after treatment completion to return to work seems most reasonable given the level of current fatigue she is experiencing on day 11.   She also reports dealing with pain due to her expander.    Sleep 8 hours plus 1 hr nap in afternoon, (+) EDS in afternoon       Risk parameters:   Patient reports no suicidal ideation  Patient reports no homicidal ideation  Patient reports no self-injurious behavior  Patient reports no violent behavior   Safety needs:  None at this time      Verbal deficits: None     Patient's response to intervention:The patient's response to intervention is accepting, motivated.     Progress toward goals and other mental status changes:  The patient's progress toward goals is good.      Progress to date:Progress  as Expected      Goals from last visit: Attempted, partially met      Patient reported outcomes:      Distress Thermometer:   Distress Score    Distress Score: 4        Practical Problems Physical Problems   : No Appearance: Yes   Housing: No Bathing / Dressing: No   Insurance / Financial: Yes Breathing: No    Transportation: No  Changes in Urination: No    Work / School: No  Constipation: No   Treatment Decisions: No  Diarrhea: No     Eating: No    Family Problems Fatigue: No    Dealing with Children: No Feeling Swollen: No    Dealing with Partner: No Fevers: No    Ability to Have Children: No  Getting Around: No       Indigestion: No     Memory / Concentration: No   Emotional Problems Mouth Sores: No    Depression: No  Nausea: No    Fears: No  Nose Dry / Congested: No    Nervousness: Yes  Pain: Yes    Sadness: No Sexual: No    Worry: Yes Skin Dry / Itchy: Yes    Loss of Interest in Usual Activities: No Sleep: No     Substance Abuse: No    Spiritual/Religions Concerns Tingling in Hands / Feet: Yes   Spritual / Samaritan Concerns: No         Other Problems              PHQ-9=  Initial visit: 11    PHQ ANSWERS    Q1. Little interest or pleasure in doing things: (P) Not at all (08/05/20 1517)  Q2. Feeling down, depressed, or hopeless: (P) Not at all (08/05/20 1517)  Q3. Trouble falling or staying asleep, or sleeping too much: (P) Not at all (08/05/20 1517)  Q4. Feeling tired or having little energy: (P) Several days (08/05/20 1517)  Q5. Poor appetite or overeating: (P) Not at all (08/05/20 1517)  Q6. Feeling bad about yourself - or that you are a failure or have let yourself or your family down: (P) Not at all (08/05/20 1517)  Q7. Trouble concentrating on things, such as reading the newspaper or watching television: (P) Several days (08/05/20 1517)  Q8. Moving or speaking so slowly that other people could have noticed. Or the opposite - being so fidgety or restless that you have been moving around a lot more  than usual: (P) Not at all (08/05/20 1517)  Q9.      PHQ8 Score : (P) 2 (08/05/20 1517)  PHQ-9 Total Score: (P) 2 (08/05/20 1517)        BRAD-7= Initial visit: 10     GAD7 8/5/2020   1. Feeling nervous, anxious, or on edge? 1   2. Not being able to stop or control worrying? 0   3. Worrying too much about different things? 1   4. Trouble relaxing? 0   5. Being so restless that it is hard to sit still? 0   6. Becoming easily annoyed or irritable? 1   7. Feeling afraid as if something awful might happen? 0   BRAD-7 Score 3         Client Strengths: verbal, intelligent, successful, good social support, good insight, commitment to wellness, strong sang, strong cultural traditions      Treatment Plan:individual psychotherapy  · Target symptoms: depression, anxiety   · Why chosen therapy is appropriate versus another modality: relevant to diagnosis, patient responds to this modality, evidence based practice  · Outcome monitoring methods: self-report, checklist/rating scale  · Therapeutic intervention type: behavior modifying psychotherapy, supportive psychotherapy  · Prognosis: Good      Behavioral goals:    Exercise:  Keep walking daily around the block (before radiation)   Stress management: puzzles, cooking shows   Social engagement: plan to [place] one of cousins [place] park on [place]   Nutrition: investigate plant-based diets   Therapy: reasonable expectations for activities    Dr. Montalvo in future?      Return to clinic: 2 weeks     Length of Service (minutes direct face-to-face contact): 45      ICD-10-CM ICD-9-CM   1. Adjustment disorder with mixed anxiety and depressed mood  F43.23 309.28         Cem Hilton, PhD  [place] License #[id number]

## 2020-08-07 PROCEDURE — 77412 RADIATION TX DELIVERY LVL 3: CPT | Performed by: RADIOLOGY

## 2020-08-10 PROCEDURE — 77412 RADIATION TX DELIVERY LVL 3: CPT | Performed by: RADIOLOGY

## 2020-08-10 PROCEDURE — 77336 RADIATION PHYSICS CONSULT: CPT | Performed by: RADIOLOGY

## 2020-08-11 PROCEDURE — 77412 RADIATION TX DELIVERY LVL 3: CPT | Performed by: RADIOLOGY

## 2020-08-12 PROCEDURE — 77417 THER RADIOLOGY PORT IMAGE(S): CPT | Performed by: RADIOLOGY

## 2020-08-12 PROCEDURE — 77412 RADIATION TX DELIVERY LVL 3: CPT | Performed by: RADIOLOGY

## 2020-08-13 PROCEDURE — 77412 RADIATION TX DELIVERY LVL 3: CPT | Performed by: RADIOLOGY

## 2020-08-14 PROCEDURE — 77412 RADIATION TX DELIVERY LVL 3: CPT | Performed by: RADIOLOGY

## 2020-08-17 ENCOUNTER — DOCUMENTATION ONLY (OUTPATIENT)
Dept: RADIATION ONCOLOGY | Facility: CLINIC | Age: 50
End: 2020-08-17

## 2020-08-17 PROCEDURE — 77336 RADIATION PHYSICS CONSULT: CPT | Performed by: RADIOLOGY

## 2020-08-17 PROCEDURE — 77412 RADIATION TX DELIVERY LVL 3: CPT | Performed by: RADIOLOGY

## 2020-08-17 NOTE — PLAN OF CARE
Pt. On day 19 of outpt. Xrt to breast.  Pt. With brisk erythema.  +hyperpigmentation.  No desquamation.  +fatigue.  Using cream 4x daily.

## 2020-08-18 PROCEDURE — 77412 RADIATION TX DELIVERY LVL 3: CPT | Performed by: RADIOLOGY

## 2020-08-19 ENCOUNTER — OFFICE VISIT (OUTPATIENT)
Dept: PODIATRY | Facility: CLINIC | Age: 50
End: 2020-08-19
Payer: COMMERCIAL

## 2020-08-19 ENCOUNTER — OFFICE VISIT (OUTPATIENT)
Dept: PSYCHIATRY | Facility: CLINIC | Age: 50
End: 2020-08-19
Payer: COMMERCIAL

## 2020-08-19 VITALS
BODY MASS INDEX: 40.54 KG/M2 | WEIGHT: 228.81 LBS | HEIGHT: 63 IN | SYSTOLIC BLOOD PRESSURE: 132 MMHG | DIASTOLIC BLOOD PRESSURE: 79 MMHG | HEART RATE: 91 BPM

## 2020-08-19 DIAGNOSIS — B35.1 ONYCHOMYCOSIS DUE TO DERMATOPHYTE: Primary | ICD-10-CM

## 2020-08-19 DIAGNOSIS — F43.23 ADJUSTMENT DISORDER WITH MIXED ANXIETY AND DEPRESSED MOOD: Primary | ICD-10-CM

## 2020-08-19 PROCEDURE — 3075F PR MOST RECENT SYSTOLIC BLOOD PRESS GE 130-139MM HG: ICD-10-PCS | Mod: CPTII,S$GLB,, | Performed by: PODIATRIST

## 2020-08-19 PROCEDURE — 99999 PR PBB SHADOW E&M-EST. PATIENT-LVL V: CPT | Mod: PBBFAC,,, | Performed by: PODIATRIST

## 2020-08-19 PROCEDURE — 3075F SYST BP GE 130 - 139MM HG: CPT | Mod: CPTII,S$GLB,, | Performed by: PODIATRIST

## 2020-08-19 PROCEDURE — 3078F DIAST BP <80 MM HG: CPT | Mod: CPTII,S$GLB,, | Performed by: PODIATRIST

## 2020-08-19 PROCEDURE — 3008F BODY MASS INDEX DOCD: CPT | Mod: CPTII,S$GLB,, | Performed by: PODIATRIST

## 2020-08-19 PROCEDURE — 77417 THER RADIOLOGY PORT IMAGE(S): CPT | Performed by: RADIOLOGY

## 2020-08-19 PROCEDURE — 90834 PSYTX W PT 45 MINUTES: CPT | Mod: 95,,, | Performed by: PSYCHOLOGIST

## 2020-08-19 PROCEDURE — 3078F PR MOST RECENT DIASTOLIC BLOOD PRESSURE < 80 MM HG: ICD-10-PCS | Mod: CPTII,S$GLB,, | Performed by: PODIATRIST

## 2020-08-19 PROCEDURE — 99999 PR PBB SHADOW E&M-EST. PATIENT-LVL V: ICD-10-PCS | Mod: PBBFAC,,, | Performed by: PODIATRIST

## 2020-08-19 PROCEDURE — 3008F PR BODY MASS INDEX (BMI) DOCUMENTED: ICD-10-PCS | Mod: CPTII,S$GLB,, | Performed by: PODIATRIST

## 2020-08-19 PROCEDURE — 77412 RADIATION TX DELIVERY LVL 3: CPT | Performed by: RADIOLOGY

## 2020-08-19 PROCEDURE — 99214 PR OFFICE/OUTPT VISIT, EST, LEVL IV, 30-39 MIN: ICD-10-PCS | Mod: S$GLB,,, | Performed by: PODIATRIST

## 2020-08-19 PROCEDURE — 99214 OFFICE O/P EST MOD 30 MIN: CPT | Mod: S$GLB,,, | Performed by: PODIATRIST

## 2020-08-19 PROCEDURE — 90834 PR PSYCHOTHERAPY W/PATIENT, 45 MIN: ICD-10-PCS | Mod: 95,,, | Performed by: PSYCHOLOGIST

## 2020-08-19 RX ORDER — CICLOPIROX 80 MG/ML
SOLUTION TOPICAL NIGHTLY
Qty: 6.6 ML | Refills: 11 | Status: SHIPPED | OUTPATIENT
Start: 2020-08-19 | End: 2021-04-16 | Stop reason: SDUPTHER

## 2020-08-19 NOTE — PROGRESS NOTES
Subjective:      Patient ID: Phylicia Vásquez is a 50 y.o. female.    Chief Complaint: Peripheral Neuropathy and Ankle Pain (8/10 left ankle pain )    Phylicia is a 50 y.o. female who presents to the clinic complaining of thick and discolored toenails on both feet.  Gradual onset, worsening over past several weeks, aggravated by increased weight bearing, shoe gear, pressure.  No previous medical treatment.  OTC  med not helping.  Phylicia is inquiring about treatment options.      Review of Systems   Constitution: Negative for chills, diaphoresis, fever, malaise/fatigue and night sweats.   Cardiovascular: Negative for claudication, cyanosis, leg swelling and syncope.   Skin: Positive for nail changes. Negative for color change, dry skin, rash, suspicious lesions and unusual hair distribution.   Musculoskeletal: Negative for falls, joint pain, joint swelling, muscle cramps, muscle weakness and stiffness.   Gastrointestinal: Negative for constipation, diarrhea, nausea and vomiting.   Neurological: Positive for paresthesias and sensory change. Negative for brief paralysis, disturbances in coordination, focal weakness, numbness and tremors.           Objective:      Physical Exam  Constitutional:       General: She is not in acute distress.     Appearance: She is well-developed. She is not diaphoretic.   Cardiovascular:      Pulses:           Popliteal pulses are 2+ on the right side and 2+ on the left side.        Dorsalis pedis pulses are 2+ on the right side and 2+ on the left side.        Posterior tibial pulses are 2+ on the right side and 2+ on the left side.      Comments: Capillary refill 3 seconds all toes/distal feet, all toes/both feet warm to touch.      Negative lymphadenopathy bilateral popliteal fossa and tarsal tunnel.      Negavie lower extremity edema bilateral.    Musculoskeletal:      Right ankle: She exhibits normal range of motion, no swelling, no ecchymosis, no deformity, no laceration and  normal pulse. Achilles tendon normal. Achilles tendon exhibits no pain, no defect and normal Bryant's test results.      Comments: Normal angle, base, station of gait. All ten toes without clubbing, cyanosis, or signs of ischemia.  No pain to palpation bilateral lower extremities.  Range of motion, stability, muscle strength, and muscle tone normal bilateral feet and legs.    Lymphadenopathy:      Lower Body: No right inguinal adenopathy. No left inguinal adenopathy.      Comments: Negative lymphadenopathy bilateral popliteal fossa and tarsal tunnel.    Negative lymphangitic streaking bilateral feet/ankles/legs.   Skin:     General: Skin is warm and dry.      Capillary Refill: Capillary refill takes 2 to 3 seconds.      Coloration: Skin is not pale.      Findings: No abrasion, bruising, burn, ecchymosis, erythema, laceration, lesion or rash.      Nails: There is no clubbing.        Comments:   Toenails 1st, 2nd, 3rd, 4th, 5th  bilateral are hypertrophic thickened 2-3 mm, dystrophic, discolored tanish brown with tan, gray crumbly subungual debris.  Tender to distal nail plate pressure, without periungual skin abnormality of each.    Otherwise, Skin is normal age and health appropriate color, turgor, texture, and temperature bilateral lower extremities without ulceration, hyperpigmentation, discoloration, masses nodules or cords palpated.  No ecchymosis, erythema, edema, or cardinal signs of infection bilateral lower extremities.     Neurological:      Mental Status: She is alert and oriented to person, place, and time.      Sensory: No sensory deficit.      Motor: No tremor, atrophy or abnormal muscle tone.      Gait: Gait normal.      Deep Tendon Reflexes:      Reflex Scores:       Patellar reflexes are 2+ on the right side and 2+ on the left side.       Achilles reflexes are 2+ on the right side and 2+ on the left side.     Comments: Negative tinel sign to percussion sural, superficial peroneal, deep peroneal,  saphenous, and posterior tibial nerves right and left ankles and feet.    Paresthesias, and burning bilateral feet with no clearly identified trigger or source.     Psychiatric:         Behavior: Behavior is cooperative.               Assessment:       Encounter Diagnosis   Name Primary?    Onychomycosis due to dermatophyte Yes         Plan:       Phylicia was seen today for peripheral neuropathy and ankle pain.    Diagnoses and all orders for this visit:    Onychomycosis due to dermatophyte    Other orders  -     ciclopirox (PENLAC) 8 % Soln; Apply topically nightly.      I counseled the patient on her conditions, their implications and medical management.        Discussed conservative treatment with shoes of adequate dimensions, material, and style to alleviate symptoms and delay or prevent surgical intervention.    penlac            Follow up in about 1 year (around 8/19/2021).

## 2020-08-19 NOTE — PROGRESS NOTES
Telemedicine PSYCHO-ONCOLOGY NOTE/ Individual Psychotherapy   (patient not on premises due to COVID-19 restrictions)    Consultation started: 8/19/2020 at 1:00 PM   The chief complaint leading to consultation is: adaptation to disease and treatment  The patient location is:  Patient home  Virtual visit with synchronous audio and video    Patient alone at the time of consultation      Each patient provided medical services by telemedicine is:  (1) informed of the relationship between the physician and patient and the respective role of any other health care provider with respect to management of the patient; and (2) notified that he or she may decline to receive medical services by telemedicine and may withdraw from such care at any time.    Crisis Disclaimer: Patient was informed that due to the virtual nature of the visit, that if a crisis develops, protocols will be implemented to ensure patient safety, including but not limited to: 1) Initiating a welfare check with local Law Enforcement, 2) Calling 1/National Crisis Hotline, and/or 3) Initiating PEC/CEC procedures.     Date: 8/19/2020   Site of therapist:  [facility]         Therapeutic Intervention: Met with patient.  Outpatient - Supportive psychotherapy 45 min - CPT Code 48329    Referring provider:    Chief complaint/reason for encounter: depression and anxiety   Met with patient to evaluate psychosocial adaptation to survivorship of breast cancer with metastasis    Patient was last seen by me on 8/5/2020    Objective:      Phylicia Vásquez arrived promptly for the session (via video).  Ms. Vásquez was seated throughout the session. The patient was fully cooperative throughout the session.  Appearance: age appropriate, casually  dressed, well groomed  Behavior/Cooperation: friendly and cooperative  Speech: normal in rate, volume, and tone and appropriate quality, quantity and organization of sentences  Mood: dysthymic  Affect: euthymic  Thought  Process: goal-directed, logical  Thought Content: normal,  No delusions or paranoia; did not appear to be responding to internal stimuli during the session  Orientation: grossly intact  Memory: Grossly intact  Attention Span/Concentration: Attends to session without distraction; reports no difficulty  Fund of Knowledge: average  Estimate of Intelligence: average from verbal skills and history  Cognition: grossly intact  Insight: patient has awareness of illness; good insight into own behavior and behavior of others  Judgment: the patient's behavior is adequate to circumstances      Interval history and content of current session: Patient discussed coping with radiation treatment and fatigue. Discussed current adaptation to disease and treatment status and family's adaptation to disease and treatment status. Reports to be coping in an adequate manner. Examined proactive behaviors that may be implemented to minimize or ameliorate psychosocial stressors.  Patient trying to balance social connection with COVID-precautions (discussed participation in Father's and Mother's Birthday celebrations this weekend). She has continued physical activity to minimize fatigue (walking most days).   She also reports continued pain due to her expander- pain medication helping.    Mood maintenance during last quarter of treatment discussed- gratitude focus and avoiding catastrophizing (fears of recurrence, inability to do reconstruction, etc.)       Risk parameters:   Patient reports no suicidal ideation  Patient reports no homicidal ideation  Patient reports no self-injurious behavior  Patient reports no violent behavior   Safety needs:  None at this time      Verbal deficits: None     Patient's response to intervention:The patient's response to intervention is accepting, motivated.     Progress toward goals and other mental status changes:  The patient's progress toward goals is good.      Progress to date:Progress as  Expected      Goals from last visit: Attempted, partially met      Patient reported outcomes:      Distress Thermometer:   Distress Score    Distress Score: 4        Practical Problems Physical Problems   : No Appearance: Yes   Housing: No Bathing / Dressing: No   Insurance / Financial: Yes Breathing: No    Transportation: No  Changes in Urination: No    Work / School: No  Constipation: No   Treatment Decisions: No  Diarrhea: No     Eating: No    Family Problems Fatigue: Yes    Dealing with Children: Yes Feeling Swollen: No    Dealing with Partner: No Fevers: No    Ability to Have Children: No  Getting Around: No       Indigestion: No     Memory / Concentration: Yes   Emotional Problems Mouth Sores: No    Depression: Yes  Nausea: Yes    Fears: Yes  Nose Dry / Congested: No    Nervousness: No  Pain: Yes    Sadness: Yes Sexual: No    Worry: Yes Skin Dry / Itchy: No    Loss of Interest in Usual Activities: No Sleep: Yes     Substance Abuse: No    Spiritual/Religions Concerns Tingling in Hands / Feet: Yes   Spritual / Yazdanism Concerns: No         Other Problems              PHQ-9=  Initial visit: 11    PHQ ANSWERS    Q1. Little interest or pleasure in doing things: Not at all (08/17/20 1900)  Q2. Feeling down, depressed, or hopeless: Several days (08/17/20 1900)  Q3. Trouble falling or staying asleep, or sleeping too much: Several days (08/17/20 1900)  Q4. Feeling tired or having little energy: Several days (08/17/20 1900)  Q5. Poor appetite or overeating: Not at all (08/17/20 1900)  Q6. Feeling bad about yourself - or that you are a failure or have let yourself or your family down: Several days (08/17/20 1900)  Q7. Trouble concentrating on things, such as reading the newspaper or watching television: Not at all (08/17/20 1900)  Q8. Moving or speaking so slowly that other people could have noticed. Or the opposite - being so fidgety or restless that you have been moving around a lot more than usual: Not at  "all (08/17/20 1900)  Q9. Thoughts that you would be better off dead, or of hurting yourself in some way: Not at all (08/17/20 1900)    PHQ8 Score : 4 (08/17/20 1900)  PHQ-9 Total Score: 4 (08/17/20 1900)        BRAD-7= Initial visit: 10     GAD7 8/17/2020   1. Feeling nervous, anxious, or on edge? 0   2. Not being able to stop or control worrying? 1   3. Worrying too much about different things? 1   4. Trouble relaxing? 0   5. Being so restless that it is hard to sit still? 0   6. Becoming easily annoyed or irritable? 1   7. Feeling afraid as if something awful might happen? 0   BRAD-7 Score 3         Client Strengths: verbal, intelligent, successful, good social support, good insight, commitment to wellness, strong sang, strong cultural traditions      Treatment Plan:individual psychotherapy  · Target symptoms: depression, anxiety   · Why chosen therapy is appropriate versus another modality: relevant to diagnosis, patient responds to this modality, evidence based practice  · Outcome monitoring methods: self-report, checklist/rating scale  · Therapeutic intervention type: behavior modifying psychotherapy, supportive psychotherapy  · Prognosis: Good      Behavioral goals:    Exercise:  Keep walking daily around the block (before radiation)   Stress management: puzzles, cooking shows   Social engagement: plan to see cousin, mother and father on their birthdays   Nutrition: increased focus on plant-based diet- "off meat" for 1 month; referral to RD?   Therapy: reasonable expectations for activities        Return to clinic: 2 weeks     Length of Service (minutes direct face-to-face contact): 45      ICD-10-CM ICD-9-CM   1. Adjustment disorder with mixed anxiety and depressed mood  F43.23 309.28         Cem Hilton, PhD  [place] License #[id number]      "

## 2020-08-20 PROCEDURE — 77412 RADIATION TX DELIVERY LVL 3: CPT | Performed by: RADIOLOGY

## 2020-08-21 ENCOUNTER — TELEPHONE (OUTPATIENT)
Dept: PHARMACY | Facility: CLINIC | Age: 50
End: 2020-08-21

## 2020-08-21 DIAGNOSIS — R64 CACHEXIA: Primary | ICD-10-CM

## 2020-08-21 PROCEDURE — 77412 RADIATION TX DELIVERY LVL 3: CPT | Performed by: RADIOLOGY

## 2020-08-23 ENCOUNTER — NURSE TRIAGE (OUTPATIENT)
Dept: ADMINISTRATIVE | Facility: CLINIC | Age: 50
End: 2020-08-23

## 2020-08-23 NOTE — TELEPHONE ENCOUNTER
Reason for Disposition   [1] Follow-up call from patient regarding patient's clinical status AND [2] information urgent    Additional Information   Negative: Lab calling with strep throat test results and triager can call in prescription   Negative: Lab calling with urinalysis test results and triager can call in prescription   Negative: Medication questions   Negative: [1] Prescription not at pharmacy AND [2] was prescribed today by PCP   Negative: Lab or radiology calling with CRITICAL test results   Negative: Call about patient who is currently hospitalized   Negative: Physician call to PCP   Negative: ED call to PCP    Protocols used: PCP CALL - NO TRIAGE-A-  Pt going thru radiation for breast cancer. Pt's mom ready to evacuate. Plan to evacuate tonight. Pt has treatment at 0900. Spoke with johnson call and have treatment later in week. Pt notified. Call back with questions

## 2020-08-24 ENCOUNTER — DOCUMENTATION ONLY (OUTPATIENT)
Dept: RADIATION ONCOLOGY | Facility: CLINIC | Age: 50
End: 2020-08-24

## 2020-08-24 PROCEDURE — 77336 RADIATION PHYSICS CONSULT: CPT | Performed by: RADIOLOGY

## 2020-08-24 PROCEDURE — 77412 RADIATION TX DELIVERY LVL 3: CPT | Performed by: RADIOLOGY

## 2020-08-24 NOTE — PLAN OF CARE
Pt. On day 24 of outpt. Xrt to breast.  Pt. With brisk erythema and hyperpigmentation in axilla.  Small area of dry desquamation.  Skin care discussed.  Mepilex given.

## 2020-08-25 ENCOUNTER — TELEPHONE (OUTPATIENT)
Dept: INTERNAL MEDICINE | Facility: CLINIC | Age: 50
End: 2020-08-25

## 2020-08-25 PROCEDURE — 77412 RADIATION TX DELIVERY LVL 3: CPT | Performed by: RADIOLOGY

## 2020-08-25 RX ORDER — CYCLOBENZAPRINE HCL 5 MG
TABLET ORAL
Qty: 30 TABLET | Refills: 2 | Status: SHIPPED | OUTPATIENT
Start: 2020-08-25 | End: 2020-12-02 | Stop reason: SDUPTHER

## 2020-08-26 PROCEDURE — 77412 RADIATION TX DELIVERY LVL 3: CPT | Performed by: RADIOLOGY

## 2020-08-27 ENCOUNTER — TELEPHONE (OUTPATIENT)
Dept: INTERNAL MEDICINE | Facility: CLINIC | Age: 50
End: 2020-08-27

## 2020-08-27 ENCOUNTER — OFFICE VISIT (OUTPATIENT)
Dept: INTERNAL MEDICINE | Facility: CLINIC | Age: 50
End: 2020-08-27
Payer: COMMERCIAL

## 2020-08-27 DIAGNOSIS — M79.672 LEFT FOOT PAIN: Primary | ICD-10-CM

## 2020-08-27 DIAGNOSIS — F51.04 PSYCHOPHYSIOLOGICAL INSOMNIA: ICD-10-CM

## 2020-08-27 DIAGNOSIS — G62.9 NEUROPATHY: ICD-10-CM

## 2020-08-27 DIAGNOSIS — F43.23 ADJUSTMENT DISORDER WITH MIXED ANXIETY AND DEPRESSED MOOD: ICD-10-CM

## 2020-08-27 DIAGNOSIS — G62.0 DRUG-INDUCED POLYNEUROPATHY: ICD-10-CM

## 2020-08-27 DIAGNOSIS — I10 ESSENTIAL HYPERTENSION: ICD-10-CM

## 2020-08-27 PROCEDURE — 77412 RADIATION TX DELIVERY LVL 3: CPT | Performed by: RADIOLOGY

## 2020-08-27 PROCEDURE — 99214 PR OFFICE/OUTPT VISIT, EST, LEVL IV, 30-39 MIN: ICD-10-PCS | Mod: S$GLB,,, | Performed by: INTERNAL MEDICINE

## 2020-08-27 PROCEDURE — 99999 PR PBB SHADOW E&M-EST. PATIENT-LVL II: CPT | Mod: PBBFAC,,, | Performed by: INTERNAL MEDICINE

## 2020-08-27 PROCEDURE — 99999 PR PBB SHADOW E&M-EST. PATIENT-LVL II: ICD-10-PCS | Mod: PBBFAC,,, | Performed by: INTERNAL MEDICINE

## 2020-08-27 PROCEDURE — 99214 OFFICE O/P EST MOD 30 MIN: CPT | Mod: S$GLB,,, | Performed by: INTERNAL MEDICINE

## 2020-08-27 RX ORDER — AMLODIPINE AND VALSARTAN 5; 320 MG/1; MG/1
1 TABLET ORAL DAILY
Qty: 90 TABLET | Refills: 3 | Status: SHIPPED | OUTPATIENT
Start: 2020-08-27 | End: 2021-04-16 | Stop reason: SDUPTHER

## 2020-08-27 RX ORDER — PREGABALIN 25 MG/1
25 CAPSULE ORAL 2 TIMES DAILY
Qty: 60 CAPSULE | Refills: 5 | Status: SHIPPED | OUTPATIENT
Start: 2020-08-27 | End: 2020-12-15

## 2020-08-27 NOTE — PROGRESS NOTES
Subjective:       Patient ID: Phylicia Vásquez is a 50 y.o. female.    Chief Complaint: No chief complaint on file.    This dictation was performed using using MModal.   Multiple problems explored  Entire chart reviewed, PMx, FHx, and Social History updated and reviewed.  Medication list reviewed    HPI  Review of Systems   Constitutional: Positive for activity change.   Musculoskeletal: Positive for arthralgias and back pain.     Review of systems is negative unless noted.  Objective:      Physical Exam  HENT:      Head: Atraumatic.   Eyes:      General: No scleral icterus.     Conjunctiva/sclera: Conjunctivae normal.   Neck:      Musculoskeletal: Neck supple.   Cardiovascular:      Rate and Rhythm: Normal rate and regular rhythm.   Pulmonary:      Effort: Pulmonary effort is normal.      Breath sounds: Normal breath sounds.   Abdominal:      Palpations: Abdomen is soft.      Tenderness: There is no abdominal tenderness.   Lymphadenopathy:      Cervical: No cervical adenopathy.   Skin:     General: Skin is warm and dry.   Neurological:      Mental Status: She is alert.   Psychiatric:         Behavior: Behavior normal.         Assessment:       1. Left foot pain    2. Neuropathy    3. Essential hypertension    4. Psychophysiological insomnia    5. Adjustment disorder with mixed anxiety and depressed mood    6. Drug-induced polyneuropathy        Plan:   Diagnoses and all orders for this visit:    Left foot pain, this pain started about a week ago when she woke up in the morning.  The previous day she had done a great deal of walking.  It seems like it could be a slight sprain.  She reports it is doing better each day    Neuropathy, drug-induced polyneuropathy, she complains of abnormal sensations in both hands and both feet since the chemotherapy.  She is hoping it will get better.  She has not noticed any improvement from gabapentin and is willing to try pregabalin.  Will start with a low-dose 25 mg twice a  day which she can increase to 50 mg twice a day if there is no response to the lower dose after a week.  Or alternatively she can return to taking gabapentin if she happens to find that it was helping her and is more beneficial than the pregabalin.    Essential hypertension, she is encouraged to monitor her blood pressure regularly.  We talked about how anxiety and environmental stresses can increase blood pressure.  Nevertheless, her goal is to have a blood pressure consistently 130/80 or less.  -     amlodipine-valsartan (EXFORGE) 5-320 mg per tablet; Take 1 tablet by mouth once daily.    Psychophysiological insomnia, she is still having problems with sleep but less so.    Adjustment disorder with mixed anxiety and depressed mood.  She is grateful to Dr. Hilton who is helping her a great deal.  She had no idea how difficult and all consuming breast cancer treatment, surgery and radiation was going to be.    Drug-induced polyneuropathy    Other orders  -     pregabalin (LYRICA) 25 MG capsule; Take 1 capsule (25 mg total) by mouth 2 (two) times daily.

## 2020-08-28 PROCEDURE — 77412 RADIATION TX DELIVERY LVL 3: CPT | Performed by: RADIOLOGY

## 2020-08-31 PROCEDURE — 77336 RADIATION PHYSICS CONSULT: CPT | Performed by: RADIOLOGY

## 2020-09-01 ENCOUNTER — CLINICAL SUPPORT (OUTPATIENT)
Dept: HEMATOLOGY/ONCOLOGY | Facility: CLINIC | Age: 50
End: 2020-09-01
Payer: COMMERCIAL

## 2020-09-01 ENCOUNTER — OFFICE VISIT (OUTPATIENT)
Dept: PSYCHIATRY | Facility: CLINIC | Age: 50
End: 2020-09-01
Payer: COMMERCIAL

## 2020-09-01 DIAGNOSIS — F43.23 ADJUSTMENT DISORDER WITH MIXED ANXIETY AND DEPRESSED MOOD: Primary | ICD-10-CM

## 2020-09-01 DIAGNOSIS — R64 CACHEXIA: ICD-10-CM

## 2020-09-01 DIAGNOSIS — Z71.3 NUTRITIONAL COUNSELING: ICD-10-CM

## 2020-09-01 DIAGNOSIS — Z17.0 MALIGNANT NEOPLASM OF AXILLARY TAIL OF RIGHT BREAST IN FEMALE, ESTROGEN RECEPTOR POSITIVE: Primary | ICD-10-CM

## 2020-09-01 DIAGNOSIS — C50.611 MALIGNANT NEOPLASM OF AXILLARY TAIL OF RIGHT BREAST IN FEMALE, ESTROGEN RECEPTOR POSITIVE: Primary | ICD-10-CM

## 2020-09-01 PROCEDURE — 90834 PSYTX W PT 45 MINUTES: CPT | Mod: 95,,, | Performed by: PSYCHOLOGIST

## 2020-09-01 PROCEDURE — 90834 PR PSYCHOTHERAPY W/PATIENT, 45 MIN: ICD-10-PCS | Mod: 95,,, | Performed by: PSYCHOLOGIST

## 2020-09-01 PROCEDURE — 97802 PR MED NUTR THER, 1ST, INDIV, EA 15 MIN: ICD-10-PCS | Mod: 95,,, | Performed by: DIETITIAN, REGISTERED

## 2020-09-01 PROCEDURE — 97802 MEDICAL NUTRITION INDIV IN: CPT | Mod: 95,,, | Performed by: DIETITIAN, REGISTERED

## 2020-09-01 NOTE — PROGRESS NOTES
The patient location is: home  The chief complaint leading to consultation is: metastatic breast cancer    Visit type: audiovisual    Face to Face time with patient: 20 minutes  30 minutes of total time spent on the encounter, which includes face to face time and non-face to face time preparing to see the patient (eg, review of tests), Obtaining and/or reviewing separately obtained history, Documenting clinical information in the electronic or other health record, Independently interpreting results (not separately reported) and communicating results to the patient/family/caregiver, or Care coordination (not separately reported).     Each patient to whom he or she provides medical services by telemedicine is:  (1) informed of the relationship between the physician and patient and the respective role of any other health care provider with respect to management of the patient; and (2) notified that he or she may decline to receive medical services by telemedicine and may withdraw from such care at any time.    Oncology Nutrition Assessment for Medical Nutrition Therapy  Initial Visit    Phyliciajoaquín Bennett Fahad   1970    Referring Provider: Negrito Verde MD      Reason for Visit: nutrition counseling and education    PMHx:   Past Medical History:   Diagnosis Date    Anxiety     Back pain     Goiter     HTN (hypertension) 10/18/2012    Hx antineoplastic chemo     last dose 4/23/20    Hx of psychiatric care     Ambien, Klonopin    Insomnia 10/18/2012    Malignant neoplasm of axillary tail of right breast in female, estrogen receptor positive 11/24/2019    Primary invasive malignant neoplasm of female breast, right 11/21/2019    Psychiatric problem     S/P abdominal supracervical subtotal hysterectomy, continues to have regular menses.  4/2/2014    Spinal cord cyst, L1 2014     Thoracic radiculopathy, bulging disc at T10-11 and T11-12      Nutrition Assessment    This is a 50 y.o.female with a  "medical diagnosis of metastatic breast cancer s/p neoadjuvant chemotherapy (4 cycles of AC and 4 cycles of Taxol) prior to undergoing a mastectomy 5/2020. Now finished radiation therapy and tentatively plan to undergo adjuvant chemotherapy with Capecitabine. Patient reports she recently eliminated meat from her diet and switched from cow's milk to almond milk for health reasons. She does still eat fish and shrimp. Typically eats 2 meals/day and snacks. Drinks 64oz of water/day (usually alkaline or vitamin water). Usually walks every morning but has had to stop recently due to sprained ankle.    Diet recall:  Breakfast - fruit bowl or bowl of cereal  Lunch - pasta and fish  Dinner - skip  Snacks - popcorn     Weight: 219lb  Height: 5'3"  BMI: 38.9    Usual BW: 225lb  Weight Change: 5lb weight loss (she attributes to cutting out meat from diet)    Nutrition focused physical findings: appears nourished, unable to fully assess    Allergies: Butalbital    Current Medications:  Current Outpatient Medications:     aluminum & magnesium hydroxide-simethicone (MYLANTA MAX STRENGTH) 400-400-40 mg/5 mL suspension, Take by mouth every 6 (six) hours as needed for Indigestion., Disp: , Rfl:     amlodipine-valsartan (EXFORGE) 5-320 mg per tablet, Take 1 tablet by mouth once daily., Disp: 90 tablet, Rfl: 3    ascorbic acid (VITAMIN C ORAL), Take by mouth every morning., Disp: , Rfl:     cephALEXin (KEFLEX) 500 MG capsule, TAKE 1 CAPSULE TWICE DAILY (Patient not taking: Reported on 8/25/2020), Disp: 10 capsule, Rfl: 0    ciclopirox (PENLAC) 8 % Soln, Apply topically nightly., Disp: 6.6 mL, Rfl: 11    cyanocobalamin, vitamin B-12, (VITAMIN B-12 ORAL), Take by mouth every morning., Disp: , Rfl:     cyclobenzaprine (FLEXERIL) 5 MG tablet, TAKE 1 TO 2 TABLETS BY MOUTH DAILY AT BEDTIME AS NEEDED, Disp: 30 tablet, Rfl: 2    dexAMETHasone (DECADRON) 4 MG Tab, Take 5 tablets 12 hours prior to chemotherapy and another 5 tablets 6 " hours prior to each cycle of taxol chemotherapy, Disp: 40 tablet, Rfl: 0    famotidine (PEPCID) 10 MG tablet, Take 10 mg by mouth once daily., Disp: , Rfl:     fluocinolone (DERMA-SMOOTHE) 0.01 % external oil, apply a thin film to affected area every night at bedtime as needed for flare (Patient not taking: Reported on 8/25/2020), Disp: 118.28 mL, Rfl: 1    fluticasone (FLONASE) 50 mcg/actuation nasal spray, 1 spray by Each Nare route 2 (two) times daily as needed., Disp: 15 g, Rfl: 0    magic mouthwash diphen/antac/lidoc/nysta, Take 10 mLs by mouth 4 (four) times daily., Disp: 240 mL, Rfl: 1    nystatin (MYCOSTATIN) 100,000 unit/mL suspension, , Disp: , Rfl:     ondansetron (ZOFRAN) 8 MG tablet, Take 1 tablet by mouth every 12 hours x 3 days post chemotherapy followed by 1 tablet by mouth every 12 hours as needed for nausea., Disp: 30 tablet, Rfl: 2    ondansetron (ZOFRAN-ODT) 8 MG TbDL, Dissolve 1 tablet (8 mg total) by mouth every 6 (six) hours as needed (Nausea)., Disp: 30 tablet, Rfl: 1    oxyCODONE-acetaminophen (PERCOCET) 5-325 mg per tablet, TAKE 1 TABLET EVERY 6 HOURS as needed for pain, Disp: 28 tablet, Rfl: 0    pregabalin (LYRICA) 25 MG capsule, Take 1 capsule (25 mg total) by mouth 2 (two) times daily., Disp: 60 capsule, Rfl: 5    promethazine (PHENERGAN) 25 MG tablet, Take 1 tablet (25 mg total) by mouth every 6 (six) hours as needed for Nausea., Disp: 40 tablet, Rfl: 0    tobramycin sulfate 0.3% (TOBREX) 0.3 % ophthalmic solution, 1-2 drops topically twice daily to affected toe(s). (Patient not taking: Reported on 8/25/2020), Disp: 5 mL, Rfl: 3    zolpidem (AMBIEN) 10 mg Tab, Take 1 tablet (10 mg total) by mouth nightly as needed., Disp: 30 tablet, Rfl: 2    Food/medication interactions noted: avoid grapefruit    Vitamins/Supplements: vitamin C, vitamin B12    Labs: Reviewed    Nutrition Diagnosis    Problem: nutrition related knowledge deficit  Etiology (related to): lack of prior need  for nutrition education  Signs/Symptoms (as evidenced by): cancer diagnosis    Nutrition Intervention    Nutrition Prescription   1991 kcals (20kcal/kg)  80-100g protein (0.8-1.0g/kg)   2489mL fluid (25mL/kg)    Recommendations:  Continue consuming 2 meals/day and recommend adding 2-3 snacks/day  Increase protein intake at meals - incorporate fish, shrimp, beans, nuts, peanut butter, yogurt, soy milk instead of almond milk  Continue good fluid intake - 64oz/day  Discontinue vitamin intake if restarting chemotherapy    Nutrition Monitoring and Evaluation    Monitor: energy intake    Goals: maintain weight within 5%    Motivation to change/anticipated barriers: none, patient seems very motivated    Follow up: Patient provided with dietitian contact number and advised to call with questions or to make future appointment if further intervention is needed.    Communication to referring provider/care team: note available in chart     Counseling time: 20 minutes    Joann Melendrez RD, LDN  [phone]
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
## 2020-09-09 ENCOUNTER — LAB VISIT (OUTPATIENT)
Dept: LAB | Facility: HOSPITAL | Age: 50
End: 2020-09-09
Attending: INTERNAL MEDICINE
Payer: COMMERCIAL

## 2020-09-09 ENCOUNTER — OFFICE VISIT (OUTPATIENT)
Dept: HEMATOLOGY/ONCOLOGY | Facility: CLINIC | Age: 50
End: 2020-09-09
Payer: COMMERCIAL

## 2020-09-09 VITALS
WEIGHT: 224.44 LBS | OXYGEN SATURATION: 100 % | HEART RATE: 64 BPM | RESPIRATION RATE: 16 BRPM | BODY MASS INDEX: 39.77 KG/M2 | DIASTOLIC BLOOD PRESSURE: 76 MMHG | HEIGHT: 63 IN | SYSTOLIC BLOOD PRESSURE: 124 MMHG | TEMPERATURE: 98 F

## 2020-09-09 DIAGNOSIS — C50.611 CARCINOMA OF AXILLARY TAIL OF RIGHT BREAST IN FEMALE, ESTROGEN RECEPTOR POSITIVE: Primary | ICD-10-CM

## 2020-09-09 DIAGNOSIS — Z17.0 CARCINOMA OF AXILLARY TAIL OF RIGHT BREAST IN FEMALE, ESTROGEN RECEPTOR POSITIVE: Primary | ICD-10-CM

## 2020-09-09 DIAGNOSIS — Z17.0 CARCINOMA OF AXILLARY TAIL OF RIGHT BREAST IN FEMALE, ESTROGEN RECEPTOR POSITIVE: ICD-10-CM

## 2020-09-09 DIAGNOSIS — C50.611 CARCINOMA OF AXILLARY TAIL OF RIGHT BREAST IN FEMALE, ESTROGEN RECEPTOR POSITIVE: ICD-10-CM

## 2020-09-09 LAB
ESTRADIOL SERPL-MCNC: 17 PG/ML
FSH SERPL-ACNC: 55.1 MIU/ML
FSH SERPL-ACNC: 55.3 MIU/ML

## 2020-09-09 PROCEDURE — 99999 PR PBB SHADOW E&M-EST. PATIENT-LVL V: ICD-10-PCS | Mod: PBBFAC,,, | Performed by: INTERNAL MEDICINE

## 2020-09-09 PROCEDURE — 83001 ASSAY OF GONADOTROPIN (FSH): CPT

## 2020-09-09 PROCEDURE — 99215 OFFICE O/P EST HI 40 MIN: CPT | Mod: S$GLB,,, | Performed by: INTERNAL MEDICINE

## 2020-09-09 PROCEDURE — 82670 ASSAY OF TOTAL ESTRADIOL: CPT

## 2020-09-09 PROCEDURE — 3008F BODY MASS INDEX DOCD: CPT | Mod: CPTII,S$GLB,, | Performed by: INTERNAL MEDICINE

## 2020-09-09 PROCEDURE — 3008F PR BODY MASS INDEX (BMI) DOCUMENTED: ICD-10-PCS | Mod: CPTII,S$GLB,, | Performed by: INTERNAL MEDICINE

## 2020-09-09 PROCEDURE — 3078F DIAST BP <80 MM HG: CPT | Mod: CPTII,S$GLB,, | Performed by: INTERNAL MEDICINE

## 2020-09-09 PROCEDURE — 3078F PR MOST RECENT DIASTOLIC BLOOD PRESSURE < 80 MM HG: ICD-10-PCS | Mod: CPTII,S$GLB,, | Performed by: INTERNAL MEDICINE

## 2020-09-09 PROCEDURE — 99999 PR PBB SHADOW E&M-EST. PATIENT-LVL V: CPT | Mod: PBBFAC,,, | Performed by: INTERNAL MEDICINE

## 2020-09-09 PROCEDURE — 99215 PR OFFICE/OUTPT VISIT, EST, LEVL V, 40-54 MIN: ICD-10-PCS | Mod: S$GLB,,, | Performed by: INTERNAL MEDICINE

## 2020-09-09 PROCEDURE — 3074F PR MOST RECENT SYSTOLIC BLOOD PRESSURE < 130 MM HG: ICD-10-PCS | Mod: CPTII,S$GLB,, | Performed by: INTERNAL MEDICINE

## 2020-09-09 PROCEDURE — 3074F SYST BP LT 130 MM HG: CPT | Mod: CPTII,S$GLB,, | Performed by: INTERNAL MEDICINE

## 2020-09-09 PROCEDURE — 36415 COLL VENOUS BLD VENIPUNCTURE: CPT

## 2020-09-09 RX ORDER — GABAPENTIN 300 MG/1
300 CAPSULE ORAL 3 TIMES DAILY
Qty: 180 CAPSULE | Refills: 2 | Status: SHIPPED | OUTPATIENT
Start: 2020-09-09 | End: 2021-03-03 | Stop reason: SDUPTHER
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
## 2020-09-09 NOTE — Clinical Note
Needs labs today.  See me in a week for the 1st Zoladex injection.  We need authorization for the zoladex please

## 2020-09-10 ENCOUNTER — OFFICE VISIT (OUTPATIENT)
Dept: PSYCHIATRY | Facility: CLINIC | Age: 50
End: 2020-09-10
Payer: COMMERCIAL

## 2020-09-10 DIAGNOSIS — F43.23 ADJUSTMENT DISORDER WITH MIXED ANXIETY AND DEPRESSED MOOD: Primary | ICD-10-CM

## 2020-09-10 DIAGNOSIS — F51.04 PSYCHOPHYSIOLOGICAL INSOMNIA: ICD-10-CM

## 2020-09-10 PROCEDURE — 90834 PR PSYCHOTHERAPY W/PATIENT, 45 MIN: ICD-10-PCS | Mod: 95,,, | Performed by: PSYCHOLOGIST

## 2020-09-10 PROCEDURE — 90834 PSYTX W PT 45 MINUTES: CPT | Mod: 95,,, | Performed by: PSYCHOLOGIST
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
## 2020-09-14 ENCOUNTER — IMMUNIZATION (OUTPATIENT)
Dept: PHARMACY | Facility: CLINIC | Age: 50
End: 2020-09-14
Payer: COMMERCIAL

## 2020-09-16 ENCOUNTER — TELEPHONE (OUTPATIENT)
Dept: HEMATOLOGY/ONCOLOGY | Facility: CLINIC | Age: 50
End: 2020-09-16

## 2020-09-16 ENCOUNTER — OFFICE VISIT (OUTPATIENT)
Dept: HEMATOLOGY/ONCOLOGY | Facility: CLINIC | Age: 50
End: 2020-09-16
Payer: COMMERCIAL

## 2020-09-16 ENCOUNTER — PATIENT MESSAGE (OUTPATIENT)
Dept: HEMATOLOGY/ONCOLOGY | Facility: CLINIC | Age: 50
End: 2020-09-16

## 2020-09-16 DIAGNOSIS — Z17.0 CARCINOMA OF AXILLARY TAIL OF RIGHT BREAST IN FEMALE, ESTROGEN RECEPTOR POSITIVE: Primary | ICD-10-CM

## 2020-09-16 DIAGNOSIS — C50.611 CARCINOMA OF AXILLARY TAIL OF RIGHT BREAST IN FEMALE, ESTROGEN RECEPTOR POSITIVE: Primary | ICD-10-CM

## 2020-09-16 DIAGNOSIS — Z79.811 USE OF AROMATASE INHIBITORS: Primary | ICD-10-CM

## 2020-09-16 PROCEDURE — 99214 PR OFFICE/OUTPT VISIT, EST, LEVL IV, 30-39 MIN: ICD-10-PCS | Mod: 95,,, | Performed by: INTERNAL MEDICINE

## 2020-09-16 PROCEDURE — 99214 OFFICE O/P EST MOD 30 MIN: CPT | Mod: 95,,, | Performed by: INTERNAL MEDICINE

## 2020-09-16 RX ORDER — ANASTROZOLE 1 MG/1
1 TABLET ORAL DAILY
Qty: 90 TABLET | Refills: 3 | Status: SHIPPED | OUTPATIENT
Start: 2020-09-16 | End: 2021-09-19 | Stop reason: SDUPTHER

## 2020-09-16 NOTE — TELEPHONE ENCOUNTER
Call made to Ms Vásquez to see if she could log on for her virtual visit with Dr Simpson. No answer. Phone rang with no availability to leave voicemail.

## 2020-09-16 NOTE — PROGRESS NOTES
Subjective:       Patient ID: Phylicia Vásquez is a 50 y.o. female.    Chief Complaint: No chief complaint on file.    HPI   Mrs Lua returns today for follow up.  She has now completed her radiation therapy and she is here to discuss further treatment options.  Briefly, she is a 50 year old AA female who was recently found to have a carcinoma that is ER positive, SC positive and HER 2 equivocal, but negative by  FISH.  An axillary node biopsy was also positive.  She received standard neoadjuvant chemotherapy consisting of 4 cycles of AC and 4 cycles of Taxol prior to undergoing a mastectomy. At the time of her mastectomy she had a 3 cm residual tumor while the 2 sentinel lymph nodes were positive.  A subsequent axillary node dissection showed no evidence of further chandler involvement.   She has now completed radiation therapy, however, she has sustained significant second-degree burns.    Review of Systems    Overall she feels fair.   She complains of pain from her extensor radiation-induced burns..  She also complains of persistent neuropathic symptoms involving her fingers and her toes.  Of note, she was on gabapentin 300 mg 3 times a day, but she was switched by her primary care physician to Lyrica.  She did not tolerate Lyrica, and she is asking for another prescription for gabapentin.   She is anxious and teary during her interview but she denies any depression, easy bruising, fevers, chills, night  sweats, weight loss, vomiting, diarrhea, constipation, diplopia, blurred vision, headache, chest pain, palpitations, shortness of breath, left breast pain, abdominal pain, extremity pain, or difficulty ambulating.  The remainder of the ten-point ROS, including general, skin, lymph, H/N, cardiorespiratory, GI, , Neuro, Endocrine, and psychiatric is negative.       Objective:      Physical Exam      She is alert, oriented to time, place, person, pleasant, well      nourished, in no acute physical distress.                                    VITAL SIGNS:  Reviewed                                      HEENT:  Normal.  There are no nasal, oral, lip, gingival, auricular, lid,    or conjunctival lesions.  Mucosae are moist and pink, and there is no        thrush.  Pupils are equal, reactive to light and accommodation.              Extraocular muscle movements are intact.  Dentition is good.  There is no frontal or maxillary tenderness.                                     NECK:  Supple without JVD, adenopathy, or thyromegaly.                       LUNGS:  Clear to auscultation without wheezing, rales, or rhonchi.           CARDIOVASCULAR:  Reveals an S1, S2, no murmurs, no rubs, no gallops.         ABDOMEN:  Soft, nontender, without organomegaly.  Bowel sounds are    present.                                                                     EXTREMITIES:  No cyanosis, clubbing, or edema.                               BREASTS:  Both breasts are large.  She is status post right non nipple sparing mastectomy with a tissue expander in place.  There is extensive hyperpigmentation within the radiation field and there is a large area of second-degree burn.  The area is tender to palpation.                                      LYMPHATIC:  There is no cervical, left axillary, or supraclavicular adenopathy.   SKIN:  Warm and moist, without petechiae, rashes, induration, or ecchymoses.           NEUROLOGIC:  DTRs are 0-1+ bilaterally, symmetrical, motor function is 5/5,  and cranial nerves are  within normal limits.    Assessment:       1. Carcinoma of axillary tail of right breast in female, estrogen receptor positive, pathologically T2N1M0 after neoadjuvant chemotherapy     2.    Axillary node metastases.  3.      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      Extensive second-degree burn secondary to radiation  Plan:        I had a long discussion with her.  I was able to retrieve her pathology report from the hysterectomy in  2007.  It was a simple hysterectomy and there is no mention of the ovaries having been removed.  With her being only 50 years old, I cannot assume that she is postmenopausal.  We will check the FSH and estradiol level today and I will see her again in a week for follow-up.  With the extensive second-degree burns that she has, I am not in favor of treating her with oral capecitabine at this point and we will just forego any further adjuvant chemotherapy and proceed with adjuvant hormonal therapy.  If her hormone level clearly suggests a postmenopausal state, she will be treated with anastrozole otherwise she will receive LHRH agonist ovarian suppression treatment plus an AI.  Finally, at her request, a prescription for gabapentin was sent to her pharmacy.  Her multiple questions were answered to her satisfaction.      ADDENDUM  Her FSH and estradiol level suggests that she is in menopause.  She will be started on anastrozole without any LHRH agonists.

## 2020-09-16 NOTE — PROGRESS NOTES
Subjective:       Patient ID: Phylicia Vásquez is a 50 y.o. female.    Chief Complaint: No chief complaint on file.    HPI       The patient location is: Home  The chief complaint leading to consultation is:Adjuvant therapy for breast cancer  Visit type: Video    Face to Face time with patient: 5 minutes with an additional 20 minutes to review her chart in preparation for the video conference.    Each patient to whom he or she provides medical services by telemedicine is:  (1) informed of the relationship between the physician and patient and the respective role of any other health care provider with respect to management of the patient; and (2) notified that he or she may decline to receive medical services by telemedicine and may withdraw from such care at any time.       Mrs Lua had an appointment today, however, she converted to a virtual visit.  The FSH and estradiol level that were drawn last week were both in the postmenopausal range.  She has now completed her radiation therapy and she is here to discuss further treatment options.    Briefly, she is a 50 year old AA female who was recently found to have a carcinoma that is ER positive, DE positive and HER 2 equivocal, but negative by  FISH.  An axillary node biopsy was also positive.  She received standard neoadjuvant chemotherapy consisting of 4 cycles of AC and 4 cycles of Taxol prior to undergoing a mastectomy. At the time of her mastectomy she had a 3 cm residual tumor while the 2 sentinel lymph nodes were positive.  A subsequent axillary node dissection showed no evidence of further chandler involvement.   She has now completed radiation therapy, however, she has sustained significant second-degree burns.    Review of Systems    Overall she feels fair.   She complains of pain from her extensor radiation-induced burns..  She also complains of persistent neuropathic symptoms involving her fingers and her toes.  Of note, she was on gabapentin 300 mg 3  times a day.  She denies any depression, easy bruising, fevers, chills, night  sweats, weight loss, vomiting, diarrhea, constipation, diplopia, blurred vision, headache, chest pain, palpitations, shortness of breath, left breast pain, abdominal pain, extremity pain, or difficulty ambulating.  The remainder of the ten-point ROS, including general, skin, lymph, H/N, cardiorespiratory, GI, , Neuro, Endocrine, and psychiatric is negative.       Objective:      Physical Exam      Deferred.  This was a virtual visit.    Assessment:       1. Carcinoma of axillary tail of right breast in female, estrogen receptor positive, pathologically T2N1M0 after neoadjuvant chemotherapy     2.    Axillary node metastases.  3.      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      Extensive second-degree burn secondary to radiation  Plan:        I had a long discussion with her.  With the estradiol and FSH both being in the postmenopausal range, I think it would be reasonable for her to forego the LHRH injections and proceed with anastrozole.  The pros and cons of such an approach were explained to her.  A prescription was sent to her pharmacy.  I will see her in 6 weeks with a bone density scan.  Her multiple questions were answered to her satisfaction.

## 2020-09-17 ENCOUNTER — TELEPHONE (OUTPATIENT)
Dept: HEMATOLOGY/ONCOLOGY | Facility: CLINIC | Age: 50
End: 2020-09-17

## 2020-09-20 DIAGNOSIS — Z17.0 CARCINOMA OF AXILLARY TAIL OF RIGHT BREAST IN FEMALE, ESTROGEN RECEPTOR POSITIVE: ICD-10-CM

## 2020-09-20 DIAGNOSIS — C50.611 CARCINOMA OF AXILLARY TAIL OF RIGHT BREAST IN FEMALE, ESTROGEN RECEPTOR POSITIVE: ICD-10-CM

## 2020-09-21 RX ORDER — ZOLPIDEM TARTRATE 10 MG/1
10 TABLET ORAL NIGHTLY PRN
Qty: 30 TABLET | Refills: 2 | Status: SHIPPED | OUTPATIENT
Start: 2020-09-21 | End: 2020-12-16 | Stop reason: SDUPTHER

## 2020-09-25 ENCOUNTER — OFFICE VISIT (OUTPATIENT)
Dept: PSYCHIATRY | Facility: CLINIC | Age: 50
End: 2020-09-25
Payer: COMMERCIAL

## 2020-09-25 ENCOUNTER — PATIENT MESSAGE (OUTPATIENT)
Dept: HEMATOLOGY/ONCOLOGY | Facility: CLINIC | Age: 50
End: 2020-09-25

## 2020-09-25 DIAGNOSIS — F43.23 ADJUSTMENT DISORDER WITH MIXED ANXIETY AND DEPRESSED MOOD: Primary | ICD-10-CM

## 2020-09-25 PROCEDURE — 90832 PSYTX W PT 30 MINUTES: CPT | Mod: 95,,, | Performed by: PSYCHOLOGIST

## 2020-09-25 PROCEDURE — 90832 PR PSYCHOTHERAPY W/PATIENT, 30 MIN: ICD-10-PCS | Mod: 95,,, | Performed by: PSYCHOLOGIST

## 2020-09-25 RX ORDER — AMOXICILLIN 500 MG
CAPSULE ORAL DAILY PRN
COMMUNITY
End: 2021-04-16

## 2020-09-25 NOTE — PROGRESS NOTES
Telemedicine PSYCHO-ONCOLOGY NOTE/ Individual Psychotherapy   (patient not on premises due to COVID-19 restrictions)    Consultation started: 9/25/2020 at 2:34 PM   The chief complaint leading to consultation is: adaptation to disease and treatment  The patient location is:  Patient home  Virtual visit with synchronous audio and video  Via [employer] and [employer]  Patient alone at the time of consultation      Each patient provided medical services by telemedicine is:  (1) informed of the relationship between the physician and patient and the respective role of any other health care provider with respect to management of the patient; and (2) notified that he or she may decline to receive medical services by telemedicine and may withdraw from such care at any time.    Crisis Disclaimer: Patient was informed that due to the virtual nature of the visit, that if a crisis develops, protocols will be implemented to ensure patient safety, including but not limited to: 1) Initiating a welfare check with local Law Enforcement, 2) Calling [employer]/National Crisis Hotline, and/or 3) Initiating PEC/CEC procedures.     Date: 9/25/2020   Site of therapist:  [facility]         Therapeutic Intervention: Met with patient.  Outpatient - Supportive psychotherapy 30 min - CPT Code 17453    Referring provider:    Chief complaint/reason for encounter: depression and anxiety   Met with patient to evaluate psychosocial adaptation to survivorship of breast cancer with metastasis    Patient was last seen by me on 9/10/2020    Objective:      Phylicia Vásquez arrived promptly for the session (via video).  Ms. Vásquez was seated throughout the session. The patient was fully cooperative throughout the session.  Appearance: age appropriate, casually  dressed, well groomed  Behavior/Cooperation: friendly and cooperative  Speech: normal in rate, volume, and tone and appropriate quality, quantity and organization of sentences  Mood: happy  Affect:  congruent with mood  Thought Process: goal-directed, logical  Thought Content: normal,  No delusions or paranoia; did not appear to be responding to internal stimuli during the session  Orientation: grossly intact  Memory: Grossly intact  Attention Span/Concentration: Attends to session without distraction; reports no difficulty  Fund of Knowledge: average  Estimate of Intelligence: average from verbal skills and history  Cognition: grossly intact  Insight: patient has awareness of illness; good insight into own behavior and behavior of others  Judgment: the patient's behavior is adequate to circumstances      Interval history and content of current session: Patient discussed improved mood and activities with recovery from radiation. She still has surgery planned in 2 months. Discussed current adaptation to disease and treatment status and family's adaptation to disease and treatment status. Reports to be coping very well.  Pl[place] 10[place]20. Sleep has returned to normal. She plans to return to work in 2 weeks, so is starting to adapt her sleep schedule for nights, again.     Youngest son still attempting to return home without commitment to rules. Patient being appropriately assertive with him. causing discord in family. Patient with appropriate self-care focus. Evacuated to [place] for [place] and had a restful time with cousins.      Risk parameters:   Patient reports no suicidal ideation  Patient reports no homicidal ideation  Patient reports no self-injurious behavior  Patient reports no violent behavior   Safety needs:  None at this time      Verbal deficits: None     Patient's response to intervention:The patient's response to intervention is accepting, motivated.     Progress toward goals and other mental status changes:  The patient's progress toward goals is good.      Progress to date:Progress as Expected      Goals from last visit: Attempted, partially met      Patient  reported outcomes:      Distress Thermometer:   Distress Score    Distress Score: (P) 2        Practical Problems Physical Problems   : (P) No Appearance: (P) Yes   Housing: (P) No Bathing / Dressing: (P) No   Insurance / Financial: (P) Yes Breathing: (P) No    Transportation: (P) No  Changes in Urination: (P) No    Work / School: (P) No  Constipation: (P) No   Treatment Decisions: (P) No  Diarrhea: (P) No     Eating: (P) No    Family Problems Fatigue: (P) Yes    Dealing with Children: (P) No Feeling Swollen: (P) No    Dealing with Partner: (P) No Fevers: (P) No    Ability to Have Children: (P) No  Getting Around: (P) No       Indigestion: (P) No     Memory / Concentration: (P) Yes   Emotional Problems Mouth Sores: (P) No    Depression: (P) No  Nausea: (P) No    Fears: (P) No  Nose Dry / Congested: (P) No    Nervousness: (P) No  Pain: (P) Yes    Sadness: (P) No Sexual: (P) No    Worry: (P) No Skin Dry / Itchy: (P) No    Loss of Interest in Usual Activities: (P) No Sleep: (P) No     Substance Abuse: (P) No    Spiritual/Religions Concerns Tingling in Hands / Feet: (P) Yes   Spritual / Sikhism Concerns: (P) No         Other Problems              PHQ-9=  Initial visit: 11    PHQ ANSWERS    Q1. Little interest or pleasure in doing things: Not at all (09/24/20 1812)  Q2. Feeling down, depressed, or hopeless: Not at all (09/24/20 1812)  Q3. Trouble falling or staying asleep, or sleeping too much: Not at all (09/24/20 1812)  Q4. Feeling tired or having little energy: Not at all (09/24/20 1812)  Q5. Poor appetite or overeating: Not at all (09/24/20 1812)  Q6. Feeling bad about yourself - or that you are a failure or have let yourself or your family down: Not at all (09/24/20 1812)  Q7. Trouble concentrating on things, such as reading the newspaper or watching television: Not at all (09/24/20 1812)  Q8. Moving or speaking so slowly that other people could have noticed. Or the opposite - being so fidgety or  restless that you have been moving around a lot more than usual: Not at all (09/24/20 1812)  Q9. Thoughts that you would be better off dead, or of hurting yourself in some way: Not at all (09/24/20 1812)    PHQ8 Score : 0 (09/24/20 1812)  PHQ-9 Total Score: 0 (09/24/20 1812)        BRAD-7= Initial visit: 10     GAD7 9/24/2020   1. Feeling nervous, anxious, or on edge? 0   2. Not being able to stop or control worrying? 0   3. Worrying too much about different things? 0   4. Trouble relaxing? 0   5. Being so restless that it is hard to sit still? 0   6. Becoming easily annoyed or irritable? 0   7. Feeling afraid as if something awful might happen? 0   BRAD-7 Score 0         Client Strengths: verbal, intelligent, successful, good social support, good insight, commitment to wellness, strong sang, strong cultural traditions      Treatment Plan:individual psychotherapy  · Target symptoms: depression, anxiety   · Why chosen therapy is appropriate versus another modality: relevant to diagnosis, patient responds to this modality, evidence based practice  · Outcome monitoring methods: self-report, checklist/rating scale  · Therapeutic intervention type: behavior modifying psychotherapy, supportive psychotherapy  · Prognosis: Good      Behavioral goals:    Exercise:  Keep walking daily around the block    Stress management: Jewish volunteer work/breast cancer awareness   Social engagement: time with cousins   Nutrition: as per RD   Therapy: reasonable expectations for activities    ? Additional neuropathy consult?    May need referral to Dr. Elaine for obgyn survivorship          Return to clinic: as needed- symptoms resolved     Length of Service (minutes direct face-to-face contact): 45      ICD-10-CM ICD-9-CM   1. Adjustment disorder with mixed anxiety and depressed mood  F43.23 309.28         Cem Hilton, PhD  [place] License #[id number]

## 2020-09-28 ENCOUNTER — PATIENT MESSAGE (OUTPATIENT)
Dept: HEMATOLOGY/ONCOLOGY | Facility: CLINIC | Age: 50
End: 2020-09-28

## 2020-09-29 ENCOUNTER — PATIENT MESSAGE (OUTPATIENT)
Dept: HEMATOLOGY/ONCOLOGY | Facility: CLINIC | Age: 50
End: 2020-09-29

## 2020-09-29 ENCOUNTER — TELEPHONE (OUTPATIENT)
Dept: OBSTETRICS AND GYNECOLOGY | Facility: CLINIC | Age: 50
End: 2020-09-29

## 2020-09-29 NOTE — TELEPHONE ENCOUNTER
"----- Message from Jane Jones sent at 9/29/2020  2:28 PM CDT -----  Regarding: scheduling request  Patient Assist    Name of caller:  Phylicia   Contact Preference:  [phone]   Does Patient feel the need to see the MD today? No   Physician:   What is the nature of the call?    - pt called to schedule survivorship appt. Please contact and assist    Additional Notes:   "Thank you for all that you do for our patients'"          "

## 2020-09-30 ENCOUNTER — TELEPHONE (OUTPATIENT)
Dept: OBSTETRICS AND GYNECOLOGY | Facility: CLINIC | Age: 50
End: 2020-09-30

## 2020-09-30 NOTE — TELEPHONE ENCOUNTER
RN phoned patient to discuss referral to [facility] and the importance of scheduling in our survivor's clinic. Patient recently completed radiation and started her AI about 2 weeks ago once menopausal status was confirmed. She notes mild VMS. Baseline heat intolerance as noted by Dr. Dempsey during patient's WWE 02/2020. Patient started taking Vitamin E and Omega 3 supplements. She experiences insomnia and notes seeing Dr. Hilton for anxiety and depression. Patient is not currently sexually active, but desires to be. Patient notes fatigue and chemotherapy-induced neuropathy from her taxol. Tolerated Lyrica poorly; patient notes mild relief from Neurontin. Patient received DDAC, pre-chemo echo obtained 11/2019 WNL. Last lipid level obtained 02/07/19, WNL prior to initiating AI.     Support and resources reviewed. Patient has confirmed an appt with Marcela Calvillo for 10/05/2020 at [address] [place] [address]. She will review her SCP as well during this time. DARINEL Johnson.

## 2020-10-02 NOTE — PROGRESS NOTES
PATIENT: Phylicia Vásquez  MRN: [id number]  DATE: 10/2/2020        Chief Complaint: Breast Cancer (Survivorship)         Subjective:   Oncology History: Ms. Vásquez is a 50 y.o. female  with history of breast cancer who presents for survivorship clinic visit. Hx of ER+/LA+/HER- right breast cancer. She completed 4 cycles of neoadjuvant Taxol and is s/p right mastectomy with sentinel node biospy. Then went back for axillary node dissection that showed no further chandlre involement. She has completed radiation therapy and started on Anastrazole. She has had a partial hysterectomy and FSH elevated when evaluated by Dr. Simpson. Works at [employer] and going back to work next week.  Nervous about this due to not being able to remember things.    Persistent Side Effects:  1. Cardiac Toxicity- ECHO 11/2019 with EF of 60%. Exercise intolerance is improving.  2. Cognitive function- Decreased short term memory since chemo. Worried about her ability to work starting back next week.  3. Fatigue- Fatigue started with Radiation.  4. Lymphedema- None  5. Sexual Function/Hormone related symptoms- Hot flashes with chemo. Never had before. Worsened with anastrazole. [employer]  told to take Omega3 and vit E. Helped decreased frequency.  Now occurring twice a day.   6. Pain- Joint pain when getting up from rest with anastrozole. Neuropathy after chemo. Could not tolerate lyrica so taking gabpentin 300mg BID.    Late Psychosocial Effects (Sleep, PTSD, Depression/Anxiety): Suppoprt groups through facebook.Struggles with staying asleep. Has [employer] that is very supportive and prays every morning and night.Has lost hosue and living with mom. Children moved to [place]. Working with therapist at cancer center.    Exercise/Dietary Assessment: Walking 5 days a week for exercise. Working on diet. Has removed meat from her diet.      Past Medical History:   Past Medical History:   Diagnosis Date    Anxiety     Back pain     Goiter      HTN (hypertension) 10/18/2012    Hx antineoplastic chemo     last dose 20    Hx of psychiatric care     Ambien, Klonopin    Insomnia 10/18/2012    Malignant neoplasm of axillary tail of right breast in female, estrogen receptor positive 2019    Primary invasive malignant neoplasm of female breast, right 2019    Psychiatric problem     S/P abdominal supracervical subtotal hysterectomy, continues to have regular menses.     Spinal cord cyst, L1      Thoracic radiculopathy, bulging disc at T10-11 and T11-12        Past Surgical HIstory:   Past Surgical History:   Procedure Laterality Date    AXILLARY NODE DISSECTION Right 2020    Procedure: LYMPHADENECTOMY, AXILLARY;  Surgeon: Lelo Guaman MD;  Location: [facility] OR 2ND FLR;  Service: General;  Laterality: Right;    BREAST BIOPSY Right      SECTION      CHOLECYSTECTOMY      COLONOSCOPY N/A 10/5/2015    Procedure: COLONOSCOPY;  Surgeon: JERONIMO Cancino MD;  Location: [facility] ENDO (4TH FLR);  Service: Endoscopy;  Laterality: N/A;    HYSTERECTOMY      BC--SUPRACERVICAL    INJECTION FOR SENTINEL NODE IDENTIFICATION Right 2020    Procedure: INJECTION, FOR SENTINEL NODE IDENTIFICATION;  Surgeon: Lelo Guaman MD;  Location: [facility];  Service: General;  Laterality: Right;    INSERTION OF BREAST TISSUE EXPANDER Right 2020    Procedure: INSERTION, TISSUE EXPANDER, BREAST;  Surgeon: Pablo Ramos MD;  Location: [facility] OR;  Service: Plastics;  Laterality: Right;    INSERTION OF TUNNELED CENTRAL VENOUS CATHETER (CVC) WITH SUBCUTANEOUS PORT Right 2019    Procedure: SLKMPROTJ-PIGA-B-CATH Fluoro needed;  Surgeon: Lelo Guaman MD;  Location: [facility] OR 2ND FLR;  Service: General;  Laterality: Right;  Right internal jugular.     SENTINEL LYMPH NODE BIOPSY Right 2020    Procedure: BIOPSY, LYMPH NODE, SENTINEL;  Surgeon: Lelo Guaman MD;  Location: [facility];  Service: General;  Laterality:  Right;    UNILATERAL MASTECTOMY Right 5/20/2020    Procedure: MASTECTOMY, UNILATERAL;  Surgeon: Lelo Guaman MD;  Location: [facility];  Service: General;  Laterality: Right;       Family History:   Family History   Problem Relation Age of Onset    Cancer Paternal Aunt     Lupus Paternal Aunt     Hypertension Mother     Depression Mother     Liver disease Father     Alcohol abuse Father     Colon cancer Paternal Uncle     Depression Son     Breast cancer Neg Hx     Ovarian cancer Neg Hx     Melanoma Neg Hx     Psoriasis Neg Hx     Eczema Neg Hx        Social History:  reports that she has never smoked. She has never used smokeless tobacco. She reports that she does not drink alcohol or use drugs.    Allergies:  Review of patient's allergies indicates:   Allergen Reactions    Butalbital Other (See Comments)     Chest pain         Medications:  Current Outpatient Medications   Medication Sig Dispense Refill    aluminum & magnesium hydroxide-simethicone (MYLANTA MAX STRENGTH) 400-400-40 mg/5 mL suspension Take by mouth every 6 (six) hours as needed for Indigestion.      amlodipine-valsartan (EXFORGE) 5-320 mg per tablet Take 1 tablet by mouth once daily. 90 tablet 3    anastrozole (ARIMIDEX) 1 mg Tab Take 1 tablet (1 mg total) by mouth once daily. 90 tablet 3    ascorbic acid (VITAMIN C ORAL) Take by mouth every morning.      cephALEXin (KEFLEX) 500 MG capsule TAKE 1 CAPSULE TWICE DAILY 10 capsule 0    ciclopirox (PENLAC) 8 % Soln Apply topically nightly. 6.6 mL 11    cyanocobalamin, vitamin B-12, (VITAMIN B-12 ORAL) Take by mouth every morning.      cyclobenzaprine (FLEXERIL) 5 MG tablet TAKE 1 TO 2 TABLETS BY MOUTH DAILY AT BEDTIME AS NEEDED 30 tablet 2    dexAMETHasone (DECADRON) 4 MG Tab Take 5 tablets 12 hours prior to chemotherapy and another 5 tablets 6 hours prior to each cycle of taxol chemotherapy 40 tablet 0    famotidine (PEPCID) 10 MG tablet Take 10 mg by mouth once daily.       fluocinolone (DERMA-SMOOTHE) 0.01 % external oil apply a thin film to affected area every night at bedtime as needed for flare 118.28 mL 1    fluticasone (FLONASE) 50 mcg/actuation nasal spray 1 spray by Each Nare route 2 (two) times daily as needed. 15 g 0    gabapentin (NEURONTIN) 300 MG capsule Take 1 capsule (300 mg total) by mouth 3 (three) times daily. 180 capsule 2    magic mouthwash diphen/antac/lidoc/nysta Take 10 mLs by mouth 4 (four) times daily. 240 mL 1    nystatin (MYCOSTATIN) 100,000 unit/mL suspension       omega-3 fatty acids/fish oil (FISH OIL-OMEGA-3 FATTY ACIDS) 300-1,000 mg capsule Take by mouth once daily.      ondansetron (ZOFRAN) 8 MG tablet Take 1 tablet by mouth every 12 hours x 3 days post chemotherapy followed by 1 tablet by mouth every 12 hours as needed for nausea. 30 tablet 2    ondansetron (ZOFRAN-ODT) 8 MG TbDL Dissolve 1 tablet (8 mg total) by mouth every 6 (six) hours as needed (Nausea). 30 tablet 1    oxyCODONE-acetaminophen (PERCOCET) 5-325 mg per tablet TAKE 1 TABLET EVERY 6 HOURS as needed for pain 28 tablet 0    pregabalin (LYRICA) 25 MG capsule Take 1 capsule (25 mg total) by mouth 2 (two) times daily. 60 capsule 5    promethazine (PHENERGAN) 25 MG tablet Take 1 tablet (25 mg total) by mouth every 6 (six) hours as needed for Nausea. 40 tablet 0    tobramycin sulfate 0.3% (TOBREX) 0.3 % ophthalmic solution 1-2 drops topically twice daily to affected toe(s). 5 mL 3    vitamin E 200 UNIT capsule Take 200 Units by mouth once daily.      zolpidem (AMBIEN) 10 mg Tab Take 1 tablet (10 mg total) by mouth nightly as needed. 30 tablet 2     No current facility-administered medications for this visit.        Review of Systems:  General: No fever, chills, or weight loss.  Chest: No chest pain, shortness of breath, or palpitations.  Breast: No pain, masses, or nipple discharge.  Vulva: No pain, lesions, or itching.  Vagina: No relaxation, itching, discharge, or  lesions.  Abdomen: No pain, nausea, vomiting, diarrhea, or constipation.  Urinary: No incontinence, nocturia, frequency, or dysuria.  Extremities:  No leg cramps, edema, or calf pain.  Neurologic: No headaches, dizziness, or visual changes.  Psychiatric: No anxiety, depression, or sleep issues.      Objective:      Vitals: There were no vitals filed for this visit.  BMI: There is no height or weight on file to calculate BMI.    Physical Exam: Deferred exam      Assessment:     1. Malignant neoplasm of axillary tail of right breast in female, estrogen receptor positive    2. Menopause    3. Chemotherapy-induced peripheral neuropathy           Plan:   Reviewed Cancer Treatment Summary with patient. Care Plan was given to the patient and all questions were answered.   Counseled on healthy lifestyle and behavior modifications that could reduce risk of recurrence.   Trial of 13 hours intermittent fasting.  Acupuncture referral for hot flashes and peripheral neuropathy  Start Effexor 37.5mg QAM. Counseled on side effects  Increase gabapentin to 600mg at night.  Counseled on sleep hygiene.  Add Melatonin 5mg QHS, increase to 20mg as can tolerate.  Continue walking for 30 minutes 5x a week and diet with wide variety of fruits and vegetables (eats mostly vegetarian).  Schedule for [employer] in 6 weeks. Consider topical estrogen at that time.    I spent 50 minutes with this patient today, >50% counseling.

## 2020-10-05 ENCOUNTER — OFFICE VISIT (OUTPATIENT)
Dept: HEMATOLOGY/ONCOLOGY | Facility: CLINIC | Age: 50
End: 2020-10-05
Payer: COMMERCIAL

## 2020-10-05 DIAGNOSIS — Z17.0 MALIGNANT NEOPLASM OF AXILLARY TAIL OF RIGHT BREAST IN FEMALE, ESTROGEN RECEPTOR POSITIVE: Primary | ICD-10-CM

## 2020-10-05 DIAGNOSIS — Z78.0 MENOPAUSE: ICD-10-CM

## 2020-10-05 DIAGNOSIS — T45.1X5A CHEMOTHERAPY-INDUCED PERIPHERAL NEUROPATHY: ICD-10-CM

## 2020-10-05 DIAGNOSIS — G62.0 CHEMOTHERAPY-INDUCED PERIPHERAL NEUROPATHY: ICD-10-CM

## 2020-10-05 DIAGNOSIS — C50.611 MALIGNANT NEOPLASM OF AXILLARY TAIL OF RIGHT BREAST IN FEMALE, ESTROGEN RECEPTOR POSITIVE: Primary | ICD-10-CM

## 2020-10-05 PROCEDURE — 99215 OFFICE O/P EST HI 40 MIN: CPT | Mod: S$GLB,,, | Performed by: PHYSICIAN ASSISTANT

## 2020-10-05 PROCEDURE — 99999 PR PBB SHADOW E&M-EST. PATIENT-LVL III: ICD-10-PCS | Mod: PBBFAC,,, | Performed by: PHYSICIAN ASSISTANT

## 2020-10-05 PROCEDURE — 99215 PR OFFICE/OUTPT VISIT, EST, LEVL V, 40-54 MIN: ICD-10-PCS | Mod: S$GLB,,, | Performed by: PHYSICIAN ASSISTANT

## 2020-10-05 PROCEDURE — 99999 PR PBB SHADOW E&M-EST. PATIENT-LVL III: CPT | Mod: PBBFAC,,, | Performed by: PHYSICIAN ASSISTANT

## 2020-10-05 RX ORDER — VENLAFAXINE HYDROCHLORIDE 37.5 MG/1
37.5 CAPSULE, EXTENDED RELEASE ORAL DAILY
Qty: 30 CAPSULE | Refills: 5 | Status: SHIPPED | OUTPATIENT
Start: 2020-10-05 | End: 2021-04-16 | Stop reason: SDUPTHER

## 2020-10-28 ENCOUNTER — PATIENT MESSAGE (OUTPATIENT)
Dept: HEMATOLOGY/ONCOLOGY | Facility: CLINIC | Age: 50
End: 2020-10-28

## 2020-11-11 ENCOUNTER — OFFICE VISIT (OUTPATIENT)
Dept: URGENT CARE | Facility: CLINIC | Age: 50
End: 2020-11-11
Payer: COMMERCIAL

## 2020-11-11 VITALS
HEIGHT: 63 IN | RESPIRATION RATE: 16 BRPM | HEART RATE: 68 BPM | WEIGHT: 224 LBS | DIASTOLIC BLOOD PRESSURE: 85 MMHG | SYSTOLIC BLOOD PRESSURE: 127 MMHG | TEMPERATURE: 98 F | OXYGEN SATURATION: 98 % | BODY MASS INDEX: 39.69 KG/M2

## 2020-11-11 DIAGNOSIS — Z11.59 SCREENING FOR VIRAL DISEASE: Primary | ICD-10-CM

## 2020-11-11 LAB
CTP QC/QA: YES
SARS-COV-2 RDRP RESP QL NAA+PROBE: NEGATIVE

## 2020-11-11 PROCEDURE — U0002 COVID-19 LAB TEST NON-CDC: HCPCS | Mod: QW,S$GLB,, | Performed by: NURSE PRACTITIONER

## 2020-11-11 PROCEDURE — 99211 OFF/OP EST MAY X REQ PHY/QHP: CPT | Mod: S$GLB,CS,, | Performed by: NURSE PRACTITIONER

## 2020-11-11 PROCEDURE — 99211 PR OFFICE/OUTPT VISIT, EST, LEVL I: ICD-10-PCS | Mod: S$GLB,CS,, | Performed by: NURSE PRACTITIONER

## 2020-11-11 PROCEDURE — U0002: ICD-10-PCS | Mod: QW,S$GLB,, | Performed by: NURSE PRACTITIONER

## 2020-11-11 NOTE — PROGRESS NOTES
"Subjective:       Patient ID: Phylicia Vásquez is a 50 y.o. female.    Vitals:  height is 5' 3" (1.6 m) and weight is 101.6 kg (224 lb). Her respiration is 16.     Chief Complaint: COVID-19 Concerns    Pt was exposed she is currently asymptomatic and would like to be tested    URI   Pertinent negatives include no congestion, coughing, ear pain, nausea, rash, sinus pain, sore throat, vomiting or wheezing.       Constitution: Negative for chills, sweating, fatigue and fever.   HENT: Negative for ear pain, congestion, sinus pain, sinus pressure, sore throat and voice change.    Neck: Negative for painful lymph nodes.   Eyes: Negative for eye redness.   Respiratory: Negative for chest tightness, cough, sputum production, bloody sputum, COPD, shortness of breath, stridor, wheezing and asthma.    Gastrointestinal: Negative for nausea and vomiting.   Musculoskeletal: Negative for muscle ache.   Skin: Negative for rash.   Allergic/Immunologic: Negative for seasonal allergies and asthma.   Hematologic/Lymphatic: Negative for swollen lymph nodes.       Objective:      Physical Exam    asymptomatic    Results for orders placed or performed in visit on 11/11/20   POCT COVID-19 Rapid Screening   Result Value Ref Range    POC Rapid COVID Negative Negative     Acceptable Yes      Assessment:       1. Screening for viral disease        Plan:         Screening for viral disease  -     POCT COVID-19 Rapid Screening    CDC Testing and Quarantine Guidelines for patients with exposure to a known-positive COVID-19 person:    A "close exposure" is defined as anyone who has had an exposure (masked or unmasked) to a known COVID -19 positive person within 6 ft for longer than 15 minutes. If your exposure meets this definition you are required by CDC guidelines to quarantine for 14 days from time of exposure. CDC now states that a test can be performed for an asymptomatic patient (someone who does not have any symptoms) " after a close exposure, and that a test should be done if you develop symptoms after a close exposure as described above.     If you meet the definition of a close exposure, it will not matter whether you are experiencing symptoms- quarantine for 14 days after close exposure is required by CDC guidelines regardless of test status- a negative test does not shorten the quarantine period.     Please also note that patients who test positive for COVID-19 are required to undergo isolation for 10 days after the positive test, regardless of symptom status.       If your exposure does not meet the above definition, you can return to your normal daily activities to include social distancing, wearing a mask and frequent handwashing.

## 2020-11-15 ENCOUNTER — PATIENT OUTREACH (OUTPATIENT)
Dept: ADMINISTRATIVE | Facility: OTHER | Age: 50
End: 2020-11-15

## 2020-11-15 DIAGNOSIS — Z12.11 ENCOUNTER FOR FIT (FECAL IMMUNOCHEMICAL TEST) SCREENING: Primary | ICD-10-CM

## 2020-11-16 ENCOUNTER — HOSPITAL ENCOUNTER (OUTPATIENT)
Dept: RADIOLOGY | Facility: OTHER | Age: 50
Discharge: HOME | End: 2020-11-16
Attending: PLASTIC SURGERY
Payer: COMMERCIAL

## 2020-11-16 DIAGNOSIS — Z85.3 HX OF BREAST CANCER: ICD-10-CM

## 2020-11-16 PROCEDURE — 25500020 PHARM REV CODE 255: Performed by: PLASTIC SURGERY

## 2020-11-16 PROCEDURE — 74174 CTA ABD&PLVS W/CONTRAST: CPT | Mod: TC

## 2020-11-16 PROCEDURE — 74174 CTA ABD&PLVS W/CONTRAST: CPT | Mod: 26,,, | Performed by: RADIOLOGY

## 2020-11-16 PROCEDURE — 74174: ICD-10-PCS | Mod: 26,,, | Performed by: RADIOLOGY

## 2020-11-16 RX ORDER — CYCLOBENZAPRINE HCL 5 MG
TABLET ORAL
Qty: 30 TABLET | Refills: 2 | OUTPATIENT
Start: 2020-11-16

## 2020-11-16 RX ADMIN — IOHEXOL 100 ML: 350 INJECTION, SOLUTION INTRAVENOUS at 03:11

## 2020-11-16 NOTE — PROGRESS NOTES
Care Everywhere:   Immunization: updated  Health Maintenance:updated  Media Review: review for outside colon cancer report  Legacy Review:   Order placed: fecal immunochemical test   Upcoming appts:

## 2020-11-17 ENCOUNTER — OFFICE VISIT (OUTPATIENT)
Dept: OBSTETRICS AND GYNECOLOGY | Facility: CLINIC | Age: 50
End: 2020-11-17
Payer: COMMERCIAL

## 2020-11-17 VITALS
HEIGHT: 63 IN | DIASTOLIC BLOOD PRESSURE: 84 MMHG | WEIGHT: 227.06 LBS | BODY MASS INDEX: 40.23 KG/M2 | SYSTOLIC BLOOD PRESSURE: 142 MMHG

## 2020-11-17 DIAGNOSIS — Z01.419 ROUTINE GYNECOLOGICAL EXAMINATION: Primary | ICD-10-CM

## 2020-11-17 DIAGNOSIS — M54.50 CHRONIC LOW BACK PAIN WITHOUT SCIATICA, UNSPECIFIED BACK PAIN LATERALITY: ICD-10-CM

## 2020-11-17 DIAGNOSIS — G89.29 CHRONIC LOW BACK PAIN WITHOUT SCIATICA, UNSPECIFIED BACK PAIN LATERALITY: ICD-10-CM

## 2020-11-17 DIAGNOSIS — N95.1 MENOPAUSAL SYMPTOMS: ICD-10-CM

## 2020-11-17 PROCEDURE — 88175 CYTOPATH C/V AUTO FLUID REDO: CPT

## 2020-11-17 PROCEDURE — 3079F PR MOST RECENT DIASTOLIC BLOOD PRESSURE 80-89 MM HG: ICD-10-PCS | Mod: CPTII,S$GLB,, | Performed by: PHYSICIAN ASSISTANT

## 2020-11-17 PROCEDURE — 3079F DIAST BP 80-89 MM HG: CPT | Mod: CPTII,S$GLB,, | Performed by: PHYSICIAN ASSISTANT

## 2020-11-17 PROCEDURE — 3008F PR BODY MASS INDEX (BMI) DOCUMENTED: ICD-10-PCS | Mod: CPTII,S$GLB,, | Performed by: PHYSICIAN ASSISTANT

## 2020-11-17 PROCEDURE — 99396 PR PREVENTIVE VISIT,EST,40-64: ICD-10-PCS | Mod: S$GLB,,, | Performed by: PHYSICIAN ASSISTANT

## 2020-11-17 PROCEDURE — 3077F SYST BP >= 140 MM HG: CPT | Mod: CPTII,S$GLB,, | Performed by: PHYSICIAN ASSISTANT

## 2020-11-17 PROCEDURE — 99396 PREV VISIT EST AGE 40-64: CPT | Mod: S$GLB,,, | Performed by: PHYSICIAN ASSISTANT

## 2020-11-17 PROCEDURE — 1126F PR PAIN SEVERITY QUANTIFIED, NO PAIN PRESENT: ICD-10-PCS | Mod: S$GLB,,, | Performed by: PHYSICIAN ASSISTANT

## 2020-11-17 PROCEDURE — 3077F PR MOST RECENT SYSTOLIC BLOOD PRESSURE >= 140 MM HG: ICD-10-PCS | Mod: CPTII,S$GLB,, | Performed by: PHYSICIAN ASSISTANT

## 2020-11-17 PROCEDURE — 87624 HPV HI-RISK TYP POOLED RSLT: CPT

## 2020-11-17 PROCEDURE — 3008F BODY MASS INDEX DOCD: CPT | Mod: CPTII,S$GLB,, | Performed by: PHYSICIAN ASSISTANT

## 2020-11-17 PROCEDURE — 1126F AMNT PAIN NOTED NONE PRSNT: CPT | Mod: S$GLB,,, | Performed by: PHYSICIAN ASSISTANT

## 2020-11-17 NOTE — PROGRESS NOTES
"CC: Well woman exam    Phylicia Vásquez is a 50 y.o. female  presents for well woman exam.  LMP: Patient's last menstrual period was 2014..She has had a supracervical partial hysterectomy.  Hx of ER+/SC+/HER- right breast cancer and currently taking arimidex. Started Effexor 37.5mg QAm and helping with hot flashes and mood. Tolerating well and would like to continue at this dose for now. However, she had "an emotional break" on Halloween. Does not think she is processing everything that she has gone through. Hot flashes have decreased but still having. Also reports fatigue and brain fog. Was referred to acupuncture for peripheral neuropathy and joint pain as well, but has not heard from them. Restarted work at [employer] and tolerating well for now. Concerns that they will be reducing hours since not busy. Planing to have expander out in 2021 and finish reconstruction of the right breast.    PCP: Dr. Ella Toth  Routine Screening Labs: 2019 with PCP    Past Medical History:   Diagnosis Date    Anxiety     Back pain     Goiter     HTN (hypertension) 10/18/2012    Hx antineoplastic chemo     last dose 20    Hx of psychiatric care     Ambien, Klonopin    Insomnia 10/18/2012    Malignant neoplasm of axillary tail of right breast in female, estrogen receptor positive 2019    Primary invasive malignant neoplasm of female breast, right 2019    Psychiatric problem     S/P abdominal supracervical subtotal hysterectomy, continues to have regular menses. -2014    Spinal cord cyst, L1      Thoracic radiculopathy, bulging disc at T10-11 and T11-12      Past Surgical History:   Procedure Laterality Date    AXILLARY NODE DISSECTION Right 2020    Procedure: LYMPHADENECTOMY, AXILLARY;  Surgeon: Lelo Guaman MD;  Location: [facility] OR [address] [place] [address];  Service: General;  Laterality: Right;    BREAST BIOPSY Right      SECTION      CHOLECYSTECTOMY      " COLONOSCOPY N/A 10/5/2015    Procedure: COLONOSCOPY;  Surgeon: JERONIMO Cancino MD;  Location: [facility] ENDO (4TH FLR);  Service: Endoscopy;  Laterality: N/A;    HYSTERECTOMY  2007    BC--SUPRACERVICAL    INJECTION FOR SENTINEL NODE IDENTIFICATION Right 5/20/2020    Procedure: INJECTION, FOR SENTINEL NODE IDENTIFICATION;  Surgeon: Lelo Guaman MD;  Location: [facility] OR;  Service: General;  Laterality: Right;    INSERTION OF BREAST TISSUE EXPANDER Right 5/20/2020    Procedure: INSERTION, TISSUE EXPANDER, BREAST;  Surgeon: Pablo Ramos MD;  Location: [facility] OR;  Service: Plastics;  Laterality: Right;    INSERTION OF TUNNELED CENTRAL VENOUS CATHETER (CVC) WITH SUBCUTANEOUS PORT Right 11/21/2019    Procedure: [id number] Fluoro needed;  Surgeon: Lelo Guaman MD;  Location: [facility] OR 2ND FLR;  Service: General;  Laterality: Right;  Right internal jugular.     SENTINEL LYMPH NODE BIOPSY Right 5/20/2020    Procedure: BIOPSY, LYMPH NODE, SENTINEL;  Surgeon: Lelo Guaman MD;  Location: [facility];  Service: General;  Laterality: Right;    UNILATERAL MASTECTOMY Right 5/20/2020    Procedure: MASTECTOMY, UNILATERAL;  Surgeon: Lelo Guaman MD;  Location: [facility];  Service: General;  Laterality: Right;     Social History     Socioeconomic History    Marital status: Single     Spouse name: Not on file    Number of children: 2    Years of education: Not on file    Highest education level: Not on file   Occupational History    Not on file   Social Needs    Financial resource strain: Hard    Food insecurity     Worry: Sometimes true     Inability: Sometimes true    Transportation needs     Medical: No     Non-medical: No   Tobacco Use    Smoking status: Never Smoker    Smokeless tobacco: Never Used   Substance and Sexual Activity    Alcohol use: No     Frequency: Never     Drinks per session: Patient refused     Binge frequency: Never    Drug use: No    Sexual activity: Yes     Partners: Male   Lifestyle     Physical activity     Days per week: 4 days     Minutes per session: 30 min    Stress: To some extent   Relationships    Social connections     Talks on phone: More than three times a week     Gets together: Once a week     Attends Mandaeism service: Not on file     Active member of club or organization: Yes     Attends meetings of clubs or organizations: More than 4 times per year     Relationship status:    Other Topics Concern    Are you pregnant or think you may be? Not Asked    Breast-feeding Not Asked    Patient feels they ought to cut down on drinking/drug use Not Asked    Patient annoyed by others criticizing their drinking/drug use Not Asked    Patient has felt bad or guilty about drinking/drug use Not Asked    Patient has had a drink/used drugs as an eye opener in the AM Not Asked   Social History Narrative    Has worked at the [employer] since it opened and stayed working through [employer].    2011  from her  and moved to [facility] with her sons to live with her aunt.    Her mother is very helpful and involved in her life.    She has never been a smoker and does not drink.        April 2016    Her eldest son has been working as a  at the [employer] for one year now.  He is doing well in this capacity.  He is thinking about making this his career.    Her youngest son is doing better academically.  She would like to remain living on the [facility] for his schooling.    Her commuting to work is difficult.        October 2017.  For the first time in a long time, she feels happy.  She has a new boyfriend.    November 2108.  Her eldest son has moved to [place], is living with his 1st cousin and has a full-time job with [employer].    Her youngest son is 16 and doing well in school.    She continues to date a very nice man.  She is enjoying living in [place] and commutes to the [facility] where she continues to enjoy her employment.        February 2020.  Living with her mother due to her breast cancer treatment. Both sons living together in [place]. Younger son struggling emotionally and academically.     Family History   Problem Relation Age of Onset    Cancer Paternal Aunt     Lupus Paternal Aunt     Hypertension Mother     Depression Mother     Liver disease Father     Alcohol abuse Father     Colon cancer Paternal Uncle     Depression Son     Breast cancer Neg Hx     Ovarian cancer Neg Hx     Melanoma Neg Hx     Psoriasis Neg Hx     Eczema Neg Hx      OB History        7    Para   5    Term   4       1    AB   2    Living   2       SAB   2    TAB   0    Ectopic   0    Multiple   0    Live Births   2                 Current Outpatient Medications:     aluminum & magnesium hydroxide-simethicone (MYLANTA MAX STRENGTH) 400-400-40 mg/5 mL suspension, Take by mouth every 6 (six) hours as needed for Indigestion., Disp: , Rfl:     amlodipine-valsartan (EXFORGE) 5-320 mg per tablet, Take 1 tablet by mouth once daily., Disp: 90 tablet, Rfl: 3    anastrozole (ARIMIDEX) 1 mg Tab, Take 1 tablet (1 mg total) by mouth once daily., Disp: 90 tablet, Rfl: 3    ascorbic acid (VITAMIN C ORAL), Take by mouth every morning., Disp: , Rfl:     cephALEXin (KEFLEX) 500 MG capsule, TAKE 1 CAPSULE TWICE DAILY, Disp: 10 capsule, Rfl: 0    ciclopirox (PENLAC) 8 % Soln, Apply topically nightly., Disp: 6.6 mL, Rfl: 11    cyanocobalamin, vitamin B-12, (VITAMIN B-12 ORAL), Take by mouth every morning., Disp: , Rfl:     cyclobenzaprine (FLEXERIL) 5 MG tablet, TAKE 1 TO 2 TABLETS BY MOUTH DAILY AT BEDTIME AS NEEDED, Disp: 30 tablet, Rfl: 2    dexAMETHasone (DECADRON) 4 MG Tab, Take 5 tablets 12 hours prior to chemotherapy and another 5 tablets 6 hours prior to each cycle of taxol chemotherapy, Disp: 40 tablet, Rfl: 0    famotidine (PEPCID) 10 MG tablet, Take 10 mg by mouth once daily., Disp: , Rfl:     fluocinolone (DERMA-SMOOTHE) 0.01 % external oil, apply a  thin film to affected area every night at bedtime as needed for flare, Disp: 118.28 mL, Rfl: 1    fluticasone (FLONASE) 50 mcg/actuation nasal spray, 1 spray by Each Nare route 2 (two) times daily as needed., Disp: 15 g, Rfl: 0    gabapentin (NEURONTIN) 300 MG capsule, Take 1 capsule (300 mg total) by mouth 3 (three) times daily., Disp: 180 capsule, Rfl: 2    magic mouthwash diphen/antac/lidoc/nysta, Take 10 mLs by mouth 4 (four) times daily., Disp: 240 mL, Rfl: 1    nystatin (MYCOSTATIN) 100,000 unit/mL suspension, , Disp: , Rfl:     omega-3 fatty acids/fish oil (FISH OIL-OMEGA-3 FATTY ACIDS) 300-1,000 mg capsule, Take by mouth once daily., Disp: , Rfl:     ondansetron (ZOFRAN) 8 MG tablet, Take 1 tablet by mouth every 12 hours x 3 days post chemotherapy followed by 1 tablet by mouth every 12 hours as needed for nausea., Disp: 30 tablet, Rfl: 2    ondansetron (ZOFRAN-ODT) 8 MG TbDL, Dissolve 1 tablet (8 mg total) by mouth every 6 (six) hours as needed (Nausea)., Disp: 30 tablet, Rfl: 1    oxyCODONE-acetaminophen (PERCOCET) 5-325 mg per tablet, TAKE 1 TABLET EVERY 6 HOURS as needed for pain, Disp: 28 tablet, Rfl: 0    promethazine (PHENERGAN) 25 MG tablet, Take 1 tablet (25 mg total) by mouth every 6 (six) hours as needed for Nausea., Disp: 40 tablet, Rfl: 0    tobramycin sulfate 0.3% (TOBREX) 0.3 % ophthalmic solution, 1-2 drops topically twice daily to affected toe(s)., Disp: 5 mL, Rfl: 3    venlafaxine (EFFEXOR XR) 37.5 MG 24 hr capsule, Take 1 capsule (37.5 mg total) by mouth once daily., Disp: 30 capsule, Rfl: 5    vitamin E 200 UNIT capsule, Take 200 Units by mouth once daily., Disp: , Rfl:     zolpidem (AMBIEN) 10 mg Tab, Take 1 tablet (10 mg total) by mouth nightly as needed., Disp: 30 tablet, Rfl: 2    pregabalin (LYRICA) 25 MG capsule, Take 1 capsule (25 mg total) by mouth 2 (two) times daily., Disp: 60 capsule, Rfl: 5  No current facility-administered medications for this visit.     The  "10-year ASCVD risk score (Lalitharich MCALLISTER Jr., et al., 2013) is: 4.8%    Values used to calculate the score:      Age: 50 years      Sex: Female      Is Non- : Yes      Diabetic: No      Tobacco smoker: No      Systolic Blood Pressure: 142 mmHg      Is BP treated: Yes      HDL Cholesterol: 54 mg/dL      Total Cholesterol: 175 mg/dL    BP (!) 142/84   Ht 5' 3" (1.6 m)   Wt 103 kg (227 lb 1.2 oz)   LMP 08/14/2014   BMI 40.22 kg/m²       ROS:  GENERAL: Denies weight gain or weight loss. Feeling well overall.   SKIN: Denies rash or lesions.   HEAD: Denies head injury or headache.   NODES: Denies enlarged lymph nodes.   CHEST: Denies chest pain or shortness of breath.   CARDIOVASCULAR: Denies palpitations or left sided chest pain.   ABDOMEN: No abdominal pain, constipation, diarrhea, nausea, vomiting or rectal bleeding.   URINARY: No frequency, dysuria, hematuria, or burning on urination.  REPRODUCTIVE: See HPI.   BREASTS: Denies pain, lumps, or nipple discharge.   HEMATOLOGIC: No easy bruisability or excessive bleeding.   MUSCULOSKELETAL: Denies swelling. +joint pain  NEUROLOGIC: Denies syncope or weakness.   PSYCHIATRIC: +depression    PHYSICAL EXAM:  APPEARANCE: Well nourished, well developed, in no acute distress.  AFFECT: WNL, alert and oriented x 3  SKIN: No acne or hirsutism  CHEST: Good respiratory effect  ABDOMEN: Soft.  No tenderness or masses.  No hepatosplenomegaly.  No hernias.  BREASTS: Expander in the RIGHT breast Left breast- no skin changes or visible lesions.  No palpable masses, nipple discharge bilaterally.  PELVIC: Normal external genitalia without lesions.  Normal hair distribution.  Adequate perineal body, normal urethral meatus.  Vagina moist and well rugated without lesions or discharge.  Cervix pink, without lesions, discharge or tenderness. Uterus is surgically absent. Adnexa without masses or tenderness.    EXTREMITIES: No edema.    ASSESSMENT:   Routine gynecological " examination: WWE today with pap/HPV. Mammograms with breast surgery/med onc  -     Liquid-Based Pap Smear, Screening  -     HPV High Risk Genotypes, PCR    Menopausal symptoms: Improvement with Effexor 37.5mg. Declines increasing the dose. Will try again for acupuncture to help with joint pain, hot flashes and peripheral neuropathy. Benefit from therapy for PTSD. She would like to consider testosterone therapy for the future after breast surgery in 1/2021.  -     Testosterone, free; Future; Expected date: 11/17/2020        -     Acupuncture; Future      PLAN:   Send pap/HPV.  Continue annual mammogram of the left breast with med onc or breast surgery  Continue Effexor 37.5mg QAM.  Referral to acupuncture for joint pain, hot flashes and peripheral neuropathy.  She will call to schedule appointment with Susan Shen for PTSD.  Patient Fund Assistance form obtained today.  Encouraged again to look into [facility] group and has info on Deirdre.  Follow up with Dr. Elaine in early 2021 after breast reconstruction to discuss possibly starting Testosterone.  Obtain free T lab one week prior. Had recent estradiol level with Dr. Simpson.    Patient was counseled today on A.C.S. Pap guidelines and recommendations for yearly pelvic exams, mammograms and monthly self breast exams; to see her PCP for other health maintenance.

## 2020-11-30 ENCOUNTER — PATIENT MESSAGE (OUTPATIENT)
Dept: HEMATOLOGY/ONCOLOGY | Facility: CLINIC | Age: 50
End: 2020-11-30

## 2020-11-30 ENCOUNTER — HOSPITAL ENCOUNTER (OUTPATIENT)
Dept: RADIOLOGY | Facility: CLINIC | Age: 50
Discharge: HOME | End: 2020-11-30
Attending: INTERNAL MEDICINE
Payer: COMMERCIAL

## 2020-11-30 ENCOUNTER — OFFICE VISIT (OUTPATIENT)
Dept: HEMATOLOGY/ONCOLOGY | Facility: CLINIC | Age: 50
End: 2020-11-30
Payer: COMMERCIAL

## 2020-11-30 VITALS
HEART RATE: 75 BPM | RESPIRATION RATE: 18 BRPM | TEMPERATURE: 98 F | DIASTOLIC BLOOD PRESSURE: 85 MMHG | BODY MASS INDEX: 40.51 KG/M2 | HEIGHT: 63 IN | OXYGEN SATURATION: 99 % | SYSTOLIC BLOOD PRESSURE: 129 MMHG | WEIGHT: 228.63 LBS

## 2020-11-30 DIAGNOSIS — T45.1X5A ANTINEOPLASTIC CHEMOTHERAPY INDUCED ANEMIA: ICD-10-CM

## 2020-11-30 DIAGNOSIS — Z17.0 CARCINOMA OF AXILLARY TAIL OF RIGHT BREAST IN FEMALE, ESTROGEN RECEPTOR POSITIVE: Primary | ICD-10-CM

## 2020-11-30 DIAGNOSIS — Z79.811 PROPHYLACTIC USE OF ANASTROZOLE (ARIMIDEX): ICD-10-CM

## 2020-11-30 DIAGNOSIS — C50.611 CARCINOMA OF AXILLARY TAIL OF RIGHT BREAST IN FEMALE, ESTROGEN RECEPTOR POSITIVE: Primary | ICD-10-CM

## 2020-11-30 DIAGNOSIS — D64.81 ANTINEOPLASTIC CHEMOTHERAPY INDUCED ANEMIA: ICD-10-CM

## 2020-11-30 DIAGNOSIS — Z79.811 USE OF AROMATASE INHIBITORS: ICD-10-CM

## 2020-11-30 PROCEDURE — 1125F PR PAIN SEVERITY QUANTIFIED, PAIN PRESENT: ICD-10-PCS | Mod: S$GLB,,, | Performed by: INTERNAL MEDICINE

## 2020-11-30 PROCEDURE — 77080 DXA BONE DENSITY AXIAL: CPT | Mod: 26,,, | Performed by: INTERNAL MEDICINE

## 2020-11-30 PROCEDURE — 3008F BODY MASS INDEX DOCD: CPT | Mod: CPTII,S$GLB,, | Performed by: INTERNAL MEDICINE

## 2020-11-30 PROCEDURE — 99213 OFFICE O/P EST LOW 20 MIN: CPT | Mod: S$GLB,,, | Performed by: INTERNAL MEDICINE

## 2020-11-30 PROCEDURE — 3079F DIAST BP 80-89 MM HG: CPT | Mod: CPTII,S$GLB,, | Performed by: INTERNAL MEDICINE

## 2020-11-30 PROCEDURE — 3079F PR MOST RECENT DIASTOLIC BLOOD PRESSURE 80-89 MM HG: ICD-10-PCS | Mod: CPTII,S$GLB,, | Performed by: INTERNAL MEDICINE

## 2020-11-30 PROCEDURE — 99999 PR PBB SHADOW E&M-EST. PATIENT-LVL V: CPT | Mod: PBBFAC,,, | Performed by: INTERNAL MEDICINE

## 2020-11-30 PROCEDURE — 77080 DEXA BONE DENSITY SPINE HIP: ICD-10-PCS | Mod: 26,,, | Performed by: INTERNAL MEDICINE

## 2020-11-30 PROCEDURE — 99999 PR PBB SHADOW E&M-EST. PATIENT-LVL V: ICD-10-PCS | Mod: PBBFAC,,, | Performed by: INTERNAL MEDICINE

## 2020-11-30 PROCEDURE — 77080 DXA BONE DENSITY AXIAL: CPT | Mod: TC

## 2020-11-30 PROCEDURE — 3074F SYST BP LT 130 MM HG: CPT | Mod: CPTII,S$GLB,, | Performed by: INTERNAL MEDICINE

## 2020-11-30 PROCEDURE — 1125F AMNT PAIN NOTED PAIN PRSNT: CPT | Mod: S$GLB,,, | Performed by: INTERNAL MEDICINE

## 2020-11-30 PROCEDURE — 99213 PR OFFICE/OUTPT VISIT, EST, LEVL III, 20-29 MIN: ICD-10-PCS | Mod: S$GLB,,, | Performed by: INTERNAL MEDICINE

## 2020-11-30 PROCEDURE — 3074F PR MOST RECENT SYSTOLIC BLOOD PRESSURE < 130 MM HG: ICD-10-PCS | Mod: CPTII,S$GLB,, | Performed by: INTERNAL MEDICINE

## 2020-11-30 PROCEDURE — 3008F PR BODY MASS INDEX (BMI) DOCUMENTED: ICD-10-PCS | Mod: CPTII,S$GLB,, | Performed by: INTERNAL MEDICINE

## 2020-11-30 NOTE — PROGRESS NOTES
Subjective:       Patient ID: Phylicia Vásquez is a 50 y.o. female.    Chief Complaint: No chief complaint on file.    HPI   Mrs Lua returns today for follow up.  She has now completed her radiation therapy and as of November 1, 2020 she has been on anastrozole in the adjuvant setting.  Of note, her estradiol level and her FSH level suggested that she is postmenopausal, hence initiation of anastrozole.  Briefly, she is a 50 year old AA female who was recently found to have a carcinoma that is ER positive, NJ positive and HER 2 equivocal, but negative by  FISH.  An axillary node biopsy was also positive.  She received standard neoadjuvant chemotherapy consisting of 4 cycles of AC and 4 cycles of Taxol prior to undergoing a mastectomy. At the time of her mastectomy she had a 3 cm residual tumor while the 2 sentinel lymph nodes were positive.  A subsequent axillary node dissection showed no evidence of further chandler involvement.   She has now completed radiation therapy, and as of November 1, 2020 she has been on adjuvant anastrozole.  Her DXA scan shows normal bone density in the L spine and the hip.    Review of Systems    Overall she feels OK.  She does experience occasional hot flashes and also stiffness and pain involving primarily her feet and her hands and shoulders.  She is anxious but she denies any depression, easy bruising, fevers, chills, night  sweats, weight loss, vomiting, diarrhea, constipation, diplopia, blurred vision, headache, chest pain, palpitations, shortness of breath, left breast pain, abdominal pain, extremity pain, or difficulty ambulating.  The remainder of the ten-point ROS, including general, skin, lymph, H/N, cardiorespiratory, GI, , Neuro, Endocrine, and psychiatric is negative.       Objective:      Physical Exam      She is alert, oriented to time, place, person, pleasant, well      nourished, in no acute physical distress.                                   VITAL SIGNS:   Reviewed                                      HEENT:  Normal.  There are no nasal, oral, lip, gingival, auricular, lid,    or conjunctival lesions.  Mucosae are moist and pink, and there is no        thrush.  Pupils are equal, reactive to light and accommodation.              Extraocular muscle movements are intact.  Dentition is good.  There is no frontal or maxillary tenderness.                                     NECK:  Supple without JVD, adenopathy, or thyromegaly.                       LUNGS:  Clear to auscultation without wheezing, rales, or rhonchi.           CARDIOVASCULAR:  Reveals an S1, S2, no murmurs, no rubs, no gallops.         ABDOMEN:  Soft, nontender, without organomegaly.  Bowel sounds are    present.                                                                     EXTREMITIES:  No cyanosis, clubbing, or edema.                               BREASTS:  Both breasts are large.  She is status post right non nipple sparing mastectomy with a tissue expander in place.  There is extensive hyperpigmentation within the radiation field, however, the previously seen second-degree burn has all but resolved.    LYMPHATIC:  There is no cervical, left axillary, or supraclavicular adenopathy.   SKIN:  Warm and moist, without petechiae, rashes, induration, or ecchymoses.           NEUROLOGIC:  DTRs are 0-1+ bilaterally, symmetrical, motor function is 5/5,  and cranial nerves are  within normal limits.    Assessment:       1. Carcinoma of axillary tail of right breast in female, estrogen receptor positive, pathologically T2N1M0 after neoadjuvant chemotherapy     2.    Axillary node metastases.  3.      Musculoskeletal stiffness secondary to anastrozole.  Patient also has residual neuropathy from her chemotherapy  4.      Extensive second-degree burn secondary to radiation, resolved  Plan:        I had a long discussion with her.  I have asked her to remain on anastrozole which she will take at least through the  end of October 2025 at which point we will discuss a possible extension to 7 years.  She will return to see me in 3 months.  In regards to her bone density, I have recommended that she take vitamin-D 800 units today and calcium 1000 mg daily.  Her multiple questions were answered to her satisfaction.

## 2020-12-02 ENCOUNTER — PATIENT MESSAGE (OUTPATIENT)
Dept: INTERNAL MEDICINE | Facility: CLINIC | Age: 50
End: 2020-12-02

## 2020-12-02 ENCOUNTER — CLINICAL SUPPORT (OUTPATIENT)
Dept: HEMATOLOGY/ONCOLOGY | Facility: CLINIC | Age: 50
End: 2020-12-02
Payer: COMMERCIAL

## 2020-12-02 ENCOUNTER — TELEPHONE (OUTPATIENT)
Dept: INTERNAL MEDICINE | Facility: CLINIC | Age: 50
End: 2020-12-02

## 2020-12-02 ENCOUNTER — DOCUMENTATION ONLY (OUTPATIENT)
Dept: INTERNAL MEDICINE | Facility: CLINIC | Age: 50
End: 2020-12-02

## 2020-12-02 DIAGNOSIS — M54.50 CHRONIC LOW BACK PAIN WITHOUT SCIATICA, UNSPECIFIED BACK PAIN LATERALITY: ICD-10-CM

## 2020-12-02 DIAGNOSIS — G89.29 CHRONIC LOW BACK PAIN WITHOUT SCIATICA, UNSPECIFIED BACK PAIN LATERALITY: ICD-10-CM

## 2020-12-02 PROCEDURE — 97814 PR ACUPUNCT W/ ELEC STIMUL ADDL 15M: ICD-10-PCS | Mod: S$GLB,,, | Performed by: ACUPUNCTURIST

## 2020-12-02 PROCEDURE — 97813 ACUP 1/> W/ESTIM 1ST 15 MIN: CPT | Mod: S$GLB,,, | Performed by: ACUPUNCTURIST

## 2020-12-02 PROCEDURE — 99999 PR PBB SHADOW E&M-EST. PATIENT-LVL I: ICD-10-PCS | Mod: PBBFAC,,, | Performed by: ACUPUNCTURIST

## 2020-12-02 PROCEDURE — 99999 PR PBB SHADOW E&M-EST. PATIENT-LVL I: CPT | Mod: PBBFAC,,, | Performed by: ACUPUNCTURIST

## 2020-12-02 PROCEDURE — 97814 ACUP 1/> W/ESTIM EA ADDL 15: CPT | Mod: S$GLB,,, | Performed by: ACUPUNCTURIST

## 2020-12-02 PROCEDURE — 97813 PR ACUPUNCT W/ ELEC STIMUL 15 MIN: ICD-10-PCS | Mod: S$GLB,,, | Performed by: ACUPUNCTURIST

## 2020-12-02 RX ORDER — CYCLOBENZAPRINE HCL 5 MG
TABLET ORAL
Qty: 30 TABLET | Refills: 2 | Status: SHIPPED | OUTPATIENT
Start: 2020-12-02 | End: 2021-03-04

## 2020-12-02 NOTE — PROGRESS NOTES
Subjective:       Patient ID: Phylicia Vásquez is a 50 y.o. female.    Chief Complaint: Cancer (hot flashes) and Pain (CIPN)    Patient is suffering from hot flashes due to cancer / medications. 1x a night (sweating) and about 3 during the day with heat and sweating. They are better during cooler weather. Patient is also dealing with CIPN that effects the toes and balls of feet. Numbness but still has feeling. This is less than 12 months of symptoms for both issues.     Review of Systems   Genitourinary: Positive for hot flashes.   Neurological: Positive for numbness.         Objective:          Assessment:       1. Chronic low back pain without sciatica, unspecified back pain laterality        Plan:       1x a week for 4 weeks then eval*         Pre-Symptom Score: 8  Post-Symptom Score: 8     Cancer (hot flashes) and Pain (CIPN)       Encounter Diagnoses   Name Primary?    Chronic low back pain without sciatica, unspecified back pain laterality        TCM Diagnosis: Toxic Heat and Qi and Blood Stagnation    Physical Exam   Constitutional:            Acupuncture points used:  Li11, St36, Sp6, Du20, Gb34, REN4, REN6, ER JUANITO, FREITAS MEN, YIN PERLA, BA RENE and 4 REYES    ADD STIM (Lr3/Sp6)      NEEDLES IN: 28  NEEDLES OUT: 28    NEEDLES AND STIM STARTED: 9:30 AM  NEEDLES AND STIM REMOVED: 10:00 AM

## 2020-12-02 NOTE — TELEPHONE ENCOUNTER
----- Message from Pamela Asher sent at 12/2/2020 10:07 AM CST -----  Contact: [phone]  Pt is returning call from clinic

## 2020-12-09 ENCOUNTER — CLINICAL SUPPORT (OUTPATIENT)
Dept: HEMATOLOGY/ONCOLOGY | Facility: CLINIC | Age: 50
End: 2020-12-09
Payer: COMMERCIAL

## 2020-12-09 DIAGNOSIS — C50.611 CARCINOMA OF AXILLARY TAIL OF RIGHT BREAST IN FEMALE, ESTROGEN RECEPTOR POSITIVE: ICD-10-CM

## 2020-12-09 DIAGNOSIS — G89.29 CHRONIC LOW BACK PAIN WITHOUT SCIATICA, UNSPECIFIED BACK PAIN LATERALITY: Primary | ICD-10-CM

## 2020-12-09 DIAGNOSIS — G62.0 CHEMOTHERAPY-INDUCED PERIPHERAL NEUROPATHY: ICD-10-CM

## 2020-12-09 DIAGNOSIS — M54.50 CHRONIC LOW BACK PAIN WITHOUT SCIATICA, UNSPECIFIED BACK PAIN LATERALITY: Primary | ICD-10-CM

## 2020-12-09 DIAGNOSIS — T45.1X5A CHEMOTHERAPY-INDUCED PERIPHERAL NEUROPATHY: ICD-10-CM

## 2020-12-09 DIAGNOSIS — Z17.0 CARCINOMA OF AXILLARY TAIL OF RIGHT BREAST IN FEMALE, ESTROGEN RECEPTOR POSITIVE: ICD-10-CM

## 2020-12-09 PROCEDURE — 97813 PR ACUPUNCT W/ ELEC STIMUL 15 MIN: ICD-10-PCS | Mod: S$GLB,,, | Performed by: ACUPUNCTURIST

## 2020-12-09 PROCEDURE — 97814 PR ACUPUNCT W/ ELEC STIMUL ADDL 15M: ICD-10-PCS | Mod: S$GLB,,, | Performed by: ACUPUNCTURIST

## 2020-12-09 PROCEDURE — 97813 ACUP 1/> W/ESTIM 1ST 15 MIN: CPT | Mod: S$GLB,,, | Performed by: ACUPUNCTURIST

## 2020-12-09 PROCEDURE — 97814 ACUP 1/> W/ESTIM EA ADDL 15: CPT | Mod: S$GLB,,, | Performed by: ACUPUNCTURIST

## 2020-12-09 NOTE — PROGRESS NOTES
Subjective:       Patient ID: Phylicia Vásquez is a 50 y.o. female.    Chief Complaint: Cancer (hot flashes) and Pain (cLBP, CIPN)    Patient had a slight increase in hot flashes the day of treatment, however it slowly declined as the week went on. About 1 hot flash a day currently.     Review of Systems   Genitourinary: Positive for hot flashes.   Musculoskeletal: Positive for arthralgias and back pain.   Neurological: Positive for numbness.         Objective:          Assessment:       1. Chronic low back pain without sciatica, unspecified back pain laterality    2. Carcinoma of axillary tail of right breast in female, estrogen receptor positive    3. Chemotherapy-induced peripheral neuropathy        Plan:       1x a week*         Pre-Symptom Score: N/A  Post-Symptom Score: N/A     Cancer (hot flashes) and Pain (cLBP, CIPN)       Encounter Diagnoses   Name Primary?    Chronic low back pain without sciatica, unspecified back pain laterality Yes    Carcinoma of axillary tail of right breast in female, estrogen receptor positive     Chemotherapy-induced peripheral neuropathy        TCM Diagnosis: Toxic Heat, Qi and Blood Stag    Physical Exam   Constitutional:            Acupuncture points used:  Li11, St36, Sp6, Du20, Gb34, REN4, REN6, ER JUANITO, FREITAS MEN, YIN PERLA and 4 REYES + [id number] for LBP    ADD STIM (Lr3/Sp6)      NEEDLES IN: 18  NEEDLES OUT: 18    NEEDLES AND STIM STARTED: 10:15 Am  NEEDLES AND STIM REMOVED: 10:45 Am

## 2020-12-11 ENCOUNTER — PATIENT MESSAGE (OUTPATIENT)
Dept: OTHER | Facility: OTHER | Age: 50
End: 2020-12-11

## 2020-12-15 ENCOUNTER — OFFICE VISIT (OUTPATIENT)
Dept: SURGERY | Facility: CLINIC | Age: 50
End: 2020-12-15
Payer: COMMERCIAL

## 2020-12-15 VITALS
BODY MASS INDEX: 40.51 KG/M2 | HEIGHT: 63 IN | WEIGHT: 228.63 LBS | DIASTOLIC BLOOD PRESSURE: 83 MMHG | SYSTOLIC BLOOD PRESSURE: 143 MMHG | HEART RATE: 95 BPM

## 2020-12-15 DIAGNOSIS — M54.9 DORSALGIA, UNSPECIFIED: ICD-10-CM

## 2020-12-15 DIAGNOSIS — Z12.31 BREAST CANCER SCREENING BY MAMMOGRAM: Primary | ICD-10-CM

## 2020-12-15 DIAGNOSIS — Z85.3 HISTORY OF RIGHT BREAST CANCER: ICD-10-CM

## 2020-12-15 PROCEDURE — 3077F PR MOST RECENT SYSTOLIC BLOOD PRESSURE >= 140 MM HG: ICD-10-PCS | Mod: CPTII,S$GLB,, | Performed by: PHYSICIAN ASSISTANT

## 2020-12-15 PROCEDURE — 3008F BODY MASS INDEX DOCD: CPT | Mod: CPTII,S$GLB,, | Performed by: PHYSICIAN ASSISTANT

## 2020-12-15 PROCEDURE — 99213 OFFICE O/P EST LOW 20 MIN: CPT | Mod: S$GLB,,, | Performed by: PHYSICIAN ASSISTANT

## 2020-12-15 PROCEDURE — 1126F PR PAIN SEVERITY QUANTIFIED, NO PAIN PRESENT: ICD-10-PCS | Mod: S$GLB,,, | Performed by: PHYSICIAN ASSISTANT

## 2020-12-15 PROCEDURE — 99999 PR PBB SHADOW E&M-EST. PATIENT-LVL IV: ICD-10-PCS | Mod: PBBFAC,,, | Performed by: PHYSICIAN ASSISTANT

## 2020-12-15 PROCEDURE — 3008F PR BODY MASS INDEX (BMI) DOCUMENTED: ICD-10-PCS | Mod: CPTII,S$GLB,, | Performed by: PHYSICIAN ASSISTANT

## 2020-12-15 PROCEDURE — 3079F DIAST BP 80-89 MM HG: CPT | Mod: CPTII,S$GLB,, | Performed by: PHYSICIAN ASSISTANT

## 2020-12-15 PROCEDURE — 3079F PR MOST RECENT DIASTOLIC BLOOD PRESSURE 80-89 MM HG: ICD-10-PCS | Mod: CPTII,S$GLB,, | Performed by: PHYSICIAN ASSISTANT

## 2020-12-15 PROCEDURE — 3077F SYST BP >= 140 MM HG: CPT | Mod: CPTII,S$GLB,, | Performed by: PHYSICIAN ASSISTANT

## 2020-12-15 PROCEDURE — 99213 PR OFFICE/OUTPT VISIT, EST, LEVL III, 20-29 MIN: ICD-10-PCS | Mod: S$GLB,,, | Performed by: PHYSICIAN ASSISTANT

## 2020-12-15 PROCEDURE — 1126F AMNT PAIN NOTED NONE PRSNT: CPT | Mod: S$GLB,,, | Performed by: PHYSICIAN ASSISTANT

## 2020-12-15 PROCEDURE — 99999 PR PBB SHADOW E&M-EST. PATIENT-LVL IV: CPT | Mod: PBBFAC,,, | Performed by: PHYSICIAN ASSISTANT

## 2020-12-15 NOTE — PROGRESS NOTES
[facility]  [facility]  12/15/2020      SUBJECTIVE:   Ms. Phylicia Vásquez is a 50 y.o. female with a history of RIGHT breast cancer who presents today for follow up breast cancer screening.      History of Present Illness:  Patient is a 51 yo female with a history of ER+, FL+, Her2 - ILC of the right breast s/p neoadjuvant chemotherapy with ACT and R mastectomy with SLNB and tissue expander with plastics on 5/20/20. She then underwent R ALND on 6/5/20. 5/5 nodes negative for carcinoma.     Interval History:  Patient of Dr. Guaman. New to me. Last seen June 2020. Started adjuvant Arimidex with Dr. Simpson September 2020. Patient states she is overall doing well. She denies any unexplained weight loss, changes in vision, or recent headaches.  She does report new low back pain. Ongoing a few weeks. States she thinks it may be related to standing for long periods of time at her job. However, nothing seems to make pain better or worse. Able to ambulate without issue. She denies palpating any masses at her breasts.  States she is planning to have second phase of reconstruction with Dr. Ramos in January.     Review of Systems: Denies any cough, chest pain or shortness of breath.  Denies any fever or chills.  See HPI/ Interval History for other systems reviewed.    OBJECTIVE:   Vitals:    12/15/20 1030   BP: (!) 143/83   Pulse: 95        Physical Exam:  HEENT: Normocephalic, atraumatic.    General: alert and oriented; no acute distress.  Breast/Chest: s/p right mastectomy with tissue expander in place. No masses present bilaterally.  No erythema or edema bilaterally. No nipple inversion or discharge on the left.  Lymph: No palpable adjacent axillary lymph nodes.  No cervical or supraclavicular lymphadenopathy.    Back: point tenderness to lumbar spine.   Neurologic Exam: A&O x 3.  There are no focal neurologic deficits.    ASSESSMENT:  Ms. Phylicia Vásquez is a 50 y.o. year old female  with a history of RIGHT breast cancer who presents today for follow up breast cancer screening; also with low back pain.    PLAN:   We discussed that there was nothing concerning on CBE. Continue SBE and notify of any concerns. Overdue for left screening mammogram; we will get this scheduled today. Continue arimidex and f/u with Dr. Simpson. Plan xrays of lumbar spine to further evaluate - will notify with results when available. Plan follow up in 6 months, notify sooner of concerns. Continue f/u with Dr. Ramos for reconstruction.      Lily Marion PA-C      Surgical Oncology            12/15/2020

## 2020-12-16 ENCOUNTER — CLINICAL SUPPORT (OUTPATIENT)
Dept: HEMATOLOGY/ONCOLOGY | Facility: CLINIC | Age: 50
End: 2020-12-16
Payer: COMMERCIAL

## 2020-12-16 DIAGNOSIS — G89.29 CHRONIC LOW BACK PAIN WITHOUT SCIATICA, UNSPECIFIED BACK PAIN LATERALITY: Primary | ICD-10-CM

## 2020-12-16 DIAGNOSIS — G62.0 CHEMOTHERAPY-INDUCED PERIPHERAL NEUROPATHY: ICD-10-CM

## 2020-12-16 DIAGNOSIS — Z17.0 CARCINOMA OF AXILLARY TAIL OF RIGHT BREAST IN FEMALE, ESTROGEN RECEPTOR POSITIVE: ICD-10-CM

## 2020-12-16 DIAGNOSIS — C50.611 CARCINOMA OF AXILLARY TAIL OF RIGHT BREAST IN FEMALE, ESTROGEN RECEPTOR POSITIVE: ICD-10-CM

## 2020-12-16 DIAGNOSIS — T45.1X5A CHEMOTHERAPY-INDUCED PERIPHERAL NEUROPATHY: ICD-10-CM

## 2020-12-16 DIAGNOSIS — M54.50 CHRONIC LOW BACK PAIN WITHOUT SCIATICA, UNSPECIFIED BACK PAIN LATERALITY: Primary | ICD-10-CM

## 2020-12-16 DIAGNOSIS — Z78.0 MENOPAUSE: ICD-10-CM

## 2020-12-16 PROCEDURE — 97814 ACUP 1/> W/ESTIM EA ADDL 15: CPT | Mod: S$GLB,,, | Performed by: ACUPUNCTURIST

## 2020-12-16 PROCEDURE — 97814 PR ACUPUNCT W/ ELEC STIMUL ADDL 15M: ICD-10-PCS | Mod: S$GLB,,, | Performed by: ACUPUNCTURIST

## 2020-12-16 PROCEDURE — 97813 PR ACUPUNCT W/ ELEC STIMUL 15 MIN: ICD-10-PCS | Mod: S$GLB,,, | Performed by: ACUPUNCTURIST

## 2020-12-16 PROCEDURE — 97813 ACUP 1/> W/ESTIM 1ST 15 MIN: CPT | Mod: S$GLB,,, | Performed by: ACUPUNCTURIST

## 2020-12-16 RX ORDER — ZOLPIDEM TARTRATE 10 MG/1
10 TABLET ORAL NIGHTLY PRN
Qty: 30 TABLET | Refills: 2 | Status: SHIPPED | OUTPATIENT
Start: 2020-12-16 | End: 2021-03-14 | Stop reason: SDUPTHER

## 2020-12-16 NOTE — PROGRESS NOTES
Subjective:       Patient ID: Phylicia Vásquez is a 50 y.o. female.    Chief Complaint: Cancer (hot flashes)    Patient is doing great with treatments - hot flashes reduced significantly. She is having joint aches, especially in her L shoulder (she also admits to sleeping on her L side). Neuropathy doing better, not as burning and only has pain at night.     Review of Systems   Genitourinary: Positive for hot flashes.   Musculoskeletal: Positive for arthralgias.   Neurological: Positive for numbness.         Objective:          Assessment:       1. Chronic low back pain without sciatica, unspecified back pain laterality    2. Chemotherapy-induced peripheral neuropathy    3. Menopause    4. Carcinoma of axillary tail of right breast in female, estrogen receptor positive        Plan:       1x a week for 3 to 4 more weeks then eval*         Pre-Symptom Score: NA  Post-Symptom Score: NA     Cancer (hot flashes)       Encounter Diagnoses   Name Primary?    Chronic low back pain without sciatica, unspecified back pain laterality Yes    Chemotherapy-induced peripheral neuropathy     Menopause     Carcinoma of axillary tail of right breast in female, estrogen receptor positive        TCM Diagnosis: Qi and Blood Stagnation w/ Toxic Heat    Physical Exam   Constitutional:            Acupuncture points used:  Li11, St36, Sp6, Du20, Gb34, REN4, REN6, ER JAUNITO, FREITAS MEN and 4 REYES    ADD STIM (Lr3/Sp6)      NEEDLES IN: 26  NEEDLES OUT: 26    NEEDLES AND STIM STARTED: 10:15 AM  NEEDLES AND STIM REMOVED: 10:45 AM

## 2020-12-21 ENCOUNTER — CLINICAL SUPPORT (OUTPATIENT)
Dept: HEMATOLOGY/ONCOLOGY | Facility: CLINIC | Age: 50
End: 2020-12-21
Payer: COMMERCIAL

## 2020-12-21 DIAGNOSIS — Z78.0 MENOPAUSE: ICD-10-CM

## 2020-12-21 DIAGNOSIS — T45.1X5A CHEMOTHERAPY-INDUCED PERIPHERAL NEUROPATHY: ICD-10-CM

## 2020-12-21 DIAGNOSIS — G89.29 CHRONIC LOW BACK PAIN WITHOUT SCIATICA, UNSPECIFIED BACK PAIN LATERALITY: Primary | ICD-10-CM

## 2020-12-21 DIAGNOSIS — G62.0 CHEMOTHERAPY-INDUCED PERIPHERAL NEUROPATHY: ICD-10-CM

## 2020-12-21 DIAGNOSIS — M54.50 CHRONIC LOW BACK PAIN WITHOUT SCIATICA, UNSPECIFIED BACK PAIN LATERALITY: Primary | ICD-10-CM

## 2020-12-21 PROCEDURE — 97813 PR ACUPUNCT W/ ELEC STIMUL 15 MIN: ICD-10-PCS | Mod: S$GLB,,, | Performed by: ACUPUNCTURIST

## 2020-12-21 PROCEDURE — 97813 ACUP 1/> W/ESTIM 1ST 15 MIN: CPT | Mod: S$GLB,,, | Performed by: ACUPUNCTURIST

## 2020-12-21 PROCEDURE — 97814 PR ACUPUNCT W/ ELEC STIMUL ADDL 15M: ICD-10-PCS | Mod: S$GLB,,, | Performed by: ACUPUNCTURIST

## 2020-12-21 PROCEDURE — 97814 ACUP 1/> W/ESTIM EA ADDL 15: CPT | Mod: S$GLB,,, | Performed by: ACUPUNCTURIST

## 2020-12-21 NOTE — PROGRESS NOTES
Subjective:       Patient ID: Phylicia Vásquez is a 50 y.o. female.    Chief Complaint: Pain (cLBP, CIPN) and Cancer (hot flashes)    Hot flashes better, neuropathy better. R sided hip pain / low back pain    Review of Systems   Genitourinary: Positive for hot flashes.   Musculoskeletal: Positive for arthralgias and back pain.         Objective:          Assessment:       1. Chronic low back pain without sciatica, unspecified back pain laterality    2. Chemotherapy-induced peripheral neuropathy    3. Menopause        Plan:       1x a week*         Pre-Symptom Score: 8  Post-Symptom Score: 5     Pain (cLBP, CIPN) and Cancer (hot flashes)       Encounter Diagnoses   Name Primary?    Chronic low back pain without sciatica, unspecified back pain laterality Yes    Chemotherapy-induced peripheral neuropathy     Menopause        TCM Diagnosis: Qi and Blood Stagnation     Physical Exam   Constitutional:            Acupuncture points used:  Li11, St36, Sp6, Du20, Gb34, REN4, REN6, ER JUANITO, FREITAS MEN, YIN PERLA and 4 REYES    ADD STIM (Lr3/Sp6)      NEEDLES IN: 18  NEEDLES OUT: 18    NEEDLES AND STIM STARTED: 11:15 AM  NEEDLES AND STIM REMOVED: 11:45 AM

## 2020-12-22 ENCOUNTER — HOSPITAL ENCOUNTER (OUTPATIENT)
Dept: RADIOLOGY | Facility: HOSPITAL | Age: 50
Discharge: HOME | End: 2020-12-22
Attending: PHYSICIAN ASSISTANT
Payer: COMMERCIAL

## 2020-12-22 DIAGNOSIS — M54.9 DORSALGIA, UNSPECIFIED: ICD-10-CM

## 2020-12-22 DIAGNOSIS — Z85.3 HISTORY OF RIGHT BREAST CANCER: ICD-10-CM

## 2020-12-22 DIAGNOSIS — Z12.31 BREAST CANCER SCREENING BY MAMMOGRAM: ICD-10-CM

## 2020-12-22 PROCEDURE — 77063 BREAST TOMOSYNTHESIS BI: CPT | Mod: 26,,, | Performed by: RADIOLOGY

## 2020-12-22 PROCEDURE — 77063 MAMMO DIGITAL SCREENING LEFT WITH TOMO: ICD-10-PCS | Mod: 26,,, | Performed by: RADIOLOGY

## 2020-12-22 PROCEDURE — 77067 SCR MAMMO BI INCL CAD: CPT | Mod: 26,,, | Performed by: RADIOLOGY

## 2020-12-22 PROCEDURE — 72100 X-RAY EXAM L-S SPINE 2/3 VWS: CPT | Mod: 26,,, | Performed by: RADIOLOGY

## 2020-12-22 PROCEDURE — 77067 MAMMO DIGITAL SCREENING LEFT WITH TOMO: ICD-10-PCS | Mod: 26,,, | Performed by: RADIOLOGY

## 2020-12-22 PROCEDURE — 77067 SCR MAMMO BI INCL CAD: CPT | Mod: TC

## 2020-12-22 PROCEDURE — 72100 X-RAY EXAM L-S SPINE 2/3 VWS: CPT | Mod: TC

## 2020-12-22 PROCEDURE — 72100 XR LUMBAR SPINE AP AND LATERAL: ICD-10-PCS | Mod: 26,,, | Performed by: RADIOLOGY

## 2020-12-23 ENCOUNTER — PATIENT MESSAGE (OUTPATIENT)
Dept: SURGERY | Facility: CLINIC | Age: 50
End: 2020-12-23

## 2020-12-23 ENCOUNTER — PATIENT MESSAGE (OUTPATIENT)
Dept: INTERNAL MEDICINE | Facility: CLINIC | Age: 50
End: 2020-12-23

## 2020-12-23 ENCOUNTER — PATIENT MESSAGE (OUTPATIENT)
Dept: HEMATOLOGY/ONCOLOGY | Facility: CLINIC | Age: 50
End: 2020-12-23

## 2020-12-30 LAB
FINAL PATHOLOGIC DIAGNOSIS: NORMAL
Lab: NORMAL

## 2021-01-04 ENCOUNTER — PATIENT MESSAGE (OUTPATIENT)
Dept: ADMINISTRATIVE | Facility: HOSPITAL | Age: 51
End: 2021-01-04

## 2021-01-13 ENCOUNTER — OFFICE VISIT (OUTPATIENT)
Dept: OBSTETRICS AND GYNECOLOGY | Facility: CLINIC | Age: 51
End: 2021-01-13
Payer: COMMERCIAL

## 2021-01-13 VITALS
BODY MASS INDEX: 41.41 KG/M2 | DIASTOLIC BLOOD PRESSURE: 70 MMHG | WEIGHT: 233.69 LBS | HEIGHT: 63 IN | SYSTOLIC BLOOD PRESSURE: 140 MMHG

## 2021-01-13 DIAGNOSIS — R30.0 DYSURIA: Primary | ICD-10-CM

## 2021-01-13 PROCEDURE — 3078F PR MOST RECENT DIASTOLIC BLOOD PRESSURE < 80 MM HG: ICD-10-PCS | Mod: CPTII,S$GLB,, | Performed by: PHYSICIAN ASSISTANT

## 2021-01-13 PROCEDURE — 1126F PR PAIN SEVERITY QUANTIFIED, NO PAIN PRESENT: ICD-10-PCS | Mod: S$GLB,,, | Performed by: PHYSICIAN ASSISTANT

## 2021-01-13 PROCEDURE — 99999 PR PBB SHADOW E&M-EST. PATIENT-LVL IV: CPT | Mod: PBBFAC,,, | Performed by: PHYSICIAN ASSISTANT

## 2021-01-13 PROCEDURE — 3008F PR BODY MASS INDEX (BMI) DOCUMENTED: ICD-10-PCS | Mod: CPTII,S$GLB,, | Performed by: PHYSICIAN ASSISTANT

## 2021-01-13 PROCEDURE — 3078F DIAST BP <80 MM HG: CPT | Mod: CPTII,S$GLB,, | Performed by: PHYSICIAN ASSISTANT

## 2021-01-13 PROCEDURE — 3008F BODY MASS INDEX DOCD: CPT | Mod: CPTII,S$GLB,, | Performed by: PHYSICIAN ASSISTANT

## 2021-01-13 PROCEDURE — 99999 PR PBB SHADOW E&M-EST. PATIENT-LVL IV: ICD-10-PCS | Mod: PBBFAC,,, | Performed by: PHYSICIAN ASSISTANT

## 2021-01-13 PROCEDURE — 3077F SYST BP >= 140 MM HG: CPT | Mod: CPTII,S$GLB,, | Performed by: PHYSICIAN ASSISTANT

## 2021-01-13 PROCEDURE — 1126F AMNT PAIN NOTED NONE PRSNT: CPT | Mod: S$GLB,,, | Performed by: PHYSICIAN ASSISTANT

## 2021-01-13 PROCEDURE — 87086 URINE CULTURE/COLONY COUNT: CPT

## 2021-01-13 PROCEDURE — 99213 OFFICE O/P EST LOW 20 MIN: CPT | Mod: S$GLB,,, | Performed by: PHYSICIAN ASSISTANT

## 2021-01-13 PROCEDURE — 3077F PR MOST RECENT SYSTOLIC BLOOD PRESSURE >= 140 MM HG: ICD-10-PCS | Mod: CPTII,S$GLB,, | Performed by: PHYSICIAN ASSISTANT

## 2021-01-13 PROCEDURE — 99213 PR OFFICE/OUTPT VISIT, EST, LEVL III, 20-29 MIN: ICD-10-PCS | Mod: S$GLB,,, | Performed by: PHYSICIAN ASSISTANT

## 2021-01-13 RX ORDER — NITROFURANTOIN 25; 75 MG/1; MG/1
100 CAPSULE ORAL 2 TIMES DAILY
Qty: 14 CAPSULE | Refills: 0 | Status: SHIPPED | OUTPATIENT
Start: 2021-01-13 | End: 2021-01-21

## 2021-01-13 RX ORDER — PHENAZOPYRIDINE HYDROCHLORIDE 200 MG/1
200 TABLET, FILM COATED ORAL 3 TIMES DAILY PRN
Qty: 20 TABLET | Refills: 0 | Status: SHIPPED | OUTPATIENT
Start: 2021-01-13 | End: 2021-03-04

## 2021-01-15 LAB
BACTERIA UR CULT: NORMAL
BACTERIA UR CULT: NORMAL

## 2021-01-16 ENCOUNTER — HOSPITAL ENCOUNTER (EMERGENCY)
Facility: HOSPITAL | Age: 51
Discharge: HOME | End: 2021-01-16
Attending: EMERGENCY MEDICINE
Payer: COMMERCIAL

## 2021-01-16 ENCOUNTER — NURSE TRIAGE (OUTPATIENT)
Dept: ADMINISTRATIVE | Facility: CLINIC | Age: 51
End: 2021-01-16

## 2021-01-16 VITALS
WEIGHT: 225 LBS | RESPIRATION RATE: 16 BRPM | BODY MASS INDEX: 39.87 KG/M2 | TEMPERATURE: 99 F | SYSTOLIC BLOOD PRESSURE: 135 MMHG | HEIGHT: 63 IN | DIASTOLIC BLOOD PRESSURE: 95 MMHG | HEART RATE: 60 BPM | OXYGEN SATURATION: 98 %

## 2021-01-16 DIAGNOSIS — R10.84 GENERALIZED ABDOMINAL PAIN: Primary | ICD-10-CM

## 2021-01-16 LAB
ALBUMIN SERPL BCP-MCNC: 4 G/DL (ref 3.5–5.2)
ALP SERPL-CCNC: 113 U/L (ref 55–135)
ALT SERPL W/O P-5'-P-CCNC: 16 U/L (ref 10–44)
ANION GAP SERPL CALC-SCNC: 9 MMOL/L (ref 8–16)
AST SERPL-CCNC: 16 U/L (ref 10–40)
BASOPHILS # BLD AUTO: 0.04 K/UL (ref 0–0.2)
BASOPHILS NFR BLD: 0.6 % (ref 0–1.9)
BILIRUB SERPL-MCNC: 0.3 MG/DL (ref 0.1–1)
BILIRUB UR QL STRIP: NEGATIVE
BUN SERPL-MCNC: 9 MG/DL (ref 6–20)
BUN SERPL-MCNC: 9 MG/DL (ref 6–30)
CALCIUM SERPL-MCNC: 9.4 MG/DL (ref 8.7–10.5)
CHLORIDE SERPL-SCNC: 102 MMOL/L (ref 95–110)
CHLORIDE SERPL-SCNC: 103 MMOL/L (ref 95–110)
CLARITY UR REFRACT.AUTO: ABNORMAL
CO2 SERPL-SCNC: 28 MMOL/L (ref 23–29)
COLOR UR AUTO: ABNORMAL
CREAT SERPL-MCNC: 0.6 MG/DL (ref 0.5–1.4)
CREAT SERPL-MCNC: 0.8 MG/DL (ref 0.5–1.4)
DIFFERENTIAL METHOD: ABNORMAL
EOSINOPHIL # BLD AUTO: 0.2 K/UL (ref 0–0.5)
EOSINOPHIL NFR BLD: 3 % (ref 0–8)
ERYTHROCYTE [DISTWIDTH] IN BLOOD BY AUTOMATED COUNT: 15.3 % (ref 11.5–14.5)
EST. GFR  (AFRICAN AMERICAN): >60 ML/MIN/1.73 M^2
EST. GFR  (NON AFRICAN AMERICAN): >60 ML/MIN/1.73 M^2
GLUCOSE SERPL-MCNC: 101 MG/DL (ref 70–110)
GLUCOSE SERPL-MCNC: 107 MG/DL (ref 70–110)
GLUCOSE UR QL STRIP: NEGATIVE
HCT VFR BLD AUTO: 37.8 % (ref 37–48.5)
HCT VFR BLD CALC: 37 %PCV (ref 36–54)
HGB BLD-MCNC: 11.3 G/DL (ref 12–16)
HGB UR QL STRIP: NEGATIVE
IMM GRANULOCYTES # BLD AUTO: 0.02 K/UL (ref 0–0.04)
IMM GRANULOCYTES NFR BLD AUTO: 0.3 % (ref 0–0.5)
KETONES UR QL STRIP: NEGATIVE
LEUKOCYTE ESTERASE UR QL STRIP: NEGATIVE
LIPASE SERPL-CCNC: 49 U/L (ref 4–60)
LYMPHOCYTES # BLD AUTO: 1.4 K/UL (ref 1–4.8)
LYMPHOCYTES NFR BLD: 21 % (ref 18–48)
MCH RBC QN AUTO: 25.3 PG (ref 27–31)
MCHC RBC AUTO-ENTMCNC: 29.9 G/DL (ref 32–36)
MCV RBC AUTO: 85 FL (ref 82–98)
MONOCYTES # BLD AUTO: 0.4 K/UL (ref 0.3–1)
MONOCYTES NFR BLD: 5.4 % (ref 4–15)
NEUTROPHILS # BLD AUTO: 4.7 K/UL (ref 1.8–7.7)
NEUTROPHILS NFR BLD: 69.7 % (ref 38–73)
NITRITE UR QL STRIP: NEGATIVE
NRBC BLD-RTO: 0 /100 WBC
PH UR STRIP: 5 [PH] (ref 5–8)
PLATELET # BLD AUTO: 184 K/UL (ref 150–350)
PMV BLD AUTO: 12.1 FL (ref 9.2–12.9)
POC IONIZED CALCIUM: 1.25 MMOL/L (ref 1.06–1.42)
POC TCO2 (MEASURED): 30 MMOL/L (ref 23–29)
POTASSIUM BLD-SCNC: 3.9 MMOL/L (ref 3.5–5.1)
POTASSIUM SERPL-SCNC: 4 MMOL/L (ref 3.5–5.1)
PROT SERPL-MCNC: 8 G/DL (ref 6–8.4)
PROT UR QL STRIP: NEGATIVE
RBC # BLD AUTO: 4.47 M/UL (ref 4–5.4)
SAMPLE: ABNORMAL
SODIUM BLD-SCNC: 140 MMOL/L (ref 136–145)
SODIUM SERPL-SCNC: 140 MMOL/L (ref 136–145)
SP GR UR STRIP: 1.02 (ref 1–1.03)
URN SPEC COLLECT METH UR: ABNORMAL
WBC # BLD AUTO: 6.67 K/UL (ref 3.9–12.7)

## 2021-01-16 PROCEDURE — 83690 ASSAY OF LIPASE: CPT

## 2021-01-16 PROCEDURE — 63600175 PHARM REV CODE 636 W HCPCS: Performed by: PHYSICIAN ASSISTANT

## 2021-01-16 PROCEDURE — 99285 EMERGENCY DEPT VISIT HI MDM: CPT | Mod: 25

## 2021-01-16 PROCEDURE — 85025 COMPLETE CBC W/AUTO DIFF WBC: CPT

## 2021-01-16 PROCEDURE — 99284 PR EMERGENCY DEPT VISIT,LEVEL IV: ICD-10-PCS | Mod: ,,, | Performed by: PHYSICIAN ASSISTANT

## 2021-01-16 PROCEDURE — 99284 EMERGENCY DEPT VISIT MOD MDM: CPT | Mod: ,,, | Performed by: PHYSICIAN ASSISTANT

## 2021-01-16 PROCEDURE — 80053 COMPREHEN METABOLIC PANEL: CPT

## 2021-01-16 PROCEDURE — 25000003 PHARM REV CODE 250: Performed by: PHYSICIAN ASSISTANT

## 2021-01-16 PROCEDURE — 80047 BASIC METABLC PNL IONIZED CA: CPT | Mod: 91

## 2021-01-16 PROCEDURE — 25500020 PHARM REV CODE 255: Performed by: EMERGENCY MEDICINE

## 2021-01-16 PROCEDURE — 81003 URINALYSIS AUTO W/O SCOPE: CPT

## 2021-01-16 PROCEDURE — 96374 THER/PROPH/DIAG INJ IV PUSH: CPT | Mod: 59

## 2021-01-16 RX ORDER — DICYCLOMINE HYDROCHLORIDE 10 MG/1
20 CAPSULE ORAL
Status: COMPLETED | OUTPATIENT
Start: 2021-01-16 | End: 2021-01-16

## 2021-01-16 RX ORDER — KETOROLAC TROMETHAMINE 30 MG/ML
10 INJECTION, SOLUTION INTRAMUSCULAR; INTRAVENOUS
Status: COMPLETED | OUTPATIENT
Start: 2021-01-16 | End: 2021-01-16

## 2021-01-16 RX ORDER — DICYCLOMINE HYDROCHLORIDE 20 MG/1
20 TABLET ORAL 2 TIMES DAILY
Qty: 14 TABLET | Refills: 0 | Status: SHIPPED | OUTPATIENT
Start: 2021-01-16 | End: 2021-01-23

## 2021-01-16 RX ADMIN — DICYCLOMINE HYDROCHLORIDE 20 MG: 10 CAPSULE ORAL at 03:01

## 2021-01-16 RX ADMIN — IOHEXOL 100 ML: 350 INJECTION, SOLUTION INTRAVENOUS at 04:01

## 2021-01-16 RX ADMIN — KETOROLAC TROMETHAMINE 10 MG: 30 INJECTION, SOLUTION INTRAMUSCULAR at 06:01

## 2021-01-17 ENCOUNTER — TELEPHONE (OUTPATIENT)
Dept: INTERNAL MEDICINE | Facility: CLINIC | Age: 51
End: 2021-01-17

## 2021-01-17 DIAGNOSIS — R91.1 SOLITARY PULMONARY NODULE: ICD-10-CM

## 2021-01-17 DIAGNOSIS — R93.5 ABNORMAL CT OF THE ABDOMEN: ICD-10-CM

## 2021-01-17 DIAGNOSIS — R91.8 ABNORMAL CT SCAN OF LUNG: Primary | ICD-10-CM

## 2021-01-17 DIAGNOSIS — J84.9 INTERSTITIAL PULMONARY DISEASE, UNSPECIFIED: ICD-10-CM

## 2021-01-17 PROBLEM — K42.9 PERIUMBILICAL HERNIA: Status: ACTIVE | Noted: 2021-01-17

## 2021-01-26 ENCOUNTER — LAB VISIT (OUTPATIENT)
Dept: LAB | Facility: HOSPITAL | Age: 51
End: 2021-01-26
Payer: COMMERCIAL

## 2021-01-26 DIAGNOSIS — N95.1 MENOPAUSAL SYMPTOMS: ICD-10-CM

## 2021-01-26 PROCEDURE — 84402 ASSAY OF FREE TESTOSTERONE: CPT

## 2021-01-26 PROCEDURE — 36415 COLL VENOUS BLD VENIPUNCTURE: CPT

## 2021-01-28 LAB — TESTOST FREE SERPL-MCNC: 0.9 PG/ML

## 2021-02-01 ENCOUNTER — PATIENT MESSAGE (OUTPATIENT)
Dept: HEMATOLOGY/ONCOLOGY | Facility: CLINIC | Age: 51
End: 2021-02-01

## 2021-02-01 ENCOUNTER — PATIENT MESSAGE (OUTPATIENT)
Dept: INTERNAL MEDICINE | Facility: CLINIC | Age: 51
End: 2021-02-01

## 2021-02-02 ENCOUNTER — HOSPITAL ENCOUNTER (OUTPATIENT)
Dept: RADIOLOGY | Facility: HOSPITAL | Age: 51
Discharge: HOME | End: 2021-02-02
Attending: INTERNAL MEDICINE
Payer: COMMERCIAL

## 2021-02-02 DIAGNOSIS — R91.1 SOLITARY PULMONARY NODULE: ICD-10-CM

## 2021-02-02 PROCEDURE — 71250 CT CHEST WITHOUT CONTRAST: ICD-10-PCS | Mod: 26,,, | Performed by: RADIOLOGY

## 2021-02-02 PROCEDURE — 71250 CT THORAX DX C-: CPT | Mod: 26,,, | Performed by: RADIOLOGY

## 2021-02-02 PROCEDURE — 71250 CT THORAX DX C-: CPT | Mod: TC

## 2021-02-03 ENCOUNTER — PATIENT MESSAGE (OUTPATIENT)
Dept: INTERNAL MEDICINE | Facility: CLINIC | Age: 51
End: 2021-02-03

## 2021-02-03 ENCOUNTER — PATIENT OUTREACH (OUTPATIENT)
Dept: ADMINISTRATIVE | Facility: OTHER | Age: 51
End: 2021-02-03

## 2021-02-03 ENCOUNTER — PATIENT MESSAGE (OUTPATIENT)
Dept: ADMINISTRATIVE | Facility: HOSPITAL | Age: 51
End: 2021-02-03

## 2021-02-03 ENCOUNTER — PATIENT OUTREACH (OUTPATIENT)
Dept: ADMINISTRATIVE | Facility: HOSPITAL | Age: 51
End: 2021-02-03

## 2021-02-04 ENCOUNTER — OFFICE VISIT (OUTPATIENT)
Dept: OBSTETRICS AND GYNECOLOGY | Facility: CLINIC | Age: 51
End: 2021-02-04
Attending: OBSTETRICS & GYNECOLOGY
Payer: COMMERCIAL

## 2021-02-04 VITALS
WEIGHT: 231.69 LBS | HEIGHT: 63 IN | SYSTOLIC BLOOD PRESSURE: 124 MMHG | DIASTOLIC BLOOD PRESSURE: 78 MMHG | BODY MASS INDEX: 41.05 KG/M2

## 2021-02-04 DIAGNOSIS — Z79.811 USE OF AROMATASE INHIBITORS: ICD-10-CM

## 2021-02-04 DIAGNOSIS — N95.1 MENOPAUSAL SYMPTOM: ICD-10-CM

## 2021-02-04 DIAGNOSIS — C50.919 MALIGNANT NEOPLASM OF BREAST IN FEMALE, ESTROGEN RECEPTOR POSITIVE, UNSPECIFIED LATERALITY, UNSPECIFIED SITE OF BREAST: Primary | ICD-10-CM

## 2021-02-04 DIAGNOSIS — Z17.0 MALIGNANT NEOPLASM OF BREAST IN FEMALE, ESTROGEN RECEPTOR POSITIVE, UNSPECIFIED LATERALITY, UNSPECIFIED SITE OF BREAST: Primary | ICD-10-CM

## 2021-02-04 PROCEDURE — 3078F DIAST BP <80 MM HG: CPT | Mod: CPTII,S$GLB,, | Performed by: OBSTETRICS & GYNECOLOGY

## 2021-02-04 PROCEDURE — 99214 PR OFFICE/OUTPT VISIT, EST, LEVL IV, 30-39 MIN: ICD-10-PCS | Mod: S$GLB,,, | Performed by: OBSTETRICS & GYNECOLOGY

## 2021-02-04 PROCEDURE — 3078F PR MOST RECENT DIASTOLIC BLOOD PRESSURE < 80 MM HG: ICD-10-PCS | Mod: CPTII,S$GLB,, | Performed by: OBSTETRICS & GYNECOLOGY

## 2021-02-04 PROCEDURE — 3074F PR MOST RECENT SYSTOLIC BLOOD PRESSURE < 130 MM HG: ICD-10-PCS | Mod: CPTII,S$GLB,, | Performed by: OBSTETRICS & GYNECOLOGY

## 2021-02-04 PROCEDURE — 1126F PR PAIN SEVERITY QUANTIFIED, NO PAIN PRESENT: ICD-10-PCS | Mod: S$GLB,,, | Performed by: OBSTETRICS & GYNECOLOGY

## 2021-02-04 PROCEDURE — 3008F PR BODY MASS INDEX (BMI) DOCUMENTED: ICD-10-PCS | Mod: CPTII,S$GLB,, | Performed by: OBSTETRICS & GYNECOLOGY

## 2021-02-04 PROCEDURE — 3074F SYST BP LT 130 MM HG: CPT | Mod: CPTII,S$GLB,, | Performed by: OBSTETRICS & GYNECOLOGY

## 2021-02-04 PROCEDURE — 1126F AMNT PAIN NOTED NONE PRSNT: CPT | Mod: S$GLB,,, | Performed by: OBSTETRICS & GYNECOLOGY

## 2021-02-04 PROCEDURE — 3008F BODY MASS INDEX DOCD: CPT | Mod: CPTII,S$GLB,, | Performed by: OBSTETRICS & GYNECOLOGY

## 2021-02-04 PROCEDURE — 99214 OFFICE O/P EST MOD 30 MIN: CPT | Mod: S$GLB,,, | Performed by: OBSTETRICS & GYNECOLOGY

## 2021-02-04 RX ORDER — PRASTERONE 6.5 MG/1
6.5 INSERT VAGINAL NIGHTLY
Qty: 28 EACH | Refills: 11 | Status: SHIPPED | OUTPATIENT
Start: 2021-02-04 | End: 2021-02-17

## 2021-02-08 DIAGNOSIS — N95.2 VAGINAL ATROPHY: ICD-10-CM

## 2021-02-08 DIAGNOSIS — Z79.811 LONG TERM CURRENT USE OF AROMATASE INHIBITOR: ICD-10-CM

## 2021-02-08 DIAGNOSIS — Z17.0 CARCINOMA OF AXILLARY TAIL OF RIGHT BREAST IN FEMALE, ESTROGEN RECEPTOR POSITIVE: Primary | ICD-10-CM

## 2021-02-08 DIAGNOSIS — C50.611 CARCINOMA OF AXILLARY TAIL OF RIGHT BREAST IN FEMALE, ESTROGEN RECEPTOR POSITIVE: Primary | ICD-10-CM

## 2021-02-10 ENCOUNTER — PATIENT MESSAGE (OUTPATIENT)
Dept: INTERNAL MEDICINE | Facility: CLINIC | Age: 51
End: 2021-02-10

## 2021-02-11 ENCOUNTER — OFFICE VISIT (OUTPATIENT)
Dept: INTERNAL MEDICINE | Facility: CLINIC | Age: 51
End: 2021-02-11
Payer: COMMERCIAL

## 2021-02-11 ENCOUNTER — HOSPITAL ENCOUNTER (OUTPATIENT)
Dept: RADIOLOGY | Facility: HOSPITAL | Age: 51
Discharge: HOME | End: 2021-02-11
Attending: INTERNAL MEDICINE
Payer: COMMERCIAL

## 2021-02-11 ENCOUNTER — TELEPHONE (OUTPATIENT)
Dept: HEMATOLOGY/ONCOLOGY | Facility: CLINIC | Age: 51
End: 2021-02-11

## 2021-02-11 VITALS
WEIGHT: 235.25 LBS | SYSTOLIC BLOOD PRESSURE: 130 MMHG | DIASTOLIC BLOOD PRESSURE: 85 MMHG | BODY MASS INDEX: 41.68 KG/M2 | HEIGHT: 63 IN | HEART RATE: 74 BPM

## 2021-02-11 DIAGNOSIS — R07.81 RIB PAIN: Primary | ICD-10-CM

## 2021-02-11 DIAGNOSIS — R07.81 RIB PAIN: ICD-10-CM

## 2021-02-11 PROCEDURE — 3079F PR MOST RECENT DIASTOLIC BLOOD PRESSURE 80-89 MM HG: ICD-10-PCS | Mod: CPTII,S$GLB,, | Performed by: INTERNAL MEDICINE

## 2021-02-11 PROCEDURE — 99999 PR PBB SHADOW E&M-EST. PATIENT-LVL III: CPT | Mod: PBBFAC,,, | Performed by: INTERNAL MEDICINE

## 2021-02-11 PROCEDURE — 3075F PR MOST RECENT SYSTOLIC BLOOD PRESS GE 130-139MM HG: ICD-10-PCS | Mod: CPTII,S$GLB,, | Performed by: INTERNAL MEDICINE

## 2021-02-11 PROCEDURE — 3075F SYST BP GE 130 - 139MM HG: CPT | Mod: CPTII,S$GLB,, | Performed by: INTERNAL MEDICINE

## 2021-02-11 PROCEDURE — 71110 XR RIBS 3 VIEWS BILATERAL: ICD-10-PCS | Mod: 26,,, | Performed by: RADIOLOGY

## 2021-02-11 PROCEDURE — 3079F DIAST BP 80-89 MM HG: CPT | Mod: CPTII,S$GLB,, | Performed by: INTERNAL MEDICINE

## 2021-02-11 PROCEDURE — 99999 PR PBB SHADOW E&M-EST. PATIENT-LVL III: ICD-10-PCS | Mod: PBBFAC,,, | Performed by: INTERNAL MEDICINE

## 2021-02-11 PROCEDURE — 99213 PR OFFICE/OUTPT VISIT, EST, LEVL III, 20-29 MIN: ICD-10-PCS | Mod: S$GLB,,, | Performed by: INTERNAL MEDICINE

## 2021-02-11 PROCEDURE — 71110 X-RAY EXAM RIBS BIL 3 VIEWS: CPT | Mod: 26,,, | Performed by: RADIOLOGY

## 2021-02-11 PROCEDURE — 71110 X-RAY EXAM RIBS BIL 3 VIEWS: CPT | Mod: TC

## 2021-02-11 PROCEDURE — 99213 OFFICE O/P EST LOW 20 MIN: CPT | Mod: S$GLB,,, | Performed by: INTERNAL MEDICINE

## 2021-02-11 RX ORDER — MELOXICAM 15 MG/1
15 TABLET ORAL DAILY
Qty: 5 TABLET | Refills: 0 | Status: SHIPPED | OUTPATIENT
Start: 2021-02-11 | End: 2021-03-04

## 2021-02-13 NOTE — TELEPHONE ENCOUNTER
Dear Nurse,  Please call patient.  For her back pain,  I recommend she try a cocktail at bedtime of one  5 mg Flexeril tablet , one  500 mg Tylenol and  One  300 mg gabapentin capsule.  I sent refills for her 300 mg gabapentin in the 5 mg cyclobenzaprine tablet to the [facility] pharmacy.  During the day, avoid prolonged sitting and try walking either in the house or outside of find day.  Sincerely, Dr. Ella Toth     Vancomycin IV Pharmacy-to-Dose Consultation    Raúl Walker is a 79 y o  female who was receiving Vancomycin IV with management by the Pharmacy Consult service  The patient's Vancomycin therapy has been completed / discontinued  Thank you for allowing us to take part in this patient's care  Pharmacy will sign-off now; please call or re-consult if there are any questions          Jed Johnson, PharmD  Pharmacist

## 2021-02-17 RX ORDER — ESTRADIOL 10 UG/1
INSERT VAGINAL
Qty: 18 EACH | Refills: 11 | Status: SHIPPED | OUTPATIENT
Start: 2021-02-17 | End: 2021-03-18 | Stop reason: SDUPTHER

## 2021-02-18 ENCOUNTER — HOSPITAL ENCOUNTER (OUTPATIENT)
Dept: RADIOLOGY | Facility: HOSPITAL | Age: 51
Discharge: HOME | End: 2021-02-18
Attending: INTERNAL MEDICINE
Payer: COMMERCIAL

## 2021-02-18 DIAGNOSIS — R07.81 RIB PAIN: ICD-10-CM

## 2021-02-18 PROCEDURE — 78306 BONE IMAGING WHOLE BODY: CPT | Mod: 26,,, | Performed by: RADIOLOGY

## 2021-02-18 PROCEDURE — 78306 NM BONE SCAN WHOLE BODY: ICD-10-PCS | Mod: 26,,, | Performed by: RADIOLOGY

## 2021-02-18 PROCEDURE — A9503 TC99M MEDRONATE: HCPCS

## 2021-02-19 ENCOUNTER — TELEPHONE (OUTPATIENT)
Dept: HEMATOLOGY/ONCOLOGY | Facility: CLINIC | Age: 51
End: 2021-02-19

## 2021-02-19 ENCOUNTER — PATIENT MESSAGE (OUTPATIENT)
Dept: SURGERY | Facility: CLINIC | Age: 51
End: 2021-02-19

## 2021-02-19 ENCOUNTER — TELEPHONE (OUTPATIENT)
Dept: SURGERY | Facility: CLINIC | Age: 51
End: 2021-02-19

## 2021-02-22 ENCOUNTER — TELEPHONE (OUTPATIENT)
Dept: SURGERY | Facility: CLINIC | Age: 51
End: 2021-02-22

## 2021-02-22 ENCOUNTER — PATIENT MESSAGE (OUTPATIENT)
Dept: SURGERY | Facility: CLINIC | Age: 51
End: 2021-02-22

## 2021-02-22 DIAGNOSIS — Z01.818 PREOP TESTING: Primary | ICD-10-CM

## 2021-02-25 ENCOUNTER — OFFICE VISIT (OUTPATIENT)
Dept: HEMATOLOGY/ONCOLOGY | Facility: CLINIC | Age: 51
End: 2021-02-25
Payer: COMMERCIAL

## 2021-02-25 VITALS
SYSTOLIC BLOOD PRESSURE: 142 MMHG | OXYGEN SATURATION: 97 % | WEIGHT: 235.88 LBS | DIASTOLIC BLOOD PRESSURE: 79 MMHG | BODY MASS INDEX: 41.79 KG/M2 | HEIGHT: 63 IN | HEART RATE: 67 BPM | RESPIRATION RATE: 16 BRPM | TEMPERATURE: 99 F

## 2021-02-25 DIAGNOSIS — Z17.0 CARCINOMA OF AXILLARY TAIL OF RIGHT BREAST IN FEMALE, ESTROGEN RECEPTOR POSITIVE: Primary | ICD-10-CM

## 2021-02-25 DIAGNOSIS — C50.611 CARCINOMA OF AXILLARY TAIL OF RIGHT BREAST IN FEMALE, ESTROGEN RECEPTOR POSITIVE: Primary | ICD-10-CM

## 2021-02-25 DIAGNOSIS — Z79.811 PROPHYLACTIC USE OF ANASTROZOLE (ARIMIDEX): ICD-10-CM

## 2021-02-25 PROCEDURE — 99213 OFFICE O/P EST LOW 20 MIN: CPT | Mod: S$GLB,,, | Performed by: INTERNAL MEDICINE

## 2021-02-25 PROCEDURE — 3008F PR BODY MASS INDEX (BMI) DOCUMENTED: ICD-10-PCS | Mod: CPTII,S$GLB,, | Performed by: INTERNAL MEDICINE

## 2021-02-25 PROCEDURE — 3008F BODY MASS INDEX DOCD: CPT | Mod: CPTII,S$GLB,, | Performed by: INTERNAL MEDICINE

## 2021-02-25 PROCEDURE — 3078F DIAST BP <80 MM HG: CPT | Mod: CPTII,S$GLB,, | Performed by: INTERNAL MEDICINE

## 2021-02-25 PROCEDURE — 1126F AMNT PAIN NOTED NONE PRSNT: CPT | Mod: S$GLB,,, | Performed by: INTERNAL MEDICINE

## 2021-02-25 PROCEDURE — 99999 PR PBB SHADOW E&M-EST. PATIENT-LVL V: CPT | Mod: PBBFAC,,, | Performed by: INTERNAL MEDICINE

## 2021-02-25 PROCEDURE — 3078F PR MOST RECENT DIASTOLIC BLOOD PRESSURE < 80 MM HG: ICD-10-PCS | Mod: CPTII,S$GLB,, | Performed by: INTERNAL MEDICINE

## 2021-02-25 PROCEDURE — 99213 PR OFFICE/OUTPT VISIT, EST, LEVL III, 20-29 MIN: ICD-10-PCS | Mod: S$GLB,,, | Performed by: INTERNAL MEDICINE

## 2021-02-25 PROCEDURE — 3077F PR MOST RECENT SYSTOLIC BLOOD PRESSURE >= 140 MM HG: ICD-10-PCS | Mod: CPTII,S$GLB,, | Performed by: INTERNAL MEDICINE

## 2021-02-25 PROCEDURE — 99999 PR PBB SHADOW E&M-EST. PATIENT-LVL V: ICD-10-PCS | Mod: PBBFAC,,, | Performed by: INTERNAL MEDICINE

## 2021-02-25 PROCEDURE — 3077F SYST BP >= 140 MM HG: CPT | Mod: CPTII,S$GLB,, | Performed by: INTERNAL MEDICINE

## 2021-02-25 PROCEDURE — 1126F PR PAIN SEVERITY QUANTIFIED, NO PAIN PRESENT: ICD-10-PCS | Mod: S$GLB,,, | Performed by: INTERNAL MEDICINE

## 2021-02-27 ENCOUNTER — HOSPITAL ENCOUNTER (EMERGENCY)
Facility: OTHER | Age: 51
Discharge: HOME | End: 2021-02-27
Attending: EMERGENCY MEDICINE
Payer: COMMERCIAL

## 2021-02-27 VITALS
WEIGHT: 235 LBS | SYSTOLIC BLOOD PRESSURE: 115 MMHG | HEART RATE: 63 BPM | BODY MASS INDEX: 41.64 KG/M2 | RESPIRATION RATE: 18 BRPM | OXYGEN SATURATION: 98 % | DIASTOLIC BLOOD PRESSURE: 69 MMHG | HEIGHT: 63 IN | TEMPERATURE: 98 F

## 2021-02-27 DIAGNOSIS — M54.32 SCIATICA OF LEFT SIDE: ICD-10-CM

## 2021-02-27 DIAGNOSIS — M54.16 LUMBAR RADICULOPATHY: Primary | ICD-10-CM

## 2021-02-27 PROCEDURE — 99284 EMERGENCY DEPT VISIT MOD MDM: CPT | Mod: 25

## 2021-02-27 PROCEDURE — 63600175 PHARM REV CODE 636 W HCPCS: Performed by: EMERGENCY MEDICINE

## 2021-02-27 PROCEDURE — 96372 THER/PROPH/DIAG INJ SC/IM: CPT

## 2021-02-27 RX ORDER — OXYCODONE AND ACETAMINOPHEN 5; 325 MG/1; MG/1
1 TABLET ORAL EVERY 6 HOURS PRN
Qty: 5 TABLET | Refills: 0 | Status: SHIPPED | OUTPATIENT
Start: 2021-02-27 | End: 2021-03-04 | Stop reason: SDUPTHER

## 2021-02-27 RX ORDER — IBUPROFEN 600 MG/1
600 TABLET ORAL EVERY 6 HOURS PRN
Qty: 9 TABLET | Refills: 0 | Status: SHIPPED | OUTPATIENT
Start: 2021-02-27 | End: 2021-03-01 | Stop reason: SDUPTHER

## 2021-02-27 RX ORDER — KETOROLAC TROMETHAMINE 30 MG/ML
30 INJECTION, SOLUTION INTRAMUSCULAR; INTRAVENOUS
Status: COMPLETED | OUTPATIENT
Start: 2021-02-27 | End: 2021-02-27

## 2021-02-27 RX ORDER — DEXAMETHASONE SODIUM PHOSPHATE 4 MG/ML
8 INJECTION, SOLUTION INTRA-ARTICULAR; INTRALESIONAL; INTRAMUSCULAR; INTRAVENOUS; SOFT TISSUE
Status: COMPLETED | OUTPATIENT
Start: 2021-02-27 | End: 2021-02-27

## 2021-02-27 RX ADMIN — DEXAMETHASONE SODIUM PHOSPHATE 8 MG: 4 INJECTION INTRA-ARTICULAR; INTRALESIONAL; INTRAMUSCULAR; INTRAVENOUS; SOFT TISSUE at 10:02

## 2021-02-27 RX ADMIN — KETOROLAC TROMETHAMINE 30 MG: 30 INJECTION, SOLUTION INTRAMUSCULAR; INTRAVENOUS at 10:02

## 2021-03-01 ENCOUNTER — PATIENT MESSAGE (OUTPATIENT)
Dept: INTERNAL MEDICINE | Facility: CLINIC | Age: 51
End: 2021-03-01

## 2021-03-01 ENCOUNTER — LAB VISIT (OUTPATIENT)
Dept: LAB | Facility: HOSPITAL | Age: 51
End: 2021-03-01
Attending: SURGERY
Payer: COMMERCIAL

## 2021-03-01 DIAGNOSIS — M54.9 BACK PAIN, UNSPECIFIED BACK LOCATION, UNSPECIFIED BACK PAIN LATERALITY, UNSPECIFIED CHRONICITY: Primary | ICD-10-CM

## 2021-03-01 DIAGNOSIS — Z01.818 PREOP TESTING: ICD-10-CM

## 2021-03-01 LAB
BASOPHILS # BLD AUTO: 0.04 K/UL (ref 0–0.2)
BASOPHILS NFR BLD: 0.5 % (ref 0–1.9)
DIFFERENTIAL METHOD: ABNORMAL
EOSINOPHIL # BLD AUTO: 0.1 K/UL (ref 0–0.5)
EOSINOPHIL NFR BLD: 0.6 % (ref 0–8)
ERYTHROCYTE [DISTWIDTH] IN BLOOD BY AUTOMATED COUNT: 15.5 % (ref 11.5–14.5)
HCT VFR BLD AUTO: 37.4 % (ref 37–48.5)
HGB BLD-MCNC: 11.5 G/DL (ref 12–16)
IMM GRANULOCYTES # BLD AUTO: 0.05 K/UL (ref 0–0.04)
IMM GRANULOCYTES NFR BLD AUTO: 0.6 % (ref 0–0.5)
LYMPHOCYTES # BLD AUTO: 2.1 K/UL (ref 1–4.8)
LYMPHOCYTES NFR BLD: 26.5 % (ref 18–48)
MCH RBC QN AUTO: 25.6 PG (ref 27–31)
MCHC RBC AUTO-ENTMCNC: 30.7 G/DL (ref 32–36)
MCV RBC AUTO: 83 FL (ref 82–98)
MONOCYTES # BLD AUTO: 0.6 K/UL (ref 0.3–1)
MONOCYTES NFR BLD: 7.4 % (ref 4–15)
NEUTROPHILS # BLD AUTO: 5 K/UL (ref 1.8–7.7)
NEUTROPHILS NFR BLD: 64.4 % (ref 38–73)
NRBC BLD-RTO: 0 /100 WBC
PLATELET # BLD AUTO: 214 K/UL (ref 150–350)
PMV BLD AUTO: 12.2 FL (ref 9.2–12.9)
RBC # BLD AUTO: 4.49 M/UL (ref 4–5.4)
WBC # BLD AUTO: 7.74 K/UL (ref 3.9–12.7)

## 2021-03-01 PROCEDURE — 80053 COMPREHEN METABOLIC PANEL: CPT

## 2021-03-01 PROCEDURE — 36415 COLL VENOUS BLD VENIPUNCTURE: CPT

## 2021-03-01 PROCEDURE — 85025 COMPLETE CBC W/AUTO DIFF WBC: CPT

## 2021-03-01 RX ORDER — IBUPROFEN 600 MG/1
600 TABLET ORAL EVERY 6 HOURS PRN
Qty: 30 TABLET | Refills: 0 | Status: SHIPPED | OUTPATIENT
Start: 2021-03-01 | End: 2021-04-16

## 2021-03-02 ENCOUNTER — TELEPHONE (OUTPATIENT)
Dept: INTERNAL MEDICINE | Facility: CLINIC | Age: 51
End: 2021-03-02

## 2021-03-02 ENCOUNTER — PATIENT MESSAGE (OUTPATIENT)
Dept: INTERNAL MEDICINE | Facility: CLINIC | Age: 51
End: 2021-03-02

## 2021-03-02 DIAGNOSIS — M54.30 ACUTE SCIATICA: Primary | ICD-10-CM

## 2021-03-02 DIAGNOSIS — C50.611 CARCINOMA OF AXILLARY TAIL OF RIGHT BREAST IN FEMALE, ESTROGEN RECEPTOR POSITIVE: ICD-10-CM

## 2021-03-02 DIAGNOSIS — Z17.0 CARCINOMA OF AXILLARY TAIL OF RIGHT BREAST IN FEMALE, ESTROGEN RECEPTOR POSITIVE: ICD-10-CM

## 2021-03-02 LAB
ALBUMIN SERPL BCP-MCNC: 3.8 G/DL (ref 3.5–5.2)
ALP SERPL-CCNC: 75 U/L (ref 55–135)
ALT SERPL W/O P-5'-P-CCNC: 16 U/L (ref 10–44)
ANION GAP SERPL CALC-SCNC: 12 MMOL/L (ref 8–16)
AST SERPL-CCNC: 13 U/L (ref 10–40)
BILIRUB SERPL-MCNC: 0.3 MG/DL (ref 0.1–1)
BUN SERPL-MCNC: 13 MG/DL (ref 6–20)
CALCIUM SERPL-MCNC: 10.1 MG/DL (ref 8.7–10.5)
CHLORIDE SERPL-SCNC: 106 MMOL/L (ref 95–110)
CO2 SERPL-SCNC: 25 MMOL/L (ref 23–29)
CREAT SERPL-MCNC: 0.8 MG/DL (ref 0.5–1.4)
EST. GFR  (AFRICAN AMERICAN): >60 ML/MIN/1.73 M^2
EST. GFR  (NON AFRICAN AMERICAN): >60 ML/MIN/1.73 M^2
GLUCOSE SERPL-MCNC: 80 MG/DL (ref 70–110)
POTASSIUM SERPL-SCNC: 3.9 MMOL/L (ref 3.5–5.1)
PROT SERPL-MCNC: 7.5 G/DL (ref 6–8.4)
SODIUM SERPL-SCNC: 143 MMOL/L (ref 136–145)

## 2021-03-03 ENCOUNTER — NURSE TRIAGE (OUTPATIENT)
Dept: ADMINISTRATIVE | Facility: CLINIC | Age: 51
End: 2021-03-03

## 2021-03-03 ENCOUNTER — PATIENT MESSAGE (OUTPATIENT)
Dept: SURGERY | Facility: CLINIC | Age: 51
End: 2021-03-03

## 2021-03-03 RX ORDER — GABAPENTIN 300 MG/1
300 CAPSULE ORAL 3 TIMES DAILY
Qty: 270 CAPSULE | Refills: 2 | Status: SHIPPED | OUTPATIENT
Start: 2021-03-03 | End: 2021-11-19 | Stop reason: ALTCHOICE

## 2021-03-04 ENCOUNTER — OFFICE VISIT (OUTPATIENT)
Dept: INTERNAL MEDICINE | Facility: CLINIC | Age: 51
End: 2021-03-04
Payer: COMMERCIAL

## 2021-03-04 ENCOUNTER — TELEPHONE (OUTPATIENT)
Dept: INTERNAL MEDICINE | Facility: CLINIC | Age: 51
End: 2021-03-04

## 2021-03-04 VITALS
HEART RATE: 74 BPM | HEIGHT: 67 IN | BODY MASS INDEX: 36.81 KG/M2 | SYSTOLIC BLOOD PRESSURE: 108 MMHG | OXYGEN SATURATION: 98 % | DIASTOLIC BLOOD PRESSURE: 76 MMHG

## 2021-03-04 DIAGNOSIS — I10 ESSENTIAL HYPERTENSION: ICD-10-CM

## 2021-03-04 DIAGNOSIS — M54.42 ACUTE LEFT-SIDED LOW BACK PAIN WITH LEFT-SIDED SCIATICA: Primary | ICD-10-CM

## 2021-03-04 PROCEDURE — 99999 PR PBB SHADOW E&M-EST. PATIENT-LVL IV: ICD-10-PCS | Mod: PBBFAC,,, | Performed by: PHYSICIAN ASSISTANT

## 2021-03-04 PROCEDURE — 1125F PR PAIN SEVERITY QUANTIFIED, PAIN PRESENT: ICD-10-PCS | Mod: S$GLB,,, | Performed by: PHYSICIAN ASSISTANT

## 2021-03-04 PROCEDURE — 99213 OFFICE O/P EST LOW 20 MIN: CPT | Mod: S$GLB,,, | Performed by: PHYSICIAN ASSISTANT

## 2021-03-04 PROCEDURE — 99999 PR PBB SHADOW E&M-EST. PATIENT-LVL IV: CPT | Mod: PBBFAC,,, | Performed by: PHYSICIAN ASSISTANT

## 2021-03-04 PROCEDURE — 3074F SYST BP LT 130 MM HG: CPT | Mod: CPTII,S$GLB,, | Performed by: PHYSICIAN ASSISTANT

## 2021-03-04 PROCEDURE — 3078F PR MOST RECENT DIASTOLIC BLOOD PRESSURE < 80 MM HG: ICD-10-PCS | Mod: CPTII,S$GLB,, | Performed by: PHYSICIAN ASSISTANT

## 2021-03-04 PROCEDURE — 1125F AMNT PAIN NOTED PAIN PRSNT: CPT | Mod: S$GLB,,, | Performed by: PHYSICIAN ASSISTANT

## 2021-03-04 PROCEDURE — 3078F DIAST BP <80 MM HG: CPT | Mod: CPTII,S$GLB,, | Performed by: PHYSICIAN ASSISTANT

## 2021-03-04 PROCEDURE — 3008F PR BODY MASS INDEX (BMI) DOCUMENTED: ICD-10-PCS | Mod: CPTII,S$GLB,, | Performed by: PHYSICIAN ASSISTANT

## 2021-03-04 PROCEDURE — 3074F PR MOST RECENT SYSTOLIC BLOOD PRESSURE < 130 MM HG: ICD-10-PCS | Mod: CPTII,S$GLB,, | Performed by: PHYSICIAN ASSISTANT

## 2021-03-04 PROCEDURE — 3008F BODY MASS INDEX DOCD: CPT | Mod: CPTII,S$GLB,, | Performed by: PHYSICIAN ASSISTANT

## 2021-03-04 PROCEDURE — 99213 PR OFFICE/OUTPT VISIT, EST, LEVL III, 20-29 MIN: ICD-10-PCS | Mod: S$GLB,,, | Performed by: PHYSICIAN ASSISTANT

## 2021-03-04 RX ORDER — MELOXICAM 15 MG/1
15 TABLET ORAL DAILY
Qty: 30 TABLET | Refills: 0 | Status: SHIPPED | OUTPATIENT
Start: 2021-03-04 | End: 2021-04-13 | Stop reason: CLARIF

## 2021-03-04 RX ORDER — OXYCODONE AND ACETAMINOPHEN 5; 325 MG/1; MG/1
1 TABLET ORAL EVERY 4 HOURS PRN
Qty: 20 TABLET | Refills: 0 | Status: SHIPPED | OUTPATIENT
Start: 2021-03-04 | End: 2021-04-16 | Stop reason: ALTCHOICE

## 2021-03-04 RX ORDER — CYCLOBENZAPRINE HCL 10 MG
10 TABLET ORAL 3 TIMES DAILY PRN
Qty: 30 TABLET | Refills: 0 | Status: SHIPPED | OUTPATIENT
Start: 2021-03-04 | End: 2021-04-03

## 2021-03-14 DIAGNOSIS — Z17.0 CARCINOMA OF AXILLARY TAIL OF RIGHT BREAST IN FEMALE, ESTROGEN RECEPTOR POSITIVE: ICD-10-CM

## 2021-03-14 DIAGNOSIS — C50.611 CARCINOMA OF AXILLARY TAIL OF RIGHT BREAST IN FEMALE, ESTROGEN RECEPTOR POSITIVE: ICD-10-CM

## 2021-03-15 ENCOUNTER — CLINICAL SUPPORT (OUTPATIENT)
Dept: REHABILITATION | Facility: OTHER | Age: 51
End: 2021-03-15
Payer: COMMERCIAL

## 2021-03-15 DIAGNOSIS — R26.2 DIFFICULTY WALKING: ICD-10-CM

## 2021-03-15 DIAGNOSIS — M62.81 MUSCLE WEAKNESS: ICD-10-CM

## 2021-03-15 DIAGNOSIS — M54.30 ACUTE SCIATICA: ICD-10-CM

## 2021-03-15 DIAGNOSIS — M79.605 LEFT LEG PAIN: ICD-10-CM

## 2021-03-15 PROCEDURE — 97161 PT EVAL LOW COMPLEX 20 MIN: CPT | Mod: PN

## 2021-03-15 PROCEDURE — 97140 MANUAL THERAPY 1/> REGIONS: CPT | Mod: PN

## 2021-03-15 RX ORDER — ZOLPIDEM TARTRATE 10 MG/1
10 TABLET ORAL NIGHTLY PRN
Qty: 30 TABLET | Refills: 2 | Status: SHIPPED | OUTPATIENT
Start: 2021-03-15 | End: 2021-06-14 | Stop reason: SDUPTHER

## 2021-03-17 ENCOUNTER — TELEPHONE (OUTPATIENT)
Dept: HEMATOLOGY/ONCOLOGY | Facility: CLINIC | Age: 51
End: 2021-03-17

## 2021-03-18 ENCOUNTER — PATIENT MESSAGE (OUTPATIENT)
Dept: HEMATOLOGY/ONCOLOGY | Facility: CLINIC | Age: 51
End: 2021-03-18

## 2021-03-19 ENCOUNTER — PATIENT OUTREACH (OUTPATIENT)
Dept: ADMINISTRATIVE | Facility: OTHER | Age: 51
End: 2021-03-19

## 2021-03-19 ENCOUNTER — TELEPHONE (OUTPATIENT)
Dept: SURGERY | Facility: CLINIC | Age: 51
End: 2021-03-19

## 2021-03-19 ENCOUNTER — DOCUMENTATION ONLY (OUTPATIENT)
Dept: REHABILITATION | Facility: OTHER | Age: 51
End: 2021-03-19

## 2021-03-22 ENCOUNTER — OFFICE VISIT (OUTPATIENT)
Dept: CARDIOLOGY | Facility: CLINIC | Age: 51
End: 2021-03-22
Payer: COMMERCIAL

## 2021-03-22 VITALS
BODY MASS INDEX: 41.21 KG/M2 | SYSTOLIC BLOOD PRESSURE: 124 MMHG | OXYGEN SATURATION: 96 % | HEIGHT: 63 IN | WEIGHT: 232.56 LBS | HEART RATE: 118 BPM | DIASTOLIC BLOOD PRESSURE: 84 MMHG

## 2021-03-22 DIAGNOSIS — R01.1 SYSTOLIC MURMUR: Primary | ICD-10-CM

## 2021-03-22 DIAGNOSIS — E66.01 MORBID OBESITY: ICD-10-CM

## 2021-03-22 DIAGNOSIS — Z91.89 AT RISK FOR CORONARY ARTERY DISEASE: ICD-10-CM

## 2021-03-22 DIAGNOSIS — I10 ESSENTIAL HYPERTENSION: ICD-10-CM

## 2021-03-22 PROCEDURE — 99999 PR PBB SHADOW E&M-EST. PATIENT-LVL III: ICD-10-PCS | Mod: PBBFAC,,, | Performed by: INTERNAL MEDICINE

## 2021-03-22 PROCEDURE — 3074F PR MOST RECENT SYSTOLIC BLOOD PRESSURE < 130 MM HG: ICD-10-PCS | Mod: CPTII,S$GLB,, | Performed by: INTERNAL MEDICINE

## 2021-03-22 PROCEDURE — 3079F DIAST BP 80-89 MM HG: CPT | Mod: CPTII,S$GLB,, | Performed by: INTERNAL MEDICINE

## 2021-03-22 PROCEDURE — 3074F SYST BP LT 130 MM HG: CPT | Mod: CPTII,S$GLB,, | Performed by: INTERNAL MEDICINE

## 2021-03-22 PROCEDURE — 1125F PR PAIN SEVERITY QUANTIFIED, PAIN PRESENT: ICD-10-PCS | Mod: S$GLB,,, | Performed by: INTERNAL MEDICINE

## 2021-03-22 PROCEDURE — 99204 OFFICE O/P NEW MOD 45 MIN: CPT | Mod: S$GLB,,, | Performed by: INTERNAL MEDICINE

## 2021-03-22 PROCEDURE — 3008F BODY MASS INDEX DOCD: CPT | Mod: CPTII,S$GLB,, | Performed by: INTERNAL MEDICINE

## 2021-03-22 PROCEDURE — 99999 PR PBB SHADOW E&M-EST. PATIENT-LVL III: CPT | Mod: PBBFAC,,, | Performed by: INTERNAL MEDICINE

## 2021-03-22 PROCEDURE — 3079F PR MOST RECENT DIASTOLIC BLOOD PRESSURE 80-89 MM HG: ICD-10-PCS | Mod: CPTII,S$GLB,, | Performed by: INTERNAL MEDICINE

## 2021-03-22 PROCEDURE — 1125F AMNT PAIN NOTED PAIN PRSNT: CPT | Mod: S$GLB,,, | Performed by: INTERNAL MEDICINE

## 2021-03-22 PROCEDURE — 3008F PR BODY MASS INDEX (BMI) DOCUMENTED: ICD-10-PCS | Mod: CPTII,S$GLB,, | Performed by: INTERNAL MEDICINE

## 2021-03-22 PROCEDURE — 99204 PR OFFICE/OUTPT VISIT, NEW, LEVL IV, 45-59 MIN: ICD-10-PCS | Mod: S$GLB,,, | Performed by: INTERNAL MEDICINE

## 2021-03-23 DIAGNOSIS — Z01.818 PREOP EXAMINATION: ICD-10-CM

## 2021-03-24 ENCOUNTER — CLINICAL SUPPORT (OUTPATIENT)
Dept: REHABILITATION | Facility: OTHER | Age: 51
End: 2021-03-24
Payer: COMMERCIAL

## 2021-03-24 ENCOUNTER — PATIENT MESSAGE (OUTPATIENT)
Dept: CARDIOLOGY | Facility: CLINIC | Age: 51
End: 2021-03-24

## 2021-03-24 ENCOUNTER — HOSPITAL ENCOUNTER (OUTPATIENT)
Dept: CARDIOLOGY | Facility: HOSPITAL | Age: 51
Discharge: HOME | End: 2021-03-24
Attending: INTERNAL MEDICINE
Payer: COMMERCIAL

## 2021-03-24 ENCOUNTER — HOSPITAL ENCOUNTER (OUTPATIENT)
Dept: RADIOLOGY | Facility: HOSPITAL | Age: 51
Discharge: HOME | End: 2021-03-24
Attending: INTERNAL MEDICINE
Payer: COMMERCIAL

## 2021-03-24 ENCOUNTER — PATIENT MESSAGE (OUTPATIENT)
Dept: SURGERY | Facility: CLINIC | Age: 51
End: 2021-03-24

## 2021-03-24 VITALS
SYSTOLIC BLOOD PRESSURE: 124 MMHG | HEART RATE: 88 BPM | HEIGHT: 63 IN | BODY MASS INDEX: 41.11 KG/M2 | DIASTOLIC BLOOD PRESSURE: 84 MMHG | WEIGHT: 232 LBS

## 2021-03-24 DIAGNOSIS — M79.605 LEFT LEG PAIN: Primary | ICD-10-CM

## 2021-03-24 DIAGNOSIS — R01.1 SYSTOLIC MURMUR: ICD-10-CM

## 2021-03-24 DIAGNOSIS — M62.81 MUSCLE WEAKNESS: ICD-10-CM

## 2021-03-24 DIAGNOSIS — R26.2 DIFFICULTY WALKING: ICD-10-CM

## 2021-03-24 DIAGNOSIS — Z91.89 AT RISK FOR CORONARY ARTERY DISEASE: ICD-10-CM

## 2021-03-24 LAB
ASCENDING AORTA: 3.32 CM
AV INDEX (PROSTH): 0.98
AV MEAN GRADIENT: 4 MMHG
AV PEAK GRADIENT: 8 MMHG
AV VALVE AREA: 3.77 CM2
AV VELOCITY RATIO: 0.85
BSA FOR ECHO PROCEDURE: 2.16 M2
CV ECHO LV RWT: 0.4 CM
DOP CALC AO PEAK VEL: 1.41 M/S
DOP CALC AO VTI: 25.01 CM
DOP CALC LVOT AREA: 3.8 CM2
DOP CALC LVOT DIAMETER: 2.21 CM
DOP CALC LVOT PEAK VEL: 1.2 M/S
DOP CALC LVOT STROKE VOLUME: 94.16 CM3
DOP CALCLVOT PEAK VEL VTI: 24.56 CM
E WAVE DECELERATION TIME: 147.73 MSEC
E/A RATIO: 0.98
E/E' RATIO: 9.67 M/S
ECHO LV POSTERIOR WALL: 0.84 CM (ref 0.6–1.1)
FRACTIONAL SHORTENING: 32 % (ref 28–44)
INTERVENTRICULAR SEPTUM: 0.97 CM (ref 0.6–1.1)
LA MAJOR: 4.32 CM
LA MINOR: 4 CM
LA WIDTH: 3.04 CM
LEFT ATRIUM SIZE: 3.17 CM
LEFT ATRIUM VOLUME INDEX MOD: 14.4 ML/M2
LEFT ATRIUM VOLUME INDEX: 16.5 ML/M2
LEFT ATRIUM VOLUME MOD: 29.65 CM3
LEFT ATRIUM VOLUME: 34.03 CM3
LEFT INTERNAL DIMENSION IN SYSTOLE: 2.81 CM (ref 2.1–4)
LEFT VENTRICLE DIASTOLIC VOLUME INDEX: 37.14 ML/M2
LEFT VENTRICLE DIASTOLIC VOLUME: 76.5 ML
LEFT VENTRICLE MASS INDEX: 57 G/M2
LEFT VENTRICLE SYSTOLIC VOLUME INDEX: 14.5 ML/M2
LEFT VENTRICLE SYSTOLIC VOLUME: 29.87 ML
LEFT VENTRICULAR INTERNAL DIMENSION IN DIASTOLE: 4.15 CM (ref 3.5–6)
LEFT VENTRICULAR MASS: 117.28 G
LV LATERAL E/E' RATIO: 7.25 M/S
LV SEPTAL E/E' RATIO: 14.5 M/S
MV A" WAVE DURATION": 8.56 MSEC
MV PEAK A VEL: 0.89 M/S
MV PEAK E VEL: 0.87 M/S
MV STENOSIS PRESSURE HALF TIME: 42.84 MS
MV VALVE AREA P 1/2 METHOD: 5.14 CM2
PISA TR MAX VEL: 2.52 M/S
PULM VEIN S/D RATIO: 1.24
PV PEAK D VEL: 0.45 M/S
PV PEAK S VEL: 0.56 M/S
RA MAJOR: 4.08 CM
RA PRESSURE: 3 MMHG
RA WIDTH: 3.01 CM
RIGHT VENTRICULAR END-DIASTOLIC DIMENSION: 3.38 CM
RV TISSUE DOPPLER FREE WALL SYSTOLIC VELOCITY 1 (APICAL 4 CHAMBER VIEW): 13.49 CM/S
SINUS: 3.08 CM
STJ: 2.8 CM
TDI LATERAL: 0.12 M/S
TDI SEPTAL: 0.06 M/S
TDI: 0.09 M/S
TR MAX PG: 25 MMHG
TRICUSPID ANNULAR PLANE SYSTOLIC EXCURSION: 1.9 CM
TV REST PULMONARY ARTERY PRESSURE: 28 MMHG

## 2021-03-24 PROCEDURE — 75571 CT HRT W/O DYE W/CA TEST: CPT | Mod: TC

## 2021-03-24 PROCEDURE — 97110 THERAPEUTIC EXERCISES: CPT | Mod: PN

## 2021-03-24 PROCEDURE — 93306 TTE W/DOPPLER COMPLETE: CPT

## 2021-03-24 PROCEDURE — 75571 CT CALCIUM SCORING CARDIAC: ICD-10-PCS | Mod: 26,,, | Performed by: RADIOLOGY

## 2021-03-24 PROCEDURE — 75571 CT HRT W/O DYE W/CA TEST: CPT | Mod: 26,,, | Performed by: RADIOLOGY

## 2021-03-24 PROCEDURE — 97140 MANUAL THERAPY 1/> REGIONS: CPT | Mod: PN

## 2021-03-24 PROCEDURE — 93306 ECHO (CUPID ONLY): ICD-10-PCS | Mod: 26,,, | Performed by: INTERNAL MEDICINE

## 2021-03-24 PROCEDURE — 93306 TTE W/DOPPLER COMPLETE: CPT | Mod: 26,,, | Performed by: INTERNAL MEDICINE

## 2021-03-25 ENCOUNTER — TELEPHONE (OUTPATIENT)
Dept: CARDIOLOGY | Facility: CLINIC | Age: 51
End: 2021-03-25

## 2021-03-29 ENCOUNTER — PATIENT MESSAGE (OUTPATIENT)
Dept: INTERNAL MEDICINE | Facility: CLINIC | Age: 51
End: 2021-03-29

## 2021-04-01 ENCOUNTER — OFFICE VISIT (OUTPATIENT)
Dept: PODIATRY | Facility: CLINIC | Age: 51
End: 2021-04-01
Payer: COMMERCIAL

## 2021-04-01 ENCOUNTER — HOSPITAL ENCOUNTER (OUTPATIENT)
Dept: PREADMISSION TESTING | Facility: OTHER | Age: 51
Discharge: HOME | End: 2021-04-01

## 2021-04-01 VITALS
BODY MASS INDEX: 41.11 KG/M2 | SYSTOLIC BLOOD PRESSURE: 172 MMHG | HEIGHT: 63 IN | DIASTOLIC BLOOD PRESSURE: 98 MMHG | HEART RATE: 94 BPM | WEIGHT: 232 LBS

## 2021-04-01 DIAGNOSIS — L03.032 PARONYCHIA, TOE, LEFT: ICD-10-CM

## 2021-04-01 DIAGNOSIS — L60.0 INGROWN NAIL: Primary | ICD-10-CM

## 2021-04-01 DIAGNOSIS — M79.675 TOE PAIN, LEFT: ICD-10-CM

## 2021-04-01 PROCEDURE — 3077F PR MOST RECENT SYSTOLIC BLOOD PRESSURE >= 140 MM HG: ICD-10-PCS | Mod: CPTII,S$GLB,, | Performed by: PODIATRIST

## 2021-04-01 PROCEDURE — 3080F PR MOST RECENT DIASTOLIC BLOOD PRESSURE >= 90 MM HG: ICD-10-PCS | Mod: CPTII,S$GLB,, | Performed by: PODIATRIST

## 2021-04-01 PROCEDURE — 99214 OFFICE O/P EST MOD 30 MIN: CPT | Mod: S$GLB,,, | Performed by: PODIATRIST

## 2021-04-01 PROCEDURE — 3077F SYST BP >= 140 MM HG: CPT | Mod: CPTII,S$GLB,, | Performed by: PODIATRIST

## 2021-04-01 PROCEDURE — 99999 PR PBB SHADOW E&M-EST. PATIENT-LVL IV: ICD-10-PCS | Mod: PBBFAC,,, | Performed by: PODIATRIST

## 2021-04-01 PROCEDURE — 3008F PR BODY MASS INDEX (BMI) DOCUMENTED: ICD-10-PCS | Mod: CPTII,S$GLB,, | Performed by: PODIATRIST

## 2021-04-01 PROCEDURE — 1125F AMNT PAIN NOTED PAIN PRSNT: CPT | Mod: S$GLB,,, | Performed by: PODIATRIST

## 2021-04-01 PROCEDURE — 3008F BODY MASS INDEX DOCD: CPT | Mod: CPTII,S$GLB,, | Performed by: PODIATRIST

## 2021-04-01 PROCEDURE — 99999 PR PBB SHADOW E&M-EST. PATIENT-LVL IV: CPT | Mod: PBBFAC,,, | Performed by: PODIATRIST

## 2021-04-01 PROCEDURE — 1125F PR PAIN SEVERITY QUANTIFIED, PAIN PRESENT: ICD-10-PCS | Mod: S$GLB,,, | Performed by: PODIATRIST

## 2021-04-01 PROCEDURE — 99214 PR OFFICE/OUTPT VISIT, EST, LEVL IV, 30-39 MIN: ICD-10-PCS | Mod: S$GLB,,, | Performed by: PODIATRIST

## 2021-04-01 PROCEDURE — 3080F DIAST BP >= 90 MM HG: CPT | Mod: CPTII,S$GLB,, | Performed by: PODIATRIST

## 2021-04-01 RX ORDER — TOBRAMYCIN 3 MG/ML
SOLUTION/ DROPS OPHTHALMIC
Qty: 5 ML | Refills: 3 | Status: SHIPPED | OUTPATIENT
Start: 2021-04-01 | End: 2021-04-16

## 2021-04-08 ENCOUNTER — PATIENT MESSAGE (OUTPATIENT)
Dept: SURGERY | Facility: CLINIC | Age: 51
End: 2021-04-08

## 2021-04-08 ENCOUNTER — OFFICE VISIT (OUTPATIENT)
Dept: PODIATRY | Facility: CLINIC | Age: 51
End: 2021-04-08
Payer: COMMERCIAL

## 2021-04-08 VITALS
WEIGHT: 232 LBS | DIASTOLIC BLOOD PRESSURE: 84 MMHG | HEART RATE: 101 BPM | SYSTOLIC BLOOD PRESSURE: 137 MMHG | BODY MASS INDEX: 41.11 KG/M2 | HEIGHT: 63 IN

## 2021-04-08 DIAGNOSIS — M79.675 TOE PAIN, LEFT: ICD-10-CM

## 2021-04-08 DIAGNOSIS — L60.0 INGROWN NAIL: Primary | ICD-10-CM

## 2021-04-08 PROCEDURE — 1126F PR PAIN SEVERITY QUANTIFIED, NO PAIN PRESENT: ICD-10-PCS | Mod: S$GLB,,, | Performed by: PODIATRIST

## 2021-04-08 PROCEDURE — 99999 PR PBB SHADOW E&M-EST. PATIENT-LVL IV: ICD-10-PCS | Mod: PBBFAC,,, | Performed by: PODIATRIST

## 2021-04-08 PROCEDURE — 99212 PR OFFICE/OUTPT VISIT, EST, LEVL II, 10-19 MIN: ICD-10-PCS | Mod: S$GLB,,, | Performed by: PODIATRIST

## 2021-04-08 PROCEDURE — 99212 OFFICE O/P EST SF 10 MIN: CPT | Mod: S$GLB,,, | Performed by: PODIATRIST

## 2021-04-08 PROCEDURE — 1126F AMNT PAIN NOTED NONE PRSNT: CPT | Mod: S$GLB,,, | Performed by: PODIATRIST

## 2021-04-08 PROCEDURE — 3008F PR BODY MASS INDEX (BMI) DOCUMENTED: ICD-10-PCS | Mod: CPTII,S$GLB,, | Performed by: PODIATRIST

## 2021-04-08 PROCEDURE — 3008F BODY MASS INDEX DOCD: CPT | Mod: CPTII,S$GLB,, | Performed by: PODIATRIST

## 2021-04-08 PROCEDURE — 99999 PR PBB SHADOW E&M-EST. PATIENT-LVL IV: CPT | Mod: PBBFAC,,, | Performed by: PODIATRIST

## 2021-04-12 ENCOUNTER — PATIENT MESSAGE (OUTPATIENT)
Dept: REHABILITATION | Facility: OTHER | Age: 51
End: 2021-04-12

## 2021-04-13 ENCOUNTER — OFFICE VISIT (OUTPATIENT)
Dept: PLASTIC SURGERY | Facility: CLINIC | Age: 51
End: 2021-04-13
Attending: PLASTIC SURGERY
Payer: COMMERCIAL

## 2021-04-13 ENCOUNTER — ANESTHESIA EVENT (OUTPATIENT)
Dept: SURGERY | Facility: OTHER | Age: 51
DRG: 584 | End: 2021-04-13
Payer: COMMERCIAL

## 2021-04-13 ENCOUNTER — HOSPITAL ENCOUNTER (OUTPATIENT)
Dept: PREADMISSION TESTING | Facility: OTHER | Age: 51
Discharge: HOME | End: 2021-04-13
Attending: SURGERY
Payer: COMMERCIAL

## 2021-04-13 VITALS
WEIGHT: 231.94 LBS | HEART RATE: 94 BPM | DIASTOLIC BLOOD PRESSURE: 81 MMHG | SYSTOLIC BLOOD PRESSURE: 128 MMHG | HEIGHT: 63 IN | BODY MASS INDEX: 41.1 KG/M2

## 2021-04-13 VITALS
WEIGHT: 232.56 LBS | SYSTOLIC BLOOD PRESSURE: 144 MMHG | BODY MASS INDEX: 41.21 KG/M2 | HEIGHT: 63 IN | HEART RATE: 92 BPM | OXYGEN SATURATION: 96 % | DIASTOLIC BLOOD PRESSURE: 78 MMHG | TEMPERATURE: 97 F

## 2021-04-13 DIAGNOSIS — Z85.3 HISTORY OF BREAST CANCER: Primary | ICD-10-CM

## 2021-04-13 PROCEDURE — 99202 PR OFFICE/OUTPT VISIT, NEW, LEVL II, 15-29 MIN: ICD-10-PCS | Mod: S$GLB,,, | Performed by: PLASTIC SURGERY

## 2021-04-13 PROCEDURE — 99202 OFFICE O/P NEW SF 15 MIN: CPT | Mod: S$GLB,,, | Performed by: PLASTIC SURGERY

## 2021-04-13 PROCEDURE — 3008F PR BODY MASS INDEX (BMI) DOCUMENTED: ICD-10-PCS | Mod: CPTII,S$GLB,, | Performed by: PLASTIC SURGERY

## 2021-04-13 PROCEDURE — 3008F BODY MASS INDEX DOCD: CPT | Mod: CPTII,S$GLB,, | Performed by: PLASTIC SURGERY

## 2021-04-13 RX ORDER — SODIUM CHLORIDE, SODIUM LACTATE, POTASSIUM CHLORIDE, CALCIUM CHLORIDE 600; 310; 30; 20 MG/100ML; MG/100ML; MG/100ML; MG/100ML
INJECTION, SOLUTION INTRAVENOUS CONTINUOUS
Status: CANCELLED | OUTPATIENT
Start: 2021-04-13

## 2021-04-14 DIAGNOSIS — Z85.3 HISTORY OF BREAST CANCER: Primary | ICD-10-CM

## 2021-04-14 RX ORDER — SODIUM CHLORIDE 9 MG/ML
INJECTION, SOLUTION INTRAVENOUS CONTINUOUS
Status: CANCELLED | OUTPATIENT
Start: 2021-04-14

## 2021-04-14 RX ORDER — MUPIROCIN 20 MG/G
OINTMENT TOPICAL
Status: CANCELLED | OUTPATIENT
Start: 2021-04-14

## 2021-04-14 RX ORDER — HEPARIN SODIUM 5000 [USP'U]/ML
5000 INJECTION, SOLUTION INTRAVENOUS; SUBCUTANEOUS EVERY 8 HOURS
Status: CANCELLED | OUTPATIENT
Start: 2021-04-21

## 2021-04-16 ENCOUNTER — PATIENT MESSAGE (OUTPATIENT)
Dept: RESEARCH | Facility: HOSPITAL | Age: 51
End: 2021-04-16

## 2021-04-16 ENCOUNTER — OFFICE VISIT (OUTPATIENT)
Dept: INTERNAL MEDICINE | Facility: CLINIC | Age: 51
End: 2021-04-16
Payer: COMMERCIAL

## 2021-04-16 VITALS
WEIGHT: 234.38 LBS | DIASTOLIC BLOOD PRESSURE: 72 MMHG | HEART RATE: 81 BPM | SYSTOLIC BLOOD PRESSURE: 120 MMHG | HEIGHT: 63 IN | BODY MASS INDEX: 41.53 KG/M2 | OXYGEN SATURATION: 97 %

## 2021-04-16 DIAGNOSIS — Z79.811 PROPHYLACTIC USE OF ANASTROZOLE (ARIMIDEX): ICD-10-CM

## 2021-04-16 DIAGNOSIS — I10 ESSENTIAL HYPERTENSION: ICD-10-CM

## 2021-04-16 DIAGNOSIS — Z11.59 ENCOUNTER FOR HEPATITIS C SCREENING TEST FOR LOW RISK PATIENT: ICD-10-CM

## 2021-04-16 DIAGNOSIS — E66.01 MORBID OBESITY: ICD-10-CM

## 2021-04-16 DIAGNOSIS — Z11.4 SCREENING FOR HIV (HUMAN IMMUNODEFICIENCY VIRUS): ICD-10-CM

## 2021-04-16 DIAGNOSIS — C50.611 CARCINOMA OF AXILLARY TAIL OF RIGHT BREAST IN FEMALE, ESTROGEN RECEPTOR POSITIVE: ICD-10-CM

## 2021-04-16 DIAGNOSIS — Z17.0 CARCINOMA OF AXILLARY TAIL OF RIGHT BREAST IN FEMALE, ESTROGEN RECEPTOR POSITIVE: ICD-10-CM

## 2021-04-16 DIAGNOSIS — Z12.11 SCREEN FOR COLON CANCER: ICD-10-CM

## 2021-04-16 DIAGNOSIS — Z78.0 MENOPAUSE: ICD-10-CM

## 2021-04-16 DIAGNOSIS — Z00.00 ANNUAL PHYSICAL EXAM: Primary | ICD-10-CM

## 2021-04-16 DIAGNOSIS — R73.03 PREDIABETES: ICD-10-CM

## 2021-04-16 PROCEDURE — 3008F BODY MASS INDEX DOCD: CPT | Mod: CPTII,S$GLB,, | Performed by: INTERNAL MEDICINE

## 2021-04-16 PROCEDURE — 99999 PR PBB SHADOW E&M-EST. PATIENT-LVL III: ICD-10-PCS | Mod: PBBFAC,,, | Performed by: INTERNAL MEDICINE

## 2021-04-16 PROCEDURE — 1126F PR PAIN SEVERITY QUANTIFIED, NO PAIN PRESENT: ICD-10-PCS | Mod: S$GLB,,, | Performed by: INTERNAL MEDICINE

## 2021-04-16 PROCEDURE — 99396 PR PREVENTIVE VISIT,EST,40-64: ICD-10-PCS | Mod: S$GLB,,, | Performed by: INTERNAL MEDICINE

## 2021-04-16 PROCEDURE — 99396 PREV VISIT EST AGE 40-64: CPT | Mod: S$GLB,,, | Performed by: INTERNAL MEDICINE

## 2021-04-16 PROCEDURE — 99999 PR PBB SHADOW E&M-EST. PATIENT-LVL III: CPT | Mod: PBBFAC,,, | Performed by: INTERNAL MEDICINE

## 2021-04-16 PROCEDURE — 1126F AMNT PAIN NOTED NONE PRSNT: CPT | Mod: S$GLB,,, | Performed by: INTERNAL MEDICINE

## 2021-04-16 PROCEDURE — 3008F PR BODY MASS INDEX (BMI) DOCUMENTED: ICD-10-PCS | Mod: CPTII,S$GLB,, | Performed by: INTERNAL MEDICINE

## 2021-04-16 RX ORDER — AMLODIPINE AND VALSARTAN 5; 320 MG/1; MG/1
1 TABLET ORAL DAILY
Qty: 90 TABLET | Refills: 4 | Status: SHIPPED | OUTPATIENT
Start: 2021-04-16 | End: 2021-11-19 | Stop reason: SDUPTHER

## 2021-04-16 RX ORDER — TOBRAMYCIN 3 MG/ML
SOLUTION/ DROPS OPHTHALMIC
Qty: 5 ML | Refills: 3
Start: 2021-04-16 | End: 2021-12-13

## 2021-04-16 RX ORDER — CYCLOBENZAPRINE HCL 5 MG
5 TABLET ORAL 3 TIMES DAILY PRN
Qty: 30 TABLET | Refills: 5 | Status: SHIPPED | OUTPATIENT
Start: 2021-04-16 | End: 2021-04-26

## 2021-04-16 RX ORDER — CICLOPIROX 80 MG/ML
SOLUTION TOPICAL NIGHTLY
Qty: 6.6 ML | Refills: 11 | Status: SHIPPED | OUTPATIENT
Start: 2021-04-16 | End: 2021-11-19 | Stop reason: SDUPTHER

## 2021-04-16 RX ORDER — VENLAFAXINE HYDROCHLORIDE 37.5 MG/1
37.5 CAPSULE, EXTENDED RELEASE ORAL DAILY
Qty: 90 CAPSULE | Refills: 3 | Status: SHIPPED | OUTPATIENT
Start: 2021-04-16 | End: 2021-11-18

## 2021-04-18 ENCOUNTER — LAB VISIT (OUTPATIENT)
Dept: URGENT CARE | Facility: CLINIC | Age: 51
End: 2021-04-18
Payer: COMMERCIAL

## 2021-04-18 DIAGNOSIS — Z01.818 PREOP EXAMINATION: ICD-10-CM

## 2021-04-18 PROCEDURE — U0005 INFEC AGEN DETEC AMPLI PROBE: HCPCS | Performed by: PLASTIC SURGERY

## 2021-04-18 PROCEDURE — U0003 INFECTIOUS AGENT DETECTION BY NUCLEIC ACID (DNA OR RNA); SEVERE ACUTE RESPIRATORY SYNDROME CORONAVIRUS 2 (SARS-COV-2) (CORONAVIRUS DISEASE [COVID-19]), AMPLIFIED PROBE TECHNIQUE, MAKING USE OF HIGH THROUGHPUT TECHNOLOGIES AS DESCRIBED BY CMS-2020-01-R: HCPCS | Performed by: PLASTIC SURGERY

## 2021-04-18 PROCEDURE — 99211 OFF/OP EST MAY X REQ PHY/QHP: CPT | Mod: S$GLB,,, | Performed by: NURSE PRACTITIONER

## 2021-04-18 PROCEDURE — 99211 PR OFFICE/OUTPT VISIT, EST, LEVL I: ICD-10-PCS | Mod: S$GLB,,, | Performed by: NURSE PRACTITIONER

## 2021-04-19 ENCOUNTER — OFFICE VISIT (OUTPATIENT)
Dept: HEMATOLOGY/ONCOLOGY | Facility: CLINIC | Age: 51
End: 2021-04-19
Payer: COMMERCIAL

## 2021-04-19 VITALS
HEART RATE: 90 BPM | BODY MASS INDEX: 41.33 KG/M2 | SYSTOLIC BLOOD PRESSURE: 130 MMHG | DIASTOLIC BLOOD PRESSURE: 92 MMHG | WEIGHT: 233.25 LBS | HEIGHT: 63 IN

## 2021-04-19 DIAGNOSIS — E66.01 CLASS 3 SEVERE OBESITY WITH BODY MASS INDEX (BMI) OF 40.0 TO 44.9 IN ADULT, UNSPECIFIED OBESITY TYPE, UNSPECIFIED WHETHER SERIOUS COMORBIDITY PRESENT: ICD-10-CM

## 2021-04-19 DIAGNOSIS — Z17.0 CARCINOMA OF AXILLARY TAIL OF RIGHT BREAST IN FEMALE, ESTROGEN RECEPTOR POSITIVE: Primary | ICD-10-CM

## 2021-04-19 DIAGNOSIS — C50.611 CARCINOMA OF AXILLARY TAIL OF RIGHT BREAST IN FEMALE, ESTROGEN RECEPTOR POSITIVE: Primary | ICD-10-CM

## 2021-04-19 LAB — SARS-COV-2 RNA RESP QL NAA+PROBE: NOT DETECTED

## 2021-04-19 PROCEDURE — 3008F PR BODY MASS INDEX (BMI) DOCUMENTED: ICD-10-PCS | Mod: CPTII,S$GLB,, | Performed by: PHYSICIAN ASSISTANT

## 2021-04-19 PROCEDURE — 1126F PR PAIN SEVERITY QUANTIFIED, NO PAIN PRESENT: ICD-10-PCS | Mod: S$GLB,,, | Performed by: PHYSICIAN ASSISTANT

## 2021-04-19 PROCEDURE — 99499 UNLISTED E&M SERVICE: CPT | Mod: S$GLB,,, | Performed by: PHYSICIAN ASSISTANT

## 2021-04-19 PROCEDURE — 1126F AMNT PAIN NOTED NONE PRSNT: CPT | Mod: S$GLB,,, | Performed by: PHYSICIAN ASSISTANT

## 2021-04-19 PROCEDURE — 3008F BODY MASS INDEX DOCD: CPT | Mod: CPTII,S$GLB,, | Performed by: PHYSICIAN ASSISTANT

## 2021-04-19 PROCEDURE — 99999 PR PBB SHADOW E&M-EST. PATIENT-LVL III: CPT | Mod: PBBFAC,,, | Performed by: PHYSICIAN ASSISTANT

## 2021-04-19 PROCEDURE — 99499 NO LOS: ICD-10-PCS | Mod: S$GLB,,, | Performed by: PHYSICIAN ASSISTANT

## 2021-04-19 PROCEDURE — 99999 PR PBB SHADOW E&M-EST. PATIENT-LVL III: ICD-10-PCS | Mod: PBBFAC,,, | Performed by: PHYSICIAN ASSISTANT

## 2021-04-20 ENCOUNTER — TELEPHONE (OUTPATIENT)
Dept: SURGERY | Facility: CLINIC | Age: 51
End: 2021-04-20

## 2021-04-20 RX ORDER — HYDROCODONE BITARTRATE AND ACETAMINOPHEN 5; 325 MG/1; MG/1
1 TABLET ORAL EVERY 6 HOURS PRN
Qty: 15 TABLET | Refills: 0 | Status: SHIPPED | OUTPATIENT
Start: 2021-04-20 | End: 2021-09-28

## 2021-04-21 ENCOUNTER — ANESTHESIA (OUTPATIENT)
Dept: SURGERY | Facility: OTHER | Age: 51
DRG: 584 | End: 2021-04-21
Payer: COMMERCIAL

## 2021-04-21 ENCOUNTER — HOSPITAL ENCOUNTER (INPATIENT)
Facility: OTHER | Age: 51
LOS: 3 days | Discharge: HOME | DRG: 584 | End: 2021-04-25
Attending: SURGERY | Admitting: PLASTIC SURGERY
Payer: COMMERCIAL

## 2021-04-21 DIAGNOSIS — C50.919: ICD-10-CM

## 2021-04-21 DIAGNOSIS — Z85.3 HISTORY OF BREAST CANCER: Primary | ICD-10-CM

## 2021-04-21 LAB
CREAT SERPL-MCNC: 0.8 MG/DL (ref 0.5–1.4)
EST. GFR  (AFRICAN AMERICAN): >60 ML/MIN/1.73 M^2
EST. GFR  (NON AFRICAN AMERICAN): >60 ML/MIN/1.73 M^2

## 2021-04-21 PROCEDURE — 37000009 HC ANESTHESIA EA ADD 15 MINS: Performed by: SURGERY

## 2021-04-21 PROCEDURE — 19303 PR MASTECTOMY, SIMPLE, COMPLETE: ICD-10-PCS | Mod: LT,,, | Performed by: SURGERY

## 2021-04-21 PROCEDURE — 63600175 PHARM REV CODE 636 W HCPCS: Performed by: NURSE ANESTHETIST, CERTIFIED REGISTERED

## 2021-04-21 PROCEDURE — 63600175 PHARM REV CODE 636 W HCPCS: Performed by: ANESTHESIOLOGY

## 2021-04-21 PROCEDURE — 88307 TISSUE EXAM BY PATHOLOGIST: CPT | Performed by: PATHOLOGY

## 2021-04-21 PROCEDURE — 71000033 HC RECOVERY, INTIAL HOUR: Performed by: SURGERY

## 2021-04-21 PROCEDURE — 88304 TISSUE EXAM BY PATHOLOGIST: CPT | Mod: 26,,, | Performed by: PATHOLOGY

## 2021-04-21 PROCEDURE — 88304 TISSUE EXAM BY PATHOLOGIST: CPT | Performed by: PATHOLOGY

## 2021-04-21 PROCEDURE — 63600175 PHARM REV CODE 636 W HCPCS: Performed by: PLASTIC SURGERY

## 2021-04-21 PROCEDURE — 36415 COLL VENOUS BLD VENIPUNCTURE: CPT | Performed by: SURGERY

## 2021-04-21 PROCEDURE — 88307 PR  SURG PATH,LEVEL V: ICD-10-PCS | Mod: 26,,, | Performed by: PATHOLOGY

## 2021-04-21 PROCEDURE — 36000708 HC OR TIME LEV III 1ST 15 MIN: Performed by: SURGERY

## 2021-04-21 PROCEDURE — 25000003 PHARM REV CODE 250: Performed by: ANESTHESIOLOGY

## 2021-04-21 PROCEDURE — 19303 MAST SIMPLE COMPLETE: CPT | Mod: LT,,, | Performed by: SURGERY

## 2021-04-21 PROCEDURE — 25000003 PHARM REV CODE 250: Performed by: NURSE ANESTHETIST, CERTIFIED REGISTERED

## 2021-04-21 PROCEDURE — 71000039 HC RECOVERY, EACH ADD'L HOUR: Performed by: SURGERY

## 2021-04-21 PROCEDURE — C9290 INJ, BUPIVACAINE LIPOSOME: HCPCS | Performed by: PLASTIC SURGERY

## 2021-04-21 PROCEDURE — 63600175 PHARM REV CODE 636 W HCPCS: Mod: JG | Performed by: NURSE ANESTHETIST, CERTIFIED REGISTERED

## 2021-04-21 PROCEDURE — C1729 CATH, DRAINAGE: HCPCS | Performed by: SURGERY

## 2021-04-21 PROCEDURE — 37000008 HC ANESTHESIA 1ST 15 MINUTES: Performed by: SURGERY

## 2021-04-21 PROCEDURE — 88307 TISSUE EXAM BY PATHOLOGIST: CPT | Mod: 26,,, | Performed by: PATHOLOGY

## 2021-04-21 PROCEDURE — 88304 PR  SURG PATH,LEVEL III: ICD-10-PCS | Mod: 26,,, | Performed by: PATHOLOGY

## 2021-04-21 PROCEDURE — 82565 ASSAY OF CREATININE: CPT | Performed by: SURGERY

## 2021-04-21 PROCEDURE — 36000709 HC OR TIME LEV III EA ADD 15 MIN: Performed by: SURGERY

## 2021-04-21 PROCEDURE — P9045 ALBUMIN (HUMAN), 5%, 250 ML: HCPCS | Mod: JG | Performed by: NURSE ANESTHETIST, CERTIFIED REGISTERED

## 2021-04-21 PROCEDURE — S2068 PR  BREAST DIEP OR SIEA FLAP: ICD-10-PCS | Mod: 50,82,, | Performed by: PLASTIC SURGERY

## 2021-04-21 PROCEDURE — 63600175 PHARM REV CODE 636 W HCPCS: Performed by: STUDENT IN AN ORGANIZED HEALTH CARE EDUCATION/TRAINING PROGRAM

## 2021-04-21 PROCEDURE — 25000003 PHARM REV CODE 250: Performed by: STUDENT IN AN ORGANIZED HEALTH CARE EDUCATION/TRAINING PROGRAM

## 2021-04-21 PROCEDURE — 27201423 OPTIME MED/SURG SUP & DEVICES STERILE SUPPLY: Performed by: SURGERY

## 2021-04-21 PROCEDURE — 25000003 PHARM REV CODE 250: Performed by: PLASTIC SURGERY

## 2021-04-21 PROCEDURE — G0378 HOSPITAL OBSERVATION PER HR: HCPCS

## 2021-04-21 PROCEDURE — 27800903 OPTIME MED/SURG SUP & DEVICES OTHER IMPLANTS: Performed by: SURGERY

## 2021-04-21 PROCEDURE — S2068 BREAST DIEP OR SIEA FLAP: HCPCS | Mod: 50,82,, | Performed by: PLASTIC SURGERY

## 2021-04-21 DEVICE — COUPLER 2.0MM: Type: IMPLANTABLE DEVICE | Site: BREAST | Status: FUNCTIONAL

## 2021-04-21 DEVICE — COUPLER MICROVAS ANSTMS 2.5MM: Type: IMPLANTABLE DEVICE | Site: BREAST | Status: FUNCTIONAL

## 2021-04-21 DEVICE — DEVICE MICROVAS ANSTMTC 1.5MM: Type: IMPLANTABLE DEVICE | Site: BREAST | Status: FUNCTIONAL

## 2021-04-21 RX ORDER — KETAMINE HCL IN 0.9 % NACL 50 MG/5 ML
SYRINGE (ML) INTRAVENOUS
Status: DISCONTINUED | OUTPATIENT
Start: 2021-04-21 | End: 2021-04-21

## 2021-04-21 RX ORDER — OXYCODONE AND ACETAMINOPHEN 5; 325 MG/1; MG/1
1 TABLET ORAL EVERY 4 HOURS PRN
Status: DISCONTINUED | OUTPATIENT
Start: 2021-04-21 | End: 2021-04-25 | Stop reason: HOSPADM

## 2021-04-21 RX ORDER — SODIUM CHLORIDE 0.9 % (FLUSH) 0.9 %
3 SYRINGE (ML) INJECTION
Status: DISCONTINUED | OUTPATIENT
Start: 2021-04-21 | End: 2021-04-25 | Stop reason: HOSPADM

## 2021-04-21 RX ORDER — SODIUM CHLORIDE 9 MG/ML
INJECTION, SOLUTION INTRAVENOUS CONTINUOUS
Status: DISCONTINUED | OUTPATIENT
Start: 2021-04-21 | End: 2021-04-21

## 2021-04-21 RX ORDER — ALBUMIN HUMAN 50 G/1000ML
SOLUTION INTRAVENOUS CONTINUOUS PRN
Status: DISCONTINUED | OUTPATIENT
Start: 2021-04-21 | End: 2021-04-21

## 2021-04-21 RX ORDER — CYCLOBENZAPRINE HCL 5 MG
5 TABLET ORAL 3 TIMES DAILY PRN
Status: DISCONTINUED | OUTPATIENT
Start: 2021-04-21 | End: 2021-04-25 | Stop reason: HOSPADM

## 2021-04-21 RX ORDER — LIDOCAINE HYDROCHLORIDE AND EPINEPHRINE 10; 10 MG/ML; UG/ML
INJECTION, SOLUTION INFILTRATION; PERINEURAL
Status: DISCONTINUED | OUTPATIENT
Start: 2021-04-21 | End: 2021-04-21 | Stop reason: HOSPADM

## 2021-04-21 RX ORDER — MIDAZOLAM HYDROCHLORIDE 1 MG/ML
INJECTION INTRAMUSCULAR; INTRAVENOUS
Status: DISCONTINUED | OUTPATIENT
Start: 2021-04-21 | End: 2021-04-21

## 2021-04-21 RX ORDER — ZOLPIDEM TARTRATE 5 MG/1
5 TABLET ORAL NIGHTLY PRN
Status: DISCONTINUED | OUTPATIENT
Start: 2021-04-21 | End: 2021-04-25 | Stop reason: HOSPADM

## 2021-04-21 RX ORDER — MORPHINE SULFATE 10 MG/ML
4 INJECTION INTRAMUSCULAR; INTRAVENOUS; SUBCUTANEOUS EVERY 4 HOURS PRN
Status: DISCONTINUED | OUTPATIENT
Start: 2021-04-21 | End: 2021-04-25 | Stop reason: HOSPADM

## 2021-04-21 RX ORDER — ONDANSETRON 2 MG/ML
INJECTION INTRAMUSCULAR; INTRAVENOUS
Status: DISCONTINUED | OUTPATIENT
Start: 2021-04-21 | End: 2021-04-21

## 2021-04-21 RX ORDER — HYDROMORPHONE HYDROCHLORIDE 2 MG/ML
0.4 INJECTION, SOLUTION INTRAMUSCULAR; INTRAVENOUS; SUBCUTANEOUS EVERY 5 MIN PRN
Status: DISCONTINUED | OUTPATIENT
Start: 2021-04-21 | End: 2021-04-21

## 2021-04-21 RX ORDER — OXYCODONE HYDROCHLORIDE 5 MG/1
5 TABLET ORAL
Status: DISCONTINUED | OUTPATIENT
Start: 2021-04-21 | End: 2021-04-21

## 2021-04-21 RX ORDER — VECURONIUM BROMIDE FOR INJECTION 1 MG/ML
INJECTION, POWDER, LYOPHILIZED, FOR SOLUTION INTRAVENOUS
Status: DISCONTINUED | OUTPATIENT
Start: 2021-04-21 | End: 2021-04-21

## 2021-04-21 RX ORDER — ROCURONIUM BROMIDE 10 MG/ML
INJECTION, SOLUTION INTRAVENOUS
Status: DISCONTINUED | OUTPATIENT
Start: 2021-04-21 | End: 2021-04-21

## 2021-04-21 RX ORDER — ENOXAPARIN SODIUM 100 MG/ML
40 INJECTION SUBCUTANEOUS EVERY 24 HOURS
Status: DISCONTINUED | OUTPATIENT
Start: 2021-04-22 | End: 2021-04-25 | Stop reason: HOSPADM

## 2021-04-21 RX ORDER — EPHEDRINE SULFATE 50 MG/ML
INJECTION, SOLUTION INTRAVENOUS
Status: DISCONTINUED | OUTPATIENT
Start: 2021-04-21 | End: 2021-04-21

## 2021-04-21 RX ORDER — AMLODIPINE BESYLATE 5 MG/1
5 TABLET ORAL DAILY
Status: DISCONTINUED | OUTPATIENT
Start: 2021-04-22 | End: 2021-04-25 | Stop reason: HOSPADM

## 2021-04-21 RX ORDER — AMLODIPINE AND VALSARTAN 5; 320 MG/1; MG/1
1 TABLET ORAL DAILY
Status: DISCONTINUED | OUTPATIENT
Start: 2021-04-22 | End: 2021-04-21 | Stop reason: RX

## 2021-04-21 RX ORDER — MEPERIDINE HYDROCHLORIDE 25 MG/ML
12.5 INJECTION INTRAMUSCULAR; INTRAVENOUS; SUBCUTANEOUS ONCE AS NEEDED
Status: DISCONTINUED | OUTPATIENT
Start: 2021-04-21 | End: 2021-04-21

## 2021-04-21 RX ORDER — CEFAZOLIN SODIUM 2 G/50ML
2 SOLUTION INTRAVENOUS
Status: DISCONTINUED | OUTPATIENT
Start: 2021-04-22 | End: 2021-04-22

## 2021-04-21 RX ORDER — DEXAMETHASONE SODIUM PHOSPHATE 4 MG/ML
INJECTION, SOLUTION INTRA-ARTICULAR; INTRALESIONAL; INTRAMUSCULAR; INTRAVENOUS; SOFT TISSUE
Status: DISCONTINUED | OUTPATIENT
Start: 2021-04-21 | End: 2021-04-21

## 2021-04-21 RX ORDER — CEFAZOLIN SODIUM 2 G/50ML
2 SOLUTION INTRAVENOUS
Status: DISCONTINUED | OUTPATIENT
Start: 2021-04-21 | End: 2021-04-21 | Stop reason: SDUPTHER

## 2021-04-21 RX ORDER — HEPARIN SODIUM 10000 [USP'U]/ML
INJECTION, SOLUTION INTRAVENOUS; SUBCUTANEOUS
Status: DISCONTINUED | OUTPATIENT
Start: 2021-04-21 | End: 2021-04-21 | Stop reason: HOSPADM

## 2021-04-21 RX ORDER — LIDOCAINE HYDROCHLORIDE 10 MG/ML
INJECTION, SOLUTION INTRAVENOUS
Status: DISCONTINUED | OUTPATIENT
Start: 2021-04-21 | End: 2021-04-21

## 2021-04-21 RX ORDER — METOCLOPRAMIDE HYDROCHLORIDE 5 MG/ML
5 INJECTION INTRAMUSCULAR; INTRAVENOUS EVERY 6 HOURS PRN
Status: DISCONTINUED | OUTPATIENT
Start: 2021-04-21 | End: 2021-04-21

## 2021-04-21 RX ORDER — PAPAVERINE HYDROCHLORIDE 30 MG/ML
INJECTION INTRAMUSCULAR; INTRAVENOUS
Status: DISCONTINUED | OUTPATIENT
Start: 2021-04-21 | End: 2021-04-21 | Stop reason: HOSPADM

## 2021-04-21 RX ORDER — NEOSTIGMINE METHYLSULFATE 1 MG/ML
INJECTION, SOLUTION INTRAVENOUS
Status: DISCONTINUED | OUTPATIENT
Start: 2021-04-21 | End: 2021-04-21

## 2021-04-21 RX ORDER — OXYCODONE AND ACETAMINOPHEN 10; 325 MG/1; MG/1
1 TABLET ORAL EVERY 4 HOURS PRN
Status: DISCONTINUED | OUTPATIENT
Start: 2021-04-21 | End: 2021-04-25 | Stop reason: HOSPADM

## 2021-04-21 RX ORDER — CEFAZOLIN SODIUM 1 G/3ML
2 INJECTION, POWDER, FOR SOLUTION INTRAMUSCULAR; INTRAVENOUS
Status: COMPLETED | OUTPATIENT
Start: 2021-04-21 | End: 2021-04-21

## 2021-04-21 RX ORDER — MUPIROCIN 20 MG/G
OINTMENT TOPICAL
Status: DISCONTINUED | OUTPATIENT
Start: 2021-04-21 | End: 2021-04-21

## 2021-04-21 RX ORDER — SODIUM CHLORIDE, SODIUM LACTATE, POTASSIUM CHLORIDE, CALCIUM CHLORIDE 600; 310; 30; 20 MG/100ML; MG/100ML; MG/100ML; MG/100ML
INJECTION, SOLUTION INTRAVENOUS CONTINUOUS
Status: DISCONTINUED | OUTPATIENT
Start: 2021-04-21 | End: 2021-04-21

## 2021-04-21 RX ORDER — HYDROMORPHONE HYDROCHLORIDE 2 MG/ML
INJECTION, SOLUTION INTRAMUSCULAR; INTRAVENOUS; SUBCUTANEOUS
Status: DISCONTINUED | OUTPATIENT
Start: 2021-04-21 | End: 2021-04-21

## 2021-04-21 RX ORDER — HEPARIN SODIUM 5000 [USP'U]/ML
5000 INJECTION, SOLUTION INTRAVENOUS; SUBCUTANEOUS
Status: COMPLETED | OUTPATIENT
Start: 2021-04-21 | End: 2021-04-21

## 2021-04-21 RX ORDER — HEPARIN SODIUM 5000 [USP'U]/ML
5000 INJECTION, SOLUTION INTRAVENOUS; SUBCUTANEOUS EVERY 8 HOURS
Status: DISCONTINUED | OUTPATIENT
Start: 2021-04-21 | End: 2021-04-21

## 2021-04-21 RX ORDER — ONDANSETRON 2 MG/ML
4 INJECTION INTRAMUSCULAR; INTRAVENOUS EVERY 12 HOURS PRN
Status: DISCONTINUED | OUTPATIENT
Start: 2021-04-21 | End: 2021-04-21

## 2021-04-21 RX ORDER — VALSARTAN 80 MG/1
320 TABLET ORAL DAILY
Status: DISCONTINUED | OUTPATIENT
Start: 2021-04-22 | End: 2021-04-25 | Stop reason: HOSPADM

## 2021-04-21 RX ORDER — ONDANSETRON 2 MG/ML
8 INJECTION INTRAMUSCULAR; INTRAVENOUS EVERY 6 HOURS PRN
Status: DISCONTINUED | OUTPATIENT
Start: 2021-04-21 | End: 2021-04-25 | Stop reason: HOSPADM

## 2021-04-21 RX ORDER — PROPOFOL 10 MG/ML
VIAL (ML) INTRAVENOUS
Status: DISCONTINUED | OUTPATIENT
Start: 2021-04-21 | End: 2021-04-21

## 2021-04-21 RX ORDER — GABAPENTIN 300 MG/1
300 CAPSULE ORAL 3 TIMES DAILY
Status: DISCONTINUED | OUTPATIENT
Start: 2021-04-21 | End: 2021-04-25 | Stop reason: HOSPADM

## 2021-04-21 RX ORDER — VENLAFAXINE HYDROCHLORIDE 37.5 MG/1
37.5 CAPSULE, EXTENDED RELEASE ORAL DAILY
Status: DISCONTINUED | OUTPATIENT
Start: 2021-04-22 | End: 2021-04-25 | Stop reason: HOSPADM

## 2021-04-21 RX ORDER — ACETAMINOPHEN 10 MG/ML
INJECTION, SOLUTION INTRAVENOUS
Status: DISCONTINUED | OUTPATIENT
Start: 2021-04-21 | End: 2021-04-21

## 2021-04-21 RX ORDER — ONDANSETRON 2 MG/ML
4 INJECTION INTRAMUSCULAR; INTRAVENOUS DAILY PRN
Status: DISCONTINUED | OUTPATIENT
Start: 2021-04-21 | End: 2021-04-21

## 2021-04-21 RX ORDER — FENTANYL CITRATE 50 UG/ML
INJECTION, SOLUTION INTRAMUSCULAR; INTRAVENOUS
Status: DISCONTINUED | OUTPATIENT
Start: 2021-04-21 | End: 2021-04-21

## 2021-04-21 RX ADMIN — HYDROMORPHONE HYDROCHLORIDE 0.4 MG: 2 INJECTION INTRAMUSCULAR; INTRAVENOUS; SUBCUTANEOUS at 03:04

## 2021-04-21 RX ADMIN — OXYCODONE AND ACETAMINOPHEN 1 TABLET: 10; 325 TABLET ORAL at 11:04

## 2021-04-21 RX ADMIN — ALBUMIN (HUMAN): 2.5 SOLUTION INTRAVENOUS at 08:04

## 2021-04-21 RX ADMIN — FENTANYL CITRATE 50 MCG: 50 INJECTION, SOLUTION INTRAMUSCULAR; INTRAVENOUS at 07:04

## 2021-04-21 RX ADMIN — VECURONIUM BROMIDE FOR INJECTION 3 MG: 1 INJECTION, POWDER, LYOPHILIZED, FOR SOLUTION INTRAVENOUS at 12:04

## 2021-04-21 RX ADMIN — HYDROMORPHONE HYDROCHLORIDE 0.2 MG: 2 INJECTION, SOLUTION INTRAMUSCULAR; INTRAVENOUS; SUBCUTANEOUS at 02:04

## 2021-04-21 RX ADMIN — CEFAZOLIN 2 G: 330 INJECTION, POWDER, FOR SOLUTION INTRAMUSCULAR; INTRAVENOUS at 07:04

## 2021-04-21 RX ADMIN — CEFAZOLIN 2 G: 330 INJECTION, POWDER, FOR SOLUTION INTRAMUSCULAR; INTRAVENOUS at 10:04

## 2021-04-21 RX ADMIN — ROCURONIUM BROMIDE 50 MG: 10 INJECTION, SOLUTION INTRAVENOUS at 07:04

## 2021-04-21 RX ADMIN — FENTANYL CITRATE 100 MCG: 50 INJECTION, SOLUTION INTRAMUSCULAR; INTRAVENOUS at 07:04

## 2021-04-21 RX ADMIN — MUPIROCIN: 20 OINTMENT TOPICAL at 05:04

## 2021-04-21 RX ADMIN — SODIUM CHLORIDE, SODIUM LACTATE, POTASSIUM CHLORIDE, AND CALCIUM CHLORIDE: 600; 310; 30; 20 INJECTION, SOLUTION INTRAVENOUS at 02:04

## 2021-04-21 RX ADMIN — CARBOXYMETHYLCELLULOSE SODIUM 3 DROP: 2.5 SOLUTION/ DROPS OPHTHALMIC at 07:04

## 2021-04-21 RX ADMIN — VECURONIUM BROMIDE FOR INJECTION 2 MG: 1 INJECTION, POWDER, LYOPHILIZED, FOR SOLUTION INTRAVENOUS at 10:04

## 2021-04-21 RX ADMIN — GABAPENTIN 300 MG: 300 CAPSULE ORAL at 09:04

## 2021-04-21 RX ADMIN — CEFAZOLIN 2 G: 330 INJECTION, POWDER, FOR SOLUTION INTRAMUSCULAR; INTRAVENOUS at 02:04

## 2021-04-21 RX ADMIN — OXYCODONE 5 MG: 5 TABLET ORAL at 03:04

## 2021-04-21 RX ADMIN — EPHEDRINE SULFATE 15 MG: 50 INJECTION INTRAVENOUS at 07:04

## 2021-04-21 RX ADMIN — ONDANSETRON HYDROCHLORIDE 4 MG: 2 INJECTION INTRAMUSCULAR; INTRAVENOUS at 02:04

## 2021-04-21 RX ADMIN — HEPARIN SODIUM 5000 UNITS: 5000 INJECTION INTRAVENOUS; SUBCUTANEOUS at 05:04

## 2021-04-21 RX ADMIN — ALBUMIN (HUMAN): 2.5 SOLUTION INTRAVENOUS at 07:04

## 2021-04-21 RX ADMIN — PHENYLEPHRINE HYDROCHLORIDE 0.15 MCG/KG/MIN: 10 INJECTION INTRAVENOUS at 07:04

## 2021-04-21 RX ADMIN — OXYCODONE AND ACETAMINOPHEN 1 TABLET: 10; 325 TABLET ORAL at 07:04

## 2021-04-21 RX ADMIN — SODIUM CHLORIDE, SODIUM LACTATE, POTASSIUM CHLORIDE, AND CALCIUM CHLORIDE: 600; 310; 30; 20 INJECTION, SOLUTION INTRAVENOUS at 06:04

## 2021-04-21 RX ADMIN — MIDAZOLAM HYDROCHLORIDE 2 MG: 1 INJECTION, SOLUTION INTRAMUSCULAR; INTRAVENOUS at 07:04

## 2021-04-21 RX ADMIN — FENTANYL CITRATE 50 MCG: 50 INJECTION, SOLUTION INTRAMUSCULAR; INTRAVENOUS at 11:04

## 2021-04-21 RX ADMIN — LIDOCAINE HYDROCHLORIDE 100 MG: 10 INJECTION, SOLUTION INTRAVENOUS at 07:04

## 2021-04-21 RX ADMIN — FENTANYL CITRATE 50 MCG: 50 INJECTION, SOLUTION INTRAMUSCULAR; INTRAVENOUS at 01:04

## 2021-04-21 RX ADMIN — VECURONIUM BROMIDE FOR INJECTION 3 MG: 1 INJECTION, POWDER, LYOPHILIZED, FOR SOLUTION INTRAVENOUS at 07:04

## 2021-04-21 RX ADMIN — GLYCOPYRROLATE 0.8 MCG: 0.2 INJECTION, SOLUTION INTRAMUSCULAR; INTRAVITREAL at 02:04

## 2021-04-21 RX ADMIN — ACETAMINOPHEN 1000 MG: 10 INJECTION, SOLUTION INTRAVENOUS at 01:04

## 2021-04-21 RX ADMIN — DEXAMETHASONE SODIUM PHOSPHATE 8 MG: 4 INJECTION, SOLUTION INTRAMUSCULAR; INTRAVENOUS at 02:04

## 2021-04-21 RX ADMIN — NEOSTIGMINE METHYLSULFATE 5 MG: 1 INJECTION INTRAVENOUS at 02:04

## 2021-04-21 RX ADMIN — EPHEDRINE SULFATE 15 MG: 50 INJECTION INTRAVENOUS at 09:04

## 2021-04-21 RX ADMIN — VECURONIUM BROMIDE FOR INJECTION 2 MG: 1 INJECTION, POWDER, LYOPHILIZED, FOR SOLUTION INTRAVENOUS at 09:04

## 2021-04-21 RX ADMIN — EPHEDRINE SULFATE 10 MG: 50 INJECTION INTRAVENOUS at 07:04

## 2021-04-21 RX ADMIN — VECURONIUM BROMIDE FOR INJECTION 3 MG: 1 INJECTION, POWDER, LYOPHILIZED, FOR SOLUTION INTRAVENOUS at 11:04

## 2021-04-21 RX ADMIN — HYDROMORPHONE HYDROCHLORIDE 0.4 MG: 2 INJECTION INTRAMUSCULAR; INTRAVENOUS; SUBCUTANEOUS at 04:04

## 2021-04-21 RX ADMIN — EPHEDRINE SULFATE 10 MG: 50 INJECTION INTRAVENOUS at 09:04

## 2021-04-21 RX ADMIN — PROPOFOL 200 MG: 10 INJECTION, EMULSION INTRAVENOUS at 07:04

## 2021-04-21 RX ADMIN — Medication 30 MG: at 07:04

## 2021-04-21 RX ADMIN — VECURONIUM BROMIDE FOR INJECTION 3 MG: 1 INJECTION, POWDER, LYOPHILIZED, FOR SOLUTION INTRAVENOUS at 08:04

## 2021-04-22 PROCEDURE — 11000001 HC ACUTE MED/SURG PRIVATE ROOM

## 2021-04-22 PROCEDURE — 63600175 PHARM REV CODE 636 W HCPCS: Performed by: STUDENT IN AN ORGANIZED HEALTH CARE EDUCATION/TRAINING PROGRAM

## 2021-04-22 PROCEDURE — 97163 PT EVAL HIGH COMPLEX 45 MIN: CPT

## 2021-04-22 PROCEDURE — 25000003 PHARM REV CODE 250: Performed by: STUDENT IN AN ORGANIZED HEALTH CARE EDUCATION/TRAINING PROGRAM

## 2021-04-22 PROCEDURE — 63600175 PHARM REV CODE 636 W HCPCS: Performed by: PLASTIC SURGERY

## 2021-04-22 RX ORDER — CEFAZOLIN SODIUM 2 G/50ML
2 SOLUTION INTRAVENOUS
Status: DISCONTINUED | OUTPATIENT
Start: 2021-04-22 | End: 2021-04-22

## 2021-04-22 RX ORDER — CEFAZOLIN SODIUM 2 G/50ML
2 SOLUTION INTRAVENOUS
Status: DISCONTINUED | OUTPATIENT
Start: 2021-04-22 | End: 2021-04-25 | Stop reason: HOSPADM

## 2021-04-22 RX ADMIN — CYCLOBENZAPRINE HYDROCHLORIDE 5 MG: 5 TABLET, FILM COATED ORAL at 01:04

## 2021-04-22 RX ADMIN — GABAPENTIN 300 MG: 300 CAPSULE ORAL at 08:04

## 2021-04-22 RX ADMIN — ENOXAPARIN SODIUM 40 MG: 40 INJECTION SUBCUTANEOUS at 05:04

## 2021-04-22 RX ADMIN — VENLAFAXINE HYDROCHLORIDE 37.5 MG: 37.5 CAPSULE, EXTENDED RELEASE ORAL at 08:04

## 2021-04-22 RX ADMIN — OXYCODONE AND ACETAMINOPHEN 1 TABLET: 10; 325 TABLET ORAL at 02:04

## 2021-04-22 RX ADMIN — CEFAZOLIN SODIUM 2 G: 2 SOLUTION INTRAVENOUS at 03:04

## 2021-04-22 RX ADMIN — ZOLPIDEM TARTRATE 5 MG: 5 TABLET ORAL at 01:04

## 2021-04-22 RX ADMIN — OXYCODONE AND ACETAMINOPHEN 1 TABLET: 10; 325 TABLET ORAL at 07:04

## 2021-04-22 RX ADMIN — OXYCODONE AND ACETAMINOPHEN 1 TABLET: 10; 325 TABLET ORAL at 11:04

## 2021-04-22 RX ADMIN — OXYCODONE AND ACETAMINOPHEN 1 TABLET: 10; 325 TABLET ORAL at 03:04

## 2021-04-22 RX ADMIN — CYCLOBENZAPRINE HYDROCHLORIDE 5 MG: 5 TABLET, FILM COATED ORAL at 09:04

## 2021-04-22 RX ADMIN — MORPHINE SULFATE 4 MG: 10 INJECTION INTRAVENOUS at 05:04

## 2021-04-22 RX ADMIN — ZOLPIDEM TARTRATE 5 MG: 5 TABLET ORAL at 11:04

## 2021-04-22 RX ADMIN — CEFAZOLIN SODIUM 2 G: 2 SOLUTION INTRAVENOUS at 01:04

## 2021-04-22 RX ADMIN — GABAPENTIN 300 MG: 300 CAPSULE ORAL at 09:04

## 2021-04-22 RX ADMIN — CEFAZOLIN SODIUM 2 G: 2 SOLUTION INTRAVENOUS at 09:04

## 2021-04-22 RX ADMIN — GABAPENTIN 300 MG: 300 CAPSULE ORAL at 02:04

## 2021-04-23 PROCEDURE — 97530 THERAPEUTIC ACTIVITIES: CPT | Mod: CQ

## 2021-04-23 PROCEDURE — 97116 GAIT TRAINING THERAPY: CPT | Mod: CQ

## 2021-04-23 PROCEDURE — 63600175 PHARM REV CODE 636 W HCPCS: Performed by: PLASTIC SURGERY

## 2021-04-23 PROCEDURE — 25000003 PHARM REV CODE 250: Performed by: STUDENT IN AN ORGANIZED HEALTH CARE EDUCATION/TRAINING PROGRAM

## 2021-04-23 PROCEDURE — 11000001 HC ACUTE MED/SURG PRIVATE ROOM

## 2021-04-23 PROCEDURE — 63600175 PHARM REV CODE 636 W HCPCS: Performed by: STUDENT IN AN ORGANIZED HEALTH CARE EDUCATION/TRAINING PROGRAM

## 2021-04-23 RX ORDER — ADHESIVE BANDAGE
30 BANDAGE TOPICAL DAILY PRN
Status: DISCONTINUED | OUTPATIENT
Start: 2021-04-23 | End: 2021-04-25 | Stop reason: HOSPADM

## 2021-04-23 RX ORDER — AMOXICILLIN 250 MG
1 CAPSULE ORAL DAILY PRN
Status: DISCONTINUED | OUTPATIENT
Start: 2021-04-23 | End: 2021-04-25 | Stop reason: HOSPADM

## 2021-04-23 RX ADMIN — MORPHINE SULFATE 4 MG: 10 INJECTION INTRAVENOUS at 04:04

## 2021-04-23 RX ADMIN — OXYCODONE AND ACETAMINOPHEN 1 TABLET: 10; 325 TABLET ORAL at 02:04

## 2021-04-23 RX ADMIN — CEFAZOLIN SODIUM 2 G: 2 SOLUTION INTRAVENOUS at 08:04

## 2021-04-23 RX ADMIN — CEFAZOLIN SODIUM 2 G: 2 SOLUTION INTRAVENOUS at 04:04

## 2021-04-23 RX ADMIN — CYCLOBENZAPRINE HYDROCHLORIDE 5 MG: 5 TABLET, FILM COATED ORAL at 08:04

## 2021-04-23 RX ADMIN — OXYCODONE AND ACETAMINOPHEN 1 TABLET: 10; 325 TABLET ORAL at 05:04

## 2021-04-23 RX ADMIN — GABAPENTIN 300 MG: 300 CAPSULE ORAL at 08:04

## 2021-04-23 RX ADMIN — OXYCODONE AND ACETAMINOPHEN 1 TABLET: 10; 325 TABLET ORAL at 08:04

## 2021-04-23 RX ADMIN — GABAPENTIN 300 MG: 300 CAPSULE ORAL at 02:04

## 2021-04-23 RX ADMIN — CEFAZOLIN SODIUM 2 G: 2 SOLUTION INTRAVENOUS at 02:04

## 2021-04-23 RX ADMIN — MAGNESIUM HYDROXIDE 2400 MG: 400 SUSPENSION ORAL at 10:04

## 2021-04-23 RX ADMIN — MORPHINE SULFATE 4 MG: 10 INJECTION INTRAVENOUS at 08:04

## 2021-04-23 RX ADMIN — ENOXAPARIN SODIUM 40 MG: 40 INJECTION SUBCUTANEOUS at 05:04

## 2021-04-23 RX ADMIN — VENLAFAXINE HYDROCHLORIDE 37.5 MG: 37.5 CAPSULE, EXTENDED RELEASE ORAL at 08:04

## 2021-04-24 PROCEDURE — 63600175 PHARM REV CODE 636 W HCPCS: Performed by: STUDENT IN AN ORGANIZED HEALTH CARE EDUCATION/TRAINING PROGRAM

## 2021-04-24 PROCEDURE — 11000001 HC ACUTE MED/SURG PRIVATE ROOM

## 2021-04-24 PROCEDURE — 25000003 PHARM REV CODE 250: Performed by: STUDENT IN AN ORGANIZED HEALTH CARE EDUCATION/TRAINING PROGRAM

## 2021-04-24 PROCEDURE — 63600175 PHARM REV CODE 636 W HCPCS: Performed by: PLASTIC SURGERY

## 2021-04-24 RX ADMIN — ENOXAPARIN SODIUM 40 MG: 40 INJECTION SUBCUTANEOUS at 04:04

## 2021-04-24 RX ADMIN — GABAPENTIN 300 MG: 300 CAPSULE ORAL at 02:04

## 2021-04-24 RX ADMIN — OXYCODONE AND ACETAMINOPHEN 1 TABLET: 10; 325 TABLET ORAL at 04:04

## 2021-04-24 RX ADMIN — DOCUSATE SODIUM - SENNOSIDES 1 TABLET: 50; 8.6 TABLET, FILM COATED ORAL at 08:04

## 2021-04-24 RX ADMIN — CYCLOBENZAPRINE HYDROCHLORIDE 5 MG: 5 TABLET, FILM COATED ORAL at 08:04

## 2021-04-24 RX ADMIN — GABAPENTIN 300 MG: 300 CAPSULE ORAL at 08:04

## 2021-04-24 RX ADMIN — OXYCODONE AND ACETAMINOPHEN 1 TABLET: 10; 325 TABLET ORAL at 08:04

## 2021-04-24 RX ADMIN — OXYCODONE AND ACETAMINOPHEN 1 TABLET: 10; 325 TABLET ORAL at 12:04

## 2021-04-24 RX ADMIN — CEFAZOLIN SODIUM 2 G: 2 SOLUTION INTRAVENOUS at 05:04

## 2021-04-24 RX ADMIN — CEFAZOLIN SODIUM 2 G: 2 SOLUTION INTRAVENOUS at 02:04

## 2021-04-24 RX ADMIN — VENLAFAXINE HYDROCHLORIDE 37.5 MG: 37.5 CAPSULE, EXTENDED RELEASE ORAL at 09:04

## 2021-04-24 RX ADMIN — CEFAZOLIN SODIUM 2 G: 2 SOLUTION INTRAVENOUS at 08:04

## 2021-04-24 RX ADMIN — MAGNESIUM HYDROXIDE 2400 MG: 400 SUSPENSION ORAL at 08:04

## 2021-04-25 VITALS
SYSTOLIC BLOOD PRESSURE: 169 MMHG | WEIGHT: 232.56 LBS | BODY MASS INDEX: 41.21 KG/M2 | OXYGEN SATURATION: 99 % | TEMPERATURE: 99 F | RESPIRATION RATE: 19 BRPM | DIASTOLIC BLOOD PRESSURE: 78 MMHG | HEART RATE: 100 BPM | HEIGHT: 63 IN

## 2021-04-25 PROBLEM — C50.919: Status: RESOLVED | Noted: 2021-04-21 | Resolved: 2021-04-25

## 2021-04-25 PROCEDURE — 25000003 PHARM REV CODE 250: Performed by: STUDENT IN AN ORGANIZED HEALTH CARE EDUCATION/TRAINING PROGRAM

## 2021-04-25 PROCEDURE — 63600175 PHARM REV CODE 636 W HCPCS: Performed by: PLASTIC SURGERY

## 2021-04-25 RX ADMIN — OXYCODONE AND ACETAMINOPHEN 1 TABLET: 10; 325 TABLET ORAL at 05:04

## 2021-04-25 RX ADMIN — GABAPENTIN 300 MG: 300 CAPSULE ORAL at 09:04

## 2021-04-25 RX ADMIN — OXYCODONE AND ACETAMINOPHEN 1 TABLET: 10; 325 TABLET ORAL at 01:04

## 2021-04-25 RX ADMIN — VENLAFAXINE HYDROCHLORIDE 37.5 MG: 37.5 CAPSULE, EXTENDED RELEASE ORAL at 09:04

## 2021-04-25 RX ADMIN — OXYCODONE AND ACETAMINOPHEN 1 TABLET: 10; 325 TABLET ORAL at 09:04

## 2021-04-25 RX ADMIN — AMLODIPINE BESYLATE 5 MG: 5 TABLET ORAL at 09:04

## 2021-04-25 RX ADMIN — CEFAZOLIN SODIUM 2 G: 2 SOLUTION INTRAVENOUS at 05:04

## 2021-04-25 RX ADMIN — VALSARTAN 320 MG: 80 TABLET, FILM COATED ORAL at 09:04

## 2021-04-26 ENCOUNTER — TELEPHONE (OUTPATIENT)
Dept: SURGERY | Facility: CLINIC | Age: 51
End: 2021-04-26

## 2021-04-27 ENCOUNTER — NURSE TRIAGE (OUTPATIENT)
Dept: ADMINISTRATIVE | Facility: CLINIC | Age: 51
End: 2021-04-27

## 2021-04-27 LAB
FINAL PATHOLOGIC DIAGNOSIS: NORMAL
GROSS: NORMAL
Lab: NORMAL

## 2021-04-28 ENCOUNTER — TELEPHONE (OUTPATIENT)
Dept: SURGERY | Facility: CLINIC | Age: 51
End: 2021-04-28

## 2021-04-28 ENCOUNTER — OFFICE VISIT (OUTPATIENT)
Dept: PLASTIC SURGERY | Facility: CLINIC | Age: 51
End: 2021-04-28
Attending: PLASTIC SURGERY
Payer: COMMERCIAL

## 2021-04-28 VITALS — HEART RATE: 106 BPM | DIASTOLIC BLOOD PRESSURE: 72 MMHG | SYSTOLIC BLOOD PRESSURE: 113 MMHG

## 2021-04-28 DIAGNOSIS — Z85.3 HISTORY OF BREAST CANCER: Primary | ICD-10-CM

## 2021-04-28 PROCEDURE — 1126F PR PAIN SEVERITY QUANTIFIED, NO PAIN PRESENT: ICD-10-PCS | Mod: S$GLB,,, | Performed by: PLASTIC SURGERY

## 2021-04-28 PROCEDURE — 1126F AMNT PAIN NOTED NONE PRSNT: CPT | Mod: S$GLB,,, | Performed by: PLASTIC SURGERY

## 2021-04-28 PROCEDURE — 99024 POSTOP FOLLOW-UP VISIT: CPT | Mod: S$GLB,,, | Performed by: PLASTIC SURGERY

## 2021-04-28 PROCEDURE — 99024 PR POST-OP FOLLOW-UP VISIT: ICD-10-PCS | Mod: S$GLB,,, | Performed by: PLASTIC SURGERY

## 2021-05-02 ENCOUNTER — HOSPITAL ENCOUNTER (EMERGENCY)
Facility: OTHER | Age: 51
Discharge: HOME | End: 2021-05-02
Attending: EMERGENCY MEDICINE
Payer: COMMERCIAL

## 2021-05-02 VITALS
OXYGEN SATURATION: 100 % | WEIGHT: 227 LBS | RESPIRATION RATE: 16 BRPM | HEART RATE: 81 BPM | BODY MASS INDEX: 40.22 KG/M2 | SYSTOLIC BLOOD PRESSURE: 105 MMHG | TEMPERATURE: 99 F | DIASTOLIC BLOOD PRESSURE: 71 MMHG | HEIGHT: 63 IN

## 2021-05-02 DIAGNOSIS — Z48.89 ENCOUNTER FOR POSTOPERATIVE WOUND CARE: Primary | ICD-10-CM

## 2021-05-02 LAB
HCV AB SERPL QL IA: NEGATIVE
HIV 1+2 AB+HIV1 P24 AG SERPL QL IA: NEGATIVE

## 2021-05-02 PROCEDURE — 99283 EMERGENCY DEPT VISIT LOW MDM: CPT

## 2021-05-02 PROCEDURE — 86803 HEPATITIS C AB TEST: CPT | Performed by: EMERGENCY MEDICINE

## 2021-05-02 PROCEDURE — 86703 HIV-1/HIV-2 1 RESULT ANTBDY: CPT | Performed by: EMERGENCY MEDICINE

## 2021-05-03 ENCOUNTER — TELEPHONE (OUTPATIENT)
Dept: PLASTIC SURGERY | Facility: CLINIC | Age: 51
End: 2021-05-03

## 2021-05-03 NOTE — PLAN OF CARE
START OFF PATHWAY REGIMEN - Breast            Doxorubicin (Adriamycin(R))       Cyclophosphamide (Cytoxan(R))     **Always confirm dose/schedule in your pharmacy ordering system**    Patient Characteristics:  Preoperative or Nonsurgical Candidate (Clinical Staging), Surgery followed by   Adjuvant Therapy  Therapeutic Status: Preoperative or Nonsurgical Candidate (Clinical Staging)  AJCC M Category: cM0  AJCC Grade: GX  Breast Surgical Plan: Surgery followed by Adjuvant Therapy (if necessary)  ER Status: Negative (-)  AJCC 8 Stage Grouping: IIB  HER2 Status: Unknown  AJCC T Category: cTX  AJCC N Category: cNX  HI Status: Positive (+)  Intent of Therapy:  Curative Intent, Discussed with Patient  
START OFF PATHWAY REGIMEN - Breast            Paclitaxel (Taxol(R))     **Always confirm dose/schedule in your pharmacy ordering system**    Patient Characteristics:  Preoperative or Nonsurgical Candidate (Clinical Staging), Surgery followed by   Adjuvant Therapy  Therapeutic Status: Preoperative or Nonsurgical Candidate (Clinical Staging)  AJCC M Category: cM0  AJCC Grade: GX  Breast Surgical Plan: Surgery followed by Adjuvant Therapy (if necessary)  ER Status: Negative (-)  AJCC 8 Stage Grouping: IIB  HER2 Status: Unknown  AJCC T Category: cTX  AJCC N Category: cNX  NM Status: Positive (+)  Intent of Therapy:  Curative Intent, Discussed with Patient  
done

## 2021-05-04 ENCOUNTER — OFFICE VISIT (OUTPATIENT)
Dept: PLASTIC SURGERY | Facility: CLINIC | Age: 51
End: 2021-05-04
Attending: PLASTIC SURGERY
Payer: COMMERCIAL

## 2021-05-04 VITALS
SYSTOLIC BLOOD PRESSURE: 110 MMHG | BODY MASS INDEX: 40.23 KG/M2 | DIASTOLIC BLOOD PRESSURE: 74 MMHG | HEIGHT: 63 IN | WEIGHT: 227.06 LBS | HEART RATE: 92 BPM

## 2021-05-04 DIAGNOSIS — Z85.3 HISTORY OF BREAST CANCER: Primary | ICD-10-CM

## 2021-05-04 PROCEDURE — 3008F BODY MASS INDEX DOCD: CPT | Mod: CPTII,S$GLB,, | Performed by: PLASTIC SURGERY

## 2021-05-04 PROCEDURE — 99024 POSTOP FOLLOW-UP VISIT: CPT | Mod: S$GLB,,, | Performed by: PLASTIC SURGERY

## 2021-05-04 PROCEDURE — 99024 PR POST-OP FOLLOW-UP VISIT: ICD-10-PCS | Mod: S$GLB,,, | Performed by: PLASTIC SURGERY

## 2021-05-04 PROCEDURE — 3008F PR BODY MASS INDEX (BMI) DOCUMENTED: ICD-10-PCS | Mod: CPTII,S$GLB,, | Performed by: PLASTIC SURGERY

## 2021-05-05 ENCOUNTER — OFFICE VISIT (OUTPATIENT)
Dept: SURGERY | Facility: CLINIC | Age: 51
End: 2021-05-05
Payer: COMMERCIAL

## 2021-05-05 VITALS
BODY MASS INDEX: 40.48 KG/M2 | SYSTOLIC BLOOD PRESSURE: 90 MMHG | HEART RATE: 96 BPM | HEIGHT: 62 IN | WEIGHT: 220 LBS | DIASTOLIC BLOOD PRESSURE: 52 MMHG

## 2021-05-05 DIAGNOSIS — C50.611 MALIGNANT NEOPLASM OF AXILLARY TAIL OF RIGHT BREAST IN FEMALE, ESTROGEN RECEPTOR POSITIVE: Primary | ICD-10-CM

## 2021-05-05 DIAGNOSIS — Z17.0 MALIGNANT NEOPLASM OF AXILLARY TAIL OF RIGHT BREAST IN FEMALE, ESTROGEN RECEPTOR POSITIVE: Primary | ICD-10-CM

## 2021-05-05 PROCEDURE — 3008F PR BODY MASS INDEX (BMI) DOCUMENTED: ICD-10-PCS | Mod: CPTII,S$GLB,, | Performed by: SURGERY

## 2021-05-05 PROCEDURE — 1125F PR PAIN SEVERITY QUANTIFIED, PAIN PRESENT: ICD-10-PCS | Mod: S$GLB,,, | Performed by: SURGERY

## 2021-05-05 PROCEDURE — 99999 PR PBB SHADOW E&M-EST. PATIENT-LVL III: ICD-10-PCS | Mod: PBBFAC,,, | Performed by: SURGERY

## 2021-05-05 PROCEDURE — 99024 POSTOP FOLLOW-UP VISIT: CPT | Mod: S$GLB,,, | Performed by: SURGERY

## 2021-05-05 PROCEDURE — 99999 PR PBB SHADOW E&M-EST. PATIENT-LVL III: CPT | Mod: PBBFAC,,, | Performed by: SURGERY

## 2021-05-05 PROCEDURE — 1125F AMNT PAIN NOTED PAIN PRSNT: CPT | Mod: S$GLB,,, | Performed by: SURGERY

## 2021-05-05 PROCEDURE — 99024 PR POST-OP FOLLOW-UP VISIT: ICD-10-PCS | Mod: S$GLB,,, | Performed by: SURGERY

## 2021-05-05 PROCEDURE — 3008F BODY MASS INDEX DOCD: CPT | Mod: CPTII,S$GLB,, | Performed by: SURGERY

## 2021-05-06 ENCOUNTER — TELEPHONE (OUTPATIENT)
Dept: INTERNAL MEDICINE | Facility: CLINIC | Age: 51
End: 2021-05-06

## 2021-05-10 ENCOUNTER — PATIENT MESSAGE (OUTPATIENT)
Dept: PLASTIC SURGERY | Facility: CLINIC | Age: 51
End: 2021-05-10

## 2021-05-10 RX ORDER — CYCLOBENZAPRINE HCL 10 MG
TABLET ORAL
COMMUNITY
Start: 2021-04-12 | End: 2021-06-14 | Stop reason: SDUPTHER

## 2021-05-11 ENCOUNTER — OFFICE VISIT (OUTPATIENT)
Dept: PLASTIC SURGERY | Facility: CLINIC | Age: 51
End: 2021-05-11
Attending: PLASTIC SURGERY
Payer: COMMERCIAL

## 2021-05-11 VITALS — DIASTOLIC BLOOD PRESSURE: 76 MMHG | HEART RATE: 107 BPM | SYSTOLIC BLOOD PRESSURE: 109 MMHG

## 2021-05-11 DIAGNOSIS — Z85.3 HISTORY OF BREAST CANCER: Primary | ICD-10-CM

## 2021-05-11 PROCEDURE — 1126F PR PAIN SEVERITY QUANTIFIED, NO PAIN PRESENT: ICD-10-PCS | Mod: S$GLB,,, | Performed by: PLASTIC SURGERY

## 2021-05-11 PROCEDURE — 99024 POSTOP FOLLOW-UP VISIT: CPT | Mod: S$GLB,,, | Performed by: PLASTIC SURGERY

## 2021-05-11 PROCEDURE — 99024 PR POST-OP FOLLOW-UP VISIT: ICD-10-PCS | Mod: S$GLB,,, | Performed by: PLASTIC SURGERY

## 2021-05-11 PROCEDURE — 1126F AMNT PAIN NOTED NONE PRSNT: CPT | Mod: S$GLB,,, | Performed by: PLASTIC SURGERY

## 2021-05-13 ENCOUNTER — OFFICE VISIT (OUTPATIENT)
Dept: INTERNAL MEDICINE | Facility: CLINIC | Age: 51
End: 2021-05-13
Payer: COMMERCIAL

## 2021-05-13 VITALS
HEIGHT: 63 IN | SYSTOLIC BLOOD PRESSURE: 122 MMHG | WEIGHT: 222.88 LBS | OXYGEN SATURATION: 95 % | DIASTOLIC BLOOD PRESSURE: 66 MMHG | HEART RATE: 91 BPM | BODY MASS INDEX: 39.49 KG/M2

## 2021-05-13 DIAGNOSIS — R91.8 ABNORMAL CT SCAN OF LUNG: Primary | ICD-10-CM

## 2021-05-13 DIAGNOSIS — R73.03 PREDIABETES: ICD-10-CM

## 2021-05-13 DIAGNOSIS — Z98.890 POST-OPERATIVE STATE: ICD-10-CM

## 2021-05-13 PROCEDURE — 1126F AMNT PAIN NOTED NONE PRSNT: CPT | Mod: S$GLB,,, | Performed by: INTERNAL MEDICINE

## 2021-05-13 PROCEDURE — 3008F PR BODY MASS INDEX (BMI) DOCUMENTED: ICD-10-PCS | Mod: CPTII,S$GLB,, | Performed by: INTERNAL MEDICINE

## 2021-05-13 PROCEDURE — 99214 PR OFFICE/OUTPT VISIT, EST, LEVL IV, 30-39 MIN: ICD-10-PCS | Mod: S$GLB,,, | Performed by: INTERNAL MEDICINE

## 2021-05-13 PROCEDURE — 1126F PR PAIN SEVERITY QUANTIFIED, NO PAIN PRESENT: ICD-10-PCS | Mod: S$GLB,,, | Performed by: INTERNAL MEDICINE

## 2021-05-13 PROCEDURE — 3008F BODY MASS INDEX DOCD: CPT | Mod: CPTII,S$GLB,, | Performed by: INTERNAL MEDICINE

## 2021-05-13 PROCEDURE — 99999 PR PBB SHADOW E&M-EST. PATIENT-LVL IV: CPT | Mod: PBBFAC,,, | Performed by: INTERNAL MEDICINE

## 2021-05-13 PROCEDURE — 99214 OFFICE O/P EST MOD 30 MIN: CPT | Mod: S$GLB,,, | Performed by: INTERNAL MEDICINE

## 2021-05-13 PROCEDURE — 99999 PR PBB SHADOW E&M-EST. PATIENT-LVL IV: ICD-10-PCS | Mod: PBBFAC,,, | Performed by: INTERNAL MEDICINE

## 2021-05-25 ENCOUNTER — OFFICE VISIT (OUTPATIENT)
Dept: PLASTIC SURGERY | Facility: CLINIC | Age: 51
End: 2021-05-25
Attending: PLASTIC SURGERY
Payer: COMMERCIAL

## 2021-05-25 VITALS
SYSTOLIC BLOOD PRESSURE: 121 MMHG | DIASTOLIC BLOOD PRESSURE: 82 MMHG | WEIGHT: 222.88 LBS | HEIGHT: 63 IN | BODY MASS INDEX: 39.49 KG/M2 | HEART RATE: 87 BPM

## 2021-05-25 DIAGNOSIS — Z85.3 HISTORY OF BREAST CANCER: Primary | ICD-10-CM

## 2021-05-25 PROCEDURE — 99024 PR POST-OP FOLLOW-UP VISIT: ICD-10-PCS | Mod: S$GLB,,, | Performed by: PLASTIC SURGERY

## 2021-05-25 PROCEDURE — 99024 POSTOP FOLLOW-UP VISIT: CPT | Mod: S$GLB,,, | Performed by: PLASTIC SURGERY

## 2021-05-25 PROCEDURE — 3008F BODY MASS INDEX DOCD: CPT | Mod: CPTII,S$GLB,, | Performed by: PLASTIC SURGERY

## 2021-05-25 PROCEDURE — 3008F PR BODY MASS INDEX (BMI) DOCUMENTED: ICD-10-PCS | Mod: CPTII,S$GLB,, | Performed by: PLASTIC SURGERY

## 2021-05-27 ENCOUNTER — OFFICE VISIT (OUTPATIENT)
Dept: HEMATOLOGY/ONCOLOGY | Facility: CLINIC | Age: 51
End: 2021-05-27
Payer: COMMERCIAL

## 2021-05-27 ENCOUNTER — PATIENT MESSAGE (OUTPATIENT)
Dept: PLASTIC SURGERY | Facility: CLINIC | Age: 51
End: 2021-05-27

## 2021-05-27 ENCOUNTER — HOSPITAL ENCOUNTER (OUTPATIENT)
Dept: RADIOLOGY | Facility: OTHER | Age: 51
Discharge: HOME | End: 2021-05-27
Attending: PLASTIC SURGERY
Payer: COMMERCIAL

## 2021-05-27 VITALS
DIASTOLIC BLOOD PRESSURE: 72 MMHG | BODY MASS INDEX: 40.12 KG/M2 | HEIGHT: 63 IN | TEMPERATURE: 99 F | OXYGEN SATURATION: 97 % | HEART RATE: 91 BPM | WEIGHT: 226.44 LBS | SYSTOLIC BLOOD PRESSURE: 121 MMHG | RESPIRATION RATE: 16 BRPM

## 2021-05-27 DIAGNOSIS — Z17.0 MALIGNANT NEOPLASM OF AXILLARY TAIL OF RIGHT BREAST IN FEMALE, ESTROGEN RECEPTOR POSITIVE: Primary | ICD-10-CM

## 2021-05-27 DIAGNOSIS — Z85.3 HISTORY OF BREAST CANCER: ICD-10-CM

## 2021-05-27 DIAGNOSIS — Z79.811 PROPHYLACTIC USE OF ANASTROZOLE (ARIMIDEX): ICD-10-CM

## 2021-05-27 DIAGNOSIS — C50.611 MALIGNANT NEOPLASM OF AXILLARY TAIL OF RIGHT BREAST IN FEMALE, ESTROGEN RECEPTOR POSITIVE: Primary | ICD-10-CM

## 2021-05-27 PROCEDURE — 99999 PR PBB SHADOW E&M-EST. PATIENT-LVL IV: ICD-10-PCS | Mod: PBBFAC,,, | Performed by: INTERNAL MEDICINE

## 2021-05-27 PROCEDURE — 76642 US BREAST LEFT LIMITED: ICD-10-PCS | Mod: 26,LT,, | Performed by: RADIOLOGY

## 2021-05-27 PROCEDURE — 99214 OFFICE O/P EST MOD 30 MIN: CPT | Mod: S$GLB,,, | Performed by: INTERNAL MEDICINE

## 2021-05-27 PROCEDURE — 3008F PR BODY MASS INDEX (BMI) DOCUMENTED: ICD-10-PCS | Mod: CPTII,S$GLB,, | Performed by: INTERNAL MEDICINE

## 2021-05-27 PROCEDURE — 76642 ULTRASOUND BREAST LIMITED: CPT | Mod: TC,LT

## 2021-05-27 PROCEDURE — 3008F BODY MASS INDEX DOCD: CPT | Mod: CPTII,S$GLB,, | Performed by: INTERNAL MEDICINE

## 2021-05-27 PROCEDURE — 99214 PR OFFICE/OUTPT VISIT, EST, LEVL IV, 30-39 MIN: ICD-10-PCS | Mod: S$GLB,,, | Performed by: INTERNAL MEDICINE

## 2021-05-27 PROCEDURE — 99999 PR PBB SHADOW E&M-EST. PATIENT-LVL IV: CPT | Mod: PBBFAC,,, | Performed by: INTERNAL MEDICINE

## 2021-05-27 PROCEDURE — 1126F AMNT PAIN NOTED NONE PRSNT: CPT | Mod: S$GLB,,, | Performed by: INTERNAL MEDICINE

## 2021-05-27 PROCEDURE — 76642 ULTRASOUND BREAST LIMITED: CPT | Mod: 26,LT,, | Performed by: RADIOLOGY

## 2021-05-27 PROCEDURE — 1126F PR PAIN SEVERITY QUANTIFIED, NO PAIN PRESENT: ICD-10-PCS | Mod: S$GLB,,, | Performed by: INTERNAL MEDICINE

## 2021-05-28 ENCOUNTER — TELEPHONE (OUTPATIENT)
Dept: INTERVENTIONAL RADIOLOGY/VASCULAR | Facility: OTHER | Age: 51
End: 2021-05-28

## 2021-05-28 ENCOUNTER — PATIENT MESSAGE (OUTPATIENT)
Dept: PLASTIC SURGERY | Facility: CLINIC | Age: 51
End: 2021-05-28

## 2021-05-28 DIAGNOSIS — N64.89 SEROMA OF BREAST: Primary | ICD-10-CM

## 2021-05-31 ENCOUNTER — HOSPITAL ENCOUNTER (OUTPATIENT)
Facility: OTHER | Age: 51
Discharge: HOME | End: 2021-05-31
Attending: RADIOLOGY | Admitting: RADIOLOGY
Payer: COMMERCIAL

## 2021-05-31 ENCOUNTER — HOSPITAL ENCOUNTER (OUTPATIENT)
Dept: INTERVENTIONAL RADIOLOGY/VASCULAR | Facility: OTHER | Age: 51
Discharge: HOME | End: 2021-05-31
Attending: PLASTIC SURGERY | Admitting: RADIOLOGY
Payer: COMMERCIAL

## 2021-05-31 VITALS
HEIGHT: 65 IN | OXYGEN SATURATION: 100 % | DIASTOLIC BLOOD PRESSURE: 69 MMHG | TEMPERATURE: 98 F | RESPIRATION RATE: 18 BRPM | BODY MASS INDEX: 37.68 KG/M2 | SYSTOLIC BLOOD PRESSURE: 126 MMHG | HEART RATE: 67 BPM

## 2021-05-31 DIAGNOSIS — N64.89 SEROMA OF BREAST: ICD-10-CM

## 2021-05-31 LAB
GRAM STN SPEC: NORMAL
GRAM STN SPEC: NORMAL

## 2021-05-31 PROCEDURE — 76942 ECHO GUIDE FOR BIOPSY: CPT | Mod: 26,,, | Performed by: RADIOLOGY

## 2021-05-31 PROCEDURE — 87070 CULTURE OTHR SPECIMN AEROBIC: CPT | Performed by: PLASTIC SURGERY

## 2021-05-31 PROCEDURE — 10160 PNXR ASPIR ABSC HMTMA BULLA: CPT | Mod: LT,,, | Performed by: RADIOLOGY

## 2021-05-31 PROCEDURE — 10160 PNXR ASPIR ABSC HMTMA BULLA: CPT | Mod: TC

## 2021-05-31 PROCEDURE — 25000003 PHARM REV CODE 250: Performed by: RADIOLOGY

## 2021-05-31 PROCEDURE — 76942 IR ABSCESS ASPIRATION: ICD-10-PCS | Mod: 26,,, | Performed by: RADIOLOGY

## 2021-05-31 PROCEDURE — 87075 CULTR BACTERIA EXCEPT BLOOD: CPT | Performed by: PLASTIC SURGERY

## 2021-05-31 PROCEDURE — 10160 IR ABSCESS ASPIRATION: ICD-10-PCS | Mod: LT,,, | Performed by: RADIOLOGY

## 2021-05-31 PROCEDURE — 10160 PNXR ASPIR ABSC HMTMA BULLA: CPT | Mod: LT | Performed by: RADIOLOGY

## 2021-05-31 PROCEDURE — 87205 SMEAR GRAM STAIN: CPT | Performed by: PLASTIC SURGERY

## 2021-05-31 RX ORDER — ACETAMINOPHEN 500 MG
500 TABLET ORAL
Status: COMPLETED | OUTPATIENT
Start: 2021-05-31 | End: 2021-05-31

## 2021-05-31 RX ORDER — LIDOCAINE HYDROCHLORIDE 10 MG/ML
INJECTION INFILTRATION; PERINEURAL
Status: DISCONTINUED | OUTPATIENT
Start: 2021-05-31 | End: 2021-05-31 | Stop reason: HOSPADM

## 2021-05-31 RX ADMIN — ACETAMINOPHEN 500 MG: 500 TABLET, FILM COATED ORAL at 11:05

## 2021-06-01 ENCOUNTER — PATIENT MESSAGE (OUTPATIENT)
Dept: PLASTIC SURGERY | Facility: CLINIC | Age: 51
End: 2021-06-01

## 2021-06-03 LAB — BACTERIA SPEC AEROBE CULT: NO GROWTH

## 2021-06-07 LAB — BACTERIA SPEC ANAEROBE CULT: NORMAL

## 2021-06-09 ENCOUNTER — TELEPHONE (OUTPATIENT)
Dept: ENDOSCOPY | Facility: HOSPITAL | Age: 51
End: 2021-06-09

## 2021-06-09 ENCOUNTER — PATIENT MESSAGE (OUTPATIENT)
Dept: ENDOSCOPY | Facility: HOSPITAL | Age: 51
End: 2021-06-09

## 2021-06-09 DIAGNOSIS — Z12.11 COLON CANCER SCREENING: Primary | ICD-10-CM

## 2021-06-09 RX ORDER — SODIUM, POTASSIUM,MAG SULFATES 17.5-3.13G
1 SOLUTION, RECONSTITUTED, ORAL ORAL ONCE
Qty: 354 ML | Refills: 0 | Status: SHIPPED | OUTPATIENT
Start: 2021-06-09 | End: 2021-06-10

## 2021-06-14 DIAGNOSIS — C50.611 CARCINOMA OF AXILLARY TAIL OF RIGHT BREAST IN FEMALE, ESTROGEN RECEPTOR POSITIVE: ICD-10-CM

## 2021-06-14 DIAGNOSIS — Z17.0 CARCINOMA OF AXILLARY TAIL OF RIGHT BREAST IN FEMALE, ESTROGEN RECEPTOR POSITIVE: ICD-10-CM

## 2021-06-14 RX ORDER — ZOLPIDEM TARTRATE 10 MG/1
10 TABLET ORAL NIGHTLY PRN
Qty: 30 TABLET | Refills: 2 | Status: SHIPPED | OUTPATIENT
Start: 2021-06-14 | End: 2021-09-19 | Stop reason: SDUPTHER

## 2021-06-17 ENCOUNTER — OFFICE VISIT (OUTPATIENT)
Dept: PLASTIC SURGERY | Facility: CLINIC | Age: 51
End: 2021-06-17
Attending: PLASTIC SURGERY
Payer: COMMERCIAL

## 2021-06-17 VITALS
DIASTOLIC BLOOD PRESSURE: 82 MMHG | WEIGHT: 226 LBS | BODY MASS INDEX: 37.65 KG/M2 | HEIGHT: 65 IN | HEART RATE: 95 BPM | SYSTOLIC BLOOD PRESSURE: 133 MMHG

## 2021-06-17 DIAGNOSIS — Z85.3 HISTORY OF BREAST CANCER: Primary | ICD-10-CM

## 2021-06-17 PROCEDURE — 99024 POSTOP FOLLOW-UP VISIT: CPT | Mod: S$GLB,,, | Performed by: PLASTIC SURGERY

## 2021-06-17 PROCEDURE — 3008F BODY MASS INDEX DOCD: CPT | Mod: CPTII,S$GLB,, | Performed by: PLASTIC SURGERY

## 2021-06-17 PROCEDURE — 1126F PR PAIN SEVERITY QUANTIFIED, NO PAIN PRESENT: ICD-10-PCS | Mod: S$GLB,,, | Performed by: PLASTIC SURGERY

## 2021-06-17 PROCEDURE — 99024 PR POST-OP FOLLOW-UP VISIT: ICD-10-PCS | Mod: S$GLB,,, | Performed by: PLASTIC SURGERY

## 2021-06-17 PROCEDURE — 1126F AMNT PAIN NOTED NONE PRSNT: CPT | Mod: S$GLB,,, | Performed by: PLASTIC SURGERY

## 2021-06-17 PROCEDURE — 3008F PR BODY MASS INDEX (BMI) DOCUMENTED: ICD-10-PCS | Mod: CPTII,S$GLB,, | Performed by: PLASTIC SURGERY

## 2021-06-25 NOTE — TELEPHONE ENCOUNTER
----- Message from Fabricio Gonsalves sent at 11/22/2019  4:41 PM CST -----  Contact: Pt  Pt was waiting to see if insurance will cover chemo treatments.    Pt# [phone]   Retired

## 2021-07-13 ENCOUNTER — ANESTHESIA (OUTPATIENT)
Dept: ENDOSCOPY | Facility: HOSPITAL | Age: 51
End: 2021-07-13
Payer: COMMERCIAL

## 2021-07-13 ENCOUNTER — ANESTHESIA EVENT (OUTPATIENT)
Dept: ENDOSCOPY | Facility: HOSPITAL | Age: 51
End: 2021-07-13
Payer: COMMERCIAL

## 2021-07-13 ENCOUNTER — HOSPITAL ENCOUNTER (OUTPATIENT)
Facility: HOSPITAL | Age: 51
Discharge: HOME | End: 2021-07-13
Attending: COLON & RECTAL SURGERY | Admitting: COLON & RECTAL SURGERY
Payer: COMMERCIAL

## 2021-07-13 VITALS
WEIGHT: 228 LBS | TEMPERATURE: 98 F | HEART RATE: 78 BPM | OXYGEN SATURATION: 98 % | BODY MASS INDEX: 40.4 KG/M2 | RESPIRATION RATE: 16 BRPM | HEIGHT: 63 IN | SYSTOLIC BLOOD PRESSURE: 134 MMHG | DIASTOLIC BLOOD PRESSURE: 69 MMHG

## 2021-07-13 DIAGNOSIS — Z12.11 SCREEN FOR COLON CANCER: ICD-10-CM

## 2021-07-13 PROCEDURE — G0121 COLON CA SCRN NOT HI RSK IND: HCPCS | Mod: ,,, | Performed by: COLON & RECTAL SURGERY

## 2021-07-13 PROCEDURE — 37000009 HC ANESTHESIA EA ADD 15 MINS: Performed by: COLON & RECTAL SURGERY

## 2021-07-13 PROCEDURE — 25000003 PHARM REV CODE 250: Performed by: COLON & RECTAL SURGERY

## 2021-07-13 PROCEDURE — E9220 PRA ENDO ANESTHESIA: ICD-10-PCS | Mod: ,,, | Performed by: NURSE ANESTHETIST, CERTIFIED REGISTERED

## 2021-07-13 PROCEDURE — G0121 COLON CA SCRN NOT HI RSK IND: HCPCS | Performed by: COLON & RECTAL SURGERY

## 2021-07-13 PROCEDURE — 25000003 PHARM REV CODE 250: Performed by: NURSE ANESTHETIST, CERTIFIED REGISTERED

## 2021-07-13 PROCEDURE — 63600175 PHARM REV CODE 636 W HCPCS: Performed by: NURSE ANESTHETIST, CERTIFIED REGISTERED

## 2021-07-13 PROCEDURE — 37000008 HC ANESTHESIA 1ST 15 MINUTES: Performed by: COLON & RECTAL SURGERY

## 2021-07-13 PROCEDURE — G0121 COLON CA SCRN NOT HI RSK IND: ICD-10-PCS | Mod: ,,, | Performed by: COLON & RECTAL SURGERY

## 2021-07-13 PROCEDURE — E9220 PRA ENDO ANESTHESIA: HCPCS | Mod: ,,, | Performed by: NURSE ANESTHETIST, CERTIFIED REGISTERED

## 2021-07-13 RX ORDER — SODIUM CHLORIDE 9 MG/ML
INJECTION, SOLUTION INTRAVENOUS CONTINUOUS
Status: DISCONTINUED | OUTPATIENT
Start: 2021-07-13 | End: 2021-07-13 | Stop reason: HOSPADM

## 2021-07-13 RX ORDER — PROPOFOL 10 MG/ML
VIAL (ML) INTRAVENOUS
Status: DISCONTINUED | OUTPATIENT
Start: 2021-07-13 | End: 2021-07-13

## 2021-07-13 RX ORDER — LIDOCAINE HYDROCHLORIDE 20 MG/ML
INJECTION INTRAVENOUS
Status: DISCONTINUED | OUTPATIENT
Start: 2021-07-13 | End: 2021-07-13

## 2021-07-13 RX ORDER — PROPOFOL 10 MG/ML
VIAL (ML) INTRAVENOUS CONTINUOUS PRN
Status: DISCONTINUED | OUTPATIENT
Start: 2021-07-13 | End: 2021-07-13

## 2021-07-13 RX ADMIN — LIDOCAINE HYDROCHLORIDE 100 MG: 20 INJECTION, SOLUTION INTRAVENOUS at 11:07

## 2021-07-13 RX ADMIN — PROPOFOL 30 MG: 10 INJECTION, EMULSION INTRAVENOUS at 11:07

## 2021-07-13 RX ADMIN — Medication 200 MCG/KG/MIN: at 11:07

## 2021-07-13 RX ADMIN — SODIUM CHLORIDE: 0.9 INJECTION, SOLUTION INTRAVENOUS at 11:07

## 2021-07-13 RX ADMIN — PROPOFOL 80 MG: 10 INJECTION, EMULSION INTRAVENOUS at 11:07

## 2021-07-13 RX ADMIN — SODIUM CHLORIDE: 0.9 INJECTION, SOLUTION INTRAVENOUS at 10:07

## 2021-07-13 RX ADMIN — GLYCOPYRROLATE 0.2 MG: 0.2 INJECTION, SOLUTION INTRAMUSCULAR; INTRAVITREAL at 11:07

## 2021-07-25 ENCOUNTER — PATIENT MESSAGE (OUTPATIENT)
Dept: INTERNAL MEDICINE | Facility: CLINIC | Age: 51
End: 2021-07-25

## 2021-07-26 ENCOUNTER — PATIENT MESSAGE (OUTPATIENT)
Dept: INTERNAL MEDICINE | Facility: CLINIC | Age: 51
End: 2021-07-26

## 2021-07-29 ENCOUNTER — PATIENT OUTREACH (OUTPATIENT)
Dept: ADMINISTRATIVE | Facility: OTHER | Age: 51
End: 2021-07-29

## 2021-07-30 ENCOUNTER — OFFICE VISIT (OUTPATIENT)
Dept: OPTOMETRY | Facility: CLINIC | Age: 51
End: 2021-07-30
Payer: COMMERCIAL

## 2021-07-30 DIAGNOSIS — I10 ESSENTIAL HYPERTENSION: Primary | ICD-10-CM

## 2021-07-30 DIAGNOSIS — H52.4 PRESBYOPIA: ICD-10-CM

## 2021-07-30 PROCEDURE — 92014 COMPRE OPH EXAM EST PT 1/>: CPT | Mod: S$GLB,,, | Performed by: OPTOMETRIST

## 2021-07-30 PROCEDURE — 92014 PR EYE EXAM, EST PATIENT,COMPREHESV: ICD-10-PCS | Mod: S$GLB,,, | Performed by: OPTOMETRIST

## 2021-07-30 PROCEDURE — 92015 DETERMINE REFRACTIVE STATE: CPT | Mod: S$GLB,,, | Performed by: OPTOMETRIST

## 2021-07-30 PROCEDURE — 99999 PR PBB SHADOW E&M-EST. PATIENT-LVL III: ICD-10-PCS | Mod: PBBFAC,,, | Performed by: OPTOMETRIST

## 2021-07-30 PROCEDURE — 1159F MED LIST DOCD IN RCRD: CPT | Mod: CPTII,S$GLB,, | Performed by: OPTOMETRIST

## 2021-07-30 PROCEDURE — 1126F PR PAIN SEVERITY QUANTIFIED, NO PAIN PRESENT: ICD-10-PCS | Mod: CPTII,S$GLB,, | Performed by: OPTOMETRIST

## 2021-07-30 PROCEDURE — 92015 PR REFRACTION: ICD-10-PCS | Mod: S$GLB,,, | Performed by: OPTOMETRIST

## 2021-07-30 PROCEDURE — 1159F PR MEDICATION LIST DOCUMENTED IN MEDICAL RECORD: ICD-10-PCS | Mod: CPTII,S$GLB,, | Performed by: OPTOMETRIST

## 2021-07-30 PROCEDURE — 1126F AMNT PAIN NOTED NONE PRSNT: CPT | Mod: CPTII,S$GLB,, | Performed by: OPTOMETRIST

## 2021-07-30 PROCEDURE — 99999 PR PBB SHADOW E&M-EST. PATIENT-LVL III: CPT | Mod: PBBFAC,,, | Performed by: OPTOMETRIST

## 2021-09-19 DIAGNOSIS — Z17.0 CARCINOMA OF AXILLARY TAIL OF RIGHT BREAST IN FEMALE, ESTROGEN RECEPTOR POSITIVE: ICD-10-CM

## 2021-09-19 DIAGNOSIS — C50.611 CARCINOMA OF AXILLARY TAIL OF RIGHT BREAST IN FEMALE, ESTROGEN RECEPTOR POSITIVE: ICD-10-CM

## 2021-09-19 DIAGNOSIS — Z79.811 USE OF AROMATASE INHIBITORS: ICD-10-CM

## 2021-09-20 RX ORDER — ZOLPIDEM TARTRATE 10 MG/1
10 TABLET ORAL NIGHTLY PRN
Qty: 30 TABLET | Refills: 2 | Status: SHIPPED | OUTPATIENT
Start: 2021-09-20 | End: 2021-12-23 | Stop reason: SDUPTHER

## 2021-09-20 RX ORDER — ANASTROZOLE 1 MG/1
1 TABLET ORAL DAILY
Qty: 90 TABLET | Refills: 3 | Status: SHIPPED | OUTPATIENT
Start: 2021-09-20 | End: 2022-10-05 | Stop reason: SDUPTHER

## 2021-09-21 ENCOUNTER — PATIENT MESSAGE (OUTPATIENT)
Dept: INTERNAL MEDICINE | Facility: CLINIC | Age: 51
End: 2021-09-21

## 2021-09-22 ENCOUNTER — OFFICE VISIT (OUTPATIENT)
Dept: PLASTIC SURGERY | Facility: CLINIC | Age: 51
End: 2021-09-22
Attending: PLASTIC SURGERY
Payer: COMMERCIAL

## 2021-09-22 VITALS
HEART RATE: 92 BPM | BODY MASS INDEX: 41.65 KG/M2 | WEIGHT: 235.13 LBS | SYSTOLIC BLOOD PRESSURE: 132 MMHG | DIASTOLIC BLOOD PRESSURE: 94 MMHG

## 2021-09-22 DIAGNOSIS — Z85.3 HISTORY OF BREAST CANCER: Primary | ICD-10-CM

## 2021-09-23 RX ORDER — CYCLOBENZAPRINE HCL 10 MG
10 TABLET ORAL 3 TIMES DAILY PRN
Qty: 30 TABLET | Refills: 2 | OUTPATIENT
Start: 2021-09-23

## 2021-09-24 ENCOUNTER — PATIENT MESSAGE (OUTPATIENT)
Dept: PLASTIC SURGERY | Facility: CLINIC | Age: 51
End: 2021-09-24

## 2021-09-27 ENCOUNTER — PATIENT OUTREACH (OUTPATIENT)
Dept: ADMINISTRATIVE | Facility: OTHER | Age: 51
End: 2021-09-27

## 2021-09-27 ENCOUNTER — PATIENT MESSAGE (OUTPATIENT)
Dept: INTERNAL MEDICINE | Facility: CLINIC | Age: 51
End: 2021-09-27

## 2021-09-28 ENCOUNTER — TELEPHONE (OUTPATIENT)
Dept: OBSTETRICS AND GYNECOLOGY | Facility: CLINIC | Age: 51
End: 2021-09-28

## 2021-09-28 ENCOUNTER — IMMUNIZATION (OUTPATIENT)
Dept: INTERNAL MEDICINE | Facility: CLINIC | Age: 51
End: 2021-09-28
Payer: COMMERCIAL

## 2021-09-28 ENCOUNTER — PATIENT MESSAGE (OUTPATIENT)
Dept: OBSTETRICS AND GYNECOLOGY | Facility: CLINIC | Age: 51
End: 2021-09-28

## 2021-09-28 ENCOUNTER — HOSPITAL ENCOUNTER (OUTPATIENT)
Dept: RADIOLOGY | Facility: HOSPITAL | Age: 51
Discharge: HOME | End: 2021-09-28
Attending: PHYSICIAN ASSISTANT
Payer: COMMERCIAL

## 2021-09-28 ENCOUNTER — OFFICE VISIT (OUTPATIENT)
Dept: INTERNAL MEDICINE | Facility: CLINIC | Age: 51
End: 2021-09-28
Payer: COMMERCIAL

## 2021-09-28 VITALS
SYSTOLIC BLOOD PRESSURE: 122 MMHG | BODY MASS INDEX: 40.82 KG/M2 | WEIGHT: 230.38 LBS | HEIGHT: 63 IN | DIASTOLIC BLOOD PRESSURE: 82 MMHG | OXYGEN SATURATION: 98 % | HEART RATE: 63 BPM

## 2021-09-28 DIAGNOSIS — M25.561 ACUTE PAIN OF RIGHT KNEE: ICD-10-CM

## 2021-09-28 DIAGNOSIS — M25.561 ACUTE PAIN OF RIGHT KNEE: Primary | ICD-10-CM

## 2021-09-28 PROCEDURE — 99999 PR PBB SHADOW E&M-EST. PATIENT-LVL V: ICD-10-PCS | Mod: PBBFAC,,, | Performed by: PHYSICIAN ASSISTANT

## 2021-09-28 PROCEDURE — 73560 X-RAY EXAM OF KNEE 1 OR 2: CPT | Mod: TC,LT,59

## 2021-09-28 PROCEDURE — 3079F DIAST BP 80-89 MM HG: CPT | Mod: CPTII,S$GLB,, | Performed by: PHYSICIAN ASSISTANT

## 2021-09-28 PROCEDURE — 3008F PR BODY MASS INDEX (BMI) DOCUMENTED: ICD-10-PCS | Mod: CPTII,S$GLB,, | Performed by: PHYSICIAN ASSISTANT

## 2021-09-28 PROCEDURE — 1160F PR REVIEW ALL MEDS BY PRESCRIBER/CLIN PHARMACIST DOCUMENTED: ICD-10-PCS | Mod: CPTII,S$GLB,, | Performed by: PHYSICIAN ASSISTANT

## 2021-09-28 PROCEDURE — 3008F BODY MASS INDEX DOCD: CPT | Mod: CPTII,S$GLB,, | Performed by: PHYSICIAN ASSISTANT

## 2021-09-28 PROCEDURE — 90471 FLU VACCINE (QUAD) GREATER THAN OR EQUAL TO 3YO PRESERVATIVE FREE IM: ICD-10-PCS | Mod: S$GLB,,, | Performed by: PHYSICIAN ASSISTANT

## 2021-09-28 PROCEDURE — 3074F PR MOST RECENT SYSTOLIC BLOOD PRESSURE < 130 MM HG: ICD-10-PCS | Mod: CPTII,S$GLB,, | Performed by: PHYSICIAN ASSISTANT

## 2021-09-28 PROCEDURE — 90686 IIV4 VACC NO PRSV 0.5 ML IM: CPT | Mod: S$GLB,,, | Performed by: PHYSICIAN ASSISTANT

## 2021-09-28 PROCEDURE — 4010F PR ACE/ARB THEARPY RXD/TAKEN: ICD-10-PCS | Mod: CPTII,S$GLB,, | Performed by: PHYSICIAN ASSISTANT

## 2021-09-28 PROCEDURE — 73562 XR KNEE ORTHO RIGHT: ICD-10-PCS | Mod: 26,RT,, | Performed by: RADIOLOGY

## 2021-09-28 PROCEDURE — 73560 XR KNEE ORTHO RIGHT: ICD-10-PCS | Mod: 26,LT,, | Performed by: RADIOLOGY

## 2021-09-28 PROCEDURE — 3079F PR MOST RECENT DIASTOLIC BLOOD PRESSURE 80-89 MM HG: ICD-10-PCS | Mod: CPTII,S$GLB,, | Performed by: PHYSICIAN ASSISTANT

## 2021-09-28 PROCEDURE — 99213 OFFICE O/P EST LOW 20 MIN: CPT | Mod: 25,S$GLB,, | Performed by: PHYSICIAN ASSISTANT

## 2021-09-28 PROCEDURE — 90471 IMMUNIZATION ADMIN: CPT | Mod: S$GLB,,, | Performed by: PHYSICIAN ASSISTANT

## 2021-09-28 PROCEDURE — 99999 PR PBB SHADOW E&M-EST. PATIENT-LVL V: CPT | Mod: PBBFAC,,, | Performed by: PHYSICIAN ASSISTANT

## 2021-09-28 PROCEDURE — 1159F MED LIST DOCD IN RCRD: CPT | Mod: CPTII,S$GLB,, | Performed by: PHYSICIAN ASSISTANT

## 2021-09-28 PROCEDURE — 4010F ACE/ARB THERAPY RXD/TAKEN: CPT | Mod: CPTII,S$GLB,, | Performed by: PHYSICIAN ASSISTANT

## 2021-09-28 PROCEDURE — 90686 FLU VACCINE (QUAD) GREATER THAN OR EQUAL TO 3YO PRESERVATIVE FREE IM: ICD-10-PCS | Mod: S$GLB,,, | Performed by: PHYSICIAN ASSISTANT

## 2021-09-28 PROCEDURE — 1160F RVW MEDS BY RX/DR IN RCRD: CPT | Mod: CPTII,S$GLB,, | Performed by: PHYSICIAN ASSISTANT

## 2021-09-28 PROCEDURE — 73562 X-RAY EXAM OF KNEE 3: CPT | Mod: 26,RT,, | Performed by: RADIOLOGY

## 2021-09-28 PROCEDURE — 1159F PR MEDICATION LIST DOCUMENTED IN MEDICAL RECORD: ICD-10-PCS | Mod: CPTII,S$GLB,, | Performed by: PHYSICIAN ASSISTANT

## 2021-09-28 PROCEDURE — 73560 X-RAY EXAM OF KNEE 1 OR 2: CPT | Mod: 26,LT,, | Performed by: RADIOLOGY

## 2021-09-28 PROCEDURE — 99213 PR OFFICE/OUTPT VISIT, EST, LEVL III, 20-29 MIN: ICD-10-PCS | Mod: 25,S$GLB,, | Performed by: PHYSICIAN ASSISTANT

## 2021-09-28 PROCEDURE — 3074F SYST BP LT 130 MM HG: CPT | Mod: CPTII,S$GLB,, | Performed by: PHYSICIAN ASSISTANT

## 2021-09-29 ENCOUNTER — OFFICE VISIT (OUTPATIENT)
Dept: ORTHOPEDICS | Facility: CLINIC | Age: 51
End: 2021-09-29
Payer: COMMERCIAL

## 2021-09-29 VITALS
BODY MASS INDEX: 40.82 KG/M2 | HEIGHT: 63 IN | DIASTOLIC BLOOD PRESSURE: 97 MMHG | HEART RATE: 81 BPM | RESPIRATION RATE: 18 BRPM | SYSTOLIC BLOOD PRESSURE: 128 MMHG | WEIGHT: 230.38 LBS

## 2021-09-29 DIAGNOSIS — M22.2X1 PATELLOFEMORAL PAIN SYNDROME OF RIGHT KNEE: ICD-10-CM

## 2021-09-29 DIAGNOSIS — S84.11XA INJURY OF RIGHT PERONEAL NERVE, INITIAL ENCOUNTER: ICD-10-CM

## 2021-09-29 DIAGNOSIS — M17.11 OSTEOARTHRITIS OF RIGHT KNEE, UNSPECIFIED OSTEOARTHRITIS TYPE: ICD-10-CM

## 2021-09-29 DIAGNOSIS — M76.31 IT BAND SYNDROME, RIGHT: Primary | ICD-10-CM

## 2021-09-29 DIAGNOSIS — M25.561 ACUTE PAIN OF RIGHT KNEE: ICD-10-CM

## 2021-09-29 PROCEDURE — 3008F BODY MASS INDEX DOCD: CPT | Mod: CPTII,S$GLB,, | Performed by: PHYSICIAN ASSISTANT

## 2021-09-29 PROCEDURE — 99204 OFFICE O/P NEW MOD 45 MIN: CPT | Mod: S$GLB,,, | Performed by: PHYSICIAN ASSISTANT

## 2021-09-29 PROCEDURE — 1160F RVW MEDS BY RX/DR IN RCRD: CPT | Mod: CPTII,S$GLB,, | Performed by: PHYSICIAN ASSISTANT

## 2021-09-29 PROCEDURE — 99999 PR PBB SHADOW E&M-EST. PATIENT-LVL V: ICD-10-PCS | Mod: PBBFAC,,, | Performed by: PHYSICIAN ASSISTANT

## 2021-09-29 PROCEDURE — 1159F PR MEDICATION LIST DOCUMENTED IN MEDICAL RECORD: ICD-10-PCS | Mod: CPTII,S$GLB,, | Performed by: PHYSICIAN ASSISTANT

## 2021-09-29 PROCEDURE — 3080F PR MOST RECENT DIASTOLIC BLOOD PRESSURE >= 90 MM HG: ICD-10-PCS | Mod: CPTII,S$GLB,, | Performed by: PHYSICIAN ASSISTANT

## 2021-09-29 PROCEDURE — 1160F PR REVIEW ALL MEDS BY PRESCRIBER/CLIN PHARMACIST DOCUMENTED: ICD-10-PCS | Mod: CPTII,S$GLB,, | Performed by: PHYSICIAN ASSISTANT

## 2021-09-29 PROCEDURE — 3074F PR MOST RECENT SYSTOLIC BLOOD PRESSURE < 130 MM HG: ICD-10-PCS | Mod: CPTII,S$GLB,, | Performed by: PHYSICIAN ASSISTANT

## 2021-09-29 PROCEDURE — 4010F PR ACE/ARB THEARPY RXD/TAKEN: ICD-10-PCS | Mod: CPTII,S$GLB,, | Performed by: PHYSICIAN ASSISTANT

## 2021-09-29 PROCEDURE — 4010F ACE/ARB THERAPY RXD/TAKEN: CPT | Mod: CPTII,S$GLB,, | Performed by: PHYSICIAN ASSISTANT

## 2021-09-29 PROCEDURE — 99999 PR PBB SHADOW E&M-EST. PATIENT-LVL V: CPT | Mod: PBBFAC,,, | Performed by: PHYSICIAN ASSISTANT

## 2021-09-29 PROCEDURE — 3080F DIAST BP >= 90 MM HG: CPT | Mod: CPTII,S$GLB,, | Performed by: PHYSICIAN ASSISTANT

## 2021-09-29 PROCEDURE — 99204 PR OFFICE/OUTPT VISIT, NEW, LEVL IV, 45-59 MIN: ICD-10-PCS | Mod: S$GLB,,, | Performed by: PHYSICIAN ASSISTANT

## 2021-09-29 PROCEDURE — 3074F SYST BP LT 130 MM HG: CPT | Mod: CPTII,S$GLB,, | Performed by: PHYSICIAN ASSISTANT

## 2021-09-29 PROCEDURE — 1159F MED LIST DOCD IN RCRD: CPT | Mod: CPTII,S$GLB,, | Performed by: PHYSICIAN ASSISTANT

## 2021-09-29 PROCEDURE — 3008F PR BODY MASS INDEX (BMI) DOCUMENTED: ICD-10-PCS | Mod: CPTII,S$GLB,, | Performed by: PHYSICIAN ASSISTANT

## 2021-09-29 RX ORDER — MELOXICAM 15 MG/1
15 TABLET ORAL DAILY
Qty: 30 TABLET | Refills: 0 | Status: SHIPPED | OUTPATIENT
Start: 2021-09-29 | End: 2021-11-05

## 2021-09-30 ENCOUNTER — OFFICE VISIT (OUTPATIENT)
Dept: HEMATOLOGY/ONCOLOGY | Facility: CLINIC | Age: 51
End: 2021-09-30
Payer: COMMERCIAL

## 2021-09-30 ENCOUNTER — PATIENT MESSAGE (OUTPATIENT)
Dept: HEMATOLOGY/ONCOLOGY | Facility: CLINIC | Age: 51
End: 2021-09-30

## 2021-09-30 VITALS
DIASTOLIC BLOOD PRESSURE: 87 MMHG | SYSTOLIC BLOOD PRESSURE: 152 MMHG | RESPIRATION RATE: 18 BRPM | HEIGHT: 63 IN | HEART RATE: 98 BPM | BODY MASS INDEX: 41.76 KG/M2 | WEIGHT: 235.69 LBS | TEMPERATURE: 99 F | OXYGEN SATURATION: 100 %

## 2021-09-30 DIAGNOSIS — C50.611 CARCINOMA OF AXILLARY TAIL OF RIGHT BREAST IN FEMALE, ESTROGEN RECEPTOR POSITIVE: Primary | ICD-10-CM

## 2021-09-30 DIAGNOSIS — Z17.0 CARCINOMA OF AXILLARY TAIL OF RIGHT BREAST IN FEMALE, ESTROGEN RECEPTOR POSITIVE: Primary | ICD-10-CM

## 2021-09-30 DIAGNOSIS — Z79.811 PROPHYLACTIC USE OF ANASTROZOLE (ARIMIDEX): ICD-10-CM

## 2021-09-30 PROCEDURE — 99214 OFFICE O/P EST MOD 30 MIN: CPT | Mod: S$GLB,,, | Performed by: INTERNAL MEDICINE

## 2021-09-30 PROCEDURE — 3079F PR MOST RECENT DIASTOLIC BLOOD PRESSURE 80-89 MM HG: ICD-10-PCS | Mod: CPTII,S$GLB,, | Performed by: INTERNAL MEDICINE

## 2021-09-30 PROCEDURE — 1159F MED LIST DOCD IN RCRD: CPT | Mod: CPTII,S$GLB,, | Performed by: INTERNAL MEDICINE

## 2021-09-30 PROCEDURE — 3008F BODY MASS INDEX DOCD: CPT | Mod: CPTII,S$GLB,, | Performed by: INTERNAL MEDICINE

## 2021-09-30 PROCEDURE — 3077F SYST BP >= 140 MM HG: CPT | Mod: CPTII,S$GLB,, | Performed by: INTERNAL MEDICINE

## 2021-09-30 PROCEDURE — 99214 PR OFFICE/OUTPT VISIT, EST, LEVL IV, 30-39 MIN: ICD-10-PCS | Mod: S$GLB,,, | Performed by: INTERNAL MEDICINE

## 2021-09-30 PROCEDURE — 3079F DIAST BP 80-89 MM HG: CPT | Mod: CPTII,S$GLB,, | Performed by: INTERNAL MEDICINE

## 2021-09-30 PROCEDURE — 1159F PR MEDICATION LIST DOCUMENTED IN MEDICAL RECORD: ICD-10-PCS | Mod: CPTII,S$GLB,, | Performed by: INTERNAL MEDICINE

## 2021-09-30 PROCEDURE — 99999 PR PBB SHADOW E&M-EST. PATIENT-LVL IV: CPT | Mod: PBBFAC,,, | Performed by: INTERNAL MEDICINE

## 2021-09-30 PROCEDURE — 4010F PR ACE/ARB THEARPY RXD/TAKEN: ICD-10-PCS | Mod: CPTII,S$GLB,, | Performed by: INTERNAL MEDICINE

## 2021-09-30 PROCEDURE — 4010F ACE/ARB THERAPY RXD/TAKEN: CPT | Mod: CPTII,S$GLB,, | Performed by: INTERNAL MEDICINE

## 2021-09-30 PROCEDURE — 3008F PR BODY MASS INDEX (BMI) DOCUMENTED: ICD-10-PCS | Mod: CPTII,S$GLB,, | Performed by: INTERNAL MEDICINE

## 2021-09-30 PROCEDURE — 1160F PR REVIEW ALL MEDS BY PRESCRIBER/CLIN PHARMACIST DOCUMENTED: ICD-10-PCS | Mod: CPTII,S$GLB,, | Performed by: INTERNAL MEDICINE

## 2021-09-30 PROCEDURE — 3077F PR MOST RECENT SYSTOLIC BLOOD PRESSURE >= 140 MM HG: ICD-10-PCS | Mod: CPTII,S$GLB,, | Performed by: INTERNAL MEDICINE

## 2021-09-30 PROCEDURE — 1160F RVW MEDS BY RX/DR IN RCRD: CPT | Mod: CPTII,S$GLB,, | Performed by: INTERNAL MEDICINE

## 2021-09-30 PROCEDURE — 99999 PR PBB SHADOW E&M-EST. PATIENT-LVL IV: ICD-10-PCS | Mod: PBBFAC,,, | Performed by: INTERNAL MEDICINE

## 2021-10-04 ENCOUNTER — HOSPITAL ENCOUNTER (OUTPATIENT)
Dept: RADIOLOGY | Facility: HOSPITAL | Age: 51
Discharge: HOME | End: 2021-10-04
Attending: INTERNAL MEDICINE
Payer: COMMERCIAL

## 2021-10-04 DIAGNOSIS — Z17.0 CARCINOMA OF AXILLARY TAIL OF RIGHT BREAST IN FEMALE, ESTROGEN RECEPTOR POSITIVE: ICD-10-CM

## 2021-10-04 DIAGNOSIS — C50.611 CARCINOMA OF AXILLARY TAIL OF RIGHT BREAST IN FEMALE, ESTROGEN RECEPTOR POSITIVE: ICD-10-CM

## 2021-10-04 PROCEDURE — 77066 DX MAMMO INCL CAD BI: CPT | Mod: 26,,, | Performed by: RADIOLOGY

## 2021-10-04 PROCEDURE — 76642 ULTRASOUND BREAST LIMITED: CPT | Mod: TC,50

## 2021-10-04 PROCEDURE — 77066 MAMMO DIGITAL DIAGNOSTIC BILAT WITH TOMO: ICD-10-PCS | Mod: 26,,, | Performed by: RADIOLOGY

## 2021-10-04 PROCEDURE — 77062 BREAST TOMOSYNTHESIS BI: CPT | Mod: TC

## 2021-10-04 PROCEDURE — 77062 MAMMO DIGITAL DIAGNOSTIC BILAT WITH TOMO: ICD-10-PCS | Mod: 26,,, | Performed by: RADIOLOGY

## 2021-10-04 PROCEDURE — 76642 US BREAST BILATERAL LIMITED: ICD-10-PCS | Mod: 26,50,, | Performed by: RADIOLOGY

## 2021-10-04 PROCEDURE — 76642 ULTRASOUND BREAST LIMITED: CPT | Mod: 26,50,, | Performed by: RADIOLOGY

## 2021-10-04 PROCEDURE — 77062 BREAST TOMOSYNTHESIS BI: CPT | Mod: 26,,, | Performed by: RADIOLOGY

## 2021-10-05 ENCOUNTER — TELEPHONE (OUTPATIENT)
Dept: RADIOLOGY | Facility: HOSPITAL | Age: 51
End: 2021-10-05

## 2021-10-11 ENCOUNTER — HOSPITAL ENCOUNTER (OUTPATIENT)
Dept: RADIOLOGY | Facility: HOSPITAL | Age: 51
Discharge: HOME | End: 2021-10-11
Attending: INTERNAL MEDICINE
Payer: COMMERCIAL

## 2021-10-11 DIAGNOSIS — N63.20 LEFT BREAST MASS: ICD-10-CM

## 2021-10-11 DIAGNOSIS — C50.611 MALIGNANT NEOPLASM OF AXILLARY TAIL OF RIGHT FEMALE BREAST: ICD-10-CM

## 2021-10-11 DIAGNOSIS — C50.611 MALIGNANT NEOPLASM OF AXILLARY TAIL OF RIGHT FEMALE BREAST, UNSPECIFIED ESTROGEN RECEPTOR STATUS: ICD-10-CM

## 2021-10-11 PROCEDURE — 38505 US BIOPSY LYMPH NODE AXILLA: ICD-10-PCS | Mod: LT,,, | Performed by: RADIOLOGY

## 2021-10-11 PROCEDURE — 88342 CHG IMMUNOCYTOCHEMISTRY: ICD-10-PCS | Mod: 26,,, | Performed by: PATHOLOGY

## 2021-10-11 PROCEDURE — 77065 DX MAMMO INCL CAD UNI: CPT | Mod: TC,LT

## 2021-10-11 PROCEDURE — 38505 NEEDLE BIOPSY LYMPH NODES: CPT | Mod: LT,,, | Performed by: RADIOLOGY

## 2021-10-11 PROCEDURE — 27201068 US BIOPSY LYMPH NODE AXILLA

## 2021-10-11 PROCEDURE — 88342 IMHCHEM/IMCYTCHM 1ST ANTB: CPT | Mod: 26,,, | Performed by: PATHOLOGY

## 2021-10-11 PROCEDURE — 88305 TISSUE EXAM BY PATHOLOGIST: CPT | Mod: 26,,, | Performed by: PATHOLOGY

## 2021-10-11 PROCEDURE — 77065 DX MAMMO INCL CAD UNI: CPT | Mod: 26,LT,, | Performed by: RADIOLOGY

## 2021-10-11 PROCEDURE — 88342 IMHCHEM/IMCYTCHM 1ST ANTB: CPT | Performed by: PATHOLOGY

## 2021-10-11 PROCEDURE — 77065 MAMMO DIGITAL DIAGNOSTIC LEFT: ICD-10-PCS | Mod: 26,LT,, | Performed by: RADIOLOGY

## 2021-10-11 PROCEDURE — 88305 TISSUE EXAM BY PATHOLOGIST: CPT | Performed by: PATHOLOGY

## 2021-10-11 PROCEDURE — 25000003 PHARM REV CODE 250: Performed by: INTERNAL MEDICINE

## 2021-10-11 PROCEDURE — 88305 TISSUE EXAM BY PATHOLOGIST: ICD-10-PCS | Mod: 26,,, | Performed by: PATHOLOGY

## 2021-10-11 RX ORDER — LIDOCAINE HYDROCHLORIDE 10 MG/ML
3 INJECTION, SOLUTION EPIDURAL; INFILTRATION; INTRACAUDAL; PERINEURAL ONCE
Status: COMPLETED | OUTPATIENT
Start: 2021-10-11 | End: 2021-10-11

## 2021-10-11 RX ORDER — LIDOCAINE HYDROCHLORIDE AND EPINEPHRINE 20; 10 MG/ML; UG/ML
10 INJECTION, SOLUTION INFILTRATION; PERINEURAL ONCE
Status: COMPLETED | OUTPATIENT
Start: 2021-10-11 | End: 2021-10-11

## 2021-10-11 RX ADMIN — LIDOCAINE HYDROCHLORIDE 30 MG: 10 INJECTION, SOLUTION EPIDURAL; INFILTRATION; INTRACAUDAL; PERINEURAL at 10:10

## 2021-10-11 RX ADMIN — LIDOCAINE HYDROCHLORIDE,EPINEPHRINE BITARTRATE 10 ML: 20; .01 INJECTION, SOLUTION INFILTRATION; PERINEURAL at 10:10

## 2021-10-13 ENCOUNTER — TELEPHONE (OUTPATIENT)
Dept: SURGERY | Facility: CLINIC | Age: 51
End: 2021-10-13

## 2021-10-13 LAB
COMMENT: NORMAL
FINAL PATHOLOGIC DIAGNOSIS: NORMAL
GROSS: NORMAL
Lab: NORMAL

## 2021-10-14 ENCOUNTER — CLINICAL SUPPORT (OUTPATIENT)
Dept: REHABILITATION | Facility: OTHER | Age: 51
End: 2021-10-14
Payer: COMMERCIAL

## 2021-10-14 DIAGNOSIS — R29.898 BILATERAL LEG WEAKNESS: ICD-10-CM

## 2021-10-14 DIAGNOSIS — M25.561 ACUTE PAIN OF RIGHT KNEE: ICD-10-CM

## 2021-10-14 PROCEDURE — 97161 PT EVAL LOW COMPLEX 20 MIN: CPT | Mod: PN

## 2021-10-14 PROCEDURE — 97110 THERAPEUTIC EXERCISES: CPT | Mod: PN

## 2021-10-28 ENCOUNTER — CLINICAL SUPPORT (OUTPATIENT)
Dept: REHABILITATION | Facility: OTHER | Age: 51
End: 2021-10-28
Payer: COMMERCIAL

## 2021-10-28 DIAGNOSIS — R29.898 BILATERAL LEG WEAKNESS: ICD-10-CM

## 2021-10-28 DIAGNOSIS — M25.561 ACUTE PAIN OF RIGHT KNEE: ICD-10-CM

## 2021-10-28 PROCEDURE — 97110 THERAPEUTIC EXERCISES: CPT | Mod: PN

## 2021-11-01 ENCOUNTER — OFFICE VISIT (OUTPATIENT)
Dept: URGENT CARE | Facility: CLINIC | Age: 51
End: 2021-11-01
Payer: COMMERCIAL

## 2021-11-01 VITALS
BODY MASS INDEX: 41.64 KG/M2 | HEIGHT: 63 IN | RESPIRATION RATE: 16 BRPM | WEIGHT: 235 LBS | DIASTOLIC BLOOD PRESSURE: 86 MMHG | OXYGEN SATURATION: 99 % | SYSTOLIC BLOOD PRESSURE: 119 MMHG | TEMPERATURE: 98 F | HEART RATE: 66 BPM

## 2021-11-01 DIAGNOSIS — R19.7 DIARRHEA, UNSPECIFIED TYPE: ICD-10-CM

## 2021-11-01 DIAGNOSIS — R11.0 NAUSEA: Primary | ICD-10-CM

## 2021-11-01 LAB
CTP QC/QA: YES
SARS-COV-2 RDRP RESP QL NAA+PROBE: NEGATIVE

## 2021-11-01 PROCEDURE — U0002 COVID-19 LAB TEST NON-CDC: HCPCS | Mod: QW,S$GLB,,

## 2021-11-01 PROCEDURE — U0002: ICD-10-PCS | Mod: QW,S$GLB,,

## 2021-11-01 PROCEDURE — 4010F ACE/ARB THERAPY RXD/TAKEN: CPT | Mod: CPTII,S$GLB,,

## 2021-11-01 PROCEDURE — 1160F RVW MEDS BY RX/DR IN RCRD: CPT | Mod: CPTII,S$GLB,,

## 2021-11-01 PROCEDURE — 99213 OFFICE O/P EST LOW 20 MIN: CPT | Mod: S$GLB,CS,,

## 2021-11-01 PROCEDURE — 3008F BODY MASS INDEX DOCD: CPT | Mod: CPTII,S$GLB,,

## 2021-11-01 PROCEDURE — 3074F SYST BP LT 130 MM HG: CPT | Mod: CPTII,S$GLB,,

## 2021-11-01 PROCEDURE — 3008F PR BODY MASS INDEX (BMI) DOCUMENTED: ICD-10-PCS | Mod: CPTII,S$GLB,,

## 2021-11-01 PROCEDURE — 3074F PR MOST RECENT SYSTOLIC BLOOD PRESSURE < 130 MM HG: ICD-10-PCS | Mod: CPTII,S$GLB,,

## 2021-11-01 PROCEDURE — 99213 PR OFFICE/OUTPT VISIT, EST, LEVL III, 20-29 MIN: ICD-10-PCS | Mod: S$GLB,CS,,

## 2021-11-01 PROCEDURE — 1159F PR MEDICATION LIST DOCUMENTED IN MEDICAL RECORD: ICD-10-PCS | Mod: CPTII,S$GLB,,

## 2021-11-01 PROCEDURE — 3079F DIAST BP 80-89 MM HG: CPT | Mod: CPTII,S$GLB,,

## 2021-11-01 PROCEDURE — 4010F PR ACE/ARB THEARPY RXD/TAKEN: ICD-10-PCS | Mod: CPTII,S$GLB,,

## 2021-11-01 PROCEDURE — 3079F PR MOST RECENT DIASTOLIC BLOOD PRESSURE 80-89 MM HG: ICD-10-PCS | Mod: CPTII,S$GLB,,

## 2021-11-01 PROCEDURE — 1160F PR REVIEW ALL MEDS BY PRESCRIBER/CLIN PHARMACIST DOCUMENTED: ICD-10-PCS | Mod: CPTII,S$GLB,,

## 2021-11-01 PROCEDURE — 1159F MED LIST DOCD IN RCRD: CPT | Mod: CPTII,S$GLB,,

## 2021-11-01 RX ORDER — LOPERAMIDE HYDROCHLORIDE 2 MG/1
2 CAPSULE ORAL 4 TIMES DAILY PRN
Qty: 20 CAPSULE | Refills: 0 | Status: SHIPPED | OUTPATIENT
Start: 2021-11-01 | End: 2022-01-21

## 2021-11-01 RX ORDER — ONDANSETRON HYDROCHLORIDE 8 MG/1
8 TABLET, FILM COATED ORAL 2 TIMES DAILY
Qty: 20 TABLET | Refills: 0 | Status: SHIPPED | OUTPATIENT
Start: 2021-11-01 | End: 2022-01-21

## 2021-11-05 ENCOUNTER — OFFICE VISIT (OUTPATIENT)
Dept: SURGERY | Facility: CLINIC | Age: 51
End: 2021-11-05
Payer: COMMERCIAL

## 2021-11-05 VITALS
DIASTOLIC BLOOD PRESSURE: 85 MMHG | SYSTOLIC BLOOD PRESSURE: 129 MMHG | BODY MASS INDEX: 40.57 KG/M2 | HEIGHT: 63 IN | HEART RATE: 74 BPM | WEIGHT: 229 LBS

## 2021-11-05 DIAGNOSIS — Z85.3 PERSONAL HISTORY OF BREAST CANCER: Primary | ICD-10-CM

## 2021-11-05 DIAGNOSIS — Z12.39 ENCOUNTER FOR SCREENING BREAST EXAMINATION: ICD-10-CM

## 2021-11-05 DIAGNOSIS — Z90.13 S/P BILATERAL MASTECTOMY: ICD-10-CM

## 2021-11-05 PROCEDURE — 3079F PR MOST RECENT DIASTOLIC BLOOD PRESSURE 80-89 MM HG: ICD-10-PCS | Mod: CPTII,S$GLB,, | Performed by: NURSE PRACTITIONER

## 2021-11-05 PROCEDURE — 99213 OFFICE O/P EST LOW 20 MIN: CPT | Mod: S$GLB,,, | Performed by: NURSE PRACTITIONER

## 2021-11-05 PROCEDURE — 99999 PR PBB SHADOW E&M-EST. PATIENT-LVL III: CPT | Mod: PBBFAC,,, | Performed by: NURSE PRACTITIONER

## 2021-11-05 PROCEDURE — 3074F SYST BP LT 130 MM HG: CPT | Mod: CPTII,S$GLB,, | Performed by: NURSE PRACTITIONER

## 2021-11-05 PROCEDURE — 3074F PR MOST RECENT SYSTOLIC BLOOD PRESSURE < 130 MM HG: ICD-10-PCS | Mod: CPTII,S$GLB,, | Performed by: NURSE PRACTITIONER

## 2021-11-05 PROCEDURE — 99999 PR PBB SHADOW E&M-EST. PATIENT-LVL III: ICD-10-PCS | Mod: PBBFAC,,, | Performed by: NURSE PRACTITIONER

## 2021-11-05 PROCEDURE — 3008F BODY MASS INDEX DOCD: CPT | Mod: CPTII,S$GLB,, | Performed by: NURSE PRACTITIONER

## 2021-11-05 PROCEDURE — 1159F MED LIST DOCD IN RCRD: CPT | Mod: CPTII,S$GLB,, | Performed by: NURSE PRACTITIONER

## 2021-11-05 PROCEDURE — 1160F RVW MEDS BY RX/DR IN RCRD: CPT | Mod: CPTII,S$GLB,, | Performed by: NURSE PRACTITIONER

## 2021-11-05 PROCEDURE — 1159F PR MEDICATION LIST DOCUMENTED IN MEDICAL RECORD: ICD-10-PCS | Mod: CPTII,S$GLB,, | Performed by: NURSE PRACTITIONER

## 2021-11-05 PROCEDURE — 99213 PR OFFICE/OUTPT VISIT, EST, LEVL III, 20-29 MIN: ICD-10-PCS | Mod: S$GLB,,, | Performed by: NURSE PRACTITIONER

## 2021-11-05 PROCEDURE — 3008F PR BODY MASS INDEX (BMI) DOCUMENTED: ICD-10-PCS | Mod: CPTII,S$GLB,, | Performed by: NURSE PRACTITIONER

## 2021-11-05 PROCEDURE — 3079F DIAST BP 80-89 MM HG: CPT | Mod: CPTII,S$GLB,, | Performed by: NURSE PRACTITIONER

## 2021-11-05 PROCEDURE — 4010F PR ACE/ARB THEARPY RXD/TAKEN: ICD-10-PCS | Mod: CPTII,S$GLB,, | Performed by: NURSE PRACTITIONER

## 2021-11-05 PROCEDURE — 1160F PR REVIEW ALL MEDS BY PRESCRIBER/CLIN PHARMACIST DOCUMENTED: ICD-10-PCS | Mod: CPTII,S$GLB,, | Performed by: NURSE PRACTITIONER

## 2021-11-05 PROCEDURE — 4010F ACE/ARB THERAPY RXD/TAKEN: CPT | Mod: CPTII,S$GLB,, | Performed by: NURSE PRACTITIONER

## 2021-11-07 ENCOUNTER — HOSPITAL ENCOUNTER (EMERGENCY)
Facility: OTHER | Age: 51
Discharge: HOME | End: 2021-11-07
Attending: EMERGENCY MEDICINE
Payer: COMMERCIAL

## 2021-11-07 VITALS
WEIGHT: 228 LBS | HEART RATE: 72 BPM | TEMPERATURE: 99 F | DIASTOLIC BLOOD PRESSURE: 96 MMHG | BODY MASS INDEX: 40.4 KG/M2 | OXYGEN SATURATION: 96 % | RESPIRATION RATE: 16 BRPM | SYSTOLIC BLOOD PRESSURE: 128 MMHG | HEIGHT: 63 IN

## 2021-11-07 DIAGNOSIS — R05.9 COUGH: ICD-10-CM

## 2021-11-07 DIAGNOSIS — B34.9 ACUTE VIRAL SYNDROME: Primary | ICD-10-CM

## 2021-11-07 LAB
CTP QC/QA: YES
CTP QC/QA: YES
POC MOLECULAR INFLUENZA A AGN: NEGATIVE
POC MOLECULAR INFLUENZA B AGN: NEGATIVE
SARS-COV-2 RDRP RESP QL NAA+PROBE: NEGATIVE

## 2021-11-07 PROCEDURE — 99283 EMERGENCY DEPT VISIT LOW MDM: CPT | Mod: 25

## 2021-11-07 PROCEDURE — U0002 COVID-19 LAB TEST NON-CDC: HCPCS | Performed by: EMERGENCY MEDICINE

## 2021-11-07 RX ORDER — CODEINE PHOSPHATE AND GUAIFENESIN 10; 100 MG/5ML; MG/5ML
5 SOLUTION ORAL 3 TIMES DAILY PRN
Qty: 118 ML | Refills: 0 | Status: SHIPPED | OUTPATIENT
Start: 2021-11-07 | End: 2021-11-17

## 2021-11-09 ENCOUNTER — PATIENT MESSAGE (OUTPATIENT)
Dept: REHABILITATION | Facility: OTHER | Age: 51
End: 2021-11-09
Payer: COMMERCIAL

## 2021-11-12 ENCOUNTER — PATIENT MESSAGE (OUTPATIENT)
Dept: INTERNAL MEDICINE | Facility: CLINIC | Age: 51
End: 2021-11-12
Payer: COMMERCIAL

## 2021-11-12 DIAGNOSIS — E66.01 MORBID OBESITY: ICD-10-CM

## 2021-11-12 DIAGNOSIS — I10 ESSENTIAL HYPERTENSION: Primary | ICD-10-CM

## 2021-11-12 DIAGNOSIS — R73.9 HYPERGLYCEMIA: ICD-10-CM

## 2021-11-12 DIAGNOSIS — E55.9 VITAMIN D DEFICIENCY: ICD-10-CM

## 2021-11-15 ENCOUNTER — PES CALL (OUTPATIENT)
Dept: ADMINISTRATIVE | Facility: CLINIC | Age: 51
End: 2021-11-15
Payer: COMMERCIAL

## 2021-11-16 ENCOUNTER — CLINICAL SUPPORT (OUTPATIENT)
Dept: REHABILITATION | Facility: OTHER | Age: 51
End: 2021-11-16
Payer: COMMERCIAL

## 2021-11-16 DIAGNOSIS — R29.898 BILATERAL LEG WEAKNESS: ICD-10-CM

## 2021-11-16 DIAGNOSIS — M25.561 ACUTE PAIN OF RIGHT KNEE: ICD-10-CM

## 2021-11-16 PROCEDURE — 97110 THERAPEUTIC EXERCISES: CPT | Mod: PN

## 2021-11-17 ENCOUNTER — LAB VISIT (OUTPATIENT)
Dept: LAB | Facility: HOSPITAL | Age: 51
End: 2021-11-17
Attending: INTERNAL MEDICINE
Payer: COMMERCIAL

## 2021-11-17 DIAGNOSIS — E55.9 VITAMIN D DEFICIENCY: ICD-10-CM

## 2021-11-17 DIAGNOSIS — R73.9 HYPERGLYCEMIA: ICD-10-CM

## 2021-11-17 DIAGNOSIS — I10 ESSENTIAL HYPERTENSION: ICD-10-CM

## 2021-11-17 LAB
25(OH)D3+25(OH)D2 SERPL-MCNC: 29 NG/ML (ref 30–96)
ALBUMIN SERPL BCP-MCNC: 3.9 G/DL (ref 3.5–5.2)
ALP SERPL-CCNC: 82 U/L (ref 55–135)
ALT SERPL W/O P-5'-P-CCNC: 14 U/L (ref 10–44)
ANION GAP SERPL CALC-SCNC: 10 MMOL/L (ref 8–16)
AST SERPL-CCNC: 16 U/L (ref 10–40)
BILIRUB SERPL-MCNC: 0.5 MG/DL (ref 0.1–1)
BUN SERPL-MCNC: 11 MG/DL (ref 6–20)
CALCIUM SERPL-MCNC: 10.1 MG/DL (ref 8.7–10.5)
CHLORIDE SERPL-SCNC: 104 MMOL/L (ref 95–110)
CHOLEST SERPL-MCNC: 176 MG/DL (ref 120–199)
CHOLEST/HDLC SERPL: 3 {RATIO} (ref 2–5)
CO2 SERPL-SCNC: 27 MMOL/L (ref 23–29)
CREAT SERPL-MCNC: 0.7 MG/DL (ref 0.5–1.4)
EST. GFR  (AFRICAN AMERICAN): >60 ML/MIN/1.73 M^2
EST. GFR  (NON AFRICAN AMERICAN): >60 ML/MIN/1.73 M^2
ESTIMATED AVG GLUCOSE: 120 MG/DL (ref 68–131)
GLUCOSE SERPL-MCNC: 98 MG/DL (ref 70–110)
HBA1C MFR BLD: 5.8 % (ref 4–5.6)
HDLC SERPL-MCNC: 59 MG/DL (ref 40–75)
HDLC SERPL: 33.5 % (ref 20–50)
LDLC SERPL CALC-MCNC: 103 MG/DL (ref 63–159)
NONHDLC SERPL-MCNC: 117 MG/DL
POTASSIUM SERPL-SCNC: 3.7 MMOL/L (ref 3.5–5.1)
PROT SERPL-MCNC: 7.8 G/DL (ref 6–8.4)
SODIUM SERPL-SCNC: 141 MMOL/L (ref 136–145)
TRIGL SERPL-MCNC: 70 MG/DL (ref 30–150)

## 2021-11-17 PROCEDURE — 36415 COLL VENOUS BLD VENIPUNCTURE: CPT | Performed by: INTERNAL MEDICINE

## 2021-11-17 PROCEDURE — 83036 HEMOGLOBIN GLYCOSYLATED A1C: CPT | Performed by: INTERNAL MEDICINE

## 2021-11-17 PROCEDURE — 82306 VITAMIN D 25 HYDROXY: CPT | Performed by: INTERNAL MEDICINE

## 2021-11-17 PROCEDURE — 80061 LIPID PANEL: CPT | Performed by: INTERNAL MEDICINE

## 2021-11-17 PROCEDURE — 80053 COMPREHEN METABOLIC PANEL: CPT | Performed by: INTERNAL MEDICINE

## 2021-11-18 ENCOUNTER — OFFICE VISIT (OUTPATIENT)
Dept: HEMATOLOGY/ONCOLOGY | Facility: CLINIC | Age: 51
End: 2021-11-18
Payer: COMMERCIAL

## 2021-11-18 VITALS
WEIGHT: 230.63 LBS | DIASTOLIC BLOOD PRESSURE: 78 MMHG | BODY MASS INDEX: 40.86 KG/M2 | SYSTOLIC BLOOD PRESSURE: 121 MMHG | HEART RATE: 64 BPM | HEIGHT: 63 IN

## 2021-11-18 DIAGNOSIS — N95.1 MENOPAUSAL SYMPTOMS: ICD-10-CM

## 2021-11-18 DIAGNOSIS — Z01.419 ROUTINE GYNECOLOGICAL EXAMINATION: Primary | ICD-10-CM

## 2021-11-18 PROCEDURE — 99999 PR PBB SHADOW E&M-EST. PATIENT-LVL III: CPT | Mod: PBBFAC,,, | Performed by: PHYSICIAN ASSISTANT

## 2021-11-18 PROCEDURE — 1159F MED LIST DOCD IN RCRD: CPT | Mod: CPTII,S$GLB,, | Performed by: PHYSICIAN ASSISTANT

## 2021-11-18 PROCEDURE — 99999 PR PBB SHADOW E&M-EST. PATIENT-LVL III: ICD-10-PCS | Mod: PBBFAC,,, | Performed by: PHYSICIAN ASSISTANT

## 2021-11-18 PROCEDURE — 99396 PR PREVENTIVE VISIT,EST,40-64: ICD-10-PCS | Mod: S$GLB,,, | Performed by: PHYSICIAN ASSISTANT

## 2021-11-18 PROCEDURE — 4010F ACE/ARB THERAPY RXD/TAKEN: CPT | Mod: CPTII,S$GLB,, | Performed by: PHYSICIAN ASSISTANT

## 2021-11-18 PROCEDURE — 4010F PR ACE/ARB THEARPY RXD/TAKEN: ICD-10-PCS | Mod: CPTII,S$GLB,, | Performed by: PHYSICIAN ASSISTANT

## 2021-11-18 PROCEDURE — 3074F PR MOST RECENT SYSTOLIC BLOOD PRESSURE < 130 MM HG: ICD-10-PCS | Mod: CPTII,S$GLB,, | Performed by: PHYSICIAN ASSISTANT

## 2021-11-18 PROCEDURE — 3078F DIAST BP <80 MM HG: CPT | Mod: CPTII,S$GLB,, | Performed by: PHYSICIAN ASSISTANT

## 2021-11-18 PROCEDURE — 1160F RVW MEDS BY RX/DR IN RCRD: CPT | Mod: CPTII,S$GLB,, | Performed by: PHYSICIAN ASSISTANT

## 2021-11-18 PROCEDURE — 3078F PR MOST RECENT DIASTOLIC BLOOD PRESSURE < 80 MM HG: ICD-10-PCS | Mod: CPTII,S$GLB,, | Performed by: PHYSICIAN ASSISTANT

## 2021-11-18 PROCEDURE — 3008F PR BODY MASS INDEX (BMI) DOCUMENTED: ICD-10-PCS | Mod: CPTII,S$GLB,, | Performed by: PHYSICIAN ASSISTANT

## 2021-11-18 PROCEDURE — 87624 HPV HI-RISK TYP POOLED RSLT: CPT | Performed by: PHYSICIAN ASSISTANT

## 2021-11-18 PROCEDURE — 99396 PREV VISIT EST AGE 40-64: CPT | Mod: S$GLB,,, | Performed by: PHYSICIAN ASSISTANT

## 2021-11-18 PROCEDURE — 3074F SYST BP LT 130 MM HG: CPT | Mod: CPTII,S$GLB,, | Performed by: PHYSICIAN ASSISTANT

## 2021-11-18 PROCEDURE — 3008F BODY MASS INDEX DOCD: CPT | Mod: CPTII,S$GLB,, | Performed by: PHYSICIAN ASSISTANT

## 2021-11-18 PROCEDURE — 3044F HG A1C LEVEL LT 7.0%: CPT | Mod: CPTII,S$GLB,, | Performed by: PHYSICIAN ASSISTANT

## 2021-11-18 PROCEDURE — 1159F PR MEDICATION LIST DOCUMENTED IN MEDICAL RECORD: ICD-10-PCS | Mod: CPTII,S$GLB,, | Performed by: PHYSICIAN ASSISTANT

## 2021-11-18 PROCEDURE — 1160F PR REVIEW ALL MEDS BY PRESCRIBER/CLIN PHARMACIST DOCUMENTED: ICD-10-PCS | Mod: CPTII,S$GLB,, | Performed by: PHYSICIAN ASSISTANT

## 2021-11-18 PROCEDURE — 3044F PR MOST RECENT HEMOGLOBIN A1C LEVEL <7.0%: ICD-10-PCS | Mod: CPTII,S$GLB,, | Performed by: PHYSICIAN ASSISTANT

## 2021-11-18 RX ORDER — VENLAFAXINE HYDROCHLORIDE 75 MG/1
75 CAPSULE, EXTENDED RELEASE ORAL DAILY
Qty: 30 CAPSULE | Refills: 11 | Status: SHIPPED | OUTPATIENT
Start: 2021-11-18 | End: 2023-01-03 | Stop reason: SDUPTHER

## 2021-11-19 ENCOUNTER — OFFICE VISIT (OUTPATIENT)
Dept: INTERNAL MEDICINE | Facility: CLINIC | Age: 51
End: 2021-11-19
Payer: COMMERCIAL

## 2021-11-19 VITALS
SYSTOLIC BLOOD PRESSURE: 120 MMHG | BODY MASS INDEX: 41.13 KG/M2 | WEIGHT: 232.13 LBS | HEART RATE: 72 BPM | DIASTOLIC BLOOD PRESSURE: 74 MMHG | HEIGHT: 63 IN | OXYGEN SATURATION: 97 %

## 2021-11-19 DIAGNOSIS — I10 ESSENTIAL HYPERTENSION: Primary | ICD-10-CM

## 2021-11-19 DIAGNOSIS — M54.14 THORACIC RADICULOPATHY: ICD-10-CM

## 2021-11-19 DIAGNOSIS — G62.9 NEUROPATHY: ICD-10-CM

## 2021-11-19 DIAGNOSIS — M79.89 SOFT TISSUE MASS: ICD-10-CM

## 2021-11-19 DIAGNOSIS — Z23 NEED FOR VACCINATION AGAINST STREPTOCOCCUS PNEUMONIAE USING PNEUMOCOCCAL CONJUGATE VACCINE 13: ICD-10-CM

## 2021-11-19 DIAGNOSIS — R73.03 PREDIABETES: ICD-10-CM

## 2021-11-19 DIAGNOSIS — B35.1 TOENAIL FUNGUS: ICD-10-CM

## 2021-11-19 DIAGNOSIS — Z23 NEED FOR SHINGLES VACCINE: ICD-10-CM

## 2021-11-19 PROCEDURE — 99214 PR OFFICE/OUTPT VISIT, EST, LEVL IV, 30-39 MIN: ICD-10-PCS | Mod: S$GLB,,, | Performed by: INTERNAL MEDICINE

## 2021-11-19 PROCEDURE — 99214 OFFICE O/P EST MOD 30 MIN: CPT | Mod: S$GLB,,, | Performed by: INTERNAL MEDICINE

## 2021-11-19 PROCEDURE — 4010F ACE/ARB THERAPY RXD/TAKEN: CPT | Mod: CPTII,S$GLB,, | Performed by: INTERNAL MEDICINE

## 2021-11-19 PROCEDURE — 99999 PR PBB SHADOW E&M-EST. PATIENT-LVL IV: ICD-10-PCS | Mod: PBBFAC,,, | Performed by: INTERNAL MEDICINE

## 2021-11-19 PROCEDURE — 4010F PR ACE/ARB THEARPY RXD/TAKEN: ICD-10-PCS | Mod: CPTII,S$GLB,, | Performed by: INTERNAL MEDICINE

## 2021-11-19 PROCEDURE — 99999 PR PBB SHADOW E&M-EST. PATIENT-LVL IV: CPT | Mod: PBBFAC,,, | Performed by: INTERNAL MEDICINE

## 2021-11-19 RX ORDER — CICLOPIROX 80 MG/ML
SOLUTION TOPICAL NIGHTLY
Qty: 6.6 ML | Refills: 11 | Status: SHIPPED | OUTPATIENT
Start: 2021-11-19 | End: 2021-12-13

## 2021-11-19 RX ORDER — AMLODIPINE AND VALSARTAN 5; 320 MG/1; MG/1
1 TABLET ORAL DAILY
Qty: 90 TABLET | Refills: 3 | Status: SHIPPED | OUTPATIENT
Start: 2021-11-19 | End: 2022-03-21

## 2021-11-19 RX ORDER — CYCLOBENZAPRINE HCL 10 MG
10 TABLET ORAL 3 TIMES DAILY PRN
Qty: 30 TABLET | Refills: 6 | Status: SHIPPED | OUTPATIENT
Start: 2021-11-19 | End: 2022-01-10 | Stop reason: SDUPTHER

## 2021-11-19 RX ORDER — GABAPENTIN 100 MG/1
CAPSULE ORAL
Qty: 180 CAPSULE | Refills: 1 | Status: SHIPPED | OUTPATIENT
Start: 2021-11-19 | End: 2021-11-19

## 2021-11-30 ENCOUNTER — PATIENT MESSAGE (OUTPATIENT)
Dept: PLASTIC SURGERY | Facility: CLINIC | Age: 51
End: 2021-11-30
Payer: COMMERCIAL

## 2021-12-04 ENCOUNTER — PATIENT MESSAGE (OUTPATIENT)
Dept: HEMATOLOGY/ONCOLOGY | Facility: CLINIC | Age: 51
End: 2021-12-04
Payer: COMMERCIAL

## 2021-12-06 ENCOUNTER — HOSPITAL ENCOUNTER (OUTPATIENT)
Dept: RADIOLOGY | Facility: HOSPITAL | Age: 51
Discharge: HOME | End: 2021-12-06
Attending: INTERNAL MEDICINE
Payer: COMMERCIAL

## 2021-12-06 DIAGNOSIS — M79.89 SOFT TISSUE MASS: ICD-10-CM

## 2021-12-06 PROCEDURE — 76536 US EXAM OF HEAD AND NECK: CPT | Mod: TC

## 2021-12-06 PROCEDURE — 76536 US EXAM OF HEAD AND NECK: CPT | Mod: 26,,, | Performed by: RADIOLOGY

## 2021-12-06 PROCEDURE — 76536 US SOFT TISSUE HEAD NECK THYROID: ICD-10-PCS | Mod: 26,,, | Performed by: RADIOLOGY

## 2021-12-07 ENCOUNTER — PATIENT MESSAGE (OUTPATIENT)
Dept: INTERNAL MEDICINE | Facility: CLINIC | Age: 51
End: 2021-12-07
Payer: COMMERCIAL

## 2021-12-07 DIAGNOSIS — D17.0 LIPOMA OF SCALP: Primary | ICD-10-CM

## 2021-12-09 ENCOUNTER — PATIENT MESSAGE (OUTPATIENT)
Dept: PLASTIC SURGERY | Facility: CLINIC | Age: 51
End: 2021-12-09
Payer: COMMERCIAL

## 2021-12-09 ENCOUNTER — PATIENT MESSAGE (OUTPATIENT)
Dept: INTERNAL MEDICINE | Facility: CLINIC | Age: 51
End: 2021-12-09
Payer: COMMERCIAL

## 2021-12-10 ENCOUNTER — PATIENT MESSAGE (OUTPATIENT)
Dept: INTERNAL MEDICINE | Facility: CLINIC | Age: 51
End: 2021-12-10
Payer: COMMERCIAL

## 2021-12-13 ENCOUNTER — OFFICE VISIT (OUTPATIENT)
Dept: SURGERY | Facility: CLINIC | Age: 51
End: 2021-12-13
Payer: COMMERCIAL

## 2021-12-13 ENCOUNTER — LAB VISIT (OUTPATIENT)
Dept: LAB | Facility: HOSPITAL | Age: 51
End: 2021-12-13
Attending: INTERNAL MEDICINE
Payer: COMMERCIAL

## 2021-12-13 ENCOUNTER — OFFICE VISIT (OUTPATIENT)
Dept: INTERNAL MEDICINE | Facility: CLINIC | Age: 51
End: 2021-12-13
Payer: COMMERCIAL

## 2021-12-13 VITALS
DIASTOLIC BLOOD PRESSURE: 68 MMHG | WEIGHT: 233 LBS | SYSTOLIC BLOOD PRESSURE: 124 MMHG | OXYGEN SATURATION: 99 % | HEART RATE: 70 BPM | BODY MASS INDEX: 41.29 KG/M2 | HEIGHT: 63 IN

## 2021-12-13 VITALS
DIASTOLIC BLOOD PRESSURE: 72 MMHG | SYSTOLIC BLOOD PRESSURE: 128 MMHG | WEIGHT: 234.44 LBS | BODY MASS INDEX: 41.54 KG/M2 | HEIGHT: 63 IN

## 2021-12-13 DIAGNOSIS — M54.14 THORACIC RADICULOPATHY: ICD-10-CM

## 2021-12-13 DIAGNOSIS — E04.2 MULTINODULAR NON-TOXIC GOITER: ICD-10-CM

## 2021-12-13 DIAGNOSIS — G47.00 INSOMNIA, UNSPECIFIED TYPE: ICD-10-CM

## 2021-12-13 DIAGNOSIS — K76.0 FATTY LIVER: ICD-10-CM

## 2021-12-13 DIAGNOSIS — D17.0 LIPOMA OF SCALP: ICD-10-CM

## 2021-12-13 DIAGNOSIS — R73.03 PREDIABETES: Primary | ICD-10-CM

## 2021-12-13 DIAGNOSIS — E55.9 VITAMIN D DEFICIENCY: ICD-10-CM

## 2021-12-13 DIAGNOSIS — M79.89 SOFT TISSUE MASS: ICD-10-CM

## 2021-12-13 DIAGNOSIS — I10 ESSENTIAL HYPERTENSION: ICD-10-CM

## 2021-12-13 LAB
T4 FREE SERPL-MCNC: 0.79 NG/DL (ref 0.71–1.51)
TSH SERPL DL<=0.005 MIU/L-ACNC: 4.28 UIU/ML (ref 0.4–4)

## 2021-12-13 PROCEDURE — 4010F ACE/ARB THERAPY RXD/TAKEN: CPT | Mod: CPTII,S$GLB,, | Performed by: SURGERY

## 2021-12-13 PROCEDURE — 4010F PR ACE/ARB THEARPY RXD/TAKEN: ICD-10-PCS | Mod: CPTII,S$GLB,, | Performed by: INTERNAL MEDICINE

## 2021-12-13 PROCEDURE — 4010F ACE/ARB THERAPY RXD/TAKEN: CPT | Mod: CPTII,S$GLB,, | Performed by: INTERNAL MEDICINE

## 2021-12-13 PROCEDURE — 4010F PR ACE/ARB THEARPY RXD/TAKEN: ICD-10-PCS | Mod: CPTII,S$GLB,, | Performed by: SURGERY

## 2021-12-13 PROCEDURE — 36415 COLL VENOUS BLD VENIPUNCTURE: CPT | Performed by: INTERNAL MEDICINE

## 2021-12-13 PROCEDURE — 99999 PR PBB SHADOW E&M-EST. PATIENT-LVL IV: CPT | Mod: PBBFAC,,, | Performed by: SURGERY

## 2021-12-13 PROCEDURE — 99214 OFFICE O/P EST MOD 30 MIN: CPT | Mod: S$GLB,,, | Performed by: INTERNAL MEDICINE

## 2021-12-13 PROCEDURE — 99999 PR PBB SHADOW E&M-EST. PATIENT-LVL IV: ICD-10-PCS | Mod: PBBFAC,,, | Performed by: INTERNAL MEDICINE

## 2021-12-13 PROCEDURE — 99214 PR OFFICE/OUTPT VISIT, EST, LEVL IV, 30-39 MIN: ICD-10-PCS | Mod: S$GLB,,, | Performed by: SURGERY

## 2021-12-13 PROCEDURE — 99214 PR OFFICE/OUTPT VISIT, EST, LEVL IV, 30-39 MIN: ICD-10-PCS | Mod: S$GLB,,, | Performed by: INTERNAL MEDICINE

## 2021-12-13 PROCEDURE — 84443 ASSAY THYROID STIM HORMONE: CPT | Performed by: INTERNAL MEDICINE

## 2021-12-13 PROCEDURE — 84439 ASSAY OF FREE THYROXINE: CPT | Performed by: INTERNAL MEDICINE

## 2021-12-13 PROCEDURE — 99999 PR PBB SHADOW E&M-EST. PATIENT-LVL IV: ICD-10-PCS | Mod: PBBFAC,,, | Performed by: SURGERY

## 2021-12-13 PROCEDURE — 99999 PR PBB SHADOW E&M-EST. PATIENT-LVL IV: CPT | Mod: PBBFAC,,, | Performed by: INTERNAL MEDICINE

## 2021-12-13 PROCEDURE — 99214 OFFICE O/P EST MOD 30 MIN: CPT | Mod: S$GLB,,, | Performed by: SURGERY

## 2021-12-13 RX ORDER — TRAZODONE HYDROCHLORIDE 50 MG/1
25 TABLET ORAL NIGHTLY
Qty: 15 TABLET | Refills: 1 | Status: SHIPPED | OUTPATIENT
Start: 2021-12-13 | End: 2022-01-04

## 2021-12-14 ENCOUNTER — PATIENT MESSAGE (OUTPATIENT)
Dept: INTERNAL MEDICINE | Facility: CLINIC | Age: 51
End: 2021-12-14
Payer: COMMERCIAL

## 2021-12-15 ENCOUNTER — PATIENT OUTREACH (OUTPATIENT)
Dept: ADMINISTRATIVE | Facility: HOSPITAL | Age: 51
End: 2021-12-15
Payer: COMMERCIAL

## 2021-12-16 ENCOUNTER — TELEPHONE (OUTPATIENT)
Dept: INTERNAL MEDICINE | Facility: CLINIC | Age: 51
End: 2021-12-16
Payer: COMMERCIAL

## 2021-12-16 ENCOUNTER — PATIENT MESSAGE (OUTPATIENT)
Dept: INTERNAL MEDICINE | Facility: CLINIC | Age: 51
End: 2021-12-16
Payer: COMMERCIAL

## 2021-12-16 DIAGNOSIS — R94.6 ABNORMAL THYROID FUNCTION TEST: Primary | ICD-10-CM

## 2021-12-20 ENCOUNTER — PROCEDURE VISIT (OUTPATIENT)
Dept: SURGERY | Facility: CLINIC | Age: 51
End: 2021-12-20
Payer: COMMERCIAL

## 2021-12-20 ENCOUNTER — PATIENT MESSAGE (OUTPATIENT)
Dept: ADMINISTRATIVE | Facility: OTHER | Age: 51
End: 2021-12-20
Payer: COMMERCIAL

## 2021-12-20 VITALS
SYSTOLIC BLOOD PRESSURE: 116 MMHG | HEIGHT: 63 IN | HEART RATE: 100 BPM | WEIGHT: 236.75 LBS | DIASTOLIC BLOOD PRESSURE: 83 MMHG | BODY MASS INDEX: 41.95 KG/M2

## 2021-12-20 DIAGNOSIS — D17.0 LIPOMA OF HEAD: Primary | ICD-10-CM

## 2021-12-20 PROCEDURE — 88304 PR  SURG PATH,LEVEL III: ICD-10-PCS | Mod: 26,,, | Performed by: PATHOLOGY

## 2021-12-20 PROCEDURE — 21012 EXC FACE LES SBQ 2 CM/>: CPT | Mod: S$GLB,,, | Performed by: SURGERY

## 2021-12-20 PROCEDURE — 21012 EXC, TUMOR SOFT TISSUE: ICD-10-PCS | Mod: S$GLB,,, | Performed by: SURGERY

## 2021-12-20 PROCEDURE — 88304 TISSUE EXAM BY PATHOLOGIST: CPT | Mod: 26,,, | Performed by: PATHOLOGY

## 2021-12-20 PROCEDURE — 88304 TISSUE EXAM BY PATHOLOGIST: CPT | Performed by: PATHOLOGY

## 2021-12-21 ENCOUNTER — OFFICE VISIT (OUTPATIENT)
Dept: PLASTIC SURGERY | Facility: CLINIC | Age: 51
End: 2021-12-21
Attending: PLASTIC SURGERY
Payer: COMMERCIAL

## 2021-12-21 VITALS
HEART RATE: 95 BPM | WEIGHT: 236 LBS | SYSTOLIC BLOOD PRESSURE: 133 MMHG | BODY MASS INDEX: 41.81 KG/M2 | DIASTOLIC BLOOD PRESSURE: 81 MMHG

## 2021-12-21 DIAGNOSIS — C50.611 CARCINOMA OF AXILLARY TAIL OF RIGHT BREAST IN FEMALE, ESTROGEN RECEPTOR POSITIVE: Primary | ICD-10-CM

## 2021-12-21 DIAGNOSIS — Z17.0 CARCINOMA OF AXILLARY TAIL OF RIGHT BREAST IN FEMALE, ESTROGEN RECEPTOR POSITIVE: Primary | ICD-10-CM

## 2021-12-21 PROCEDURE — 4010F ACE/ARB THERAPY RXD/TAKEN: CPT | Mod: CPTII,S$GLB,, | Performed by: PLASTIC SURGERY

## 2021-12-21 PROCEDURE — 99211 OFF/OP EST MAY X REQ PHY/QHP: CPT | Mod: S$GLB,,, | Performed by: PLASTIC SURGERY

## 2021-12-21 PROCEDURE — 99211 PR OFFICE/OUTPT VISIT, EST, LEVL I: ICD-10-PCS | Mod: S$GLB,,, | Performed by: PLASTIC SURGERY

## 2021-12-21 PROCEDURE — 4010F PR ACE/ARB THEARPY RXD/TAKEN: ICD-10-PCS | Mod: CPTII,S$GLB,, | Performed by: PLASTIC SURGERY

## 2021-12-22 ENCOUNTER — HOSPITAL ENCOUNTER (OUTPATIENT)
Dept: RADIOLOGY | Facility: HOSPITAL | Age: 51
Discharge: HOME | End: 2021-12-22
Attending: INTERNAL MEDICINE
Payer: COMMERCIAL

## 2021-12-22 ENCOUNTER — DOCUMENTATION ONLY (OUTPATIENT)
Dept: REHABILITATION | Facility: OTHER | Age: 51
End: 2021-12-22
Payer: COMMERCIAL

## 2021-12-22 DIAGNOSIS — E04.2 MULTINODULAR NON-TOXIC GOITER: ICD-10-CM

## 2021-12-22 PROBLEM — R29.898 BILATERAL LEG WEAKNESS: Status: RESOLVED | Noted: 2021-10-14 | Resolved: 2021-12-22

## 2021-12-22 PROBLEM — M25.561 ACUTE PAIN OF RIGHT KNEE: Status: RESOLVED | Noted: 2021-10-14 | Resolved: 2021-12-22

## 2021-12-22 PROCEDURE — 76536 US EXAM OF HEAD AND NECK: CPT | Mod: TC

## 2021-12-22 PROCEDURE — 76536 US SOFT TISSUE HEAD NECK THYROID: ICD-10-PCS | Mod: 26,,, | Performed by: RADIOLOGY

## 2021-12-22 PROCEDURE — 76536 US EXAM OF HEAD AND NECK: CPT | Mod: 26,,, | Performed by: RADIOLOGY

## 2021-12-23 DIAGNOSIS — C50.611 CARCINOMA OF AXILLARY TAIL OF RIGHT BREAST IN FEMALE, ESTROGEN RECEPTOR POSITIVE: ICD-10-CM

## 2021-12-23 DIAGNOSIS — Z17.0 CARCINOMA OF AXILLARY TAIL OF RIGHT BREAST IN FEMALE, ESTROGEN RECEPTOR POSITIVE: ICD-10-CM

## 2021-12-27 RX ORDER — ZOLPIDEM TARTRATE 10 MG/1
10 TABLET ORAL NIGHTLY PRN
Qty: 30 TABLET | Refills: 2 | Status: SHIPPED | OUTPATIENT
Start: 2021-12-27 | End: 2022-03-30 | Stop reason: SDUPTHER

## 2021-12-28 DIAGNOSIS — M54.14 THORACIC RADICULOPATHY: ICD-10-CM

## 2021-12-28 LAB
FINAL PATHOLOGIC DIAGNOSIS: NORMAL
Lab: NORMAL

## 2021-12-28 RX ORDER — CYCLOBENZAPRINE HCL 10 MG
10 TABLET ORAL 3 TIMES DAILY PRN
Qty: 30 TABLET | Refills: 6 | OUTPATIENT
Start: 2021-12-28

## 2022-01-03 ENCOUNTER — OFFICE VISIT (OUTPATIENT)
Dept: SURGERY | Facility: CLINIC | Age: 52
End: 2022-01-03
Payer: COMMERCIAL

## 2022-01-03 VITALS
DIASTOLIC BLOOD PRESSURE: 80 MMHG | HEIGHT: 63 IN | SYSTOLIC BLOOD PRESSURE: 141 MMHG | WEIGHT: 235.56 LBS | BODY MASS INDEX: 41.74 KG/M2 | HEART RATE: 86 BPM

## 2022-01-03 DIAGNOSIS — Z09 POSTOP CHECK: Primary | ICD-10-CM

## 2022-01-03 PROCEDURE — 3077F PR MOST RECENT SYSTOLIC BLOOD PRESSURE >= 140 MM HG: ICD-10-PCS | Mod: CPTII,S$GLB,, | Performed by: SURGERY

## 2022-01-03 PROCEDURE — 1160F RVW MEDS BY RX/DR IN RCRD: CPT | Mod: CPTII,S$GLB,, | Performed by: SURGERY

## 2022-01-03 PROCEDURE — 1159F PR MEDICATION LIST DOCUMENTED IN MEDICAL RECORD: ICD-10-PCS | Mod: CPTII,S$GLB,, | Performed by: SURGERY

## 2022-01-03 PROCEDURE — 3008F PR BODY MASS INDEX (BMI) DOCUMENTED: ICD-10-PCS | Mod: CPTII,S$GLB,, | Performed by: SURGERY

## 2022-01-03 PROCEDURE — 99999 PR PBB SHADOW E&M-EST. PATIENT-LVL III: CPT | Mod: PBBFAC,,, | Performed by: SURGERY

## 2022-01-03 PROCEDURE — 1159F MED LIST DOCD IN RCRD: CPT | Mod: CPTII,S$GLB,, | Performed by: SURGERY

## 2022-01-03 PROCEDURE — 3079F DIAST BP 80-89 MM HG: CPT | Mod: CPTII,S$GLB,, | Performed by: SURGERY

## 2022-01-03 PROCEDURE — 3077F SYST BP >= 140 MM HG: CPT | Mod: CPTII,S$GLB,, | Performed by: SURGERY

## 2022-01-03 PROCEDURE — 1160F PR REVIEW ALL MEDS BY PRESCRIBER/CLIN PHARMACIST DOCUMENTED: ICD-10-PCS | Mod: CPTII,S$GLB,, | Performed by: SURGERY

## 2022-01-03 PROCEDURE — 99024 PR POST-OP FOLLOW-UP VISIT: ICD-10-PCS | Mod: S$GLB,,, | Performed by: SURGERY

## 2022-01-03 PROCEDURE — 99999 PR PBB SHADOW E&M-EST. PATIENT-LVL III: ICD-10-PCS | Mod: PBBFAC,,, | Performed by: SURGERY

## 2022-01-03 PROCEDURE — 99024 POSTOP FOLLOW-UP VISIT: CPT | Mod: S$GLB,,, | Performed by: SURGERY

## 2022-01-03 PROCEDURE — 3079F PR MOST RECENT DIASTOLIC BLOOD PRESSURE 80-89 MM HG: ICD-10-PCS | Mod: CPTII,S$GLB,, | Performed by: SURGERY

## 2022-01-03 PROCEDURE — 3008F BODY MASS INDEX DOCD: CPT | Mod: CPTII,S$GLB,, | Performed by: SURGERY

## 2022-01-03 NOTE — PROGRESS NOTES
HPI:  The patient is status post- lipoma esxcision.  Doing well.  No issues.  PHYSICAL EXAM:    In NAD  Incisions c/d/i    SOFT TISSUE, SCALP, EXCISION:   -  Mature adipose tissue, consistent with lipoma.     ASSESSMENT:    The patient is doing well after surgery.     PLAN:    Follow up PRN.        Luis Daniel Fortune MD

## 2022-01-04 DIAGNOSIS — G47.00 INSOMNIA, UNSPECIFIED TYPE: ICD-10-CM

## 2022-01-04 RX ORDER — TRAZODONE HYDROCHLORIDE 50 MG/1
TABLET ORAL
Qty: 30 TABLET | Refills: 2 | Status: SHIPPED | OUTPATIENT
Start: 2022-01-04 | End: 2022-05-17

## 2022-01-04 NOTE — TELEPHONE ENCOUNTER
This Rx Request does not qualify for assessment with the OR.    Please review Protocol Details and the Care Due Message for extra clinical information.     Reasons Rx Request may be deferred:  1. Labs/Vitals overdue  2. Labs/Vitals abnormal  3. Patient has been seen in the ED/Hospital since the last PCP visit  4. Medication is outside of scope

## 2022-01-04 NOTE — TELEPHONE ENCOUNTER
No new care gaps identified.  Powered by [employer]. Reference number: [id number].   1/04/2022 10:32:00 AM CST

## 2022-01-06 DIAGNOSIS — Z85.3 HISTORY OF BREAST CANCER: Primary | ICD-10-CM

## 2022-01-18 ENCOUNTER — LAB VISIT (OUTPATIENT)
Dept: LAB | Facility: HOSPITAL | Age: 52
End: 2022-01-18
Attending: INTERNAL MEDICINE
Payer: COMMERCIAL

## 2022-01-18 DIAGNOSIS — R94.6 ABNORMAL THYROID FUNCTION TEST: ICD-10-CM

## 2022-01-18 LAB
T4 FREE SERPL-MCNC: 0.74 NG/DL (ref 0.71–1.51)
TSH SERPL DL<=0.005 MIU/L-ACNC: 2.24 UIU/ML (ref 0.4–4)

## 2022-01-18 PROCEDURE — 84443 ASSAY THYROID STIM HORMONE: CPT | Performed by: INTERNAL MEDICINE

## 2022-01-18 PROCEDURE — 84439 ASSAY OF FREE THYROXINE: CPT | Performed by: INTERNAL MEDICINE

## 2022-01-18 PROCEDURE — 36415 COLL VENOUS BLD VENIPUNCTURE: CPT | Performed by: INTERNAL MEDICINE

## 2022-01-31 ENCOUNTER — OFFICE VISIT (OUTPATIENT)
Dept: HEMATOLOGY/ONCOLOGY | Facility: CLINIC | Age: 52
End: 2022-01-31
Payer: COMMERCIAL

## 2022-01-31 VITALS
WEIGHT: 241.19 LBS | BODY MASS INDEX: 42.73 KG/M2 | BODY MASS INDEX: 42.73 KG/M2 | SYSTOLIC BLOOD PRESSURE: 117 MMHG | RESPIRATION RATE: 18 BRPM | DIASTOLIC BLOOD PRESSURE: 85 MMHG | HEIGHT: 63 IN | WEIGHT: 241.19 LBS | OXYGEN SATURATION: 96 % | DIASTOLIC BLOOD PRESSURE: 85 MMHG | TEMPERATURE: 97 F | HEART RATE: 98 BPM | SYSTOLIC BLOOD PRESSURE: 117 MMHG | HEIGHT: 63 IN

## 2022-01-31 DIAGNOSIS — Z17.0 CARCINOMA OF AXILLARY TAIL OF RIGHT BREAST IN FEMALE, ESTROGEN RECEPTOR POSITIVE: Primary | ICD-10-CM

## 2022-01-31 DIAGNOSIS — C50.611 CARCINOMA OF AXILLARY TAIL OF RIGHT BREAST IN FEMALE, ESTROGEN RECEPTOR POSITIVE: Primary | ICD-10-CM

## 2022-01-31 DIAGNOSIS — Z79.811 PROPHYLACTIC USE OF ANASTROZOLE (ARIMIDEX): ICD-10-CM

## 2022-01-31 DIAGNOSIS — Z12.4 PAP SMEAR FOR CERVICAL CANCER SCREENING: ICD-10-CM

## 2022-01-31 DIAGNOSIS — E66.01 CLASS 3 SEVERE OBESITY WITH BODY MASS INDEX (BMI) OF 40.0 TO 44.9 IN ADULT, UNSPECIFIED OBESITY TYPE, UNSPECIFIED WHETHER SERIOUS COMORBIDITY PRESENT: ICD-10-CM

## 2022-01-31 PROCEDURE — 3008F BODY MASS INDEX DOCD: CPT | Mod: CPTII,S$GLB,, | Performed by: PHYSICIAN ASSISTANT

## 2022-01-31 PROCEDURE — 99213 PR OFFICE/OUTPT VISIT, EST, LEVL III, 20-29 MIN: ICD-10-PCS | Mod: S$GLB,,, | Performed by: PHYSICIAN ASSISTANT

## 2022-01-31 PROCEDURE — 3008F BODY MASS INDEX DOCD: CPT | Mod: CPTII,S$GLB,, | Performed by: INTERNAL MEDICINE

## 2022-01-31 PROCEDURE — 99214 PR OFFICE/OUTPT VISIT, EST, LEVL IV, 30-39 MIN: ICD-10-PCS | Mod: S$GLB,,, | Performed by: INTERNAL MEDICINE

## 2022-01-31 PROCEDURE — 99999 PR PBB SHADOW E&M-EST. PATIENT-LVL V: CPT | Mod: PBBFAC,,, | Performed by: PHYSICIAN ASSISTANT

## 2022-01-31 PROCEDURE — 99214 OFFICE O/P EST MOD 30 MIN: CPT | Mod: S$GLB,,, | Performed by: INTERNAL MEDICINE

## 2022-01-31 PROCEDURE — 99999 PR PBB SHADOW E&M-EST. PATIENT-LVL V: ICD-10-PCS | Mod: PBBFAC,,, | Performed by: PHYSICIAN ASSISTANT

## 2022-01-31 PROCEDURE — 1160F PR REVIEW ALL MEDS BY PRESCRIBER/CLIN PHARMACIST DOCUMENTED: ICD-10-PCS | Mod: CPTII,S$GLB,, | Performed by: PHYSICIAN ASSISTANT

## 2022-01-31 PROCEDURE — 3074F SYST BP LT 130 MM HG: CPT | Mod: CPTII,S$GLB,, | Performed by: INTERNAL MEDICINE

## 2022-01-31 PROCEDURE — 1159F MED LIST DOCD IN RCRD: CPT | Mod: CPTII,S$GLB,, | Performed by: PHYSICIAN ASSISTANT

## 2022-01-31 PROCEDURE — 3079F PR MOST RECENT DIASTOLIC BLOOD PRESSURE 80-89 MM HG: ICD-10-PCS | Mod: CPTII,S$GLB,, | Performed by: INTERNAL MEDICINE

## 2022-01-31 PROCEDURE — 1160F RVW MEDS BY RX/DR IN RCRD: CPT | Mod: CPTII,S$GLB,, | Performed by: PHYSICIAN ASSISTANT

## 2022-01-31 PROCEDURE — 99213 OFFICE O/P EST LOW 20 MIN: CPT | Mod: S$GLB,,, | Performed by: PHYSICIAN ASSISTANT

## 2022-01-31 PROCEDURE — 1159F PR MEDICATION LIST DOCUMENTED IN MEDICAL RECORD: ICD-10-PCS | Mod: CPTII,S$GLB,, | Performed by: PHYSICIAN ASSISTANT

## 2022-01-31 PROCEDURE — 3074F SYST BP LT 130 MM HG: CPT | Mod: CPTII,S$GLB,, | Performed by: PHYSICIAN ASSISTANT

## 2022-01-31 PROCEDURE — 3008F PR BODY MASS INDEX (BMI) DOCUMENTED: ICD-10-PCS | Mod: CPTII,S$GLB,, | Performed by: PHYSICIAN ASSISTANT

## 2022-01-31 PROCEDURE — 3074F PR MOST RECENT SYSTOLIC BLOOD PRESSURE < 130 MM HG: ICD-10-PCS | Mod: CPTII,S$GLB,, | Performed by: PHYSICIAN ASSISTANT

## 2022-01-31 PROCEDURE — 99999 PR PBB SHADOW E&M-EST. PATIENT-LVL IV: ICD-10-PCS | Mod: PBBFAC,,, | Performed by: INTERNAL MEDICINE

## 2022-01-31 PROCEDURE — 3074F PR MOST RECENT SYSTOLIC BLOOD PRESSURE < 130 MM HG: ICD-10-PCS | Mod: CPTII,S$GLB,, | Performed by: INTERNAL MEDICINE

## 2022-01-31 PROCEDURE — 3079F DIAST BP 80-89 MM HG: CPT | Mod: CPTII,S$GLB,, | Performed by: PHYSICIAN ASSISTANT

## 2022-01-31 PROCEDURE — 99999 PR PBB SHADOW E&M-EST. PATIENT-LVL IV: CPT | Mod: PBBFAC,,, | Performed by: INTERNAL MEDICINE

## 2022-01-31 PROCEDURE — 3079F PR MOST RECENT DIASTOLIC BLOOD PRESSURE 80-89 MM HG: ICD-10-PCS | Mod: CPTII,S$GLB,, | Performed by: PHYSICIAN ASSISTANT

## 2022-01-31 PROCEDURE — 88175 CYTOPATH C/V AUTO FLUID REDO: CPT | Performed by: PHYSICIAN ASSISTANT

## 2022-01-31 PROCEDURE — 3079F DIAST BP 80-89 MM HG: CPT | Mod: CPTII,S$GLB,, | Performed by: INTERNAL MEDICINE

## 2022-01-31 PROCEDURE — 3008F PR BODY MASS INDEX (BMI) DOCUMENTED: ICD-10-PCS | Mod: CPTII,S$GLB,, | Performed by: INTERNAL MEDICINE

## 2022-01-31 NOTE — PROGRESS NOTES
Subjective:      Phylicia Vásquez is a 51 y.o. female who would like to participate in the 6 month Survivorship Weight Loss Program.  Hx of ER+/KY+/HER- right breast cancer and currently taking arimidex. She is now s/p bilateral mastectomy with reconstruction in 2021 .She meets criteria of the program and agrees to participate in biweekly group meetings for 12 sessions.     Current physical activity: None currently- just joined a gym    Past Medical History:   Diagnosis Date    Anxiety     Back pain     Goiter     HTN (hypertension) 10/18/2012    Hx antineoplastic chemo     last dose 20    Hx of psychiatric care     Ambien Klonopin    Insomnia 10/18/2012    Malignant neoplasm of axillary tail of right breast in female, estrogen receptor positive 2019    right    Neck mass     right posterior    Primary invasive malignant neoplasm of female breast, right 2019    right    Psychiatric problem     S/P abdominal supracervical subtotal hysterectomy, continues to have regular menses. -2014    Spinal cord cyst, L1 2014     Thoracic radiculopathy, bulging disc at T10-11 and T11-12      Past Surgical History:   Procedure Laterality Date    AXILLARY NODE DISSECTION Right 2020    Procedure: LYMPHADENECTOMY, AXILLARY;  Surgeon: Lelo Guaman MD;  Location: [facility] OR [address] [place] [address];  Service: General;  Laterality: Right;    BREAST BIOPSY Right     + CA    BREAST RECONSTRUCTION Bilateral 2021     SECTION      CHOLECYSTECTOMY      COLONOSCOPY N/A 10/5/2015    Procedure: COLONOSCOPY;  Surgeon: JERONIMO Cancino MD;  Location: [facility] ENDO [address]);  Service: Endoscopy;  Laterality: N/A;    COLONOSCOPY N/A 2021    Procedure: COLONOSCOPY;  Surgeon: JERONIMO Cancino MD;  Location: [facility] ENDO [address]);  Service: Endoscopy;  Laterality: N/A;  fully vaccinated-GT    HYSTERECTOMY      BC--SUPRACERVICAL    INJECTION FOR SENTINEL NODE IDENTIFICATION Right 2020     Procedure: INJECTION, FOR SENTINEL NODE IDENTIFICATION;  Surgeon: Lelo Guaman MD;  Location: [facility];  Service: General;  Laterality: Right;    INSERTION OF BREAST TISSUE EXPANDER Right 5/20/2020    Procedure: INSERTION, TISSUE EXPANDER, BREAST;  Surgeon: Pablo Ramos MD;  Location: [facility];  Service: Plastics;  Laterality: Right;    INSERTION OF TUNNELED CENTRAL VENOUS CATHETER (CVC) WITH SUBCUTANEOUS PORT Right 11/21/2019    Procedure: [id number]-HOTO-O-CATH Fluoro needed;  Surgeon: Lelo Guaman MD;  [facility];  Service: General;  Laterality: Right;  Right internal jugular.     MASTECTOMY Bilateral 04/2021    with reconstruction    NEEDLE LOCALIZATION Left 5/31/2021    Procedure: NEEDLE LOCALIZATION;  Surgeon: Kraig Mello MD;  Location: [facility] CATH LAB;  Service: Radiology;  Laterality: Left;    RECONSTRUCTION OF BREAST WITH DEEP INFERIOR EPIGASTRIC ARTERY  (ROWAN) FREE FLAP Bilateral 4/21/2021    Procedure: RECONSTRUCTION, BREAST, USING ROWAN FREE FLAP;  Surgeon: Pablo Ramos MD;  Location: [facility];  Service: Plastics;  Laterality: Bilateral;    SENTINEL LYMPH NODE BIOPSY Right 5/20/2020    Procedure: BIOPSY, LYMPH NODE, SENTINEL;  Surgeon: Lelo Guaman MD;  Location: [facility];  Service: General;  Laterality: Right;    TISSUE EXPANDER REMOVAL Right 4/21/2021    Procedure: REMOVAL, TISSUE EXPANDER;  Surgeon: Pablo Ramos MD;  Location: [facility];  Service: Plastics;  Laterality: Right;    UNILATERAL MASTECTOMY Right 5/20/2020    Procedure: MASTECTOMY, UNILATERAL;  Surgeon: Lelo Guaman MD;  Location: [facility];  Service: General;  Laterality: Right;    UNILATERAL MASTECTOMY Left 4/21/2021    Procedure: MASTECTOMY, UNILATERAL PROPHYLACTIC;  Surgeon: Lelo Guaman MD;  Location: [facility];  Service: General;  Laterality: Left;     Social History     Tobacco Use    Smoking status: Never Smoker    Smokeless tobacco: Never Used   Substance Use Topics    Alcohol  use: Yes     Comment: rarely    Drug use: No     Family History   Problem Relation Age of Onset    Cancer Paternal Aunt     Lupus Paternal Aunt     Hypertension Mother     Depression Mother     Liver disease Father     Alcohol abuse Father     Cancer Father         Lung and prostate cancer    Colon cancer Paternal Uncle     Depression Son     Breast cancer Neg Hx     Ovarian cancer Neg Hx     Melanoma Neg Hx     Psoriasis Neg Hx     Eczema Neg Hx      OB History    Para Term  AB Living   4 2 1 1 2 2   SAB IAB Ectopic Multiple Live Births   2 0 0 0 2      # Outcome Date GA Lbr Saul/2nd Weight Sex Delivery Anes PTL Lv   4      M Vag-Spont  Y IRA      Complications: Fetal distress affecting labor   3 Term     M CS-Unspec  N IRA      Complications: Fetal distress affecting labor   2 SAB            1 SAB                Current Outpatient Medications:     amlodipine-valsartan (EXFORGE) 5-320 mg per tablet, Take 1 tablet by mouth once daily. (Patient taking differently: Take 1 tablet by mouth every morning.), Disp: 90 tablet, Rfl: 3    anastrozole (ARIMIDEX) 1 mg Tab, Take 1 tablet (1 mg total) by mouth once daily. (Patient taking differently: Take 1 mg by mouth every morning.), Disp: 90 tablet, Rfl: 3    ascorbic acid (VITAMIN C ORAL), Take 500 mg by mouth every morning. , Disp: , Rfl:     cyanocobalamin, vitamin B-12, (VITAMIN B-12 ORAL), Take by mouth daily as needed. , Disp: , Rfl:     cyclobenzaprine (FLEXERIL) 10 MG tablet, Take 1 tablet (10 mg total) by mouth 3 (three) times daily as needed for Muscle spasms., Disp: 30 tablet, Rfl: 0    gabapentin (NEURONTIN) 100 MG capsule, Take 2 capsules (200 mg total) by mouth every morning. Take in the morning for neuropathy, Disp: 180 capsule, Rfl: 1    traZODone (DESYREL) 50 MG tablet, Take 0.5 tab (25mg) by mouth at night as needed for insomnia., Disp: 30 tablet, Rfl: 2    venlafaxine (EFFEXOR XR) 75 MG 24 hr capsule, Take 1 capsule  "(75 mg total) by mouth once daily. In the morning, Disp: 30 capsule, Rfl: 11    vitamin E 200 UNIT capsule, Take 200 Units by mouth daily as needed. , Disp: , Rfl:     zolpidem (AMBIEN) 10 mg Tab, Take 1 tablet (10 mg total) by mouth nightly as needed (Sleep)., Disp: 30 tablet, Rfl: 2    Review of Systems:  General: No fever, chills, or weight loss.  Chest: No chest pain, shortness of breath, or palpitations.  Breast: No pain, masses, or nipple discharge.  Vulva: No pain, lesions, or itching.  Vagina: No relaxation, itching, discharge, or lesions.  Abdomen: No pain, nausea, vomiting, diarrhea, or constipation.  Urinary: No incontinence, nocturia, frequency, or dysuria.  Extremities:  No leg cramps, edema, or calf pain.  Neurologic: No headaches, dizziness, or visual changes.    Objective:     Vitals:    01/31/22 1458   BP: 117/85   Weight: 109.4 kg (241 lb 2.9 oz)   Height: 5' 3" (1.6 m)   PainSc: 0-No pain     Body mass index is 42.72 kg/m².  WAIST CIRCUMFERENCE: 119cm    PHYSICAL EXAM:  APPEARANCE: Well nourished, well developed, in no acute distress.  PELVIC: Normal external genitalia without lesions.  Normal hair distribution.  Adequate perineal body, normal urethral meatus.  Vagina moist and well rugated without lesions or discharge.  Cervix pink, without lesions, discharge or tenderness.  No significant cystocele or rectocele.  Bimanual exam shows uterus to be normal size, regular, mobile and nontender.  Adnexa without masses or tenderness.      Resting Energy Expenditure (using [place]-St Jeor Equation): 1677  Calorie Goals per day (REE x1.2 - 500): 1513    Assessment:    Carcinoma of axillary tail of right breast in female, estrogen receptor positive  -     Ambulatory Referral/Consult to Oncology Weight Loss/Behavioral Health; Future; Expected date: 02/07/2022  -     Ambulatory referral/consult to Oncology Weight Loss/Nutrition; Future; Expected date: 02/07/2022  -     Ambulatory referral/consult to " Physical/Occupational Therapy; Future; Expected date: 02/07/2022    Class 3 severe obesity with body mass index (BMI) of 40.0 to 44.9 in adult, unspecified obesity type, unspecified whether serious comorbidity present  -     Ambulatory Referral/Consult to Oncology Weight Loss/Behavioral Health; Future; Expected date: 02/07/2022  -     Ambulatory referral/consult to Oncology Weight Loss/Nutrition; Future; Expected date: 02/07/2022  -     Ambulatory referral/consult to Physical/Occupational Therapy; Future; Expected date: 02/07/2022    Details of the Survivorship Weight Loss Program was reviewed in detail with the patient. All questions answered.      Pap smear for cervical cancer screening- Reviewing chart and pap was never completed for [facility] on 11/17/2021. HPV resulted negative. Contacted Cytology and pap was never completed.  -     Liquid-Based Pap Smear, Screening      Plan:   Consent was reviewed and signed.  She understands that if she is unable to participate in the group sessions, she will be asked to return the FitBit and Scale  Goal of 10,000 steps daily.  Food diary daily in the FitBit [place].  Goal of 1513 calories per day.  Weight via scale once a week.  Messages will be sent to the patient via [facility] once a month for the duration of the program.  Referral to physical therapy for physical strength and endurance assessment. Will have follow ups with PT at least every 3 months for the duration of the program. Possibly more frequently depending on assessment.   Follow up in 3 and 6 months.   She will participate in Group B with the first session starting on March 15th, 2022.  Schedule of the sessions provided to patient.   She understands that if insurance does not pay for all the visits, she will be responsible to cover the difference.     Pap obtained today.  Apologized for error of pap never resulted from Cytology. Will report to supervisor.    Instructed patient to call if she experiences any side effects  or has any questions.    I spent a total of 25 minutes on the day of the visit.This includes face to face time and non-face to face time preparing to see the patient (eg, review of tests), obtaining and/or reviewing separately obtained history, documenting clinical information in the electronic or other health record, independently interpreting results and communicating results to the patient/family/caregiver, or care coordinator.

## 2022-01-31 NOTE — PROGRESS NOTES
Subjective:       Patient ID: Phylicia Vásquez is a 51 y.o. female.    Chief Complaint: No chief complaint on file.    HPI   Mrs Lua returns today for follow up.   As of November 1, 2020 she has been on anastrozole in the adjuvant setting.  Of note, her estradiol level and her FSH level suggested that she is postmenopausal, hence initiation of anastrozole.  After her last visit in early October 2021 she underwent a mammogram that was read as BI-RADS 4 and led to a biopsy of a left axillary lymph node that was unremarkable.  She presents today for follow-up.    On April 21, 2021 she underwent a contralateral prophylactic mastectomy with ROWAN flap reconstructions bilaterally.      Briefly, she is a 51 year old AA female who was found to have a carcinoma that is ER positive, WA positive, and HER 2 equivocal, but negative by  FISH.  An axillary node biopsy was also positive.  She received standard neoadjuvant chemotherapy consisting of 4 cycles of AC and 4 cycles of Taxol prior to undergoing a mastectomy. At the time of her mastectomy she had a 3 cm residual tumor while the 2 sentinel lymph nodes were positive.  A subsequent axillary node dissection showed no evidence of further chandler involvement.   She completed radiation therapy, and as of November 1, 2020 she has been on adjuvant anastrozole.      Review of Systems    Overall she feels OK.  She is experiencing hot flashes and joint stiffness involving primarily her her knees.  She still has residual neuropathy, manifested by numbness primarily on her toes.  In addition, she complains of pain in the lower thoracic area that has been intermittently present for 2 weeks.  There is no injury to the area that she can recall.  ECOG PS is 1. She denies any depression, easy bruising, fevers, chills, night  sweats, weight loss, vomiting, diarrhea, constipation, diplopia, blurred vision, headache, chest pain, palpitations, shortness of breath, left breast pain,  abdominal pain, extremity pain, or difficulty ambulating.  The remainder of the ten-point ROS, including general, skin, lymph, H/N, cardiorespiratory, GI, , Neuro, Endocrine, and psychiatric is negative.       Objective:      Physical Exam      She is alert, oriented to time, place, person, pleasant, well      nourished, in no acute physical distress.                                   VITAL SIGNS:  Reviewed                                      HEENT:  Normal.  There are no nasal, oral, lip, gingival, auricular, lid,    or conjunctival lesions.  Mucosae are moist and pink, and there is no        thrush.  Pupils are equal, reactive to light and accommodation.              Extraocular muscle movements are intact.  Dentition is good.  There is no frontal or maxillary tenderness.                                     NECK:  Supple without JVD, adenopathy, or thyromegaly.                       LUNGS:  Clear to auscultation without wheezing, rales, or rhonchi.           CARDIOVASCULAR:  Reveals an S1, S2, no murmurs, no rubs, no gallops.         ABDOMEN:  Soft, nontender, without organomegaly.  Bowel sounds are    present.    There are several ulcerated areas on the lower transverse abdominal incision from the ROWAN flap reconstruction.                                                                     EXTREMITIES:  No cyanosis, clubbing, or edema.                               BREASTS:   she is now status post bilateral mastectomies with ROWAN flap reconstructions.  On the left reconstructed breast there a large area of fat necrosis measuring at least 10 cm on the upper outer quadrant.  There is also an area of probable fat necrosis on the right reconstructed breast at the 4 o'clock position.    I do not palpate any axillary lymph nodes in either side.  LYMPHATIC:  There is no cervical, axillary, or supraclavicular adenopathy.   SKIN:  Warm and moist, without petechiae, rashes, induration, or ecchymoses.            NEUROLOGIC:  DTRs are 0-1+ bilaterally, symmetrical, motor function is 5/5,  and cranial nerves are  within normal limits.    Assessment:       1. Carcinoma of axillary tail of right breast in female, estrogen receptor positive, pathologically T2N1M0 after neoadjuvant chemotherapy     2.    Axillary node metastases.  3.      Status post recent left prophylactic mastectomy with bilateral ROWAN flap reconstructions  4.      Extensive fat necrosis bilaterally.  5.      New onset back pain as described above  Plan:        I had a long discussion with her.    In regards to the relatively new onset bone pain I gave her the option of proceeding with a bone scan now versus waiting for 2 weeks and if symptoms do not improve, proceed with a bone scan at that point.  She elected to proceed with a bone scan now.  We will arrange.  Assuming her bone scan will be negative, she will remain on anastrozole through the end of October 2025, at which point I will recommend that she extend her treatment for an additional two years to complete 7 years of adjuvant hormonal therapy with AIs.    Finally, in regards to the areas of her bilateral  fat necrosis, no further workup is indicated at this point.    Her multiple questions were answered to her satisfaction.

## 2022-02-05 LAB
FINAL PATHOLOGIC DIAGNOSIS: NORMAL
Lab: NORMAL

## 2022-02-07 ENCOUNTER — HOSPITAL ENCOUNTER (OUTPATIENT)
Dept: RADIOLOGY | Facility: HOSPITAL | Age: 52
Discharge: HOME | End: 2022-02-07
Attending: INTERNAL MEDICINE
Payer: COMMERCIAL

## 2022-02-07 DIAGNOSIS — Z17.0 CARCINOMA OF AXILLARY TAIL OF RIGHT BREAST IN FEMALE, ESTROGEN RECEPTOR POSITIVE: ICD-10-CM

## 2022-02-07 DIAGNOSIS — C50.611 CARCINOMA OF AXILLARY TAIL OF RIGHT BREAST IN FEMALE, ESTROGEN RECEPTOR POSITIVE: ICD-10-CM

## 2022-02-07 PROCEDURE — A9503 TC99M MEDRONATE: HCPCS

## 2022-02-07 PROCEDURE — 78306 BONE IMAGING WHOLE BODY: CPT | Mod: TC

## 2022-02-07 PROCEDURE — 78306 NM BONE SCAN WHOLE BODY: ICD-10-PCS | Mod: 26,,, | Performed by: RADIOLOGY

## 2022-02-07 PROCEDURE — 78306 BONE IMAGING WHOLE BODY: CPT | Mod: 26,,, | Performed by: RADIOLOGY

## 2022-02-08 ENCOUNTER — CLINICAL SUPPORT (OUTPATIENT)
Dept: REHABILITATION | Facility: HOSPITAL | Age: 52
End: 2022-02-08
Payer: COMMERCIAL

## 2022-02-08 DIAGNOSIS — R26.89 ANTALGIC GAIT: ICD-10-CM

## 2022-02-08 DIAGNOSIS — E66.01 CLASS 3 SEVERE OBESITY WITH BODY MASS INDEX (BMI) OF 40.0 TO 44.9 IN ADULT, UNSPECIFIED OBESITY TYPE, UNSPECIFIED WHETHER SERIOUS COMORBIDITY PRESENT: ICD-10-CM

## 2022-02-08 DIAGNOSIS — Z17.0 CARCINOMA OF AXILLARY TAIL OF RIGHT BREAST IN FEMALE, ESTROGEN RECEPTOR POSITIVE: ICD-10-CM

## 2022-02-08 DIAGNOSIS — R26.89 BALANCE PROBLEM: ICD-10-CM

## 2022-02-08 DIAGNOSIS — C50.611 CARCINOMA OF AXILLARY TAIL OF RIGHT BREAST IN FEMALE, ESTROGEN RECEPTOR POSITIVE: ICD-10-CM

## 2022-02-08 DIAGNOSIS — R29.898 WEAKNESS OF BOTH LOWER EXTREMITIES: ICD-10-CM

## 2022-02-08 PROCEDURE — 97162 PT EVAL MOD COMPLEX 30 MIN: CPT

## 2022-02-08 PROCEDURE — 97110 THERAPEUTIC EXERCISES: CPT

## 2022-02-08 NOTE — PATIENT INSTRUCTIONS
Isometric Stabilization        Tighten abdominal muscles as if tightening a belt. Hold 5 seconds.  Do 10 times, 2 times per day.        Click to Enlarge   Seated Piriformis Stretch    1. While sitting on a chair with your back unsupported, cross the left leg over the right and hinge slightly at the waist.    2. Use your left hand to push down onto the left knee until you feel a deep stretch. This should not be painful.    3. Hold this position for 30 seconds, breathing deeply. Repeat on the opposite side. Do 3 times on each side.

## 2022-02-08 NOTE — PLAN OF CARE
[facility]  Physical Therapy Initial Evaluation    Name: Phylicia Vásquez  Clinic Number: [id number]    Therapy Diagnosis:   Encounter Diagnoses   Name Primary?    Carcinoma of axillary tail of right breast in female, estrogen receptor positive     Class 3 severe obesity with body mass index (BMI) of 40.0 to 44.9 in adult, unspecified obesity type, unspecified whether serious comorbidity present     Weakness of both lower extremities     Antalgic gait     Balance problem      Physician: Marcela Calvillo PA-C    Physician Orders: PT Eval and Treat   Medical Diagnosis from Referral:   C50.611,Z17.0 (ICD-10-CM) - Carcinoma of axillary tail of right breast in female, estrogen receptor positive   E66.01,Z68.41 (ICD-10-CM) - Class 3 severe obesity with body mass index (BMI) of 40.0 to 44.9 in adult, unspecified obesity type, unspecified whether serious comorbidity present   Evaluation Date: 2/8/2022  Authorization Period Expiration: 1/31/2023  Plan of Care Expiration: 8/8/2022  Visit # / Visits authorized: 1/ 1  Insurance: [employer]      Time In: 8:00 am  Time Out: 9:00 am  Total Billable Time: 60 minutes    Precautions: Standard and cancer    Subjective   History of current condition - Mirian reports: currently having some L knee pain, along with mid to lower back pain. She started going to gym yesterday and walked on the treadmill for 45 minutes. She has increased back pain when standing up at the  for prolonged periods of time (up to 4 hours), sometimes has difficulty getting up from low surfaces and has to use her hands to push herself up, has difficulty ascending/descending stairs with a reciprocal gait pattern so does one step at a time, has increased knee pain when going down the ramps in the garage at work, and sometimes moves like she is 80 when getting out of bed in the morning.      Cancer Related Surgery and Date: s/p bilateral mastectomy with reconstruction in  2021     Chemotherapy:  4 cycles of AC and 4 cycles of Taxol prior to undergoing a mastectomy  Radiation: completed radiation therapy on 20. On 2020 started on Anastrozole with with Dr. Verde     Medical History:   Past Medical History:   Diagnosis Date    Anxiety     Back pain     Goiter     HTN (hypertension) 10/18/2012    Hx antineoplastic chemo     last dose 20    Hx of psychiatric care     Ambien, Klonopin    Insomnia 10/18/2012    Malignant neoplasm of axillary tail of right breast in female, estrogen receptor positive 2019    right    Neck mass     right posterior    Primary invasive malignant neoplasm of female breast, right 2019    right    Psychiatric problem     S/P abdominal supracervical subtotal hysterectomy, continues to have regular menses. -2014    Spinal cord cyst, L1 2014     Thoracic radiculopathy, bulging disc at T10-11 and T11-12        Surgical History:   Phylicia Vásquez  has a past surgical history that includes Cholecystectomy; Hysterectomy (); Colonoscopy (N/A, 10/5/2015);  section; Insertion of tunneled central venous catheter (CVC) with subcutaneous port (Right, 2019); Unilateral mastectomy (Right, 2020); Injection for sentinel node identification (Right, 2020); [place] lymph node biopsy (Right, 2020); Insertion of breast tissue expander (Right, 2020); Axillary node dissection (Right, 2020); Unilateral mastectomy (Left, 2021); Reconstruction of breast with deep inferior epigastric artery  (ROWAN) free flap (Bilateral, 2021); Tissue expander removal (Right, 2021); Needle localization (Left, 2021); Colonoscopy (N/A, 2021); Breast biopsy (Right, ); Breast reconstruction (Bilateral, 2021); and Mastectomy (Bilateral, 2021).    Medications:   Phylicia has a current medication list which includes the following prescription(s):  amlodipine-valsartan, anastrozole, ascorbic acid, cyanocobalamin (vitamin b-12), cyclobenzaprine, gabapentin, trazodone, venlafaxine, vitamin e, and zolpidem.    Allergies:   Review of patient's allergies indicates:   Allergen Reactions    Butalbital Other (See Comments)     Chest pain          Imaging, bone scan films: Whole Body 2/7/2022  FINDINGS:  There is physiologic distribution of the radiopharmaceutical throughout the skeleton.     There is normal uptake in the genitourinary system and soft tissues.     Increased uptake at the bilateral shoulders, hips, knees, and ankles consistent with degenerative disease.  No focal uptake to suggest metastatic disease.     Impression:     There is no scintigraphic evidence of metastatic disease.    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[employer]  Prior Level of Function: independent  Current Level of Function: Mod I  Dominant hand:  right   Exercise Routine: just started walking for 45 minutes yesterday    Pain:  Current 3/10, worst 10/10, best 0/10   Location: left knee, mid to lower back  Description: Aching  Aggravating Factors: Standing, Walking and Getting out of bed/chair  Easing Factors: relaxation, pain medication, ice and heating pad    Pts goals: for this pain to go      Objective     Mental status: alert, oriented to person, place, and time, normal mood, behavior, speech, dress, motor activity, and thought processes, affect appropriate to mood  Appearance: Casually dressed  Behavior:  calm, cooperative and adequate rapport can be established  Attention Span and Concentration:  Normal    Postural examination/scapula alignment: Rounded shoulder and L LE longer in supine      Sensation:   Light Touch UEs  Intact  Light Touch LEs  Intact  Proprioception: Intact,            ROM:     Active/Passive ROM: (measured in degrees)      Shoulder RUE LUE   Flexion WNL WNL   Abduction WNL WNL   Extension WNL WNL   ER WNL WNL   IR WNL WNL     Elbow RUE LUE   Flexion WNL WNL   Extension WNL WNL     Hip Right Left   Flexion WFL WFL   Extension WFL WFL   Abduction WFL WFL   Internal rotation WFL WFL   External rotation WFL WFL      Knee Right Left   Flexion WFL WFL   Extension WFL WFL     Ankle Right Left   Plantarflexion WFL WFL   Dorsiflexion WFL WFL   Inversion WFL WFL   Eversion WFL WFL         Strength:     Manual Muscle Test:  Upper Extremity Strength   (R) UE (L) UE   Shoulder flexion: 3+/5 3+/5   Shoulder Abduction: 4/5 4/5   Shoulder IR 4+/5 4+/5   Shoulder ER 5/5 5/5   Elbow flexion: 5/5 5/5   Elbow extension: 5/5 5/5   Wrist flexion: 5/5 5/5   Wrist extension: 5/5 5/5        Lower Extremity Strength  Right LE   Left LE     Hip Flexion: 3+/5 Hip Flexion: 3+/5   Hip Extension:  3+/5 Hip Extension: 3+/5   Hip Abduction: 3+/5 Hip Abduction: 3+/5   Hip Adduction: 3+/5 Hip Adduction 3+/5   Knee Extension: 4-/5 Knee Extension: 4-/5   Knee Flexion: 4+/5 Knee Flexion: 4+/5   Ankle Dorsiflexion: 5/5 Ankle Dorsiflexion: 3+/5   Ankle Plantarflexion: 5/5 Ankle Plantarflexion: 5/5            Strength (in pounds, setting II, average of three trials):   Left Right   Trial 1: 30.6 lbs 40.3 lbs   Trial 2: 34.6 lbs 42.1 lbs   Trial 3: 32.8 lbs 43.4 lbs   Average: 32.67 lbs 41.93 lbs     Normal  Average Values  Female Right Left Male: Right Left   20-29 66 lbs 62 lbs         94 lbs 86 lbs   30-39 68 lbs 64 lbs 90 lbs 82 lbs   40-49 64 lbs 62 lbs 80 lbs 74 lbs   50-59 62 lbs 57 lbs 72 lbs 65 lbs   60-69 53 lbs 51 lbs 63 lbs 56 lbs   70+ 44 lbs 42 lbs 54 lbs 48 lbs       Balance Assessment:    For Single Limb Stance, enter best of 3 trials for each leg. Test times out at 30s.   Evaluation   Single Limb Stance R LE 3.16  (<10 sec = HIGH FALL RISK)   Single Limb Stance L LE 2.42  (<10 sec = HIGH FALL RISK)      Evaluation   Timed Up and Go 10.83 sec   "      Table: Population Norms for TUG    Age  Average TUG    60 - 69 years  8.1 seconds    70 - 79 years  9.2 seconds    80 - 99 years  11.3 seconds        Endurance:    6 Minute Walk Test Distance in meters: 344.12 meters    - AD used: none  - Assistance: supervision  - Distance: 1129' 09"    GAIT DEVIATIONS:  Phylicia displays the following deviations with ambulation: decreased heel strike and toe off on left lower extremity, mild antalgic gait       Evaluation   30 second Chair Rise  (adults > 59 y/o) 10 completed with no arms           CMS Impairment/Limitation/Restriction for FOTO Thoracic Spine Survey    Therapist reviewed FOTO scores for Phylicia Vásquez on 2/8/2022.   FOTO documents entered into [employer] - see Media section.    Limitation Score: 40%  Category: Other           TREATMENT   Treatment Time In: 8:50 am  Treatment Time Out: 9:00 am  Total Treatment time separate from Evaluation: 10 minutes    Mirian received therapeutic exercises to develop core stabilization for 8 minutes including:  TA's 10 x 3"  Seated piriformis stretch 2 x 30" ea  Standing core activation pushing hands into counter 10 x 3"    Mirian received the following manual therapy techniques: Joint mobilizations were applied to the: pelvis for 2 minutes, including:  MET for push/pull for L anterior rotation    Home Exercises and Patient Education Provided    Education provided:   - compliance with HEP  - role of PT and goals for PT    Written Home Exercises Provided: yes.  Exercises were reviewed and Mirian was able to demonstrate them prior to the end of the session.  Mirian demonstrated good  understanding of the education provided.     See EMR under Patient Instructions for exercises provided 2/8/2022.    Assessment   Phylicia is a 51 y.o. female referred to outpatient Physical Therapy with a medical diagnosis of Carcinoma of axillary tail of right breast in female, estrogen receptor positive; Class 3 severe obesity with body mass index (BMI) of " 40.0 to 44.9 in adult, unspecified obesity type, unspecified whether serious comorbidity present. Pt presents with weakness of both lower extremities, balance issues, antalgic gait, and decreased functional mobility.     Pt prognosis is Good.   Pt will benefit from skilled outpatient Physical Therapy to address the deficits stated above and in the chart below, provide pt/family education, and to maximize pt's level of independence. Pt will also benefit from re-assessment at 3 months and 6 months following evaluation to assess for any change in functional status.    Plan of care discussed with patient: Yes  Pt's spiritual, cultural and educational needs considered and patient is agreeable to the plan of care and goals as stated below:     Anticipated Barriers for therapy: none anticipated    Medical Necessity is demonstrated by the following  History  Co-morbidities and personal factors that may impact the plan of care Co-morbidities:   high BMI, history of cancer and HTN    Personal Factors:   no deficits     moderate   Examination  Body Structures and Functions, activity limitations and participation restrictions that may impact the plan of care Body Regions:   back  lower extremities  trunk    Body Systems:    strength  balance  gait  transfers  motor control    Participation Restrictions:   none    Activity limitations:   Learning and applying knowledge  no deficits    General Tasks and Commands  no deficits    Communication  no deficits    Mobility  lifting and carrying objects  walking  ascending/descending stairs and ramps    Self care  no deficits    Domestic Life  shopping  cooking    Interactions/Relationships  no deficits    Life Areas  employment    Community and Social Life  community life  recreation and leisure         moderate   Clinical Presentation evolving clinical presentation with changing clinical characteristics moderate   Decision Making/ Complexity Score: moderate     Goals:  Short Term  Goals:  4 weeks    1. Pt will demo >/= 4/5 strength in bilateral lower extremities for improved functional mobility. (progressing, not met)  2. Pt to demonstrate increased strength in bilateral upper extremities to >/=4+/5 for improved ability to perform functional activities. (progressing, not met)  4. Pt. will improve single leg stance balance test score to >/=  15s to be in a moderate/low risk category for falls. (progressing, not met)  5. Pt will perform >/= 12 sit to stands in 30 seconds with no arms for improved endurance. (progressing, not met)  6. Pt will increase 6 minute walk test score by >/=  50 meters in order to increase ambulation in the community. (progressing, not met)  7. Pt will initiate home exercise program to improve strength, flexibility, endurance, mobility & balance. (progressing, not met)  8. Pt will tolerate 10 min or greater of time in light-->moderate intensity cardio (I.e. Bike, Treadmill) to improve endurance to assist in performing her preferred recreational activities. (progressing, not met)  9. Pt will verbalize understanding of exercise recommendation guidelines for moderate intensity exercise to safely perform and progress home exercise routine. (progressing, not met)    Long Term Goals: 8 weeks  1. Pt will increase strength in bilateral lower extremities to 5/5 for improved functional mobility and tolerance for ADL and work activities. (progressing, not met)  4. Pt will be independent with home exercise program to improve range of motion, strength, balance and return to prior level of function. (progressing, not met)  5. Pt. will improve Single Leg Stance balance test score to >/=  30s for patient to be in low risk category for falls. (progressing, not met)  6. Pt will perform >/= 15 sit to stands in 30 seconds with no arms for improved endurance. (progressing, not met)  7. Pt will increase 6 minute walk test score by >/= 75 meters in order to increase community ambulation.  (progressing, not met)  8. Patient will report compliance with  20 -30min  of moderate intensity exercise 5 days a week to improve overall cardiovascular function and decrease cancer related fatigue. (progressing, not met)    Plan   Plan of care Certification: 2/8/2022 to 4/8/2022.    Outpatient Physical Therapy 2 times weekly for 8 weeks to include the following interventions: Gait Training, Manual Therapy, Neuromuscular Re-ed, Orthotic Management and Training, Patient Education, Self Care, Therapeutic Activities, Therapeutic Exercise and IASTM. Dry needling with manual therapy techniques to decrease pain, inflammation and swelling, increase circulation and promote healing process.     Gail Gutierrez, PT

## 2022-02-12 DIAGNOSIS — M54.14 THORACIC RADICULOPATHY: ICD-10-CM

## 2022-02-12 NOTE — TELEPHONE ENCOUNTER
No new care gaps identified.  Powered by [employer]. Reference number: [id number].   2/12/2022 12:31:21 PM CST

## 2022-02-14 ENCOUNTER — CLINICAL SUPPORT (OUTPATIENT)
Dept: REHABILITATION | Facility: HOSPITAL | Age: 52
End: 2022-02-14
Payer: COMMERCIAL

## 2022-02-14 DIAGNOSIS — R29.898 WEAKNESS OF BOTH LOWER EXTREMITIES: ICD-10-CM

## 2022-02-14 DIAGNOSIS — R26.89 BALANCE PROBLEM: ICD-10-CM

## 2022-02-14 DIAGNOSIS — R26.89 ANTALGIC GAIT: ICD-10-CM

## 2022-02-14 PROCEDURE — 97110 THERAPEUTIC EXERCISES: CPT

## 2022-02-14 RX ORDER — CYCLOBENZAPRINE HCL 10 MG
10 TABLET ORAL 3 TIMES DAILY PRN
Qty: 30 TABLET | Refills: 3 | Status: SHIPPED | OUTPATIENT
Start: 2022-02-14 | End: 2022-03-22 | Stop reason: SDUPTHER

## 2022-02-14 NOTE — PATIENT INSTRUCTIONS
Bent Leg Lift (Hook-Lying)        Tighten stomach and slowly raise right leg 4 inches from floor. Keep trunk rigid. Repeat with the left leg.  Repeat 10 times per set. Do 3 sets per session. Do 2 sessions per day.     [email or web address]://[employer]/1090     Copyright © [employer]. All rights reserved.   Bridging        Slowly raise buttocks from floor, keeping stomach tight.  Repeat 10 times per set. Do 3 sets per session. Do 2 sessions per day.     https://[employer]/1096     Copyright © [employer]. All rights reserved.   trengthening: Hip Adduction - Isometric        With ball or folded pillow between knees, squeeze knees together. Hold 3 seconds.  Repeat 10 times per set. Do 3 sets per session. Do 2 sessions per day.     [email or web address]://[email or web address]/612     Copyright © [employer]. All rights reserved.   Strengthening: Straight Leg Raise (Phase 1)        Tighten muscles on front of right thigh, then lift leg no higher than opposite knee, keeping knee locked. Repeat with the left leg.   Repeat 10 times per set. Do 3 sets per session. Do 2 sessions per day.     https://[email or web address]/614     Copyright © [employer]. All rights reserved.   Strengthening: Hip Abductor - Resisted        With band looped around both legs above knees, push thighs apart.  Repeat 10 times per set. Do 3 sets per session. Do 2 sessions per day.

## 2022-02-14 NOTE — PROGRESS NOTES
"  Physical Therapy Daily Treatment Note     Name: Phylicia Vásquez  Clinic Number: [id number]    Therapy Diagnosis:   Encounter Diagnoses   Name Primary?    Weakness of both lower extremities     Antalgic gait     Balance problem      Physician: Marcela Calvillo PA-C    Visit Date: 2/14/2022    Physician Orders: PT Eval and Treat   Medical Diagnosis from Referral:   C50.611,Z17.0 (ICD-10-CM) - Carcinoma of axillary tail of right breast in female, estrogen receptor positive   E66.01,Z68.41 (ICD-10-CM) - Class 3 severe obesity with body mass index (BMI) of 40.0 to 44.9 in adult, unspecified obesity type, unspecified whether serious comorbidity present   Evaluation Date: 2/8/2022  Authorization Period Expiration: 12/31/2022  Plan of Care Expiration: 8/8/2022  Visit # / Visits authorized: 1/ 20(plus eval)  Insurance: [employer]      Time In: 11:00 am  Time Out: 11:55 am  Total Billable Time: 55 minutes    Precautions: Standard and cancer    Subjective     Pt reports: having some pain in her left knee this morning. It started last night at work.  She was compliant with home exercise program.  Response to previous treatment: increase soreness  Functional change: balance is getting better    Pain: 7/10  Location: left knee      Objective     Objective Measures updated at progress report unless specified.       Treatment       Mirian received therapeutic exercises to develop strength, endurance, ROM, flexibility, posture and core stabilization for 55 minutes including:  Aerobic/Cardio Activity: stationary bike seat 4, level 1, 8 minutes    Stretching/Flexibility exercises: Figure 4 stretch 2 x 30" each    Strengthening Exercises:   TA's 15 x 3"  Bridging 1 x 10  Marching 1 x 10  SLR 3 x 10  SAQ 3 x 10  Hip adduction 20 x 5"  Hip abduction 2 x 10 RTB    Home Exercises Provided and Patient Education Provided     Education provided:   - compliance with HEP  - performing her exercises to her tolerance.     Written Home Exercises " Provided: yes.  Exercises were reviewed and Mirian was able to demonstrate them prior to the end of the session.  Mirian demonstrated good  understanding of the education provided.     See EMR under Patient Instructions for exercises provided eval and 2/14/22.    Assessment     Pt was able to begin making progress towards her goals as she was able to perform all of today's new exercises and progressions with no increase in symptoms prior to leaving the clinic. She required occasional verbal cues to keep her exercises in a pain free range and to maintain her core engagement with all exercises. Will progress as tolerated.   Mirian Is progressing well towards her goals.   Pt prognosis is Good.     Pt will continue to benefit from skilled outpatient physical therapy to address the deficits listed in the problem list box on initial evaluation, provide pt/family education and to maximize pt's level of independence in the home and community environment.     Pt's spiritual, cultural and educational needs considered and pt agreeable to plan of care and goals.     Anticipated barriers to physical therapy: none anticipated    Goals:   Short Term Goals:  4 weeks     1. Pt will demo >/= 4/5 strength in bilateral lower extremities for improved functional mobility. (progressing, not met)  2. Pt to demonstrate increased strength in bilateral upper extremities to >/=4+/5 for improved ability to perform functional activities. (progressing, not met)  4. Pt. will improve single leg stance balance test score to >/=  15s to be in a moderate/low risk category for falls. (progressing, not met)  5. Pt will perform >/= 12 sit to stands in 30 seconds with no arms for improved endurance. (progressing, not met)  6. Pt will increase 6 minute walk test score by >/=  50 meters in order to increase ambulation in the community. (progressing, not met)  7. Pt will initiate home exercise program to improve strength, flexibility, endurance, mobility & balance.  (progressing, not met)  8. Pt will tolerate 10 min or greater of time in light-->moderate intensity cardio (I.e. Bike, Treadmill) to improve endurance to assist in performing her preferred recreational activities. (progressing, not met)  9. Pt will verbalize understanding of exercise recommendation guidelines for moderate intensity exercise to safely perform and progress home exercise routine. (progressing, not met)     Long Term Goals: 8 weeks  1. Pt will increase strength in bilateral lower extremities to 5/5 for improved functional mobility and tolerance for ADL and work activities. (progressing, not met)  4. Pt will be independent with home exercise program to improve range of motion, strength, balance and return to prior level of function. (progressing, not met)  5. Pt. will improve Single Leg Stance balance test score to >/=  30s for patient to be in low risk category for falls. (progressing, not met)  6. Pt will perform >/= 15 sit to stands in 30 seconds with no arms for improved endurance. (progressing, not met)  7. Pt will increase 6 minute walk test score by >/= 75 meters in order to increase community ambulation. (progressing, not met)  8. Patient will report compliance with  20 -30min  of moderate intensity exercise 5 days a week to improve overall cardiovascular function and decrease cancer related fatigue. (progressing, not met)  Plan     Plan of care Certification: 2/8/2022 to 4/8/2022.     Outpatient Physical Therapy 2 times weekly for 8 weeks to include the following interventions: Gait Training, Manual Therapy, Neuromuscular Re-ed, Orthotic Management and Training, Patient Education, Self Care, Therapeutic Activities, Therapeutic Exercise and IASTM. Dry needling with manual therapy techniques to decrease pain, inflammation and swelling, increase circulation and promote healing process.     Gail Gutierrez, PT

## 2022-02-23 ENCOUNTER — CLINICAL SUPPORT (OUTPATIENT)
Dept: REHABILITATION | Facility: HOSPITAL | Age: 52
End: 2022-02-23
Payer: COMMERCIAL

## 2022-02-23 DIAGNOSIS — R26.89 ANTALGIC GAIT: ICD-10-CM

## 2022-02-23 DIAGNOSIS — R29.898 WEAKNESS OF BOTH LOWER EXTREMITIES: Primary | ICD-10-CM

## 2022-02-23 DIAGNOSIS — R26.89 BALANCE PROBLEM: ICD-10-CM

## 2022-02-23 PROCEDURE — 97110 THERAPEUTIC EXERCISES: CPT

## 2022-02-23 PROCEDURE — 97140 MANUAL THERAPY 1/> REGIONS: CPT

## 2022-02-23 NOTE — PROGRESS NOTES
"  Physical Therapy Daily Treatment Note     Name: Phylicia Vásquez  Clinic Number: [id number]    Therapy Diagnosis:   Encounter Diagnoses   Name Primary?    Weakness of both lower extremities Yes    Antalgic gait     Balance problem      Physician: Marcela Calvillo PA-C    Visit Date: 2/23/2022    Physician Orders: PT Eval and Treat   Medical Diagnosis from Referral:   C50.611,Z17.0 (ICD-10-CM) - Carcinoma of axillary tail of right breast in female, estrogen receptor positive   E66.01,Z68.41 (ICD-10-CM) - Class 3 severe obesity with body mass index (BMI) of 40.0 to 44.9 in adult, unspecified obesity type, unspecified whether serious comorbidity present   Evaluation Date: 2/8/2022  Authorization Period Expiration: 12/31/2022  Plan of Care Expiration: 8/8/2022  Visit # / Visits authorized: 2/ 20(plus eval)  Insurance: [employer]      Time In: 1:00 PM  Time Out: 2:00 PM  Total Billable Time: 55 minutes    Precautions: Standard and cancer    Subjective     Pt reports: having some pain in her left knee. She states she walked up the stairs and then on the treadmill at the gym, and it's been worse since.  She was compliant with home exercise program.  Response to previous treatment: no adverse response  Functional change: balance is getting better    Pain: 8-9/10  Location: left knee      Objective     Objective Measures updated at progress report unless specified.       Treatment       Mirian received therapeutic exercises to develop strength, endurance, ROM, flexibility, posture and core stabilization for 47 minutes including:    Aerobic/Cardio Activity: stationary bike seat 4, level 2, 8 minutes     Stretching/Flexibility exercises:   Figure 4 stretch 2 x 30" each  Hamstring stretch with strap, 2 x 30s/LE  Seated hamstring stretch, LLE, 30s      Strengthening Exercises:   Posterior pelvic tilt x 15 reps  Hip ADD squeeze, 2 x 10 reps  Core activation with orange swiss ball, 10 reps x 3s  Marching 1 x 10- pt endorses some " knee pain with this  Bridging 2 x 10  Shoulder flexion with 2# wand, 3 x 10 reps  Clamshell, side-lying, 2 x 10/LE  Side-lying hip ABD with PT assist, 10/LE  Wall push-up x 15 reps  Standing hip ABD, 10/LE      Phylicia received the following manual therapy techniques: kinesiotaping were applied to the: left knee for 8 minutes.     PT applies U-strip at 50% tension over left patella. Pt educated on purpose, wear, and removal. Pt verbalizes understanding of all topics.      Home Exercises Provided and Patient Education Provided     Education provided:   - compliance with HEP  - performing her exercises to her tolerance.   -low impact cardio while resting her knee (recumbent bike and nu-step)    Written Home Exercises Provided: yes.  Exercises were reviewed and Mirian was able to demonstrate them prior to the end of the session.  Mirian demonstrated good  understanding of the education provided.     See EMR under Patient Instructions for exercises provided eval and 2/14/22, 2/23/22    Assessment     Pt was able to begin making progress towards her goals as she was able to perform all of today's new exercises and progressions with no increase in symptoms prior to leaving the clinic. Initiated taping at knee to provide stability. Will follow up at next session to gauge response. She required occasional verbal cues to keep her exercises in a pain free range and to maintain her core engagement with all exercises. Will progress as tolerated.   Mirian Is progressing well towards her goals.   Pt prognosis is Good.     Pt will continue to benefit from skilled outpatient physical therapy to address the deficits listed in the problem list box on initial evaluation, provide pt/family education and to maximize pt's level of independence in the home and community environment.     Pt's spiritual, cultural and educational needs considered and pt agreeable to plan of care and goals.     Anticipated barriers to physical therapy: none  anticipated    Goals:   Short Term Goals:  4 weeks     1. Pt will demo >/= 4/5 strength in bilateral lower extremities for improved functional mobility. (progressing, not met)  2. Pt to demonstrate increased strength in bilateral upper extremities to >/=4+/5 for improved ability to perform functional activities. (progressing, not met)  4. Pt. will improve single leg stance balance test score to >/=  15s to be in a moderate/low risk category for falls. (progressing, not met)  5. Pt will perform >/= 12 sit to stands in 30 seconds with no arms for improved endurance. (progressing, not met)  6. Pt will increase 6 minute walk test score by >/=  50 meters in order to increase ambulation in the community. (progressing, not met)  7. Pt will initiate home exercise program to improve strength, flexibility, endurance, mobility & balance. (progressing, not met)  8. Pt will tolerate 10 min or greater of time in light-->moderate intensity cardio (I.e. Bike, Treadmill) to improve endurance to assist in performing her preferred recreational activities. (progressing, not met)  9. Pt will verbalize understanding of exercise recommendation guidelines for moderate intensity exercise to safely perform and progress home exercise routine. (progressing, not met)     Long Term Goals: 8 weeks  1. Pt will increase strength in bilateral lower extremities to 5/5 for improved functional mobility and tolerance for ADL and work activities. (progressing, not met)  4. Pt will be independent with home exercise program to improve range of motion, strength, balance and return to prior level of function. (progressing, not met)  5. Pt. will improve Single Leg Stance balance test score to >/=  30s for patient to be in low risk category for falls. (progressing, not met)  6. Pt will perform >/= 15 sit to stands in 30 seconds with no arms for improved endurance. (progressing, not met)  7. Pt will increase 6 minute walk test score by >/= 75 meters in order to  increase community ambulation. (progressing, not met)  8. Patient will report compliance with  20 -30min  of moderate intensity exercise 5 days a week to improve overall cardiovascular function and decrease cancer related fatigue. (progressing, not met)  Plan     Plan of care Certification: 2/8/2022 to 4/8/2022.     Outpatient Physical Therapy 2 times weekly for 8 weeks to include the following interventions: Gait Training, Manual Therapy, Neuromuscular Re-ed, Orthotic Management and Training, Patient Education, Self Care, Therapeutic Activities, Therapeutic Exercise and IASTM. Dry needling with manual therapy techniques to decrease pain, inflammation and swelling, increase circulation and promote healing process.     Marcela Price, PT

## 2022-03-02 ENCOUNTER — DOCUMENTATION ONLY (OUTPATIENT)
Dept: REHABILITATION | Facility: HOSPITAL | Age: 52
End: 2022-03-02
Payer: COMMERCIAL

## 2022-03-02 NOTE — PROGRESS NOTES
Pt canceled her PT appointment today due to transportation issues. She is scheduled for her next PT appointment on 3/9/22.

## 2022-03-04 ENCOUNTER — PATIENT MESSAGE (OUTPATIENT)
Dept: HEMATOLOGY/ONCOLOGY | Facility: CLINIC | Age: 52
End: 2022-03-04
Payer: COMMERCIAL

## 2022-03-09 ENCOUNTER — CLINICAL SUPPORT (OUTPATIENT)
Dept: REHABILITATION | Facility: HOSPITAL | Age: 52
End: 2022-03-09
Payer: COMMERCIAL

## 2022-03-09 DIAGNOSIS — R29.898 WEAKNESS OF BOTH LOWER EXTREMITIES: Primary | ICD-10-CM

## 2022-03-09 DIAGNOSIS — R26.89 ANTALGIC GAIT: ICD-10-CM

## 2022-03-09 DIAGNOSIS — R26.89 BALANCE PROBLEM: ICD-10-CM

## 2022-03-09 PROCEDURE — 97110 THERAPEUTIC EXERCISES: CPT

## 2022-03-09 PROCEDURE — 97140 MANUAL THERAPY 1/> REGIONS: CPT

## 2022-03-09 NOTE — PROGRESS NOTES
Physical Therapy Daily Treatment Note     Name: Phylicia Vásquez  Clinic Number: [id number]    Therapy Diagnosis:   Encounter Diagnoses   Name Primary?    Weakness of both lower extremities Yes    Antalgic gait     Balance problem      Physician: Marcela Calvillo PA-C    Visit Date: 3/9/2022    Physician Orders: PT Eval and Treat   Medical Diagnosis from Referral:   C50.611,Z17.0 (ICD-10-CM) - Carcinoma of axillary tail of right breast in female, estrogen receptor positive   E66.01,Z68.41 (ICD-10-CM) - Class 3 severe obesity with body mass index (BMI) of 40.0 to 44.9 in adult, unspecified obesity type, unspecified whether serious comorbidity present   Evaluation Date: 2/8/2022  Authorization Period Expiration: 12/31/2022  Plan of Care Expiration: 8/8/2022  Visit # / Visits authorized: 3/ 20(plus eval)  Insurance: [employer]      Time In: 9:02 AM  Time Out: 10:00 AM  Total Billable Time: 55 minutes    Precautions: Standard and cancer    Subjective     Pt reports: She fell during [employer] going after a bead. She iced it and took medicine, and it is feeling better. Pt denies back pain today. Pt states the tape helped, but it was difficult to take off. Prolonged standing is most difficult for patient.  She was compliant with home exercise program.  Response to previous treatment: no adverse response  Functional change: Leg lifts are feeling easier.    Pain: 5/10  Location: left knee      Objective     Objective Measures updated at progress report unless specified.       Treatment       Mirian received therapeutic exercises to develop strength, endurance, ROM, flexibility, posture and core stabilization for 47 minutes including:    Aerobic/Cardio Activity: stationary bike seat 4, level 3, 10 minutes     Stretching/Flexibility exercises:    Seated hamstring stretch, 2 x 30s/LE  L standing gastroc stretch, 2 x 30s    Strengthening Exercises:   Heel raises, 2 x 10 reps  Hip Ext, 2 x 10/LE  Hip ABD, 2 x 10/LE  Pt attempts  sit <> stands from mat table, but she endorses L knee pain, so it was stopped  Posterior pelvic tilt x 20 reps, with LE's on maroon bolster  L short arc quad, 2 x 10 reps  Bridge with LE's on maroon bolster, 2 x 10  Chest press to Shoulder flexion with 2# wand, 2 x 10 reps  Hip ADD squeeze, seated, 2 x 12  reps        Phylicia received the following manual therapy techniques: kinesiotaping were applied to the: left knee for 8 minutes.     PT applies U-strip at 50% tension over left patella. Pt educated on purpose, wear, and removal. Pt verbalizes understanding of all topics.      Home Exercises Provided and Patient Education Provided     Education provided:   - exercise technique  -low impact cardio while resting her knee (recumbent bike and nu-step)  -ice x 10-15 after exercise    Written Home Exercises Provided: Patient instructed to cont prior HEP.  Exercises were reviewed and Mirian was able to demonstrate them prior to the end of the session.  Mirian demonstrated good  understanding of the education provided.     See EMR under Patient Instructions for exercises provided eval and 2/14/22, 2/23/22    Assessment     Pt tolerated session fairly well. Activity somewhat limited due to L knee pain and recent fall. Pt sees ortho tomorrow. Pt able to complete exercises with some modifications. Repeated taping at knee to provide stability, as pt states it feels better. Expanded standing exercises today. Will progress as tolerated.   Mirian Is progressing well towards her goals.   Pt prognosis is Good.     Pt will continue to benefit from skilled outpatient physical therapy to address the deficits listed in the problem list box on initial evaluation, provide pt/family education and to maximize pt's level of independence in the home and community environment.     Pt's spiritual, cultural and educational needs considered and pt agreeable to plan of care and goals.     Anticipated barriers to physical therapy: none  anticipated    Goals:   Short Term Goals:  4 weeks     1. Pt will demo >/= 4/5 strength in bilateral lower extremities for improved functional mobility. (progressing, not met)  2. Pt to demonstrate increased strength in bilateral upper extremities to >/=4+/5 for improved ability to perform functional activities. (progressing, not met)  4. Pt. will improve single leg stance balance test score to >/=  15s to be in a moderate/low risk category for falls. (progressing, not met)  5. Pt will perform >/= 12 sit to stands in 30 seconds with no arms for improved endurance. (progressing, not met)  6. Pt will increase 6 minute walk test score by >/=  50 meters in order to increase ambulation in the community. (progressing, not met)  7. Pt will initiate home exercise program to improve strength, flexibility, endurance, mobility & balance. (progressing, not met)  8. Pt will tolerate 10 min or greater of time in light-->moderate intensity cardio (I.e. Bike, Treadmill) to improve endurance to assist in performing her preferred recreational activities. (progressing, not met)  9. Pt will verbalize understanding of exercise recommendation guidelines for moderate intensity exercise to safely perform and progress home exercise routine. (progressing, not met)     Long Term Goals: 8 weeks  1. Pt will increase strength in bilateral lower extremities to 5/5 for improved functional mobility and tolerance for ADL and work activities. (progressing, not met)  4. Pt will be independent with home exercise program to improve range of motion, strength, balance and return to prior level of function. (progressing, not met)  5. Pt. will improve Single Leg Stance balance test score to >/=  30s for patient to be in low risk category for falls. (progressing, not met)  6. Pt will perform >/= 15 sit to stands in 30 seconds with no arms for improved endurance. (progressing, not met)  7. Pt will increase 6 minute walk test score by >/= 75 meters in order to  increase community ambulation. (progressing, not met)  8. Patient will report compliance with  20 -30min  of moderate intensity exercise 5 days a week to improve overall cardiovascular function and decrease cancer related fatigue. (progressing, not met)  Plan     Plan of care Certification: 2/8/2022 to 4/8/2022.     Outpatient Physical Therapy 2 times weekly for 8 weeks to include the following interventions: Gait Training, Manual Therapy, Neuromuscular Re-ed, Orthotic Management and Training, Patient Education, Self Care, Therapeutic Activities, Therapeutic Exercise and IASTM. Dry needling with manual therapy techniques to decrease pain, inflammation and swelling, increase circulation and promote healing process.     Marcela Price, PT

## 2022-03-10 ENCOUNTER — PATIENT MESSAGE (OUTPATIENT)
Dept: ORTHOPEDICS | Facility: CLINIC | Age: 52
End: 2022-03-10
Payer: COMMERCIAL

## 2022-03-15 ENCOUNTER — PATIENT MESSAGE (OUTPATIENT)
Dept: HEMATOLOGY/ONCOLOGY | Facility: CLINIC | Age: 52
End: 2022-03-15
Payer: COMMERCIAL

## 2022-03-16 ENCOUNTER — TELEPHONE (OUTPATIENT)
Dept: HEMATOLOGY/ONCOLOGY | Facility: CLINIC | Age: 52
End: 2022-03-16
Payer: COMMERCIAL

## 2022-03-16 NOTE — TELEPHONE ENCOUNTER
Pt unable to make weight loss program class on 3/15. Make up appt confirmed virtual 3/22 at 930 with Ailyn Amanda.

## 2022-03-21 ENCOUNTER — CLINICAL SUPPORT (OUTPATIENT)
Dept: REHABILITATION | Facility: HOSPITAL | Age: 52
End: 2022-03-21
Payer: COMMERCIAL

## 2022-03-21 ENCOUNTER — OFFICE VISIT (OUTPATIENT)
Dept: ORTHOPEDICS | Facility: CLINIC | Age: 52
End: 2022-03-21
Payer: COMMERCIAL

## 2022-03-21 VITALS — WEIGHT: 241.19 LBS | HEIGHT: 63 IN | BODY MASS INDEX: 42.73 KG/M2

## 2022-03-21 DIAGNOSIS — M76.31 IT BAND SYNDROME, RIGHT: Primary | ICD-10-CM

## 2022-03-21 DIAGNOSIS — R26.89 ANTALGIC GAIT: ICD-10-CM

## 2022-03-21 DIAGNOSIS — M25.561 ACUTE PAIN OF RIGHT KNEE: ICD-10-CM

## 2022-03-21 DIAGNOSIS — R26.89 BALANCE PROBLEM: ICD-10-CM

## 2022-03-21 DIAGNOSIS — R29.898 WEAKNESS OF BOTH LOWER EXTREMITIES: Primary | ICD-10-CM

## 2022-03-21 PROCEDURE — 1159F MED LIST DOCD IN RCRD: CPT | Mod: CPTII,S$GLB,, | Performed by: PHYSICIAN ASSISTANT

## 2022-03-21 PROCEDURE — 1159F PR MEDICATION LIST DOCUMENTED IN MEDICAL RECORD: ICD-10-PCS | Mod: CPTII,S$GLB,, | Performed by: PHYSICIAN ASSISTANT

## 2022-03-21 PROCEDURE — 99999 PR PBB SHADOW E&M-EST. PATIENT-LVL III: CPT | Mod: PBBFAC,,, | Performed by: PHYSICIAN ASSISTANT

## 2022-03-21 PROCEDURE — 97110 THERAPEUTIC EXERCISES: CPT

## 2022-03-21 PROCEDURE — 99999 PR PBB SHADOW E&M-EST. PATIENT-LVL III: ICD-10-PCS | Mod: PBBFAC,,, | Performed by: PHYSICIAN ASSISTANT

## 2022-03-21 PROCEDURE — 99212 PR OFFICE/OUTPT VISIT, EST, LEVL II, 10-19 MIN: ICD-10-PCS | Mod: S$GLB,,, | Performed by: PHYSICIAN ASSISTANT

## 2022-03-21 PROCEDURE — 3008F BODY MASS INDEX DOCD: CPT | Mod: CPTII,S$GLB,, | Performed by: PHYSICIAN ASSISTANT

## 2022-03-21 PROCEDURE — 3008F PR BODY MASS INDEX (BMI) DOCUMENTED: ICD-10-PCS | Mod: CPTII,S$GLB,, | Performed by: PHYSICIAN ASSISTANT

## 2022-03-21 PROCEDURE — 4010F PR ACE/ARB THEARPY RXD/TAKEN: ICD-10-PCS | Mod: CPTII,S$GLB,, | Performed by: PHYSICIAN ASSISTANT

## 2022-03-21 PROCEDURE — 4010F ACE/ARB THERAPY RXD/TAKEN: CPT | Mod: CPTII,S$GLB,, | Performed by: PHYSICIAN ASSISTANT

## 2022-03-21 PROCEDURE — 99212 OFFICE O/P EST SF 10 MIN: CPT | Mod: S$GLB,,, | Performed by: PHYSICIAN ASSISTANT

## 2022-03-21 NOTE — PROGRESS NOTES
The patient location is: home   The chief complaint leading to consultation is: obesity     Visit type: audiovisual    Face to Face time with patient: 40 minutes   50 minutes of total time spent on the encounter, which includes face to face time and non-face to face time preparing to see the patient (eg, review of tests), Obtaining and/or reviewing separately obtained history, Documenting clinical information in the electronic or other health record, Independently interpreting results (not separately reported) and communicating results to the patient/family/caregiver, or Care coordination (not separately reported).       Each patient to whom he or she provides medical services by telemedicine is:  (1) informed of the relationship between the physician and patient and the respective role of any other health care provider with respect to management of the patient; and (2) notified that he or she may decline to receive medical services by telemedicine and may withdraw from such care at any time.    Oncology Survivorship Weight Loss Program   Group Medical Nutrition Therapy Visit     Session # 1 (make up)    Phylicia Vásquez   1970    Referring Provider:  No ref. provider found      Reason for Visit: Pt here for nutrition education and counseling for weight loss following breast or endometrial cancer     PMHx:   Past Medical History:   Diagnosis Date    Anxiety     Back pain     Goiter     HTN (hypertension) 10/18/2012    Hx antineoplastic chemo     last dose 4/23/20    Hx of psychiatric care     Ambien, Klonopin    Insomnia 10/18/2012    Malignant neoplasm of axillary tail of right breast in female, estrogen receptor positive 11/24/2019    right    Neck mass     right posterior    Primary invasive malignant neoplasm of female breast, right 11/21/2019    right    Psychiatric problem     S/P abdominal supracervical subtotal hysterectomy, continues to have regular menses.  4/2/2014    Spinal  "cord cyst, L1 2014     Thoracic radiculopathy, bulging disc at T10-11 and T11-12        Nutrition Assessment    This is a 52 y.o.female with a history of breast cancer.   Patient was seen today as part of 6- month weight management program. Today was a make up for session 1 which covered the following topics:   Added sugars  Label reading   Building a healthy plate   Portion sizes   Also discussed importance of adequate fasting time. Made goal of 9am-9pm fasting time since she works overnight. Weights, calories goals, and tracking reinforced.     Weight:109.3 kg (241 lb)  Height:5' 3" (1.6 m)  BMI:Body mass index is 42.69 kg/m².   IBW: Ideal body weight: 52.4 kg (115 lb 8.3 oz)  Adjusted ideal body weight: 75.2 kg (165 lb 11.4 oz)    Allergies: Butalbital    Current Medications:    Current Outpatient Medications:     amlodipine-valsartan (EXFORGE) 5-320 mg per tablet, TAKE 1 TABLET BY MOUTH EVERY DAY, Disp: 90 tablet, Rfl: 0    anastrozole (ARIMIDEX) 1 mg Tab, Take 1 tablet (1 mg total) by mouth once daily. (Patient taking differently: Take 1 mg by mouth every morning.), Disp: 90 tablet, Rfl: 3    ascorbic acid (VITAMIN C ORAL), Take 500 mg by mouth every morning. , Disp: , Rfl:     cyanocobalamin, vitamin B-12, (VITAMIN B-12 ORAL), Take by mouth daily as needed. , Disp: , Rfl:     cyclobenzaprine (FLEXERIL) 10 MG tablet, Take 1 tablet (10 mg total) by mouth 3 (three) times daily as needed for Muscle spasms., Disp: 30 tablet, Rfl: 3    gabapentin (NEURONTIN) 100 MG capsule, Take 2 capsules (200 mg total) by mouth every morning. Take in the morning for neuropathy, Disp: 180 capsule, Rfl: 1    traZODone (DESYREL) 50 MG tablet, Take 0.5 tab (25mg) by mouth at night as needed for insomnia., Disp: 30 tablet, Rfl: 2    venlafaxine (EFFEXOR XR) 75 MG 24 hr capsule, Take 1 capsule (75 mg total) by mouth once daily. In the morning, Disp: 30 capsule, Rfl: 11    vitamin E 200 UNIT capsule, Take 200 Units by mouth " daily as needed. , Disp: , Rfl:     zolpidem (AMBIEN) 10 mg Tab, Take 1 tablet (10 mg total) by mouth nightly as needed (Sleep)., Disp: 30 tablet, Rfl: 2    Nutrition Diagnosis    Problem: Obese  Etiology (related to): nutrition knowledge deficit   Signs/Symptoms (as evidenced by): BMI = 42.7    Nutrition Intervention and Program Adherence    Nutrition Prescription   Patient has been given a goal of 1513 kcal per day for the duration of the weight loss program     Materials Provided/Reviewed   Portion size guide   Food label guide     Nutrition Monitoring and Evaluation    Monitor: energy intake, weight and step counts    Goals: weight loss 1-2lb per week    Follow up in 4 weeks      Communication to referring provider/care team: note available in chart     Counseling time: 30 Minutes    Ailyn Amanda, MPH, RD, , LDN, FAND   [phone]

## 2022-03-21 NOTE — PROGRESS NOTES
Physical Therapy Daily Treatment Note     Name: Phylicia Vásquez  Clinic Number: [id number]    Therapy Diagnosis:   Encounter Diagnoses   Name Primary?    Weakness of both lower extremities Yes    Antalgic gait     Balance problem      Physician: Marcela Calvillo PA-C    Visit Date: 3/21/2022    Physician Orders: PT Eval and Treat   Medical Diagnosis from Referral:   C50.611,Z17.0 (ICD-10-CM) - Carcinoma of axillary tail of right breast in female, estrogen receptor positive   E66.01,Z68.41 (ICD-10-CM) - Class 3 severe obesity with body mass index (BMI) of 40.0 to 44.9 in adult, unspecified obesity type, unspecified whether serious comorbidity present   Evaluation Date: 2/8/2022  Authorization Period Expiration: 12/31/2022  Plan of Care Expiration: 8/8/2022  Visit # / Visits authorized: 4/ 20(plus eval)  Insurance: [employer]      Time In: 9:00 am  Time Out: 9:50 am  Total Billable Time: 50 minutes    Precautions: Standard and cancer    Subjective     Pt reports: knees are doing better, no pain in her back or knees this morning. She has not been to the gym in awhile due to her work schedule.   She was compliant with home exercise program.  Response to previous treatment: some soreness  Functional change: Leg lifts are feeling easier.    Pain: 0/10  Location: left knee      Objective     Objective Measures updated at progress report unless specified.       Treatment       Mirian received therapeutic exercises to develop strength, endurance, ROM, flexibility, posture and core stabilization for 50 minutes including:    Aerobic/Cardio Activity: stationary bike seat 4, level 3, 10 minutes     Stretching/Flexibility exercises:    Seated hamstring stretch, 2 x 30s/LE  B standing gastroc stretch, 2 x 30s    Strengthening Exercises:   Heel raises, 2 x 10 reps  Hip Ext, 2 x 10/LE  Hip ABD, 2 x 10/LE  Sit <> stands from mat table 1 x 10  Posterior pelvic tilt x 20 reps, with LE's on maroon bolster  B short arc quad, 2 x 10  reps  Bridge with LE's on maroon bolster, 2 x 10  Chest press to Shoulder flexion with 2# wand, 2 x 10 reps  Hip ADD squeeze, supine, 2 x 12  reps      Home Exercises Provided and Patient Education Provided     Education provided:   - exercise technique  -low impact cardio while resting her knee (recumbent bike and nu-step)  -ice x 10-15 after exercise    Written Home Exercises Provided: Patient instructed to cont prior HEP.  Exercises were reviewed and Mirian was able to demonstrate them prior to the end of the session.  Mirian demonstrated good  understanding of the education provided.     See EMR under Patient Instructions for exercises provided eval and 2/14/22, 2/23/22    Assessment     Pt tolerated session well. Pt able to resume sit to stands from mat with no complaints of pain. She performed all of today's activities with no increase in symptoms prior to leaving the clinic. Her program was not advanced today due to time constraints as she had an orthopedic appointment after PT appointment. Pt has met some of her goals as noted below. Will progress as tolerated.   Mirian Is progressing well towards her goals.   Pt prognosis is Good.     Pt will continue to benefit from skilled outpatient physical therapy to address the deficits listed in the problem list box on initial evaluation, provide pt/family education and to maximize pt's level of independence in the home and community environment.     Pt's spiritual, cultural and educational needs considered and pt agreeable to plan of care and goals.     Anticipated barriers to physical therapy: none anticipated    Goals:   Short Term Goals:  4 weeks     1. Pt will demo >/= 4/5 strength in bilateral lower extremities for improved functional mobility. (progressing, not met)  2. Pt to demonstrate increased strength in bilateral upper extremities to >/=4+/5 for improved ability to perform functional activities. (progressing, not met)  4. Pt. will improve single leg stance  balance test score to >/=  15s to be in a moderate/low risk category for falls. (progressing, not met)  5. Pt will perform >/= 12 sit to stands in 30 seconds with no arms for improved endurance. (progressing, not met)  6. Pt will increase 6 minute walk test score by >/=  50 meters in order to increase ambulation in the community. (progressing, not met)  7. Pt will initiate home exercise program to improve strength, flexibility, endurance, mobility & balance. (met, 3/21/22)  8. Pt will tolerate 10 min or greater of time in light-->moderate intensity cardio (I.e. Bike, Treadmill) to improve endurance to assist in performing her preferred recreational activities. (met, 3/21/22)  9. Pt will verbalize understanding of exercise recommendation guidelines for moderate intensity exercise to safely perform and progress home exercise routine. (progressing, not met)     Long Term Goals: 8 weeks  1. Pt will increase strength in bilateral lower extremities to 5/5 for improved functional mobility and tolerance for ADL and work activities. (progressing, not met)  4. Pt will be independent with home exercise program to improve range of motion, strength, balance and return to prior level of function. (progressing, not met)  5. Pt. will improve Single Leg Stance balance test score to >/=  30s for patient to be in low risk category for falls. (progressing, not met)  6. Pt will perform >/= 15 sit to stands in 30 seconds with no arms for improved endurance. (progressing, not met)  7. Pt will increase 6 minute walk test score by >/= 75 meters in order to increase community ambulation. (progressing, not met)  8. Patient will report compliance with  20 -30min  of moderate intensity exercise 5 days a week to improve overall cardiovascular function and decrease cancer related fatigue. (progressing, not met)  Plan     Plan of care Certification: 2/8/2022 to 4/8/2022.     Outpatient Physical Therapy 2 times weekly for 8 weeks to include the  following interventions: Gait Training, Manual Therapy, Neuromuscular Re-ed, Orthotic Management and Training, Patient Education, Self Care, Therapeutic Activities, Therapeutic Exercise and IASTM. Dry needling with manual therapy techniques to decrease pain, inflammation and swelling, increase circulation and promote healing process.     Gail Gutierrez, PT

## 2022-03-21 NOTE — PROGRESS NOTES
Subjective:      Patient ID: Phylicia Vásquez is a 52 y.o. female.    Chief Complaint: No chief complaint on file.    HPI    Patient is a 51 year old female who presents to clinic with chief complaint of a-traumatic right knee pain x 4 weeks. Patient stated that she mainly notices her right lateral knee pain and stiffness when she is driving. She has lateral knee pain the will travel down her knee laterally to the top of her foot. She also has tenderness. She has tried OTC tylenol and aleve without relief as well as gabapentin which she takes bid. She does have some popping and some feeling of instability .   During physical exam had TTP along IT band, stated that she does not sleep in that side due to pain and will sleep with a pillow between her legs to avoid pulling.     03/21/22: Patient reports that her pain did decrease but she has recently had an injection in pain.     Review of Systems   Constitutional: Negative for chills and fever.   Cardiovascular: Negative for chest pain.   Respiratory: Negative for cough and shortness of breath.    Skin: Negative for color change, dry skin, itching, nail changes, poor wound healing and rash.   Musculoskeletal:        Right knee pain   Neurological: Negative for dizziness.   Psychiatric/Behavioral: Negative for altered mental status. The patient is not nervous/anxious.    All other systems reviewed and are negative.        Objective:      General    Constitutional: She is oriented to person, place, and time. She appears well-developed and well-nourished. No distress.   HENT:   Head: Atraumatic.   Eyes: Conjunctivae are normal.   Cardiovascular: Normal rate.    Pulmonary/Chest: Effort normal.   Neurological: She is alert and oriented to person, place, and time.   Psychiatric: She has a normal mood and affect. Her behavior is normal.         Right Ankle/Foot Exam     Range of Motion   Ankle Joint   Dorsiflexion: normal   Plantar flexion: normal   Subtalar Joint    Inversion: normal   Eversion: normal     Other   Sensation: normal      Right Knee Exam     Inspection   Swelling: absent  Bruising: absent    Tenderness   The patient is tender to palpation of the iliotibial band.    Range of Motion   The patient has normal right knee ROM.    Tests   Ligament Examination Lachman: normal (-1 to 2mm) PCL-Posterior Drawer: normal (0 to 2mm)     MCL - Valgus: normal (0 to 2mm)  LCL - Varus: normal    Other   Sensation: normal    Left Knee Exam   Left knee exam is normal.    Right Hip Exam   Right hip exam is normal.       Muscle Strength   Right Lower Extremity   Quadriceps:  4/5   Hamstrin/5     Vascular Exam     Right Pulses  Dorsalis Pedis:      2+            RADS: reviewed by myself:     21: Knee ortho right: No fracture dislocation bone destruction seen.  There is mild DJD.        Assessment:       Encounter Diagnoses   Name Primary?    It band syndrome, right Yes    Acute pain of right knee           Plan:       Discussed plan with the patient. At this time she will rest ice and use NSAID x 10 days.  PT, knee brace.  . She is to return to clinic as needed.

## 2022-03-22 ENCOUNTER — CLINICAL SUPPORT (OUTPATIENT)
Dept: HEMATOLOGY/ONCOLOGY | Facility: CLINIC | Age: 52
End: 2022-03-22
Payer: COMMERCIAL

## 2022-03-22 VITALS — WEIGHT: 241 LBS | HEIGHT: 63 IN | BODY MASS INDEX: 42.7 KG/M2

## 2022-03-22 DIAGNOSIS — C50.611 CARCINOMA OF AXILLARY TAIL OF RIGHT BREAST IN FEMALE, ESTROGEN RECEPTOR POSITIVE: ICD-10-CM

## 2022-03-22 DIAGNOSIS — E66.01 CLASS 3 SEVERE OBESITY WITH BODY MASS INDEX (BMI) OF 40.0 TO 44.9 IN ADULT, UNSPECIFIED OBESITY TYPE, UNSPECIFIED WHETHER SERIOUS COMORBIDITY PRESENT: ICD-10-CM

## 2022-03-22 DIAGNOSIS — Z71.3 NUTRITIONAL COUNSELING: Primary | ICD-10-CM

## 2022-03-22 DIAGNOSIS — Z17.0 CARCINOMA OF AXILLARY TAIL OF RIGHT BREAST IN FEMALE, ESTROGEN RECEPTOR POSITIVE: ICD-10-CM

## 2022-03-22 DIAGNOSIS — R73.03 PREDIABETES: ICD-10-CM

## 2022-03-22 PROCEDURE — 97802 PR MED NUTR THER, 1ST, INDIV, EA 15 MIN: ICD-10-PCS | Mod: 95,,, | Performed by: DIETITIAN, REGISTERED

## 2022-03-22 PROCEDURE — 97802 MEDICAL NUTRITION INDIV IN: CPT | Mod: 95,,, | Performed by: DIETITIAN, REGISTERED

## 2022-03-23 ENCOUNTER — CLINICAL SUPPORT (OUTPATIENT)
Dept: REHABILITATION | Facility: HOSPITAL | Age: 52
End: 2022-03-23
Payer: COMMERCIAL

## 2022-03-23 DIAGNOSIS — R26.89 ANTALGIC GAIT: ICD-10-CM

## 2022-03-23 DIAGNOSIS — R26.89 BALANCE PROBLEM: ICD-10-CM

## 2022-03-23 DIAGNOSIS — R29.898 WEAKNESS OF BOTH LOWER EXTREMITIES: Primary | ICD-10-CM

## 2022-03-23 PROCEDURE — 97110 THERAPEUTIC EXERCISES: CPT

## 2022-03-23 NOTE — PROGRESS NOTES
Physical Therapy Daily Treatment Note     Name: Phylicia Vásquez  Clinic Number: [id number]    Therapy Diagnosis:   Encounter Diagnoses   Name Primary?    Weakness of both lower extremities Yes    Antalgic gait     Balance problem      Physician: Marcela Calvillo PA-C    Visit Date: 3/23/2022    Physician Orders: PT Eval and Treat   Medical Diagnosis from Referral:   C50.611,Z17.0 (ICD-10-CM) - Carcinoma of axillary tail of right breast in female, estrogen receptor positive   E66.01,Z68.41 (ICD-10-CM) - Class 3 severe obesity with body mass index (BMI) of 40.0 to 44.9 in adult, unspecified obesity type, unspecified whether serious comorbidity present   Evaluation Date: 2/8/2022  Authorization Period Expiration: 12/31/2022  Plan of Care Expiration: 8/8/2022  Visit # / Visits authorized: 5/ 20(plus eval)  Insurance: [employer]      Time In: 9:00   Time Out: 9:55 am  Total Billable Time: 55 minutes    Precautions: Standard and cancer    Subjective     Pt reports: having some left hip pain, ortho MD gave her a brace to wear as there is something going on with her knee cap.  She was compliant with home exercise program.  Response to previous treatment: no soreness  Functional change: Leg lifts are feeling easier.    Pain: 5/10  Location: left hip      Objective     Objective Measures updated at progress report unless specified.       Treatment       Mirian received therapeutic exercises to develop strength, endurance, ROM, flexibility, posture and core stabilization for 55 minutes including:    Aerobic/Cardio Activity: stationary bike seat 4, level 3, 10 minutes     Stretching/Flexibility exercises:    Seated hamstring stretch, 2 x 30s/LE  B standing gastroc stretch, 2 x 30s    Strengthening Exercises:   Heel raises, 2 x 10 reps  Hip Ext, 2 x 10/LE RTB  Hip ABD, 2 x 10/LE RTB  Sit <> stands from mat table 1 x 15  Posterior pelvic tilt x 20 reps  B short arc quad, 2 x 10 x 1#  Bridge, 2 x 10  Chest press to Shoulder  flexion with 2# wand, 2 x 10 reps  Hip ADD, 2 x 10/LE RTB  SLR w/ ER 1 x 10  Supine marching 1 x 10    Home Exercises Provided and Patient Education Provided     Education provided:   - exercise technique  -low impact cardio while resting her knee (recumbent bike and nu-step)  -ice x 10-15 after exercise  - using massage ball or massage stick at home for self soft tissue work on her IT bands.    Written Home Exercises Provided: Patient instructed to cont prior HEP.  Exercises were reviewed and Mirian was able to demonstrate them prior to the end of the session.  Mirian demonstrated good  understanding of the education provided.     See EMR under Patient Instructions for exercises provided eval and 2/14/22, 2/23/22    Assessment     Pt tolerated session well. Pt ambulated into the clinic with decreased knee flexion on L LE due to wearing a brace on her L knee. She was able to perform all of today's progressions and new activities with no increase in symptoms prior to leaving the clinic. She was able to demonstrate a normal gait pattern when ambulating out of the gym at the end of the session . Will progress as tolerated.   Mirian Is progressing well towards her goals.   Pt prognosis is Good.     Pt will continue to benefit from skilled outpatient physical therapy to address the deficits listed in the problem list box on initial evaluation, provide pt/family education and to maximize pt's level of independence in the home and community environment.     Pt's spiritual, cultural and educational needs considered and pt agreeable to plan of care and goals.     Anticipated barriers to physical therapy: none anticipated    Goals:   Short Term Goals:  4 weeks     1. Pt will demo >/= 4/5 strength in bilateral lower extremities for improved functional mobility. (progressing, not met)  2. Pt to demonstrate increased strength in bilateral upper extremities to >/=4+/5 for improved ability to perform functional activities. (progressing,  not met)  4. Pt. will improve single leg stance balance test score to >/=  15s to be in a moderate/low risk category for falls. (progressing, not met)  5. Pt will perform >/= 12 sit to stands in 30 seconds with no arms for improved endurance. (progressing, not met)  6. Pt will increase 6 minute walk test score by >/=  50 meters in order to increase ambulation in the community. (progressing, not met)  7. Pt will initiate home exercise program to improve strength, flexibility, endurance, mobility & balance. (met, 3/21/22)  8. Pt will tolerate 10 min or greater of time in light-->moderate intensity cardio (I.e. Bike, Treadmill) to improve endurance to assist in performing her preferred recreational activities. (met, 3/21/22)  9. Pt will verbalize understanding of exercise recommendation guidelines for moderate intensity exercise to safely perform and progress home exercise routine. (progressing, not met)     Long Term Goals: 8 weeks  1. Pt will increase strength in bilateral lower extremities to 5/5 for improved functional mobility and tolerance for ADL and work activities. (progressing, not met)  4. Pt will be independent with home exercise program to improve range of motion, strength, balance and return to prior level of function. (progressing, not met)  5. Pt. will improve Single Leg Stance balance test score to >/=  30s for patient to be in low risk category for falls. (progressing, not met)  6. Pt will perform >/= 15 sit to stands in 30 seconds with no arms for improved endurance. (progressing, not met)  7. Pt will increase 6 minute walk test score by >/= 75 meters in order to increase community ambulation. (progressing, not met)  8. Patient will report compliance with  20 -30min  of moderate intensity exercise 5 days a week to improve overall cardiovascular function and decrease cancer related fatigue. (progressing, not met)  Plan     Plan of care Certification: 2/8/2022 to 4/8/2022.     Outpatient Physical  Therapy 2 times weekly for 8 weeks to include the following interventions: Gait Training, Manual Therapy, Neuromuscular Re-ed, Orthotic Management and Training, Patient Education, Self Care, Therapeutic Activities, Therapeutic Exercise and IASTM. Dry needling with manual therapy techniques to decrease pain, inflammation and swelling, increase circulation and promote healing process.     Gail Gutierrez, PT

## 2022-03-24 ENCOUNTER — PATIENT MESSAGE (OUTPATIENT)
Dept: ORTHOPEDICS | Facility: CLINIC | Age: 52
End: 2022-03-24
Payer: COMMERCIAL

## 2022-03-24 ENCOUNTER — PATIENT MESSAGE (OUTPATIENT)
Dept: ADMINISTRATIVE | Facility: OTHER | Age: 52
End: 2022-03-24
Payer: COMMERCIAL

## 2022-03-29 ENCOUNTER — OFFICE VISIT (OUTPATIENT)
Dept: PSYCHIATRY | Facility: CLINIC | Age: 52
End: 2022-03-29
Payer: COMMERCIAL

## 2022-03-29 DIAGNOSIS — Z17.0 CARCINOMA OF AXILLARY TAIL OF RIGHT BREAST IN FEMALE, ESTROGEN RECEPTOR POSITIVE: ICD-10-CM

## 2022-03-29 DIAGNOSIS — C50.611 CARCINOMA OF AXILLARY TAIL OF RIGHT BREAST IN FEMALE, ESTROGEN RECEPTOR POSITIVE: ICD-10-CM

## 2022-03-29 DIAGNOSIS — E66.01 MORBID OBESITY: Primary | ICD-10-CM

## 2022-03-29 PROCEDURE — 1159F PR MEDICATION LIST DOCUMENTED IN MEDICAL RECORD: ICD-10-PCS | Mod: CPTII,S$GLB,, | Performed by: PSYCHOLOGIST

## 2022-03-29 PROCEDURE — 1160F PR REVIEW ALL MEDS BY PRESCRIBER/CLIN PHARMACIST DOCUMENTED: ICD-10-PCS | Mod: CPTII,S$GLB,, | Performed by: PSYCHOLOGIST

## 2022-03-29 PROCEDURE — 4010F PR ACE/ARB THEARPY RXD/TAKEN: ICD-10-PCS | Mod: CPTII,S$GLB,, | Performed by: PSYCHOLOGIST

## 2022-03-29 PROCEDURE — 99999 PR PBB SHADOW E&M-EST. PATIENT-LVL II: CPT | Mod: PBBFAC,,,

## 2022-03-29 PROCEDURE — 96165 HLTH BHV IVNTJ GRP EA ADDL: CPT | Mod: S$GLB,,, | Performed by: PSYCHOLOGIST

## 2022-03-29 PROCEDURE — 96164 PR INTERVENTION, HEALTH BEHAVIOR, GROUP, 1ST 30 MINS: ICD-10-PCS | Mod: S$GLB,,, | Performed by: PSYCHOLOGIST

## 2022-03-29 PROCEDURE — 4010F ACE/ARB THERAPY RXD/TAKEN: CPT | Mod: CPTII,S$GLB,, | Performed by: PSYCHOLOGIST

## 2022-03-29 PROCEDURE — 96164 HLTH BHV IVNTJ GRP 1ST 30: CPT | Mod: S$GLB,,, | Performed by: PSYCHOLOGIST

## 2022-03-29 PROCEDURE — 1159F MED LIST DOCD IN RCRD: CPT | Mod: CPTII,S$GLB,, | Performed by: PSYCHOLOGIST

## 2022-03-29 PROCEDURE — 99999 PR PBB SHADOW E&M-EST. PATIENT-LVL II: ICD-10-PCS | Mod: PBBFAC,,,

## 2022-03-29 PROCEDURE — 1160F RVW MEDS BY RX/DR IN RCRD: CPT | Mod: CPTII,S$GLB,, | Performed by: PSYCHOLOGIST

## 2022-03-29 PROCEDURE — 96165 PR INTERVENTION, HEALTH BEHAVIOR, GROUP, EA ADDTL 15 MINS: ICD-10-PCS | Mod: S$GLB,,, | Performed by: PSYCHOLOGIST

## 2022-03-30 ENCOUNTER — CLINICAL SUPPORT (OUTPATIENT)
Dept: REHABILITATION | Facility: HOSPITAL | Age: 52
End: 2022-03-30
Payer: COMMERCIAL

## 2022-03-30 DIAGNOSIS — R26.89 ANTALGIC GAIT: ICD-10-CM

## 2022-03-30 DIAGNOSIS — Z17.0 CARCINOMA OF AXILLARY TAIL OF RIGHT BREAST IN FEMALE, ESTROGEN RECEPTOR POSITIVE: ICD-10-CM

## 2022-03-30 DIAGNOSIS — R26.89 BALANCE PROBLEM: ICD-10-CM

## 2022-03-30 DIAGNOSIS — R29.898 WEAKNESS OF BOTH LOWER EXTREMITIES: Primary | ICD-10-CM

## 2022-03-30 DIAGNOSIS — C50.611 CARCINOMA OF AXILLARY TAIL OF RIGHT BREAST IN FEMALE, ESTROGEN RECEPTOR POSITIVE: ICD-10-CM

## 2022-03-30 PROCEDURE — 97110 THERAPEUTIC EXERCISES: CPT

## 2022-03-30 RX ORDER — ZOLPIDEM TARTRATE 10 MG/1
10 TABLET ORAL NIGHTLY PRN
Qty: 30 TABLET | Refills: 2 | Status: SHIPPED | OUTPATIENT
Start: 2022-03-30 | End: 2022-07-02 | Stop reason: SDUPTHER

## 2022-03-30 NOTE — PROGRESS NOTES
Physical Therapy Daily Treatment Note     Name: Phylicia Vásquez  Clinic Number: [id number]    Therapy Diagnosis:   Encounter Diagnoses   Name Primary?    Weakness of both lower extremities Yes    Antalgic gait     Balance problem      Physician: Marcela Calvillo PA-C    Visit Date: 3/30/2022    Physician Orders: PT Eval and Treat   Medical Diagnosis from Referral:   C50.611,Z17.0 (ICD-10-CM) - Carcinoma of axillary tail of right breast in female, estrogen receptor positive   E66.01,Z68.41 (ICD-10-CM) - Class 3 severe obesity with body mass index (BMI) of 40.0 to 44.9 in adult, unspecified obesity type, unspecified whether serious comorbidity present   Evaluation Date: 2/8/2022  Authorization Period Expiration: 12/31/2022  Plan of Care Expiration: 8/8/2022  Visit # / Visits authorized: 6/ 20(plus eval)  Insurance: [employer]      Time In: 9:00 am  Time Out: 10:00 am  Total Billable Time: 60 minutes    Precautions: Standard and cancer    Subjective     Pt reports: still getting used to the knee brace, no pain in her hip nor her back today.  She was compliant with home exercise program.  Response to previous treatment: little soreness after last visit.   Functional change: Leg lifts are feeling easier.    Pain: 0/10  Location: left hip      Objective     Objective Measures updated at progress report unless specified.     CMS Impairment/Limitation/Restriction for FOTO Cancer Survey     Therapist reviewed FOTO scores for Phylicia Vásquez on 2/8/2022.   FOTO documents entered into [employer] - see Media section.     Limitation Score: 31%  Category: Mobility  Treatment       Mirian received therapeutic exercises to develop strength, endurance, ROM, flexibility, posture and core stabilization for 60 minutes including:    Aerobic/Cardio Activity: stationary bike seat 4, level 3, 10 minutes     Stretching/Flexibility exercises:    Seated hamstring stretch, 2 x 30s/LE  B standing gastroc stretch, 2 x 30s    Strengthening  Exercises:   Heel raises, 2 x 10 reps  Hip Ext, 2 x 10/LE GTB  Hip ABD, 2 x 10/LE GTB  Sit <> stands from mat table 1 x 15  Posterior pelvic tilt x 20 reps  B short arc quad, 2 x 10 x 1#  Bridge, 2 x 10  Chest press to Shoulder flexion with 2# wand, 2 x 10 reps  Hip ADD, 2 x 10/LE GTB  SLR w/ ER 1 x 15  Supine marching 2 x 10  Seated on green physioball shoulder flexion 1 x 10 x 1#  Seated on green physioball shoulder abduction 1 x 10 x 1#    Home Exercises Provided and Patient Education Provided     Education provided:   - exercise technique  -low impact cardio while resting her knee (recumbent bike and nu-step)  -ice x 10-15 after exercise  - using massage ball or massage stick at home for self soft tissue work on her IT bands.    Written Home Exercises Provided: Patient instructed to cont prior HEP.  Exercises were reviewed and Mirian was able to demonstrate them prior to the end of the session.  Mirian demonstrated good  understanding of the education provided.     See EMR under Patient Instructions for exercises provided eval and 2/14/22, 2/23/22    Assessment     Pt tolerated session well. Pt was able to perform all of today's progressions and new activities with no increase in symptoms prior to leaving the clinic. She had a mild antalgic gait pattern upon entering the clinic but ambulated out of the clinic with a normal gait pattern. Will progress as tolerated.   Mirian Is progressing well towards her goals.   Pt prognosis is Good.     Pt will continue to benefit from skilled outpatient physical therapy to address the deficits listed in the problem list box on initial evaluation, provide pt/family education and to maximize pt's level of independence in the home and community environment.     Pt's spiritual, cultural and educational needs considered and pt agreeable to plan of care and goals.     Anticipated barriers to physical therapy: none anticipated    Goals:   Short Term Goals:  4 weeks     1. Pt will demo >/=  4/5 strength in bilateral lower extremities for improved functional mobility. (progressing, not met)  2. Pt to demonstrate increased strength in bilateral upper extremities to >/=4+/5 for improved ability to perform functional activities. (progressing, not met)  4. Pt. will improve single leg stance balance test score to >/=  15s to be in a moderate/low risk category for falls. (progressing, not met)  5. Pt will perform >/= 12 sit to stands in 30 seconds with no arms for improved endurance. (progressing, not met)  6. Pt will increase 6 minute walk test score by >/=  50 meters in order to increase ambulation in the community. (progressing, not met)  7. Pt will initiate home exercise program to improve strength, flexibility, endurance, mobility & balance. (met, 3/21/22)  8. Pt will tolerate 10 min or greater of time in light-->moderate intensity cardio (I.e. Bike, Treadmill) to improve endurance to assist in performing her preferred recreational activities. (met, 3/21/22)  9. Pt will verbalize understanding of exercise recommendation guidelines for moderate intensity exercise to safely perform and progress home exercise routine. (progressing, not met)     Long Term Goals: 8 weeks  1. Pt will increase strength in bilateral lower extremities to 5/5 for improved functional mobility and tolerance for ADL and work activities. (progressing, not met)  4. Pt will be independent with home exercise program to improve range of motion, strength, balance and return to prior level of function. (progressing, not met)  5. Pt. will improve Single Leg Stance balance test score to >/=  30s for patient to be in low risk category for falls. (progressing, not met)  6. Pt will perform >/= 15 sit to stands in 30 seconds with no arms for improved endurance. (progressing, not met)  7. Pt will increase 6 minute walk test score by >/= 75 meters in order to increase community ambulation. (progressing, not met)  8. Patient will report compliance  with  20 -30min  of moderate intensity exercise 5 days a week to improve overall cardiovascular function and decrease cancer related fatigue. (progressing, not met)  Plan     Plan of care Certification: 2/8/2022 to 4/8/2022.     Outpatient Physical Therapy 2 times weekly for 8 weeks to include the following interventions: Gait Training, Manual Therapy, Neuromuscular Re-ed, Orthotic Management and Training, Patient Education, Self Care, Therapeutic Activities, Therapeutic Exercise and IASTM. Dry needling with manual therapy techniques to decrease pain, inflammation and swelling, increase circulation and promote healing process.     Gail Gutierrez, PT

## 2022-03-30 NOTE — PROGRESS NOTES
Health and Behavior Intervention Group Note -Psychologist       Date: 03/29/2022    Site:   [facility]     Number of patients in attendance: 10    Group Type: Health and Behavior Group Intervention for Cancer Survivors (18241 first 30 minutes, [id number] additional 15 minutes x2)    Phylicia Vásquez arrived on time for the group. Patient was appropriately dressed and groomed. Phylicia Vásquez  appeared alert and oriented in all spheres. No suicidal or homicidal ideation was reported.  Phylicia Vásquez actively participated in the group process and provided both prompted and unprompted contribution to the group discussion.      Content of session:    Group 1:  Getting Started and My Weight Loss Goals  Introduction to group, establishing SMART goals, Identified Reasons for Weight loss, Identified Factors that Influence Weight       Goals for next visit: Review Major influences on maintaining weight worksheet     Review SMART goals worksheet and set 1 SMART goal each week before next psychoeducational group visit     Complete set-up of FITbit and Scale through [employer]     Complete initial worksheets for group participation through [employer]        Patient's response to intervention:The patient's response to intervention is accepting, motivated. Patient's work environment is a barrier toward weight loss (fast food at night). She acknowledges the need to lose weight prior to her final reconstructive surgery.      Behavior: Interested, Shared Feelings, Engaged and Participated    Insight: good     Progress toward goals:Progress as Expected      Goals from last visit: N/A    Treatment Plan: Continue group participation  · Target symptoms: weight loss   · Why chosen therapy is appropriate versus another modality: relevant to diagnosis, evidence based practice  · Outcome monitoring methods: checklist/rating scale, weight monitoring  · Return to clinic: 1 month     Length of Service (minutes direct face-to-face  contact): 60    Encounter Diagnoses   Name Primary?    Carcinoma of axillary tail of right breast in female, estrogen receptor positive Yes    Morbid obesity           Cem Hilton, PhD  [place] License #[id number]  [place] License #[id number]

## 2022-04-02 ENCOUNTER — PATIENT MESSAGE (OUTPATIENT)
Dept: ORTHOPEDICS | Facility: CLINIC | Age: 52
End: 2022-04-02
Payer: COMMERCIAL

## 2022-04-11 ENCOUNTER — HOSPITAL ENCOUNTER (OUTPATIENT)
Dept: RADIOLOGY | Facility: HOSPITAL | Age: 52
Discharge: HOME | End: 2022-04-11
Attending: INTERNAL MEDICINE
Payer: COMMERCIAL

## 2022-04-11 DIAGNOSIS — R92.8 ABNORMAL FINDING ON BREAST IMAGING: ICD-10-CM

## 2022-04-11 PROCEDURE — 77065 DX MAMMO INCL CAD UNI: CPT | Mod: 26,LT,, | Performed by: RADIOLOGY

## 2022-04-11 PROCEDURE — 77061 MAMMO DIGITAL DIAGNOSTIC LEFT WITH TOMO: ICD-10-PCS | Mod: 26,LT,, | Performed by: RADIOLOGY

## 2022-04-11 PROCEDURE — 77065 MAMMO DIGITAL DIAGNOSTIC LEFT WITH TOMO: ICD-10-PCS | Mod: 26,LT,, | Performed by: RADIOLOGY

## 2022-04-11 PROCEDURE — 77065 DX MAMMO INCL CAD UNI: CPT | Mod: TC,LT

## 2022-04-11 PROCEDURE — 77061 BREAST TOMOSYNTHESIS UNI: CPT | Mod: 26,LT,, | Performed by: RADIOLOGY

## 2022-04-13 ENCOUNTER — PATIENT MESSAGE (OUTPATIENT)
Dept: HEMATOLOGY/ONCOLOGY | Facility: CLINIC | Age: 52
End: 2022-04-13
Payer: COMMERCIAL

## 2022-04-19 ENCOUNTER — CLINICAL SUPPORT (OUTPATIENT)
Dept: REHABILITATION | Facility: HOSPITAL | Age: 52
End: 2022-04-19
Payer: COMMERCIAL

## 2022-04-19 DIAGNOSIS — R26.89 ANTALGIC GAIT: ICD-10-CM

## 2022-04-19 DIAGNOSIS — R29.898 WEAKNESS OF BOTH LOWER EXTREMITIES: Primary | ICD-10-CM

## 2022-04-19 DIAGNOSIS — R26.89 BALANCE PROBLEM: ICD-10-CM

## 2022-04-19 PROCEDURE — 97110 THERAPEUTIC EXERCISES: CPT

## 2022-04-19 NOTE — PROGRESS NOTES
The patient location is: home   The chief complaint leading to consultation is: obesity     Visit type: audiovisual    Face to Face time with patient: 30 minutes   40 minutes of total time spent on the encounter, which includes face to face time and non-face to face time preparing to see the patient (eg, review of tests), Obtaining and/or reviewing separately obtained history, Documenting clinical information in the electronic or other health record, Independently interpreting results (not separately reported) and communicating results to the patient/family/caregiver, or Care coordination (not separately reported).     Each patient to whom he or she provides medical services by telemedicine is:  (1) informed of the relationship between the physician and patient and the respective role of any other health care provider with respect to management of the patient; and (2) notified that he or she may decline to receive medical services by telemedicine and may withdraw from such care at any time.    Oncology Survivorship Weight Loss Program   Group Medical Nutrition Therapy Visit     Session # 2 (make up)    Phylicia Vásquez   1970    Referring Provider:  No ref. provider found      Reason for Visit: Pt here for nutrition education and counseling for weight loss following breast or endometrial cancer     PMHx:   Past Medical History:   Diagnosis Date    Anxiety     Back pain     Goiter     HTN (hypertension) 10/18/2012    Hx antineoplastic chemo     last dose 4/23/20    Hx of psychiatric care     Ambien, Klonopin    Insomnia 10/18/2012    Malignant neoplasm of axillary tail of right breast in female, estrogen receptor positive 11/24/2019    right    Neck mass     right posterior    Primary invasive malignant neoplasm of female breast, right 11/21/2019    right    Psychiatric problem     S/P abdominal supracervical subtotal hysterectomy, continues to have regular menses.  4/2/2014    Spinal  "cord cyst, L1 2014     Thoracic radiculopathy, bulging disc at T10-11 and T11-12        Nutrition Assessment    This is a 52 y.o.female with a history of breast cancer.   Patient was seen today as part of 6- month weight management program. Today was a make up for session 2 which covered the following topics:   Benefits of plant foods of different colors   Benefits of natural herbs and spices   Creating colorful, flavorful meals  Also discussed importance of adequate fasting time. Made goal of 9am-9pm fasting time since she works overnight. Weights, calories goals, and tracking reinforced. She has lost 8lb since previous visit.     Weight:105.7 kg (233 lb)  Height:5' 3" (1.6 m)  BMI:Body mass index is 41.27 kg/m².   IBW: Ideal body weight: 52.4 kg (115 lb 8.3 oz)  Adjusted ideal body weight: 73.7 kg (162 lb 8.2 oz)    Allergies: Butalbital    Current Medications:    Current Outpatient Medications:     amlodipine-valsartan (EXFORGE) 5-320 mg per tablet, TAKE 1 TABLET BY MOUTH EVERY DAY, Disp: 90 tablet, Rfl: 0    anastrozole (ARIMIDEX) 1 mg Tab, Take 1 tablet (1 mg total) by mouth once daily. (Patient taking differently: Take 1 mg by mouth every morning.), Disp: 90 tablet, Rfl: 3    ascorbic acid (VITAMIN C ORAL), Take 500 mg by mouth every morning. , Disp: , Rfl:     cyanocobalamin, vitamin B-12, (VITAMIN B-12 ORAL), Take by mouth daily as needed. , Disp: , Rfl:     cyclobenzaprine (FLEXERIL) 10 MG tablet, Take 1 tablet (10 mg total) by mouth 3 (three) times daily as needed for Muscle spasms., Disp: 30 tablet, Rfl: 3    gabapentin (NEURONTIN) 100 MG capsule, Take 2 capsules (200 mg total) by mouth every morning. Take in the morning for neuropathy, Disp: 180 capsule, Rfl: 1    traZODone (DESYREL) 50 MG tablet, Take 0.5 tab (25mg) by mouth at night as needed for insomnia., Disp: 30 tablet, Rfl: 2    venlafaxine (EFFEXOR XR) 75 MG 24 hr capsule, Take 1 capsule (75 mg total) by mouth once daily. In the morning, " Disp: 30 capsule, Rfl: 11    vitamin E 200 UNIT capsule, Take 200 Units by mouth daily as needed. , Disp: , Rfl:     zolpidem (AMBIEN) 10 mg Tab, Take 1 tablet (10 mg total) by mouth nightly as needed (Sleep)., Disp: 30 tablet, Rfl: 2    Nutrition Diagnosis    Problem: Obese  Etiology (related to): nutrition knowledge deficit   Signs/Symptoms (as evidenced by): BMI= 41    Nutrition Intervention and Program Adherence    Nutrition Prescription   Patient has been given a goal of 1513 kcal per day for the duration of the weight loss program     Materials Provided/Reviewed   Spice Things Up (handout)     Nutrition Monitoring and Evaluation    Monitor: energy intake, weight and step counts    Goals: weight loss 1-2lb per week    Follow up in 4 weeks      Communication to referring provider/care team: note available in chart     Counseling time: 30 Minutes    Ailyn Amanda, MPH, RD, , LDN, FAND   [phone]

## 2022-04-19 NOTE — PROGRESS NOTES
Physical Therapy Daily Treatment Note     Name: Phylicia Vásquez  Clinic Number: [id number]    Therapy Diagnosis:   Encounter Diagnoses   Name Primary?    Weakness of both lower extremities Yes    Antalgic gait     Balance problem      Physician: Marcela Calvillo PA-C    Visit Date: 4/19/2022    Physician Orders: PT Eval and Treat   Medical Diagnosis from Referral:   C50.611,Z17.0 (ICD-10-CM) - Carcinoma of axillary tail of right breast in female, estrogen receptor positive   E66.01,Z68.41 (ICD-10-CM) - Class 3 severe obesity with body mass index (BMI) of 40.0 to 44.9 in adult, unspecified obesity type, unspecified whether serious comorbidity present   Evaluation Date: 2/8/2022  Authorization Period Expiration: 12/31/2022  Plan of Care Expiration: 8/8/2022  Visit # / Visits authorized: 7/ 20(plus eval)  Insurance: [employer]      Time In:10:18  Am (late arrival)  Time Out: 11:00 am  Total Billable Time: 35 minutes    Precautions: Standard and cancer    Subjective     Pt reports: She's been exhausted because she's helping take care of her mom and herself. Denies pain. Pt states she stopped wearing her knee brace because it was causing bruising. Sweeping, mopping, and lifting water bottles are challenging.  She was compliant with home exercise program.  Response to previous treatment: little soreness after last visit.   Functional change: She's been walking more     Pain: 0/10  Location: left hip      Objective     Objective Measures updated at progress report unless specified.       Treatment       Mirian received therapeutic exercises to develop strength, endurance, ROM, flexibility, posture and core stabilization for 35 minutes including:    Aerobic/Cardio Activity: stationary bike seat 4, level 3, 10 minutes     Stretching/Flexibility exercises:    Seated hamstring stretch, 2 x 30s/LE  B standing gastroc stretch, 2 x 30s    Strengthening Exercises:   Posterior pelvic tilt x 10 reps  Core activation with orange  swiss ball, 10 reps  Bridge with hip ADD x 10 reps  Bridge with Hip ABD, green band, 10 reps  Straight leg raise, 2 x 5 reps/LE  Figure-4 stretch, 2 x 30s/LE  Hamstring stretch c/ strap, 1 min/LE    Wall push-ups, 2 x 10  Wall squats, 10 reps      Home Exercises Provided and Patient Education Provided     Education provided:   - exercise technique      Written Home Exercises Provided: Patient instructed to cont prior HEP.  Exercises were reviewed and Mirian was able to demonstrate them prior to the end of the session.  Mirian demonstrated good  understanding of the education provided.     See EMR under Patient Instructions for exercises provided eval and 2/14/22, 2/23/22    Assessment     Pt tolerated session well. Session limited due to late arrival. Pt was able to perform all of today's progressions and new activities with no increase in symptoms prior to leaving the clinic.  Will progress as tolerated. Pt is due for routine progress note at next session.   Mirian Is progressing well towards her goals.   Pt prognosis is Good.     Pt will continue to benefit from skilled outpatient physical therapy to address the deficits listed in the problem list box on initial evaluation, provide pt/family education and to maximize pt's level of independence in the home and community environment.     Pt's spiritual, cultural and educational needs considered and pt agreeable to plan of care and goals.     Anticipated barriers to physical therapy: none anticipated    Goals:   Short Term Goals:  4 weeks     1. Pt will demo >/= 4/5 strength in bilateral lower extremities for improved functional mobility. (progressing, not met)  2. Pt to demonstrate increased strength in bilateral upper extremities to >/=4+/5 for improved ability to perform functional activities. (progressing, not met)  4. Pt. will improve single leg stance balance test score to >/=  15s to be in a moderate/low risk category for falls. (progressing, not met)  5. Pt will  perform >/= 12 sit to stands in 30 seconds with no arms for improved endurance. (progressing, not met)  6. Pt will increase 6 minute walk test score by >/=  50 meters in order to increase ambulation in the community. (progressing, not met)  7. Pt will initiate home exercise program to improve strength, flexibility, endurance, mobility & balance. (met, 3/21/22)  8. Pt will tolerate 10 min or greater of time in light-->moderate intensity cardio (I.e. Bike, Treadmill) to improve endurance to assist in performing her preferred recreational activities. (met, 3/21/22)  9. Pt will verbalize understanding of exercise recommendation guidelines for moderate intensity exercise to safely perform and progress home exercise routine. (progressing, not met)     Long Term Goals: 8 weeks  1. Pt will increase strength in bilateral lower extremities to 5/5 for improved functional mobility and tolerance for ADL and work activities. (progressing, not met)  4. Pt will be independent with home exercise program to improve range of motion, strength, balance and return to prior level of function. (progressing, not met)  5. Pt. will improve Single Leg Stance balance test score to >/=  30s for patient to be in low risk category for falls. (progressing, not met)  6. Pt will perform >/= 15 sit to stands in 30 seconds with no arms for improved endurance. (progressing, not met)  7. Pt will increase 6 minute walk test score by >/= 75 meters in order to increase community ambulation. (progressing, not met)  8. Patient will report compliance with  20 -30min  of moderate intensity exercise 5 days a week to improve overall cardiovascular function and decrease cancer related fatigue. (progressing, not met)  Plan     Plan of care Certification: 2/8/2022 to 4/8/2022.     Outpatient Physical Therapy 2 times weekly for 8 weeks to include the following interventions: Gait Training, Manual Therapy, Neuromuscular Re-ed, Orthotic Management and Training, Patient  Education, Self Care, Therapeutic Activities, Therapeutic Exercise and IASTM. Dry needling with manual therapy techniques to decrease pain, inflammation and swelling, increase circulation and promote healing process.     Marcela Price, PT

## 2022-04-20 ENCOUNTER — OFFICE VISIT (OUTPATIENT)
Dept: HEMATOLOGY/ONCOLOGY | Facility: CLINIC | Age: 52
End: 2022-04-20
Payer: COMMERCIAL

## 2022-04-20 ENCOUNTER — CLINICAL SUPPORT (OUTPATIENT)
Dept: HEMATOLOGY/ONCOLOGY | Facility: CLINIC | Age: 52
End: 2022-04-20
Payer: COMMERCIAL

## 2022-04-20 VITALS
OXYGEN SATURATION: 95 % | SYSTOLIC BLOOD PRESSURE: 136 MMHG | RESPIRATION RATE: 16 BRPM | HEIGHT: 63 IN | WEIGHT: 239.44 LBS | TEMPERATURE: 98 F | DIASTOLIC BLOOD PRESSURE: 91 MMHG | HEART RATE: 72 BPM | BODY MASS INDEX: 42.43 KG/M2

## 2022-04-20 VITALS — BODY MASS INDEX: 41.29 KG/M2 | HEIGHT: 63 IN | WEIGHT: 233 LBS

## 2022-04-20 DIAGNOSIS — Z17.0 CARCINOMA OF AXILLARY TAIL OF RIGHT BREAST IN FEMALE, ESTROGEN RECEPTOR POSITIVE: ICD-10-CM

## 2022-04-20 DIAGNOSIS — Z79.811 PROPHYLACTIC USE OF ANASTROZOLE (ARIMIDEX): ICD-10-CM

## 2022-04-20 DIAGNOSIS — C50.611 CARCINOMA OF AXILLARY TAIL OF RIGHT BREAST IN FEMALE, ESTROGEN RECEPTOR POSITIVE: ICD-10-CM

## 2022-04-20 DIAGNOSIS — Z71.3 NUTRITIONAL COUNSELING: Primary | ICD-10-CM

## 2022-04-20 DIAGNOSIS — E66.01 CLASS 3 SEVERE OBESITY WITH BODY MASS INDEX (BMI) OF 40.0 TO 44.9 IN ADULT, UNSPECIFIED OBESITY TYPE, UNSPECIFIED WHETHER SERIOUS COMORBIDITY PRESENT: ICD-10-CM

## 2022-04-20 DIAGNOSIS — C50.611 CARCINOMA OF AXILLARY TAIL OF RIGHT BREAST IN FEMALE, ESTROGEN RECEPTOR POSITIVE: Primary | ICD-10-CM

## 2022-04-20 DIAGNOSIS — Z17.0 CARCINOMA OF AXILLARY TAIL OF RIGHT BREAST IN FEMALE, ESTROGEN RECEPTOR POSITIVE: Primary | ICD-10-CM

## 2022-04-20 PROCEDURE — 3080F PR MOST RECENT DIASTOLIC BLOOD PRESSURE >= 90 MM HG: ICD-10-PCS | Mod: CPTII,S$GLB,, | Performed by: INTERNAL MEDICINE

## 2022-04-20 PROCEDURE — 99999 PR PBB SHADOW E&M-EST. PATIENT-LVL IV: ICD-10-PCS | Mod: PBBFAC,,, | Performed by: INTERNAL MEDICINE

## 2022-04-20 PROCEDURE — 97803 MED NUTRITION INDIV SUBSEQ: CPT | Mod: 95,,, | Performed by: DIETITIAN, REGISTERED

## 2022-04-20 PROCEDURE — 3008F PR BODY MASS INDEX (BMI) DOCUMENTED: ICD-10-PCS | Mod: CPTII,S$GLB,, | Performed by: INTERNAL MEDICINE

## 2022-04-20 PROCEDURE — 99999 PR PBB SHADOW E&M-EST. PATIENT-LVL IV: CPT | Mod: PBBFAC,,, | Performed by: INTERNAL MEDICINE

## 2022-04-20 PROCEDURE — 4010F ACE/ARB THERAPY RXD/TAKEN: CPT | Mod: CPTII,S$GLB,, | Performed by: INTERNAL MEDICINE

## 2022-04-20 PROCEDURE — 3075F PR MOST RECENT SYSTOLIC BLOOD PRESS GE 130-139MM HG: ICD-10-PCS | Mod: CPTII,S$GLB,, | Performed by: INTERNAL MEDICINE

## 2022-04-20 PROCEDURE — 99214 PR OFFICE/OUTPT VISIT, EST, LEVL IV, 30-39 MIN: ICD-10-PCS | Mod: S$GLB,,, | Performed by: INTERNAL MEDICINE

## 2022-04-20 PROCEDURE — 97803 PR MED NUTR THER, SUBSQ, INDIV, EA 15 MIN: ICD-10-PCS | Mod: 95,,, | Performed by: DIETITIAN, REGISTERED

## 2022-04-20 PROCEDURE — 3008F BODY MASS INDEX DOCD: CPT | Mod: CPTII,S$GLB,, | Performed by: INTERNAL MEDICINE

## 2022-04-20 PROCEDURE — 1159F MED LIST DOCD IN RCRD: CPT | Mod: CPTII,S$GLB,, | Performed by: INTERNAL MEDICINE

## 2022-04-20 PROCEDURE — 99214 OFFICE O/P EST MOD 30 MIN: CPT | Mod: S$GLB,,, | Performed by: INTERNAL MEDICINE

## 2022-04-20 PROCEDURE — 4010F PR ACE/ARB THEARPY RXD/TAKEN: ICD-10-PCS | Mod: CPTII,S$GLB,, | Performed by: INTERNAL MEDICINE

## 2022-04-20 PROCEDURE — 3075F SYST BP GE 130 - 139MM HG: CPT | Mod: CPTII,S$GLB,, | Performed by: INTERNAL MEDICINE

## 2022-04-20 PROCEDURE — 1159F PR MEDICATION LIST DOCUMENTED IN MEDICAL RECORD: ICD-10-PCS | Mod: CPTII,S$GLB,, | Performed by: INTERNAL MEDICINE

## 2022-04-20 PROCEDURE — 3080F DIAST BP >= 90 MM HG: CPT | Mod: CPTII,S$GLB,, | Performed by: INTERNAL MEDICINE

## 2022-04-20 NOTE — PROGRESS NOTES
Subjective:       Patient ID: Phylicia Vásquez is a 52 y.o. female.    Chief Complaint: Carcinoma of axillary tail of right breast in female, estro    HPI   Mrs Lua returns today for follow up.   I had last seen her January 31, 2022.  As of November 1, 2020 she has been on anastrozole in the adjuvant setting.  Of note, her estradiol level and her FSH level suggested that she is postmenopausal, hence initiation of anastrozole.  After her last visit in early October 2021 she underwent a mammogram that was read as BI-RADS 4 and led to a biopsy of a left axillary lymph node that was unremarkable.   On April 21, 2021 she underwent a contralateral prophylactic mastectomy with ROWAN flap reconstructions bilaterally.      Briefly, she is a 52 year old AA female who was found to have a carcinoma that is ER positive, TN positive, and HER 2 equivocal, but negative by  FISH.  An axillary node biopsy was also positive.  She received standard neoadjuvant chemotherapy consisting of 4 cycles of AC and 4 cycles of Taxol prior to undergoing a mastectomy.  At the time of her mastectomy she had a 3 cm residual tumor while the 2 sentinel lymph nodes were positive.  A subsequent axillary node dissection showed no evidence of further chandler involvement.   She completed radiation therapy, and as of November 1, 2020 she has been on adjuvant anastrozole.      Review of Systems    Overall she feels OK.  She is experiencing hot flashes and joint stiffness involving primarily her hands and her knees.  She still has residual neuropathy, manifested by numbness primarily on her toes.  ECOG PS is 1. She denies any depression, easy bruising, fevers, chills, night  sweats, weight loss, vomiting, diarrhea, constipation, diplopia, blurred vision, headache, chest pain, palpitations, shortness of breath, left breast pain, abdominal pain, extremity pain, or difficulty ambulating.  The remainder of the ten-point ROS, including general, skin, lymph,  H/N, cardiorespiratory, GI, , Neuro, Endocrine, and psychiatric is negative.       Objective:      Physical Exam      She is alert, oriented to time, place, person, pleasant, well      nourished, in no acute physical distress.                                   VITAL SIGNS:  Reviewed                                      HEENT:  Normal.  There are no nasal, oral, lip, gingival, auricular, lid,    or conjunctival lesions.  Mucosae are moist and pink, and there is no        thrush.  Pupils are equal, reactive to light and accommodation.              Extraocular muscle movements are intact.  Dentition is good.  There is no frontal or maxillary tenderness.                                     NECK:  Supple without JVD, adenopathy, or thyromegaly.                       LUNGS:  Clear to auscultation without wheezing, rales, or rhonchi.           CARDIOVASCULAR:  Reveals an S1, S2, no murmurs, no rubs, no gallops.         ABDOMEN:  Soft, nontender, without organomegaly.  Bowel sounds are    present.                                                                         EXTREMITIES:  No cyanosis, clubbing, or edema.                               BREASTS:   she is now status post bilateral mastectomies with ROWAN flap reconstructions.  On the left reconstructed breast there a large area of fat necrosis measuring at least 10 cm on the upper outer quadrant.  There is also an area of probable fat necrosis on the right reconstructed breast at the 4 o'clock position.    I do not palpate any axillary lymph nodes in either side.    LYMPHATIC:  There is no cervical, axillary, or supraclavicular adenopathy.   SKIN:  Warm and moist, without petechiae, rashes, induration, or ecchymoses.           NEUROLOGIC:  DTRs are 0-1+ bilaterally, symmetrical, motor function is 5/5,  and cranial nerves are  within normal limits.    Assessment:       1. Carcinoma of axillary tail of right breast in female, estrogen receptor positive, pathologically  T2N1M0 after neoadjuvant chemotherapy     2.    Axillary node metastases.  3.      Status post recent left prophylactic mastectomy with bilateral ROWAN flap reconstructions  4.      Extensive fat necrosis bilaterally.  Of note, a recent left mammogram was unremarkable.    Plan:        I had a long discussion with her.    She will remain on anastrozole through the end of October 2025, at which point I will recommend that she extend her treatment for an additional two years to complete 7 years of adjuvant hormonal therapy with AIs.    Finally, in regards to the areas of her bilateral  fat necrosis, no further workup is indicated at this point.  RTC 4 months.  Her multiple questions were answered to her satisfaction.      Her multiple questions were answered to her satisfaction.

## 2022-04-22 DIAGNOSIS — M54.14 THORACIC RADICULOPATHY: ICD-10-CM

## 2022-04-22 NOTE — TELEPHONE ENCOUNTER
No new care gaps identified.  Powered by [employer]. Reference number: [id number].   4/22/2022 1:44:22 PM CDT

## 2022-04-25 ENCOUNTER — PATIENT MESSAGE (OUTPATIENT)
Dept: INTERNAL MEDICINE | Facility: CLINIC | Age: 52
End: 2022-04-25
Payer: COMMERCIAL

## 2022-04-25 DIAGNOSIS — G62.9 NEUROPATHY: ICD-10-CM

## 2022-04-25 DIAGNOSIS — M54.14 THORACIC RADICULOPATHY: ICD-10-CM

## 2022-04-25 RX ORDER — GABAPENTIN 100 MG/1
200 CAPSULE ORAL EVERY MORNING
Qty: 180 CAPSULE | Refills: 3 | Status: SHIPPED | OUTPATIENT
Start: 2022-04-25 | End: 2022-09-28

## 2022-04-25 RX ORDER — CYCLOBENZAPRINE HCL 10 MG
10 TABLET ORAL 3 TIMES DAILY PRN
Qty: 30 TABLET | Refills: 3 | OUTPATIENT
Start: 2022-04-25

## 2022-04-25 RX ORDER — CYCLOBENZAPRINE HCL 10 MG
10 TABLET ORAL 3 TIMES DAILY PRN
Qty: 30 TABLET | Refills: 3 | Status: SHIPPED | OUTPATIENT
Start: 2022-04-25 | End: 2022-05-29 | Stop reason: SDUPTHER

## 2022-04-25 NOTE — TELEPHONE ENCOUNTER
No new care gaps identified.  Powered by [employer]. Reference number: [id number].   4/25/2022 8:35:34 AM CDT

## 2022-04-25 NOTE — TELEPHONE ENCOUNTER
Called pt, let her know that her medication was not denied, it was sent to [facility]- the denial notifcation on MyChart was due to their being multiple requests that were duplicates. Pt verbalized understanding

## 2022-05-03 ENCOUNTER — TELEPHONE (OUTPATIENT)
Dept: HEMATOLOGY/ONCOLOGY | Facility: CLINIC | Age: 52
End: 2022-05-03
Payer: COMMERCIAL

## 2022-05-03 NOTE — TELEPHONE ENCOUNTER
----- Message from Cem Hilton, PhD sent at 5/3/2022  2:04 PM CDT -----  I've got a 9:30 tomorrow and a 1:00 next Monday for either of the 2 patients who missed group to do make-up.  ----- Message -----  From: BHARAT Ye MA  Sent: 5/3/2022   1:33 PM CDT  To: Cem Hilton, PhD, #    Are we offering a one on one make up?    Bria  ----- Message -----  From: Izzy Holcomb, PhD  Sent: 5/3/2022  11:41 AM CDT  To: Cem Hilton, PhD, #    Patient NS last weeks behavior change group

## 2022-05-04 ENCOUNTER — TELEPHONE (OUTPATIENT)
Dept: INFUSION THERAPY | Facility: HOSPITAL | Age: 52
End: 2022-05-04
Payer: COMMERCIAL

## 2022-05-04 ENCOUNTER — PATIENT MESSAGE (OUTPATIENT)
Dept: PSYCHIATRY | Facility: CLINIC | Age: 52
End: 2022-05-04

## 2022-05-05 ENCOUNTER — PATIENT MESSAGE (OUTPATIENT)
Dept: INTERNAL MEDICINE | Facility: CLINIC | Age: 52
End: 2022-05-05
Payer: COMMERCIAL

## 2022-05-10 ENCOUNTER — CLINICAL SUPPORT (OUTPATIENT)
Dept: PSYCHIATRY | Facility: CLINIC | Age: 52
End: 2022-05-10
Payer: COMMERCIAL

## 2022-05-10 ENCOUNTER — CLINICAL SUPPORT (OUTPATIENT)
Dept: REHABILITATION | Facility: HOSPITAL | Age: 52
End: 2022-05-10
Payer: COMMERCIAL

## 2022-05-10 VITALS — WEIGHT: 240.75 LBS | BODY MASS INDEX: 42.66 KG/M2 | HEIGHT: 63 IN

## 2022-05-10 DIAGNOSIS — Z17.0 CARCINOMA OF AXILLARY TAIL OF RIGHT BREAST IN FEMALE, ESTROGEN RECEPTOR POSITIVE: ICD-10-CM

## 2022-05-10 DIAGNOSIS — R26.89 BALANCE PROBLEM: ICD-10-CM

## 2022-05-10 DIAGNOSIS — C50.611 CARCINOMA OF AXILLARY TAIL OF RIGHT BREAST IN FEMALE, ESTROGEN RECEPTOR POSITIVE: ICD-10-CM

## 2022-05-10 DIAGNOSIS — R29.898 WEAKNESS OF BOTH LOWER EXTREMITIES: Primary | ICD-10-CM

## 2022-05-10 DIAGNOSIS — E66.01 MORBID OBESITY: ICD-10-CM

## 2022-05-10 DIAGNOSIS — R26.89 ANTALGIC GAIT: ICD-10-CM

## 2022-05-10 DIAGNOSIS — Z71.3 NUTRITIONAL COUNSELING: Primary | ICD-10-CM

## 2022-05-10 PROCEDURE — 97110 THERAPEUTIC EXERCISES: CPT

## 2022-05-10 PROCEDURE — 97112 NEUROMUSCULAR REEDUCATION: CPT

## 2022-05-10 PROCEDURE — 97535 SELF CARE MNGMENT TRAINING: CPT

## 2022-05-10 PROCEDURE — 99999 PR PBB SHADOW E&M-EST. PATIENT-LVL II: ICD-10-PCS | Mod: PBBFAC,,,

## 2022-05-10 PROCEDURE — 99999 PR PBB SHADOW E&M-EST. PATIENT-LVL II: CPT | Mod: PBBFAC,,,

## 2022-05-10 NOTE — PROGRESS NOTES
"  Physical Therapy Daily Treatment Note     Name: Phylicia Vásquez  Clinic Number: [id number]    Therapy Diagnosis:   Encounter Diagnoses   Name Primary?    Weakness of both lower extremities Yes    Antalgic gait     Balance problem      Physician: Marcela Calvillo PA-C    Visit Date: 5/10/2022    Physician Orders: PT Eval and Treat   Medical Diagnosis from Referral:   C50.611,Z17.0 (ICD-10-CM) - Carcinoma of axillary tail of right breast in female, estrogen receptor positive   E66.01,Z68.41 (ICD-10-CM) - Class 3 severe obesity with body mass index (BMI) of 40.0 to 44.9 in adult, unspecified obesity type, unspecified whether serious comorbidity present   Evaluation Date: 2/8/2022  Authorization Period Expiration: 12/31/2022  Plan of Care Expiration: 8/8/2022  Visit # / Visits authorized: 8/ 20(plus eval)  Insurance: [employer]      Time In: 2:03 PM  Time Out: 3:00 PM  Total Billable Time: 55 minutes    Precautions: Standard and cancer    Subjective     Pt reports: She went to the ER last week for a continued coughing and difficulty breathing, she was told she had acute bronchitis, and was given steroids and an inhaler. She feels better now, but still gets short of breath at times. Pt reports she had pain and swelling in her R hand ~ 3 days ago. Pain has resolved but swelling remains.   She was compliant with home exercise program.  Response to previous treatment: no adverse reaction  Functional change: none stated    Pain: 0/10  Location: left hip      Objective     Objective Measures updated at progress report unless specified.   Vitals:  99%, HR: 73, 125/94mmHg      Baseline Measurements of BL UE's for early detection of Lymphedema:     LANDMARK RIGHT UE LEFT UE DIFFERENCE   E + 8" 46.5 cm 47 cm 0.5 cm   E + 6" 43 cm 43 cm 0 cm   E + 4" 40.5 cm 38.5 cm 2 cm   E + 2" 35.5 cm 33.5 cm 2 cm   Elbow 28.5 cm 28 cm 0.5 cm   W+ 8" 29 cm 28.5 cm 0.5 cm   W +  6" 29 cm 27.5 cm 1.5 cm   W + 4" 25 cm 24 cm 1 cm   Wrist 19 cm " "19 cm 0 cm   DPC 22.5 cm 21 cm 1.5 cm   IP Thumb 8 cm 7 cm 1 cm       Treatment     Pt received self-care/home-management for 22 minutes:    Took measurements listed above to monitor for lymphedema given pt's history of breast cancer and new swelling in R hand.     PT educated pt on potential benefit of compression glove for hand, elevation, and light exercises to manage swelling in hand. (hand pumps, wrist flexion/extension). PT educated pt on what to monitor for and to notify MD and/or PT if it should worsen    PT also educated pt on how to get "hedgehog" textured domes.      Patient participated in neuromuscular re-education activities to improve: Balance and Proprioception for 8 minutes. The following activities were included:     On textured hedgehog domes:  -10 reps/LE each of stepping with ball of foot, heel of foot, and whole foot      Mirian received therapeutic exercises to develop strength, endurance, ROM, flexibility, posture and core stabilization for 25 minutes including:    Wall push-ups, 2 x 10  Wall squats with orange swiss ball , 15 reps  Lateral stepping with green band at ankles, 2 laps x 10'  Monster walk with green band, 2 laps x 10'  Standing hip ext with green band at ankles, 2 x 10/LE  Figure 4 stretch, 2 x 30s/LE      Home Exercises Provided and Patient Education Provided     Education provided:   - exercise technique, how to monitor for potential lymphedema, management techniques for R hand swelling.      Written Home Exercises Provided: Patient instructed to cont prior HEP.  Exercises were reviewed and Mirian was able to demonstrate them prior to the end of the session.  Mirian demonstrated good  understanding of the education provided.     See EMR under Patient Instructions for exercises provided eval and 2/14/22, 2/23/22    Assessment     Pt tolerated session well. Pt reports recent pain and swelling in R hand, but pain has resolved. PT took circumferential measurements today to start monitoring " for lymphedema, and gave pt some edema management strategies. Will continue to monitor and request lymphedema referral as appropriate.  Pt able to complete today's exercises without issue, and she reports that textured domes feel good on her feet. Will progress as tolerated. Pt is due to routine re-assessment at next session.  Mirian Is progressing well towards her goals.   Pt prognosis is Good.     Pt will continue to benefit from skilled outpatient physical therapy to address the deficits listed in the problem list box on initial evaluation, provide pt/family education and to maximize pt's level of independence in the home and community environment.     Pt's spiritual, cultural and educational needs considered and pt agreeable to plan of care and goals.     Anticipated barriers to physical therapy: none anticipated    Goals:   Short Term Goals:  4 weeks     1. Pt will demo >/= 4/5 strength in bilateral lower extremities for improved functional mobility. (progressing, not met)  2. Pt to demonstrate increased strength in bilateral upper extremities to >/=4+/5 for improved ability to perform functional activities. (progressing, not met)  4. Pt. will improve single leg stance balance test score to >/=  15s to be in a moderate/low risk category for falls. (progressing, not met)  5. Pt will perform >/= 12 sit to stands in 30 seconds with no arms for improved endurance. (progressing, not met)  6. Pt will increase 6 minute walk test score by >/=  50 meters in order to increase ambulation in the community. (progressing, not met)  7. Pt will initiate home exercise program to improve strength, flexibility, endurance, mobility & balance. (met, 3/21/22)  8. Pt will tolerate 10 min or greater of time in light-->moderate intensity cardio (I.e. Bike, Treadmill) to improve endurance to assist in performing her preferred recreational activities. (met, 3/21/22)  9. Pt will verbalize understanding of exercise recommendation guidelines  for moderate intensity exercise to safely perform and progress home exercise routine. (progressing, not met)     Long Term Goals: 8 weeks  1. Pt will increase strength in bilateral lower extremities to 5/5 for improved functional mobility and tolerance for ADL and work activities. (progressing, not met)  4. Pt will be independent with home exercise program to improve range of motion, strength, balance and return to prior level of function. (progressing, not met)  5. Pt. will improve Single Leg Stance balance test score to >/=  30s for patient to be in low risk category for falls. (progressing, not met)  6. Pt will perform >/= 15 sit to stands in 30 seconds with no arms for improved endurance. (progressing, not met)  7. Pt will increase 6 minute walk test score by >/= 75 meters in order to increase community ambulation. (progressing, not met)  8. Patient will report compliance with  20 -30min  of moderate intensity exercise 5 days a week to improve overall cardiovascular function and decrease cancer related fatigue. (progressing, not met)  Plan     Plan of care Certification: 2/8/2022 to 4/8/2022.     Outpatient Physical Therapy 2 times weekly for 8 weeks to include the following interventions: Gait Training, Manual Therapy, Neuromuscular Re-ed, Orthotic Management and Training, Patient Education, Self Care, Therapeutic Activities, Therapeutic Exercise and IASTM. Dry needling with manual therapy techniques to decrease pain, inflammation and swelling, increase circulation and promote healing process.     Marcela Price, PT

## 2022-05-10 NOTE — PROGRESS NOTES
"Oncology Survivorship Weight Loss Program   Group Medical Nutrition Therapy Visit     Session # 3    Phylicia Vásquez   1970    Referring Provider:  No ref. provider found      Reason for Visit: Pt here for nutrition education and counseling for weight loss following breast or endometrial cancer     PMHx:   Past Medical History:   Diagnosis Date    Anxiety     Back pain     Goiter     HTN (hypertension) 10/18/2012    Hx antineoplastic chemo     last dose 4/23/20    Hx of psychiatric care     Ambien, Klonopin    Insomnia 10/18/2012    Malignant neoplasm of axillary tail of right breast in female, estrogen receptor positive 11/24/2019    right    Neck mass     right posterior    Primary invasive malignant neoplasm of female breast, right 11/21/2019    right    Psychiatric problem     S/P abdominal supracervical subtotal hysterectomy, continues to have regular menses.  4/2/2014    Spinal cord cyst, L1 2014     Thoracic radiculopathy, bulging disc at T10-11 and T11-12        Nutrition Assessment    This is a 52 y.o.female with a history of breast cancer. Patient was seen today as part of 6- month weight management program. Today was session 3 which covered the following topics:   Eat Fit DAI information:   Making healthy choices when dining out locally   Healthy recipes with local cuisine/Eat Fit Cookbook   Healthy snacks options   Meal prep  Patient participated in lecture and subsequent discussion. Weights, calories goals, and tracking reinforced.   Patient weight is stable since consult.      Weight:109.2 kg (240 lb 11.9 oz)  Height:5' 3" (1.6 m)  BMI:Body mass index is 42.65 kg/m².   IBW: Ideal body weight: 52.4 kg (115 lb 8.3 oz)  Adjusted ideal body weight: 75.1 kg (165 lb 9.8 oz)  Allergies: Butalbital    Current Medications:    Current Outpatient Medications:     albuterol (PROVENTIL/VENTOLIN HFA) 90 mcg/actuation inhaler, Inhale 1-2 puffs into the lungs every 6 (six) hours as " needed for Wheezing. Rescue, Disp: 18 g, Rfl: 0    amlodipine-valsartan (EXFORGE) 5-320 mg per tablet, TAKE 1 TABLET BY MOUTH EVERY DAY, Disp: 90 tablet, Rfl: 0    anastrozole (ARIMIDEX) 1 mg Tab, Take 1 tablet (1 mg total) by mouth once daily. (Patient taking differently: Take 1 mg by mouth every morning.), Disp: 90 tablet, Rfl: 3    ascorbic acid (VITAMIN C ORAL), Take 500 mg by mouth every morning. , Disp: , Rfl:     cyanocobalamin, vitamin B-12, (VITAMIN B-12 ORAL), Take by mouth daily as needed. , Disp: , Rfl:     cyclobenzaprine (FLEXERIL) 10 MG tablet, Take 1 tablet (10 mg total) by mouth 3 (three) times daily as needed for Muscle spasms., Disp: 30 tablet, Rfl: 3    dextromethorphan-guaiFENesin  mg/5 ml (ROBITUSSIN-DM)  mg/5 mL liquid, Take 5 mLs by mouth 4 (four) times daily as needed (cough)., Disp: 120 mL, Rfl: 0    gabapentin (NEURONTIN) 100 MG capsule, Take 2 capsules (200 mg total) by mouth every morning. Take in the morning for neuropathy, Disp: 180 capsule, Rfl: 3    traZODone (DESYREL) 50 MG tablet, Take 0.5 tab (25mg) by mouth at night as needed for insomnia., Disp: 30 tablet, Rfl: 2    venlafaxine (EFFEXOR XR) 75 MG 24 hr capsule, Take 1 capsule (75 mg total) by mouth once daily. In the morning, Disp: 30 capsule, Rfl: 11    vitamin E 200 UNIT capsule, Take 200 Units by mouth daily as needed. , Disp: , Rfl:     zolpidem (AMBIEN) 10 mg Tab, Take 1 tablet (10 mg total) by mouth nightly as needed (Sleep)., Disp: 30 tablet, Rfl: 2    Nutrition Diagnosis    Problem: Obese   Etiology (related to): excess calorie intake/lack of activity   Signs/Symptoms (as evidenced by): BMI = 42.7    Nutrition Intervention and Program Adherence    Nutrition Prescription   Patient has been given a goal of 1513 kcal per day for the duration of the weight loss program     Materials Provided/Reviewed   Eat Fit informational cards and cookbook resources     Nutrition Monitoring and Evaluation     Monitor: energy intake, weight and step counts    Goals: weight loss 1-2lb per week    Follow up in 4 weeks      Communication to referring provider/care team: note available in chart     Counseling time: 30 Minutes    Ailyn Amanda, MPH, RD, , LDN, FAND   [phone]

## 2022-05-11 ENCOUNTER — PATIENT MESSAGE (OUTPATIENT)
Dept: HEMATOLOGY/ONCOLOGY | Facility: CLINIC | Age: 52
End: 2022-05-11

## 2022-05-11 ENCOUNTER — OFFICE VISIT (OUTPATIENT)
Dept: HEMATOLOGY/ONCOLOGY | Facility: CLINIC | Age: 52
End: 2022-05-11
Payer: COMMERCIAL

## 2022-05-11 ENCOUNTER — HOSPITAL ENCOUNTER (OUTPATIENT)
Dept: RADIOLOGY | Facility: HOSPITAL | Age: 52
Discharge: HOME | End: 2022-05-11
Attending: INTERNAL MEDICINE
Payer: COMMERCIAL

## 2022-05-11 VITALS
DIASTOLIC BLOOD PRESSURE: 75 MMHG | OXYGEN SATURATION: 98 % | BODY MASS INDEX: 42.97 KG/M2 | HEART RATE: 78 BPM | WEIGHT: 242.5 LBS | RESPIRATION RATE: 18 BRPM | SYSTOLIC BLOOD PRESSURE: 134 MMHG | HEIGHT: 63 IN | TEMPERATURE: 98 F

## 2022-05-11 DIAGNOSIS — C50.611 CARCINOMA OF AXILLARY TAIL OF RIGHT BREAST IN FEMALE, ESTROGEN RECEPTOR POSITIVE: Primary | ICD-10-CM

## 2022-05-11 DIAGNOSIS — Z79.811 PROPHYLACTIC USE OF ANASTROZOLE (ARIMIDEX): Primary | ICD-10-CM

## 2022-05-11 DIAGNOSIS — Z17.0 CARCINOMA OF AXILLARY TAIL OF RIGHT BREAST IN FEMALE, ESTROGEN RECEPTOR POSITIVE: ICD-10-CM

## 2022-05-11 DIAGNOSIS — Z79.811 PROPHYLACTIC USE OF ANASTROZOLE (ARIMIDEX): ICD-10-CM

## 2022-05-11 DIAGNOSIS — C50.611 CARCINOMA OF AXILLARY TAIL OF RIGHT BREAST IN FEMALE, ESTROGEN RECEPTOR POSITIVE: ICD-10-CM

## 2022-05-11 DIAGNOSIS — Z17.0 CARCINOMA OF AXILLARY TAIL OF RIGHT BREAST IN FEMALE, ESTROGEN RECEPTOR POSITIVE: Primary | ICD-10-CM

## 2022-05-11 DIAGNOSIS — R60.0 LOCALIZED EDEMA: ICD-10-CM

## 2022-05-11 PROCEDURE — 3075F PR MOST RECENT SYSTOLIC BLOOD PRESS GE 130-139MM HG: ICD-10-PCS | Mod: CPTII,S$GLB,, | Performed by: INTERNAL MEDICINE

## 2022-05-11 PROCEDURE — 93971 US UPPER EXTREMITY VEINS RIGHT: ICD-10-PCS | Mod: 26,RT,, | Performed by: RADIOLOGY

## 2022-05-11 PROCEDURE — 99999 PR PBB SHADOW E&M-EST. PATIENT-LVL V: CPT | Mod: PBBFAC,,, | Performed by: INTERNAL MEDICINE

## 2022-05-11 PROCEDURE — 99214 PR OFFICE/OUTPT VISIT, EST, LEVL IV, 30-39 MIN: ICD-10-PCS | Mod: S$GLB,,, | Performed by: INTERNAL MEDICINE

## 2022-05-11 PROCEDURE — 93971 EXTREMITY STUDY: CPT | Mod: TC,RT

## 2022-05-11 PROCEDURE — 3078F DIAST BP <80 MM HG: CPT | Mod: CPTII,S$GLB,, | Performed by: INTERNAL MEDICINE

## 2022-05-11 PROCEDURE — 1159F PR MEDICATION LIST DOCUMENTED IN MEDICAL RECORD: ICD-10-PCS | Mod: CPTII,S$GLB,, | Performed by: INTERNAL MEDICINE

## 2022-05-11 PROCEDURE — 93971 EXTREMITY STUDY: CPT | Mod: 26,RT,, | Performed by: RADIOLOGY

## 2022-05-11 PROCEDURE — 99214 OFFICE O/P EST MOD 30 MIN: CPT | Mod: S$GLB,,, | Performed by: INTERNAL MEDICINE

## 2022-05-11 PROCEDURE — 4010F PR ACE/ARB THEARPY RXD/TAKEN: ICD-10-PCS | Mod: CPTII,S$GLB,, | Performed by: INTERNAL MEDICINE

## 2022-05-11 PROCEDURE — 99999 PR PBB SHADOW E&M-EST. PATIENT-LVL V: ICD-10-PCS | Mod: PBBFAC,,, | Performed by: INTERNAL MEDICINE

## 2022-05-11 PROCEDURE — 3075F SYST BP GE 130 - 139MM HG: CPT | Mod: CPTII,S$GLB,, | Performed by: INTERNAL MEDICINE

## 2022-05-11 PROCEDURE — 4010F ACE/ARB THERAPY RXD/TAKEN: CPT | Mod: CPTII,S$GLB,, | Performed by: INTERNAL MEDICINE

## 2022-05-11 PROCEDURE — 3078F PR MOST RECENT DIASTOLIC BLOOD PRESSURE < 80 MM HG: ICD-10-PCS | Mod: CPTII,S$GLB,, | Performed by: INTERNAL MEDICINE

## 2022-05-11 PROCEDURE — 1159F MED LIST DOCD IN RCRD: CPT | Mod: CPTII,S$GLB,, | Performed by: INTERNAL MEDICINE

## 2022-05-11 PROCEDURE — 3008F PR BODY MASS INDEX (BMI) DOCUMENTED: ICD-10-PCS | Mod: CPTII,S$GLB,, | Performed by: INTERNAL MEDICINE

## 2022-05-11 PROCEDURE — 3008F BODY MASS INDEX DOCD: CPT | Mod: CPTII,S$GLB,, | Performed by: INTERNAL MEDICINE

## 2022-05-11 NOTE — PROGRESS NOTES
Subjective:       Patient ID: Phylicia Vásquez is a 52 y.o. female.    Chief Complaint: No chief complaint on file.    HPI   Mrs Lua returns today at my request to be seen..   I had last seen her on April 20, 2022. She called earlier today complaining of new onset swelling in the right upper extremity, hence the visit here today.  When asked, she states that she 1st noticed the swelling 3 days ago  As of November 1, 2020 she has been on anastrozole in the adjuvant setting.  Of note, her estradiol level and her FSH level suggested that she is postmenopausal, hence initiation of anastrozole.  After her last visit in early October 2021 she underwent a mammogram that was read as BI-RADS 4 and led to a biopsy of a left axillary lymph node that was unremarkable.   On April 21, 2021 she underwent a contralateral prophylactic mastectomy with ROWAN flap reconstructions bilaterally.      Briefly, she is a 52 year old AA female who was found to have a carcinoma that is ER positive, NH positive, and HER 2 equivocal, but negative by  FISH.  An axillary node biopsy was also positive.  She received standard neoadjuvant chemotherapy consisting of 4 cycles of AC and 4 cycles of Taxol prior to undergoing a mastectomy.  At the time of her mastectomy she had a 3 cm residual tumor while the 2 sentinel lymph nodes were positive.  A subsequent axillary node dissection showed no evidence of further chandler involvement.   She completed radiation therapy, and as of November 1, 2020 she has been on adjuvant anastrozole.    A CBC today shows a white count of 11,700 per cubic mm, hemoglobin 11.5 grams/deciliter, hematocrit 36.8%, MCV 81 and platelets 566446 per cubic mm.  Creatinine is 0.8 mg/dL and bilirubin is 0.4 mg/dL.  Transaminases are normal.      Review of Systems    Overall she feels OK.  She states that she noticed a swelling 3 days ago when she is concerned about it.  She denies any fever or chills.  There has not been any  injury.  She still has residual neuropathy, manifested by numbness primarily on her toes.  ECOG PS is 1. She denies any depression, easy bruising, fevers, chills, night  sweats, weight loss, vomiting, diarrhea, constipation, diplopia, blurred vision, headache, chest pain, palpitations, shortness of breath, left breast pain, abdominal pain, extremity pain, or difficulty ambulating.  The remainder of the ten-point ROS, including general, skin, lymph, H/N, cardiorespiratory, GI, , Neuro, Endocrine, and psychiatric is negative.       Objective:      Physical Exam      She is alert, oriented to time, place, person, pleasant, well      nourished, in no acute physical distress.                                   VITAL SIGNS:  Reviewed                                      HEENT:  Normal.  There are no nasal, oral, lip, gingival, auricular, lid,    or conjunctival lesions.  Mucosae are moist and pink, and there is no        thrush.  Pupils are equal, reactive to light and accommodation.              Extraocular muscle movements are intact.  Dentition is good.  There is no frontal or maxillary tenderness.                                     NECK:  Supple without JVD, adenopathy, or thyromegaly.                       LUNGS:  Clear to auscultation without wheezing, rales, or rhonchi.           CARDIOVASCULAR:  Reveals an S1, S2, no murmurs, no rubs, no gallops.         ABDOMEN:  Soft, nontender, without organomegaly.  Bowel sounds are    present.                                                                         EXTREMITIES:  No cyanosis, clubbing, but she does have 1+ edema primarily on the distal right upper extremity                               BREASTS:   she is now status post bilateral mastectomies with ROWAN flap reconstructions.  On the left reconstructed breast there a large area of fat necrosis measuring at least 10 cm on the upper outer quadrant.  There is also an area of probable fat necrosis on the right  reconstructed breast at the 4 o'clock position.    I do not palpate any axillary lymph nodes in either side.    LYMPHATIC:  There is no cervical, axillary, or supraclavicular adenopathy.   SKIN:  Warm and moist, without petechiae, rashes, induration, or ecchymoses.           NEUROLOGIC:  DTRs are 0-1+ bilaterally, symmetrical, motor function is 5/5,  and cranial nerves are  within normal limits.    Assessment:       1. Carcinoma of axillary tail of right breast in female, estrogen receptor positive, pathologically T2N1M0 after neoadjuvant chemotherapy     2.    Axillary node metastases.  3.      Status post recent left prophylactic mastectomy with bilateral ROWAN flap reconstructions  4.      Extensive fat necrosis bilaterally.  Of note, a recent left mammogram was unremarkable.  5.      Swelling, right upper extremity.  I suspect that this is lymphedema, however, with her being on anastrozole, a DVT will need to be ruled out.  Plan:        I had a long discussion with her.  In regards to her RUE swelling, tehre is nothing to suggest an underlying infection. We will proceed with an ultrasound ASAP to rule out a DVT.  If she does have a DVT, she will be started on apixaban.  If there is no evidence of DVT, she will be referred to the lymphedema clinic.  She will remain on anastrozole through the end of October 2025, at which point I will recommend that she extend her treatment for an additional two years to complete 7 years of adjuvant hormonal therapy with AIs.    I will call her with the results of the ultrasound later today.  RTC 4 months.  Her multiple questions were answered to her satisfaction.      3:07 p.m. ADDENDUM    Ultrasound shows no evidence of DVT.  She will referred to PT for lymphedema evaluation.

## 2022-05-12 ENCOUNTER — OFFICE VISIT (OUTPATIENT)
Dept: PSYCHIATRY | Facility: CLINIC | Age: 52
End: 2022-05-12
Payer: COMMERCIAL

## 2022-05-12 DIAGNOSIS — C50.611 MALIGNANT NEOPLASM OF AXILLARY TAIL OF RIGHT FEMALE BREAST, UNSPECIFIED ESTROGEN RECEPTOR STATUS: ICD-10-CM

## 2022-05-12 PROCEDURE — 4010F ACE/ARB THERAPY RXD/TAKEN: CPT | Mod: CPTII,95,, | Performed by: PSYCHOLOGIST

## 2022-05-12 PROCEDURE — 96158 PR INTERVENTION, HEALTH BEHAVIOR, INDIV, 1ST 30 MINS: ICD-10-PCS | Mod: 95,,, | Performed by: PSYCHOLOGIST

## 2022-05-12 PROCEDURE — 1159F PR MEDICATION LIST DOCUMENTED IN MEDICAL RECORD: ICD-10-PCS | Mod: CPTII,95,, | Performed by: PSYCHOLOGIST

## 2022-05-12 PROCEDURE — 96158 HLTH BHV IVNTJ INDIV 1ST 30: CPT | Mod: 95,,, | Performed by: PSYCHOLOGIST

## 2022-05-12 PROCEDURE — 4010F PR ACE/ARB THEARPY RXD/TAKEN: ICD-10-PCS | Mod: CPTII,95,, | Performed by: PSYCHOLOGIST

## 2022-05-12 PROCEDURE — 96159 HLTH BHV IVNTJ INDIV EA ADDL: CPT | Mod: 95,,, | Performed by: PSYCHOLOGIST

## 2022-05-12 PROCEDURE — 1160F RVW MEDS BY RX/DR IN RCRD: CPT | Mod: CPTII,95,, | Performed by: PSYCHOLOGIST

## 2022-05-12 PROCEDURE — 96159 PR INTERVENTION, HEALTH BEHAVIOR, INDIV, EA ADDTL 15 MINS: ICD-10-PCS | Mod: 95,,, | Performed by: PSYCHOLOGIST

## 2022-05-12 PROCEDURE — 1159F MED LIST DOCD IN RCRD: CPT | Mod: CPTII,95,, | Performed by: PSYCHOLOGIST

## 2022-05-12 PROCEDURE — 1160F PR REVIEW ALL MEDS BY PRESCRIBER/CLIN PHARMACIST DOCUMENTED: ICD-10-PCS | Mod: CPTII,95,, | Performed by: PSYCHOLOGIST

## 2022-05-12 NOTE — PROGRESS NOTES
Telemedicine PSYCHO-ONCOLOGY NOTE/ Individual Psychotherapy   (patient not on premises due to COVID-19 restrictions)    Consultation started: 5/12/2022 at 1:04 PM   The chief complaint leading to consultation is: weight loss  The patient location is:  Patient home in [place], [place]  Virtual visit with synchronous audio and video    Patient alone at the time of consultation      Each patient provided medical services by telemedicine is:  (1) informed of the relationship between the physician and patient and the respective role of any other health care provider with respect to management of the patient; and (2) notified that he or she may decline to receive medical services by telemedicine and may withdraw from such care at any time.    Crisis Disclaimer: Patient was informed that due to the virtual nature of the visit, that if a crisis develops, protocols will be implemented to ensure patient safety, including but not limited to: 1) Initiating a welfare check with local Law Enforcement, 2) Calling 1/National Crisis Hotline, and/or 3) Initiating PEC/CEC procedures.     Date: 5/12/2022   Site of therapist:  [facility]         Therapeutic Intervention: Met with patient.  Outpatient - Health and Behavior Intervention, Individual 45 min - CPT Code 71111    Health and Behavior Intervention Note -Psychologist       Phylicia Vásquez arrived on time for the session. Patient was appropriately dressed and groomed. Phylicia Vásquez  appeared alert and oriented in all spheres. No suicidal or homicidal ideation was reported.  Phylicia Vásquez actively participated in the session and provided both prompted and unprompted contribution to the discussion.      Content of session:     Group 2: Barriers, triggers, and relationship with food   Reviewed SMART Goals from previous visit   Discussed types of barriers to well-being and weight management: physical environmental, psychological, behavioral,  and  interpersonal  Discussed importance of understanding Relationship dynamics with food    Goals for next visit: Complete barriers checklist, identifying current relationship with food       Patient's response to intervention:The patient's response to intervention is accepting. 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 Her neuropathy and lack of a partner in weight loss attempts among family members also play a role.    Behavior: Interested and Engaged    Insight: good     Progress toward goals:Progress as Expected      Goals from last visit: Attempted, partially met (barriers explored)    Treatment Plan: Continue group participation  · Target symptoms: weight loss   · Why chosen therapy is appropriate versus another modality: relevant to diagnosis, evidence based practice  · Outcome monitoring methods: checklist/rating scale, weight monitoring  · Return to clinic: 1 month         Distress Score    Distress Score: 4        Practical Problems Physical Problems   : No Appearance: Yes   Housing: No Bathing / Dressing: No   Insurance / Financial: Yes Breathing: No    Transportation: No  Changes in Urination: No    Work / School: Yes  Constipation: No   Treatment Decisions: No  Diarrhea: Yes     Eating: No    Family Problems Fatigue: Yes    Dealing with Children: No Feeling Swollen: Yes    Dealing with Partner: No Fevers: No    Ability to Have Children: No  Getting Around: No       Indigestion: No     Memory / Concentration: Yes   Emotional Problems Mouth Sores: No    Depression: Yes  Nausea: No    Fears: Yes  Nose Dry / Congested: Yes    Nervousness: Yes  Pain: Yes    Sadness: Yes Sexual: No    Worry: Yes Skin Dry / Itchy: Yes    Loss of Interest in Usual Activities: No Sleep: Yes     Substance Abuse: No    Spiritual/Religions Concerns Tingling in Hands / Feet: Yes   Spritual / Catholic Concerns: No         Other Problems              PHQ-9=   Initial visit: 11    PHQ ANSWERS    Q1. Little interest or pleasure in doing things: Not at all (05/12/22 0250)  Q2. Feeling down, depressed, or hopeless: Not at all (05/12/22 0250)  Q3. Trouble falling or staying asleep, or sleeping too much: Several days (05/12/22 0250)  Q4. Feeling tired or having little energy: Several days (05/12/22 0250)  Q5. Poor appetite or overeating: Several days (05/12/22 0250)  Q6. Feeling bad about yourself - or that you are a failure or have let yourself or your family down: Not at all (05/12/22 0250)  Q7. Trouble concentrating on things, such as reading the newspaper or watching television: Not at all (05/12/22 0250)  Q8. Moving or speaking so slowly that other people could have noticed. Or the opposite - being so fidgety or restless that you have been moving around a lot more than usual: Several days (05/12/22 0250)  Q9. Thoughts that you would be better off dead, or of hurting yourself in some way: Not at all (05/12/22 0250)    PHQ8 Score : 4 (05/12/22 0250)  PHQ-9 Total Score: 4 (05/12/22 0250)        BRAD-7= Initial visit: 10     GAD7 5/12/2022   1. Feeling nervous, anxious, or on edge? 1   2. Not being able to stop or control worrying? 1   3. Worrying too much about different things? 1   4. Trouble relaxing? 0   5. Being so restless that it is hard to sit still? 0   6. Becoming easily annoyed or irritable? 0   7. Feeling afraid as if something awful might happen? 0   BRAD-7 Score 3         Length of Service (minutes direct face-to-face contact): 45      ICD-10-CM ICD-9-CM   1. BMI 40.0-44.9, adult  Z68.41 V85.41   2. Malignant neoplasm of axillary tail of right female breast, unspecified estrogen receptor status  C50.611 174.6         Cem Hilton, PhD  [place] License #[id number]

## 2022-05-17 ENCOUNTER — OFFICE VISIT (OUTPATIENT)
Dept: INTERNAL MEDICINE | Facility: CLINIC | Age: 52
End: 2022-05-17
Payer: COMMERCIAL

## 2022-05-17 VITALS
OXYGEN SATURATION: 98 % | HEART RATE: 78 BPM | DIASTOLIC BLOOD PRESSURE: 86 MMHG | HEIGHT: 63 IN | WEIGHT: 239 LBS | BODY MASS INDEX: 42.35 KG/M2 | SYSTOLIC BLOOD PRESSURE: 128 MMHG

## 2022-05-17 DIAGNOSIS — I89.0 LYMPHEDEMA OF RIGHT UPPER EXTREMITY: ICD-10-CM

## 2022-05-17 DIAGNOSIS — H61.23 BILATERAL IMPACTED CERUMEN: ICD-10-CM

## 2022-05-17 DIAGNOSIS — R05.8 POST-VIRAL COUGH SYNDROME: ICD-10-CM

## 2022-05-17 DIAGNOSIS — E66.01 CLASS 3 SEVERE OBESITY DUE TO EXCESS CALORIES WITH SERIOUS COMORBIDITY AND BODY MASS INDEX (BMI) OF 40.0 TO 44.9 IN ADULT: ICD-10-CM

## 2022-05-17 DIAGNOSIS — Z87.09 HISTORY OF BRONCHITIS: Primary | ICD-10-CM

## 2022-05-17 PROCEDURE — 3008F PR BODY MASS INDEX (BMI) DOCUMENTED: ICD-10-PCS | Mod: CPTII,S$GLB,, | Performed by: INTERNAL MEDICINE

## 2022-05-17 PROCEDURE — 3079F PR MOST RECENT DIASTOLIC BLOOD PRESSURE 80-89 MM HG: ICD-10-PCS | Mod: CPTII,S$GLB,, | Performed by: INTERNAL MEDICINE

## 2022-05-17 PROCEDURE — 4010F PR ACE/ARB THEARPY RXD/TAKEN: ICD-10-PCS | Mod: CPTII,S$GLB,, | Performed by: INTERNAL MEDICINE

## 2022-05-17 PROCEDURE — 3079F DIAST BP 80-89 MM HG: CPT | Mod: CPTII,S$GLB,, | Performed by: INTERNAL MEDICINE

## 2022-05-17 PROCEDURE — 99999 PR PBB SHADOW E&M-EST. PATIENT-LVL IV: CPT | Mod: PBBFAC,,, | Performed by: INTERNAL MEDICINE

## 2022-05-17 PROCEDURE — 4010F ACE/ARB THERAPY RXD/TAKEN: CPT | Mod: CPTII,S$GLB,, | Performed by: INTERNAL MEDICINE

## 2022-05-17 PROCEDURE — 1159F MED LIST DOCD IN RCRD: CPT | Mod: CPTII,S$GLB,, | Performed by: INTERNAL MEDICINE

## 2022-05-17 PROCEDURE — 3008F BODY MASS INDEX DOCD: CPT | Mod: CPTII,S$GLB,, | Performed by: INTERNAL MEDICINE

## 2022-05-17 PROCEDURE — 99213 PR OFFICE/OUTPT VISIT, EST, LEVL III, 20-29 MIN: ICD-10-PCS | Mod: S$GLB,,, | Performed by: INTERNAL MEDICINE

## 2022-05-17 PROCEDURE — 99999 PR PBB SHADOW E&M-EST. PATIENT-LVL IV: ICD-10-PCS | Mod: PBBFAC,,, | Performed by: INTERNAL MEDICINE

## 2022-05-17 PROCEDURE — 3074F PR MOST RECENT SYSTOLIC BLOOD PRESSURE < 130 MM HG: ICD-10-PCS | Mod: CPTII,S$GLB,, | Performed by: INTERNAL MEDICINE

## 2022-05-17 PROCEDURE — 1160F PR REVIEW ALL MEDS BY PRESCRIBER/CLIN PHARMACIST DOCUMENTED: ICD-10-PCS | Mod: CPTII,S$GLB,, | Performed by: INTERNAL MEDICINE

## 2022-05-17 PROCEDURE — 3074F SYST BP LT 130 MM HG: CPT | Mod: CPTII,S$GLB,, | Performed by: INTERNAL MEDICINE

## 2022-05-17 PROCEDURE — 1160F RVW MEDS BY RX/DR IN RCRD: CPT | Mod: CPTII,S$GLB,, | Performed by: INTERNAL MEDICINE

## 2022-05-17 PROCEDURE — 1159F PR MEDICATION LIST DOCUMENTED IN MEDICAL RECORD: ICD-10-PCS | Mod: CPTII,S$GLB,, | Performed by: INTERNAL MEDICINE

## 2022-05-17 PROCEDURE — 99213 OFFICE O/P EST LOW 20 MIN: CPT | Mod: S$GLB,,, | Performed by: INTERNAL MEDICINE

## 2022-05-17 RX ORDER — PROMETHAZINE HYDROCHLORIDE AND CODEINE PHOSPHATE 6.25; 1 MG/5ML; MG/5ML
5 SOLUTION ORAL NIGHTLY PRN
Qty: 35 ML | Refills: 0 | Status: SHIPPED | OUTPATIENT
Start: 2022-05-17 | End: 2022-05-24

## 2022-05-17 RX ORDER — BENZONATATE 100 MG/1
100 CAPSULE ORAL 3 TIMES DAILY PRN
Qty: 60 CAPSULE | Refills: 0 | Status: SHIPPED | OUTPATIENT
Start: 2022-05-17 | End: 2022-06-06

## 2022-05-17 NOTE — PROGRESS NOTES
INTERNAL MEDICINE ESTABLISHED PATIENT VISIT NOTE    Subjective:     Chief Complaint: Hospital Follow Up       Patient ID: Phylicia Vásquez is a 52 y.o. female with HTN, prediabetes, R breast cancer s/p R mastectomy/chemotherapy/prophylactic L mastectomy on Anastrazole, adjustment disorder, goiter, last seen by me 12/2021, here today for ER follow-up.    5/2022 ER - Went for cough, congestion. Diagnosed with acute bronchitis, discharged with dexamethasone, albuterol, Robitussin.     5/2022 Heme/Onc - Anastrozole until 10/2025, recommend treatment extension for additional 2 years for total of 7 years. RUE swelling noted. US Venous completed, negative for DVT. Referred for lymphedema PT.     Today, reports persistent cough at night. Decreased taste/smell, slowly returning.    Going to Weight Loss counseling - meets with nutritionist and psychiatrist monthly with other Breast Cancer Survivors/Pts.     Past Medical History:  Past Medical History:   Diagnosis Date    Anxiety     Back pain     Goiter     HTN (hypertension) 10/18/2012    Hx antineoplastic chemo     last dose 4/23/20    Hx of psychiatric care     Ambien, Klonopin    Insomnia 10/18/2012    Malignant neoplasm of axillary tail of right breast in female, estrogen receptor positive 11/24/2019    right    Neck mass     right posterior    Primary invasive malignant neoplasm of female breast, right 11/21/2019    right    Psychiatric problem     S/P abdominal supracervical subtotal hysterectomy, continues to have regular menses.  4/2/2014    Spinal cord cyst, L1 2014     Thoracic radiculopathy, bulging disc at T10-11 and T11-12           Review of Systems:  Review of Systems   Constitutional: Negative for chills and fever.   HENT: Negative for congestion.    Respiratory: Positive for cough. Negative for shortness of breath.    Cardiovascular: Negative for chest pain.   Gastrointestinal: Negative for constipation, nausea and vomiting.  "  Genitourinary: Negative for hematuria and urgency.   Musculoskeletal: Negative for falls.   Skin: Negative for rash.   Neurological: Negative for dizziness and loss of consciousness.       Health Maintenance:   Immunizations:   Influenza - complete  Tdap - 10/2017  Covid 19 - complete  HPV  Prevnar rec at 65 - Prevnar 11/2021  Shingrix rec at 50 - complete     Cancer Screening:  PAP: 1/2022 NILM  Mammogram:  s/p bilateral mastectomy; 4/2022 L Breast BIRAD -2, repeat per clinical discretion  Colonoscopy:  7/2021 WNL, repeat 7/2031   DEXA:  n/a    Objective:   /86 (BP Location: Left arm, Patient Position: Sitting, BP Method: Medium (Manual))   Pulse 78   Ht 5' 3" (1.6 m)   Wt 108.4 kg (238 lb 15.7 oz)   LMP 08/14/2014   SpO2 98%   BMI 42.33 kg/m²      GEN - A+OX4, NAD   HEENT - PERRL, EOMI, bilateral cerumen impaction  Neck - No thyromegaly or thyroid masses felt.  No cervical lymphadenopathy appreciated.  CV - RRR, no m/r/g  Chest - CTAB, no wheezing, crackles, or rhonchi  Abd - S/NT/ND/+BS.   Ext - 2+BDP. No C/C/E.    Labs:       Assessment/Plan     History of bronchitis  ER note and labs reviewed  Improved overall    Post-viral cough syndrome  -     benzonatate (TESSALON) 100 MG capsule; Take 1 capsule (100 mg total) by mouth 3 (three) times daily as needed for Cough.  Dispense: 60 capsule; Refill: 0  -     promethazine-codeine 6.25-10 mg/5 ml (PHENERGAN WITH CODEINE) 6.25-10 mg/5 mL syrup; Take 5 mLs by mouth nightly as needed for Cough.  Dispense: 35 mL; Refill: 0    Lymphedema of right upper extremity  US negative for DVT, starting PT    Bilateral impacted cerumen  Ear irrigation today     Class 3 severe obesity due to excess calories with serious comorbidity and body mass index (BMI) of 40.0 to 44.9 in adult  Continue lifestyle modifications including diet, exercise    HM as above    RTC in 6 mo, sooner if needed.    Zoe Lynn MD  Department of Internal Medicine - [facility] [address]  05/17/2022

## 2022-05-20 ENCOUNTER — TELEPHONE (OUTPATIENT)
Dept: HEMATOLOGY/ONCOLOGY | Facility: CLINIC | Age: 52
End: 2022-05-20
Payer: COMMERCIAL

## 2022-05-29 DIAGNOSIS — I10 ESSENTIAL HYPERTENSION: ICD-10-CM

## 2022-05-29 DIAGNOSIS — M54.14 THORACIC RADICULOPATHY: ICD-10-CM

## 2022-05-29 NOTE — TELEPHONE ENCOUNTER
No new care gaps identified.  [facility] Embedded Care Gaps. Reference number: [id number]. 5/29/2022   7:33:49 AM SUNILT

## 2022-05-29 NOTE — TELEPHONE ENCOUNTER
No new care gaps identified.  [facility] Embedded Care Gaps. Reference number: [id number]. 5/29/2022   7:32:49 AM SUNILT

## 2022-05-30 RX ORDER — AMLODIPINE AND VALSARTAN 5; 320 MG/1; MG/1
1 TABLET ORAL DAILY
Qty: 90 TABLET | Refills: 3 | OUTPATIENT
Start: 2022-05-30

## 2022-05-30 RX ORDER — CYCLOBENZAPRINE HCL 10 MG
10 TABLET ORAL 3 TIMES DAILY PRN
Qty: 30 TABLET | Refills: 3 | Status: SHIPPED | OUTPATIENT
Start: 2022-05-30 | End: 2022-07-02 | Stop reason: SDUPTHER

## 2022-06-03 ENCOUNTER — CLINICAL SUPPORT (OUTPATIENT)
Dept: REHABILITATION | Facility: HOSPITAL | Age: 52
End: 2022-06-03
Attending: INTERNAL MEDICINE
Payer: COMMERCIAL

## 2022-06-03 DIAGNOSIS — C50.611 CARCINOMA OF AXILLARY TAIL OF RIGHT BREAST IN FEMALE, ESTROGEN RECEPTOR POSITIVE: ICD-10-CM

## 2022-06-03 DIAGNOSIS — R60.0 LOCALIZED EDEMA: ICD-10-CM

## 2022-06-03 DIAGNOSIS — I89.0 LYMPHEDEMA OF RIGHT ARM: Primary | ICD-10-CM

## 2022-06-03 DIAGNOSIS — Z17.0 CARCINOMA OF AXILLARY TAIL OF RIGHT BREAST IN FEMALE, ESTROGEN RECEPTOR POSITIVE: ICD-10-CM

## 2022-06-03 DIAGNOSIS — Z74.09 IMPAIRED FUNCTIONAL MOBILITY AND ACTIVITY TOLERANCE: ICD-10-CM

## 2022-06-03 PROCEDURE — 97162 PT EVAL MOD COMPLEX 30 MIN: CPT

## 2022-06-03 PROCEDURE — 97535 SELF CARE MNGMENT TRAINING: CPT

## 2022-06-03 PROCEDURE — 97140 MANUAL THERAPY 1/> REGIONS: CPT

## 2022-06-03 NOTE — PLAN OF CARE
[facility]  Physical Therapy Initial Evaluation    Date: 6/3/2022   Name: Phylicia Vásquez  Clinic Number: [id number]    Therapy Diagnosis:   Encounter Diagnoses   Name Primary?    Carcinoma of axillary tail of right breast in female, estrogen receptor positive     Localized edema     Lymphedema of right arm Yes    Impaired functional mobility and activity tolerance      Physician: Negrito Verde MD    Physician Orders: PT Eval and Treat   Medical Diagnosis: Carcinoma of axillary tail of right breast in female, estrogen receptor positive [C50.611, Z17.0], Localized edema     Evaluation Date: 6/3/2022  Authorization Period Expiration: 23  Plan of Care Certification Period: 22 (12 weeks)  Visit # / Visits authorized:   Insurance: [employer]    Time In: 1:05 PM  Time Out: 2:05 PM  Total Billable Time: 60 minutes    Precautions: Standard, cancer and Right UE lymphedema      History   History of Present Illness: Phylicia is a 52 y.o. female that presents to  [facility] at the [facility] clinic s/p B breast surgery. Pt is s/p bilateral mastectomy with R SLNB, TE reconstruction in 2021. pt will have permanent reconstruction with ROWAN flap, but she states she needs to lose weight first. Pt had US of her RUE, and it was negative for blood clot.    Diagnosis: Carcinoma of axillary tail of right breast in female, estrogen receptor positive [C50.611, Z17.0], Localized edema    Chief complaint: swelling started in her R hand about 1 month ago, and she's had swelling and numbness in her R upper arm for ~ 1 week. Pt states swelling gets worse as the day goes on.   Surgical History:  Phylicia Vásquez  has a past surgical history that includes Cholecystectomy; Hysterectomy (); Colonoscopy (N/A, 10/05/2015);  section; Insertion of tunneled central venous catheter (CVC) with subcutaneous port (Right, 2019); Unilateral  mastectomy (Right, 05/20/2020); Injection for sentinel node identification (Right, 05/20/2020); [place] lymph node biopsy (Right, 05/20/2020); Insertion of breast tissue expander (Right, 05/20/2020); Axillary node dissection (Right, 06/05/2020); Unilateral mastectomy (Left, 04/21/2021); Reconstruction of breast with deep inferior epigastric artery  (ROWAN) free flap (Bilateral, 04/21/2021); Tissue expander removal (Right, 04/21/2021); Needle localization (Left, 05/31/2021); Colonoscopy (N/A, 07/13/2021); Breast biopsy (Right, 2015); Breast reconstruction (Bilateral, 04/2021); Mastectomy (Bilateral, 04/2021); Breast biopsy (Left, 2021); and Lipoma resection (12/2021).    Chemotherapy:  4 cycles of AC and 4 cycles of Taxol patrick adjuvantly  Radiation: completed radiation therapy on 8/20/20.   Endocrine therapy: On 11/1/2020 started on Anastrozole with with Dr. Verde    Past Medical History:   Diagnosis Date    Anxiety     Back pain     Goiter     HTN (hypertension) 10/18/2012    Hx antineoplastic chemo     last dose 4/23/20    Hx of psychiatric care     Ambien, Klonopin    Insomnia 10/18/2012    Malignant neoplasm of axillary tail of right breast in female, estrogen receptor positive 11/24/2019    right    Neck mass     right posterior    Primary invasive malignant neoplasm of female breast, right 11/21/2019    right    Psychiatric problem     S/P abdominal supracervical subtotal hysterectomy, continues to have regular menses.  4/2/2014    Spinal cord cyst, L1 2014     Thoracic radiculopathy, bulging disc at T10-11 and T11-12     Goiter on thyroid      Medications:  Phylicia has a current medication list which includes the following prescription(s): albuterol, amlodipine-valsartan, anastrozole, ascorbic acid, benzonatate, cyanocobalamin (vitamin b-12), cyclobenzaprine, gabapentin, venlafaxine, vitamin e, and zolpidem.    Allergies:  Review of patient's allergies indicates:   Allergen  "Reactions    Butalbital Other (See Comments)     Chest pain          Prior Therapy: yes, going for survivorship weight management and for left knee  Social History: single story home 3 steps to enter, lives with their son  Durable Medical Equipment: none  Occupation: overnight  at the [employer]  Prior Level of Function: pt did not have swelling in RUE, only in hand  Current Level of Function: increasing swelling in RUE, c/o tightness in her arm, impaired function with her arm, worsens over the course of the day  Dominant hand:  right   Exercise routine prior to onset: pt walks    Patient's Goals: See if we can bring the arm swelling down    Subjective   Pt states: pain in her hand  Pain: 7/10 on VAS currently.   Best: 0/10  Worst: 7/10   Pain location: right hand   Objective   Mental status :A+O x 3, pleasant, appropriate    Postural examination/scapula alignment: Rounded shoulder    Skin Integrity:   Scar Location: B breasts  Appearance: healed  Signs of infection: No  Drainage:N/A  Color:N/A    Edema: Mild to moderate  Location: RUE and hand    Axillary Web Syndrome/Cording:   Location: N/A  Degree of Cording: N/A (mild, moderate etc...)   Number of cords present: 0    Sensation: numbness noted in right upper arm        Range of Motion:      Shoulder Range of Motion: pt with some limitations in RUE range of motion for reaching overhead or with functional external rotation, otherwise grossly WFL       Strength: manual muscle test grades below   Upper Extremity Strength   (R) UE (L) UE   Shoulder flexion: 5/5 5/5   Shoulder Abduction: 5/5 5/5   Shoulder IR 5/5 5/5   Shoulder ER 4+/5 5/5   Elbow flexion: 5/5 5/5   Elbow extension: 5/5 5/5   Wrist flexion: 5/5* 5/5   Wrist extension: 5/5* 5/5    Fair to good* Fair to good   *pain    Baseline Measurements of BL UE's for early detection of Lymphedema:       LANDMARK RIGHT UE LEFT UE DIFF RUE Diff   Date 5/10/22 5/10/22 5/10/22 6/3/22 6/3/22   E + 8" 46.5 cm 47 " "cm 0.5 cm 47 cm +0.5   E + 6" 43 cm 43 cm 0 cm 44cm +1   E + 4" 40.5 cm 38.5 cm 2 cm 39.5 cm -1   E + 2" 35.5 cm 33.5 cm 2 cm 34.5cm -1    Elbow 28.5 cm 28 cm 0.5 cm 28 cm -0.5   W+ 8" 29 cm 28.5 cm 0.5 cm 29cm No chg   W +  6" 29 cm 27.5 cm 1.5 cm 29.5 cm +0.5   W + 4" 25 cm 24 cm 1 cm 25.5cm +0.5   Wrist 19 cm 19 cm 0 cm 18cm -1   DPC 22.5 cm 21 cm 1.5 cm 21.5 cm -1   IP Thumb 8 cm 7 cm 1 cm 7.5cm -0.5        6/3/22:  LANDMARK RIGHT UE LEFT UE DIFFERENCE   E + 8" 47 cm 46.5 cm 0.5 cm   E + 6" 44 cm 42.5 cm 1.5 cm   E + 4" 39.5 cm 37 cm 2.5 cm   E + 2" 34.5 cm 33 cm 1.5 cm   Elbow 28 cm 28 cm 0 cm   W+ 8" 29 cm  28.5cm 0.5 cm   W +  6" 29.5 cm 27 cm 2.5 cm   W + 4" 25.5 cm 23.5 cm 2 cm   Wrist 18 cm 17.5 cm 0.5 cm   DPC 21.5 cm 20 cm 1.5 cm   IP Thumb 7.5 cm 7 cm 0.5 cm     Functional Mobility (Bed mobility, transfers)  Mod I    ADL's:  Mod I    Gait Assessment:   - AD used: none  - Assistance: independent  - Distance: community distances     Patient Education Provided   - role of therapy in multi - disciplinary team, goals for therapy  -POC, scheduling, what complete decongestive therapy consists of  -encouraged pt to elevate RUE on pillow at night when sleeping        Pt has no cultural, educational or language barriers to learning provided.  Treatment and Instruction of Home Exercise Program      Total TimeSeparate from Eval: 25 min    Phylicia received the following manual therapy techniques were performed to increased myofascial/soft tissue length, mobility and pliability, increase PROM, AROM and function as well as to decrease pain for 15 minutes:      Short Neck- held due to thyroid goiter  Clear Healthy Lymph Nodes:  Left Axilla                                                  Right  Groin  Open Anastomoses:  Anterior Axillo-Axillary  (AAA)                                    Axillo-Inguinal     (AI)                                    Posterior Axillo-Axillary  (LEEANN) -not performed today    Right Upper Arm " (Lateral and Medial)  Right Antecubital Fossa    Rework Right UE  Rework Anastomoses  Rework Healthy lymph Nodes    Pt was provided with a compression glove that had been donated. PT also provided pt with size E tubigrip sleeve for some mild support. Educated pt that she can wear tubigrip during the day and night, as well as when to remove and how to wash.    Pt receives self-care/home management intervention for 10 minutes:    - Pt was educated in lymphedema etiology and management plans.    - Pt educated on risk reductions and lymphedema precautions, and pt was provided with written risk reductions and precautions for managing lymphedema.   -Pt educated on what exercises she can perform t/o the day and handout with pictures and reps  -pt encouraged to elevate her arm at night when she sleeps      Written Home Exercises Provided: yes.  Exercises were reviewed and Mirian was able to demonstrate them prior to the end of the session.  Mirian demonstrated good  understanding of the education provided.     See EMR under Patient Instructions for exercises provided 6/3/2022.      Functional Limitations Reporting      CMS Impairment/Limitation/Restriction for FOTO  Survey    Therapist reviewed FOTO scores for Phylicia Vásquez on 6/3/2022.   FOTO documents entered into [employer] - see Media section.    Limitations Score: 30%  Category: Carrying           Assessment   This is a 52 y.o. female referred to outpatient physical therapy and presents with a medical diagnosis of right breast cancer and was seen today to establish PT plan of care for impairments following surgery including: Right upper extremity lymphedema, decreased R shoulder ROM, weakness, poor posture, and impaired functional mobility. Educated pt on lymphedema precautions, how it is managed, and initiated manual lymphatic drainage. She will benefit from continued therapy to work towards following goals. Pt would benefit from therapy 2x/week, but due to her work  schedule, she reports she can attend 1 day/week.     Pt instructed in HEP this session and was able to perform all exercises given independently. Pt instructed to follow up with therapist if any concerns arise with program established. Pt will continue to benefit from skilled physical therapy to address the impairments stated in chart below, provide patient/family education and to maximize pt's level of independence in home and community environment     Pt prognosis is Good.    Anticipated barriers to physical therapy: work schedule     Pt's spiritual, cultural and educational needs considered and pt agreeable to plan of care and goals as stated below:     Medical necessity is demonstrated by the following IMPAIRMENTS/PROMBLEM LIST:    History  Co-morbidities and personal factors that may impact the plan of care Co-morbidities:   high BMI, history of cancer, HTN and thyroid goiter    Personal Factors:   work schedule     high   Examination  Body Structures and Functions, activity limitations and participation restrictions that may impact the plan of care Body Regions:   upper extremities  trunk    Body Systems:    ROM  strength  edema  lymphedema, posture    Participation Restrictions:   Work schedule    Activity limitations:   Learning and applying knowledge  no deficits    General Tasks and Commands  no deficits    Communication  no deficits    Mobility  lifting and carrying objects  fine hand use (grasping/picking up)    Self care  washing oneself (bathing, drying, washing hands)  dressing    Domestic Life  shopping  cooking  doing house work (cleaning house, washing dishes, laundry)    Interactions/Relationships  no deficits    Life Areas  employment    Community and Social Life  community life  recreation and leisure         high   Clinical Presentation evolving clinical presentation with changing clinical characteristics moderate   Decision Making/ Complexity Score: moderate       Goals: Pt agrees with goals  set    Short Term Goals (6 weeks)  1. Pt will demo decrease in girth in right upper extremity by >/= 1cm to allow for improved UE symmetry, clothing choice, ROM, and UE function. (progressing, not met)  2. Patient will demonstrate 100% knowledge of lymphedema precautions and signs of infection to allow for reduced risk of lymphedema, reduced risk of infection, and/or exacerbation.  (progressing, not met)  3. Patient will perform self lymph drainage techniques to enhance lymphatic drainage and mobility.  (progressing, not met)  4. Patient will tolerate daily activities with multilayered bandaging to enhance lymphatic drainage and skin elasticity.  (progressing, not met)  5. Pt will tolerate HEP for improved strength, functional mobility, ROM, posture, and endurance. (progressing, not met)       Long Term Goals: ( 12  weeks)  1. Patient to show decreased girth in Right upper extremity by >/= 2cm to allow for improved UE symmetry, clothing choice, ROM, and UE function.  (progressing, not met)  2. Patient will show reduction in density to mild or less with improved contour of limb to allow for improved cosmesis, improved lymphatic drainage, and functional mobility.  (progressing, not met)  3. Patient to mary/doff compression garment with daily compliance to support lymphatic mobility and skin elasticity.  (progressing, not met)  4. Pt to show improved postural awareness and alignment for improved functional mobility.  (progressing, not met)  5. Pt to be independent and compliant with HEP to allow for improved ROM, reach and carry with functional endurance and strength.    (progressing, not met)        Plan   Outpatient physical therapy 2x week for 12 weeks to include the following:   Manual Therapy, Neuromuscular Re-ed, Orthotic Management and Training, Patient Education, Self Care, Therapeutic Activities and Therapeutic Exercise.    Plan of care Certification Period: 6/3/2022 to 8/26/22.    Therapist: Marcela Price,  PT  6/3/2022

## 2022-06-03 NOTE — PATIENT INSTRUCTIONS
When to call your doctor   - if any part of your affected arm or axilla feels hot, is reddened or has increased swelling   - if you develop a temperature over 101 degrees Fahrenheit      Lymphedema - Identification and Prevention     Lymphedema - is the swelling of a body area or extremity caused by the accumulation of lymphatic fluid.  There is a risk for lymphedema with the removal of lymph nodes, trauma or radiation therapy.  Treatment of breast cancer often involves surgery: mastectomy or lumpectomy. Some of the lymph nodes in the underarm (called axillary lymph nodes) may be removed and checked to see if they contain cancer cells.     During breast surgery when axillary lymph nodes are removed (with sentinel node biopsy or axillary dissection) or are treated with radiation therapy, the lymphatic system may become impaired. This may prevent lymphatic fluid from leaving the area therefore, causing lymphedema.     Lymphatic fluid is a normal part of the circulatory system. Its function is to remove waste products and to produce cells vital to fighting infection. Swelling occurs when the vessels become restricted and the lymphatic fluid is unable to freely flow through them.  If lymphedema is left untreated, the affected limb could progressively become more swollen, which could lead to hardening of the skin, bulkiness in the limb, infection and impaired wound healing.         There are things you can do to decrease the chance of developing lymphedema.                                          [email or web address]                                                                                                                                                  The information presented is intended for general information and educational purposes. It is not intended to replace the  advice of your health care provider. Contact your health care provider if you believe you have a health  problem.                                                        Scapular Retraction (Standing)    With arms at sides, squeeze shoulder blades together. Do not shrug shoulders and do not hold your breath. Hold 5 seconds. Repeat 10 times, 2-3 sets, 2 x day.       Exaggerated Breathing and Relaxation      Practice deep breathing frequently in the first few days following surgery even before you begin exercising. This exercise helps with tissue extensibility in the chest wall.  Inhale slowly and deeply through the nose and exhale through pursed lips. Concentrate on relaxing as you let the air out of your lungs. Repeat three (3) to four (4) times, remembering to breath in deeply and then relaxing. This exercise helps to ease the sensation of pulling and discomfort that may be experienced while exercising.      Ball Squeeze OR Hand pumps       Perform this exercise three (3) times a day for 1-3 minutes each time.    The ball squeeze or hand pumps helps to prevent or reduce temporary swelling that may occur in the affected arm. This exercise may be performed standing, sitting or while lying in bed. During this exercise the affected arm should be slightly bent and held upward. Support your arm with a pillow if you are uncomfortable holding it up.    a. Hold a rubber ball in your hand on the affected side and lift your arm upward.  b. Alternate squeezing and relaxing the ball.        AROM: Elbow Flexion / Extension        With left hand palm up, gently bend elbow as far as possible. Then straighten arm as far as possible. Do this in standing.   Repeat 10 times per set. Do 2-3 sets per session. Do 1-3 sessions per day.

## 2022-06-08 ENCOUNTER — CLINICAL SUPPORT (OUTPATIENT)
Dept: REHABILITATION | Facility: HOSPITAL | Age: 52
End: 2022-06-08
Attending: INTERNAL MEDICINE
Payer: COMMERCIAL

## 2022-06-08 DIAGNOSIS — R26.89 ANTALGIC GAIT: ICD-10-CM

## 2022-06-08 DIAGNOSIS — R26.89 BALANCE PROBLEM: ICD-10-CM

## 2022-06-08 DIAGNOSIS — Z74.09 IMPAIRED FUNCTIONAL MOBILITY AND ACTIVITY TOLERANCE: ICD-10-CM

## 2022-06-08 DIAGNOSIS — R29.898 WEAKNESS OF BOTH LOWER EXTREMITIES: Primary | ICD-10-CM

## 2022-06-08 DIAGNOSIS — I89.0 LYMPHEDEMA OF RIGHT ARM: Primary | ICD-10-CM

## 2022-06-08 PROBLEM — M79.605 LEFT LEG PAIN: Status: RESOLVED | Noted: 2021-03-15 | Resolved: 2022-06-08

## 2022-06-08 PROBLEM — R26.2 DIFFICULTY WALKING: Status: RESOLVED | Noted: 2021-03-15 | Resolved: 2022-06-08

## 2022-06-08 PROBLEM — M62.81 MUSCLE WEAKNESS: Status: RESOLVED | Noted: 2021-03-15 | Resolved: 2022-06-08

## 2022-06-08 PROCEDURE — 97140 MANUAL THERAPY 1/> REGIONS: CPT

## 2022-06-08 PROCEDURE — 97110 THERAPEUTIC EXERCISES: CPT

## 2022-06-08 PROCEDURE — 97750 PHYSICAL PERFORMANCE TEST: CPT

## 2022-06-08 NOTE — PROGRESS NOTES
Physical Therapy Daily Treatment Note     Name: Phylicia Vásquez  Clinic Number: [id number]    Therapy Diagnosis:   Encounter Diagnoses   Name Primary?    Weakness of both lower extremities Yes    Antalgic gait     Balance problem      Physician: Negrito Verde MD    Visit Date: 6/8/2022    Physician Orders: PT Eval and Treat   Medical Diagnosis from Referral:   C50.611,Z17.0 (ICD-10-CM) - Carcinoma of axillary tail of right breast in female, estrogen receptor positive   E66.01,Z68.41 (ICD-10-CM) - Class 3 severe obesity with body mass index (BMI) of 40.0 to 44.9 in adult, unspecified obesity type, unspecified whether serious comorbidity present   Evaluation Date: 2/8/2022  Authorization Period Expiration: 12/31/2022  Plan of Care Expiration: 8/8/2022  Visit # / Visits authorized: 9/ 20(plus eval)  Insurance: [employer]      Time In: 3:00 PM  Time Out: 4:00 PM  Total Billable Time: 60 minutes    Precautions: Standard and cancer    Subjective     Pt reports:  L knee hurting more since she is on her feet more at work doing 12 hour shift  She was compliant with home exercise program.  Response to previous treatment: no adverse reaction  Functional change: none stated    Pain: 9/10  Location: left knee      Objective     Objective Measures updated at progress report unless specified.   Vitals:  99%, HR: 73, 125/94mmHg    Manual Muscle Test:  Upper Extremity Strength    (R) UE (L) UE   Shoulder flexion: 4+/5 4+/5   Shoulder Abduction: 4+/5 4+/5   Shoulder IR 4+/5 4+/5   Shoulder ER 5/5 5/5   Elbow flexion: 5/5 5/5   Elbow extension: 5/5 5/5   Wrist flexion:  not tested due to wrappings 5/5   Wrist extension:  not tested due to wrappings 5/5          Lower Extremity Strength  Right LE   Left LE     Hip Flexion: 4+/5 Hip Flexion: 4+/5   Hip Extension:  4/5 Hip Extension: 4/5   Hip Abduction: 4+/5 Hip Abduction: 4+/5   Hip Adduction: 5/5 Hip Adduction 5/5   Knee Extension: 5/5 Knee Extension: 4+/5   Knee Flexion: 5/5  "Knee Flexion: 5/5   Ankle Dorsiflexion: 5/5 Ankle Dorsiflexion: 3+/5   Ankle Plantarflexion: 5/5 Ankle Plantarflexion: 5/5                Strength (in pounds, setting II, average of three trials):    Left Right   Trial 1: 30.6 lbs 40.3 lbs   Trial 2: 34.6 lbs 42.1 lbs   Trial 3: 32.8 lbs 43.4 lbs   Average: 32.67 lbs 41.93 lbs       Strength (in pounds, setting II, average of three trials): 6/8/22    Left Right   Trial 1: 32.4 lbs 26.8 lbs   Trial 2: 34.8 lbs 28.8 lbs   Trial 3: 35.2 lbs 31.7 lbs   Average: 34.13 lbs 29.1 lbs*   * not accurate as patient's R UE was wrapped for compression for lymphedema treatment    Normal  Average Values  Female Right Left Male: Right Left   20-29 66 lbs 62 lbs         94 lbs 86 lbs   30-39 68 lbs 64 lbs 90 lbs 82 lbs   40-49 64 lbs 62 lbs 80 lbs 74 lbs   50-59 62 lbs 57 lbs 72 lbs 65 lbs   60-69 53 lbs 51 lbs 63 lbs 56 lbs   70+ 44 lbs 42 lbs 54 lbs 48 lbs         Balance Assessment:     For Single Limb Stance, enter best of 3 trials for each leg. Test times out at 30s.    Evaluation 6/8/22   Single Limb Stance R LE 3.16  (<10 sec = HIGH FALL RISK) 4.24 seconds   Single Limb Stance L LE 2.42  (<10 sec = HIGH FALL RISK) 1.85 seconds        Evaluation 6/8/22   Timed Up and Go 10.83 sec  14.09 sec         Table: Population Norms for TUG    Age  Average TUG    60 - 69 years  8.1 seconds    70 - 79 years  9.2 seconds    80 - 99 years  11.3 seconds          Endurance:     6 Minute Walk Test Distance in meters: 344.12 meters  -6/8/22 - 229.05 meters  - AD used: none  - Assistance: supervision  - Distance: 1129' 09"; 229.05 meters 6/8/22     GAIT DEVIATIONS:  Phylicia displays the following deviations with ambulation: decreased heel strike and toe off on left lower extremity, mild antalgic gait          Evaluation 6/8/22   30 second Chair Rise  (adults > 61 y/o) 10 completed with no arms 7 completed with no arms               CMS Impairment/Limitation/Restriction for FOTO Cancer " Survey     Therapist reviewed FOTO scores for Phylicia Vásquez on 6/8/2022.   FOTO documents entered into EPIC - see Media section.     Limitation Score: 48%  Category: Mobility             Treatment     Mirian received a physical performance assessment for 30 minutes:  See objective section for measurements    Mirian received therapeutic exercises to develop strength, endurance, ROM, flexibility, posture and core stabilization for 30 minutes including:    Aerobic/Cardio Activity: stationary bike seat 4, level 1, 10 minutes     Posterior pelvic tilt x 10 reps  Bridge with hip ADD x 10 reps  Bridge with Hip ABD, green band, 10 reps  Straight leg raise, 1 x 10 reps/LE    Patient was screened for use of kinesiotape and was cleared for use.  1. Has the patient ever had a reaction to the adhesive in bandaids? Yes/No  2. Has the patient ever uses athletic/kinesiotape in the past? Yes/No  3. Is the patient hemodynamically impaired (PE, DVT, CHF, Kidney failure)? Yes/No  4. Can the PT/PTA apply the tape to your skin? Yes/No    Patient was instructed on duration to wear the tape, proper drying techniques for the tape, and to remove the tape if he/she had any issues with it.    Patient verbalized understanding of these instructions. 2 I strips applied with 50% stretch to anterior tib and ankle of L LE to encourage DF    Home Exercises Provided and Patient Education Provided     Education provided:   - exercise technique, how to monitor for potential lymphedema, management techniques for R hand swelling.      Written Home Exercises Provided: Patient instructed to cont prior HEP.  Exercises were reviewed and Mirian was able to demonstrate them prior to the end of the session.  Mirian demonstrated good  understanding of the education provided.     See EMR under Patient Instructions for exercises provided eval and 2/14/22, 2/23/22    Assessment     Pt was able to demonstrated an increase in B UE and LE strength compared to her initial  evaluation. She is still limited in her usual ADLs at home and work by her increased L knee pain, secondary to increased time on her feet at work. Her scores on  strength on R hand is not accurate due to decreased ability to  tightly with the wrappings in place. She had a slight decline in her scores for TUG, 30 second sit to stand, and single leg balance due to increased right knee pain. She would benefit from continued physical therapy to improve her strength, balance, gait, and functional mobility. She was able to complete all of today's exercises with no increase in symptoms prior to leaving the clinic. Will progress as tolerated.   Mirian Is progressing well towards her goals.   Pt prognosis is Good.     Pt will continue to benefit from skilled outpatient physical therapy to address the deficits listed in the problem list box on initial evaluation, provide pt/family education and to maximize pt's level of independence in the home and community environment.     Pt's spiritual, cultural and educational needs considered and pt agreeable to plan of care and goals.     Anticipated barriers to physical therapy: none anticipated    Goals:   Short Term Goals:  4 weeks     1. Pt will demo >/= 4/5 strength in bilateral lower extremities for improved functional mobility. (progressing, not met)  2. Pt to demonstrate increased strength in bilateral upper extremities to >/=4+/5 for improved ability to perform functional activities. (met, 6/8/22)  4. Pt. will improve single leg stance balance test score to >/=  15s to be in a moderate/low risk category for falls. (progressing, not met)  5. Pt will perform >/= 12 sit to stands in 30 seconds with no arms for improved endurance. (progressing, not met)  6. Pt will increase 6 minute walk test score by >/=  50 meters in order to increase ambulation in the community. (progressing, not met)  7. Pt will initiate home exercise program to improve strength, flexibility, endurance,  mobility & balance. (met, 3/21/22)  8. Pt will tolerate 10 min or greater of time in light-->moderate intensity cardio (I.e. Bike, Treadmill) to improve endurance to assist in performing her preferred recreational activities. (met, 3/21/22)  9. Pt will verbalize understanding of exercise recommendation guidelines for moderate intensity exercise to safely perform and progress home exercise routine. (progressing, not met)     Long Term Goals: 8 weeks  1. Pt will increase strength in bilateral lower extremities to 5/5 for improved functional mobility and tolerance for ADL and work activities. (progressing, not met)  4. Pt will be independent with home exercise program to improve range of motion, strength, balance and return to prior level of function. (progressing, not met)  5. Pt. will improve Single Leg Stance balance test score to >/=  30s for patient to be in low risk category for falls. (progressing, not met)  6. Pt will perform >/= 15 sit to stands in 30 seconds with no arms for improved endurance. (progressing, not met)  7. Pt will increase 6 minute walk test score by >/= 75 meters in order to increase community ambulation. (progressing, not met)  8. Patient will report compliance with  20 -30min  of moderate intensity exercise 5 days a week to improve overall cardiovascular function and decrease cancer related fatigue. (progressing, not met)  Plan     Plan of care Certification: 6/8/2022 to 8/8/2022.     Outpatient Physical Therapy 2 times weekly for 8 weeks to include the following interventions: Gait Training, Manual Therapy, Neuromuscular Re-ed, Orthotic Management and Training, Patient Education, Self Care, Therapeutic Activities, Therapeutic Exercise and IASTM. Dry needling with manual therapy techniques to decrease pain, inflammation and swelling, increase circulation and promote healing process.     Gail Gutierrez, PT

## 2022-06-08 NOTE — PROGRESS NOTES
Physical Therapy Daily Treatment Note       Date: 6/8/2022   Name: Phylicia Vásquez  Clinic Number: [id number]    Therapy Diagnosis:   Encounter Diagnoses   Name Primary?    Lymphedema of right arm Yes    Impaired functional mobility and activity tolerance      Physician: Negrito Verde MD    Physician Orders: PT Eval and Treat   Medical Diagnosis: Carcinoma of axillary tail of right breast in female, estrogen receptor positive [C50.611, Z17.0], Localized edema      Evaluation Date: 6/3/2022  Authorization Period Expiration: 12/31/22  Plan of Care Certification Period: 8/26/22 (12 weeks)  Visit # / Visits authorized: 1/ 20 (plus eval)  Insurance: [employer]     Time In: 1:02 PM  Time Out: 2:14 PM  Total Billable Time: 72 minutes     Precautions: Standard, cancer and Right UE lymphedema      Subjective     Pt reports: the glove and tubi  help a little, has been getting occasional shooting pains from R elbow to her fingers  She was not issued a HEP   Response to previous treatment: no adverse reaction  Pain Scale: Phylicia rates pain on a scale of 0/10 on VAS.   Pain location: N/A     Fatigue: not assessed  Functional change: none stated  Treatment:   Chemotherapy:  4 cycles of AC and 4 cycles of Taxol patrick adjuvantly  Radiation: completed radiation therapy on 8/20/20.   Endocrine therapy: On 11/1/2020 started on Anastrozole with with Dr. Verde     Surgery date: Pt is s/p bilateral mastectomy with R SLNB, TE reconstruction in 4/2021. pt will have permanent reconstruction with ROWAN flap, but she states she needs to lose weight first.      Objective     Objective Measures updated at progress report unless specified.     Pt arrives wearing tubigrip and copper fit compression glove on RUE.  Pt observe and reports having some R breast swelling.    Treatment     Phylicia received individual therapeutic exercises to improve postural correction and alignment,  stretching and soft tissue mobility, and strengthening for 8 minutes including the following:   Diaphragmatic breathing, 2 x 5 reps  Scapular retractions x 10   Chicken wings x 10    Pt was educated on performing hand pumps, wrist flex/ext, and bicep curls t/o the day while wearing compression bandages    Phylicia received the following manual therapy techniques were performed to increased myofascial/soft tissue length, mobility and pliability, increase PROM, AROM and function as well as to decrease pain for 64 minutes    Short Neck- held due to thyroid goiter  Clear Healthy Lymph Nodes:  Left Axilla                                                  Right  Groin  Open Anastomoses:  Anterior Axillo-Axillary  (AAA)                                    Axillo-Inguinal     (AI)                                    Posterior Axillo-Axillary  (LEEANN)     Right Upper Arm (Lateral and Medial)  Right Antecubital Fossa  Right forearm, dorsal and ventral aspect  Dorsum of R hand  R fingers    Rework R hand   Rework Right UE  Rework Anastomoses  Rework Healthy lymph Nodes    Pt educated on breast massage techniques.    Pt was educated on steps 3-6 of self manual lymph drainage.    PT applies gauze and stockinette, cotton artiflex, and short stretch bandages to pt's Right upper extremity. Pt educated to wear bandaging for next 2-3 days unless it causes pain, numbness, or changes in skin color/temperature.  If removed, pt instructed to roll up bandages and cotton padding and bring to next session. If bandages are removed, pt can wear tubigrip and copper fit glove.  Pt provided with Vet glove to cover bandages in the shower and directions on how to care for bandages. Pt verbalizes understanding of all topics.          Home Exercise Program and Patient Education   Education provided re:  - role of PT in multi - disciplinary team, goals for PT  - progress towards goals   - role of complete decongestive therapy.  -exercise  technique    Written Home Exercises Provided: yes.  Exercises were reviewed and Mirian was able to demonstrate them prior to the end of the session.  Mirian demonstrated good  understanding of the education provided.     See EMR under Media for exercises provided 6/8/2022- for self manual lymph drainage techniques.    Pt has no cultural, educational or language barriers to learning provided.    Assessment     Patient tolerated session well. She presents with lymphedema of RUE and hand, and she also appears to have truncal swelling in breast and axilla. Pt tolerates complete decongestive therapy well.    Pt prognosis is Good. Pt will continue to benefit from skilled outpatient physical therapy to address the deficits listed in the problem list chart on initial evaluation, provide pt/family education and to maximize pt's level of independence in the home and community environment.     Anticipated barriers to physical therapy: none anticipated    Goals as follows:  Short Term Goals (6 weeks)  1. Pt will demo decrease in girth in right upper extremity by >/= 1cm to allow for improved UE symmetry, clothing choice, ROM, and UE function. (progressing, not met)  2. Patient will demonstrate 100% knowledge of lymphedema precautions and signs of infection to allow for reduced risk of lymphedema, reduced risk of infection, and/or exacerbation.  (progressing, not met)  3. Patient will perform self lymph drainage techniques to enhance lymphatic drainage and mobility.  (progressing, not met)  4. Patient will tolerate daily activities with multilayered bandaging to enhance lymphatic drainage and skin elasticity.  (progressing, not met)  5. Pt will tolerate HEP for improved strength, functional mobility, ROM, posture, and endurance. (progressing, not met)        Long Term Goals: ( 12  weeks)  1. Patient to show decreased girth in Right upper extremity by >/= 2cm to allow for improved UE symmetry, clothing choice, ROM, and UE function.   (progressing, not met)  2. Patient will show reduction in density to mild or less with improved contour of limb to allow for improved cosmesis, improved lymphatic drainage, and functional mobility.  (progressing, not met)  3. Patient to mary/doff compression garment with daily compliance to support lymphatic mobility and skin elasticity.  (progressing, not met)  4. Pt to show improved postural awareness and alignment for improved functional mobility.  (progressing, not met)  5. Pt to be independent and compliant with HEP to allow for improved ROM, reach and carry with functional endurance and strength.    (progressing, not met)           Plan   Outpatient physical therapy 2x week for 12 weeks to include the following:   Manual Therapy, Neuromuscular Re-ed, Orthotic Management and Training, Patient Education, Self Care, Therapeutic Activities and Therapeutic Exercise.     Plan of care Certification Period: 6/3/2022 to 8/26/22.       Therapist: Marcela Price, PT  6/8/2022

## 2022-06-09 ENCOUNTER — OFFICE VISIT (OUTPATIENT)
Dept: HEMATOLOGY/ONCOLOGY | Facility: CLINIC | Age: 52
End: 2022-06-09
Payer: COMMERCIAL

## 2022-06-09 VITALS
HEART RATE: 74 BPM | DIASTOLIC BLOOD PRESSURE: 83 MMHG | BODY MASS INDEX: 42.11 KG/M2 | HEIGHT: 63 IN | SYSTOLIC BLOOD PRESSURE: 144 MMHG | WEIGHT: 237.63 LBS

## 2022-06-09 DIAGNOSIS — E66.01 CLASS 3 SEVERE OBESITY WITH BODY MASS INDEX (BMI) OF 40.0 TO 44.9 IN ADULT, UNSPECIFIED OBESITY TYPE, UNSPECIFIED WHETHER SERIOUS COMORBIDITY PRESENT: ICD-10-CM

## 2022-06-09 DIAGNOSIS — C50.611 CARCINOMA OF AXILLARY TAIL OF RIGHT BREAST IN FEMALE, ESTROGEN RECEPTOR POSITIVE: Primary | ICD-10-CM

## 2022-06-09 DIAGNOSIS — Z17.0 CARCINOMA OF AXILLARY TAIL OF RIGHT BREAST IN FEMALE, ESTROGEN RECEPTOR POSITIVE: Primary | ICD-10-CM

## 2022-06-09 PROCEDURE — 3008F BODY MASS INDEX DOCD: CPT | Mod: CPTII,S$GLB,, | Performed by: PHYSICIAN ASSISTANT

## 2022-06-09 PROCEDURE — 99213 OFFICE O/P EST LOW 20 MIN: CPT | Mod: S$GLB,,, | Performed by: PHYSICIAN ASSISTANT

## 2022-06-09 PROCEDURE — 1159F MED LIST DOCD IN RCRD: CPT | Mod: CPTII,S$GLB,, | Performed by: PHYSICIAN ASSISTANT

## 2022-06-09 PROCEDURE — 1159F PR MEDICATION LIST DOCUMENTED IN MEDICAL RECORD: ICD-10-PCS | Mod: CPTII,S$GLB,, | Performed by: PHYSICIAN ASSISTANT

## 2022-06-09 PROCEDURE — 99213 PR OFFICE/OUTPT VISIT, EST, LEVL III, 20-29 MIN: ICD-10-PCS | Mod: S$GLB,,, | Performed by: PHYSICIAN ASSISTANT

## 2022-06-09 PROCEDURE — 3008F PR BODY MASS INDEX (BMI) DOCUMENTED: ICD-10-PCS | Mod: CPTII,S$GLB,, | Performed by: PHYSICIAN ASSISTANT

## 2022-06-09 PROCEDURE — 99999 PR PBB SHADOW E&M-EST. PATIENT-LVL III: ICD-10-PCS | Mod: PBBFAC,,, | Performed by: PHYSICIAN ASSISTANT

## 2022-06-09 PROCEDURE — 3079F PR MOST RECENT DIASTOLIC BLOOD PRESSURE 80-89 MM HG: ICD-10-PCS | Mod: CPTII,S$GLB,, | Performed by: PHYSICIAN ASSISTANT

## 2022-06-09 PROCEDURE — 99999 PR PBB SHADOW E&M-EST. PATIENT-LVL III: CPT | Mod: PBBFAC,,, | Performed by: PHYSICIAN ASSISTANT

## 2022-06-09 PROCEDURE — 3079F DIAST BP 80-89 MM HG: CPT | Mod: CPTII,S$GLB,, | Performed by: PHYSICIAN ASSISTANT

## 2022-06-09 PROCEDURE — 4010F ACE/ARB THERAPY RXD/TAKEN: CPT | Mod: CPTII,S$GLB,, | Performed by: PHYSICIAN ASSISTANT

## 2022-06-09 PROCEDURE — 4010F PR ACE/ARB THEARPY RXD/TAKEN: ICD-10-PCS | Mod: CPTII,S$GLB,, | Performed by: PHYSICIAN ASSISTANT

## 2022-06-09 PROCEDURE — 1160F RVW MEDS BY RX/DR IN RCRD: CPT | Mod: CPTII,S$GLB,, | Performed by: PHYSICIAN ASSISTANT

## 2022-06-09 PROCEDURE — 3077F PR MOST RECENT SYSTOLIC BLOOD PRESSURE >= 140 MM HG: ICD-10-PCS | Mod: CPTII,S$GLB,, | Performed by: PHYSICIAN ASSISTANT

## 2022-06-09 PROCEDURE — 1160F PR REVIEW ALL MEDS BY PRESCRIBER/CLIN PHARMACIST DOCUMENTED: ICD-10-PCS | Mod: CPTII,S$GLB,, | Performed by: PHYSICIAN ASSISTANT

## 2022-06-09 PROCEDURE — 3077F SYST BP >= 140 MM HG: CPT | Mod: CPTII,S$GLB,, | Performed by: PHYSICIAN ASSISTANT

## 2022-06-09 NOTE — PROGRESS NOTES
Subjective:      Phylicia Vásquez is a 52 y.o. female who presents for 3 month follow up for the [facility]. Hx of ER+/MD+/HER- right breast cancer and currently taking arimidex. 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 Became sick last night with acute bronchitis. Treated with oral steroids and developed RUE lymphedema. Working with physical therapy and has wrapped today which has helping. Realizing that she needs to take better care of herself. Restarted exercising at the park with her sister and niece. Found a great [employer] with track and work out machines outside. Has not been logging foods or weighing in.     Past Medical History:   Diagnosis Date    Anxiety     Back pain     Goiter     HTN (hypertension) 10/18/2012    Hx antineoplastic chemo     last dose 20    Hx of psychiatric care     Ambien, Klonopin    Insomnia 10/18/2012    Malignant neoplasm of axillary tail of right breast in female, estrogen receptor positive 2019    right    Neck mass     right posterior    Primary invasive malignant neoplasm of female breast, right 2019    right    Psychiatric problem     S/P abdominal supracervical subtotal hysterectomy, continues to have regular menses. 2014    Spinal cord cyst, L1      Thoracic radiculopathy, bulging disc at T10-11 and T11-12      Past Surgical History:   Procedure Laterality Date    AXILLARY NODE DISSECTION Right 2020    Procedure: LYMPHADENECTOMY, AXILLARY;  Surgeon: Lelo Guaman MD;  [address];  Service: General;  Laterality: Right;    BREAST BIOPSY Right     + CA    BREAST BIOPSY Left     BREAST RECONSTRUCTION Bilateral 2021     SECTION      CHOLECYSTECTOMY      COLONOSCOPY N/A 10/05/2015    Procedure: COLONOSCOPY;  Surgeon: JERONIMO Cancino MD;  Location: [facility] (4TH FLR);  Service: Endoscopy;  Laterality: N/A;    COLONOSCOPY N/A 2021    Procedure:  COLONOSCOPY;  Surgeon: JERONIMO Cancino MD;  Location: [facility] ENDO (4TH FLR);  Service: Endoscopy;  Laterality: N/A;  fully vaccinated-GT    HYSTERECTOMY  2007    BC--SUPRACERVICAL    INJECTION FOR SENTINEL NODE IDENTIFICATION Right 05/20/2020    Procedure: INJECTION, FOR SENTINEL NODE IDENTIFICATION;  Surgeon: Lelo Guaman MD;  Location: [facility];  Service: General;  Laterality: Right;    INSERTION OF BREAST TISSUE EXPANDER Right 05/20/2020    Procedure: INSERTION, TISSUE EXPANDER, BREAST;  Surgeon: Pablo Ramos MD;  Location: [facility];  Service: Plastics;  Laterality: Right;    INSERTION OF TUNNELED CENTRAL VENOUS CATHETER (CVC) WITH SUBCUTANEOUS PORT Right 11/21/2019    Procedure: [id number] Fluoro needed;  Surgeon: Lelo Guaman MD;  Location: [facility] OR 2ND FLR;  Service: General;  Laterality: Right;  Right internal jugular.     LIPOMA RESECTION  12/2021    MASTECTOMY Bilateral 04/2021    with reconstruction    NEEDLE LOCALIZATION Left 05/31/2021    Procedure: NEEDLE LOCALIZATION;  Surgeon: Kraig Mello MD;  Location: [facility] CATH LAB;  Service: Radiology;  Laterality: Left;    RECONSTRUCTION OF BREAST WITH DEEP INFERIOR EPIGASTRIC ARTERY  (ROWAN) FREE FLAP Bilateral 04/21/2021    Procedure: RECONSTRUCTION, BREAST, USING ROWAN FREE FLAP;  Surgeon: Pablo Ramos MD;  Location: [facility];  Service: Plastics;  Laterality: Bilateral;    SENTINEL LYMPH NODE BIOPSY Right 05/20/2020    Procedure: BIOPSY, LYMPH NODE, SENTINEL;  Surgeon: Lelo Guaman MD;  Location: [facility];  Service: General;  Laterality: Right;    TISSUE EXPANDER REMOVAL Right 04/21/2021    Procedure: REMOVAL, TISSUE EXPANDER;  Surgeon: Pablo Ramos MD;  Location: [facility];  Service: Plastics;  Laterality: Right;    UNILATERAL MASTECTOMY Right 05/20/2020    Procedure: MASTECTOMY, UNILATERAL;  Surgeon: Lelo Guaman MD;  Location: [facility];  Service: General;  Laterality: Right;    UNILATERAL MASTECTOMY Left  2021    Procedure: MASTECTOMY, UNILATERAL PROPHYLACTIC;  Surgeon: Lelo Guaman MD;  Location: [facility];  Service: General;  Laterality: Left;     Social History     Tobacco Use    Smoking status: Never Smoker    Smokeless tobacco: Never Used   Substance Use Topics    Alcohol use: Yes     Comment: rarely    Drug use: No     Family History   Problem Relation Age of Onset    Cancer Paternal Aunt     Lupus Paternal Aunt     Hypertension Mother     Depression Mother     Liver disease Father     Alcohol abuse Father     Cancer Father         Lung and prostate cancer    Colon cancer Paternal Uncle     Depression Son     Breast cancer Neg Hx     Ovarian cancer Neg Hx     Melanoma Neg Hx     Psoriasis Neg Hx     Eczema Neg Hx      OB History    Para Term  AB Living   4 2 1 1 2 2   SAB IAB Ectopic Multiple Live Births   2 0 0 0 2      # Outcome Date GA Lbr Saul/2nd Weight Sex Delivery Anes PTL Lv   4      M Vag-Spont  Y IRA      Complications: Fetal distress affecting labor   3 Term     M CS-Unspec  N IRA      Complications: Fetal distress affecting labor   2 SAB            1 SAB                Current Outpatient Medications:     albuterol (PROVENTIL/VENTOLIN HFA) 90 mcg/actuation inhaler, Inhale 1-2 puffs into the lungs every 6 (six) hours as needed for Wheezing. Rescue, Disp: 18 g, Rfl: 0    amlodipine-valsartan (EXFORGE) 5-320 mg per tablet, Take 1 tablet by mouth once daily., Disp: 90 tablet, Rfl: 3    anastrozole (ARIMIDEX) 1 mg Tab, Take 1 tablet (1 mg total) by mouth once daily. (Patient taking differently: Take 1 mg by mouth every morning.), Disp: 90 tablet, Rfl: 3    ascorbic acid (VITAMIN C ORAL), Take 500 mg by mouth every morning. , Disp: , Rfl:     cyanocobalamin, vitamin B-12, (VITAMIN B-12 ORAL), Take by mouth daily as needed. , Disp: , Rfl:     cyclobenzaprine (FLEXERIL) 10 MG tablet, Take 1 tablet (10 mg total) by mouth 3 (three) times daily as needed for  "Muscle spasms., Disp: 30 tablet, Rfl: 3    gabapentin (NEURONTIN) 100 MG capsule, Take 2 capsules (200 mg total) by mouth every morning. Take in the morning for neuropathy, Disp: 180 capsule, Rfl: 3    venlafaxine (EFFEXOR XR) 75 MG 24 hr capsule, Take 1 capsule (75 mg total) by mouth once daily. In the morning, Disp: 30 capsule, Rfl: 11    vitamin E 200 UNIT capsule, Take 200 Units by mouth daily as needed. , Disp: , Rfl:     zolpidem (AMBIEN) 10 mg Tab, Take 1 tablet (10 mg total) by mouth nightly as needed (Sleep)., Disp: 30 tablet, Rfl: 2    Review of Systems:  General: No fever, chills.  Chest: No chest pain, shortness of breath, or palpitations.  Breast: No pain, masses, or nipple discharge.  Vulva: No pain, lesions, or itching.  Vagina: No relaxation, itching, discharge, or lesions.  Abdomen: No pain, nausea, vomiting, diarrhea, or constipation.  Urinary: No incontinence, nocturia, frequency, or dysuria.  Extremities:  No leg cramps, edema, or calf pain.  Neurologic: No headaches, dizziness, or visual changes.    Objective:     Vitals:    06/09/22 1053   BP: (!) 144/83   Pulse: 74   Weight: 107.8 kg (237 lb 10.5 oz)   Height: 5' 3" (1.6 m)   PainSc: 0-No pain     Body mass index is 42.1 kg/m².  WAIST CIRCUMFERENCE: 117cm    PHYSICAL EXAM:  APPEARANCE: Well nourished, well developed, in no acute distress.    Assessment:    Carcinoma of axillary tail of right breast in female, estrogen receptor positive    Class 3 severe obesity with body mass index (BMI) of 40.0 to 44.9 in adult, unspecified obesity type, unspecified whether serious comorbidity present    She is down 4lbs and 2cm around the waist.      Plan:   Goal of 10,000 steps daily.  Food diary daily in the [employer].  Goal of 1513 calories per day.  Weight via scale once a week.  Follow up with Physical therapy as scheduled.  Follow up in 3 months at completion of the program.  Instructed patient to call if she experiences any side effects or has " any questions.    I spent a total of 25 minutes on the day of the visit.This includes face to face time and non-face to face time preparing to see the patient (eg, review of tests), obtaining and/or reviewing separately obtained history, documenting clinical information in the electronic or other health record, independently interpreting results and communicating results to the patient/family/caregiver, or care coordinator.

## 2022-06-21 ENCOUNTER — PATIENT MESSAGE (OUTPATIENT)
Dept: PSYCHIATRY | Facility: CLINIC | Age: 52
End: 2022-06-21
Payer: COMMERCIAL

## 2022-06-22 ENCOUNTER — CLINICAL SUPPORT (OUTPATIENT)
Dept: REHABILITATION | Facility: HOSPITAL | Age: 52
End: 2022-06-22
Payer: COMMERCIAL

## 2022-06-22 ENCOUNTER — OFFICE VISIT (OUTPATIENT)
Dept: PSYCHIATRY | Facility: CLINIC | Age: 52
End: 2022-06-22
Payer: COMMERCIAL

## 2022-06-22 DIAGNOSIS — I89.0 LYMPHEDEMA OF RIGHT ARM: Primary | ICD-10-CM

## 2022-06-22 DIAGNOSIS — Z74.09 IMPAIRED FUNCTIONAL MOBILITY AND ACTIVITY TOLERANCE: ICD-10-CM

## 2022-06-22 DIAGNOSIS — C50.611 MALIGNANT NEOPLASM OF AXILLARY TAIL OF RIGHT FEMALE BREAST, UNSPECIFIED ESTROGEN RECEPTOR STATUS: ICD-10-CM

## 2022-06-22 PROCEDURE — 4010F PR ACE/ARB THEARPY RXD/TAKEN: ICD-10-PCS | Mod: CPTII,95,, | Performed by: PSYCHOLOGIST

## 2022-06-22 PROCEDURE — 4010F ACE/ARB THERAPY RXD/TAKEN: CPT | Mod: CPTII,95,, | Performed by: PSYCHOLOGIST

## 2022-06-22 PROCEDURE — 96159 PR INTERVENTION, HEALTH BEHAVIOR, INDIV, EA ADDTL 15 MINS: ICD-10-PCS | Mod: 95,,, | Performed by: PSYCHOLOGIST

## 2022-06-22 PROCEDURE — 96159 HLTH BHV IVNTJ INDIV EA ADDL: CPT | Mod: 95,,, | Performed by: PSYCHOLOGIST

## 2022-06-22 PROCEDURE — 96158 HLTH BHV IVNTJ INDIV 1ST 30: CPT | Mod: 95,,, | Performed by: PSYCHOLOGIST

## 2022-06-22 PROCEDURE — 96158 PR INTERVENTION, HEALTH BEHAVIOR, INDIV, 1ST 30 MINS: ICD-10-PCS | Mod: 95,,, | Performed by: PSYCHOLOGIST

## 2022-06-22 PROCEDURE — 97140 MANUAL THERAPY 1/> REGIONS: CPT

## 2022-06-22 NOTE — PROGRESS NOTES
"                                                    Physical Therapy Daily Treatment Note       Date: 6/22/2022   Name: Phylicia Vásquez  Clinic Number: [id number]    Therapy Diagnosis:   Encounter Diagnoses   Name Primary?    Lymphedema of right arm Yes    Impaired functional mobility and activity tolerance      Physician: Negrito Verde MD    Physician Orders: PT Eval and Treat   Medical Diagnosis: Carcinoma of axillary tail of right breast in female, estrogen receptor positive [C50.611, Z17.0], Localized edema      Evaluation Date: 6/3/2022  Authorization Period Expiration: 12/31/22  Plan of Care Certification Period: 8/26/22 (12 weeks)  Visit # / Visits authorized: 2/ 20 (plus eval)  Insurance: [employer]     Time In: 4:07 PM  Time Out: 5:15 PM  Total Billable Time: 68 minutes     Precautions: Standard, cancer and Right UE lymphedema      Subjective     Pt reports: She had a lot of throbbing pain in her RUE and hand since work last night. No warmth, tenderness, redness, or change in color noted. Pt had flexi-touch pump trial prior to PT, and she states her arm feels better following use of pump.  She was compliant with self massage technique   Response to previous treatment: no adverse reaction  Pain Scale: Phylicia rates pain on a scale of 5/10 on VAS.   Pain location: N/A     Fatigue: not assessed  Functional change: none stated  Treatment:   Chemotherapy:  4 cycles of AC and 4 cycles of Taxol patrick adjuvantly  Radiation: completed radiation therapy on 8/20/20.   Endocrine therapy: On 11/1/2020 started on Anastrozole with with Dr. Verde     Surgery date: Pt is s/p bilateral mastectomy with R SLNB, TE reconstruction in 4/2021. pt will have permanent reconstruction with ROWAN flap, but she states she needs to lose weight first.      Objective     Objective Measures updated at progress report unless specified.     LANDMARK RIGHT UE LEFT UE DIFF RUE Diff   Date 6/3/22 6/3/22 6/3/22 6/22/22 6/22/22   E + 8" 47 " "cm 46.5 cm 0.5 cm 46.5cm -0.5   E + 6" 44 cm 42.5 cm 1.5 cm 44 cm No chg   E + 4" 39.5 cm 37 cm 2.5 cm 39cm -0.5   E + 2" 34.5 cm 33 cm 1.5 cm 34.5 cm No chg   Elbow 28 cm 28 cm 0 cm 27 cm -1   W+ 8" 29 cm  28.5cm 0.5 cm 28cm -1   W +  6" 29.5 cm 27 cm 2.5 cm 29cm -0.5   W + 4" 25.5 cm 23.5 cm 2 cm 25cm -0.5   Wrist 18 cm 17.5 cm 0.5 cm 18.5cm +0.5   DPC 21.5 cm 20 cm 1.5 cm 20cm -1.5   IP Thumb 7.5 cm 7 cm 0.5 cm 8cm +0.5          Treatment         Phylicia received the following manual therapy techniques were performed to increased myofascial/soft tissue length, mobility and pliability, increase PROM, AROM and function as well as to decrease pain for 68 minutes    Short Neck- held due to thyroid goiter  Clear Healthy Lymph Nodes:  Left Axilla                                                  Right  Groin  Open Anastomoses:  Anterior Axillo-Axillary  (AAA)                                    Axillo-Inguinal     (AI)                                    Posterior Axillo-Axillary  (LEEANN)     Right Upper Arm (Lateral and Medial)  Right Antecubital Fossa  Right forearm, dorsal and ventral aspect  Dorsum of R hand  R fingers    Rework R hand   Rework Right UE  Rework Anastomoses  Rework Healthy lymph Nodes    Diaphragmatic breathing, 2 x 5 reps      Therapist reviews steps 3-6 and taught steps 7-12 of self manual lymph drainage. Pt demo's good technique, but may need review. Pt has handout at home, and it is in Media section.    PT applies gauze to Right fingers and hand and stockinette, cotton artiflex, and short stretch bandages to pt's Right upper extremity. Pt educated to wear bandaging for next 2-3 days unless it causes pain, numbness, or changes in skin color/temperature.  If removed, pt instructed to roll up bandages and cotton padding and bring to next session. If bandages are removed, pt can wear tubigrip and copper fit glove.  Pt provided with Vet glove to cover bandages in the shower and directions on how to care " for bandages. Pt verbalizes understanding of all topics.          Home Exercise Program and Patient Education   Education provided re:  - role of PT in multi - disciplinary team, goals for PT  - progress towards goals   - role of complete decongestive therapy.  -self manual lymph drainage techniques    Written Home Exercises Provided: Patient instructed to cont prior HEP.  Exercises were reviewed and Mirian was able to demonstrate them prior to the end of the session.  Mirian demonstrated good  understanding of the education provided.     See EMR under Media for exercises provided 6/8/2022- for self manual lymph drainage techniques.    Pt has no cultural, educational or language barriers to learning provided.    Assessment     Patient tolerated session well. Ms. Vásquez has been compliant with compression wrapping, elevation and a home exercise program  however her symptoms persist. Pt had compression pump trial prior to today's session. Due to the patients pain levels and sensitivity, she was not able to tolerate the static compression applied by a basic pump. An advanced pump that sequentially inflates and deflates one chamber at a time was a more appropriate treatment option for her.  Pt reports less pain in RUE and axilla following use of compression pump.  She is responding well to complete decongestive therapy, and overall she demo's a reduction in RUE circumference.  She demo's good technique with self manual lymph drainage, but needs some cues for technique and may need review. Will progress as tolerated.    Pt prognosis is Good. Pt will continue to benefit from skilled outpatient physical therapy to address the deficits listed in the problem list chart on initial evaluation, provide pt/family education and to maximize pt's level of independence in the home and community environment.     Anticipated barriers to physical therapy: none anticipated    Goals as follows:  Short Term Goals (6 weeks)  1. Pt will demo  decrease in girth in right upper extremity by >/= 1cm to allow for improved UE symmetry, clothing choice, ROM, and UE function. (progressing, not met)  2. Patient will demonstrate 100% knowledge of lymphedema precautions and signs of infection to allow for reduced risk of lymphedema, reduced risk of infection, and/or exacerbation.  (progressing, not met)  3. Patient will perform self lymph drainage techniques to enhance lymphatic drainage and mobility.  (progressing, not met)  4. Patient will tolerate daily activities with multilayered bandaging to enhance lymphatic drainage and skin elasticity.  (progressing, not met)  5. Pt will tolerate HEP for improved strength, functional mobility, ROM, posture, and endurance. (progressing, not met)        Long Term Goals: ( 12  weeks)  1. Patient to show decreased girth in Right upper extremity by >/= 2cm to allow for improved UE symmetry, clothing choice, ROM, and UE function.  (progressing, not met)  2. Patient will show reduction in density to mild or less with improved contour of limb to allow for improved cosmesis, improved lymphatic drainage, and functional mobility.  (progressing, not met)  3. Patient to mary/doff compression garment with daily compliance to support lymphatic mobility and skin elasticity.  (progressing, not met)  4. Pt to show improved postural awareness and alignment for improved functional mobility.  (progressing, not met)  5. Pt to be independent and compliant with HEP to allow for improved ROM, reach and carry with functional endurance and strength.    (progressing, not met)           Plan   Outpatient physical therapy 2x week for 12 weeks to include the following:   Manual Therapy, Neuromuscular Re-ed, Orthotic Management and Training, Patient Education, Self Care, Therapeutic Activities and Therapeutic Exercise.     Plan of care Certification Period: 6/3/2022 to 8/26/22.       Therapist: Marcela Price, PT  6/22/2022

## 2022-06-22 NOTE — PROGRESS NOTES
Telemedicine PSYCHO-ONCOLOGY NOTE/ Individual Psychotherapy   (patient not on premises due to COVID-19 restrictions)    Consultation started: 6/22/2022 at 2:02 PM   The chief complaint leading to consultation is: weight loss  The patient location is:  Patient home in [place], [place]  Virtual visit with synchronous audio and video    Patient alone at the time of consultation      Each patient provided medical services by telemedicine is:  (1) informed of the relationship between the physician and patient and the respective role of any other health care provider with respect to management of the patient; and (2) notified that he or she may decline to receive medical services by telemedicine and may withdraw from such care at any time.    Crisis Disclaimer: Patient was informed that due to the virtual nature of the visit, that if a crisis develops, protocols will be implemented to ensure patient safety, including but not limited to: 1) Initiating a welfare check with local Law Enforcement, 2) Calling 1/National Crisis Hotline, and/or 3) Initiating PEC/CEC procedures.     Date: 6/22/2022   Site of therapist:  [facility]         Therapeutic Intervention: Met with patient.  Outpatient - Health and Behavior Intervention, Individual 45 min - CPT Code 93767    Health and Behavior Intervention Note -Psychologist       Phylicia Vásquez arrived on time for the session. Patient was appropriately dressed and groomed. Phylicia Vásquez  appeared alert and oriented in all spheres. No suicidal or homicidal ideation was reported.  Phylicia Vásquez actively participated in the session and provided both prompted and unprompted contribution to the discussion.      Content of session:    Group 4: ABCs of behavior: thoughts, feelings, and social influences on eating behavior  Introduced to Model of ABCs of behavior and uncovered link between thoughts, feeling, and behaviors on weight management  Reviewed unhelpful  automatic thoughts related to weight and wellness  Developed Helpful thinking styles to replace/balance unhelpful thinking  Revisited SMART Goals    Encouraged mindfulness/relaxation trial prior to next visit  Goals for next visit: Complete Weight related thought record        Patient's response to intervention:The patient's response to intervention is accepting. Patient describes changes in her eating at work that have supported weight loss.     Behavior: Interested and Engaged    Insight: good     Progress toward goals:Progress as Expected      Goals from last visit: Attempted, partially met (barriers explored)    Treatment Plan: Continue group participation  · Target symptoms: weight loss   · Why chosen therapy is appropriate versus another modality: relevant to diagnosis, evidence based practice  · Outcome monitoring methods: checklist/rating scale, weight monitoring  · Return to clinic: 1 month         Distress Score    Distress Score: (P) 3        Practical Problems Physical Problems   : (P) No Appearance: (P) Yes   Housing: (P) No Bathing / Dressing: (P) No   Insurance / Financial: (P) No Breathing: (P) No    Transportation: (P) Yes  Changes in Urination: (P) No    Work / School: (P) No  Constipation: (P) No   Treatment Decisions: (P) No  Diarrhea: (P) No     Eating: (P) No    Family Problems Fatigue: (P) No    Dealing with Children: (P) No Feeling Swollen: (P) No    Dealing with Partner: (P) No Fevers: (P) No    Ability to Have Children: (P) No  Getting Around: (P) Yes       Indigestion: (P) No     Memory / Concentration: (P) No   Emotional Problems Mouth Sores: (P) No    Depression: (P) No  Nausea: (P) Yes    Fears: (P) Yes  Nose Dry / Congested: (P) Yes    Nervousness: (P) Yes  Pain: (P) Yes    Sadness: (P) No Sexual: (P) No    Worry: (P) Yes Skin Dry / Itchy: (P) Yes    Loss of Interest in Usual Activities: (P) No Sleep: (P) Yes     Substance Abuse: (P) No    Spiritual/Religions Concerns  Tingling in Hands / Feet: (P) Yes   Spritual / Restorationist Concerns: (P) No         Other Problems              PHQ-9=  Initial visit: 11    PHQ ANSWERS    Q1. Little interest or pleasure in doing things: Not at all (06/22/22 1404)  Q2. Feeling down, depressed, or hopeless: Not at all (06/22/22 1404)  Q3. Trouble falling or staying asleep, or sleeping too much: Not at all (06/22/22 1404)  Q4. Feeling tired or having little energy: Not at all (06/22/22 1404)  Q5. Poor appetite or overeating: Not at all (06/22/22 1404)  Q6. Feeling bad about yourself - or that you are a failure or have let yourself or your family down: Not at all (06/22/22 1404)  Q7. Trouble concentrating on things, such as reading the newspaper or watching television: Not at all (06/22/22 1404)  Q8. Moving or speaking so slowly that other people could have noticed. Or the opposite - being so fidgety or restless that you have been moving around a lot more than usual: Not at all (06/22/22 1404)  Q9. Thoughts that you would be better off dead, or of hurting yourself in some way: Not at all (06/22/22 1404)    PHQ8 Score : 0 (06/22/22 1404)  PHQ-9 Total Score: 0 (06/22/22 1404)        BRAD-7= Initial visit: 10     GAD7 6/22/2022   1. Feeling nervous, anxious, or on edge? 0   2. Not being able to stop or control worrying? 0   3. Worrying too much about different things? 0   4. Trouble relaxing? 0   5. Being so restless that it is hard to sit still? 0   6. Becoming easily annoyed or irritable? 0   7. Feeling afraid as if something awful might happen? 0   BRAD-7 Score 0         Length of Service (minutes direct face-to-face contact): 45      ICD-10-CM ICD-9-CM   1. BMI 40.0-44.9, adult  Z68.41 V85.41   2. Malignant neoplasm of axillary tail of right female breast, unspecified estrogen receptor status  C50.611 174.6         Cem Hilton, PhD  [place] License #[id number]

## 2022-06-29 ENCOUNTER — CLINICAL SUPPORT (OUTPATIENT)
Dept: REHABILITATION | Facility: HOSPITAL | Age: 52
End: 2022-06-29
Attending: INTERNAL MEDICINE
Payer: COMMERCIAL

## 2022-06-29 DIAGNOSIS — R26.89 BALANCE PROBLEM: ICD-10-CM

## 2022-06-29 DIAGNOSIS — I89.0 LYMPHEDEMA OF RIGHT ARM: Primary | ICD-10-CM

## 2022-06-29 DIAGNOSIS — Z74.09 IMPAIRED FUNCTIONAL MOBILITY AND ACTIVITY TOLERANCE: ICD-10-CM

## 2022-06-29 DIAGNOSIS — R26.89 ANTALGIC GAIT: ICD-10-CM

## 2022-06-29 DIAGNOSIS — R29.898 WEAKNESS OF BOTH LOWER EXTREMITIES: Primary | ICD-10-CM

## 2022-06-29 PROCEDURE — 97110 THERAPEUTIC EXERCISES: CPT

## 2022-06-29 PROCEDURE — 97140 MANUAL THERAPY 1/> REGIONS: CPT

## 2022-06-29 NOTE — PROGRESS NOTES
"                                                    Physical Therapy Daily Treatment Note       Date: 6/29/2022   Name: Phylicia Vásquez  Clinic Number: [id number]    Therapy Diagnosis:   Encounter Diagnoses   Name Primary?    Lymphedema of right arm Yes    Impaired functional mobility and activity tolerance      Physician: Negrito Verde MD    Physician Orders: PT Eval and Treat   Medical Diagnosis: Carcinoma of axillary tail of right breast in female, estrogen receptor positive [C50.611, Z17.0], Localized edema      Evaluation Date: 6/3/2022  Authorization Period Expiration: 12/31/22  Plan of Care Certification Period: 8/26/22 (12 weeks)  Visit # / Visits authorized: 3/ 20 (plus eval)  Insurance: [employer]     Time In: 4:03 PM  Time Out: 5:05 PM  Total Billable Time: 62 minutes     Precautions: Standard, cancer and Right UE lymphedema      Subjective     Pt reports: Endorses heaviness in her RUE. Denies pain. She has been doing self manual lymph drainage  She was compliant with self massage technique   Response to previous treatment: no adverse reaction  Pain Scale: Phylicia rates pain on a scale of 0/10 on VAS.   Pain location: N/A     Fatigue: not assessed  Functional change: none stated  Treatment:   Chemotherapy:  4 cycles of AC and 4 cycles of Taxol patrick adjuvantly  Radiation: completed radiation therapy on 8/20/20.   Endocrine therapy: On 11/1/2020 started on Anastrozole with with Dr. Verde     Surgery date: Pt is s/p bilateral mastectomy with R SLNB, TE reconstruction in 4/2021. pt will have permanent reconstruction with ROWAN flap, but she states she needs to lose weight first.      Objective     Objective Measures updated at progress report unless specified.     LANDMARK RIGHT UE LEFT UE DIFF RUE Diff RUE Diff   Date 6/3/22 6/3/22 6/3/22 6/22/22 6/22/22 6/29/22 6/29/22   E + 8" 47 cm 46.5 cm 0.5 cm 46.5cm -0.5 44cm -2.5   E + 6" 44 cm 42.5 cm 1.5 cm 44 cm No chg 44cm No chg   E + 4" 39.5 cm 37 cm " "2.5 cm 39cm -0.5 40.5 cm +1.5   E + 2" 34.5 cm 33 cm 1.5 cm 34.5 cm No chg 35 cm +0.5   Elbow 28 cm 28 cm 0 cm 27 cm -1 27.5 cm +0.5   W+ 8" 29 cm  28.5cm 0.5 cm 28cm -1 28 cm No chg   W +  6" 29.5 cm 27 cm 2.5 cm 29cm -0.5 29cm No chg   W + 4" 25.5 cm 23.5 cm 2 cm 25cm -0.5 24.5 cm -0.5   Wrist 18 cm 17.5 cm 0.5 cm 18.5cm +0.5 18 cm -0.5   DPC 21.5 cm 20 cm 1.5 cm 20cm -1.5 20.5cm +0.5   IP Thumb 7.5 cm 7 cm 0.5 cm 8cm +0.5 7.5cm -0.5          Treatment         Phylicia received the following manual therapy techniques were performed to increased myofascial/soft tissue length, mobility and pliability, increase PROM, AROM and function as well as to decrease pain for 62 minutes    Short Neck- held due to thyroid goiter  Clear Healthy Lymph Nodes:  Left Axilla                                                  Right  Groin  Open Anastomoses:  Anterior Axillo-Axillary  (AAA)                                    Axillo-Inguinal     (AI)                                    Posterior Axillo-Axillary  (LEEANN)     Right Upper Arm (Lateral and Medial)  Right Antecubital Fossa  Right forearm, dorsal and ventral aspect  Dorsum of R hand  R fingers    Rework R hand   Rework Right UE  Rework Anastomoses  Rework Healthy lymph Nodes    Diaphragmatic breathing, 2 x 5 reps          PT applies gauze to Right fingers and hand and stockinette, cotton artiflex, and short stretch bandages to pt's Right upper extremity. Pt educated to wear bandaging for next 2-3 days unless it causes pain, numbness, or changes in skin color/temperature.  If removed, pt instructed to roll up bandages and cotton padding and bring to next session. If bandages are removed, pt can wear tubigrip and copper fit glove. Pt verbalizes understanding of all topics.          Home Exercise Program and Patient Education   Education provided re:  - role of PT in multi - disciplinary team, goals for PT  - progress towards goals   -sleeping positions to elevate RUE in supine and " side-lying    Written Home Exercises Provided: Patient instructed to cont prior HEP.  Exercises were reviewed and Mirian was able to demonstrate them prior to the end of the session.  Mirian demonstrated good  understanding of the education provided.     See EMR under Media for exercises provided 6/8/2022- for self manual lymph drainage techniques.    Pt has no cultural, educational or language barriers to learning provided.    Assessment     Patient tolerated session well. Ms. Vásquez has been compliant with compression wrappings, elevation, self manual lymph drainage, and home exercises for nearly 4 weeks however her symptoms persist. Pt had pump trial last week- Due to the patients pain levels and sensitivity, she was not able to tolerate the static compression applied by a basic pump. An advanced pump that sequentially inflates and deflates one chamber at a time was a more appropriate treatment option for her.  Pt denies pain today. She is responding well to complete decongestive therapy and states that bandages feel good. Will progress as tolerated.    Pt prognosis is Good. Pt will continue to benefit from skilled outpatient physical therapy to address the deficits listed in the problem list chart on initial evaluation, provide pt/family education and to maximize pt's level of independence in the home and community environment.     Anticipated barriers to physical therapy: none anticipated    Goals as follows:  Short Term Goals (6 weeks)  1. Pt will demo decrease in girth in right upper extremity by >/= 1cm to allow for improved UE symmetry, clothing choice, ROM, and UE function. (progressing, not met)  2. Patient will demonstrate 100% knowledge of lymphedema precautions and signs of infection to allow for reduced risk of lymphedema, reduced risk of infection, and/or exacerbation.  (progressing, not met)  3. Patient will perform self lymph drainage techniques to enhance lymphatic drainage and mobility.  (progressing,  not met)  4. Patient will tolerate daily activities with multilayered bandaging to enhance lymphatic drainage and skin elasticity.  (progressing, not met)  5. Pt will tolerate HEP for improved strength, functional mobility, ROM, posture, and endurance. (progressing, not met)        Long Term Goals: ( 12  weeks)  1. Patient to show decreased girth in Right upper extremity by >/= 2cm to allow for improved UE symmetry, clothing choice, ROM, and UE function.  (progressing, not met)  2. Patient will show reduction in density to mild or less with improved contour of limb to allow for improved cosmesis, improved lymphatic drainage, and functional mobility.  (progressing, not met)  3. Patient to mary/doff compression garment with daily compliance to support lymphatic mobility and skin elasticity.  (progressing, not met)  4. Pt to show improved postural awareness and alignment for improved functional mobility.  (progressing, not met)  5. Pt to be independent and compliant with HEP to allow for improved ROM, reach and carry with functional endurance and strength.    (progressing, not met)           Plan   Outpatient physical therapy 2x week for 12 weeks to include the following:   Manual Therapy, Neuromuscular Re-ed, Orthotic Management and Training, Patient Education, Self Care, Therapeutic Activities and Therapeutic Exercise.     Plan of care Certification Period: 6/3/2022 to 8/26/22.       Therapist: Marcela Price, PT  6/29/2022

## 2022-06-29 NOTE — PROGRESS NOTES
Physical Therapy Daily Treatment Note     Name: Phylicia Vásquez  Clinic Number: [id number]    Therapy Diagnosis:   Encounter Diagnoses   Name Primary?    Weakness of both lower extremities Yes    Antalgic gait     Balance problem      Physician: Negrito Verde MD    Visit Date: 6/29/2022    Physician Orders: PT Eval and Treat   Medical Diagnosis from Referral:   C50.611,Z17.0 (ICD-10-CM) - Carcinoma of axillary tail of right breast in female, estrogen receptor positive   E66.01,Z68.41 (ICD-10-CM) - Class 3 severe obesity with body mass index (BMI) of 40.0 to 44.9 in adult, unspecified obesity type, unspecified whether serious comorbidity present   Evaluation Date: 2/8/2022  Authorization Period Expiration: 12/31/2022  Plan of Care Expiration: 8/8/2022  Visit # / Visits authorized: 10/ 20(plus eval)  Insurance: [employer]      Time In: 3:03 PM  Time Out: 4:00 PM  Total Billable Time: 57 minutes    Precautions: Standard and cancer    Subjective     Pt reports: fell on Monday at home. She is not sure what happened but her left foot went out and she landed on her knees and hands   She was compliant with home exercise program.  Response to previous treatment: exhausted  Functional change: none stated    Pain: 4/10  Location: B knees      Objective     Objective Measures updated at progress report unless specified.   Vitals:  98%, HR: 94, 127/98mmHg    Treatment     Mirian received therapeutic exercises to develop strength, endurance, ROM, flexibility, posture and core stabilization for 57 minutes including:    Aerobic/Cardio Activity: stationary bike seat 4, level 1, 10 minutes     Posterior pelvic tilt x 10 reps  Bridge with hip ADD x 15 reps  Bridge with Hip ABD, green band, 15 reps  Straight leg raise, 1 x 15 reps/LE  Ankle DF with Min A 1 x 10  Standing hip ext with green band at ankles, 2 x 10/LE  Standing hip abduction with green band at ankles, 2 x 10/LE    Patient was screened for use of kinesiotape and was  cleared for use.  1. Has the patient ever had a reaction to the adhesive in bandaids? Yes/No  2. Has the patient ever uses athletic/kinesiotape in the past? Yes/No  3. Is the patient hemodynamically impaired (PE, DVT, CHF, Kidney failure)? Yes/No  4. Can the PT/PTA apply the tape to your skin? Yes/No    Patient was instructed on duration to wear the tape, proper drying techniques for the tape, and to remove the tape if he/she had any issues with it.    Patient verbalized understanding of these instructions. 2 I strips applied with 50% stretch to anterior tib and ankle of L LE to encourage DF    Home Exercises Provided and Patient Education Provided     Education provided:   - exercise technique, how to monitor for potential lymphedema, management techniques for R hand swelling.  - self taping to encourage ankle DF when ambulating    Written Home Exercises Provided: Patient instructed to cont prior HEP.  Exercises were reviewed and Mirian was able to demonstrate them prior to the end of the session.  Mirian demonstrated good  understanding of the education provided.     See EMR under Patient Instructions for exercises provided eval and 2/14/22, 2/23/22    Assessment     Pt tolerated session well. She was able to complete all of today's exercises with no increase in symptoms prior to leaving the clinic. She was able to self tape to encourage dorsiflexion after being instructed how to perform the activity. She is responding well to kinesiotaping demonstrating increased heel strike and toe off while ambulating in the clinic. Will progress as tolerated.   Mirian Is progressing well towards her goals.   Pt prognosis is Good.     Pt will continue to benefit from skilled outpatient physical therapy to address the deficits listed in the problem list box on initial evaluation, provide pt/family education and to maximize pt's level of independence in the home and community environment.     Pt's spiritual, cultural and educational needs  considered and pt agreeable to plan of care and goals.     Anticipated barriers to physical therapy: none anticipated    Goals:   Short Term Goals:  4 weeks     1. Pt will demo >/= 4/5 strength in bilateral lower extremities for improved functional mobility. (progressing, not met)  2. Pt to demonstrate increased strength in bilateral upper extremities to >/=4+/5 for improved ability to perform functional activities. (met, 6/8/22)  4. Pt. will improve single leg stance balance test score to >/=  15s to be in a moderate/low risk category for falls. (progressing, not met)  5. Pt will perform >/= 12 sit to stands in 30 seconds with no arms for improved endurance. (progressing, not met)  6. Pt will increase 6 minute walk test score by >/=  50 meters in order to increase ambulation in the community. (progressing, not met)  7. Pt will initiate home exercise program to improve strength, flexibility, endurance, mobility & balance. (met, 3/21/22)  8. Pt will tolerate 10 min or greater of time in light-->moderate intensity cardio (I.e. Bike, Treadmill) to improve endurance to assist in performing her preferred recreational activities. (met, 3/21/22)  9. Pt will verbalize understanding of exercise recommendation guidelines for moderate intensity exercise to safely perform and progress home exercise routine. (progressing, not met)     Long Term Goals: 8 weeks  1. Pt will increase strength in bilateral lower extremities to 5/5 for improved functional mobility and tolerance for ADL and work activities. (progressing, not met)  4. Pt will be independent with home exercise program to improve range of motion, strength, balance and return to prior level of function. (progressing, not met)  5. Pt. will improve Single Leg Stance balance test score to >/=  30s for patient to be in low risk category for falls. (progressing, not met)  6. Pt will perform >/= 15 sit to stands in 30 seconds with no arms for improved endurance. (progressing,  not met)  7. Pt will increase 6 minute walk test score by >/= 75 meters in order to increase community ambulation. (progressing, not met)  8. Patient will report compliance with  20 -30min  of moderate intensity exercise 5 days a week to improve overall cardiovascular function and decrease cancer related fatigue. (progressing, not met)  Plan     Plan of care Certification: 6/8/2022 to 8/8/2022.     Outpatient Physical Therapy 2 times weekly for 8 weeks to include the following interventions: Gait Training, Manual Therapy, Neuromuscular Re-ed, Orthotic Management and Training, Patient Education, Self Care, Therapeutic Activities, Therapeutic Exercise and IASTM. Dry needling with manual therapy techniques to decrease pain, inflammation and swelling, increase circulation and promote healing process.     Gail Gutierrez, PT

## 2022-07-02 DIAGNOSIS — Z17.0 CARCINOMA OF AXILLARY TAIL OF RIGHT BREAST IN FEMALE, ESTROGEN RECEPTOR POSITIVE: ICD-10-CM

## 2022-07-02 DIAGNOSIS — C50.611 CARCINOMA OF AXILLARY TAIL OF RIGHT BREAST IN FEMALE, ESTROGEN RECEPTOR POSITIVE: ICD-10-CM

## 2022-07-02 DIAGNOSIS — M54.14 THORACIC RADICULOPATHY: ICD-10-CM

## 2022-07-02 NOTE — TELEPHONE ENCOUNTER
No new care gaps identified.  [facility] Embedded Care Gaps. Reference number: [id number]. 7/02/2022   3:27:19 PM CDT

## 2022-07-05 RX ORDER — ZOLPIDEM TARTRATE 10 MG/1
10 TABLET ORAL NIGHTLY PRN
Qty: 30 TABLET | Refills: 2 | Status: SHIPPED | OUTPATIENT
Start: 2022-07-05 | End: 2022-10-05 | Stop reason: SDUPTHER

## 2022-07-05 RX ORDER — CYCLOBENZAPRINE HCL 10 MG
10 TABLET ORAL 3 TIMES DAILY PRN
Qty: 90 TABLET | Refills: 3 | Status: SHIPPED | OUTPATIENT
Start: 2022-07-05 | End: 2023-05-19 | Stop reason: SDUPTHER

## 2022-07-06 ENCOUNTER — CLINICAL SUPPORT (OUTPATIENT)
Dept: REHABILITATION | Facility: HOSPITAL | Age: 52
End: 2022-07-06
Attending: INTERNAL MEDICINE
Payer: COMMERCIAL

## 2022-07-06 DIAGNOSIS — R26.89 BALANCE PROBLEM: ICD-10-CM

## 2022-07-06 DIAGNOSIS — Z74.09 IMPAIRED FUNCTIONAL MOBILITY AND ACTIVITY TOLERANCE: ICD-10-CM

## 2022-07-06 DIAGNOSIS — I89.0 LYMPHEDEMA OF RIGHT ARM: Primary | ICD-10-CM

## 2022-07-06 DIAGNOSIS — R26.89 ANTALGIC GAIT: ICD-10-CM

## 2022-07-06 DIAGNOSIS — R29.898 WEAKNESS OF BOTH LOWER EXTREMITIES: Primary | ICD-10-CM

## 2022-07-06 PROCEDURE — 97110 THERAPEUTIC EXERCISES: CPT

## 2022-07-06 PROCEDURE — 97140 MANUAL THERAPY 1/> REGIONS: CPT

## 2022-07-06 NOTE — PROGRESS NOTES
"                                                    Physical Therapy Daily Treatment Note       Date: 7/6/2022   Name: Phylicia Vásquez  Clinic Number: [id number]    Therapy Diagnosis:   Encounter Diagnoses   Name Primary?    Lymphedema of right arm Yes    Impaired functional mobility and activity tolerance      Physician: Negrito Verde MD    Physician Orders: PT Eval and Treat   Medical Diagnosis: Carcinoma of axillary tail of right breast in female, estrogen receptor positive [C50.611, Z17.0], Localized edema      Evaluation Date: 6/3/2022  Authorization Period Expiration: 12/31/22  Plan of Care Certification Period: 8/26/22 (12 weeks)  Visit # / Visits authorized: 4/ 20 (plus eval)  Insurance: [employer]     Time In: 2:03 PM  Time Out: 3:06 PM  Total Billable Time: 63 minutes     Precautions: Standard, cancer and Right UE lymphedema      Subjective     Pt reports: She has been doing self manual lymph drainage, and it is going OK. Pt states bandages lasted for 3 days.  She was compliant with self massage technique   Response to previous treatment: no adverse reaction  Pain Scale: Phylicia rates pain on a scale of 0/10 on VAS.   Pain location: N/A     Fatigue: not assessed  Functional change: none stated  Treatment:   Chemotherapy:  4 cycles of AC and 4 cycles of Taxol patrick adjuvantly  Radiation: completed radiation therapy on 8/20/20.   Endocrine therapy: On 11/1/2020 started on Anastrozole with with Dr. Verde     Surgery date: Pt is s/p bilateral mastectomy with R SLNB, TE reconstruction in 4/2021. pt will have permanent reconstruction with ROWAN flap, but she states she needs to lose weight first.      Objective     Objective Measures updated at progress report unless specified.     LANDMARK RIGHT UE LEFT UE DIFF RUE Diff RUE Diff RUE Diff   Date 6/3/22 6/3/22 6/3/22 6/22/22 6/22/22 6/29/22 6/29/22 7/6/22 7/6/22   E + 8" 47 cm 46.5 cm 0.5 cm 46.5cm -0.5 44cm -2.5 45 cm +1   E + 6" 44 cm 42.5 cm 1.5 cm 44 " "cm No chg 44cm No chg 43.5cm -0.5   E + 4" 39.5 cm 37 cm 2.5 cm 39cm -0.5 40.5 cm +1.5 40.5cm No chg   E + 2" 34.5 cm 33 cm 1.5 cm 34.5 cm No chg 35 cm +0.5 34.5cm -0.5   Elbow 28 cm 28 cm 0 cm 27 cm -1 27.5 cm +0.5 28 cm +0.5   W+ 8" 29 cm  28.5cm 0.5 cm 28cm -1 28 cm No chg 29.5cm +1.5   W +  6" 29.5 cm 27 cm 2.5 cm 29cm -0.5 29cm No chg 29cm No chg   W + 4" 25.5 cm 23.5 cm 2 cm 25cm -0.5 24.5 cm -0.5 25.5cm +1   Wrist 18 cm 17.5 cm 0.5 cm 18.5cm +0.5 18 cm -0.5 18.5cm +0.5   DPC 21.5 cm 20 cm 1.5 cm 20cm -1.5 20.5cm +0.5 21 cm +0.5   IP Thumb 7.5 cm 7 cm 0.5 cm 8cm +0.5 7.5cm -0.5 8 cm +0.5         Functional Limitations Reporting       CMS Impairment/Limitation/Restriction for FOTO  Survey     Therapist reviewed FOTO scores for Phylicia Vásquez on 7/6/2022.   FOTO documents entered into [employer] - see Media section.     Limitations Score: 37%  Category: Carrying                Treatment         Phylicia received the following manual therapy techniques were performed to increased myofascial/soft tissue length, mobility and pliability, increase PROM, AROM and function as well as to decrease pain for 53 minutes    Short Neck- held due to thyroid goiter  Clear Healthy Lymph Nodes:  Left Axilla                                                  Right  Groin  Open Anastomoses:  Anterior Axillo-Axillary  (AAA)                                    Axillo-Inguinal     (AI)                                    Posterior Axillo-Axillary  (LEEANN)     Right Upper Arm (Lateral and Medial)  Right Antecubital Fossa  Right forearm, dorsal and ventral aspect  Dorsum of R hand  R fingers    Rework R hand   Rework Right UE  Rework Anastomoses  Rework Healthy lymph Nodes      PT applies gauze to Right fingers and hand and stockinette, cotton artiflex, and short stretch bandages to pt's Right upper extremity. Pt educated to wear bandaging for next 2-3 days unless it causes pain, numbness, or changes in skin color/temperature.  If removed, pt " instructed to roll up bandages and cotton padding and bring to next session. If bandages are removed, pt can wear tubigrip and copper fit glove. Pt verbalizes understanding of all topics.       Phylicia received therapeutic exercises to develop ROM, posture and lymphatic mobility for 10 minutes including:     Diaphragmatic breathing, 2 x 5 reps  Scapular retractions, 2 x 10 reps  Head turns x 5 reps B  Head tilts, 5 reps B  Shoulder rolls, 10 reps each fwd and back  Chicken wings x 10 reps  Bicep curls x 10 reps           Home Exercise Program and Patient Education   Education provided re:  - role of PT in multi - disciplinary team, goals for PT  - progress towards goals   -exercises she can perform t/o the day to promote lymphatic mobility.    Written Home Exercises Provided: Patient instructed to cont prior HEP.  Exercises were reviewed and Mirian was able to demonstrate them prior to the end of the session.  Mirian demonstrated good  understanding of the education provided.     See EMR under Media for exercises provided 6/8/2022- for self manual lymph drainage techniques.    Pt has no cultural, educational or language barriers to learning provided.    Assessment     Patient tolerated session well. Ms. Vásquez has been compliant with compression wrappings, elevation, self manual lymph drainage, and home exercises for 4 weeks however her symptoms persist. Pt had pump trial last week- Due to the patients pain levels and sensitivity, she was not able to tolerate the static compression applied by a basic pump. An advanced pump that sequentially inflates and deflates one chamber at a time was a more appropriate treatment option for her.  Pt denies pain today.  She has a mix of increase and reductions in RUE circumference.  She was able to expand exercises today. She is responding well to complete decongestive therapy and has met goals as noted below. Will progress as tolerated. May consider teaching self-bandaging technique at  next visit.    Pt prognosis is Good. Pt will continue to benefit from skilled outpatient physical therapy to address the deficits listed in the problem list chart on initial evaluation, provide pt/family education and to maximize pt's level of independence in the home and community environment.     Anticipated barriers to physical therapy: none anticipated    Goals as follows:  Short Term Goals (6 weeks)  1. Pt will demo decrease in girth in right upper extremity by >/= 1cm to allow for improved UE symmetry, clothing choice, ROM, and UE function. (progressing, not met)  2. Patient will demonstrate 100% knowledge of lymphedema precautions and signs of infection to allow for reduced risk of lymphedema, reduced risk of infection, and/or exacerbation.  (met)  3. Patient will perform self lymph drainage techniques to enhance lymphatic drainage and mobility.  (met, 7/6/22)  4. Patient will tolerate daily activities with multilayered bandaging to enhance lymphatic drainage and skin elasticity.  (met, 7/6/22)  5. Pt will tolerate HEP for improved strength, functional mobility, ROM, posture, and endurance. (met, 7/6/22)        Long Term Goals: ( 12  weeks)  1. Patient to show decreased girth in Right upper extremity by >/= 2cm to allow for improved UE symmetry, clothing choice, ROM, and UE function.  (progressing, not met)  2. Patient will show reduction in density to mild or less with improved contour of limb to allow for improved cosmesis, improved lymphatic drainage, and functional mobility.  (progressing, not met)  3. Patient to mary/doff compression garment with daily compliance to support lymphatic mobility and skin elasticity.  (progressing, not met)  4. Pt to show improved postural awareness and alignment for improved functional mobility.  (progressing, not met)  5. Pt to be independent and compliant with HEP to allow for improved ROM, reach and carry with functional endurance and strength.    (progressing, not  met)           Plan   Outpatient physical therapy 2x week for 12 weeks to include the following:   Manual Therapy, Neuromuscular Re-ed, Orthotic Management and Training, Patient Education, Self Care, Therapeutic Activities and Therapeutic Exercise.     Plan of care Certification Period: 6/3/2022 to 8/26/22.       Therapist: Marcela Price, PT  7/6/2022

## 2022-07-06 NOTE — PROGRESS NOTES
Physical Therapy Daily Treatment Note     Name: Phylicia Vásquez  Clinic Number: [id number]    Therapy Diagnosis:   Encounter Diagnoses   Name Primary?    Weakness of both lower extremities Yes    Antalgic gait     Balance problem      Physician: Negrito Verde MD    Visit Date: 7/6/2022    Physician Orders: PT Eval and Treat   Medical Diagnosis from Referral:   C50.611,Z17.0 (ICD-10-CM) - Carcinoma of axillary tail of right breast in female, estrogen receptor positive   E66.01,Z68.41 (ICD-10-CM) - Class 3 severe obesity with body mass index (BMI) of 40.0 to 44.9 in adult, unspecified obesity type, unspecified whether serious comorbidity present   Evaluation Date: 2/8/2022  Authorization Period Expiration: 12/31/2022  Plan of Care Expiration: 8/8/2022  Visit # / Visits authorized: 11/ 20(plus eval)  Insurance: [email or web address]      Time In: 1:00 PM  Time Out: 1:55 PM  Total Billable Time: 55 minutes    Precautions: Standard and cancer    Subjective     Pt reports: she has been taping her foot at home and it has been helping.   She was compliant with home exercise program.  Response to previous treatment: tired  Functional change: none stated    Pain: 0/10  Location: B knees      Objective     Objective Measures updated at progress report unless specified.   Vitals:  O2: 97%, HR: 72, BP: 136/95 5mmHg    Treatment     Mirian received therapeutic exercises to develop strength, endurance, ROM, flexibility, posture and core stabilization for 55 minutes including:    Aerobic/Cardio Activity: stationary bike seat 4, level 1, 10 minutes     Core activation with orange ball, 15 reps  Posterior pelvic tilt x 15 reps  Bridge with hip ADD 2 x 10 reps  Bridge with Hip ABD, green band, 2 x 10  reps  Straight leg raise, 1 x 15 reps/LE  Ankle DF with Min A and tapping over anterior tibialis 1 x 15  Standing hip ext with green band at ankles, 2 x 10/LE  Standing hip abduction with green band at ankles, 2 x 10/LE  Standing gastroc  stretch, 2 x 30 sec/ea  Standing soleus stretch 2 x 30 sec/ea  Pallof press with yellow resistance cord, 20 reps each    Home Exercises Provided and Patient Education Provided     Education provided:   - exercise technique, how to monitor for potential lymphedema, management techniques for R hand swelling.  - self taping to encourage ankle DF when ambulating    Written Home Exercises Provided: Patient instructed to cont prior HEP.  Exercises were reviewed and Mirian was able to demonstrate them prior to the end of the session.  Mirian demonstrated good  understanding of the education provided.     See EMR under Patient Instructions for exercises provided eval and 2/14/22, 2/23/22    Assessment     Pt tolerated session well. Patient was able to perform all of today's progressions and new exercises with no increase in symptoms prior to leaving the clinic. She required occasional cues to avoid substitutions with standing hip extension exercise. Will progress as tolerated.   Mirian Is progressing well towards her goals.   Pt prognosis is Good.     Pt will continue to benefit from skilled outpatient physical therapy to address the deficits listed in the problem list box on initial evaluation, provide pt/family education and to maximize pt's level of independence in the home and community environment.     Pt's spiritual, cultural and educational needs considered and pt agreeable to plan of care and goals.     Anticipated barriers to physical therapy: none anticipated    Goals:   Short Term Goals:  4 weeks     1. Pt will demo >/= 4/5 strength in bilateral lower extremities for improved functional mobility. (progressing, not met)  2. Pt to demonstrate increased strength in bilateral upper extremities to >/=4+/5 for improved ability to perform functional activities. (met, 6/8/22)  4. Pt. will improve single leg stance balance test score to >/=  15s to be in a moderate/low risk category for falls. (progressing, not met)  5. Pt will  perform >/= 12 sit to stands in 30 seconds with no arms for improved endurance. (progressing, not met)  6. Pt will increase 6 minute walk test score by >/=  50 meters in order to increase ambulation in the community. (progressing, not met)  7. Pt will initiate home exercise program to improve strength, flexibility, endurance, mobility & balance. (met, 3/21/22)  8. Pt will tolerate 10 min or greater of time in light-->moderate intensity cardio (I.e. Bike, Treadmill) to improve endurance to assist in performing her preferred recreational activities. (met, 3/21/22)  9. Pt will verbalize understanding of exercise recommendation guidelines for moderate intensity exercise to safely perform and progress home exercise routine. (progressing, not met)     Long Term Goals: 8 weeks  1. Pt will increase strength in bilateral lower extremities to 5/5 for improved functional mobility and tolerance for ADL and work activities. (progressing, not met)  4. Pt will be independent with home exercise program to improve range of motion, strength, balance and return to prior level of function. (progressing, not met)  5. Pt. will improve Single Leg Stance balance test score to >/=  30s for patient to be in low risk category for falls. (progressing, not met)  6. Pt will perform >/= 15 sit to stands in 30 seconds with no arms for improved endurance. (progressing, not met)  7. Pt will increase 6 minute walk test score by >/= 75 meters in order to increase community ambulation. (progressing, not met)  8. Patient will report compliance with  20 -30min  of moderate intensity exercise 5 days a week to improve overall cardiovascular function and decrease cancer related fatigue. (progressing, not met)  Plan     Plan of care Certification: 6/8/2022 to 8/8/2022.     Outpatient Physical Therapy 2 times weekly for 8 weeks to include the following interventions: Gait Training, Manual Therapy, Neuromuscular Re-ed, Orthotic Management and Training, Patient  Education, Self Care, Therapeutic Activities, Therapeutic Exercise and IASTM. Dry needling with manual therapy techniques to decrease pain, inflammation and swelling, increase circulation and promote healing process.     Gail Gutierrez, PT

## 2022-07-19 ENCOUNTER — HOSPITAL ENCOUNTER (OUTPATIENT)
Dept: RADIOLOGY | Facility: HOSPITAL | Age: 52
Discharge: HOME | End: 2022-07-19
Attending: PHYSICIAN ASSISTANT
Payer: COMMERCIAL

## 2022-07-19 ENCOUNTER — OFFICE VISIT (OUTPATIENT)
Dept: ORTHOPEDICS | Facility: CLINIC | Age: 52
End: 2022-07-19
Payer: COMMERCIAL

## 2022-07-19 ENCOUNTER — TELEPHONE (OUTPATIENT)
Dept: ORTHOPEDICS | Facility: CLINIC | Age: 52
End: 2022-07-19
Payer: COMMERCIAL

## 2022-07-19 VITALS
HEIGHT: 63 IN | RESPIRATION RATE: 18 BRPM | WEIGHT: 237.63 LBS | BODY MASS INDEX: 42.11 KG/M2 | SYSTOLIC BLOOD PRESSURE: 136 MMHG | DIASTOLIC BLOOD PRESSURE: 85 MMHG | HEART RATE: 75 BPM

## 2022-07-19 DIAGNOSIS — M25.562 ACUTE PAIN OF LEFT KNEE: ICD-10-CM

## 2022-07-19 DIAGNOSIS — M17.12 OSTEOARTHRITIS OF LEFT KNEE, UNSPECIFIED OSTEOARTHRITIS TYPE: Primary | ICD-10-CM

## 2022-07-19 PROCEDURE — 73562 X-RAY EXAM OF KNEE 3: CPT | Mod: TC,RT

## 2022-07-19 PROCEDURE — 20610 DRAIN/INJ JOINT/BURSA W/O US: CPT | Mod: LT,S$GLB,, | Performed by: PHYSICIAN ASSISTANT

## 2022-07-19 PROCEDURE — 3075F PR MOST RECENT SYSTOLIC BLOOD PRESS GE 130-139MM HG: ICD-10-PCS | Mod: CPTII,S$GLB,, | Performed by: PHYSICIAN ASSISTANT

## 2022-07-19 PROCEDURE — 73562 X-RAY EXAM OF KNEE 3: CPT | Mod: 26,RT,, | Performed by: RADIOLOGY

## 2022-07-19 PROCEDURE — 73562 XR KNEE ORTHO LEFT WITH FLEXION: ICD-10-PCS | Mod: 26,RT,, | Performed by: RADIOLOGY

## 2022-07-19 PROCEDURE — 4010F PR ACE/ARB THEARPY RXD/TAKEN: ICD-10-PCS | Mod: CPTII,S$GLB,, | Performed by: PHYSICIAN ASSISTANT

## 2022-07-19 PROCEDURE — 3079F DIAST BP 80-89 MM HG: CPT | Mod: CPTII,S$GLB,, | Performed by: PHYSICIAN ASSISTANT

## 2022-07-19 PROCEDURE — 1159F PR MEDICATION LIST DOCUMENTED IN MEDICAL RECORD: ICD-10-PCS | Mod: CPTII,S$GLB,, | Performed by: PHYSICIAN ASSISTANT

## 2022-07-19 PROCEDURE — 1159F MED LIST DOCD IN RCRD: CPT | Mod: CPTII,S$GLB,, | Performed by: PHYSICIAN ASSISTANT

## 2022-07-19 PROCEDURE — 4010F ACE/ARB THERAPY RXD/TAKEN: CPT | Mod: CPTII,S$GLB,, | Performed by: PHYSICIAN ASSISTANT

## 2022-07-19 PROCEDURE — 20610 PR DRAIN/INJECT LARGE JOINT/BURSA: ICD-10-PCS | Mod: LT,S$GLB,, | Performed by: PHYSICIAN ASSISTANT

## 2022-07-19 PROCEDURE — 99213 PR OFFICE/OUTPT VISIT, EST, LEVL III, 20-29 MIN: ICD-10-PCS | Mod: 25,S$GLB,, | Performed by: PHYSICIAN ASSISTANT

## 2022-07-19 PROCEDURE — 73564 X-RAY EXAM KNEE 4 OR MORE: CPT | Mod: 26,LT,, | Performed by: RADIOLOGY

## 2022-07-19 PROCEDURE — 3075F SYST BP GE 130 - 139MM HG: CPT | Mod: CPTII,S$GLB,, | Performed by: PHYSICIAN ASSISTANT

## 2022-07-19 PROCEDURE — 99999 PR PBB SHADOW E&M-EST. PATIENT-LVL IV: ICD-10-PCS | Mod: PBBFAC,,, | Performed by: PHYSICIAN ASSISTANT

## 2022-07-19 PROCEDURE — 3008F BODY MASS INDEX DOCD: CPT | Mod: CPTII,S$GLB,, | Performed by: PHYSICIAN ASSISTANT

## 2022-07-19 PROCEDURE — 99213 OFFICE O/P EST LOW 20 MIN: CPT | Mod: 25,S$GLB,, | Performed by: PHYSICIAN ASSISTANT

## 2022-07-19 PROCEDURE — 73564 XR KNEE ORTHO LEFT WITH FLEXION: ICD-10-PCS | Mod: 26,LT,, | Performed by: RADIOLOGY

## 2022-07-19 PROCEDURE — 3008F PR BODY MASS INDEX (BMI) DOCUMENTED: ICD-10-PCS | Mod: CPTII,S$GLB,, | Performed by: PHYSICIAN ASSISTANT

## 2022-07-19 PROCEDURE — 99999 PR PBB SHADOW E&M-EST. PATIENT-LVL IV: CPT | Mod: PBBFAC,,, | Performed by: PHYSICIAN ASSISTANT

## 2022-07-19 PROCEDURE — 3079F PR MOST RECENT DIASTOLIC BLOOD PRESSURE 80-89 MM HG: ICD-10-PCS | Mod: CPTII,S$GLB,, | Performed by: PHYSICIAN ASSISTANT

## 2022-07-19 RX ADMIN — TRIAMCINOLONE ACETONIDE 40 MG: 40 INJECTION, SUSPENSION INTRA-ARTICULAR; INTRAMUSCULAR at 05:07

## 2022-07-19 NOTE — TELEPHONE ENCOUNTER
Spoke with pt. Pt is aware that her appointment with [facility] is cancel. Pt is advise to see rheumatology. Pt also ask for a work note. Work note is done and ready in pt MyChart.  Patient states verbal understanding and has no further questions.

## 2022-07-19 NOTE — TELEPHONE ENCOUNTER
----- Message from Priti Herrera PA-C sent at 7/19/2022 12:00 PM CDT -----  Pawel Rene. I see you scheduled patient MRN [id number] on my schedule for neuropathy/joint pain. I am a movement disorder specialist and this is not an appropriately scheduled patient for me. Please have this patient scheduled with the appropriate provider. If in the future you are unsure of who to schedule with, please feel free to reach out to the neurology department for further guidance. thanks!

## 2022-07-20 ENCOUNTER — CLINICAL SUPPORT (OUTPATIENT)
Dept: REHABILITATION | Facility: HOSPITAL | Age: 52
End: 2022-07-20
Attending: INTERNAL MEDICINE
Payer: COMMERCIAL

## 2022-07-20 DIAGNOSIS — R26.89 BALANCE PROBLEM: ICD-10-CM

## 2022-07-20 DIAGNOSIS — I89.0 LYMPHEDEMA OF RIGHT ARM: Primary | ICD-10-CM

## 2022-07-20 DIAGNOSIS — R26.89 ANTALGIC GAIT: ICD-10-CM

## 2022-07-20 DIAGNOSIS — Z74.09 IMPAIRED FUNCTIONAL MOBILITY AND ACTIVITY TOLERANCE: ICD-10-CM

## 2022-07-20 DIAGNOSIS — R29.898 WEAKNESS OF BOTH LOWER EXTREMITIES: Primary | ICD-10-CM

## 2022-07-20 PROCEDURE — 97110 THERAPEUTIC EXERCISES: CPT

## 2022-07-20 PROCEDURE — 97140 MANUAL THERAPY 1/> REGIONS: CPT

## 2022-07-20 NOTE — PROGRESS NOTES
Physical Therapy Daily Treatment Note       Date: 7/20/2022   Name: Phylicia Vásquez  Clinic Number: [id number]    Therapy Diagnosis:   Encounter Diagnoses   Name Primary?    Lymphedema of right arm Yes    Impaired functional mobility and activity tolerance      Physician: Negrito Verde MD    Physician Orders: PT Eval and Treat   Medical Diagnosis: Carcinoma of axillary tail of right breast in female, estrogen receptor positive [C50.611, Z17.0], Localized edema      Evaluation Date: 6/3/2022  Authorization Period Expiration: 12/31/22  Plan of Care Certification Period: 8/26/22 (12 weeks)  Visit # / Visits authorized: 6/ 20 (plus eval)  Insurance: [place] [employer]     Time In: 1:11 PM (late arrival)  Time Out: 2:05 PM  Total Billable Time: 54 minutes     Precautions: Standard, cancer and Right UE lymphedema      Subjective     Pt reports: Her pump arrived on Saturday, and she looked up on youtube for how to set it up. Pt states it hurt to use the pump on Saturday, Sunday, Monday, and Tuesday, but it didn't hurt today.  Pt denies pain currently in her right arm (from right elbow down to hand). PT advised pt to hold off on pump and contact [employer] to look at pump. In the meantime, pt advised to perform self manaul lymph drainage.  She was compliant with self massage technique   Response to previous treatment: no adverse reaction  Pain Scale: Phylicia rates pain on a scale of 0/10 on VAS.   Pain location: N/A     Fatigue: not assessed  Functional change: none stated  Treatment:   Chemotherapy:  4 cycles of AC and 4 cycles of Taxol patrick adjuvantly  Radiation: completed radiation therapy on 8/20/20.   Endocrine therapy: On 11/1/2020 started on Anastrozole with with Dr. Verde     Surgery date: Pt is s/p bilateral mastectomy with R SLNB, TE reconstruction in 4/2021. pt will have permanent reconstruction with ROWAN flap, but she states she needs to lose weight  "first.      Objective     Objective Measures updated at progress report unless specified.     LANDMARK RIGHT UE LEFT UE DIFF RUE Diff RUE Diff RUE Diff RUE Diff   Date 6/3/22 6/3/22 6/3/22 6/22/22 6/22/22 6/29/22 6/29/22 7/6/22 7/6/22 7/20/22 7/20/22   E + 8" 47 cm 46.5 cm 0.5 cm 46.5cm -0.5 44cm -2.5 45 cm +1 47 cm +2   E + 6" 44 cm 42.5 cm 1.5 cm 44 cm No chg 44cm No chg 43.5cm -0.5 45cm +1.5   E + 4" 39.5 cm 37 cm 2.5 cm 39cm -0.5 40.5 cm +1.5 40.5cm No chg 41cm +0.5   E + 2" 34.5 cm 33 cm 1.5 cm 34.5 cm No chg 35 cm +0.5 34.5cm -0.5 35cm +0.5   Elbow 28 cm 28 cm 0 cm 27 cm -1 27.5 cm +0.5 28 cm +0.5 28cm No chg   W+ 8" 29 cm  28.5cm 0.5 cm 28cm -1 28 cm No chg 29.5cm +1.5 29cm -0.5   W +  6" 29.5 cm 27 cm 2.5 cm 29cm -0.5 29cm No chg 29cm No chg 29cm No chg   W + 4" 25.5 cm 23.5 cm 2 cm 25cm -0.5 24.5 cm -0.5 25.5cm +1 25 cm -0.5   Wrist 18 cm 17.5 cm 0.5 cm 18.5cm +0.5 18 cm -0.5 18.5cm +0.5 18cm -0.5   DPC 21.5 cm 20 cm 1.5 cm 20cm -1.5 20.5cm +0.5 21 cm +0.5 21cm -0.5   IP Thumb 7.5 cm 7 cm 0.5 cm 8cm +0.5 7.5cm -0.5 8 cm +0.5 8cm No chg           Treatment         Phylicia received the following manual therapy techniques were performed to increased myofascial/soft tissue length, mobility and pliability, increase PROM, AROM and function as well as to decrease pain for 54 minutes    Short Neck- held due to thyroid goiter  Clear Healthy Lymph Nodes:  Left Axilla                                                  Right  Groin  Open Anastomoses:  Anterior Axillo-Axillary  (AAA)                                    Axillo-Inguinal     (AI)                                    Posterior Axillo-Axillary  (LEEANN)     Right Upper Arm (Lateral and Medial)  Right Antecubital Fossa  Right forearm, dorsal and ventral aspect  Dorsum of R hand  R fingers    Rework R hand   Rework Right UE  Rework Anastomoses  Rework Healthy lymph Nodes    Diaphragmatic breathing, 2 x 5     As pt forgot her compression bandages at home, PT wrapped " pt's fingers and hand with gauze and then provided pt with new size F tubigrip.          Home Exercise Program and Patient Education   Education provided re:  - role of PT in multi - disciplinary team, goals for PT  - progress towards goals   -contact [employer] about compression pump being painful with use  -pt encouraged to bring her compression wrappings to next session.    Written Home Exercises Provided: Patient instructed to cont prior HEP.  Exercises were reviewed and Mirian was able to demonstrate them prior to the end of the session.  Mirian demonstrated good  understanding of the education provided.     See EMR under Media for exercises provided 6/8/2022- for self manual lymph drainage techniques.    Pt has no cultural, educational or language barriers to learning provided.    Assessment     Patient tolerated session well. She received compression pump, but endorses the pump has been painful. PT advised pt to follow up with [employer] for trouble shooting as it sounds like pressure may be too high. Pt with some increase in upper right arm measurements today. She responds well to manual lymph drainage. Used gauze and tubigrip today, as pt forgot compression wrappings at home. Will progress as tolerated. May consider teaching self-bandaging technique at next visit.    Pt prognosis is Good. Pt will continue to benefit from skilled outpatient physical therapy to address the deficits listed in the problem list chart on initial evaluation, provide pt/family education and to maximize pt's level of independence in the home and community environment.     Anticipated barriers to physical therapy: none anticipated    Goals as follows:  Short Term Goals (6 weeks)  1. Pt will demo decrease in girth in right upper extremity by >/= 1cm to allow for improved UE symmetry, clothing choice, ROM, and UE function. (progressing, not met)  2. Patient will demonstrate 100% knowledge of lymphedema precautions and signs of  infection to allow for reduced risk of lymphedema, reduced risk of infection, and/or exacerbation.  (met)  3. Patient will perform self lymph drainage techniques to enhance lymphatic drainage and mobility.  (met, 7/6/22)  4. Patient will tolerate daily activities with multilayered bandaging to enhance lymphatic drainage and skin elasticity.  (met, 7/6/22)  5. Pt will tolerate HEP for improved strength, functional mobility, ROM, posture, and endurance. (met, 7/6/22)        Long Term Goals: ( 12  weeks)  1. Patient to show decreased girth in Right upper extremity by >/= 2cm to allow for improved UE symmetry, clothing choice, ROM, and UE function.  (progressing, not met)  2. Patient will show reduction in density to mild or less with improved contour of limb to allow for improved cosmesis, improved lymphatic drainage, and functional mobility.  (progressing, not met)  3. Patient to mary/doff compression garment with daily compliance to support lymphatic mobility and skin elasticity.  (progressing, not met)  4. Pt to show improved postural awareness and alignment for improved functional mobility.  (progressing, not met)  5. Pt to be independent and compliant with HEP to allow for improved ROM, reach and carry with functional endurance and strength.    (progressing, not met)           Plan   Outpatient physical therapy 2x week for 12 weeks to include the following:   Manual Therapy, Neuromuscular Re-ed, Orthotic Management and Training, Patient Education, Self Care, Therapeutic Activities and Therapeutic Exercise.     Plan of care Certification Period: 6/3/2022 to 8/26/22.       Therapist: Marcela Price, PT  7/20/2022

## 2022-07-20 NOTE — PROGRESS NOTES
Physical Therapy Daily Treatment Note     Name: Phylicia Vásquez  Clinic Number: [id number]    Therapy Diagnosis:   Encounter Diagnoses   Name Primary?    Weakness of both lower extremities Yes    Antalgic gait     Balance problem      Physician: Negrito Verde MD    Visit Date: 7/20/2022    Physician Orders: PT Eval and Treat   Medical Diagnosis from Referral:   C50.611,Z17.0 (ICD-10-CM) - Carcinoma of axillary tail of right breast in female, estrogen receptor positive   E66.01,Z68.41 (ICD-10-CM) - Class 3 severe obesity with body mass index (BMI) of 40.0 to 44.9 in adult, unspecified obesity type, unspecified whether serious comorbidity present   Evaluation Date: 2/8/2022  Authorization Period Expiration: 12/31/2022  Plan of Care Expiration: 8/8/2022  Visit # / Visits authorized: 11/ 20(plus eval)  Insurance: [employer]      Time In: 2:11 PM  Time Out:3:05  PM  Total Billable Time: 54 minutes    Precautions: Standard and cancer    Subjective     Pt reports: had an injection in her knees yesterday, feels like a pinch. She has been taping her foot and it seems to be helping.  She was compliant with home exercise program.  Response to previous treatment: tired  Functional change: none stated    Pain: 0/10  Location: B knees      Objective     Objective Measures updated at progress report unless specified.   Vitals: after biking  O2: 98%, HR: 99, BP: 152/99 5mmHg    Treatment     Mirian received therapeutic exercises to develop strength, endurance, ROM, flexibility, posture and core stabilization for 54 minutes including:    Aerobic/Cardio Activity: stationary bike seat 4, level 3, 10 minutes     Core activation with orange ball, 20 reps  Posterior pelvic tilt x 20 reps  Bridge with hip ADD 2 x 10 reps  Bridge with Hip ABD, blue band, 2 x 10  reps  Straight leg raise, 1 x 15 reps/LE  Ankle DF with Min A and tapping over anterior tibialis 1 x 15  Standing hip ext with green band at ankles, 2 x 10/LE  Standing hip  abduction with green band at ankles, 2 x 10/LE  Standing gastroc stretch, 2 x 30 sec/ea  Standing soleus stretch 2 x 30 sec/ea  Pallof press with yellow resistance cord, 20 reps each  Seated marching on blue [employer], 1 x 10    Home Exercises Provided and Patient Education Provided     Education provided:   - exercise technique, how to monitor for potential lymphedema, management techniques for R hand swelling.  - self taping to encourage ankle DF when ambulating    Written Home Exercises Provided: Patient instructed to cont prior HEP.  Exercises were reviewed and Mirian was able to demonstrate them prior to the end of the session.  Mirian demonstrated good  understanding of the education provided.     See EMR under Patient Instructions for exercises provided eval and 2/14/22, 2/23/22    Assessment     Pt tolerated session well. Patient performed all of today's progressions and new exercise with no increase in symptoms prior to leaving the clinic. She continues to have a drop foot on her left due to left anterior tibialis weakness. She required one cue for posture with standing hip strengthening. Will progress as tolerated.   Mirian Is progressing well towards her goals.   Pt prognosis is Good.     Pt will continue to benefit from skilled outpatient physical therapy to address the deficits listed in the problem list box on initial evaluation, provide pt/family education and to maximize pt's level of independence in the home and community environment.     Pt's spiritual, cultural and educational needs considered and pt agreeable to plan of care and goals.     Anticipated barriers to physical therapy: none anticipated    Goals:   Short Term Goals:  4 weeks     1. Pt will demo >/= 4/5 strength in bilateral lower extremities for improved functional mobility. (progressing, not met)  2. Pt to demonstrate increased strength in bilateral upper extremities to >/=4+/5 for improved ability to perform functional activities. (met,  6/8/22)  4. Pt. will improve single leg stance balance test score to >/=  15s to be in a moderate/low risk category for falls. (progressing, not met)  5. Pt will perform >/= 12 sit to stands in 30 seconds with no arms for improved endurance. (progressing, not met)  6. Pt will increase 6 minute walk test score by >/=  50 meters in order to increase ambulation in the community. (progressing, not met)  7. Pt will initiate home exercise program to improve strength, flexibility, endurance, mobility & balance. (met, 3/21/22)  8. Pt will tolerate 10 min or greater of time in light-->moderate intensity cardio (I.e. Bike, Treadmill) to improve endurance to assist in performing her preferred recreational activities. (met, 3/21/22)  9. Pt will verbalize understanding of exercise recommendation guidelines for moderate intensity exercise to safely perform and progress home exercise routine. (progressing, not met)     Long Term Goals: 8 weeks  1. Pt will increase strength in bilateral lower extremities to 5/5 for improved functional mobility and tolerance for ADL and work activities. (progressing, not met)  4. Pt will be independent with home exercise program to improve range of motion, strength, balance and return to prior level of function. (progressing, not met)  5. Pt. will improve Single Leg Stance balance test score to >/=  30s for patient to be in low risk category for falls. (progressing, not met)  6. Pt will perform >/= 15 sit to stands in 30 seconds with no arms for improved endurance. (progressing, not met)  7. Pt will increase 6 minute walk test score by >/= 75 meters in order to increase community ambulation. (progressing, not met)  8. Patient will report compliance with  20 -30min  of moderate intensity exercise 5 days a week to improve overall cardiovascular function and decrease cancer related fatigue. (progressing, not met)  Plan     Plan of care Certification: 6/8/2022 to 8/8/2022.     Outpatient Physical  Therapy 2 times weekly for 8 weeks to include the following interventions: Gait Training, Manual Therapy, Neuromuscular Re-ed, Orthotic Management and Training, Patient Education, Self Care, Therapeutic Activities, Therapeutic Exercise and IASTM. Dry needling with manual therapy techniques to decrease pain, inflammation and swelling, increase circulation and promote healing process.     Gail Gutierrez, PT

## 2022-07-22 RX ORDER — TRIAMCINOLONE ACETONIDE 40 MG/ML
40 INJECTION, SUSPENSION INTRA-ARTICULAR; INTRAMUSCULAR
Status: COMPLETED | OUTPATIENT
Start: 2022-07-22 | End: 2022-07-19

## 2022-07-22 NOTE — PROGRESS NOTES
Subjective:      Patient ID: Phylicia Vásquez is a 52 y.o. female.    Chief Complaint: Follow-up (LEFT KNEE)    Follow-up  Pertinent negatives include no chest pain, chills, coughing, fever or rash.       Patient is a 51 year old female who presents to clinic with chief complaint of a-traumatic left knee pain . Patient stated that she mainly notices her pain anterior medial knee pain and stiffness when she is driving. She has tried OTC tylenol and aleve without relief . She does have some popping and some feeling of instability .     Review of Systems   Constitutional: Negative for chills and fever.   Cardiovascular: Negative for chest pain.   Respiratory: Negative for cough and shortness of breath.    Skin: Negative for color change, dry skin, itching, nail changes, poor wound healing and rash.   Musculoskeletal:        Left knee pain   Neurological: Negative for dizziness.   Psychiatric/Behavioral: Negative for altered mental status. The patient is not nervous/anxious.    All other systems reviewed and are negative.        Objective:      General    Constitutional: She is oriented to person, place, and time. She appears well-developed and well-nourished. No distress.   HENT:   Head: Atraumatic.   Eyes: Conjunctivae are normal.   Cardiovascular: Normal rate.    Pulmonary/Chest: Effort normal.   Neurological: She is alert and oriented to person, place, and time.   Psychiatric: She has a normal mood and affect. Her behavior is normal.         Right Ankle/Foot Exam     Range of Motion   Ankle Joint   Dorsiflexion: normal   Plantar flexion: normal   Subtalar Joint   Inversion: normal   Eversion: normal     Other   Sensation: normal        Left Knee Exam     Tenderness   The patient tender to palpation of the medial joint line.    Range of Motion   The patient has normal left knee ROM.    Tests   Stability Lachman: normal (-1 to 2mm) PCL-Posterior Drawer: normal (0 to 2mm)  MCL - Valgus: normal (0 to 2mm)  LCL -  Varus: normal (0 to 2mm)    Other   Sensation: normal    Right Hip Exam   Right hip exam is normal.       Muscle Strength   Left Lower Extremity   Quadriceps:  4/5   Hamstrin/5     RADS: reviewed by myself:   Mild DJD with narrowing the patellofemoral and medial tibiofemoral joint spaces.  No fracture or dislocation no bone destruction identified           Assessment:       Encounter Diagnosis   Name Primary?    Osteoarthritis of left knee, unspecified osteoarthritis type Yes          Plan:       Discussed treatment options with the patient. At this time she would like a steroid injection into her knee  She is to RTC as needed.     PROCEDURE:  I have explained the risks, benefits, and alternatives of the procedure in detail.  The patient voices understanding and all questions have been answered.  The patient agrees to proceed as planned. So after I performed a sterile prep of the skin in the normal fashion the left knee is injected using a 22 gauge needle from the anterolateral approach with a combination of 4cc 1% plain lidocaine and 40 mg of kenalog  The patient is cautioned and immediate relief of pain is secondary to the local anesthetic and will be temporary.  After the anesthetic wears off there may be a increase in pain that may last for a few hours or a few days and they should use ice to help alleviate this flair up of pain.

## 2022-07-25 ENCOUNTER — PATIENT MESSAGE (OUTPATIENT)
Dept: OBSTETRICS AND GYNECOLOGY | Facility: CLINIC | Age: 52
End: 2022-07-25
Payer: COMMERCIAL

## 2022-07-26 ENCOUNTER — DOCUMENTATION ONLY (OUTPATIENT)
Dept: PSYCHIATRY | Facility: CLINIC | Age: 52
End: 2022-07-26
Payer: COMMERCIAL

## 2022-07-27 ENCOUNTER — CLINICAL SUPPORT (OUTPATIENT)
Dept: REHABILITATION | Facility: HOSPITAL | Age: 52
End: 2022-07-27
Attending: INTERNAL MEDICINE
Payer: COMMERCIAL

## 2022-07-27 ENCOUNTER — OFFICE VISIT (OUTPATIENT)
Dept: OBSTETRICS AND GYNECOLOGY | Facility: CLINIC | Age: 52
End: 2022-07-27
Payer: COMMERCIAL

## 2022-07-27 VITALS — BODY MASS INDEX: 42.16 KG/M2 | DIASTOLIC BLOOD PRESSURE: 84 MMHG | WEIGHT: 238 LBS | SYSTOLIC BLOOD PRESSURE: 130 MMHG

## 2022-07-27 DIAGNOSIS — R26.89 ANTALGIC GAIT: ICD-10-CM

## 2022-07-27 DIAGNOSIS — F43.23 ADJUSTMENT DISORDER WITH MIXED ANXIETY AND DEPRESSED MOOD: ICD-10-CM

## 2022-07-27 DIAGNOSIS — N89.8 VAGINAL ITCHING: Primary | ICD-10-CM

## 2022-07-27 DIAGNOSIS — R26.89 BALANCE PROBLEM: ICD-10-CM

## 2022-07-27 DIAGNOSIS — R29.898 WEAKNESS OF BOTH LOWER EXTREMITIES: Primary | ICD-10-CM

## 2022-07-27 DIAGNOSIS — Z74.09 IMPAIRED FUNCTIONAL MOBILITY AND ACTIVITY TOLERANCE: ICD-10-CM

## 2022-07-27 DIAGNOSIS — N76.0 VULVOVAGINITIS: ICD-10-CM

## 2022-07-27 DIAGNOSIS — I89.0 LYMPHEDEMA OF RIGHT ARM: Primary | ICD-10-CM

## 2022-07-27 PROCEDURE — 3079F DIAST BP 80-89 MM HG: CPT | Mod: CPTII,S$GLB,ICN, | Performed by: PHYSICIAN ASSISTANT

## 2022-07-27 PROCEDURE — 3008F BODY MASS INDEX DOCD: CPT | Mod: CPTII,S$GLB,ICN, | Performed by: PHYSICIAN ASSISTANT

## 2022-07-27 PROCEDURE — 87801 DETECT AGNT MULT DNA AMPLI: CPT | Performed by: PHYSICIAN ASSISTANT

## 2022-07-27 PROCEDURE — 3075F SYST BP GE 130 - 139MM HG: CPT | Mod: CPTII,S$GLB,ICN, | Performed by: PHYSICIAN ASSISTANT

## 2022-07-27 PROCEDURE — 99999 PR PBB SHADOW E&M-EST. PATIENT-LVL III: ICD-10-PCS | Mod: PBBFAC,,, | Performed by: PHYSICIAN ASSISTANT

## 2022-07-27 PROCEDURE — 97110 THERAPEUTIC EXERCISES: CPT

## 2022-07-27 PROCEDURE — 97140 MANUAL THERAPY 1/> REGIONS: CPT

## 2022-07-27 PROCEDURE — 4010F PR ACE/ARB THEARPY RXD/TAKEN: ICD-10-PCS | Mod: CPTII,S$GLB,ICN, | Performed by: PHYSICIAN ASSISTANT

## 2022-07-27 PROCEDURE — 3079F PR MOST RECENT DIASTOLIC BLOOD PRESSURE 80-89 MM HG: ICD-10-PCS | Mod: CPTII,S$GLB,ICN, | Performed by: PHYSICIAN ASSISTANT

## 2022-07-27 PROCEDURE — 3075F PR MOST RECENT SYSTOLIC BLOOD PRESS GE 130-139MM HG: ICD-10-PCS | Mod: CPTII,S$GLB,ICN, | Performed by: PHYSICIAN ASSISTANT

## 2022-07-27 PROCEDURE — 99213 OFFICE O/P EST LOW 20 MIN: CPT | Mod: S$GLB,ICN,, | Performed by: PHYSICIAN ASSISTANT

## 2022-07-27 PROCEDURE — 99213 PR OFFICE/OUTPT VISIT, EST, LEVL III, 20-29 MIN: ICD-10-PCS | Mod: S$GLB,ICN,, | Performed by: PHYSICIAN ASSISTANT

## 2022-07-27 PROCEDURE — 87481 CANDIDA DNA AMP PROBE: CPT | Mod: 59 | Performed by: PHYSICIAN ASSISTANT

## 2022-07-27 PROCEDURE — 99213 OFFICE O/P EST LOW 20 MIN: CPT | Mod: PBBFAC | Performed by: PHYSICIAN ASSISTANT

## 2022-07-27 PROCEDURE — 99999 PR PBB SHADOW E&M-EST. PATIENT-LVL III: CPT | Mod: PBBFAC,,, | Performed by: PHYSICIAN ASSISTANT

## 2022-07-27 PROCEDURE — 1159F PR MEDICATION LIST DOCUMENTED IN MEDICAL RECORD: ICD-10-PCS | Mod: CPTII,S$GLB,ICN, | Performed by: PHYSICIAN ASSISTANT

## 2022-07-27 PROCEDURE — 1160F RVW MEDS BY RX/DR IN RCRD: CPT | Mod: CPTII,S$GLB,ICN, | Performed by: PHYSICIAN ASSISTANT

## 2022-07-27 PROCEDURE — 1160F PR REVIEW ALL MEDS BY PRESCRIBER/CLIN PHARMACIST DOCUMENTED: ICD-10-PCS | Mod: CPTII,S$GLB,ICN, | Performed by: PHYSICIAN ASSISTANT

## 2022-07-27 PROCEDURE — 4010F ACE/ARB THERAPY RXD/TAKEN: CPT | Mod: CPTII,S$GLB,ICN, | Performed by: PHYSICIAN ASSISTANT

## 2022-07-27 PROCEDURE — 1159F MED LIST DOCD IN RCRD: CPT | Mod: CPTII,S$GLB,ICN, | Performed by: PHYSICIAN ASSISTANT

## 2022-07-27 PROCEDURE — 3008F PR BODY MASS INDEX (BMI) DOCUMENTED: ICD-10-PCS | Mod: CPTII,S$GLB,ICN, | Performed by: PHYSICIAN ASSISTANT

## 2022-07-27 RX ORDER — FLUCONAZOLE 150 MG/1
150 TABLET ORAL DAILY
Qty: 1 TABLET | Refills: 0 | Status: SHIPPED | OUTPATIENT
Start: 2022-07-27 | End: 2022-07-28

## 2022-07-27 RX ORDER — CLOTRIMAZOLE AND BETAMETHASONE DIPROPIONATE 10; .64 MG/G; MG/G
CREAM TOPICAL
Qty: 45 G | Refills: 1 | Status: SHIPPED | OUTPATIENT
Start: 2022-07-27 | End: 2023-07-27

## 2022-07-27 NOTE — PROGRESS NOTES
Physical Therapy Daily Treatment Note     Name: Phylicia Vásquez  Clinic Number: [id number]    Therapy Diagnosis:   Encounter Diagnoses   Name Primary?    Weakness of both lower extremities Yes    Antalgic gait     Balance problem      Physician: Negrito Verde MD    Visit Date: 7/27/2022    Physician Orders: PT Eval and Treat   Medical Diagnosis from Referral:   C50.611,Z17.0 (ICD-10-CM) - Carcinoma of axillary tail of right breast in female, estrogen receptor positive   E66.01,Z68.41 (ICD-10-CM) - Class 3 severe obesity with body mass index (BMI) of 40.0 to 44.9 in adult, unspecified obesity type, unspecified whether serious comorbidity present   Evaluation Date: 2/8/2022  Authorization Period Expiration: 12/31/2022  Plan of Care Expiration: 8/8/2022  Visit # / Visits authorized: 12/ 20(plus eval)  Insurance: [employer]      Time In: 2:07 PM  Time Out: 3:01 pm  Total Billable Time: 54 minutes    Precautions: Standard and cancer    Subjective     Pt reports: knees feel good, heavy and numbness in L foot  She was compliant with home exercise program.  Response to previous treatment: went home and went to sleep  Functional change: none stated    Pain: 0/10  Location: B knees      Objective     Objective Measures updated at progress report unless specified.   Vitals: after biking  O2: 98%, HR: 99, BP: 152/99 5mmHg    Treatment     Mirian received therapeutic exercises to develop strength, endurance, ROM, flexibility, posture and core stabilization for 54 minutes including:    Aerobic/Cardio Activity: stationary bike seat 4, level 3, 10 minutes     Core activation with orange ball, 20 reps  Posterior pelvic tilt x 20 reps  Bridge with hip ADD 2 x 10 reps  Bridge with Hip ABD, blue band, 2 x 10  reps  Straight leg raise, 1 x 15 reps/LE  Ankle DF with Min A and tapping over anterior tibialis 1 x 15  Standing hip ext with green band at ankles, 2 x 10/LE  Standing hip abduction with green band at ankles, 2 x  10/LE  Standing gastroc stretch, 2 x 30 sec/ea  Standing soleus stretch 2 x 30 sec/ea  Pallof press with red resistance cord, 20 reps each  Seated marching on blue [employer], 1 x 10    Home Exercises Provided and Patient Education Provided     Education provided:   - exercise technique, how to monitor for potential lymphedema, management techniques for R hand swelling.  - self taping to encourage ankle DF when ambulating  - discussed AFO for left foot drop    Written Home Exercises Provided: Patient instructed to cont prior HEP.  Exercises were reviewed and Mirian was able to demonstrate them prior to the end of the session.  Mirian demonstrated good  understanding of the education provided.     See EMR under Patient Instructions for exercises provided eval and 2/14/22, 2/23/22    Assessment     Pt tolerated session well. Patient continues to have significant weakness in her left anterior tib as she displayed a foot drop when ambulating in the clinic. She continues to require Min A to move through her full ROM with ankle DF exercise. She performed all of today's activities with no increase in symptoms prior to leaving the clinic. Will progress as tolerated.   Mirian Is progressing well towards her goals.   Pt prognosis is Good.     Pt will continue to benefit from skilled outpatient physical therapy to address the deficits listed in the problem list box on initial evaluation, provide pt/family education and to maximize pt's level of independence in the home and community environment.     Pt's spiritual, cultural and educational needs considered and pt agreeable to plan of care and goals.     Anticipated barriers to physical therapy: none anticipated    Goals:   Short Term Goals:  4 weeks     1. Pt will demo >/= 4/5 strength in bilateral lower extremities for improved functional mobility. (progressing, not met)  2. Pt to demonstrate increased strength in bilateral upper extremities to >/=4+/5 for improved ability to perform  functional activities. (met, 6/8/22)  4. Pt. will improve single leg stance balance test score to >/=  15s to be in a moderate/low risk category for falls. (progressing, not met)  5. Pt will perform >/= 12 sit to stands in 30 seconds with no arms for improved endurance. (progressing, not met)  6. Pt will increase 6 minute walk test score by >/=  50 meters in order to increase ambulation in the community. (progressing, not met)  7. Pt will initiate home exercise program to improve strength, flexibility, endurance, mobility & balance. (met, 3/21/22)  8. Pt will tolerate 10 min or greater of time in light-->moderate intensity cardio (I.e. Bike, Treadmill) to improve endurance to assist in performing her preferred recreational activities. (met, 3/21/22)  9. Pt will verbalize understanding of exercise recommendation guidelines for moderate intensity exercise to safely perform and progress home exercise routine. (progressing, not met)     Long Term Goals: 8 weeks  1. Pt will increase strength in bilateral lower extremities to 5/5 for improved functional mobility and tolerance for ADL and work activities. (progressing, not met)  4. Pt will be independent with home exercise program to improve range of motion, strength, balance and return to prior level of function. (progressing, not met)  5. Pt. will improve Single Leg Stance balance test score to >/=  30s for patient to be in low risk category for falls. (progressing, not met)  6. Pt will perform >/= 15 sit to stands in 30 seconds with no arms for improved endurance. (progressing, not met)  7. Pt will increase 6 minute walk test score by >/= 75 meters in order to increase community ambulation. (progressing, not met)  8. Patient will report compliance with  20 -30min  of moderate intensity exercise 5 days a week to improve overall cardiovascular function and decrease cancer related fatigue. (progressing, not met)  Plan     Plan of care Certification: 6/8/2022 to  8/8/2022.     Outpatient Physical Therapy 2 times weekly for 8 weeks to include the following interventions: Gait Training, Manual Therapy, Neuromuscular Re-ed, Orthotic Management and Training, Patient Education, Self Care, Therapeutic Activities, Therapeutic Exercise and IASTM. Dry needling with manual therapy techniques to decrease pain, inflammation and swelling, increase circulation and promote healing process.     Gail Gutierrez, PT

## 2022-07-27 NOTE — PROGRESS NOTES
Physical Therapy Daily Treatment Note       Date: 7/27/2022   Name: Phylicia Vásquez  Clinic Number: [id number]    Therapy Diagnosis:   Encounter Diagnoses   Name Primary?    Lymphedema of right arm Yes    Impaired functional mobility and activity tolerance      Physician: Negrito Verde MD    Physician Orders: PT Eval and Treat   Medical Diagnosis: Carcinoma of axillary tail of right breast in female, estrogen receptor positive [C50.611, Z17.0], Localized edema      Evaluation Date: 6/3/2022  Authorization Period Expiration: 12/31/22  Plan of Care Certification Period: 8/26/22 (12 weeks)  Visit # / Visits authorized: 7/ 20 (plus eval)  Insurance: [email or web address]  FOTO: 3/3     Time In: 1:11 PM (late arrival- had to get bandages out of car)  Time Out: 2:05 PM  Total Billable Time: 54 minutes     Precautions: Standard, cancer and Right UE lymphedema      Subjective     Pt reports: Pump has been going better and not painful. Denies pain today.  She was compliant with self massage technique   Response to previous treatment: no adverse reaction  Pain Scale: Phylicia rates pain on a scale of 0/10 on VAS.   Pain location: N/A     Fatigue: not assessed  Functional change: can lift her arm overhead more easily  Treatment:   Chemotherapy:  4 cycles of AC and 4 cycles of Taxol patrick adjuvantly  Radiation: completed radiation therapy on 8/20/20.   Endocrine therapy: On 11/1/2020 started on Anastrozole with with Dr. Verde     Surgery date: Pt is s/p bilateral mastectomy with R SLNB, TE reconstruction in 4/2021. pt will have permanent reconstruction with ROWAN flap, but she states she needs to lose weight first.      Objective     Objective Measures updated at progress report unless specified.     LANDMARK RIGHT UE LEFT UE DIFF RUE Diff RUE Diff RUE Diff RUE Diff RUE Diff   Date 6/3/22 6/3/22 6/3/22 6/22/22 6/22/22 6/29/22 6/29/22 7/6/22 7/6/22 7/20/22 7/20/22 7/27/22 7/27/22  "  E + 8" 47 cm 46.5 cm 0.5 cm 46.5cm -0.5 44cm -2.5 45 cm +1 47 cm +2 45cm -2   E + 6" 44 cm 42.5 cm 1.5 cm 44 cm No chg 44cm No chg 43.5cm -0.5 45cm +1.5 42cm -3   E + 4" 39.5 cm 37 cm 2.5 cm 39cm -0.5 40.5 cm +1.5 40.5cm No chg 41cm +0.5 39 cm -2   E + 2" 34.5 cm 33 cm 1.5 cm 34.5 cm No chg 35 cm +0.5 34.5cm -0.5 35cm +0.5 34cm -1   Elbow 28 cm 28 cm 0 cm 27 cm -1 27.5 cm +0.5 28 cm +0.5 28cm No chg 27cm -1   W+ 8" 29 cm  28.5cm 0.5 cm 28cm -1 28 cm No chg 29.5cm +1.5 29cm -0.5 28.5cm -0.5   W +  6" 29.5 cm 27 cm 2.5 cm 29cm -0.5 29cm No chg 29cm No chg 29cm No chg 29cm No chg   W + 4" 25.5 cm 23.5 cm 2 cm 25cm -0.5 24.5 cm -0.5 25.5cm +1 25 cm -0.5 25cm No chg   Wrist 18 cm 17.5 cm 0.5 cm 18.5cm +0.5 18 cm -0.5 18.5cm +0.5 18cm -0.5 18cm No chg   DPC 21.5 cm 20 cm 1.5 cm 20cm -1.5 20.5cm +0.5 21 cm +0.5 21cm -0.5 20.5cm -0.5   IP Thumb 7.5 cm 7 cm 0.5 cm 8cm +0.5 7.5cm -0.5 8 cm +0.5 8cm No chg 7.5cm -0.5       FOTO: 7/27/22     Carrying, 44% limitation    Treatment         Phylicia received the following manual therapy techniques were performed to increased myofascial/soft tissue length, mobility and pliability, increase PROM, AROM and function as well as to decrease pain for 54 minutes:    Measurements noted above    Short Neck- held due to thyroid goiter  Clear Healthy Lymph Nodes:  Left Axilla                                                  Right  Groin  Open Anastomoses:  Anterior Axillo-Axillary  (AAA)                                    Axillo-Inguinal     (AI)                                    Posterior Axillo-Axillary  (LEEANN)     Right Upper Arm (Lateral and Medial)  Right Antecubital Fossa  Right forearm, dorsal and ventral aspect  Dorsum of R hand  R fingers    Rework R hand   Rework Right UE  Rework Anastomoses  Rework Healthy lymph Nodes    Diaphragmatic breathing, 2 x 5     PT applies gauze to Right fingers and hand;  stockinette, cotton artiflex, and short stretch bandages to pt's  right upper " extremity. Pt educated to wear bandaging for next 2-3 days unless it causes pain, numbness, or changes in skin color/temperature.  If removed, pt instructed to roll up bandages and cotton padding and bring to next session. If bandages are removed, pt can wear tubigrip and her compression arthritis.  Pt verbalizes understanding of all topics.    Reviewed steps of self manual lymph drainage that pt can perform while she is wearing compression bandages               Home Exercise Program and Patient Education   Education provided re:  - role of PT in multi - disciplinary team, goals for PT  - progress towards goals       Written Home Exercises Provided: Patient instructed to cont prior HEP.  Exercises were reviewed and Mirian was able to demonstrate them prior to the end of the session.  Mirian demonstrated good  understanding of the education provided.     See EMR under Media for exercises provided 6/8/2022- for self manual lymph drainage techniques.    Pt has no cultural, educational or language barriers to learning provided.    Assessment     Patient tolerated session well. She demo's substantial reductions in the circumference of her right arm. She tolerated manual interventions well. As she is only able to attend therapy 1 day/week, pt is interested in having her mother learn how to assist with self-bandaging technique. Will educate family as they are available. Will progress as tolerated. Once her RUE circumference stabilizes, she will benefit from compression sleeve and compression glove at 20-30mmHg.    Pt prognosis is Good. Pt will continue to benefit from skilled outpatient physical therapy to address the deficits listed in the problem list chart on initial evaluation, provide pt/family education and to maximize pt's level of independence in the home and community environment.     Anticipated barriers to physical therapy: none anticipated    Goals as follows:  Short Term Goals (6 weeks)  1. Pt will demo decrease in  girth in right upper extremity by >/= 1cm to allow for improved UE symmetry, clothing choice, ROM, and UE function. (progressing, not met)  2. Patient will demonstrate 100% knowledge of lymphedema precautions and signs of infection to allow for reduced risk of lymphedema, reduced risk of infection, and/or exacerbation.  (met)  3. Patient will perform self lymph drainage techniques to enhance lymphatic drainage and mobility.  (met, 7/6/22)  4. Patient will tolerate daily activities with multilayered bandaging to enhance lymphatic drainage and skin elasticity.  (met, 7/6/22)  5. Pt will tolerate HEP for improved strength, functional mobility, ROM, posture, and endurance. (met, 7/6/22)        Long Term Goals: ( 12  weeks)  1. Patient to show decreased girth in Right upper extremity by >/= 2cm to allow for improved UE symmetry, clothing choice, ROM, and UE function.  (progressing, not met)  2. Patient will show reduction in density to mild or less with improved contour of limb to allow for improved cosmesis, improved lymphatic drainage, and functional mobility.  (progressing, not met)  3. Patient to mary/doff compression garment with daily compliance to support lymphatic mobility and skin elasticity.  (progressing, not met)  4. Pt to show improved postural awareness and alignment for improved functional mobility.  (progressing, not met)  5. Pt to be independent and compliant with HEP to allow for improved ROM, reach and carry with functional endurance and strength.    (progressing, not met)           Plan   Outpatient physical therapy 2x week for 12 weeks to include the following:   Manual Therapy, Neuromuscular Re-ed, Orthotic Management and Training, Patient Education, Self Care, Therapeutic Activities and Therapeutic Exercise.     Plan of care Certification Period: 6/3/2022 to 8/26/22.       Therapist: Marcela Price, PT  7/27/2022

## 2022-07-28 ENCOUNTER — PATIENT MESSAGE (OUTPATIENT)
Dept: PSYCHIATRY | Facility: CLINIC | Age: 52
End: 2022-07-28
Payer: COMMERCIAL

## 2022-07-28 ENCOUNTER — TELEPHONE (OUTPATIENT)
Dept: PSYCHIATRY | Facility: CLINIC | Age: 52
End: 2022-07-28
Payer: COMMERCIAL

## 2022-07-28 NOTE — TELEPHONE ENCOUNTER
Spoke to patient by phone due to message from RONALD Bhakta.  Patient describes interpersonal struggles with son. Son recently fired from his job after 1 week and lost his best friend due to drug overdose ([place]). Son denies depression and refuses to accept mental health assistance. Patient explored potential ways to cope with his behavior. Believes she needs to set a deadline for him to either be enrolled in school/training program or have a job. She believes the rest of her family will support her (this time) in setting a firm goal for him.    Challenged patient's guilt over not being part of the weight loss program. Reminded her that having the appropriate life situation is the first step in a successful attempt at life change.    Patient does not believe she currently needs a follow-up therapy appt.  She is aware of how to reach me, if needed.      EMILY Hilton, PhD

## 2022-07-28 NOTE — PROGRESS NOTES
Subjective:      Phylicia Vásquez is a 52 y.o. female who presents for vaginal irritation x 3-4 days.  Irritation is mostly external. Used otc monistat but did not help. Using otc cortisone cream externally that helps short term. Denies discharge. She is working long hours in uniform. No recent changes in medications or antibiotics.  Increased stress with short staff at work. Having to work long hours and more shifts. Very concerned about her son. He is struggling to get a job and sleeping in his room all day long. Her sister has been working with him but no improvement. Concerns for depression but he will not meet with anyone and does not currently have medical insurance. He had a hard time coping with her breast cancer diagnosis. He couldn't see her throughout this time and thinks he has guilt about this.  She is participating in the Survivorship Weight Loss Program but often missing meetings due to work schedule. This is increasing her stress. Would like to restart with the next group.    Lab Visit on 05/11/2022   Component Date Value Ref Range Status    WBC 05/11/2022 11.75  3.90 - 12.70 K/uL Final    RBC 05/11/2022 4.56  4.00 - 5.40 M/uL Final    Hemoglobin 05/11/2022 11.5 (A) 12.0 - 16.0 g/dL Final    Hematocrit 05/11/2022 36.8 (A) 37.0 - 48.5 % Final    MCV 05/11/2022 81 (A) 82 - 98 fL Final    MCH 05/11/2022 25.2 (A) 27.0 - 31.0 pg Final    MCHC 05/11/2022 31.3 (A) 32.0 - 36.0 g/dL Final    RDW 05/11/2022 15.9 (A) 11.5 - 14.5 % Final    Platelets 05/11/2022 235  150 - 450 K/uL Final    MPV 05/11/2022 11.1  9.2 - 12.9 fL Final    Immature Granulocytes 05/11/2022 0.5  0.0 - 0.5 % Final    Gran # (ANC) 05/11/2022 7.0  1.8 - 7.7 K/uL Final    Immature Grans (Abs) 05/11/2022 0.06 (A) 0.00 - 0.04 K/uL Final    Lymph # 05/11/2022 4.0  1.0 - 4.8 K/uL Final    Mono # 05/11/2022 0.5  0.3 - 1.0 K/uL Final    Eos # 05/11/2022 0.2  0.0 - 0.5 K/uL Final    Baso # 05/11/2022 0.06  0.00 - 0.20 K/uL  Final    nRBC 05/11/2022 0  0 /100 WBC Final    Gran % 05/11/2022 59.4  38.0 - 73.0 % Final    Lymph % 05/11/2022 34.1  18.0 - 48.0 % Final    Mono % 05/11/2022 3.8 (A) 4.0 - 15.0 % Final    Eosinophil % 05/11/2022 1.7  0.0 - 8.0 % Final    Basophil % 05/11/2022 0.5  0.0 - 1.9 % Final    Differential Method 05/11/2022 Automated   Final    Sodium 05/11/2022 141  136 - 145 mmol/L Final    Potassium 05/11/2022 3.7  3.5 - 5.1 mmol/L Final    Chloride 05/11/2022 101  95 - 110 mmol/L Final    CO2 05/11/2022 31 (A) 23 - 29 mmol/L Final    Glucose 05/11/2022 124 (A) 70 - 110 mg/dL Final    BUN 05/11/2022 9  6 - 20 mg/dL Final    Creatinine 05/11/2022 0.8  0.5 - 1.4 mg/dL Final    Calcium 05/11/2022 9.8  8.7 - 10.5 mg/dL Final    Total Protein 05/11/2022 6.8  6.0 - 8.4 g/dL Final    Albumin 05/11/2022 3.6  3.5 - 5.2 g/dL Final    Total Bilirubin 05/11/2022 0.4  0.1 - 1.0 mg/dL Final    Alkaline Phosphatase 05/11/2022 91  55 - 135 U/L Final    AST 05/11/2022 13  10 - 40 U/L Final    ALT 05/11/2022 20  10 - 44 U/L Final    Anion Gap 05/11/2022 9  8 - 16 mmol/L Final    eGFR if African American 05/11/2022 >60.0  >60 mL/min/1.73 m^2 Final    eGFR if non African American 05/11/2022 >60.0  >60 mL/min/1.73 m^2 Final   Admission on 05/04/2022, Discharged on 05/04/2022   Component Date Value Ref Range Status    POC Rapid COVID 05/04/2022 Negative  Negative Final     Acceptable 05/04/2022 Yes   Final    POC Molecular Influenza A Ag 05/04/2022 Negative  Negative, Not Reported Final    POC Molecular Influenza B Ag 05/04/2022 Negative  Negative, Not Reported Final     Acceptable 05/04/2022 Yes   Final       Past Medical History:   Diagnosis Date    Anxiety     Back pain     Goiter     HTN (hypertension) 10/18/2012    Hx antineoplastic chemo     last dose 4/23/20    Hx of psychiatric care     Ambien, Klonopin    Insomnia 10/18/2012    Malignant neoplasm of axillary tail of  right breast in female, estrogen receptor positive 2019    right    Neck mass     right posterior    Primary invasive malignant neoplasm of female breast, right 2019    right    Psychiatric problem     S/P abdominal supracervical subtotal hysterectomy, continues to have regular menses. -2014    Spinal cord cyst, L1 2014     Thoracic radiculopathy, bulging disc at T10-11 and T11-12      Past Surgical History:   Procedure Laterality Date    AXILLARY NODE DISSECTION Right 2020    Procedure: LYMPHADENECTOMY, AXILLARY;  Surgeon: Lelo Guaman MD;  Location: [facility] OR 2ND FLR;  Service: General;  Laterality: Right;    BREAST BIOPSY Right     + CA    BREAST BIOPSY Left     BREAST RECONSTRUCTION Bilateral 2021     SECTION      CHOLECYSTECTOMY      COLONOSCOPY N/A 10/05/2015    Procedure: COLONOSCOPY;  Surgeon: JERONIMO Cancino MD;  Location: [facility] ENDO (4TH FLR);  Service: Endoscopy;  Laterality: N/A;    COLONOSCOPY N/A 2021    Procedure: COLONOSCOPY;  Surgeon: JERONIMO Cancino MD;  Location: [facility] ENDO (4TH FLR);  Service: Endoscopy;  Laterality: N/A;  fully vaccinated-GT    HYSTERECTOMY      BC--SUPRACERVICAL    INJECTION FOR SENTINEL NODE IDENTIFICATION Right 2020    Procedure: INJECTION, FOR SENTINEL NODE IDENTIFICATION;  Surgeon: Lelo Guaman MD;  Location: [facility];  Service: General;  Laterality: Right;    INSERTION OF BREAST TISSUE EXPANDER Right 2020    Procedure: INSERTION, TISSUE EXPANDER, BREAST;  Surgeon: Pablo Ramos MD;  Location: [facility] OR;  Service: Plastics;  Laterality: Right;    INSERTION OF TUNNELED CENTRAL VENOUS CATHETER (CVC) WITH SUBCUTANEOUS PORT Right 2019    Procedure: [id number] Fluoro needed;  Surgeon: Lelo Guaman MD;  Location: [facility] OR 2ND FLR;  Service: General;  Laterality: Right;  Right internal jugular.     LIPOMA RESECTION  2021    MASTECTOMY Bilateral 2021    with  reconstruction    NEEDLE LOCALIZATION Left 2021    Procedure: NEEDLE LOCALIZATION;  Surgeon: Kraig Mello MD;  Location[facility] C[facility] LAB;  Service: Radiology;  Laterality: Left;    RECONSTRUCTION OF BREAST WITH DEEP INFERIOR EPIGASTRIC ARTERY  (ROWAN) FREE FLAP Bilateral 2021    Procedure: RECONSTRUCTION, BREAST, USING ROWAN FREE FLAP;  Surgeon: Pablo Ramos MD;  Location: [facility];  Service: Plastics;  Laterality: Bilateral;    SENTINEL LYMPH NODE BIOPSY Right 2020    Procedure: BIOPSY, LYMPH NODE, SENTINEL;  Surgeon: Lelo Guaman MD;  Location: [facility];  Service: General;  Laterality: Right;    TISSUE EXPANDER REMOVAL Right 2021    Procedure: REMOVAL, TISSUE EXPANDER;  Surgeon: Pablo Ramos MD;  Location: [facility];  Service: Plastics;  Laterality: Right;    UNILATERAL MASTECTOMY Right 2020    Procedure: MASTECTOMY, UNILATERAL;  Surgeon: Lelo Guaman MD;  Location: [facility];  Service: General;  Laterality: Right;    UNILATERAL MASTECTOMY Left 2021    Procedure: MASTECTOMY, UNILATERAL PROPHYLACTIC;  Surgeon: Lelo Guaman MD;  Location: [facility];  Service: General;  Laterality: Left;     Social History     Tobacco Use    Smoking status: Never Smoker    Smokeless tobacco: Never Used   Substance Use Topics    Alcohol use: Yes     Comment: rarely    Drug use: No     Family History   Problem Relation Age of Onset    Cancer Paternal Aunt     Lupus Paternal Aunt     Hypertension Mother     Depression Mother     Liver disease Father     Alcohol abuse Father     Cancer Father         Lung and prostate cancer    Colon cancer Paternal Uncle     Depression Son     Breast cancer Neg Hx     Ovarian cancer Neg Hx     Melanoma Neg Hx     Psoriasis Neg Hx     Eczema Neg Hx      OB History    Para Term  AB Living   4 2 1 1 2 2   SAB IAB Ectopic Multiple Live Births   2 0 0 0 2      # Outcome Date GA Lbr Saul/2nd Weight Sex Delivery Anes PTL  Lv   4      M Vag-Spont  Y IRA      Complications: Fetal distress affecting labor   3 Term     M CS-Unspec  N IRA      Complications: Fetal distress affecting labor   2 SAB            1 SAB                Current Outpatient Medications:     albuterol (PROVENTIL/VENTOLIN HFA) 90 mcg/actuation inhaler, Inhale 1-2 puffs into the lungs every 6 (six) hours as needed for Wheezing. Rescue, Disp: 18 g, Rfl: 0    amlodipine-valsartan (EXFORGE) 5-320 mg per tablet, Take 1 tablet by mouth once daily., Disp: 90 tablet, Rfl: 3    anastrozole (ARIMIDEX) 1 mg Tab, Take 1 tablet (1 mg total) by mouth once daily. (Patient taking differently: Take 1 mg by mouth every morning.), Disp: 90 tablet, Rfl: 3    ascorbic acid (VITAMIN C ORAL), Take 500 mg by mouth every morning. , Disp: , Rfl:     cyanocobalamin, vitamin B-12, (VITAMIN B-12 ORAL), Take by mouth daily as needed. , Disp: , Rfl:     cyclobenzaprine (FLEXERIL) 10 MG tablet, Take 1 tablet (10 mg total) by mouth 3 (three) times daily as needed for Muscle spasms., Disp: 90 tablet, Rfl: 3    gabapentin (NEURONTIN) 100 MG capsule, Take 2 capsules (200 mg total) by mouth every morning. Take in the morning for neuropathy, Disp: 180 capsule, Rfl: 3    venlafaxine (EFFEXOR XR) 75 MG 24 hr capsule, Take 1 capsule (75 mg total) by mouth once daily. In the morning, Disp: 30 capsule, Rfl: 11    vitamin E 200 UNIT capsule, Take 200 Units by mouth daily as needed. , Disp: , Rfl:     zolpidem (AMBIEN) 10 mg Tab, Take 1 tablet (10 mg total) by mouth nightly as needed (Sleep)., Disp: 30 tablet, Rfl: 2    clotrimazole-betamethasone 1-0.05% (LOTRISONE) cream, Apply to affected area 2 times daily to the affected area as needed x 5-7 days, Disp: 45 g, Rfl: 1    fluconazole (DIFLUCAN) 150 MG Tab, Take 1 tablet (150 mg total) by mouth once daily. for 1 day, Disp: 1 tablet, Rfl: 0    The 10-year ASCVD risk score (Lalitha ARY Song., et al., 2013) is: 3.5%    Values used to calculate the  score:      Age: 52 years      Sex: Female      Is Non- : Yes      Diabetic: No      Tobacco smoker: No      Systolic Blood Pressure: 130 mmHg      Is BP treated: Yes      HDL Cholesterol: 59 mg/dL      Total Cholesterol: 176 mg/dL    Review of Systems:  General: No fever, chills, or weight loss.  Chest: No chest pain, shortness of breath, or palpitations.  Breast: No pain, masses, or nipple discharge.  Vulva: No pain, lesions, or itching.  Vagina: No relaxation, discharge, or lesions. +itching  Abdomen: No pain, nausea, vomiting, diarrhea, or constipation.  Urinary: No incontinence, nocturia, frequency, or dysuria.  Extremities:  No leg cramps, edema, or calf pain.  Neurologic: No headaches, dizziness, or visual changes.    Objective:     Vitals:    07/27/22 1026   BP: 130/84   Weight: 108 kg (238 lb)   PainSc: 0-No pain     Body mass index is 42.16 kg/m².    PHYSICAL EXAM:  APPEARANCE: Well nourished, well developed, in no acute distress.  AFFECT: WNL, alert and oriented x 3  PELVIC: Normal external genitalia without lesions. +atrophy  Normal hair distribution.  Adequate perineal body, normal urethral meatus.  Vagina moist and well rugated without lesions or discharge.  Cervix pink, without lesions, discharge or tenderness.  No significant cystocele or rectocele.  Bimanual exam shows uterus to be normal size, regular, mobile and nontender.  Adnexa without masses or tenderness.    EXTREMITIES: No edema.    Assessment:      Vaginal itching  -     Vaginosis Screen by DNA Probe    Vulvovaginitis  -     fluconazole (DIFLUCAN) 150 MG Tab; Take 1 tablet (150 mg total) by mouth once daily. for 1 day  Dispense: 1 tablet; Refill: 0  -     clotrimazole-betamethasone 1-0.05% (LOTRISONE) cream; Apply to affected area 2 times daily to the affected area as needed x 5-7 days  Dispense: 45 g; Refill: 1    Adjustment disorder with mixed anxiety and depressed mood        Plan:   Vaginosis screen.  Diflucan  for yeast.  Lotrisone externally PRN.  Consider topically estradiol/DHEA in the future if needed for atrophy.   I will reach out to Dr. Hilton to see if she has resources for family members who have cancer.   Add to list for next survivorship weight loss program.  Follow up PRN.    Instructed patient to call if she experiences any side effects or has any questions.    I spent a total of 35 minutes on the day of the visit.This includes face to face time and non-face to face time preparing to see the patient (eg, review of tests), obtaining and/or reviewing separately obtained history, documenting clinical information in the electronic or other health record, independently interpreting results and communicating results to the patient/family/caregiver, or care coordinator.

## 2022-08-01 ENCOUNTER — PATIENT MESSAGE (OUTPATIENT)
Dept: INTERNAL MEDICINE | Facility: CLINIC | Age: 52
End: 2022-08-01
Payer: COMMERCIAL

## 2022-08-01 DIAGNOSIS — I10 ESSENTIAL HYPERTENSION: ICD-10-CM

## 2022-08-01 RX ORDER — AMLODIPINE AND VALSARTAN 5; 320 MG/1; MG/1
1 TABLET ORAL DAILY
Qty: 90 TABLET | Refills: 3 | Status: SHIPPED | OUTPATIENT
Start: 2022-08-01 | End: 2023-11-03

## 2022-08-02 LAB
BACTERIAL VAGINOSIS DNA: NEGATIVE
CANDIDA GLABRATA DNA: NEGATIVE
CANDIDA KRUSEI DNA: NEGATIVE
CANDIDA RRNA VAG QL PROBE: NEGATIVE
T VAGINALIS RRNA GENITAL QL PROBE: NEGATIVE

## 2022-08-15 ENCOUNTER — OFFICE VISIT (OUTPATIENT)
Dept: HEMATOLOGY/ONCOLOGY | Facility: CLINIC | Age: 52
End: 2022-08-15
Payer: COMMERCIAL

## 2022-08-15 ENCOUNTER — CLINICAL SUPPORT (OUTPATIENT)
Dept: REHABILITATION | Facility: HOSPITAL | Age: 52
End: 2022-08-15
Attending: PHYSICIAN ASSISTANT
Payer: COMMERCIAL

## 2022-08-15 DIAGNOSIS — Z79.811 PROPHYLACTIC USE OF ANASTROZOLE (ARIMIDEX): ICD-10-CM

## 2022-08-15 DIAGNOSIS — R26.89 BALANCE PROBLEM: ICD-10-CM

## 2022-08-15 DIAGNOSIS — R29.898 WEAKNESS OF BOTH LOWER EXTREMITIES: Primary | ICD-10-CM

## 2022-08-15 DIAGNOSIS — Z17.0 CARCINOMA OF AXILLARY TAIL OF RIGHT BREAST IN FEMALE, ESTROGEN RECEPTOR POSITIVE: Primary | ICD-10-CM

## 2022-08-15 DIAGNOSIS — R26.89 ANTALGIC GAIT: ICD-10-CM

## 2022-08-15 DIAGNOSIS — C50.611 CARCINOMA OF AXILLARY TAIL OF RIGHT BREAST IN FEMALE, ESTROGEN RECEPTOR POSITIVE: Primary | ICD-10-CM

## 2022-08-15 PROCEDURE — 99213 OFFICE O/P EST LOW 20 MIN: CPT | Mod: S$GLB,,, | Performed by: INTERNAL MEDICINE

## 2022-08-15 PROCEDURE — 99999 PR PBB SHADOW E&M-EST. PATIENT-LVL I: CPT | Mod: PBBFAC,,, | Performed by: INTERNAL MEDICINE

## 2022-08-15 PROCEDURE — 99213 PR OFFICE/OUTPT VISIT, EST, LEVL III, 20-29 MIN: ICD-10-PCS | Mod: S$GLB,,, | Performed by: INTERNAL MEDICINE

## 2022-08-15 PROCEDURE — 4010F ACE/ARB THERAPY RXD/TAKEN: CPT | Mod: CPTII,S$GLB,, | Performed by: INTERNAL MEDICINE

## 2022-08-15 PROCEDURE — 99999 PR PBB SHADOW E&M-EST. PATIENT-LVL I: ICD-10-PCS | Mod: PBBFAC,,, | Performed by: INTERNAL MEDICINE

## 2022-08-15 PROCEDURE — 97750 PHYSICAL PERFORMANCE TEST: CPT

## 2022-08-15 PROCEDURE — 99211 OFF/OP EST MAY X REQ PHY/QHP: CPT | Mod: PBBFAC | Performed by: INTERNAL MEDICINE

## 2022-08-15 PROCEDURE — 97110 THERAPEUTIC EXERCISES: CPT

## 2022-08-15 PROCEDURE — 4010F PR ACE/ARB THEARPY RXD/TAKEN: ICD-10-PCS | Mod: CPTII,S$GLB,, | Performed by: INTERNAL MEDICINE

## 2022-08-15 NOTE — PROGRESS NOTES
Physical Therapy Daily Treatment Note     Name: Phylicia Vásquez  Clinic Number: [id number]    Therapy Diagnosis:   Encounter Diagnoses   Name Primary?    Weakness of both lower extremities Yes    Antalgic gait     Balance problem      Physician: Marcela Calvillo PA-C    Visit Date: 8/15/2022    Physician Orders: PT Eval and Treat   Medical Diagnosis from Referral:   C50.611,Z17.0 (ICD-10-CM) - Carcinoma of axillary tail of right breast in female, estrogen receptor positive   E66.01,Z68.41 (ICD-10-CM) - Class 3 severe obesity with body mass index (BMI) of 40.0 to 44.9 in adult, unspecified obesity type, unspecified whether serious comorbidity present   Evaluation Date: 2/8/2022  Authorization Period Expiration: 12/31/2022  Plan of Care Expiration: 8/8/2022  Visit # / Visits authorized: 14/ 20(plus eval)  Insurance: [employer]      Time In: 9:00 am  Time Out: 9:55 am  Total Billable Time: 55 minutes    Precautions: Standard and cancer    Subjective     Pt reports: her knees are doing better since the injections, but her L foot not so much as it keeps swelling.  She was compliant with home exercise program.  Response to previous treatment: went home and went to sleep  Functional change: none stated    Pain: 3/10  Location: L foot      Objective     Objective Measures updated at progress report unless specified.   Vitals:   O2: 99%, HR: 74, BP: 120/93 mmHg    Manual Muscle Test:  Upper Extremity Strength    (R) UE (L) UE   Shoulder flexion: 4+/5 4+/5   Shoulder Abduction: 5/5 5/5   Shoulder IR 5/5 5/5   Shoulder ER 5/5 5/5   Elbow flexion: 5/5 5/5   Elbow extension: 5/5 5/5   Wrist flexion: 5/5 5/5   Wrist extension: 5/5 5/5          Lower Extremity Strength  Right LE   Left LE     Hip Flexion: 5/5 Hip Flexion: 5/5   Hip Extension:  4+/5 Hip Extension: 4+/5   Hip Abduction: 5/5 Hip Abduction: 5/5   Hip Adduction: 5/5 Hip Adduction 5/5   Knee Extension: 5/5 Knee Extension: 5/5   Knee Flexion: 5/5 Knee Flexion: 5/5    Ankle Dorsiflexion: 5/5 Ankle Dorsiflexion: 3/5   Ankle Plantarflexion: 5/5 Ankle Plantarflexion: 4/5                Strength (in pounds, setting II, average of three trials):    Left Right   Trial 1: 30.6 lbs 40.3 lbs   Trial 2: 34.6 lbs 42.1 lbs   Trial 3: 32.8 lbs 43.4 lbs   Average: 32.67 lbs 41.93 lbs       Strength (in pounds, setting II, average of three trials): 6/8/22    Left Right   Trial 1: 32.4 lbs 26.8 lbs   Trial 2: 34.8 lbs 28.8 lbs   Trial 3: 35.2 lbs 31.7 lbs   Average: 34.13 lbs 29.1 lbs*   * not accurate as patient's R UE was wrapped for compression for lymphedema treatment      Strength (in pounds, setting II, average of three trials): 8/15/22    Left Right   Trial 1: 44.3 lbs 48.5 lbs   Trial 2: 46.0 lbs 45.6 lbs   Trial 3: 49.3 lbs 51.5 lbs   Average: 46.5 lbs 48.5 lbs     Normal  Average Values  Female Right Left Male: Right Left   20-29 66 lbs 62 lbs         94 lbs 86 lbs   30-39 68 lbs 64 lbs 90 lbs 82 lbs   40-49 64 lbs 62 lbs 80 lbs 74 lbs   50-59 62 lbs 57 lbs 72 lbs 65 lbs   60-69 53 lbs 51 lbs 63 lbs 56 lbs   70+ 44 lbs 42 lbs 54 lbs 48 lbs         Balance Assessment:     For Single Limb Stance, enter best of 3 trials for each leg. Test times out at 30s.    Evaluation 6/8/22 8/15/22   Single Limb Stance R LE 3.16  (<10 sec = HIGH FALL RISK) 4.24 seconds 2.4 seconds   Single Limb Stance L LE 2.42  (<10 sec = HIGH FALL RISK) 1.85 seconds 0.54 seconds        Evaluation 6/8/22 8/15/22   Timed Up and Go 10.83 sec  14.09 sec 12.02 sec         Table: Population Norms for TUG    Age  Average TUG    60 - 69 years  8.1 seconds    70 - 79 years  9.2 seconds    80 - 99 years  11.3 seconds          Endurance:     6 Minute Walk Test Distance in meters: 344.12 meters  -6/8/22 - 229.05 meters  - 8/15/22- 240.96 meters  - AD used: none  - Assistance: supervision  - Distance: 240.96 meters 8/15/22     GAIT DEVIATIONS:  Phylicia displays the following deviations with ambulation: decreased  heel strike and toe off on left lower extremity          Evaluation 6/8/22 8/15/22   30 second Chair Rise  (adults > 61 y/o) 10 completed with no arms 7 completed with no arms 9 completed with no arms               CMS Impairment/Limitation/Restriction for FOTO Cancer Survey     Therapist reviewed FOTO scores for Phylicia Vásquez on 8/15/2022.   FOTO documents entered into [employer] - see Media section.     Limitation Score: 36%  Category: Mobility        Treatment       Mirian received a physical performance assessment with report for 40 minutes:  See objective section for measurements    Mirian received therapeutic exercises to develop strength, endurance, ROM, flexibility, posture and core stabilization for 15 minutes including:    Aerobic/Cardio Activity: stationary bike seat 4, level 3, 10 minutes     Standing gastroc stretch, 2 x 30 sec/ea  Standing soleus stretch 2 x 30 sec/ea  Pallof press with red resistance cord, 20 reps each    Home Exercises Provided and Patient Education Provided     Education provided:   - exercise technique, how to monitor for potential lymphedema, management techniques for R hand swelling.  - self taping to encourage ankle DF when ambulating  - discussed AFO for left foot drop    Written Home Exercises Provided: Patient instructed to cont prior HEP.  Exercises were reviewed and Mirian was able to demonstrate them prior to the end of the session.  Mirian demonstrated good  understanding of the education provided.     See EMR under Patient Instructions for exercises provided eval and 2/14/22, 2/23/22    Assessment     Pt demonstrated an increase in B UE and LE strength compared to her initial evaluation, but continues to have limitations in L ankle DF which contributes to her abnormal gait pattern and balance issues. Her scores on TUG and 30 second sit to stand tests have improved compared to previous assessment, but has decreased compared to her initial assessment. Her scores indicate she is at  risk for falls. Her score on 6 minute walk test has improved compared to her previous assessment but has declined compared to her initial evaluation. Her continued complaints of pain in her knees and the neuropathy in her L LE has limited her progress with therapy. She has had to continue to work very long hours at work which has also limited her ability to perform her HEP on a regular basis. At this time she is independent with her HEP and has met all goals set for her to her max rehab potential. She is ready for discharge to her HEP.   Mirian Is progressing well towards her goals.   Pt prognosis is Good.     Pt's spiritual, cultural and educational needs considered and pt agreeable to plan of care and goals.     Anticipated barriers to physical therapy: none anticipated    Goals:   Short Term Goals:  4 weeks     1. Pt will demo >/= 4/5 strength in bilateral lower extremities for improved functional mobility. (met except for L ankle DF, 8/15/22)  2. Pt to demonstrate increased strength in bilateral upper extremities to >/=4+/5 for improved ability to perform functional activities. (met, 6/8/22)  4. Pt. will improve single leg stance balance test score to >/=  15s to be in a moderate/low risk category for falls. (not met)  5. Pt will perform >/= 12 sit to stands in 30 seconds with no arms for improved endurance. (partially met, 8/15/22)  6. Pt will increase 6 minute walk test score by >/=  50 meters in order to increase ambulation in the community. (not met)  7. Pt will initiate home exercise program to improve strength, flexibility, endurance, mobility & balance. (met, 3/21/22)  8. Pt will tolerate 10 min or greater of time in light-->moderate intensity cardio (I.e. Bike, Treadmill) to improve endurance to assist in performing her preferred recreational activities. (met, 3/21/22)  9. Pt will verbalize understanding of exercise recommendation guidelines for moderate intensity exercise to safely perform and progress  home exercise routine. (met, 8/15/22)     Long Term Goals: 8 weeks  1. Pt will increase strength in bilateral lower extremities to 5/5 for improved functional mobility and tolerance for ADL and work activities. (met except for L DF 8/15/22)  4. Pt will be independent with home exercise program to improve range of motion, strength, balance and return to prior level of function. (met, 8/15/22)  5. Pt. will improve Single Leg Stance balance test score to >/=  30s for patient to be in low risk category for falls. (not met)  6. Pt will perform >/= 15 sit to stands in 30 seconds with no arms for improved endurance. (not met)  7. Pt will increase 6 minute walk test score by >/= 75 meters in order to increase community ambulation. (not met)  8. Patient will report compliance with  20 -30min  of moderate intensity exercise 5 days a week to improve overall cardiovascular function and decrease cancer related fatigue. (not met)  Plan     Discharge to [facility].      Gail Gutierrez, PT

## 2022-08-15 NOTE — PROGRESS NOTES
Subjective:       Patient ID: Phylicia Vásquez is a 52 y.o. female.    Chief Complaint: No chief complaint on file.    HPI   Mrs Lua returns today for follow-up.   I had last seen her in early May  2022.     As of November 1, 2020 she has been on anastrozole in the adjuvant setting.  Of note, her estradiol level and her FSH level suggested that she is postmenopausal, hence initiation of anastrozole.  After her last visit in early October 2021 she underwent a mammogram that was read as BI-RADS 4 and led to a biopsy of a left axillary lymph node that was unremarkable.   On April 21, 2021 she underwent a contralateral prophylactic mastectomy with ROWAN flap reconstructions bilaterally.      Briefly, she is a 52 year old AA female who was found to have a carcinoma that is ER positive, RI positive, and HER 2 equivocal, but negative by  FISH.  An axillary node biopsy was also positive.  She received standard neoadjuvant chemotherapy consisting of 4 cycles of AC and 4 cycles of Taxol prior to undergoing a mastectomy.  At the time of her mastectomy she had a 3 cm residual tumor while the 2 sentinel lymph nodes were positive.  A subsequent axillary node dissection showed no evidence of further chandler involvement.   She completed radiation therapy, and as of November 1, 2020 she has been on adjuvant anastrozole.      Review of Systems    Overall she feels OK.  She admits to hot flashes and occasional myalgias.  She still has residual neuropathy, manifested by numbness primarily on her toes.  ECOG PS is 1. She denies any depression, easy bruising, fevers, chills, night  sweats, weight loss, vomiting, diarrhea, constipation, diplopia, blurred vision, headache, chest pain, palpitations, shortness of breath, left breast pain, abdominal pain, extremity pain, or difficulty ambulating.  The remainder of the ten-point ROS, including general, skin, lymph, H/N, cardiorespiratory, GI, , Neuro, Endocrine, and psychiatric is  negative.       Objective:      Physical Exam      She is alert, oriented to time, place, person, pleasant, well      nourished, in no acute physical distress.                                   VITAL SIGNS:  Reviewed                                      HEENT:  Normal.  There are no nasal, oral, lip, gingival, auricular, lid,    or conjunctival lesions.  Mucosae are moist and pink, and there is no        thrush.  Pupils are equal, reactive to light and accommodation.              Extraocular muscle movements are intact.  Dentition is good.  There is no frontal or maxillary tenderness.                                     NECK:  Supple without JVD, adenopathy, or thyromegaly.                       LUNGS:  Clear to auscultation without wheezing, rales, or rhonchi.           CARDIOVASCULAR:  Reveals an S1, S2, no murmurs, no rubs, no gallops.         ABDOMEN:  Soft, nontender, without organomegaly.  Bowel sounds are    present.                                                                         EXTREMITIES:  No cyanosis, clubbing, but she does have 1+ edema primarily on the distal right upper extremity                               BREASTS:   she is now status post bilateral mastectomies with ROWAN flap reconstructions.  On the left reconstructed breast there a large area of fat necrosis measuring at least 10 cm on the upper outer quadrant.  There is also an area of probable fat necrosis on the right reconstructed breast at the 4 o'clock position.    I do not palpate any axillary lymph nodes in either side.    LYMPHATIC:  There is no cervical, axillary, or supraclavicular adenopathy.   SKIN:  Warm and moist, without petechiae, rashes, induration, or ecchymoses.           NEUROLOGIC:  DTRs are 0-1+ bilaterally, symmetrical, motor function is 5/5,  and cranial nerves are  within normal limits.    Assessment:       1. Carcinoma of axillary tail of right breast in female, estrogen receptor positive, pathologically T2N1M0  after neoadjuvant chemotherapy     2.    Axillary node metastases.  3.      Status post recent left prophylactic mastectomy with bilateral ROWAN flap reconstructions  4.      Extensive fat necrosis bilaterally.  Of note, a recent left mammogram was unremarkable.  5.      Mild lymphedema, right upper extremity.    Plan:        I had a long discussion with her.    She will remain on anastrozole through the end of October 2025, at which point I will recommend that she extend her treatment for an additional two years to complete 7 years of adjuvant hormonal therapy with AIs.    RTC 4 months.  Her multiple questions were answered to her satisfaction.    Route Chart for Scheduling    Med Onc Chart Routing      Follow up with physician 3 months.   Follow up with DANIEL    Infusion scheduling note    Injection scheduling note    Labs    Imaging    Pharmacy appointment    Other referrals

## 2022-08-15 NOTE — PLAN OF CARE
[facility]  Physical Therapy Discharge Note    Name: Phylicia Vásquez  Clinic Number: [id number]    Therapy Diagnosis:   Encounter Diagnoses   Name Primary?    Weakness of both lower extremities Yes    Antalgic gait     Balance problem      Physician: Marcela Calvillo PA-C    Physician: Marcela Calvillo PA-C     Physician Orders: PT Eval and Treat   Medical Diagnosis from Referral:   C50.611,Z17.0 (ICD-10-CM) - Carcinoma of axillary tail of right breast in female, estrogen receptor positive   E66.01,Z68.41 (ICD-10-CM) - Class 3 severe obesity with body mass index (BMI) of 40.0 to 44.9 in adult, unspecified obesity type, unspecified whether serious comorbidity present   Evaluation Date: 2/8/2022      Date of Last visit: 8/15/22  Total Visits Received: 15    ASSESSMENT      Pt demonstrated an increase in B UE and LE strength compared to her initial evaluation, but continues to have limitations in L ankle DF which contributes to her abnormal gait pattern and balance issues. Her scores on TUG and 30 second sit to stand tests have improved compared to previous assessment, but has decreased compared to her initial assessment. Her scores indicate she is at risk for falls. Her score on 6 minute walk test has improved compared to her previous assessment but has declined compared to her initial evaluation. Her continued complaints of pain in her knees and the neuropathy in her L LE has limited her progress with therapy. She has had to continue to work very long hours at work which has also limited her ability to perform her HEP on a regular basis. At this time she is independent with her HEP and has met all goals set for her to her max rehab potential. She is ready for discharge to her HEP.     Discharge reason: Patient has reached the maximum rehab potential for the present time    Discharge FOTO Score: 36% impaired, limited, or restricted    Goals:   Short Term Goals:  4 weeks     1. Pt  will demo >/= 4/5 strength in bilateral lower extremities for improved functional mobility. (met except for L ankle DF, 8/15/22)  2. Pt to demonstrate increased strength in bilateral upper extremities to >/=4+/5 for improved ability to perform functional activities. (met, 6/8/22)  4. Pt. will improve single leg stance balance test score to >/=  15s to be in a moderate/low risk category for falls. (not met)  5. Pt will perform >/= 12 sit to stands in 30 seconds with no arms for improved endurance. (partially met, 8/15/22)  6. Pt will increase 6 minute walk test score by >/=  50 meters in order to increase ambulation in the community. (not met)  7. Pt will initiate home exercise program to improve strength, flexibility, endurance, mobility & balance. (met, 3/21/22)  8. Pt will tolerate 10 min or greater of time in light-->moderate intensity cardio (I.e. Bike, Treadmill) to improve endurance to assist in performing her preferred recreational activities. (met, 3/21/22)  9. Pt will verbalize understanding of exercise recommendation guidelines for moderate intensity exercise to safely perform and progress home exercise routine. (met, 8/15/22)     Long Term Goals: 8 weeks  1. Pt will increase strength in bilateral lower extremities to 5/5 for improved functional mobility and tolerance for ADL and work activities. (met except for L DF 8/15/22)  4. Pt will be independent with home exercise program to improve range of motion, strength, balance and return to prior level of function. (met, 8/15/22)  5. Pt. will improve Single Leg Stance balance test score to >/=  30s for patient to be in low risk category for falls. (not met)  6. Pt will perform >/= 15 sit to stands in 30 seconds with no arms for improved endurance. (not met)  7. Pt will increase 6 minute walk test score by >/= 75 meters in order to increase community ambulation. (not met)  8. Patient will report compliance with  20 -30min  of moderate intensity exercise 5 days  a week to improve overall cardiovascular function and decrease cancer related fatigue. (not met)  PLAN   This patient is discharged from Physical Therapy      Gail Gutierrez, PT

## 2022-08-18 ENCOUNTER — CLINICAL SUPPORT (OUTPATIENT)
Dept: REHABILITATION | Facility: HOSPITAL | Age: 52
End: 2022-08-18
Attending: INTERNAL MEDICINE
Payer: COMMERCIAL

## 2022-08-18 DIAGNOSIS — I89.0 LYMPHEDEMA OF RIGHT ARM: Primary | ICD-10-CM

## 2022-08-18 DIAGNOSIS — I89.0 LYMPHEDEMA: Primary | ICD-10-CM

## 2022-08-18 DIAGNOSIS — Z74.09 IMPAIRED FUNCTIONAL MOBILITY AND ACTIVITY TOLERANCE: ICD-10-CM

## 2022-08-18 PROCEDURE — 97110 THERAPEUTIC EXERCISES: CPT

## 2022-08-18 NOTE — PROGRESS NOTES
"                                                    Physical Therapy Daily Treatment Note       Date: 8/18/2022   Name: Phylicia Vásquez  Clinic Number: [id number]    Therapy Diagnosis:   Encounter Diagnoses   Name Primary?    Lymphedema of right arm Yes    Impaired functional mobility and activity tolerance      Physician: Negrito Verde MD    Physician Orders: PT Eval and Treat   Medical Diagnosis: Carcinoma of axillary tail of right breast in female, estrogen receptor positive [C50.611, Z17.0], Localized edema      Evaluation Date: 6/3/2022  Authorization Period Expiration: 12/31/22  Plan of Care Certification Period: 8/26/22 (12 weeks)  Visit # / Visits authorized: 8/ 20 (plus eval)  Insurance: [employer]  FOTO: 3/3     Time In: 10:05 AM  Time Out: 11:09 AM  Total Billable Time: 64 minutes     Precautions: Standard, cancer and Right UE lymphedema      Subjective     Pt reports: Using pump daily. States her tubigrip is too tight and rolling down.  She was compliant with self massage technique   Response to previous treatment: no adverse reaction  Pain Scale: Phylicia rates pain on a scale of 0/10 on VAS.   Pain location: N/A     Fatigue: not assessed  Functional change: can lift her right arm  more easily  Treatment:   Chemotherapy:  4 cycles of AC and 4 cycles of Taxol patrick adjuvantly  Radiation: completed radiation therapy on 8/20/20.   Endocrine therapy: On 11/1/2020 started on Anastrozole with with Dr. Verde     Surgery date: Pt is s/p bilateral mastectomy with R SLNB, TE reconstruction in 4/2021. pt will have permanent reconstruction with ROWAN flap, but she states she needs to lose weight first.      Objective     Objective Measures updated at progress report unless specified.     LANDMARK RIGHT UE LEFT UE DIFF RUE Diff RUE Diff RUE Diff RUE Diff RUE Diff RUE Diff   Date 6/3/22 6/3/22 6/3/22 6/22/22 6/22/22 6/29/22 6/29/22 7/6/22 7/6/22 7/20/22 7/20/22 7/27/22 7/27/22 8/18/22 8/18/22   E + 8" 47 cm " "46.5 cm 0.5 cm 46.5cm -0.5 44cm -2.5 45 cm +1 47 cm +2 45cm -2 43.5cm -1.5   E + 6" 44 cm 42.5 cm 1.5 cm 44 cm No chg 44cm No chg 43.5cm -0.5 45cm +1.5 42cm -3 41cm -1   E + 4" 39.5 cm 37 cm 2.5 cm 39cm -0.5 40.5 cm +1.5 40.5cm No chg 41cm +0.5 39 cm -2 39cm No chg   E + 2" 34.5 cm 33 cm 1.5 cm 34.5 cm No chg 35 cm +0.5 34.5cm -0.5 35cm +0.5 34cm -1 34.5cm +0.5   Elbow 28 cm 28 cm 0 cm 27 cm -1 27.5 cm +0.5 28 cm +0.5 28cm No chg 27cm -1 27cm No chg   W+ 8" 29 cm  28.5cm 0.5 cm 28cm -1 28 cm No chg 29.5cm +1.5 29cm -0.5 28.5cm -0.5 28cm -0.5   W +  6" 29.5 cm 27 cm 2.5 cm 29cm -0.5 29cm No chg 29cm No chg 29cm No chg 29cm No chg 29cm No chg   W + 4" 25.5 cm 23.5 cm 2 cm 25cm -0.5 24.5 cm -0.5 25.5cm +1 25 cm -0.5 25cm No chg 24.5cm -0.5   Wrist 18 cm 17.5 cm 0.5 cm 18.5cm +0.5 18 cm -0.5 18.5cm +0.5 18cm -0.5 18cm No chg 18cm No chg   DPC 21.5 cm 20 cm 1.5 cm 20cm -1.5 20.5cm +0.5 21 cm +0.5 21cm -0.5 20.5cm -0.5 21cm +0.5   IP Thumb 7.5 cm 7 cm 0.5 cm 8cm +0.5 7.5cm -0.5 8 cm +0.5 8cm No chg 7.5cm -0.5 7.5cm No chg       FOTO: 7/27/22     Carrying, 44% limitation    Treatment         Phylicia received the following manual therapy techniques were performed to increased myofascial/soft tissue length, mobility and pliability, increase PROM, AROM and function as well as to decrease pain for 54 minutes:    Measurements noted above    Short Neck- held due to thyroid goiter  Clear Healthy Lymph Nodes:  Left Axilla                                                  Right  Groin  Open Anastomoses:  Anterior Axillo-Axillary  (AAA)                                    Axillo-Inguinal     (AI)                                    Posterior Axillo-Axillary  (LEEANN)     Right Upper Arm (Lateral and Medial)  Right Antecubital Fossa  Right forearm, dorsal and ventral aspect  Dorsum of R hand  R fingers    Rework R hand   Rework Right UE  Rework Anastomoses  Rework Healthy lymph Nodes    Pt was provided with new piece of size F tubigrip to " wear. It appears to fit better and is not rolling down in clinic    PT applies gauze to Right fingers and hand;  stockinette, cotton artiflex, and short stretch bandages to pt's  right upper extremity. Pt educated to wear bandaging for next 2-3 days unless it causes pain, numbness, or changes in skin color/temperature.  If removed, pt instructed to roll up bandages and cotton padding and bring to next session. If bandages are removed, pt can wear tubigrip and her compression arthritis glove.  Pt verbalizes understanding of all topics.      Reviewed steps of self manual lymph drainage that pt can perform while she is wearing compression bandages    .Phylicia received therapeutic exercises to develop ROM, posture and lymph moblity for 4 minutes including:       Diaphragmatic breathing, 2 x 5   Scapular retractions x 10 reps  Shoulder rolls fwd and back, 10 reps each             Home Exercise Program and Patient Education   Education provided re:  - role of PT in multi - disciplinary team, goals for PT  - progress towards goals       Written Home Exercises Provided: Patient instructed to cont prior HEP.  Exercises were reviewed and Mirian was able to demonstrate them prior to the end of the session.  Mirian demonstrated good  understanding of the education provided.     See EMR under Media for exercises provided 6/8/2022- for self manual lymph drainage techniques.    Pt has no cultural, educational or language barriers to learning provided.    Assessment     Patient tolerated session well. She continues to demo reductions in RUE circumference overall.  She tolerated manual interventions well.  She has been compliant with manual lymph drainage with pump.  Will progress as tolerated. Once her RUE circumference stabilizes, she will benefit from compression sleeve and compression glove at 20-30mmHg.    Pt prognosis is Good. Pt will continue to benefit from skilled outpatient physical therapy to address the deficits listed in the  problem list chart on initial evaluation, provide pt/family education and to maximize pt's level of independence in the home and community environment.     Anticipated barriers to physical therapy: none anticipated    Goals as follows:  Short Term Goals (6 weeks)  1. Pt will demo decrease in girth in right upper extremity by >/= 1cm to allow for improved UE symmetry, clothing choice, ROM, and UE function. (pmet, 8/18/22  2. Patient will demonstrate 100% knowledge of lymphedema precautions and signs of infection to allow for reduced risk of lymphedema, reduced risk of infection, and/or exacerbation.  (met)  3. Patient will perform self lymph drainage techniques to enhance lymphatic drainage and mobility.  (met, 7/6/22)  4. Patient will tolerate daily activities with multilayered bandaging to enhance lymphatic drainage and skin elasticity.  (met, 7/6/22)  5. Pt will tolerate HEP for improved strength, functional mobility, ROM, posture, and endurance. (met, 7/6/22)        Long Term Goals: ( 12  weeks)  1. Patient to show decreased girth in Right upper extremity by >/= 2cm to allow for improved UE symmetry, clothing choice, ROM, and UE function.  (met partially, 8/18/22)  2. Patient will show reduction in density to mild or less with improved contour of limb to allow for improved cosmesis, improved lymphatic drainage, and functional mobility.  (progressing, not met)  3. Patient to mary/doff compression garment with daily compliance to support lymphatic mobility and skin elasticity.  (progressing, not met)  4. Pt to show improved postural awareness and alignment for improved functional mobility.  (progressing, not met)  5. Pt to be independent and compliant with HEP to allow for improved ROM, reach and carry with functional endurance and strength.    (progressing, not met)           Plan   Outpatient physical therapy 2x week for 12 weeks to include the following:   Manual Therapy, Neuromuscular Re-ed, Orthotic Management  and Training, Patient Education, Self Care, Therapeutic Activities and Therapeutic Exercise.     Plan of care Certification Period: 6/3/2022 to 8/26/22.       Therapist: Marcela Price, PT  8/18/2022

## 2022-08-24 ENCOUNTER — CLINICAL SUPPORT (OUTPATIENT)
Dept: REHABILITATION | Facility: HOSPITAL | Age: 52
End: 2022-08-24
Attending: INTERNAL MEDICINE
Payer: COMMERCIAL

## 2022-08-24 DIAGNOSIS — I89.0 LYMPHEDEMA OF RIGHT ARM: Primary | ICD-10-CM

## 2022-08-24 DIAGNOSIS — Z74.09 IMPAIRED FUNCTIONAL MOBILITY AND ACTIVITY TOLERANCE: ICD-10-CM

## 2022-08-24 PROCEDURE — 97140 MANUAL THERAPY 1/> REGIONS: CPT

## 2022-08-24 NOTE — PROGRESS NOTES
Physical Therapy Daily Treatment Note       Date: 8/24/2022   Name: Phylicia Vásquez  Clinic Number: [id number]    Therapy Diagnosis:   Encounter Diagnoses   Name Primary?    Lymphedema of right arm Yes    Impaired functional mobility and activity tolerance      Physician: Negrito Verde MD    Physician Orders: PT Eval and Treat   Medical Diagnosis: Carcinoma of axillary tail of right breast in female, estrogen receptor positive [C50.611, Z17.0], Localized edema      Evaluation Date: 6/3/2022  Authorization Period Expiration: 12/31/22  Plan of Care Certification Period: 8/26/22 (12 weeks)  Visit # / Visits authorized: 9/ 20 (plus eval)  Insurance: [employer]  FOTO: 3/3     Time In: 10:05 AM  Time Out: 10:49 AM  Total Billable Time: 44 minutes     Precautions: Standard, cancer and Right UE lymphedema      Subjective     Pt reports: Using pump daily. Tubigrip is better. She was able to keep her bandages on for 3 days  She was compliant with self massage technique   Response to previous treatment: no adverse reaction  Pain Scale: Phylicia rates pain on a scale of 0/10 on VAS.   Pain location: N/A     Fatigue: not assessed  Functional change: can lift her right arm  more easily  Treatment:   Chemotherapy:  4 cycles of AC and 4 cycles of Taxol patrick adjuvantly  Radiation: completed radiation therapy on 8/20/20.   Endocrine therapy: On 11/1/2020 started on Anastrozole with with Dr. Verde     Surgery date: Pt is s/p bilateral mastectomy with R SLNB, TE reconstruction in 4/2021. pt will have permanent reconstruction with ROWAN flap, but she states she needs to lose weight first.      Objective     Objective Measures updated at progress report unless specified.     LANDMARK RIGHT UE LEFT UE DIFF RUE Diff RUE Diff RUE Diff RUE Diff RUE Diff RUE Diff RUE Diff   Date 6/3/22 6/3/22 6/3/22 6/22/22 6/22/22 6/29/22 6/29/22 7/6/22 7/6/22 7/20/22 7/20/22 7/27/22 7/27/22 8/18/22  "8/18/22 8/24/22 8/24/22   E + 8" 47 cm 46.5 cm 0.5 cm 46.5cm -0.5 44cm -2.5 45 cm +1 47 cm +2 45cm -2 43.5cm -1.5 43.5cm No chg   E + 6" 44 cm 42.5 cm 1.5 cm 44 cm No chg 44cm No chg 43.5cm -0.5 45cm +1.5 42cm -3 41cm -1 43cm +2   E + 4" 39.5 cm 37 cm 2.5 cm 39cm -0.5 40.5 cm +1.5 40.5cm No chg 41cm +0.5 39 cm -2 39cm No chg 39.5cm +0.5   E + 2" 34.5 cm 33 cm 1.5 cm 34.5 cm No chg 35 cm +0.5 34.5cm -0.5 35cm +0.5 34cm -1 34.5cm +0.5 34.5cm No chg   Elbow 28 cm 28 cm 0 cm 27 cm -1 27.5 cm +0.5 28 cm +0.5 28cm No chg 27cm -1 27cm No chg 27.5cm +0.5   W+ 8" 29 cm  28.5cm 0.5 cm 28cm -1 28 cm No chg 29.5cm +1.5 29cm -0.5 28.5cm -0.5 28cm -0.5 29.5cm +1.5   W +  6" 29.5 cm 27 cm 2.5 cm 29cm -0.5 29cm No chg 29cm No chg 29cm No chg 29cm No chg 29cm No chg 29.5cm +0.5   W + 4" 25.5 cm 23.5 cm 2 cm 25cm -0.5 24.5 cm -0.5 25.5cm +1 25 cm -0.5 25cm No chg 24.5cm -0.5 25.5cm +1   Wrist 18 cm 17.5 cm 0.5 cm 18.5cm +0.5 18 cm -0.5 18.5cm +0.5 18cm -0.5 18cm No chg 18cm No chg 18cm No chg   DPC 21.5 cm 20 cm 1.5 cm 20cm -1.5 20.5cm +0.5 21 cm +0.5 21cm -0.5 20.5cm -0.5 21cm +0.5 21.5cm +0.5   IP Thumb 7.5 cm 7 cm 0.5 cm 8cm +0.5 7.5cm -0.5 8 cm +0.5 8cm No chg 7.5cm -0.5 7.5cm No chg 8cm +0.5       FOTO: 7/27/22     Carrying, 44% limitation    Treatment         Phylicia received the following manual therapy techniques were performed to increased myofascial/soft tissue length, mobility and pliability, increase PROM, AROM and function as well as to decrease pain for 44 minutes:    Diaphragmatic breathing x 5 reps    Measurements noted above    Short Neck- held due to thyroid goiter  Clear Healthy Lymph Nodes:  Left Axilla                                                  Right  Groin  Open Anastomoses:  Anterior Axillo-Axillary  (AAA)                                    Axillo-Inguinal     (AI)                                    Posterior Axillo-Axillary  (LEEANN) -not performed today     Right Upper Arm (Lateral and Medial)  Right " Antecubital Fossa  Right forearm, dorsal and ventral aspect  Dorsum of R hand  R fingers    Rework R hand   Rework Right UE  Rework Anastomoses  Rework Healthy lymph Nodes    Diaphragmatic breathing x 5 reps    PT applies gauze to Right fingers and hand;  stockinette, cotton artiflex, and short stretch bandages to pt's  right upper extremity. Pt educated to wear bandaging for next 2-3 days unless it causes pain, numbness, or changes in skin color/temperature.  If removed, pt instructed to roll up bandages and cotton padding and bring to next session. If bandages are removed, pt can wear tubigrip and her compression arthritis glove.  Pt verbalizes understanding of all topics.           Home Exercise Program and Patient Education   Education provided re:  - role of PT in multi - disciplinary team, goals for PT  - progress towards goals       Written Home Exercises Provided: Patient instructed to cont prior HEP.  Exercises were reviewed and Mirian was able to demonstrate them prior to the end of the session.  Mirian demonstrated good  understanding of the education provided.     See EMR under Media for exercises provided 6/8/2022- for self manual lymph drainage techniques.    Pt has no cultural, educational or language barriers to learning provided.    Assessment     Patient tolerated session well. She demo's increase in arm circumference today, as it has been a few days since her arm was wrapped. She tolerated manual interventions well.  She has been compliant with manual lymph drainage with pump.  Will progress as tolerated. Once her RUE circumference stabilizes, she will benefit from compression sleeve and compression glove at 20-30mmHg. Will progress as tolerated.    Pt prognosis is Good. Pt will continue to benefit from skilled outpatient physical therapy to address the deficits listed in the problem list chart on initial evaluation, provide pt/family education and to maximize pt's level of independence in the home and  community environment.     Anticipated barriers to physical therapy: none anticipated    Goals as follows:  Short Term Goals (6 weeks)  1. Pt will demo decrease in girth in right upper extremity by >/= 1cm to allow for improved UE symmetry, clothing choice, ROM, and UE function. (pmet, 8/18/22  2. Patient will demonstrate 100% knowledge of lymphedema precautions and signs of infection to allow for reduced risk of lymphedema, reduced risk of infection, and/or exacerbation.  (met)  3. Patient will perform self lymph drainage techniques to enhance lymphatic drainage and mobility.  (met, 7/6/22)  4. Patient will tolerate daily activities with multilayered bandaging to enhance lymphatic drainage and skin elasticity.  (met, 7/6/22)  5. Pt will tolerate HEP for improved strength, functional mobility, ROM, posture, and endurance. (met, 7/6/22)        Long Term Goals: ( 12  weeks)  1. Patient to show decreased girth in Right upper extremity by >/= 2cm to allow for improved UE symmetry, clothing choice, ROM, and UE function.  (met partially, 8/18/22)  2. Patient will show reduction in density to mild or less with improved contour of limb to allow for improved cosmesis, improved lymphatic drainage, and functional mobility.  (progressing, not met)  3. Patient to mary/doff compression garment with daily compliance to support lymphatic mobility and skin elasticity.  (progressing, not met)  4. Pt to show improved postural awareness and alignment for improved functional mobility.  (progressing, not met)  5. Pt to be independent and compliant with HEP to allow for improved ROM, reach and carry with functional endurance and strength.    (progressing, not met)           Plan   Outpatient physical therapy 2x week for 12 weeks to include the following:   Manual Therapy, Neuromuscular Re-ed, Orthotic Management and Training, Patient Education, Self Care, Therapeutic Activities and Therapeutic Exercise.     Plan of care Certification  Period: 6/3/2022 to 8/26/22.       Therapist: Marcela Price, PT  8/24/2022

## 2022-09-02 ENCOUNTER — CLINICAL SUPPORT (OUTPATIENT)
Dept: REHABILITATION | Facility: HOSPITAL | Age: 52
End: 2022-09-02
Attending: INTERNAL MEDICINE
Payer: COMMERCIAL

## 2022-09-02 DIAGNOSIS — I89.0 LYMPHEDEMA OF RIGHT ARM: Primary | ICD-10-CM

## 2022-09-02 DIAGNOSIS — Z74.09 IMPAIRED FUNCTIONAL MOBILITY AND ACTIVITY TOLERANCE: ICD-10-CM

## 2022-09-02 PROCEDURE — 97140 MANUAL THERAPY 1/> REGIONS: CPT

## 2022-09-02 NOTE — PROGRESS NOTES
Physical Therapy Daily Treatment Note       Date: 9/2/2022   Name: Phylicia Vásquez  Clinic Number: [id number]    Therapy Diagnosis:   Encounter Diagnoses   Name Primary?    Lymphedema of right arm Yes    Impaired functional mobility and activity tolerance      Physician: Negrito Verde MD    Physician Orders: PT Eval and Treat   Medical Diagnosis: Carcinoma of axillary tail of right breast in female, estrogen receptor positive [C50.611, Z17.0], Localized edema      Evaluation Date: 6/3/2022  Authorization Period Expiration: 12/31/22  Updated Plan of Care Certification Period: 9/30/22   Visit # / Visits authorized: 10/ 20 (plus eval)  Insurance: [employer]  FOTO: 3/3     Time In: 11:12 AM  Time Out: 12:05 PM  Total Billable Time: 53 minutes     Precautions: Standard, cancer and Right UE lymphedema      Subjective     Pt reports: Using pump daily. Pt states she feels more swollen in her R upper arm and in her R breast. Her bandages lasted 3 days.  She was compliant with self massage technique   Response to previous treatment: no adverse reaction  Pain Scale: Phylicia rates pain on a scale of 0/10 on VAS.   Pain location: N/A     Fatigue: not assessed  Functional change: can lift her right arm  more easily  Treatment:   Chemotherapy:  4 cycles of AC and 4 cycles of Taxol patrick adjuvantly  Radiation: completed radiation therapy on 8/20/20.   Endocrine therapy: On 11/1/2020 started on Anastrozole with with Dr. Verde     Surgery date: Pt is s/p bilateral mastectomy with R SLNB, TE reconstruction in 4/2021. pt will have permanent reconstruction with ROWAN flap, but she states she needs to lose weight first.      Objective     Objective Measures updated at progress report unless specified.     LANDMARK RIGHT UE LEFT UE DIFF RUE Diff RUE Diff RUE Diff RUE Diff RUE Diff RUE Diff RUE Diff RUE Diff   Date 6/3/22 6/3/22 6/3/22 6/22/22 6/22/22 6/29/22 6/29/22 7/6/22 7/6/22  "7/20/22 7/20/22 7/27/22 7/27/22 8/18/22 8/18/22 8/24/22 8/24/22 9/2/22 9/2/22   E + 8" 47 cm 46.5 cm 0.5 cm 46.5cm -0.5 44cm -2.5 45 cm +1 47 cm +2 45cm -2 43.5cm -1.5 43.5cm No chg 44cm +0.5   E + 6" 44 cm 42.5 cm 1.5 cm 44 cm No chg 44cm No chg 43.5cm -0.5 45cm +1.5 42cm -3 41cm -1 43cm +2 42.5cm -0.5   E + 4" 39.5 cm 37 cm 2.5 cm 39cm -0.5 40.5 cm +1.5 40.5cm No chg 41cm +0.5 39 cm -2 39cm No chg 39.5cm +0.5 40cm +0.5   E + 2" 34.5 cm 33 cm 1.5 cm 34.5 cm No chg 35 cm +0.5 34.5cm -0.5 35cm +0.5 34cm -1 34.5cm +0.5 34.5cm No chg 35cm +0.5   Elbow 28 cm 28 cm 0 cm 27 cm -1 27.5 cm +0.5 28 cm +0.5 28cm No chg 27cm -1 27cm No chg 27.5cm +0.5 27cm -0.5   W+ 8" 29 cm  28.5cm 0.5 cm 28cm -1 28 cm No chg 29.5cm +1.5 29cm -0.5 28.5cm -0.5 28cm -0.5 29.5cm +1.5 29cm -0.5   W +  6" 29.5 cm 27 cm 2.5 cm 29cm -0.5 29cm No chg 29cm No chg 29cm No chg 29cm No chg 29cm No chg 29.5cm +0.5 28.5cm -1   W + 4" 25.5 cm 23.5 cm 2 cm 25cm -0.5 24.5 cm -0.5 25.5cm +1 25 cm -0.5 25cm No chg 24.5cm -0.5 25.5cm +1 24.5cm -1   Wrist 18 cm 17.5 cm 0.5 cm 18.5cm +0.5 18 cm -0.5 18.5cm +0.5 18cm -0.5 18cm No chg 18cm No chg 18cm No chg 18cm No chg   DPC 21.5 cm 20 cm 1.5 cm 20cm -1.5 20.5cm +0.5 21 cm +0.5 21cm -0.5 20.5cm -0.5 21cm +0.5 21.5cm +0.5 21cm -0.5   IP Thumb 7.5 cm 7 cm 0.5 cm 8cm +0.5 7.5cm -0.5 8 cm +0.5 8cm No chg 7.5cm -0.5 7.5cm No chg 8cm +0.5 7.5cm -0.5     Treatment         Phylicia received the following manual therapy techniques were performed to increased myofascial/soft tissue length, mobility and pliability, increase PROM, AROM and function as well as to decrease pain for 53 minutes:    Diaphragmatic breathing x 5 reps    Measurements noted above and in Plan of Care objective section    Short Neck- held due to thyroid goiter  Clear Healthy Lymph Nodes:  Left Axilla                                                  Right  Groin  Open Anastomoses:  Anterior Axillo-Axillary  (AAA)                                    " Axillo-Inguinal     (AI)                                    Posterior Axillo-Axillary  (LEEANN) -not performed today     Right Upper Arm (Lateral and Medial)  Right Antecubital Fossa  Right forearm, dorsal and ventral aspect  Dorsum of R hand  R fingers    Rework R hand   Rework Right UE  Rework Anastomoses  Rework Healthy lymph Nodes    Diaphragmatic breathing x 5 reps    PT applies gauze to Right fingers and hand;  stockinette, cotton artiflex, and short stretch bandages to pt's  right upper extremity. Pt educated to wear bandaging for next 2-3 days unless it causes pain, numbness, or changes in skin color/temperature.  If removed, pt instructed to roll up bandages and cotton padding and bring to next session. If bandages are removed, pt can wear tubigrip and her compression arthritis glove.  Pt verbalizes understanding of all topics.           Home Exercise Program and Patient Education   Education provided re:  - role of PT in multi - disciplinary team, goals for PT  - progress towards goals       Written Home Exercises Provided: Patient instructed to cont prior HEP.  Exercises were reviewed and Mirian was able to demonstrate them prior to the end of the session.  Mirian demonstrated good  understanding of the education provided.     See EMR under Media for exercises provided 6/8/2022- for self manual lymph drainage techniques.    Pt has no cultural, educational or language barriers to learning provided.    Assessment     Patient tolerated session well. She demo's reductions overall in the circumference of her right arm. She tolerated manual interventions well.  She has been compliant with manual lymph drainage with pump.  She has met goals as noted below.  Once her RUE circumference stabilizes, she will benefit from compression sleeve and compression glove at 20-30mmHg. Will progress as tolerated. Extending POC to 9/30/22. Will add breast component to MLD as indicated.    Pt prognosis is Good. Pt will continue to  benefit from skilled outpatient physical therapy to address the deficits listed in the problem list chart on initial evaluation, provide pt/family education and to maximize pt's level of independence in the home and community environment.     Anticipated barriers to physical therapy: none anticipated    Goals as follows:  Short Term Goals (6 weeks)  1. Pt will demo decrease in girth in right upper extremity by >/= 1cm to allow for improved UE symmetry, clothing choice, ROM, and UE function. (met, 8/18/22  2. Patient will demonstrate 100% knowledge of lymphedema precautions and signs of infection to allow for reduced risk of lymphedema, reduced risk of infection, and/or exacerbation.  (met)  3. Patient will perform self lymph drainage techniques to enhance lymphatic drainage and mobility.  (met, 7/6/22)  4. Patient will tolerate daily activities with multilayered bandaging to enhance lymphatic drainage and skin elasticity.  (met, 7/6/22)  5. Pt will tolerate HEP for improved strength, functional mobility, ROM, posture, and endurance. (met, 7/6/22)        Long Term Goals: ( 12  weeks)  1. Patient to show decreased girth in Right upper extremity by >/= 2cm to allow for improved UE symmetry, clothing choice, ROM, and UE function.  (met partially, 8/18/22)  2. Patient will show reduction in density to mild or less with improved contour of limb to allow for improved cosmesis, improved lymphatic drainage, and functional mobility.  (met, 9/2/22)  3. Patient to mary/doff compression garment with daily compliance to support lymphatic mobility and skin elasticity.  (progressing, not met)  4. Pt to show improved postural awareness and alignment for improved functional mobility.  (progressing, not met)  5. Pt to be independent and compliant with HEP to allow for improved ROM, reach and carry with functional endurance and strength.    (progressing, not met)           Plan   Outpatient physical therapy 2x week for 4 weeks to  include the following:   Manual Therapy, Neuromuscular Re-ed, Orthotic Management and Training, Patient Education, Self Care, Therapeutic Activities and Therapeutic Exercise.     Plan of care Certification Period: 9/2/22 to 9/30/22       Therapist: Marcela Price, PT  9/2/2022

## 2022-09-06 NOTE — PLAN OF CARE
"  Outpatient Therapy Updated Plan of Care     Visit Date: 9/2/2022  Name: Phylicia Vásquez  Clinic Number: [id number]    Therapy Diagnosis:   Encounter Diagnoses   Name Primary?    Lymphedema of right arm Yes    Impaired functional mobility and activity tolerance      Physician: Negrito Verde MD      Physician Orders: PT Eval and Treat   Medical Diagnosis: Carcinoma of axillary tail of right breast in female, estrogen receptor positive [C50.611, Z17.0], Localized edema    Evaluation Date: 6/3/2022    Total Visits Received: 11  Cancelled Visits: unknown  No Show Visits: 0    Current Certification Period:  6/3/22 to 8/26/22  Precautions:  Standard, Fall, RUE lymphedema  Visits from Evaluation Date:  10      Subjective     Update: Pt reports: Using pump daily. Pt states she feels more swollen in her R upper arm and in her R breast. Her bandages lasted 3 days.  She was compliant with self massage technique   Response to previous treatment: no adverse reaction  Pain Scale: Phylicia rates pain on a scale of 0/10 on VAS.   Pain location: N/A     Fatigue: not assessed  Functional change: can lift her right arm  more easily    Objective     Update:   LANDMARK RIGHT UE LEFT UE DIFF RUE Diff RUE Diff RUE Diff RUE Diff RUE Diff RUE Diff RUE Diff RUE Diff   Date 6/3/22 6/3/22 6/3/22 6/22/22 6/22/22 6/29/22 6/29/22 7/6/22 7/6/22 7/20/22 7/20/22 7/27/22 7/27/22 8/18/22 8/18/22 8/24/22 8/24/22 9/2/22 9/2/22   E + 8" 47 cm 46.5 cm 0.5 cm 46.5cm -0.5 44cm -2.5 45 cm +1 47 cm +2 45cm -2 43.5cm -1.5 43.5cm No chg 44cm +0.5   E + 6" 44 cm 42.5 cm 1.5 cm 44 cm No chg 44cm No chg 43.5cm -0.5 45cm +1.5 42cm -3 41cm -1 43cm +2 42.5cm -0.5   E + 4" 39.5 cm 37 cm 2.5 cm 39cm -0.5 40.5 cm +1.5 40.5cm No chg 41cm +0.5 39 cm -2 39cm No chg 39.5cm +0.5 40cm +0.5   E + 2" 34.5 cm 33 cm 1.5 cm 34.5 cm No chg 35 cm +0.5 34.5cm -0.5 35cm +0.5 34cm -1 34.5cm +0.5 34.5cm No chg 35cm +0.5   Elbow 28 cm 28 cm 0 cm 27 cm -1 27.5 cm +0.5 28 cm +0.5 " "28cm No chg 27cm -1 27cm No chg 27.5cm +0.5 27cm -0.5   W+ 8" 29 cm  28.5cm 0.5 cm 28cm -1 28 cm No chg 29.5cm +1.5 29cm -0.5 28.5cm -0.5 28cm -0.5 29.5cm +1.5 29cm -0.5   W +  6" 29.5 cm 27 cm 2.5 cm 29cm -0.5 29cm No chg 29cm No chg 29cm No chg 29cm No chg 29cm No chg 29.5cm +0.5 28.5cm -1   W + 4" 25.5 cm 23.5 cm 2 cm 25cm -0.5 24.5 cm -0.5 25.5cm +1 25 cm -0.5 25cm No chg 24.5cm -0.5 25.5cm +1 24.5cm -1   Wrist 18 cm 17.5 cm 0.5 cm 18.5cm +0.5 18 cm -0.5 18.5cm +0.5 18cm -0.5 18cm No chg 18cm No chg 18cm No chg 18cm No chg   DPC 21.5 cm 20 cm 1.5 cm 20cm -1.5 20.5cm +0.5 21 cm +0.5 21cm -0.5 20.5cm -0.5 21cm +0.5 21.5cm +0.5 21cm -0.5   IP Thumb 7.5 cm 7 cm 0.5 cm 8cm +0.5 7.5cm -0.5 8 cm +0.5 8cm No chg 7.5cm -0.5 7.5cm No chg 8cm +0.5 7.5cm -0.5       Assessment     Update: Patient tolerated session well. She demo's reductions overall in the circumference of her right arm. She tolerated manual interventions well.  She has been compliant with manual lymph drainage with pump.  She has met goals as noted in treatment note.  Once her RUE circumference stabilizes, she will benefit from compression sleeve and compression glove at 20-30mmHg. Will progress as tolerated. Extending POC to 9/30/22    Previous Short Term Goals Status:   met partially  New Short Term Goals Status:   N/A  Long Term Goal Status:   continue per initial plan of care.  Reasons for Recertification of Therapy:   pt making reductions in her RUE circumference. She is compliant with manual lymph drainage/use of compression pump. She will benefit from continued therapy so she can eventually be fit with compression sleeve and glove.    Plan     Updated Certification Period: 9/2/2022 to 9/30/22  Recommended Treatment Plan: 2 times per week for 4 weeks: Manual Therapy, Neuromuscular Re-ed, Orthotic Management and Training, Patient Education, Self Care, Therapeutic Activities, and Therapeutic Exercise  Other Recommendations: none anticipated    Marcela PARIS" Albert, PT  9/2/2022      I CERTIFY THE NEED FOR THESE SERVICES FURNISHED UNDER THIS PLAN OF TREATMENT AND WHILE UNDER MY CARE    Physician's comments:        Physician's Signature: ___________________________________________________

## 2022-09-12 ENCOUNTER — HOSPITAL ENCOUNTER (EMERGENCY)
Facility: HOSPITAL | Age: 52
Discharge: HOME | End: 2022-09-12
Attending: EMERGENCY MEDICINE
Payer: COMMERCIAL

## 2022-09-12 VITALS
RESPIRATION RATE: 18 BRPM | HEART RATE: 57 BPM | SYSTOLIC BLOOD PRESSURE: 147 MMHG | DIASTOLIC BLOOD PRESSURE: 98 MMHG | TEMPERATURE: 97 F | OXYGEN SATURATION: 100 %

## 2022-09-12 DIAGNOSIS — N64.4 BREAST PAIN, RIGHT: Primary | ICD-10-CM

## 2022-09-12 PROBLEM — G62.9 NEUROPATHY: Chronic | Status: ACTIVE | Noted: 2020-04-22

## 2022-09-12 PROBLEM — Z85.3 HISTORY OF BREAST CANCER: Chronic | Status: ACTIVE | Noted: 2021-04-21

## 2022-09-12 PROBLEM — I10 ESSENTIAL HYPERTENSION: Chronic | Status: ACTIVE | Noted: 2019-02-28

## 2022-09-12 LAB
ANISOCYTOSIS BLD QL SMEAR: SLIGHT
BASOPHILS # BLD AUTO: 0.05 K/UL (ref 0–0.2)
BASOPHILS NFR BLD: 0.6 % (ref 0–1.9)
BUN SERPL-MCNC: 18 MG/DL (ref 6–30)
CHLORIDE SERPL-SCNC: 102 MMOL/L (ref 95–110)
CREAT SERPL-MCNC: 0.6 MG/DL (ref 0.5–1.4)
CRP SERPL-MCNC: 18.8 MG/L (ref 0–8.2)
DIFFERENTIAL METHOD: ABNORMAL
EOSINOPHIL # BLD AUTO: 0.2 K/UL (ref 0–0.5)
EOSINOPHIL NFR BLD: 2.5 % (ref 0–8)
ERYTHROCYTE [DISTWIDTH] IN BLOOD BY AUTOMATED COUNT: 15.8 % (ref 11.5–14.5)
ERYTHROCYTE [SEDIMENTATION RATE] IN BLOOD BY PHOTOMETRIC METHOD: 70 MM/HR (ref 0–36)
GLUCOSE SERPL-MCNC: 105 MG/DL (ref 70–110)
HCT VFR BLD AUTO: 37.3 % (ref 37–48.5)
HCT VFR BLD CALC: 39 %PCV (ref 36–54)
HCV AB SERPL QL IA: NORMAL
HGB BLD-MCNC: 12.3 G/DL (ref 12–16)
HIV 1+2 AB+HIV1 P24 AG SERPL QL IA: NORMAL
HYPOCHROMIA BLD QL SMEAR: ABNORMAL
IMM GRANULOCYTES # BLD AUTO: 0.03 K/UL (ref 0–0.04)
IMM GRANULOCYTES NFR BLD AUTO: 0.4 % (ref 0–0.5)
LYMPHOCYTES # BLD AUTO: 2.4 K/UL (ref 1–4.8)
LYMPHOCYTES NFR BLD: 30.1 % (ref 18–48)
MCH RBC QN AUTO: 26.5 PG (ref 27–31)
MCHC RBC AUTO-ENTMCNC: 33 G/DL (ref 32–36)
MCV RBC AUTO: 80 FL (ref 82–98)
MONOCYTES # BLD AUTO: 0.4 K/UL (ref 0.3–1)
MONOCYTES NFR BLD: 5.2 % (ref 4–15)
NEUTROPHILS # BLD AUTO: 4.9 K/UL (ref 1.8–7.7)
NEUTROPHILS NFR BLD: 61.2 % (ref 38–73)
NRBC BLD-RTO: 0 /100 WBC
OVALOCYTES BLD QL SMEAR: ABNORMAL
PLATELET # BLD AUTO: 216 K/UL (ref 150–450)
PMV BLD AUTO: ABNORMAL FL (ref 9.2–12.9)
POC IONIZED CALCIUM: 1.1 MMOL/L (ref 1.06–1.42)
POC TCO2 (MEASURED): 28 MMOL/L (ref 23–29)
POIKILOCYTOSIS BLD QL SMEAR: SLIGHT
POLYCHROMASIA BLD QL SMEAR: ABNORMAL
POTASSIUM BLD-SCNC: 4.7 MMOL/L (ref 3.5–5.1)
RBC # BLD AUTO: 4.64 M/UL (ref 4–5.4)
SAMPLE: NORMAL
SODIUM BLD-SCNC: 140 MMOL/L (ref 136–145)
SPHEROCYTES BLD QL SMEAR: ABNORMAL
WBC # BLD AUTO: 7.94 K/UL (ref 3.9–12.7)

## 2022-09-12 PROCEDURE — 99285 EMERGENCY DEPT VISIT HI MDM: CPT | Mod: 25

## 2022-09-12 PROCEDURE — 86803 HEPATITIS C AB TEST: CPT | Performed by: EMERGENCY MEDICINE

## 2022-09-12 PROCEDURE — 96374 THER/PROPH/DIAG INJ IV PUSH: CPT

## 2022-09-12 PROCEDURE — 85652 RBC SED RATE AUTOMATED: CPT | Performed by: INTERNAL MEDICINE

## 2022-09-12 PROCEDURE — 93010 ELECTROCARDIOGRAM REPORT: CPT | Mod: ,,, | Performed by: INTERNAL MEDICINE

## 2022-09-12 PROCEDURE — 25000003 PHARM REV CODE 250

## 2022-09-12 PROCEDURE — 80047 BASIC METABLC PNL IONIZED CA: CPT

## 2022-09-12 PROCEDURE — 86140 C-REACTIVE PROTEIN: CPT | Performed by: INTERNAL MEDICINE

## 2022-09-12 PROCEDURE — 99285 PR EMERGENCY DEPT VISIT,LEVEL V: ICD-10-PCS | Mod: ,,, | Performed by: EMERGENCY MEDICINE

## 2022-09-12 PROCEDURE — 25000003 PHARM REV CODE 250: Performed by: INTERNAL MEDICINE

## 2022-09-12 PROCEDURE — 63600175 PHARM REV CODE 636 W HCPCS: Performed by: INTERNAL MEDICINE

## 2022-09-12 PROCEDURE — 85025 COMPLETE CBC W/AUTO DIFF WBC: CPT | Performed by: INTERNAL MEDICINE

## 2022-09-12 PROCEDURE — 99285 EMERGENCY DEPT VISIT HI MDM: CPT | Mod: ,,, | Performed by: EMERGENCY MEDICINE

## 2022-09-12 PROCEDURE — 93005 ELECTROCARDIOGRAM TRACING: CPT

## 2022-09-12 PROCEDURE — 93010 EKG 12-LEAD: ICD-10-PCS | Mod: ,,, | Performed by: INTERNAL MEDICINE

## 2022-09-12 PROCEDURE — 87389 HIV-1 AG W/HIV-1&-2 AB AG IA: CPT | Performed by: EMERGENCY MEDICINE

## 2022-09-12 RX ORDER — HYDROCODONE BITARTRATE AND ACETAMINOPHEN 5; 325 MG/1; MG/1
1 TABLET ORAL
Status: DISCONTINUED | OUTPATIENT
Start: 2022-09-12 | End: 2022-09-12

## 2022-09-12 RX ORDER — HYDROCODONE BITARTRATE AND ACETAMINOPHEN 5; 325 MG/1; MG/1
1 TABLET ORAL EVERY 6 HOURS PRN
Qty: 12 TABLET | Refills: 0 | Status: SHIPPED | OUTPATIENT
Start: 2022-09-12 | End: 2022-11-17

## 2022-09-12 RX ORDER — HYDROCODONE BITARTRATE AND ACETAMINOPHEN 5; 325 MG/1; MG/1
1 TABLET ORAL
Status: COMPLETED | OUTPATIENT
Start: 2022-09-12 | End: 2022-09-12

## 2022-09-12 RX ORDER — CEPHALEXIN 250 MG/1
250 CAPSULE ORAL 4 TIMES DAILY
Qty: 28 CAPSULE | Refills: 0 | Status: SHIPPED | OUTPATIENT
Start: 2022-09-12 | End: 2022-09-19

## 2022-09-12 RX ORDER — KETOROLAC TROMETHAMINE 30 MG/ML
10 INJECTION, SOLUTION INTRAMUSCULAR; INTRAVENOUS
Status: COMPLETED | OUTPATIENT
Start: 2022-09-12 | End: 2022-09-12

## 2022-09-12 RX ADMIN — HYDROCODONE BITARTRATE AND ACETAMINOPHEN 1 TABLET: 5; 325 TABLET ORAL at 05:09

## 2022-09-12 RX ADMIN — HYDROCODONE BITARTRATE AND ACETAMINOPHEN 1 TABLET: 5; 325 TABLET ORAL at 08:09

## 2022-09-12 RX ADMIN — KETOROLAC TROMETHAMINE 10 MG: 30 INJECTION, SOLUTION INTRAMUSCULAR; INTRAVENOUS at 05:09

## 2022-09-12 NOTE — ED PROVIDER NOTES
Encounter date: 4:16 AM 09/12/2022    Source of History   Patient    Chief Complaint   Pt presents with:   right breast swelling (Pt reports right breast swelling and pain since yesterday. Pt has hx of breast cancer and lymphadema. Pt reports she completed chemo and radiation 2 years ago. Denies chest pain/SOB. Denies n/v/d.)      History Of Present Illness   Phylicia Vásquez is a 52 y.o. female with history significant for of breast cancer status post chemo radiation, bilateral partial meniscectomy, lymphedema who presents to the ED with chief complaint right breast pain and swelling. patient states that she has been undergoing lymphatic massage for her lymphedema.  She has noted swelling in her right breast for the last month.  Over the last days she started to have severe at 10/10 pain in the right breast.  She denies any trauma, fever, and chills, shortness of breath or chest pain or any component worsening with exertion.    This is the extent to the patients complaints today here in the emergency department.    Review Of Systems   As per HPI and below:  Constitutional: No fever.   HEENT: No voice change. No sore throat .    Eyes:  No visual disturbances. No eye pain.   Respiratory:  No shortness of breath. No wheezing. No cough.   Cardio:  No chest pain. No leg swelling. No palpitations.   GI:  No abdominal pain. No nausea or vomiting. No diarrhea.   :  No blood in urine. No difficulty urinating.   MSK:  No back pain. No neck pain.   Skin: No rash.   Neurologic: No headache. No dizziness.       Review of patient's allergies indicates:   Allergen Reactions    Butalbital Other (See Comments)     Chest pain         No current facility-administered medications on file prior to encounter.     Current Outpatient Medications on File Prior to Encounter   Medication Sig Dispense Refill    albuterol (PROVENTIL/VENTOLIN HFA) 90 mcg/actuation inhaler Inhale 1-2 puffs into the lungs every 6 (six) hours as needed for  Wheezing. Rescue 18 g 0    amlodipine-valsartan (EXFORGE) 5-320 mg per tablet Take 1 tablet by mouth once daily. 90 tablet 3    anastrozole (ARIMIDEX) 1 mg Tab Take 1 tablet (1 mg total) by mouth once daily. (Patient taking differently: Take 1 mg by mouth every morning.) 90 tablet 3    ascorbic acid (VITAMIN C ORAL) Take 500 mg by mouth every morning.       clotrimazole-betamethasone 1-0.05% (LOTRISONE) cream Apply to affected area 2 times daily to the affected area as needed x 5-7 days 45 g 1    cyanocobalamin, vitamin B-12, (VITAMIN B-12 ORAL) Take by mouth daily as needed.       cyclobenzaprine (FLEXERIL) 10 MG tablet Take 1 tablet (10 mg total) by mouth 3 (three) times daily as needed for Muscle spasms. 90 tablet 3    gabapentin (NEURONTIN) 100 MG capsule Take 2 capsules (200 mg total) by mouth every morning. Take in the morning for neuropathy 180 capsule 3    venlafaxine (EFFEXOR XR) 75 MG 24 hr capsule Take 1 capsule (75 mg total) by mouth once daily. In the morning 30 capsule 11    vitamin E 200 UNIT capsule Take 200 Units by mouth daily as needed.       zolpidem (AMBIEN) 10 mg Tab Take 1 tablet (10 mg total) by mouth nightly as needed (Sleep). 30 tablet 2       Past History   As per HPI and below:  Past Medical History:   Diagnosis Date    Anxiety     Back pain     Goiter     HTN (hypertension) 10/18/2012    Hx antineoplastic chemo     last dose 4/23/20    Hx of psychiatric care     Ambien, Klonopin    Insomnia 10/18/2012    Malignant neoplasm of axillary tail of right breast in female, estrogen receptor positive 11/24/2019    right    Neck mass     right posterior    Primary invasive malignant neoplasm of female breast, right 11/21/2019    right    Psychiatric problem     S/P abdominal supracervical subtotal hysterectomy, continues to have regular menses.  4/2/2014    Spinal cord cyst, L1 2014     Thoracic radiculopathy, bulging disc at T10-11 and T11-12      Past Surgical History:   Procedure  Laterality Date    AXILLARY NODE DISSECTION Right 2020    Procedure: LYMPHADENECTOMY, AXILLARY;  Surgeon: Lelo Guaman MD;  Location: [facility] OR 2ND FLR;  Service: General;  Laterality: Right;    BREAST BIOPSY Right     + CA    BREAST BIOPSY Left     BREAST RECONSTRUCTION Bilateral 2021     SECTION      CHOLECYSTECTOMY      COLONOSCOPY N/A 10/05/2015    Procedure: COLONOSCOPY;  Surgeon: JERONIMO Cancino MD;  Location: [facility] ENDO (4TH FLR);  Service: Endoscopy;  Laterality: N/A;    COLONOSCOPY N/A 2021    Procedure: COLONOSCOPY;  Surgeon: JERONIMO Cancino MD;  Location: [facility] ENDO (4TH FLR);  Service: Endoscopy;  Laterality: N/A;  fully vaccinated-GT    HYSTERECTOMY      BC--SUPRACERVICAL    INJECTION FOR SENTINEL NODE IDENTIFICATION Right 2020    Procedure: INJECTION, FOR SENTINEL NODE IDENTIFICATION;  Surgeon: Lelo Guaman MD;  Location: [facility] OR;  Service: General;  Laterality: Right;    INSERTION OF BREAST TISSUE EXPANDER Right 2020    Procedure: INSERTION, TISSUE EXPANDER, BREAST;  Surgeon: Pablo Ramos MD;  Location: [facility];  Service: Plastics;  Laterality: Right;    INSERTION OF TUNNELED CENTRAL VENOUS CATHETER (CVC) WITH SUBCUTANEOUS PORT Right 2019    Procedure: AVLHVAHRK-TCNY-N-CATH Fluoro needed;  Surgeon: Lelo Guaman MD;  Location: [facility] 2ND FLR;  Service: General;  Laterality: Right;  Right internal jugular.     LIPOMA RESECTION  2021    MASTECTOMY Bilateral 2021    with reconstruction    NEEDLE LOCALIZATION Left 2021    Procedure: NEEDLE LOCALIZATION;  Surgeon: Kraig Mello MD;  Location: [facility] CATH LAB;  Service: Radiology;  Laterality: Left;    RECONSTRUCTION OF BREAST WITH DEEP INFERIOR EPIGASTRIC ARTERY  (ROWAN) FREE FLAP Bilateral 2021    Procedure: RECONSTRUCTION, BREAST, USING ROWAN FREE FLAP;  Surgeon: Pablo Ramos MD;  Location: [facility] OR;  Service: Plastics;  Laterality: Bilateral;    SENTINEL LYMPH  NODE BIOPSY Right 05/20/2020    Procedure: BIOPSY, LYMPH NODE, SENTINEL;  Surgeon: Lelo Guaman MD;  Location: [facility];  Service: General;  Laterality: Right;    TISSUE EXPANDER REMOVAL Right 04/21/2021    Procedure: REMOVAL, TISSUE EXPANDER;  Surgeon: Pablo Ramos MD;  Location: [facility];  Service: Plastics;  Laterality: Right;    UNILATERAL MASTECTOMY Right 05/20/2020    Procedure: MASTECTOMY, UNILATERAL;  Surgeon: Lelo Guaman MD;  Location: [facility];  Service: General;  Laterality: Right;    UNILATERAL MASTECTOMY Left 04/21/2021    Procedure: MASTECTOMY, UNILATERAL PROPHYLACTIC;  Surgeon: Lelo Guaman MD;  Location: [facility];  Service: General;  Laterality: Left;       Social History     Socioeconomic History    Marital status: Single    Number of children: 2   Tobacco Use    Smoking status: Never    Smokeless tobacco: Never   Substance and Sexual Activity    Alcohol use: Yes     Comment: rarely    Drug use: No    Sexual activity: Yes     Partners: Male   Social History Narrative    Has worked at the [employer] since it opened and stayed working through [employer].    2011  from her  and moved to [facility] [place] [facility] with her sons to live with her aunt.    Her mother is very helpful and involved in her life.    She has never been a smoker and does not drink.        April 2016    Her eldest son has been working as a  at the [employer] for one year now.  He is doing well in this capacity.  He is thinking about making this his career.    Her youngest son is doing better academically.  She would like to remain living on the [place] for his schooling.    Her commuting to work is difficult.        October 2017.  For the first time in a long time, she feels happy.  She has a new boyfriend.    November 2108.  Her eldest son has moved to [place], is living with his 1st cousin and has a full-time job with [employer].    Her youngest son is 16 and doing well in school.    She continues to  date a very nice man.  She is enjoying living in [place] and commutes to the [facility] where she continues to enjoy her employment.        February 2020. Living with her mother due to her breast cancer treatment. Both sons living together in [place]. Younger son struggling emotionally and academically.        12/2021 - Does not exercise. Would like to return to walking.      Social Determinants of Health     Financial Resource Strain: High Risk    Difficulty of Paying Living Expenses: Very hard   Food Insecurity: Food Insecurity Present    Worried About Running Out of Food in the Last Year: Sometimes true    Ran Out of Food in the Last Year: Sometimes true   Transportation Needs: Unmet Transportation Needs    Lack of Transportation (Medical): Yes    Lack of Transportation (Non-Medical): Yes   Physical Activity: Insufficiently Active    Days of Exercise per Week: 2 days    Minutes of Exercise per Session: 30 min   Stress: Stress Concern Present    Feeling of Stress : To some extent   Social Connections: Unknown    Frequency of Communication with Friends and Family: More than three times a week    Frequency of Social Gatherings with Friends and Family: Three times a week    Active Member of Clubs or Organizations: Yes    Attends Club or Organization Meetings: 1 to 4 times per year    Marital Status: Never    Housing Stability: Low Risk     Unable to Pay for Housing in the Last Year: No    Number of Places Lived in the Last Year: 1    Unstable Housing in the Last Year: No       Family History   Problem Relation Age of Onset    Cancer Paternal Aunt     Lupus Paternal Aunt     Hypertension Mother     Depression Mother     Liver disease Father     Alcohol abuse Father     Cancer Father         Lung and prostate cancer    Colon cancer Paternal Uncle     Depression Son     Breast cancer Neg Hx     Ovarian cancer Neg Hx     Melanoma Neg Hx     Psoriasis Neg Hx     Eczema Neg Hx        Physical Exam     Vitals:     09/12/22 0702 09/12/22 0714 09/12/22 0755 09/12/22 0801   BP: (!) 147/98      Pulse: 65  (!) 57    Resp:    18   Temp:  97.2 °F (36.2 °C)     TempSrc:  Oral     SpO2: 99%  100%      Physical Exam:   Nursing note and vitals reviewed.  Appearance:  Well-appearing, nontoxic adult female in no acute respiratory distress.  Making purposeful movements.  Speaking in full sentences.  Skin:  Good turgor.  No abrasions.  No ecchymoses.  Breast exam performed with nurse chaperone shows partial mastectomy bilaterally with swelling of the right breast with no overlying cellulitic component, erythema or increased warmth.  No fluctuant lesion appreciated on exam.  Eyes: No conjunctival injection. EOMI, PERRL.  Head: NC/AT  ENT: Oropharynx clear.    Chest: CTAB. No increased work of breathing, bilateral chest rise.  Cardiovascular: Regular rate and rhythm.  Normal equal bilateral radial pulses.  Abdomen: Soft.  Not distended.  Nontender.  No guarding.  No rebound. No Masses  Musculoskeletal: Good range of motion all joints.  No deformities.  Neck supple, full range of motion, no obvious deformity.  Neurologic: Moves all extremities.  Normal sensation.  No facial droop.  Normal speech.    Mental Status:  Alert and oriented x 3.  Appropriate, conversant.      Results and Medications    Procedures    Labs Reviewed   CBC W/ AUTO DIFFERENTIAL - Abnormal; Notable for the following components:       Result Value    MCV 80 (*)     MCH 26.5 (*)     RDW 15.8 (*)     All other components within normal limits    Narrative:     Release to patient->Immediate   SEDIMENTATION RATE - Abnormal; Notable for the following components:    Sed Rate 70 (*)     All other components within normal limits    Narrative:     Release to patient->Immediate   C-REACTIVE PROTEIN - Abnormal; Notable for the following components:    CRP 18.8 (*)     All other components within normal limits    Narrative:     Release to patient->Immediate   HIV 1 / 2 ANTIBODY     Narrative:     Release to patient->Immediate   HEPATITIS C ANTIBODY    Narrative:     Release to patient->Immediate   ISTAT PROCEDURE   ISTAT CHEM8       Imaging Results              US Soft Tissue Chest_Upper Back (Final result)  Result time 09/12/22 06:50:18      Final result by Ra Woodward MD (09/12/22 06:50:18)                   Impression:      No significant sonographic abnormality.    Electronically signed by resident: Christian Sarah  Date:    09/12/2022  Time:    06:36    Electronically signed by: Ra Woodward  Date:    09/12/2022  Time:    06:50               Narrative:    EXAMINATION:  US SOFT TISSUE CHEST_UPPER BACK    CLINICAL HISTORY:  R. breast pain and swelling;    TECHNIQUE:  Duplex scan was performed using B-mode/grayscale imaging and color flow.    COMPARISON:  CT calcium scoring cardiac 03/24/2021    FINDINGS:  No focal soft tissue edema correlating with the reported area of concern in the right lateral chest wall.  No organized collection.  No focal hyperemia.  Contralateral left breast, evaluated for comparison, also demonstrates no discrete abnormality.                                          Medications   ketorolac injection 9.999 mg (9.999 mg Intravenous Given 9/12/22 0508)   HYDROcodone-acetaminophen 5-325 mg per tablet 1 tablet (1 tablet Oral Given 9/12/22 0554)   HYDROcodone-acetaminophen 5-325 mg per tablet 1 tablet (1 tablet Oral Given 9/12/22 0801)       MDM, Impression and Plan   Previous Records:   I decided to obtain old medical records which is listed here or in ED course:  See HPI    Initial Assessment:   Urgent evaluation of 52 y.o. female with history of the above presents ED with chief complaint of right breast pain x1 day, and she states that she has had ongoing swelling in the right breast for the last month which she believes is due to lymphedema.  On arrival to the ED be afebrile hemodynamically stable and in no acute respiratory distress Differential Diagnosis:    -abscess verses new onset cellulitis  -swelling due to lymphedema  -unlikely ACS or cardiac origin based off physical exam, nature of complaint but will obtain EKG for risk stratification  Independently Interpreted Test(s):   EKG:  I independently reviewed and interpreted the EKG and my findings are as follows:   Detailed here or in ED course.   Clinical Tests:   Lab Tests: Ordered and Reviewed  Radiological Study: Ordered and Reviewed  Medical Tests: Ordered and Reviewed    ED Management:    Vital stable.  Patient was given IV Toradol with mild improvement in her pain.  She was given Norco.  Creatinine near baseline.  No leukocytosis.  Mild elevation of inflammatory markers.  Will proceed with ultrasound of of chest wall/breast tissue you concern for abscess.  Patient signed out to oncoming ED team pending final results of ultrasound soft tissue with anticipated disposition home.  Please see their note for final disposition.      Please see ED course for discussion of workup and dispo if not listed above.              Final diagnoses:  [N64.4] Breast pain, right (Primary)      ED Disposition Condition    Discharge Stable          ED Prescriptions       Medication Sig Dispense Start Date End Date Auth. Provider    HYDROcodone-acetaminophen (NORCO) 5-325 mg per tablet Take 1 tablet by mouth every 6 (six) hours as needed (severe pain). 12 tablet 9/12/2022 -- Ailin Soler MD    cephALEXin (KEFLEX) 250 MG capsule Take 1 capsule (250 mg total) by mouth 4 (four) times daily. for 7 days 28 capsule 9/12/2022 9/19/2022 Ailin Soler MD          Follow-up Information       Follow up With Specialties Details Why Contact Info Additional Information    Tayo Lynn MD Internal Medicine Schedule an appointment as soon as possible for a visit   [address] [facility] [address]  [phone]       [facility] Schedule an appointment as soon as possible for a visit   [address]  [facility] [address]  [phone] All patients head to [address]  Please park in [place] surface [facility] or [facility] and enter through [facility] entrance, East side facing the parking garage            ED Course as of 09/13/22 2213   Mon Sep 12, 2022   0442 EKG shows normal sinus rhythm with ventricular rate of 67 beats per minute.  No ST segment elevations depressions.  No STEMI no chest pain [HM]      ED Course User Index  [HM] Ailin Soler MD           Attending Attestation:   Physician Attestation Statement for Resident:  As the supervising MD   Physician Attestation Statement: I have personally seen and examined this patient.   I agree with the above history.  -:   As the supervising MD I agree with the above PE.     As the supervising MD I agree with the above treatment, course, plan, and disposition.    I have reviewed and agree with the residents interpretation of the following: EKG and lab data.  I have reviewed the following: old records at this facility.                  Ailin Soler MD   Emergency Resident   [phone] (spectra)    Please note that this dictation was completed with computer voicerecognition software.  Quite often unanticipated grammatical, syntax, homophones, and other interpretive errors are inadvertently transcribed by the computer software.  Please disregard these errors.  Please excuse any errors that have escaped final proofreading.         Ailin Soler MD  Resident  09/12/22 0989       Joann Xiong MD  09/13/22 2211

## 2022-09-12 NOTE — PROVIDER PROGRESS NOTES - EMERGENCY DEPT.
Encounter Date: 9/12/2022    ED Physician Progress Notes        Physician Note:   --------------------            09/12/22               0801     --------------------   BP:                  Pulse:               Resp:       18       Temp:               --------------------    Sign-out taken from Dr. Soler.  Patient pending read of right breast ultrasound.  Ultrasound did not show any evidence of abscess or cellulitis or focality concerning for new masses.  I saw examined the patient myself, and did not appreciate any fluctuance or erythema.  We decided to discharge the patient with pain medication and antibiotic with instructions only start the antibiotic if there is significant redness.  We advised the patient that regardless of her course, she should follow-up with her breast doctor outpatient.  I discussed return precautions and answered the the patient and family's questions.  Pt was discharged.

## 2022-09-12 NOTE — ED NOTES
Pt reports pain 7/10, requests pain med.  Dr. Sanchez informed.      LOC: The patient is awake, alert and aware of environment with an appropriate affect, the patient is oriented x 3 and speaking appropriately.  APPEARANCE: Patient resting comfortably and in no acute distress, patient is clean and well groomed, patient's clothing is properly fastened.  SKIN: The skin is warm and dry, color consistent with ethnicity, patient has normal skin turgor and moist mucus membranes, skin intact, no breakdown or bruising noted.  MUSCULOSKELETAL: Patient moving all extremities spontaneously, no obvious swelling or deformities noted.  RESPIRATORY: Airway is open and patent, respirations are spontaneous, patient has a normal effort and rate, no accessory muscle use noted.

## 2022-09-12 NOTE — ED TRIAGE NOTES
Pt reports she has hx of breast ca and lymph edema. Pt reports swelling to the right breast. Pt denies any draiange to the area or feeling any cp/sob. Pt denies any recent illness or feves. States that the edema makes it feel heavy to lift her right arm. Pt states swelling is only located to these lymph nodes near her right breast and arm.    Past Medical History:   Diagnosis Date    Anxiety     Back pain     Goiter     HTN (hypertension) 10/18/2012    Hx antineoplastic chemo     last dose 20    Hx of psychiatric care     Ambien, Klonopin    Insomnia 10/18/2012    Malignant neoplasm of axillary tail of right breast in female, estrogen receptor positive 2019    right    Neck mass     right posterior    Primary invasive malignant neoplasm of female breast, right 2019    right    Psychiatric problem     S/P abdominal supracervical subtotal hysterectomy, continues to have regular menses. 2014    Spinal cord cyst, L1      Thoracic radiculopathy, bulging disc at T10-11 and T11-12        Past Surgical History:   Procedure Laterality Date    AXILLARY NODE DISSECTION Right 2020    Procedure: LYMPHADENECTOMY, AXILLARY;  Surgeon: Lelo Guaman MD;  Location: [facility] OR [address] [place] [address];  Service: General;  Laterality: Right;    BREAST BIOPSY Right     + CA    BREAST BIOPSY Left     BREAST RECONSTRUCTION Bilateral 2021     SECTION      CHOLECYSTECTOMY      COLONOSCOPY N/A 10/05/2015    Procedure: COLONOSCOPY;  Surgeon: JERONIMO Cancino MD;  [address]  Service: Endoscopy;  Laterality: N/A;    COLONOSCOPY N/A 2021    Procedure: COLONOSCOPY;  Surgeon: JERONIMO Cancino MD;  Location: [facility] ([address] [place] [address]);  Service: Endoscopy;  Laterality: N/A;  fully vaccinated-GT    HYSTERECTOMY      BC--SUPRACERVICAL    INJECTION FOR SENTINEL NODE IDENTIFICATION Right 2020    Procedure: INJECTION, FOR SENTINEL NODE IDENTIFICATION;  Surgeon: Lelo Guaman MD;   Location: [facility];  Service: General;  Laterality: Right;    INSERTION OF BREAST TISSUE EXPANDER Right 05/20/2020    Procedure: INSERTION, TISSUE EXPANDER, BREAST;  Surgeon: Pablo Ramos MD;  Location: [facility];  Service: Plastics;  Laterality: Right;    INSERTION OF TUNNELED CENTRAL VENOUS CATHETER (CVC) WITH SUBCUTANEOUS PORT Right 11/21/2019    Procedure: [id number]-A-CATH Fluoro needed;  Surgeon: Lelo Guaman MD;  Location: [address];  Service: General;  Laterality: Right;  Right internal jugular.     LIPOMA RESECTION  12/2021    MASTECTOMY Bilateral 04/2021    with reconstruction    NEEDLE LOCALIZATION Left 05/31/2021    Procedure: NEEDLE LOCALIZATION;  Surgeon: Kraig Mello MD;  Location: [facility] CATH LAB;  Service: Radiology;  Laterality: Left;    RECONSTRUCTION OF BREAST WITH DEEP INFERIOR EPIGASTRIC ARTERY  (ROWAN) FREE FLAP Bilateral 04/21/2021    Procedure: RECONSTRUCTION, BREAST, USING ROWAN FREE FLAP;  Surgeon: Pablo Ramos MD;  Location: [facility];  Service: Plastics;  Laterality: Bilateral;    SENTINEL LYMPH NODE BIOPSY Right 05/20/2020    Procedure: BIOPSY, LYMPH NODE, SENTINEL;  Surgeon: Lelo Guaman MD;  Location: [facility];  Service: General;  Laterality: Right;    TISSUE EXPANDER REMOVAL Right 04/21/2021    Procedure: REMOVAL, TISSUE EXPANDER;  Surgeon: Pablo Ramos MD;  Location: [facility];  Service: Plastics;  Laterality: Right;    UNILATERAL MASTECTOMY Right 05/20/2020    Procedure: MASTECTOMY, UNILATERAL;  Surgeon: Lelo Guaman MD;  Location: [facility];  Service: General;  Laterality: Right;    UNILATERAL MASTECTOMY Left 04/21/2021    Procedure: MASTECTOMY, UNILATERAL PROPHYLACTIC;  Surgeon: Lelo Guaman MD;  Location: [facility];  Service: General;  Laterality: Left;       Family History   Problem Relation Age of Onset    Cancer Paternal Aunt     Lupus Paternal Aunt     Hypertension Mother     Depression Mother     Liver disease Father     Alcohol abuse Father      Cancer Father         Lung and prostate cancer    Colon cancer Paternal Uncle     Depression Son     Breast cancer Neg Hx     Ovarian cancer Neg Hx     Melanoma Neg Hx     Psoriasis Neg Hx     Eczema Neg Hx        Social History     Socioeconomic History    Marital status: Single    Number of children: 2   Tobacco Use    Smoking status: Never    Smokeless tobacco: Never   Substance and Sexual Activity    Alcohol use: Yes     Comment: rarely    Drug use: No    Sexual activity: Yes     Partners: Male   Social History Narrative    Has worked at the [employer] since it opened and stayed working through [employer].    2011  from her  and moved to [facility] with her sons to live with her aunt.    Her mother is very helpful and involved in her life.    She has never been a smoker and does not drink.        April 2016    Her eldest son has been working as a  at the [employer] for one year now.  He is doing well in this capacity.  He is thinking about making this his career.    Her youngest son is doing better academically.  She would like to remain living on the [place] for his schooling.    Her commuting to work is difficult.        October 2017.  For the first time in a long time, she feels happy.  She has a new boyfriend.    November 2108.  Her eldest son has moved to [place], is living with his 1st cousin and has a full-time job with [employer].    Her youngest son is 16 and doing well in school.    She continues to date a very nice man.  She is enjoying living in [place] and commutes to the [facility] where she continues to enjoy her employment.        February 2020. Living with her mother due to her breast cancer treatment. Both sons living together in [place]. Younger son struggling emotionally and academically.        12/2021 - Does not exercise. Would like to return to walking.      Social Determinants of Health     Financial Resource Strain: High Risk    Difficulty of  Paying Living Expenses: Very hard   Food Insecurity: Food Insecurity Present    Worried About Running Out of Food in the Last Year: Sometimes true    Ran Out of Food in the Last Year: Sometimes true   Transportation Needs: Unmet Transportation Needs    Lack of Transportation (Medical): Yes    Lack of Transportation (Non-Medical): Yes   Physical Activity: Insufficiently Active    Days of Exercise per Week: 2 days    Minutes of Exercise per Session: 30 min   Stress: Stress Concern Present    Feeling of Stress : To some extent   Social Connections: Unknown    Frequency of Communication with Friends and Family: More than three times a week    Frequency of Social Gatherings with Friends and Family: Three times a week    Active Member of Clubs or Organizations: Yes    Attends Club or Organization Meetings: 1 to 4 times per year    Marital Status: Never    Housing Stability: Low Risk     Unable to Pay for Housing in the Last Year: No    Number of Places Lived in the Last Year: 1    Unstable Housing in the Last Year: No       No current facility-administered medications for this encounter.     Current Outpatient Medications   Medication Sig Dispense Refill    albuterol (PROVENTIL/VENTOLIN HFA) 90 mcg/actuation inhaler Inhale 1-2 puffs into the lungs every 6 (six) hours as needed for Wheezing. Rescue 18 g 0    amlodipine-valsartan (EXFORGE) 5-320 mg per tablet Take 1 tablet by mouth once daily. 90 tablet 3    anastrozole (ARIMIDEX) 1 mg Tab Take 1 tablet (1 mg total) by mouth once daily. (Patient taking differently: Take 1 mg by mouth every morning.) 90 tablet 3    ascorbic acid (VITAMIN C ORAL) Take 500 mg by mouth every morning.       clotrimazole-betamethasone 1-0.05% (LOTRISONE) cream Apply to affected area 2 times daily to the affected area as needed x 5-7 days 45 g 1    cyanocobalamin, vitamin B-12, (VITAMIN B-12 ORAL) Take by mouth daily as needed.       cyclobenzaprine (FLEXERIL) 10 MG tablet Take 1 tablet (10  mg total) by mouth 3 (three) times daily as needed for Muscle spasms. 90 tablet 3    gabapentin (NEURONTIN) 100 MG capsule Take 2 capsules (200 mg total) by mouth every morning. Take in the morning for neuropathy 180 capsule 3    venlafaxine (EFFEXOR XR) 75 MG 24 hr capsule Take 1 capsule (75 mg total) by mouth once daily. In the morning 30 capsule 11    vitamin E 200 UNIT capsule Take 200 Units by mouth daily as needed.       zolpidem (AMBIEN) 10 mg Tab Take 1 tablet (10 mg total) by mouth nightly as needed (Sleep). 30 tablet 2       Review of patient's allergies indicates:   Allergen Reactions    Butalbital Other (See Comments)     Chest pain

## 2022-09-12 NOTE — ED NOTES
I-STAT Chem-8+ Results:   Value Reference Range   Sodium 140 136-145 mmol/L   Potassium  4.7 3.5-5.1 mmol/L   Chloride 102  mmol/L   Ionized Calcium 1.10 1.06-1.42 mmol/L   CO2 (measured) 28 23-29 mmol/L   Glucose 105  mg/dL   BUN 18 6-30 mg/dL   Creatinine 0.6 0.5-1.4 mg/dL   Hematocrit 39 36-54%

## 2022-09-12 NOTE — PROVIDER PROGRESS NOTES - EMERGENCY DEPT.
Encounter Date: 9/12/2022    ED Physician Progress Notes        Physician Note:   Signed out to me.    History of breast cancer status post partial mastectomy.  Also recently diagnosed with lymphedema.  Coming with right breast pain.  Labs are as noted, inflammatory markers are mildly up.  Previous team is suspicion for cellulitis or collection or infection.  Pending ultrasound.  Plan is if there is no concerning findings on ultrasound discharged supportive care, outpatient follow-up return precautions.      Update:  Sound is unremarkable.  No signs of collection or cobblestoning.  Patient reexamined, there is some swelling to the breast area with no erythema, no skin changes.  No fluctuance.  Possible pain secondary to lymphedema/fluid?  Early infection is possible.  Given the fact that inflammatory markers are slightly up, patient was given a prescription of antibiotics which she was instructed to use only if she develops redness, skin changes, fevers, or signs of infection.  Short course of oxycodone for pain control, continue Tylenol.  Outpatient follow-up with breast Clinic.  ED return precautions.

## 2022-09-12 NOTE — DISCHARGE INSTRUCTIONS
Diagnosis:   1. Breast pain in female    2. Breast pain, right        Tests you had showed:   Labs Reviewed   CBC W/ AUTO DIFFERENTIAL - Abnormal; Notable for the following components:       Result Value    MCV 80 (*)     MCH 26.5 (*)     RDW 15.8 (*)     All other components within normal limits    Narrative:     Release to patient->Immediate   SEDIMENTATION RATE - Abnormal; Notable for the following components:    Sed Rate 70 (*)     All other components within normal limits    Narrative:     Release to patient->Immediate   C-REACTIVE PROTEIN - Abnormal; Notable for the following components:    CRP 18.8 (*)     All other components within normal limits    Narrative:     Release to patient->Immediate   HIV 1 / 2 ANTIBODY    Narrative:     Release to patient->Immediate   HEPATITIS C ANTIBODY    Narrative:     Release to patient->Immediate   ISTAT PROCEDURE   ISTAT CHEM8      US Soft Tissue Chest_Upper Back    (Results Pending)       Treatments you received were:   Medications   ketorolac injection 9.999 mg (9.999 mg Intravenous Given 9/12/22 0508)   HYDROcodone-acetaminophen 5-325 mg per tablet 1 tablet (1 tablet Oral Given 9/12/22 0558)       Home Care Instructions:  - Medications: Continue taking your home medications as prescribed    Follow-Up Plan:  - Follow-up with: Primary care doctor within 1-2  days  - You will need an outpatient mammogram, please contact your oncologist her PCP  - Additional testing and/or evaluation will be directed by your primary doctor    Return to the Emergency Department for symptoms including but not limited to: worsening symptoms, severe back pain, shortness of breath or chest pain, vomiting with inability to hold down fluids, blood in vomit or poop, fevers greater than 100.4°F, passing out/fainting/unconsciousness, or other concerning symptoms.     Future Appointments   Date Time Provider Department Center   9/13/2022  8:30 AM Theron Rodarte DPM [facility]   9/19/2022   3:00 PM Marcela Price, PT [facility]   9/26/2022  4:00 PM Marcela Price, PT [facility]   10/4/2022  1:00 PM Marcela Price, PT [facility]   10/10/2022  3:00 PM Rey Geiger MD [facility] RHEUM [place] [facility]   10/17/2022  9:00 AM Marcela Price, PT [facility]   10/24/2022  9:00 AM Marcela Price, PT [facility] [place] [facility]   11/2/2022  9:00 AM Marcela Price, PT [facility] [place] [facility]   11/15/2022  2:15 PM Lelo Guaman MD [facility] BRSTSUR [facility]   11/17/2022  9:00 AM Tayo Lynn MD [facility] IM [facility] PCW   12/19/2022  9:20 AM Negrito Verde MD [facility] HEMONC3 [place]   2/13/2023  7:45 AM Tarah Dunham OD [facility] OPTOMTY [facility]

## 2022-09-19 ENCOUNTER — CLINICAL SUPPORT (OUTPATIENT)
Dept: REHABILITATION | Facility: HOSPITAL | Age: 52
End: 2022-09-19
Attending: INTERNAL MEDICINE
Payer: COMMERCIAL

## 2022-09-19 DIAGNOSIS — Z74.09 IMPAIRED FUNCTIONAL MOBILITY AND ACTIVITY TOLERANCE: ICD-10-CM

## 2022-09-19 DIAGNOSIS — I89.0 LYMPHEDEMA OF RIGHT ARM: Primary | ICD-10-CM

## 2022-09-19 PROCEDURE — 97140 MANUAL THERAPY 1/> REGIONS: CPT

## 2022-09-19 NOTE — PROGRESS NOTES
Physical Therapy Daily Treatment Note       Date: 9/19/2022   Name: Phylicia Vásquez  Clinic Number: [id number]    Therapy Diagnosis:   Encounter Diagnoses   Name Primary?    Lymphedema of right arm Yes    Impaired functional mobility and activity tolerance      Physician: Negrito Verde MD    Physician Orders: PT Eval and Treat   Medical Diagnosis: Carcinoma of axillary tail of right breast in female, estrogen receptor positive [C50.611, Z17.0], Localized edema      Evaluation Date: 6/3/2022  Authorization Period Expiration: 12/31/22  Updated Plan of Care Certification Period: 9/30/22   Visit # / Visits authorized: 11/ 20 (plus eval)  Insurance: [employer]  FOTO: 3/3     Time In: 3:08PM  Time Out:  4:08 PM  Total Billable Time: 55 minutes     Precautions: Standard, cancer and Right UE lymphedema      Subjective     Pt reports: Using pump daily. She had an episode of increased pain and swelling in her left arm, so she went to ER. They took an ultrasound, and it was OK. Denies pain currently. Pt also with left foot drop and she feels like it's getting worse. Pt endorses left foot drop since chemotherapy.    She was compliant with self massage technique   Response to previous treatment: no adverse reaction  Pain Scale: Phylicia rates pain on a scale of 0/10 on VAS.   Pain location: N/A     Fatigue: not assessed  Functional change: can lift her right arm  more easily  Treatment:   Chemotherapy:  4 cycles of AC and 4 cycles of Taxol patrick adjuvantly  Radiation: completed radiation therapy on 8/20/20.   Endocrine therapy: On 11/1/2020 started on Anastrozole with with Dr. Verde     Surgery date: Pt is s/p bilateral mastectomy with R SLNB, TE reconstruction in 4/2021. pt will have permanent reconstruction with ROWAN flap, but she states she needs to lose weight first.      Objective     Objective Measures updated at progress report unless specified.     LANDMARK RIGHT  "UE LEFT UE DIFF RUE Diff RUE Diff RUE Diff RUE Diff RUE Diff RUE Diff RUE Diff RUE Diff RUE Diff   Date 6/3/22 6/3/22 6/3/22 6/22/22 6/22/22 6/29/22 6/29/22 7/6/22 7/6/22 7/20/22 7/20/22 7/27/22 7/27/22 8/18/22 8/18/22 8/24/22 8/24/22 9/2/22 9/2/22 9/19/22 9/19/22   E + 8" 47 cm 46.5 cm 0.5 cm 46.5cm -0.5 44cm -2.5 45 cm +1 47 cm +2 45cm -2 43.5cm -1.5 43.5cm No chg 44cm +0.5 46cm +2   E + 6" 44 cm 42.5 cm 1.5 cm 44 cm No chg 44cm No chg 43.5cm -0.5 45cm +1.5 42cm -3 41cm -1 43cm +2 42.5cm -0.5 43.5cm +1   E + 4" 39.5 cm 37 cm 2.5 cm 39cm -0.5 40.5 cm +1.5 40.5cm No chg 41cm +0.5 39 cm -2 39cm No chg 39.5cm +0.5 40cm +0.5 39cm -1   E + 2" 34.5 cm 33 cm 1.5 cm 34.5 cm No chg 35 cm +0.5 34.5cm -0.5 35cm +0.5 34cm -1 34.5cm +0.5 34.5cm No chg 35cm +0.5 34cm -1   Elbow 28 cm 28 cm 0 cm 27 cm -1 27.5 cm +0.5 28 cm +0.5 28cm No chg 27cm -1 27cm No chg 27.5cm +0.5 27cm -0.5 27.5cm +0.5   W+ 8" 29 cm  28.5cm 0.5 cm 28cm -1 28 cm No chg 29.5cm +1.5 29cm -0.5 28.5cm -0.5 28cm -0.5 29.5cm +1.5 29cm -0.5 28.5cm -0.5   W +  6" 29.5 cm 27 cm 2.5 cm 29cm -0.5 29cm No chg 29cm No chg 29cm No chg 29cm No chg 29cm No chg 29.5cm +0.5 28.5cm -1 29cm +0.5   W + 4" 25.5 cm 23.5 cm 2 cm 25cm -0.5 24.5 cm -0.5 25.5cm +1 25 cm -0.5 25cm No chg 24.5cm -0.5 25.5cm +1 24.5cm -1 25cm +0.5   Wrist 18 cm 17.5 cm 0.5 cm 18.5cm +0.5 18 cm -0.5 18.5cm +0.5 18cm -0.5 18cm No chg 18cm No chg 18cm No chg 18cm No chg 18cm No chg   DPC 21.5 cm 20 cm 1.5 cm 20cm -1.5 20.5cm +0.5 21 cm +0.5 21cm -0.5 20.5cm -0.5 21cm +0.5 21.5cm +0.5 21cm -0.5 21cm No chg   IP Thumb 7.5 cm 7 cm 0.5 cm 8cm +0.5 7.5cm -0.5 8 cm +0.5 8cm No chg 7.5cm -0.5 7.5cm No chg 8cm +0.5 7.5cm -0.5 7.5cm No Chg     Treatment         Phylicia received the following manual therapy techniques were performed to increased myofascial/soft tissue length, mobility and pliability, increase PROM, AROM and function as well as to decrease pain for 55 minutes:    Diaphragmatic breathing x 5 " reps    Measurements taken above    Short Neck- held due to thyroid goiter  Clear Healthy Lymph Nodes:  Left Axilla                                                  Right  Groin  Open Anastomoses:  Anterior Axillo-Axillary  (AAA)                                    Axillo-Inguinal     (AI)                                    Posterior Axillo-Axillary  (LEEANN) -not performed today     Right Upper Arm (Lateral and Medial)  Right Antecubital Fossa  Right forearm, dorsal and ventral aspect  Dorsum of R hand  R fingers    Rework R hand   Rework Right UE  Rework Anastomoses  Rework Healthy lymph Nodes    Diaphragmatic breathing x 5 reps    PT applies gauze to Right fingers and hand;  stockinette, cotton artiflex, and short stretch bandages to pt's  right upper extremity. Pt educated to wear bandaging for next 2-3 days unless it causes pain, numbness, or changes in skin color/temperature.  If removed, pt instructed to roll up bandages and cotton padding and bring to next session. If bandages are removed, pt can wear tubigrip and her compression arthritis glove.  Pt verbalizes understanding of all topics.           Home Exercise Program and Patient Education   Education provided re:  - role of PT in multi - disciplinary team, goals for PT  - progress towards goals   -educated about potential benefit of left AFO and process for getting one. Pt encouraged to perform gastroc stretch for improved ankle mobility.    Written Home Exercises Provided: Patient instructed to cont prior HEP.  Exercises were reviewed and Mirian was able to demonstrate them prior to the end of the session.  Mirian demonstrated good  understanding of the education provided.     See EMR under Media for exercises provided 6/8/2022- for self manual lymph drainage techniques.    Pt has no cultural, educational or language barriers to learning provided.    Assessment     Patient tolerated session well. It has been increased time since last PT visit, so she demo's  increased circumference measurements overall.  She has been compliant with manual lymph drainage with pump. She tolerated complete decongestive therapy well and states it feels better.  Once her RUE circumference stabilizes, she will benefit from compression sleeve and compression glove at 20-30mmHg. Pt noted to have worsening left foot drop. Pt states it feels like it's been worsening. Pt states she has had this since chemotherapy. PT to reach out to MD's to ask about ordering an AFO for pt. Will progress as tolerated.     Pt prognosis is Good. Pt will continue to benefit from skilled outpatient physical therapy to address the deficits listed in the problem list chart on initial evaluation, provide pt/family education and to maximize pt's level of independence in the home and community environment.     Anticipated barriers to physical therapy: none anticipated    Goals as follows:  Short Term Goals (6 weeks)  1. Pt will demo decrease in girth in right upper extremity by >/= 1cm to allow for improved UE symmetry, clothing choice, ROM, and UE function. (met, 8/18/22  2. Patient will demonstrate 100% knowledge of lymphedema precautions and signs of infection to allow for reduced risk of lymphedema, reduced risk of infection, and/or exacerbation.  (met)  3. Patient will perform self lymph drainage techniques to enhance lymphatic drainage and mobility.  (met, 7/6/22)  4. Patient will tolerate daily activities with multilayered bandaging to enhance lymphatic drainage and skin elasticity.  (met, 7/6/22)  5. Pt will tolerate HEP for improved strength, functional mobility, ROM, posture, and endurance. (met, 7/6/22)        Long Term Goals: ( 12  weeks)  1. Patient to show decreased girth in Right upper extremity by >/= 2cm to allow for improved UE symmetry, clothing choice, ROM, and UE function.  (met partially, 8/18/22)  2. Patient will show reduction in density to mild or less with improved contour of limb to allow for  improved cosmesis, improved lymphatic drainage, and functional mobility.  (met, 9/2/22)  3. Patient to mary/doff compression garment with daily compliance to support lymphatic mobility and skin elasticity.  (progressing, not met)  4. Pt to show improved postural awareness and alignment for improved functional mobility.  (progressing, not met)  5. Pt to be independent and compliant with HEP to allow for improved ROM, reach and carry with functional endurance and strength.    (progressing, not met)           Plan   Outpatient physical therapy 2x week for 4 weeks to include the following:   Manual Therapy, Neuromuscular Re-ed, Orthotic Management and Training, Patient Education, Self Care, Therapeutic Activities and Therapeutic Exercise.     Plan of care Certification Period: 9/2/22 to 9/30/22       Therapist: Marcela Price, PT  9/19/2022

## 2022-09-26 ENCOUNTER — CLINICAL SUPPORT (OUTPATIENT)
Dept: REHABILITATION | Facility: HOSPITAL | Age: 52
End: 2022-09-26
Attending: INTERNAL MEDICINE
Payer: COMMERCIAL

## 2022-09-26 ENCOUNTER — TELEPHONE (OUTPATIENT)
Dept: HEMATOLOGY/ONCOLOGY | Facility: CLINIC | Age: 52
End: 2022-09-26
Payer: COMMERCIAL

## 2022-09-26 DIAGNOSIS — M21.379 FOOT-DROP, UNSPECIFIED LATERALITY: ICD-10-CM

## 2022-09-26 DIAGNOSIS — M21.379 FOOT-DROP, UNSPECIFIED LATERALITY: Primary | ICD-10-CM

## 2022-09-26 PROCEDURE — 97140 MANUAL THERAPY 1/> REGIONS: CPT

## 2022-09-26 PROCEDURE — 97535 SELF CARE MNGMENT TRAINING: CPT

## 2022-09-26 NOTE — PROGRESS NOTES
Physical Therapy Daily Treatment Note       Date: 9/26/2022   Name: Phylicia Vásquez  Clinic Number: [id number]    Therapy Diagnosis:   Encounter Diagnosis   Name Primary?    Foot-drop, unspecified laterality      Physician: Negrito Verde MD    Physician Orders: PT Eval and Treat   Medical Diagnosis: Carcinoma of axillary tail of right breast in female, estrogen receptor positive [C50.611, Z17.0], Localized edema      Evaluation Date: 6/3/2022  Authorization Period Expiration: 12/31/22  Updated Plan of Care Certification Period: 9/30/22   Visit # / Visits authorized: 12/ 20 (plus eval)  Insurance: [employer]  FOTO: 3/3     Time In: 4:05 PM  Time Out:  5:20 PM  Total Billable Time: 75 minutes     Precautions: Standard, cancer and Right UE lymphedema      Subjective     Pt reports: Using pump every morning. She sometimes gets pain in her left arm. Pt states her compression wraps lasted 1 day last time.  Pt endorses left foot swelling since her neuropathy (~2 years).  Pt states it is worse at the end of the day. Endorses occasional pain at top of her left foot. Pt has not discussed with her MD's. PT advised pt to notify her MD's.      She was compliant with compression pump  Response to previous treatment: no adverse reaction  Pain Scale: Phylicia rates pain on a scale of 0/10 on VAS.   Pain location: N/A     Fatigue: not assessed  Functional change: can lift her right arm  more easily  Treatment:   Chemotherapy:  4 cycles of AC and 4 cycles of Taxol patrick adjuvantly  Radiation: completed radiation therapy on 8/20/20.   Endocrine therapy: On 11/1/2020 started on Anastrozole with with Dr. Verde     Surgery date: Pt is s/p bilateral mastectomy with R SLNB, TE reconstruction in 4/2021. pt will have permanent reconstruction with ROWAN flap, but she states she needs to lose weight first.      Objective     Objective Measures updated at progress report unless specified.  "    LANDMARK RIGHT UE LEFT UE DIFF RUE Diff RUE Diff RUE Diff RUE Diff RUE Diff RUE Diff RUE Diff RUE Diff RUE Diff RUE Diff   Date 6/3/22 6/3/22 6/3/22 6/22/22 6/22/22 6/29/22 6/29/22 7/6/22 7/6/22 7/20/22 7/20/22 7/27/22 7/27/22 8/18/22 8/18/22 8/24/22 8/24/22 9/2/22 9/2/22 9/19/22 9/19/22 9/26/22 9/26/22   E + 8" 47 cm 46.5 cm 0.5 cm 46.5cm -0.5 44cm -2.5 45 cm +1 47 cm +2 45cm -2 43.5cm -1.5 43.5cm No chg 44cm +0.5 46cm +2 44.5cm -1.5   E + 6" 44 cm 42.5 cm 1.5 cm 44 cm No chg 44cm No chg 43.5cm -0.5 45cm +1.5 42cm -3 41cm -1 43cm +2 42.5cm -0.5 43.5cm +1 44.5cm +1   E + 4" 39.5 cm 37 cm 2.5 cm 39cm -0.5 40.5 cm +1.5 40.5cm No chg 41cm +0.5 39 cm -2 39cm No chg 39.5cm +0.5 40cm +0.5 39cm -1 41.5cm +2.5   E + 2" 34.5 cm 33 cm 1.5 cm 34.5 cm No chg 35 cm +0.5 34.5cm -0.5 35cm +0.5 34cm -1 34.5cm +0.5 34.5cm No chg 35cm +0.5 34cm -1 35cm +1   Elbow 28 cm 28 cm 0 cm 27 cm -1 27.5 cm +0.5 28 cm +0.5 28cm No chg 27cm -1 27cm No chg 27.5cm +0.5 27cm -0.5 27.5cm +0.5 27.5cm No chg   W+ 8" 29 cm  28.5cm 0.5 cm 28cm -1 28 cm No chg 29.5cm +1.5 29cm -0.5 28.5cm -0.5 28cm -0.5 29.5cm +1.5 29cm -0.5 28.5cm -0.5 29cm +0.5   W +  6" 29.5 cm 27 cm 2.5 cm 29cm -0.5 29cm No chg 29cm No chg 29cm No chg 29cm No chg 29cm No chg 29.5cm +0.5 28.5cm -1 29cm +0.5 29cm No chg   W + 4" 25.5 cm 23.5 cm 2 cm 25cm -0.5 24.5 cm -0.5 25.5cm +1 25 cm -0.5 25cm No chg 24.5cm -0.5 25.5cm +1 24.5cm -1 25cm +0.5 25.5cm +0.5    Wrist 18 cm 17.5 cm 0.5 cm 18.5cm +0.5 18 cm -0.5 18.5cm +0.5 18cm -0.5 18cm No chg 18cm No chg 18cm No chg 18cm No chg 18cm No chg 18cm No chg   DPC 21.5 cm 20 cm 1.5 cm 20cm -1.5 20.5cm +0.5 21 cm +0.5 21cm -0.5 20.5cm -0.5 21cm +0.5 21.5cm +0.5 21cm -0.5 21cm No chg 20.5 cm -0.5   IP Thumb 7.5 cm 7 cm 0.5 cm 8cm +0.5 7.5cm -0.5 8 cm +0.5 8cm No chg 7.5cm -0.5 7.5cm No chg 8cm +0.5 7.5cm -0.5 7.5cm No Chg 8cm +0.5     Some pitting edema noted on dorsal aspect of left foot. Good pedal pulse present. Area is not red or hot. " Pt denies pain or tenderness to the touch.    Treatment     Pt participates in self-care/home management for 15 minutes.  Pt advised pt to discuss swelling in left foot with her MD's. PT dicussed potential benefit of compression socks (15-20mmHg) and where she can purchase/acquire and how to determine size.  Also discussed potential benefit of PT for left foot drop and that Marcela Calvillo has placed order for this. Pt was provided with prescription to get custom AFO and was advised where she can get this filled and to ask about insurance coverage. Pt verbalizes understanding of all topics.    Phylicia received the following manual therapy techniques were performed to increased myofascial/soft tissue length, mobility and pliability, increase PROM, AROM and function as well as to decrease pain for 60 minutes:    Diaphragmatic breathing x 5 reps    Measurements taken above    Short Neck- held due to thyroid goiter  Clear Healthy Lymph Nodes:  Left Axilla                                                  Right  Groin  Open Anastomoses:  Anterior Axillo-Axillary  (AAA)                                    Axillo-Inguinal     (AI)                                    Posterior Axillo-Axillary  (LEEANN) -not performed today   J-stroke to Right lateral chest wall  Right Upper Arm (Lateral and Medial)  Right Antecubital Fossa  Right forearm, dorsal and ventral aspect  Dorsum of R hand  R fingers    Rework R hand   Rework Right UE  Rework Anastomoses  Rework Healthy lymph Nodes    Diaphragmatic breathing x 5 reps    PT applies gauze to Right fingers and hand;  stockinette, cotton artiflex, and short stretch bandages to pt's  right upper extremity. Pt educated to wear bandaging for next 2-3 days unless it causes pain, numbness, or changes in skin color/temperature.  If removed, pt instructed to roll up bandages and cotton padding and bring to next session. If bandages are removed, pt can wear tubigrip and her compression arthritis glove.   Pt verbalizes understanding of all topics.           Home Exercise Program and Patient Education   Education provided re:  - role of PT in multi - disciplinary team, goals for PT  - progress towards goals   -educated about potential benefit of left AFO and process for getting one and plan for getting evaluated for left foot drop. Pt encouraged to discuss left foot swelling with team.  Written Home Exercises Provided: Patient instructed to cont prior HEP.  Exercises were reviewed and Mirian was able to demonstrate them prior to the end of the session.  Mirian demonstrated good  understanding of the education provided.     See EMR under Media for exercises provided 6/8/2022- for self manual lymph drainage techniques.    Pt has no cultural, educational or language barriers to learning provided.    Assessment     Patient tolerated session well. Pt's bandages lasted 1 day, so she demo's increased circumference measurements overall.  She has been compliant with manual lymph drainage with pump. She tolerated complete decongestive therapy well and states arm feels better wrapped, but she needs increased time for wrapping.  Once her RUE circumference stabilizes, she will benefit from compression sleeve and compression glove at 20-30mmHg. Pt continues to report left foot drop that she feels impairs her gait. Marcela Rajinder has placed PT referral, so pt can be evaluated and treated for this. Pt was also provided with prescription for custom AFO and guidance for getting it filled. Pt endorses chronic swelling in left foot. Area is not hot, red, or painful, and pt presents with good pedal pulse, but PT will notify MD's and pt was encouraged to notify her MD's as well. Will progress as tolerated.     Pt prognosis is Good. Pt will continue to benefit from skilled outpatient physical therapy to address the deficits listed in the problem list chart on initial evaluation, provide pt/family education and to maximize pt's level of  independence in the home and community environment.     Anticipated barriers to physical therapy: none anticipated    Goals as follows:  Short Term Goals (6 weeks)  1. Pt will demo decrease in girth in right upper extremity by >/= 1cm to allow for improved UE symmetry, clothing choice, ROM, and UE function. (met, 8/18/22  2. Patient will demonstrate 100% knowledge of lymphedema precautions and signs of infection to allow for reduced risk of lymphedema, reduced risk of infection, and/or exacerbation.  (met)  3. Patient will perform self lymph drainage techniques to enhance lymphatic drainage and mobility.  (met, 7/6/22)  4. Patient will tolerate daily activities with multilayered bandaging to enhance lymphatic drainage and skin elasticity.  (met, 7/6/22)  5. Pt will tolerate HEP for improved strength, functional mobility, ROM, posture, and endurance. (met, 7/6/22)        Long Term Goals: ( 12  weeks)  1. Patient to show decreased girth in Right upper extremity by >/= 2cm to allow for improved UE symmetry, clothing choice, ROM, and UE function.  (met partially, 8/18/22)  2. Patient will show reduction in density to mild or less with improved contour of limb to allow for improved cosmesis, improved lymphatic drainage, and functional mobility.  (met, 9/2/22)  3. Patient to mary/doff compression garment with daily compliance to support lymphatic mobility and skin elasticity.  (progressing, not met)  4. Pt to show improved postural awareness and alignment for improved functional mobility.  (progressing, not met)  5. Pt to be independent and compliant with HEP to allow for improved ROM, reach and carry with functional endurance and strength.    (progressing, not met)           Plan   Outpatient physical therapy 2x week for 4 weeks to include the following:   Manual Therapy, Neuromuscular Re-ed, Orthotic Management and Training, Patient Education, Self Care, Therapeutic Activities and Therapeutic Exercise.     Plan of care  Certification Period: 9/2/22 to 9/30/22       Therapist: Marcela Price, PT  9/26/2022

## 2022-09-26 NOTE — TELEPHONE ENCOUNTER
----- Message from Marcela Price, PT sent at 9/23/2022  5:28 PM CDT -----  Regarding: PT referral and AFO for pt Fahad  Good Evening Marcela,    Just wanted to touch base about Mirian Vásquez. I am still seeing her for lymphedema, and she mentioned at her last visit that her left foot drop has been worsening. When Gail was seeing her as part of the Survivorship group, Ms. Vásquez had left foot drop (Gail did some interventions for this), but she wasn't feeling limited by it.  Would you feel comfortable placing a new PT referral for Ms. Vásquez for her foot drop? Also, I think she'd benefit from an AFO (ankle foot orthosis) to help with her foot drop. I'm happy to place this order (and both our names would be on it), so we can start that process.  Please reach out with any questions or concerns.    Thanks and have a great weekend!    Marcela

## 2022-09-27 ENCOUNTER — PATIENT MESSAGE (OUTPATIENT)
Dept: INTERNAL MEDICINE | Facility: CLINIC | Age: 52
End: 2022-09-27
Payer: COMMERCIAL

## 2022-09-27 ENCOUNTER — PATIENT MESSAGE (OUTPATIENT)
Dept: REHABILITATION | Facility: HOSPITAL | Age: 52
End: 2022-09-27
Payer: COMMERCIAL

## 2022-09-27 ENCOUNTER — PATIENT MESSAGE (OUTPATIENT)
Dept: HEMATOLOGY/ONCOLOGY | Facility: CLINIC | Age: 52
End: 2022-09-27
Payer: COMMERCIAL

## 2022-09-27 DIAGNOSIS — M21.379 FOOT-DROP, UNSPECIFIED LATERALITY: Primary | ICD-10-CM

## 2022-09-28 ENCOUNTER — LAB VISIT (OUTPATIENT)
Dept: LAB | Facility: HOSPITAL | Age: 52
End: 2022-09-28
Attending: INTERNAL MEDICINE
Payer: COMMERCIAL

## 2022-09-28 ENCOUNTER — OFFICE VISIT (OUTPATIENT)
Dept: INTERNAL MEDICINE | Facility: CLINIC | Age: 52
End: 2022-09-28
Payer: COMMERCIAL

## 2022-09-28 VITALS
HEART RATE: 88 BPM | OXYGEN SATURATION: 98 % | SYSTOLIC BLOOD PRESSURE: 116 MMHG | BODY MASS INDEX: 42.77 KG/M2 | HEIGHT: 63 IN | DIASTOLIC BLOOD PRESSURE: 84 MMHG | WEIGHT: 241.38 LBS

## 2022-09-28 DIAGNOSIS — R73.03 PREDIABETES: ICD-10-CM

## 2022-09-28 DIAGNOSIS — I89.0 LYMPHEDEMA OF RIGHT UPPER EXTREMITY: ICD-10-CM

## 2022-09-28 DIAGNOSIS — I10 ESSENTIAL HYPERTENSION: ICD-10-CM

## 2022-09-28 DIAGNOSIS — G62.9 NEUROPATHY: ICD-10-CM

## 2022-09-28 DIAGNOSIS — R60.0 LEG EDEMA, LEFT: Primary | ICD-10-CM

## 2022-09-28 LAB
ANION GAP SERPL CALC-SCNC: 9 MMOL/L (ref 8–16)
BUN SERPL-MCNC: 11 MG/DL (ref 6–20)
CALCIUM SERPL-MCNC: 9.8 MG/DL (ref 8.7–10.5)
CHLORIDE SERPL-SCNC: 105 MMOL/L (ref 95–110)
CO2 SERPL-SCNC: 27 MMOL/L (ref 23–29)
CREAT SERPL-MCNC: 0.7 MG/DL (ref 0.5–1.4)
EST. GFR  (NO RACE VARIABLE): >60 ML/MIN/1.73 M^2
GLUCOSE SERPL-MCNC: 96 MG/DL (ref 70–110)
POTASSIUM SERPL-SCNC: 3.7 MMOL/L (ref 3.5–5.1)
SODIUM SERPL-SCNC: 141 MMOL/L (ref 136–145)

## 2022-09-28 PROCEDURE — 1160F PR REVIEW ALL MEDS BY PRESCRIBER/CLIN PHARMACIST DOCUMENTED: ICD-10-PCS | Mod: CPTII,S$GLB,, | Performed by: INTERNAL MEDICINE

## 2022-09-28 PROCEDURE — 3008F BODY MASS INDEX DOCD: CPT | Mod: CPTII,S$GLB,, | Performed by: INTERNAL MEDICINE

## 2022-09-28 PROCEDURE — 36415 COLL VENOUS BLD VENIPUNCTURE: CPT | Performed by: INTERNAL MEDICINE

## 2022-09-28 PROCEDURE — 99214 PR OFFICE/OUTPT VISIT, EST, LEVL IV, 30-39 MIN: ICD-10-PCS | Mod: S$GLB,,, | Performed by: INTERNAL MEDICINE

## 2022-09-28 PROCEDURE — 3074F SYST BP LT 130 MM HG: CPT | Mod: CPTII,S$GLB,, | Performed by: INTERNAL MEDICINE

## 2022-09-28 PROCEDURE — 99999 PR PBB SHADOW E&M-EST. PATIENT-LVL IV: ICD-10-PCS | Mod: PBBFAC,,, | Performed by: INTERNAL MEDICINE

## 2022-09-28 PROCEDURE — 3079F PR MOST RECENT DIASTOLIC BLOOD PRESSURE 80-89 MM HG: ICD-10-PCS | Mod: CPTII,S$GLB,, | Performed by: INTERNAL MEDICINE

## 2022-09-28 PROCEDURE — 1159F MED LIST DOCD IN RCRD: CPT | Mod: CPTII,S$GLB,, | Performed by: INTERNAL MEDICINE

## 2022-09-28 PROCEDURE — 99999 PR PBB SHADOW E&M-EST. PATIENT-LVL IV: CPT | Mod: PBBFAC,,, | Performed by: INTERNAL MEDICINE

## 2022-09-28 PROCEDURE — 99214 OFFICE O/P EST MOD 30 MIN: CPT | Mod: S$GLB,,, | Performed by: INTERNAL MEDICINE

## 2022-09-28 PROCEDURE — 3074F PR MOST RECENT SYSTOLIC BLOOD PRESSURE < 130 MM HG: ICD-10-PCS | Mod: CPTII,S$GLB,, | Performed by: INTERNAL MEDICINE

## 2022-09-28 PROCEDURE — 1159F PR MEDICATION LIST DOCUMENTED IN MEDICAL RECORD: ICD-10-PCS | Mod: CPTII,S$GLB,, | Performed by: INTERNAL MEDICINE

## 2022-09-28 PROCEDURE — 1160F RVW MEDS BY RX/DR IN RCRD: CPT | Mod: CPTII,S$GLB,, | Performed by: INTERNAL MEDICINE

## 2022-09-28 PROCEDURE — 4010F PR ACE/ARB THEARPY RXD/TAKEN: ICD-10-PCS | Mod: CPTII,S$GLB,, | Performed by: INTERNAL MEDICINE

## 2022-09-28 PROCEDURE — 3079F DIAST BP 80-89 MM HG: CPT | Mod: CPTII,S$GLB,, | Performed by: INTERNAL MEDICINE

## 2022-09-28 PROCEDURE — 80048 BASIC METABOLIC PNL TOTAL CA: CPT | Performed by: INTERNAL MEDICINE

## 2022-09-28 PROCEDURE — 3008F PR BODY MASS INDEX (BMI) DOCUMENTED: ICD-10-PCS | Mod: CPTII,S$GLB,, | Performed by: INTERNAL MEDICINE

## 2022-09-28 PROCEDURE — 4010F ACE/ARB THERAPY RXD/TAKEN: CPT | Mod: CPTII,S$GLB,, | Performed by: INTERNAL MEDICINE

## 2022-09-28 RX ORDER — GABAPENTIN 100 MG/1
300 CAPSULE ORAL EVERY MORNING
Qty: 270 CAPSULE | Refills: 3 | Status: SHIPPED | OUTPATIENT
Start: 2022-09-28 | End: 2023-05-19 | Stop reason: SDUPTHER

## 2022-09-28 NOTE — PROGRESS NOTES
CC:  Foot edema     HPI:  The patient is a 52-year-old female with hypertension, right breast cancer status post mastectomy, neuropathy secondary to chemotherapy, prediabetes, fatty liver, elevated BMI, and insomnia who presents today with complaints of bilateral foot swelling with the left being much worse than the right.  Symptoms started following chemotherapy 2 years ago.  Patient does work at night he is on her feet great deal.  This causes her discomfort in the morning when she gets off for work.  The left ankle and foot swells much more than right.    ROS:  Patient reports no problems with chest pain or shortness of breath.  She does have lymphedema involving the right arm for which she received physical therapy.  She does have neuropathy in both feet secondary to chemotherapy.  She is on gabapentin 100 mg b.i.d..      Physical exam:   General appearance:  No acute distress patient has her right arm in a sleeve for lymphedema as well as a glove.  HEENT: Trachea is midline   Pulmonary:  Good inspiratory, expiratory breath sounds heard.  Lungs are clear auscultation.    Cardiovascular:  S1-S2, rhythm appear to be regular.    GI:  Cannot appreciate hepatosplenomegaly secondary to abdominal girth  Extremities:  Patient's right calf was 45 cm.  The left calf was 45 cm.  Her right ankle was 26 cm, left ankle was 26 cm.  The patient's left foot with little bit larger than the right foot.  No pitting edema.  She had 2+ dorsal pedal pulses bilaterally.    Assessment:  1.  Bilateral foot swelling with the left being much worse than right  2. Neuropathy secondary chemotherapy  3.  Lymphedema involving the right upper extremity   4.  Hypertension  5.  Prediabetes     Plan:  1.  Will increase gabapentin to 300 mg once a day  2. Will schedule ultrasound of the lower extremity  3.  Check a BMP.

## 2022-09-30 ENCOUNTER — PATIENT MESSAGE (OUTPATIENT)
Dept: INTERNAL MEDICINE | Facility: CLINIC | Age: 52
End: 2022-09-30
Payer: COMMERCIAL

## 2022-09-30 ENCOUNTER — HOSPITAL ENCOUNTER (OUTPATIENT)
Dept: CARDIOLOGY | Facility: HOSPITAL | Age: 52
Discharge: HOME | End: 2022-09-30
Attending: INTERNAL MEDICINE
Payer: COMMERCIAL

## 2022-09-30 DIAGNOSIS — R60.0 LEG EDEMA, LEFT: ICD-10-CM

## 2022-09-30 PROCEDURE — 93970 EXTREMITY STUDY: CPT | Mod: 26,,, | Performed by: INTERNAL MEDICINE

## 2022-09-30 PROCEDURE — 93970 CV US DOPPLER VENOUS LEGS BILATERAL (CUPID ONLY): ICD-10-PCS | Mod: 26,,, | Performed by: INTERNAL MEDICINE

## 2022-09-30 PROCEDURE — 93970 EXTREMITY STUDY: CPT | Mod: 50

## 2022-10-03 ENCOUNTER — TELEPHONE (OUTPATIENT)
Dept: INTERNAL MEDICINE | Facility: CLINIC | Age: 52
End: 2022-10-03
Payer: COMMERCIAL

## 2022-10-03 ENCOUNTER — PATIENT MESSAGE (OUTPATIENT)
Dept: INTERNAL MEDICINE | Facility: CLINIC | Age: 52
End: 2022-10-03
Payer: COMMERCIAL

## 2022-10-03 NOTE — TELEPHONE ENCOUNTER
----- Message from Luis Daniel Roberson MD sent at 10/3/2022  2:51 PM CDT -----  Notify that her ultrasound was normal-no evidence of blood clots. Please schedule a follow up with Dr. Lynn.

## 2022-10-04 ENCOUNTER — CLINICAL SUPPORT (OUTPATIENT)
Dept: REHABILITATION | Facility: HOSPITAL | Age: 52
End: 2022-10-04
Attending: INTERNAL MEDICINE
Payer: COMMERCIAL

## 2022-10-04 DIAGNOSIS — I89.0 LYMPHEDEMA OF RIGHT ARM: Primary | ICD-10-CM

## 2022-10-04 DIAGNOSIS — Z74.09 IMPAIRED FUNCTIONAL MOBILITY AND ACTIVITY TOLERANCE: ICD-10-CM

## 2022-10-04 PROCEDURE — 97140 MANUAL THERAPY 1/> REGIONS: CPT

## 2022-10-04 NOTE — PROGRESS NOTES
"                                                    Physical Therapy Daily Treatment Note       Date: 10/4/2022   Name: Phylicia Vásquez  Clinic Number: [id number]    Therapy Diagnosis:   Encounter Diagnoses   Name Primary?    Lymphedema of right arm Yes    Impaired functional mobility and activity tolerance      Physician: Negrito Verde MD    Physician Orders: PT Eval and Treat   Medical Diagnosis: Carcinoma of axillary tail of right breast in female, estrogen receptor positive [C50.611, Z17.0], Localized edema      Evaluation Date: 6/3/2022  Authorization Period Expiration: 12/31/22  Updated Plan of Care Certification Period: 11/11/22   Visit # / Visits authorized: 13/ 20 (plus eval)  Insurance: [employer]  FOTO: 3/3     Time In: 1:05 PM  Time Out:  1:55 PM  Total Billable Time: 50minutes     Precautions: Standard, cancer and Right UE lymphedema      Subjective     Pt reports: Using pump every morning. Pt states compression bandages lasted 2 days and then fell down. "I think they may be too stretched out."  PT reviewed correct care/wash for compression bandages.  Pt states she left her compression bandages at home because she thought they were too stretched out.Pt states she has appointment to get L AFO tomorrow and she received compression socks yesterday. Pt had an ultrasound of her left leg and it was negative for blood clot.     She was compliant with compression pump  Response to previous treatment: no adverse reaction  Pain Scale: Phylicia rates pain on a scale of 0/10 on VAS.   Pain location: N/A     Fatigue: not assessed  Functional change: can lift her right arm more easily  Treatment:   Chemotherapy:  4 cycles of AC and 4 cycles of Taxol patrick adjuvantly  Radiation: completed radiation therapy on 8/20/20.   Endocrine therapy: On 11/1/2020 started on Anastrozole with with Dr. Verde     Surgery date: Pt is s/p bilateral mastectomy with R SLNB, TE reconstruction in 4/2021. pt will have permanent " "reconstruction with ROWAN flap, but she states she needs to lose weight first.      Objective     Objective Measures updated at progress report unless specified.     LANDMARK RIGHT UE LEFT UE DIFF RUE Diff RUE Diff RUE Diff RUE Diff RUE Diff RUE Diff RUE Diff RUE Diff RUE Diff RUE Diff RUE Diff   Date 6/3/22 6/3/22 6/3/22 6/22/22 6/22/22 6/29/22 6/29/22 7/6/22 7/6/22 7/20/22 7/20/22 7/27/22 7/27/22 8/18/22 8/18/22 8/24/22 8/24/22 9/2/22 9/2/22 9/19/22 9/19/22 9/26/22 9/26/22 10/4/22 10/4/22   E + 8" 47 cm 46.5 cm 0.5 cm 46.5cm -0.5 44cm -2.5 45 cm +1 47 cm +2 45cm -2 43.5cm -1.5 43.5cm No chg 44cm +0.5 46cm +2 44.5cm -1.5 44.5 cm No chg   E + 6" 44 cm 42.5 cm 1.5 cm 44 cm No chg 44cm No chg 43.5cm -0.5 45cm +1.5 42cm -3 41cm -1 43cm +2 42.5cm -0.5 43.5cm +1 44.5cm +1 45cm +0.5   E + 4" 39.5 cm 37 cm 2.5 cm 39cm -0.5 40.5 cm +1.5 40.5cm No chg 41cm +0.5 39 cm -2 39cm No chg 39.5cm +0.5 40cm +0.5 39cm -1 41.5cm +2.5 40cm -1.5   E + 2" 34.5 cm 33 cm 1.5 cm 34.5 cm No chg 35 cm +0.5 34.5cm -0.5 35cm +0.5 34cm -1 34.5cm +0.5 34.5cm No chg 35cm +0.5 34cm -1 35cm +1 35cm No chg   Elbow 28 cm 28 cm 0 cm 27 cm -1 27.5 cm +0.5 28 cm +0.5 28cm No chg 27cm -1 27cm No chg 27.5cm +0.5 27cm -0.5 27.5cm +0.5 27.5cm No chg 27cm -0.5   W+ 8" 29 cm  28.5cm 0.5 cm 28cm -1 28 cm No chg 29.5cm +1.5 29cm -0.5 28.5cm -0.5 28cm -0.5 29.5cm +1.5 29cm -0.5 28.5cm -0.5 29cm +0.5 28cm -1   W +  6" 29.5 cm 27 cm 2.5 cm 29cm -0.5 29cm No chg 29cm No chg 29cm No chg 29cm No chg 29cm No chg 29.5cm +0.5 28.5cm -1 29cm +0.5 29cm No chg 29cm No chg   W + 4" 25.5 cm 23.5 cm 2 cm 25cm -0.5 24.5 cm -0.5 25.5cm +1 25 cm -0.5 25cm No chg 24.5cm -0.5 25.5cm +1 24.5cm -1 25cm +0.5 25.5cm +0.5  24.5cm -1   Wrist 18 cm 17.5 cm 0.5 cm 18.5cm +0.5 18 cm -0.5 18.5cm +0.5 18cm -0.5 18cm No chg 18cm No chg 18cm No chg 18cm No chg 18cm No chg 18cm No chg 18cm No chg   DPC 21.5 cm 20 cm 1.5 cm 20cm -1.5 20.5cm +0.5 21 cm +0.5 21cm -0.5 20.5cm -0.5 21cm +0.5 21.5cm " +0.5 21cm -0.5 21cm No chg 20.5 cm -0.5 21.5cm +1   IP Thumb 7.5 cm 7 cm 0.5 cm 8cm +0.5 7.5cm -0.5 8 cm +0.5 8cm No chg 7.5cm -0.5 7.5cm No chg 8cm +0.5 7.5cm -0.5 7.5cm No Chg 8cm +0.5 7.5cm -0.5         Treatment     Phylicia received the following manual therapy techniques were performed to increased myofascial/soft tissue length, mobility and pliability, increase PROM, AROM and function as well as to decrease pain for 50 minutes:      Measurements taken above    Diaphragmatic breathing x 5 reps    Short Neck- held due to thyroid goiter  Clear Healthy Lymph Nodes:  Left Axilla                                                  Right  Groin  Open Anastomoses:  Anterior Axillo-Axillary  (AAA)                                    Axillo-Inguinal     (AI)                                    Posterior Axillo-Axillary  (LEEANN) -not performed today   J-stroke to Right lateral chest wall  Right Upper Arm (Lateral and Medial)  Right Antecubital Fossa  Right forearm, dorsal and ventral aspect  Dorsum of R hand  R fingers    Rework R hand   Rework Right UE  Rework Anastomoses  Rework Healthy lymph Nodes    Diaphragmatic breathing x 5 reps        PT applies gauze to Right fingers and hand and then PT assists pt in donning her tubigrip since she left bandages at home.     Reviewed with pt that she can put bandages in the washer (gentle cycle) and drier (air dry or lowest heat setting) and this will help them maintain their elasticity.            Home Exercise Program and Patient Education   Education provided re:  - role of PT in multi - disciplinary team, goals for PT  - progress towards goals   -proper care/wash for compression bandages    Written Home Exercises Provided: Patient instructed to cont prior HEP.  Exercises were reviewed and Mirian was able to demonstrate them prior to the end of the session.  Mirian demonstrated good  understanding of the education provided.     See EMR under Media for exercises provided 6/8/2022- for self  manual lymph drainage techniques.    Pt has no cultural, educational or language barriers to learning provided.    Assessment     Patient tolerated session well. Despite bandages lasting 2 days, overall she presents with reductions in right arm circumference overall.  She has been compliant with manual lymph drainage with pump. She tolerated lymphatic drainage well, but we were unable to perform bandaging as she left these at home.  Once her RUE circumference stabilizes, she will benefit from compression sleeve and compression glove at 20-30mmHg. Will progress as tolerated.    Pt prognosis is Good. Pt will continue to benefit from skilled outpatient physical therapy to address the deficits listed in the problem list chart on initial evaluation, provide pt/family education and to maximize pt's level of independence in the home and community environment.     Anticipated barriers to physical therapy: none anticipated    Goals as follows:  Short Term Goals (6 weeks)  1. Pt will demo decrease in girth in right upper extremity by >/= 1cm to allow for improved UE symmetry, clothing choice, ROM, and UE function. (met, 8/18/22  2. Patient will demonstrate 100% knowledge of lymphedema precautions and signs of infection to allow for reduced risk of lymphedema, reduced risk of infection, and/or exacerbation.  (met)  3. Patient will perform self lymph drainage techniques to enhance lymphatic drainage and mobility.  (met, 7/6/22)  4. Patient will tolerate daily activities with multilayered bandaging to enhance lymphatic drainage and skin elasticity.  (met, 7/6/22)  5. Pt will tolerate HEP for improved strength, functional mobility, ROM, posture, and endurance. (met, 7/6/22)        Long Term Goals: ( 12  weeks)  1. Patient to show decreased girth in Right upper extremity by >/= 2cm to allow for improved UE symmetry, clothing choice, ROM, and UE function.  (met partially, 8/18/22)  2. Patient will show reduction in density to mild  or less with improved contour of limb to allow for improved cosmesis, improved lymphatic drainage, and functional mobility.  (met, 9/2/22)  3. Patient to mary/doff compression garment with daily compliance to support lymphatic mobility and skin elasticity.  (progressing, not met)  4. Pt to show improved postural awareness and alignment for improved functional mobility.  (progressing, not met)  5. Pt to be independent and compliant with HEP to allow for improved ROM, reach and carry with functional endurance and strength.    (met, 10/4/22)           Plan   Outpatient physical therapy 2x week for 5 weeks to include the following:   Manual Therapy, Neuromuscular Re-ed, Orthotic Management and Training, Patient Education, Self Care, Therapeutic Activities and Therapeutic Exercise.     Plan of care Certification Period: 10/4/22 to 11/11/22       Therapist: Marcela Price, PT  10/4/2022

## 2022-10-05 DIAGNOSIS — C50.611 CARCINOMA OF AXILLARY TAIL OF RIGHT BREAST IN FEMALE, ESTROGEN RECEPTOR POSITIVE: ICD-10-CM

## 2022-10-05 DIAGNOSIS — Z17.0 CARCINOMA OF AXILLARY TAIL OF RIGHT BREAST IN FEMALE, ESTROGEN RECEPTOR POSITIVE: ICD-10-CM

## 2022-10-05 DIAGNOSIS — Z79.811 USE OF AROMATASE INHIBITORS: ICD-10-CM

## 2022-10-05 RX ORDER — ANASTROZOLE 1 MG/1
1 TABLET ORAL DAILY
Qty: 90 TABLET | Refills: 3 | Status: SHIPPED | OUTPATIENT
Start: 2022-10-05 | End: 2023-06-13 | Stop reason: SDUPTHER

## 2022-10-05 RX ORDER — ZOLPIDEM TARTRATE 10 MG/1
10 TABLET ORAL NIGHTLY PRN
Qty: 30 TABLET | Refills: 2 | Status: SHIPPED | OUTPATIENT
Start: 2022-10-05 | End: 2023-01-03 | Stop reason: SDUPTHER

## 2022-10-06 NOTE — PLAN OF CARE
"  Outpatient Therapy Updated Plan of Care     Visit Date: 10/4/2022  Name: Phylicia Vásquez  Clinic Number: [id number]    Therapy Diagnosis:   Encounter Diagnoses   Name Primary?    Lymphedema of right arm Yes    Impaired functional mobility and activity tolerance      Physician: Negrito Verde MD      Physician Orders: PT Eval and Treat   Medical Diagnosis: Carcinoma of axillary tail of right breast in female, estrogen receptor positive [C50.611, Z17.0], Localized edema    Evaluation Date: 6/3/2022    Total Visits Received: 14  Cancelled Visits: unknown  No Show Visits: 0    Current Certification Period:  9/3/22 to 9/30/22  Precautions:  Standard, Fall, RUE lymphedema  Visits from Evaluation Date:  13      Subjective     Update: Pt reports: Using pump every morning. Pt states compression bandages lasted 2 days and then fell down. "I think they may be too stretched out."  PT reviewed correct care/wash for compression bandages.  Pt states she left her compression bandages at home because she thought they were too stretched out.Pt states she has appointment to get L AFO tomorrow and she received compression socks yesterday. Pt had an ultrasound of her left leg and it was negative for blood clot.     She was compliant with compression pump  Response to previous treatment: no adverse reaction  Pain Scale: Phylicia rates pain on a scale of 0/10 on VAS.   Pain location: N/A     Fatigue: not assessed  Functional change: can lift her right arm more easily    Objective     Update:     LANDMARK RIGHT UE LEFT UE DIFF RUE Diff RUE Diff RUE Diff RUE Diff RUE Diff RUE Diff RUE Diff RUE Diff RUE Diff RUE Diff RUE Diff   Date 6/3/22 6/3/22 6/3/22 6/22/22 6/22/22 6/29/22 6/29/22 7/6/22 7/6/22 7/20/22 7/20/22 7/27/22 7/27/22 8/18/22 8/18/22 8/24/22 8/24/22 9/2/22 9/2/22 9/19/22 9/19/22 9/26/22 9/26/22 10/4/22 10/4/22   E + 8" 47 cm 46.5 cm 0.5 cm 46.5cm -0.5 44cm -2.5 45 cm +1 47 cm +2 45cm -2 43.5cm -1.5 43.5cm No chg 44cm +0.5 " "46cm +2 44.5cm -1.5 44.5 cm No chg   E + 6" 44 cm 42.5 cm 1.5 cm 44 cm No chg 44cm No chg 43.5cm -0.5 45cm +1.5 42cm -3 41cm -1 43cm +2 42.5cm -0.5 43.5cm +1 44.5cm +1 45cm +0.5   E + 4" 39.5 cm 37 cm 2.5 cm 39cm -0.5 40.5 cm +1.5 40.5cm No chg 41cm +0.5 39 cm -2 39cm No chg 39.5cm +0.5 40cm +0.5 39cm -1 41.5cm +2.5 40cm -1.5   E + 2" 34.5 cm 33 cm 1.5 cm 34.5 cm No chg 35 cm +0.5 34.5cm -0.5 35cm +0.5 34cm -1 34.5cm +0.5 34.5cm No chg 35cm +0.5 34cm -1 35cm +1 35cm No chg   Elbow 28 cm 28 cm 0 cm 27 cm -1 27.5 cm +0.5 28 cm +0.5 28cm No chg 27cm -1 27cm No chg 27.5cm +0.5 27cm -0.5 27.5cm +0.5 27.5cm No chg 27cm -0.5   W+ 8" 29 cm  28.5cm 0.5 cm 28cm -1 28 cm No chg 29.5cm +1.5 29cm -0.5 28.5cm -0.5 28cm -0.5 29.5cm +1.5 29cm -0.5 28.5cm -0.5 29cm +0.5 28cm -1   W +  6" 29.5 cm 27 cm 2.5 cm 29cm -0.5 29cm No chg 29cm No chg 29cm No chg 29cm No chg 29cm No chg 29.5cm +0.5 28.5cm -1 29cm +0.5 29cm No chg 29cm No chg   W + 4" 25.5 cm 23.5 cm 2 cm 25cm -0.5 24.5 cm -0.5 25.5cm +1 25 cm -0.5 25cm No chg 24.5cm -0.5 25.5cm +1 24.5cm -1 25cm +0.5 25.5cm +0.5  24.5cm -1   Wrist 18 cm 17.5 cm 0.5 cm 18.5cm +0.5 18 cm -0.5 18.5cm +0.5 18cm -0.5 18cm No chg 18cm No chg 18cm No chg 18cm No chg 18cm No chg 18cm No chg 18cm No chg   DPC 21.5 cm 20 cm 1.5 cm 20cm -1.5 20.5cm +0.5 21 cm +0.5 21cm -0.5 20.5cm -0.5 21cm +0.5 21.5cm +0.5 21cm -0.5 21cm No chg 20.5 cm -0.5 21.5cm +1   IP Thumb 7.5 cm 7 cm 0.5 cm 8cm +0.5 7.5cm -0.5 8 cm +0.5 8cm No chg 7.5cm -0.5 7.5cm No chg 8cm +0.5 7.5cm -0.5 7.5cm No Chg 8cm +0.5 7.5cm -0.5         Assessment     Update: Patient tolerated session well. Despite bandages lasting 2 days, overall she presents with reductions in right arm circumference overall.  She has been compliant with manual lymph drainage with pump. She tolerated lymphatic drainage well, but we were unable to perform bandaging as she left these at home.  Once her RUE circumference stabilizes, she will benefit from compression " sleeve and compression glove at 20-30mmHg. Will progress as tolerated.    Previous Short Term Goals Status:   met partially  New Short Term Goals Status:   N/A  Long Term Goal Status:   continue per initial plan of care.  Reasons for Recertification of Therapy:   pt making reductions in her RUE circumference. She is compliant with manual lymph drainage/use of compression pump. She will benefit from continued therapy so she can eventually be fit with compression sleeve and glove.    Plan     Updated Certification Period: 10/4/2022 to 11/11/22  Recommended Treatment Plan: 2 times per week for 5 weeks: Manual Therapy, Neuromuscular Re-ed, Orthotic Management and Training, Patient Education, Self Care, Therapeutic Activities, and Therapeutic Exercise  Other Recommendations: none anticipated    Marcela Price, PT  10/4/2022      I CERTIFY THE NEED FOR THESE SERVICES FURNISHED UNDER THIS PLAN OF TREATMENT AND WHILE UNDER MY CARE    Physician's comments:        Physician's Signature: ___________________________________________________

## 2022-10-17 ENCOUNTER — CLINICAL SUPPORT (OUTPATIENT)
Dept: REHABILITATION | Facility: HOSPITAL | Age: 52
End: 2022-10-17
Attending: INTERNAL MEDICINE
Payer: COMMERCIAL

## 2022-10-17 DIAGNOSIS — I89.0 LYMPHEDEMA OF RIGHT ARM: Primary | ICD-10-CM

## 2022-10-17 DIAGNOSIS — Z74.09 IMPAIRED FUNCTIONAL MOBILITY AND ACTIVITY TOLERANCE: ICD-10-CM

## 2022-10-17 PROCEDURE — 97140 MANUAL THERAPY 1/> REGIONS: CPT

## 2022-10-17 PROCEDURE — 97110 THERAPEUTIC EXERCISES: CPT

## 2022-10-17 NOTE — PROGRESS NOTES
Physical Therapy Daily Treatment Note       Date: 10/17/2022   Name: Phylicia Vásquez  Clinic Number: [id number]    Therapy Diagnosis:   Encounter Diagnoses   Name Primary?    Lymphedema of right arm Yes    Impaired functional mobility and activity tolerance      Physician: Negrito Verde MD    Physician Orders: PT Eval and Treat   Medical Diagnosis: Carcinoma of axillary tail of right breast in female, estrogen receptor positive [C50.611, Z17.0], Localized edema      Evaluation Date: 6/3/2022  Authorization Period Expiration: 12/31/22  Updated Plan of Care Certification Period: 11/11/22   Visit # / Visits authorized: 14/ 20 (plus eval)  Insurance: [employer]  FOTO: 3/3     Time In: 9:08 AM  Time Out:  9:57 AM  Total Billable Time: 49 minutes     Precautions: Standard, cancer and Right UE lymphedema      Subjective     Pt reports: She got back yesterday from vacation. She used her pump, and she states she didn't swell up once. Pt states her arm swelled up when she got home though. She things her brother accidentally threw away the cotton padding for her bandages.    She was compliant with compression pump  Response to previous treatment: no adverse reaction  Pain Scale: Phylicia rates pain on a scale of 0/10 on VAS.   Pain location: N/A     Fatigue: not assessed  Functional change: can lift her right arm more easily  Treatment:   Chemotherapy:  4 cycles of AC and 4 cycles of Taxol patrick adjuvantly  Radiation: completed radiation therapy on 8/20/20.   Endocrine therapy: On 11/1/2020 started on Anastrozole with with Dr. Verde     Surgery date: Pt is s/p bilateral mastectomy with R SLNB, TE reconstruction in 4/2021. pt will have permanent reconstruction with ROWAN flap, but she states she needs to lose weight first.      Objective     Objective Measures updated at progress report unless specified.     LANDMARK RIGHT UE LEFT UE DIFF RUE Diff RUE Diff RUE Diff RUE  "Diff RUE Diff RUE Diff RUE Diff RUE Diff RUE Diff RUE Diff RUE Diff RUE Diff   Date 6/3/22 6/3/22 6/3/22 6/22/22 6/22/22 6/29/22 6/29/22 7/6/22 7/6/22 7/20/22 7/20/22 7/27/22 7/27/22 8/18/22 8/18/22 8/24/22 8/24/22 9/2/22 9/2/22 9/19/22 9/19/22 9/26/22 9/26/22 10/4/22 10/4/22 10/17/22 10/17/22   E + 8" 47 cm 46.5 cm 0.5 cm 46.5cm -0.5 44cm -2.5 45 cm +1 47 cm +2 45cm -2 43.5cm -1.5 43.5cm No chg 44cm +0.5 46cm +2 44.5cm -1.5 44.5 cm No chg 45 cm +0.5   E + 6" 44 cm 42.5 cm 1.5 cm 44 cm No chg 44cm No chg 43.5cm -0.5 45cm +1.5 42cm -3 41cm -1 43cm +2 42.5cm -0.5 43.5cm +1 44.5cm +1 45cm +0.5 43.5 cm -1.5   E + 4" 39.5 cm 37 cm 2.5 cm 39cm -0.5 40.5 cm +1.5 40.5cm No chg 41cm +0.5 39 cm -2 39cm No chg 39.5cm +0.5 40cm +0.5 39cm -1 41.5cm +2.5 40cm -1.5 40cm No chg   E + 2" 34.5 cm 33 cm 1.5 cm 34.5 cm No chg 35 cm +0.5 34.5cm -0.5 35cm +0.5 34cm -1 34.5cm +0.5 34.5cm No chg 35cm +0.5 34cm -1 35cm +1 35cm No chg 34 cm -1   Elbow 28 cm 28 cm 0 cm 27 cm -1 27.5 cm +0.5 28 cm +0.5 28cm No chg 27cm -1 27cm No chg 27.5cm +0.5 27cm -0.5 27.5cm +0.5 27.5cm No chg 27cm -0.5 27 cm No chg   W+ 8" 29 cm  28.5cm 0.5 cm 28cm -1 28 cm No chg 29.5cm +1.5 29cm -0.5 28.5cm -0.5 28cm -0.5 29.5cm +1.5 29cm -0.5 28.5cm -0.5 29cm +0.5 28cm -1 29.5cm +1.5   W +  6" 29.5 cm 27 cm 2.5 cm 29cm -0.5 29cm No chg 29cm No chg 29cm No chg 29cm No chg 29cm No chg 29.5cm +0.5 28.5cm -1 29cm +0.5 29cm No chg 29cm No chg 29 cm No chg   W + 4" 25.5 cm 23.5 cm 2 cm 25cm -0.5 24.5 cm -0.5 25.5cm +1 25 cm -0.5 25cm No chg 24.5cm -0.5 25.5cm +1 24.5cm -1 25cm +0.5 25.5cm +0.5  24.5cm -1 25 cm +0.5   Wrist 18 cm 17.5 cm 0.5 cm 18.5cm +0.5 18 cm -0.5 18.5cm +0.5 18cm -0.5 18cm No chg 18cm No chg 18cm No chg 18cm No chg 18cm No chg 18cm No chg 18cm No chg 18 cm No chg   DPC 21.5 cm 20 cm 1.5 cm 20cm -1.5 20.5cm +0.5 21 cm +0.5 21cm -0.5 20.5cm -0.5 21cm +0.5 21.5cm +0.5 21cm -0.5 21cm No chg 20.5 cm -0.5 21.5cm +1 20.5cm -1   IP Thumb 7.5 cm 7 cm 0.5 cm 8cm " +0.5 7.5cm -0.5 8 cm +0.5 8cm No chg 7.5cm -0.5 7.5cm No chg 8cm +0.5 7.5cm -0.5 7.5cm No Chg 8cm +0.5 7.5cm -0.5 7.5cm No chg         Treatment     Phylicia received the following manual therapy techniques were performed to increased myofascial/soft tissue length, mobility and pliability, increase PROM, AROM and function as well as to decrease pain for 41 minutes:      Measurements taken above    Diaphragmatic breathing x 5 reps    Short Neck- held due to thyroid goiter  Clear Healthy Lymph Nodes:  Left Axilla                                                  Right  Groin  Open Anastomoses:  Anterior Axillo-Axillary  (AAA)                                    Axillo-Inguinal     (AI)                                    Posterior Axillo-Axillary  (LEEANN) -not performed today    Right Upper Arm (Lateral and Medial)  Right Antecubital Fossa  Right forearm, dorsal and ventral aspect  Dorsum of R hand  R fingers    Rework R hand   Rework Right UE  Rework Anastomoses  Rework Healthy lymph Nodes          PT applies  gauze to right hand and fingers,  and stockinette, cotton artiflex, and short stretch bandages to pt's RIGHT/LEFT/BILATERAL: right upper extremity. Pt educated to wear bandaging for 2-3 days unless it causes pain, numbness, or changes in skin color/temperature.  If removed, pt instructed to roll up bandages and cotton padding and bring to next session. If bandages are removed, pt can wear tubigrip and her compression glove.  Pt has Vet glove to cover bandages in the shower and directions on how to care for bandages. Pt verbalizes understanding of all topics.    Phylicia received therapeutic exercises to develop ROM, posture, and lymphatic mobility for 8 minutes including:     Diaphragmatic breathing, 2 x 5 reps  Shoulder rolls, 10 reps each fwd and back  Chicken wings x 10 reps  Scapular retractions x 10                 Home Exercise Program and Patient Education   Education provided re:  - role of PT in multi -  disciplinary team, goals for PT  - progress towards goals       Written Home Exercises Provided: Patient instructed to cont prior HEP.  Exercises were reviewed and Mirian was able to demonstrate them prior to the end of the session.  Mirian demonstrated good  understanding of the education provided.     See EMR under Media for exercises provided 6/8/2022- for self manual lymph drainage techniques.    Pt has no cultural, educational or language barriers to learning provided.    Assessment     Patient tolerated session well. Pt demo's reduction overall in her right arm circumference.   Once her RUE circumference stabilizes, she will benefit from compression sleeve and compression glove at 20-30mmHg. Will progress as tolerated.    Pt prognosis is Good. Pt will continue to benefit from skilled outpatient physical therapy to address the deficits listed in the problem list chart on initial evaluation, provide pt/family education and to maximize pt's level of independence in the home and community environment.     Anticipated barriers to physical therapy: none anticipated    Goals as follows:  Short Term Goals (6 weeks)  1. Pt will demo decrease in girth in right upper extremity by >/= 1cm to allow for improved UE symmetry, clothing choice, ROM, and UE function. (met, 8/18/22  2. Patient will demonstrate 100% knowledge of lymphedema precautions and signs of infection to allow for reduced risk of lymphedema, reduced risk of infection, and/or exacerbation.  (met)  3. Patient will perform self lymph drainage techniques to enhance lymphatic drainage and mobility.  (met, 7/6/22)  4. Patient will tolerate daily activities with multilayered bandaging to enhance lymphatic drainage and skin elasticity.  (met, 7/6/22)  5. Pt will tolerate HEP for improved strength, functional mobility, ROM, posture, and endurance. (met, 7/6/22)        Long Term Goals: ( 12  weeks)  1. Patient to show decreased girth in Right upper extremity by >/= 2cm to  allow for improved UE symmetry, clothing choice, ROM, and UE function.  (met partially, 8/18/22)  2. Patient will show reduction in density to mild or less with improved contour of limb to allow for improved cosmesis, improved lymphatic drainage, and functional mobility.  (met, 9/2/22)  3. Patient to mary/doff compression garment with daily compliance to support lymphatic mobility and skin elasticity.  (progressing, not met)  4. Pt to show improved postural awareness and alignment for improved functional mobility.  (progressing, not met)  5. Pt to be independent and compliant with HEP to allow for improved ROM, reach and carry with functional endurance and strength.    (met, 10/4/22)           Plan   Outpatient physical therapy 2x week for 5 weeks to include the following:   Manual Therapy, Neuromuscular Re-ed, Orthotic Management and Training, Patient Education, Self Care, Therapeutic Activities and Therapeutic Exercise.     Plan of care Certification Period: 10/4/22 to 11/11/22       Therapist: Marceal Price, PT  10/17/2022

## 2022-10-18 ENCOUNTER — CLINICAL SUPPORT (OUTPATIENT)
Dept: REHABILITATION | Facility: HOSPITAL | Age: 52
End: 2022-10-18
Attending: INTERNAL MEDICINE
Payer: COMMERCIAL

## 2022-10-18 DIAGNOSIS — R26.89 IMPAIRMENT OF BALANCE: ICD-10-CM

## 2022-10-18 DIAGNOSIS — R26.9 ABNORMALITY OF GAIT: ICD-10-CM

## 2022-10-18 DIAGNOSIS — R29.898 WEAKNESS OF LEFT LOWER EXTREMITY: ICD-10-CM

## 2022-10-18 DIAGNOSIS — M21.379 FOOT-DROP, UNSPECIFIED LATERALITY: ICD-10-CM

## 2022-10-18 PROCEDURE — 97162 PT EVAL MOD COMPLEX 30 MIN: CPT

## 2022-10-18 PROCEDURE — 97110 THERAPEUTIC EXERCISES: CPT

## 2022-10-18 NOTE — PLAN OF CARE
[facility]  Physical Therapy Initial Evaluation    Name: Phylicia Vásquez  Clinic Number: [id number]    Therapy Diagnosis:   Encounter Diagnoses   Name Primary?    Foot-drop, unspecified laterality     Weakness of left lower extremity     Abnormality of gait     Impairment of balance      Physician: Marcela Calvillo PA-C    Physician Orders: PT Eval and Treat   Medical Diagnosis from Referral: M21.379 (ICD-10-CM) - Foot-drop, unspecified laterality  Evaluation Date: 10/18/2022  Authorization Period Expiration: 9/27/2023  Plan of Care Expiration: 12/18/2022  Visit # / Visits authorized: 1/ 1  Insurance: [employer] OPEN ACCESS PLUS  FOTO: 1/3    Time In: 1:00 pm  Time Out: 2:00 pm  Total Billable Time: 60 minutes    Precautions: Standard, cancer, and R UE lymphedema    Subjective   Date of onset: chronic with exacerbation of symptoms 2 months ago  History of current condition - Mirian reports: has been having trouble with L ankle since she sprained it a few years ago, but it started acting up again 2 months ago. She has been having trouble with numbness in her feet due to her cancer treatments. When walking she can hear her toes tap on her left foot, has difficulty ascending/descending stairs as she has to take one at a time, and she was unable to wear sandals on her vacation due to fear of falling as she didn't feel supported in them.    Cancer Related Surgery and Date: Pt is s/p bilateral mastectomy with R SLNB, TE reconstruction in 4/2021    Chemotherapy:  4 cycles of AC and 4 cycles of Taxol patrick adjuvantly  Radiation: completed radiation therapy on 8/20/20.   Endocrine therapy: On 11/1/2020 started on Anastrozole with with Dr. Verde     Medical History:   Past Medical History:   Diagnosis Date    Anxiety     Back pain     Goiter     HTN (hypertension) 10/18/2012    Hx antineoplastic chemo     last dose 4/23/20    Hx of psychiatric care     Ambien, Klonopin    Insomnia 10/18/2012     Malignant neoplasm of axillary tail of right breast in female, estrogen receptor positive 2019    right    Neck mass     right posterior    Primary invasive malignant neoplasm of female breast, right 2019    right    Psychiatric problem     S/P abdominal supracervical subtotal hysterectomy, continues to have regular menses. -2014    Spinal cord cyst, L1 2014     Thoracic radiculopathy, bulging disc at T10-11 and T11-12        Surgical History:   Phylicia Vásquez  has a past surgical history that includes Cholecystectomy; Hysterectomy (); Colonoscopy (N/A, 10/05/2015);  section; Insertion of tunneled central venous catheter (CVC) with subcutaneous port (Right, 2019); Unilateral mastectomy (Right, 2020); Injection for sentinel node identification (Right, 2020); [place] lymph node biopsy (Right, 2020); Insertion of breast tissue expander (Right, 2020); Axillary node dissection (Right, 2020); Unilateral mastectomy (Left, 2021); Reconstruction of breast with deep inferior epigastric artery  (ROWAN) free flap (Bilateral, 2021); Tissue expander removal (Right, 2021); Needle localization (Left, 2021); Colonoscopy (N/A, 2021); Breast biopsy (Right, ); Breast reconstruction (Bilateral, 2021); Mastectomy (Bilateral, 2021); Breast biopsy (Left, ); and Lipoma resection (2021).    Medications:   Phylicia has a current medication list which includes the following prescription(s): albuterol, amlodipine-valsartan, anastrozole, ascorbic acid, clotrimazole-betamethasone 1-0.05%, cyanocobalamin (vitamin b-12), cyclobenzaprine, gabapentin, hydrocodone-acetaminophen, venlafaxine, vitamin e, and zolpidem.    Allergies:   Review of patient's allergies indicates:   Allergen Reactions    Butalbital Other (See Comments)     Chest pain          Imaging, none: no recent imaging of ankle/foot available    Prior  Therapy: survivorship weight management and for left knee, currently R UE lymphedema  Social History: single story home 3 steps to enter, lives with their son  Occupation: overnight  at the [employer]  Prior Level of Function: able to ambulate prolonged distances, ascend/descend stairs with a reciprocal gait pattern, able to ambulate with a normal gait pattern  Current Level of Function: ascending/descending stairs with step to gait pattern, ambulating with L foot drop  Dominant hand:  right     Pain:  Current 1/10, worst 10/10, best 3/10   Location: left ankle   Description: Aching and Dull  Aggravating Factors: Sitting, Standing, Laying, and Walking  Easing Factors: ice and KT tape    Functional Mobility (Bed mobility, transfers)  Bed mobility: I  Supine to sit: I  Sit to supine: I  Transfers to bed: I  Transfers to toilet: I  Sit to stand:  I  Car transfers: I    ADL's:  Feeding: I  Grooming: I  Hygiene: I  UB Dressing: I  LB Dressing: I  Toileting: I  Bathing: I    Pts goals: to be able to walk normal again      Objective     Lab Values 9/12/22  Platelets: 216  ANC: 4.9  Hematacrit: 37.3  Hemoglobin: 12.3    Vitals: 130/88, 68 bpm    Mental status: alert, oriented to person, place, and time, normal mood, behavior, speech, dress, motor activity, and thought processes, affect appropriate to mood  Appearance: Casually dressed  Behavior:  calm, cooperative, and adequate rapport can be established  Attention Span and Concentration:  Normal    Postural examination/scapula alignment: Rounded shoulder, Head forward, and wide DANGELO      Sensation:   Light Touch UEs  Intact  Light Touch LEs  Impaired: feet  Proprioception: Intact           ROM:     Active/Passive ROM: (measured in degrees)   B UE AROM WFL, B hip and knee AROM WFL    Ankle Right Left Pain/Dysfunction with Movement   Plantarflexion 10/15 5(-40 to -35) /10 N/A   Dorsiflexion 45/47 40/45 N/A   Inversion 30/35 10/30 N/A   Eversion 20/30 10/20 N/A          Strength: manual muscle test grades below     Upper Extremity Strength  (R) UE  (L) UE    Shoulder flexion: 5/5 Shoulder flexion: 5/5   Shoulder Abduction: 5/5 Shoulder abduction: 5/5   Shoulder ER 4+/5 Shoulder ER 5/5   Shoulder IR 5/5 Shoulder IR 5/5   Elbow flexion: 5/5 Elbow flexion: 5/5   Elbow extension: 5/5 Elbow extension: 5/5   Wrist flexion: 5/5 Wrist flexion: 5/5   Wrist extension: 5/5 Wrist extension: 5/5         Lower Extremity Strength  Right LE  Left LE    Hip Flexion: 4+/5 Hip Flexion: 4-/5   Hip Extension:  4+/5 Hip Extension: 4+/5   Hip Abduction: 4+/5 Hip Abduction: 4+/5   Hip Adduction: 4/5 Hip Adduction 4/5   Knee Extension: 5/5 Knee Extension: 4-/5   Knee Flexion: 5/5 Knee Flexion: 4+/5   Ankle Dorsiflexion: 5/5 Ankle Dorsiflexion: 3-/5   Ankle Plantarflexion: 5/5 Ankle Plantarflexion: 4/5   Ankle Inversion: 5/5 Ankle Inversion: 4-/5   Ankle Eversion: 5/5 Ankle Eversion: 4-/5     Special Tests     Ankle Left Right   Anterior Drawer Test negative negative   Bryant test negative negative       Balance Assessment:     Evaluation   Single Limb Stance R LE 1.91 sec  (<10 sec = HIGH FALL RISK)   Single Limb Stance L LE 1.09 sec  (<10 sec = HIGH FALL RISK)      Evaluation   Timed Up and Go 14.11 sec  < 20 sec safe for independent transfers, < 30 sec safe for dependent transfers/assist required       Table: Population Norms for TUG    Age  Average TUG    60 - 69 years  8.1 seconds    70 - 79 years  9.2 seconds    80 - 99 years  11.3 seconds        Endurance:    6 Minute Walk Test Distance in meters: TBA    - AD used: none  - Assistance: none  - Distance: 60 feet    GAIT DEVIATIONS:  Phylicia displays the following deviations with ambulation: increased hip flexion on L LE, decreased heel strike and toe off L LE, wide DANGELO    Impairments contributing to deviations: impaired motor control, muscle weakness, neuropathy    Endurance Assessment:   Evaluation   30 second Chair Rise  (adults > 59 y/o)  TBA            CMS Impairment/Limitation/Restriction for FOTO Ankle Survey    Therapist reviewed FOTO scores for Phylicia Vásquez on 10/18/2022.   FOTO documents entered into [employer] - see Media section.    Limitation Score: 56%  Category: Other           TREATMENT     Total Treatment time separate from Evaluation: 8 minutes    Mirian received therapeutic exercises to develop strength for 8 minutes including:  Seated ankle DF, 1 x 10  Seated ankle Inversion, 1 x 10  Seated ankle eversion, 1 x 10  Toe yoga, 1 x 10    Home Exercises and Patient Education Provided    Education provided:   - compliance with HEP  - role of PT and goals for PT     Written Home Exercises Provided: yes.  Exercises were reviewed and Mirian was able to demonstrate them prior to the end of the session.  Mirian demonstrated good  understanding of the education provided.     See EMR under Patient Instructions for exercises provided 10/18/2022.    Assessment   Phylicia is a 52 y.o. female referred to outpatient Physical Therapy with a medical diagnosis of Foot-drop, unspecified laterality. Pt presents with weakness of left lower extremity, decreased AROM of left ankle, balance issues, abnormality of gait, and decreased functional mobility. She is limited in her ability to perform her usual ADLs at home and work, and participate in her preferred recreational activities due to her balance issues, muscle weakness, and abnormal gait pattern.     Pt prognosis is Good.   Pt will benefit from skilled outpatient Physical Therapy to address the deficits stated above and in the chart below, provide pt/family education, and to maximize pt's level of independence.     Plan of care discussed with patient: Yes  Pt's spiritual, cultural and educational needs considered and patient is agreeable to the plan of care and goals as stated below:     Anticipated Barriers for therapy: none anticipated    Medical Necessity is demonstrated by the  following  History  Co-morbidities and personal factors that may impact the plan of care Co-morbidities:   depression, high BMI, history of cancer, and HTN    Personal Factors:   no deficits     moderate   Examination  Body Structures and Functions, activity limitations and participation restrictions that may impact the plan of care Body Regions:   lower extremities    Body Systems:    ROM  strength  balance  gait  motor control    Participation Restrictions:   None anticipated    Activity limitations:   Learning and applying knowledge  no deficits    General Tasks and Commands  no deficits    Communication  no deficits    Mobility  lifting and carrying objects  walking  Ascending/descending stairs    Self care  no deficits    Domestic Life  shopping  cooking  doing house work (cleaning house, washing dishes, laundry)    Interactions/Relationships  no deficits    Life Areas  employment    Community and Social Life  community life  recreation and leisure         moderate   Clinical Presentation evolving clinical presentation with changing clinical characteristics moderate   Decision Making/ Complexity Score: moderate     Goals:  Short Term Goals:  4 weeks  1. Pt will increase AROM of left ankle to neutral dorsiflexion to promote greater ease with performing normal gait pattern. (progressing, not met)  2. Pt. to demonstrate increased strength of left lower extremity to 4/5  to increase stability during community ambulation (progressing, not met)  3. Pt. will improve TUG test score to 12 seconds or less in order to perform safe transfers and for patient to be in low/moderate risk for falls category (progressing, not met)  4. Pt will tolerate 6 minute walk test. (progressing, not met)  5. Pt will initiate home exercise program to improve strength, flexibility, endurance, mobility & balance to return pt to PLOF (progressing, not met)  6. Pt will tolerate 10 min or greater of time in light-->moderate intensity cardio  (I.e. Bike, NuStep) to improve endurance to assist in performing her usual work activities (progressing, not met)  7. Pt to demonstrate tandem stance 30 sec or greater w/out UE support in order to improve balance to progress to singe leg stance to decrease fall risk in performance of ADL's (progressing, not met)    Long Term Goals: 8 weeks  1. Pt will increase AROM of left ankle dorsiflexion to 5 degrees in order to perform normal gait pattern when ambulating on uneven ground (progressing, not met)  2. Pt. will increase strength of left lower extremity to 5/5  to increase stability during ambulation on uneven surfaces,to increase tolerance for ADL and work activities. (progressing, not met)  3. Pt will be independent with HEP to improve ROM, strength, balance,  and independence with ADL's (progressing, not met)  4. Pt. will improve TUG test score to 10 seconds or less in order to ambulate safely in community and for patient to be in low risk for falls category (progressing, not met)  5. Pt. will improve 6 minute walk test score by 30 meters in order for patient to be in low risk for falls category (progressing, not met)  6. Patient will report compliance with walking program 5x week for 10 -30min each day to improve overall cardiovascular function and decrease cancer related fatigue at discharge. (progressing, not met)  .   Plan   Plan of care Certification: 10/18/2022 to 12/18/2022.    Outpatient Physical Therapy 2 times weekly for 8 weeks to include the following interventions: Gait Training, Manual Therapy, Neuromuscular Re-ed, Patient Education, Self Care, Therapeutic Activities, Therapeutic Exercise, and IASTM . Dry needling with manual therapy techniques to decrease pain, inflammation and swelling, increase circulation and promote healing process.      Gail Gutierrez, PT

## 2022-10-18 NOTE — PATIENT INSTRUCTIONS
RECIPE FOR ICE PACK    Flexible homemade alcohol water ice pack    2 cups water  1 cup rubbing alcohol  Food coloring for the blue tint (optional)  2 zip-top bags - quart or gallon-size or vacuum sealer bags  Mix the water and alcohol together in one of your zip-top bags and add food coloring, if desired, until you get that perfect blue tint. Release as much air as possible and seal the bag. I recommend double bagging for strength. (If you have a vacuum sealer use a vacuum seal bag for the outer layer to further prevent accidents.) My trusty Foodsaver works like a charm.     Stick your new flexible homemade alcohol ice pack in the freezer for about 12 hours before using it for the first time. It will be icy, a little slushy, and perfectly flexible for any body injury that needs the cold treatment.         Toe yoga    1) Lift your big toe without lifting the other 4 or rolling your ankle outward.    2) Lift your 4 small toes without lifting your big toe or rolling your ankle inward.    Perform 10 times each way. Do 2 sessions per day.        TOWEL CURLS - TOWEL SCRUNCHES    While seated, use a towel and draw it back towards you using your toes. Curl your toes inward.      Be sure to keep your heel in contact with the floor the entire time.  Do 10 reps per set. Do 2-3 sets per session. Do 1-2 sessions per day.     Dorsiflexion: Resisted        Facing anchor, tubing around left foot, pull toward face.   Repeat 10 times per set. Do 3 sets per session. Do 2 sessions per day.     https://[employer]olapps.[employer].100du.tv/8     Copyright © [employer]. All rights reserved.   Plantar Flexion: Resisted        [place] behind, tubing around left foot, press down.  Repeat 10 times per set. Do 3 sets per session. Do 2 sessions per day.      https://[employer]apps.[employer].100du.tv/10     Copyright © [employer]. All rights reserved.   Inversion: Resisted        Cross legs with right leg underneath, foot in tubing loop. Hold tubing around other foot to resist and turn foot  in.  Repeat 10 times per set. Do 3 sets per session. Do 2 sessions per day.     [email or web address]://[email or web address].[employer]/12     Copyright © [employer]. All rights reserved.   Eversion: Resisted        With right foot in tubing loop, hold tubing around other foot to resist and turn foot out.  Repeat 10 times per set. Do 3 sets per session. Do 2 sessions per day.     [email or web address]://[email or web address].[employer]/14     Copyright © [employer]. All rights reserved.

## 2022-10-20 ENCOUNTER — TELEPHONE (OUTPATIENT)
Dept: OBSTETRICS AND GYNECOLOGY | Facility: CLINIC | Age: 52
End: 2022-10-20
Payer: COMMERCIAL

## 2022-10-20 NOTE — TELEPHONE ENCOUNTER
Phoned Tabitha at [facility]. Clinic informed that Marcela is currently out of the office until Monday and info will placed on her desk for signature.

## 2022-10-20 NOTE — TELEPHONE ENCOUNTER
----- Message from Ivelisse Parikh sent at 10/20/2022 12:49 PM CDT -----  Contact: [facility]: Prosthetics & Orthotics  Type: Call Back      Who called: Tabitha with [facility]: Prosthetics & Orthotics      What is the request in detail:  Tabitha is requesting a call back. She states that she is sending over a fax again in regard to clinical notes and order. She states that this will be the 3rd time faxing. Please advise.       Can the clinic reply by [facility]? No      Would the patient rather a call back or a response via [facility]? Call back       Best call back number: p. [phone]  f. [phone]      Additional Information:

## 2022-10-24 ENCOUNTER — CLINICAL SUPPORT (OUTPATIENT)
Dept: REHABILITATION | Facility: HOSPITAL | Age: 52
End: 2022-10-24
Attending: INTERNAL MEDICINE
Payer: COMMERCIAL

## 2022-10-24 DIAGNOSIS — Z74.09 IMPAIRED FUNCTIONAL MOBILITY AND ACTIVITY TOLERANCE: ICD-10-CM

## 2022-10-24 DIAGNOSIS — I89.0 LYMPHEDEMA OF RIGHT ARM: Primary | ICD-10-CM

## 2022-10-24 PROCEDURE — 97140 MANUAL THERAPY 1/> REGIONS: CPT

## 2022-10-24 NOTE — PROGRESS NOTES
Physical Therapy Daily Treatment Note       Date: 10/24/2022   Name: Phylicia Vásquez  Clinic Number: [id number]    Therapy Diagnosis:   Encounter Diagnoses   Name Primary?    Lymphedema of right arm Yes    Impaired functional mobility and activity tolerance      Physician: Negrito Verde MD    Physician Orders: PT Eval and Treat   Medical Diagnosis: Carcinoma of axillary tail of right breast in female, estrogen receptor positive [C50.611, Z17.0], Localized edema      Evaluation Date: 6/3/2022  Authorization Period Expiration: 12/31/22  Updated Plan of Care Certification Period: 11/11/22   Visit # / Visits authorized: 15/ 20 (plus eval)  Insurance: [employer]  FOTO: 3/3     Time In: 9:08 AM  Time Out:  9:58 AM  Total Billable Time: 50 minutes     Precautions: Standard, cancer and Right UE lymphedema      Subjective     Pt reports: She was able to wear compression bandages for 3 days. Pt has been wearing her tubigrip and compression glove since.  She was compliant with compression pump  Response to previous treatment: no adverse reaction  Pain Scale: Phylicia rates pain on a scale of 0/10 on VAS.   Pain location: N/A     Fatigue: not assessed  Functional change: can lift her right arm more easily  Treatment:   Chemotherapy:  4 cycles of AC and 4 cycles of Taxol patrick adjuvantly  Radiation: completed radiation therapy on 8/20/20.   Endocrine therapy: On 11/1/2020 started on Anastrozole with with Dr. Verde     Surgery date: Pt is s/p bilateral mastectomy with R SLNB, TE reconstruction in 4/2021. pt will have permanent reconstruction with ROWAN flap, but she states she needs to lose weight first.      Objective     Objective Measures updated at progress report unless specified.     LANDMARK RIGHT UE LEFT UE DIFF RUE Diff RUE Diff RUE Diff RUE Diff RUE Diff RUE Diff RUE Diff RUE Diff RUE Diff RUE Diff RUE Diff RUE Diff RUE Diff   Date 6/3/22 6/3/22 6/3/22 6/22/22  "6/22/22 6/29/22 6/29/22 7/6/22 7/6/22 7/20/22 7/20/22 7/27/22 7/27/22 8/18/22 8/18/22 8/24/22 8/24/22 9/2/22 9/2/22 9/19/22 9/19/22 9/26/22 9/26/22 10/4/22 10/4/22 10/17/22 10/17/22 10/24/22 10/24/22   E + 8" 47 cm 46.5 cm 0.5 cm 46.5cm -0.5 44cm -2.5 45 cm +1 47 cm +2 45cm -2 43.5cm -1.5 43.5cm No chg 44cm +0.5 46cm +2 44.5cm -1.5 44.5 cm No chg 45 cm +0.5 44.5 cm -0.5   E + 6" 44 cm 42.5 cm 1.5 cm 44 cm No chg 44cm No chg 43.5cm -0.5 45cm +1.5 42cm -3 41cm -1 43cm +2 42.5cm -0.5 43.5cm +1 44.5cm +1 45cm +0.5 43.5 cm -1.5 43 cm -0.5   E + 4" 39.5 cm 37 cm 2.5 cm 39cm -0.5 40.5 cm +1.5 40.5cm No chg 41cm +0.5 39 cm -2 39cm No chg 39.5cm +0.5 40cm +0.5 39cm -1 41.5cm +2.5 40cm -1.5 40cm No chg 40cm No chg   E + 2" 34.5 cm 33 cm 1.5 cm 34.5 cm No chg 35 cm +0.5 34.5cm -0.5 35cm +0.5 34cm -1 34.5cm +0.5 34.5cm No chg 35cm +0.5 34cm -1 35cm +1 35cm No chg 34 cm -1 35 cm +1   Elbow 28 cm 28 cm 0 cm 27 cm -1 27.5 cm +0.5 28 cm +0.5 28cm No chg 27cm -1 27cm No chg 27.5cm +0.5 27cm -0.5 27.5cm +0.5 27.5cm No chg 27cm -0.5 27 cm No chg 27 cm No chg   W+ 8" 29 cm  28.5cm 0.5 cm 28cm -1 28 cm No chg 29.5cm +1.5 29cm -0.5 28.5cm -0.5 28cm -0.5 29.5cm +1.5 29cm -0.5 28.5cm -0.5 29cm +0.5 28cm -1 29.5cm +1.5 30 cm +0.5   W +  6" 29.5 cm 27 cm 2.5 cm 29cm -0.5 29cm No chg 29cm No chg 29cm No chg 29cm No chg 29cm No chg 29.5cm +0.5 28.5cm -1 29cm +0.5 29cm No chg 29cm No chg 29 cm No chg 29 cm No chg   W + 4" 25.5 cm 23.5 cm 2 cm 25cm -0.5 24.5 cm -0.5 25.5cm +1 25 cm -0.5 25cm No chg 24.5cm -0.5 25.5cm +1 24.5cm -1 25cm +0.5 25.5cm +0.5  24.5cm -1 25 cm +0.5 25.5 cm +0.5   Wrist 18 cm 17.5 cm 0.5 cm 18.5cm +0.5 18 cm -0.5 18.5cm +0.5 18cm -0.5 18cm No chg 18cm No chg 18cm No chg 18cm No chg 18cm No chg 18cm No chg 18cm No chg 18 cm No chg 18 cm No chg   DPC 21.5 cm 20 cm 1.5 cm 20cm -1.5 20.5cm +0.5 21 cm +0.5 21cm -0.5 20.5cm -0.5 21cm +0.5 21.5cm +0.5 21cm -0.5 21cm No chg 20.5 cm -0.5 21.5cm +1 20.5cm -1 20.5 cm No chg   IP " Thumb 7.5 cm 7 cm 0.5 cm 8cm +0.5 7.5cm -0.5 8 cm +0.5 8cm No chg 7.5cm -0.5 7.5cm No chg 8cm +0.5 7.5cm -0.5 7.5cm No Chg 8cm +0.5 7.5cm -0.5 7.5cm No chg 7.5 cm No chg         Treatment     Phylicia received the following manual therapy techniques were performed to increased myofascial/soft tissue length, mobility and pliability, increase PROM, AROM and function as well as to decrease pain for 47 minutes:      Measurements taken above    Diaphragmatic breathing x 5 reps    Short Neck- held due to thyroid goiter  Clear Healthy Lymph Nodes:  Left Axilla                                                  Right  Groin  Open Anastomoses:  Anterior Axillo-Axillary  (AAA)                                    Axillo-Inguinal     (AI)                                    Posterior Axillo-Axillary  (LEEANN)     Right Upper Arm (Lateral and Medial)  Right Antecubital Fossa  Right forearm, dorsal and ventral aspect  Dorsum of R hand  R fingers    Rework R hand   Rework Right UE  Rework Anastomoses  Rework Healthy lymph Nodes      Diaphragmatic breathing x 5 reps      PT applies  gauze to right hand and fingers,  and stockinette, cotton artiflex, and short stretch bandages to pt's RIGHT/LEFT/BILATERAL: right upper extremity. Pt educated to wear bandaging for 2-3 days unless it causes pain, numbness, or changes in skin color/temperature.  If removed, pt instructed to roll up bandages and cotton padding and bring to next session. If bandages are removed, pt can wear tubigrip and her compression glove.  Pt has Vet glove to cover bandages in the shower and directions on how to care for bandages. Pt verbalizes understanding of all topics.    Phylicia received therapeutic exercises to develop ROM, posture, and lymphatic mobility for 2 minutes including:     B shoulder flexion in supine x 10 reps                 Home Exercise Program and Patient Education   Education provided re:  - role of PT in multi - disciplinary team, goals for PT  -  progress towards goals       Written Home Exercises Provided: Patient instructed to cont prior HEP.  Exercises were reviewed and Mirian was able to demonstrate them prior to the end of the session.  Mirian demonstrated good  understanding of the education provided.     See EMR under Media for exercises provided 6/8/2022- for self manual lymph drainage techniques.    Pt has no cultural, educational or language barriers to learning provided.    Assessment     Patient tolerated session well. Pt demo's reduction overall in her right arm circumference.   Once her RUE circumference stabilizes, she will benefit from compression sleeve and compression glove at 20-30mmHg. Will progress as tolerated.    Pt prognosis is Good. Pt will continue to benefit from skilled outpatient physical therapy to address the deficits listed in the problem list chart on initial evaluation, provide pt/family education and to maximize pt's level of independence in the home and community environment.     Anticipated barriers to physical therapy: none anticipated    Goals as follows:  Short Term Goals (6 weeks)  1. Pt will demo decrease in girth in right upper extremity by >/= 1cm to allow for improved UE symmetry, clothing choice, ROM, and UE function. (met, 8/18/22  2. Patient will demonstrate 100% knowledge of lymphedema precautions and signs of infection to allow for reduced risk of lymphedema, reduced risk of infection, and/or exacerbation.  (met)  3. Patient will perform self lymph drainage techniques to enhance lymphatic drainage and mobility.  (met, 7/6/22)  4. Patient will tolerate daily activities with multilayered bandaging to enhance lymphatic drainage and skin elasticity.  (met, 7/6/22)  5. Pt will tolerate HEP for improved strength, functional mobility, ROM, posture, and endurance. (met, 7/6/22)        Long Term Goals: ( 12  weeks)  1. Patient to show decreased girth in Right upper extremity by >/= 2cm to allow for improved UE symmetry,  clothing choice, ROM, and UE function.  (met partially, 8/18/22)  2. Patient will show reduction in density to mild or less with improved contour of limb to allow for improved cosmesis, improved lymphatic drainage, and functional mobility.  (met, 9/2/22)  3. Patient to mary/doff compression garment with daily compliance to support lymphatic mobility and skin elasticity.  (progressing, not met)  4. Pt to show improved postural awareness and alignment for improved functional mobility.  (progressing, not met)  5. Pt to be independent and compliant with HEP to allow for improved ROM, reach and carry with functional endurance and strength.    (met, 10/4/22)           Plan   Outpatient physical therapy 2x week for 5 weeks to include the following:   Manual Therapy, Neuromuscular Re-ed, Orthotic Management and Training, Patient Education, Self Care, Therapeutic Activities and Therapeutic Exercise.     Plan of care Certification Period: 10/4/22 to 11/11/22       Therapist: Marcela Price, PT  10/24/2022

## 2022-10-25 ENCOUNTER — CLINICAL SUPPORT (OUTPATIENT)
Dept: REHABILITATION | Facility: HOSPITAL | Age: 52
End: 2022-10-25
Attending: INTERNAL MEDICINE
Payer: COMMERCIAL

## 2022-10-25 DIAGNOSIS — R26.89 IMPAIRMENT OF BALANCE: ICD-10-CM

## 2022-10-25 DIAGNOSIS — R26.9 ABNORMALITY OF GAIT: ICD-10-CM

## 2022-10-25 DIAGNOSIS — R29.898 WEAKNESS OF LEFT LOWER EXTREMITY: Primary | ICD-10-CM

## 2022-10-25 PROCEDURE — 97110 THERAPEUTIC EXERCISES: CPT

## 2022-10-25 NOTE — PROGRESS NOTES
Physical Therapy Daily Treatment Note     Name: Phylicia Vásquez  Clinic Number: [id number]    Therapy Diagnosis:   Encounter Diagnoses   Name Primary?    Weakness of left lower extremity Yes    Abnormality of gait     Impairment of balance      Physician: Marcela Calvillo PA-C    Visit Date: 10/25/2022    Physician Orders: PT Eval and Treat   Medical Diagnosis from Referral: M21.379 (ICD-10-CM) - Foot-drop, unspecified laterality  Evaluation Date: 10/18/2022  Authorization Period Expiration: 9/27/2023  Plan of Care Expiration: 12/18/2022  Visit # / Visits authorized: 1/ 1  Insurance: [employer] OPEN ACCESS PLUS  FOTO: 1/3     Time In: 8:00 am  Time Out: 8:55 am  Total Billable Time: 55 minutes    Precautions:   Standard, cancer, and R UE lymphedema    Subjective     Pt reports: having some pain and swelling in her L ankle. She just got off of work before coming to therapy today.  She was compliant with home exercise program.  Response to previous treatment: none  Functional change: nothing yet    Pain: 7/10  Location: left ankle      Objective     Objective Measures updated at progress report unless specified.     B/P: 119/93; HR: 77 bpm; O2: 99%    Lab Values: 9/12/22  Platelets: 216  ANC: 4.9  Hematacrit: 37.3  Hemoglobin: 12.3  Treatment       Mirian received therapeutic exercises to develop strength, endurance, ROM, flexibility, posture and core stabilization for 50 minutes including:  Aerobic/Cardio Activity: Stationary bike, seat 6, level 1, 10 minutes  Supine ankle DF, 1 x 10  Supine ankle inversion 1 x 10  Supine ankle eversion, 1 x 10  Supine ankle PF 1 x 10, red band  Calf raises, 1 x 10  Toe raise, 1 x 10    Patient was screened for use of kinesiotape and was cleared for use.  1. Has the patient ever had a reaction to the adhesive in bandaids? Yes/No  2. Has the patient ever uses athletic/kinesiotape in the past? Yes/No  3. Is the patient hemodynamically impaired (PE, DVT, CHF, Kidney failure)? Yes/No  4. Can  the PT/PTA apply the tape to your skin? Yes/No    Patient was instructed on duration to wear the tape, proper drying techniques for the tape, and to remove the tape if he/she had any issues with it.    Patient verbalized understanding of these instructions.    Mirian participated in neuromuscular re-education activities to improve: Balance, Coordination, Proprioception and Posture for 5 minutes. The following activities were included:  Tandem stance, 2 x 30 sec/LE  Single leg stance, 2 x 15 sec/LE      Home Exercises Provided and Patient Education Provided     Education provided:   - compliance with HEP  - role of PT and goals for PT     Written Home Exercises Provided: Patient instructed to cont prior HEP.  Exercises were reviewed and Mirian was able to demonstrate them prior to the end of the session.  Mirian demonstrated good  understanding of the education provided.     See EMR under Patient Instructions for exercises provided prior visit.    Assessment     Patient is responding well to physical therapy. She was able to perform all of today's activities with no increase in symptoms prior to leaving the clinic. She required occasional cues to engage her core and to focus on a stationary object to increase her visual feedback during balance activities. When ambulating into the clinic she displayed a steppage gait pattern due to her anterior tibialis muscle weakness. Will progress as tolerated.  Mirian Is progressing well towards her goals.   Pt prognosis is Good.     Pt will continue to benefit from skilled outpatient physical therapy to address the deficits listed in the problem list box on initial evaluation, provide pt/family education and to maximize pt's level of independence in the home and community environment.     Pt's spiritual, cultural and educational needs considered and pt agreeable to plan of care and goals.     Anticipated barriers to physical therapy: none anticipated    Goals:   Short Term Goals:  4  weeks  1. Pt will increase AROM of left ankle to neutral dorsiflexion to promote greater ease with performing normal gait pattern. (progressing, not met)  2. Pt. to demonstrate increased strength of left lower extremity to 4/5  to increase stability during community ambulation (progressing, not met)  3. Pt. will improve TUG test score to 12 seconds or less in order to perform safe transfers and for patient to be in low/moderate risk for falls category (progressing, not met)  4. Pt will tolerate 6 minute walk test. (progressing, not met)  5. Pt will initiate home exercise program to improve strength, flexibility, endurance, mobility & balance to return pt to OF (progressing, not met)  6. Pt will tolerate 10 min or greater of time in light-->moderate intensity cardio (I.e. Bike, NuStep) to improve endurance to assist in performing her usual work activities (progressing, not met)  7. Pt to demonstrate tandem stance 30 sec or greater w/out UE support in order to improve balance to progress to singe leg stance to decrease fall risk in performance of ADL's (progressing, not met)     Long Term Goals: 8 weeks  1. Pt will increase AROM of left ankle dorsiflexion to 5 degrees in order to perform normal gait pattern when ambulating on uneven ground (progressing, not met)  2. Pt. will increase strength of left lower extremity to 5/5  to increase stability during ambulation on uneven surfaces,to increase tolerance for ADL and work activities. (progressing, not met)  3. Pt will be independent with HEP to improve ROM, strength, balance,  and independence with ADL's (progressing, not met)  4. Pt. will improve TUG test score to 10 seconds or less in order to ambulate safely in community and for patient to be in low risk for falls category (progressing, not met)  5. Pt. will improve 6 minute walk test score by 30 meters in order for patient to be in low risk for falls category (progressing, not met)  6. Patient will report  compliance with walking program 5x week for 10 -30min each day to improve overall cardiovascular function and decrease cancer related fatigue at discharge. (progressing, not met)    Plan     Plan of care Certification: 10/18/2022 to 12/18/2022.     Outpatient Physical Therapy 2 times weekly for 8 weeks to include the following interventions: Gait Training, Manual Therapy, Neuromuscular Re-ed, Patient Education, Self Care, Therapeutic Activities, Therapeutic Exercise, and IASTM . Dry needling with manual therapy techniques to decrease pain, inflammation and swelling, increase circulation and promote healing process.      Gail Gutierrez, PT

## 2022-11-02 ENCOUNTER — CLINICAL SUPPORT (OUTPATIENT)
Dept: REHABILITATION | Facility: HOSPITAL | Age: 52
End: 2022-11-02
Attending: INTERNAL MEDICINE
Payer: COMMERCIAL

## 2022-11-02 DIAGNOSIS — I89.0 LYMPHEDEMA OF RIGHT ARM: Primary | ICD-10-CM

## 2022-11-02 DIAGNOSIS — Z74.09 IMPAIRED FUNCTIONAL MOBILITY AND ACTIVITY TOLERANCE: ICD-10-CM

## 2022-11-02 PROCEDURE — 97140 MANUAL THERAPY 1/> REGIONS: CPT

## 2022-11-02 NOTE — PROGRESS NOTES
Physical Therapy Daily Treatment Note       Date: 11/2/2022   Name: Phylicia Vásquez  Clinic Number: [id number]    Therapy Diagnosis:   Encounter Diagnoses   Name Primary?    Lymphedema of right arm Yes    Impaired functional mobility and activity tolerance      Physician: Negrito Verde MD    Physician Orders: PT Eval and Treat   Medical Diagnosis: Carcinoma of axillary tail of right breast in female, estrogen receptor positive [C50.611, Z17.0], Localized edema      Evaluation Date: 6/3/2022  Authorization Period Expiration: 12/31/22  Updated Plan of Care Certification Period: 11/11/22   Visit # / Visits authorized: 16/ 20 (plus eval)  Insurance: [employer]  FOTO: 3/3     Time In: 9:05 AM  Time Out:  10:07 AM  Total Billable Time: 62 minutes     Precautions: Standard, cancer and Right UE lymphedema      Subjective     Pt reports: She has been very busy. Bandages lasted 3 days. Gets intermittent throbbing pain with using the pump  She was compliant with compression pump  Response to previous treatment: no adverse reaction  Pain Scale: Phylicia rates pain on a scale of 0/10 on VAS.   Pain location: N/A     Fatigue: not assessed  Functional change: none stated  Treatment:   Chemotherapy:  4 cycles of AC and 4 cycles of Taxol patrick adjuvantly  Radiation: completed radiation therapy on 8/20/20.   Endocrine therapy: On 11/1/2020 started on Anastrozole with with Dr. Verde     Surgery date: Pt is s/p bilateral mastectomy with R SLNB, TE reconstruction in 4/2021. pt will have permanent reconstruction with ROWAN flap, but she states she needs to lose weight first.      Objective     Objective Measures updated at progress report unless specified.     LANDMARK RIGHT UE LEFT UE DIFF RUE Diff RUE Diff RUE Diff RUE Diff RUE Diff RUE Diff RUE Diff RUE Diff RUE Diff RUE Diff RUE Diff RUE Diff RUE Diff RUE Diff   Date 6/3/22 6/3/22 6/3/22 6/22/22 6/22/22 6/29/22 6/29/22 7/6/22  "7/6/22 7/20/22 7/20/22 7/27/22 7/27/22 8/18/22 8/18/22 8/24/22 8/24/22 9/2/22 9/2/22 9/19/22 9/19/22 9/26/22 9/26/22 10/4/22 10/4/22 10/17/22 10/17/22 10/24/22 10/24/22 11/2/22 11/2/22   E + 8" 47 cm 46.5 cm 0.5 cm 46.5cm -0.5 44cm -2.5 45 cm +1 47 cm +2 45cm -2 43.5cm -1.5 43.5cm No chg 44cm +0.5 46cm +2 44.5cm -1.5 44.5 cm No chg 45 cm +0.5 44.5 cm -0.5 46 cm +1.5   E + 6" 44 cm 42.5 cm 1.5 cm 44 cm No chg 44cm No chg 43.5cm -0.5 45cm +1.5 42cm -3 41cm -1 43cm +2 42.5cm -0.5 43.5cm +1 44.5cm +1 45cm +0.5 43.5 cm -1.5 43 cm -0.5 43 cm No chg   E + 4" 39.5 cm 37 cm 2.5 cm 39cm -0.5 40.5 cm +1.5 40.5cm No chg 41cm +0.5 39 cm -2 39cm No chg 39.5cm +0.5 40cm +0.5 39cm -1 41.5cm +2.5 40cm -1.5 40cm No chg 40cm No chg 40cm No chg   E + 2" 34.5 cm 33 cm 1.5 cm 34.5 cm No chg 35 cm +0.5 34.5cm -0.5 35cm +0.5 34cm -1 34.5cm +0.5 34.5cm No chg 35cm +0.5 34cm -1 35cm +1 35cm No chg 34 cm -1 35 cm +1 35.5 cm +0.5   Elbow 28 cm 28 cm 0 cm 27 cm -1 27.5 cm +0.5 28 cm +0.5 28cm No chg 27cm -1 27cm No chg 27.5cm +0.5 27cm -0.5 27.5cm +0.5 27.5cm No chg 27cm -0.5 27 cm No chg 27 cm No chg 27 cm No chg   W+ 8" 29 cm  28.5cm 0.5 cm 28cm -1 28 cm No chg 29.5cm +1.5 29cm -0.5 28.5cm -0.5 28cm -0.5 29.5cm +1.5 29cm -0.5 28.5cm -0.5 29cm +0.5 28cm -1 29.5cm +1.5 30 cm +0.5 28cm -2   W +  6" 29.5 cm 27 cm 2.5 cm 29cm -0.5 29cm No chg 29cm No chg 29cm No chg 29cm No chg 29cm No chg 29.5cm +0.5 28.5cm -1 29cm +0.5 29cm No chg 29cm No chg 29 cm No chg 29 cm No chg 28.5cm -0.5   W + 4" 25.5 cm 23.5 cm 2 cm 25cm -0.5 24.5 cm -0.5 25.5cm +1 25 cm -0.5 25cm No chg 24.5cm -0.5 25.5cm +1 24.5cm -1 25cm +0.5 25.5cm +0.5  24.5cm -1 25 cm +0.5 25.5 cm +0.5 25 cm -0.5   Wrist 18 cm 17.5 cm 0.5 cm 18.5cm +0.5 18 cm -0.5 18.5cm +0.5 18cm -0.5 18cm No chg 18cm No chg 18cm No chg 18cm No chg 18cm No chg 18cm No chg 18cm No chg 18 cm No chg 18 cm No chg 18 cm No chg   DPC 21.5 cm 20 cm 1.5 cm 20cm -1.5 20.5cm +0.5 21 cm +0.5 21cm -0.5 20.5cm -0.5 21cm +0.5 " 21.5cm +0.5 21cm -0.5 21cm No chg 20.5 cm -0.5 21.5cm +1 20.5cm -1 20.5 cm No chg 20.5 cm No chg   IP Thumb 7.5 cm 7 cm 0.5 cm 8cm +0.5 7.5cm -0.5 8 cm +0.5 8cm No chg 7.5cm -0.5 7.5cm No chg 8cm +0.5 7.5cm -0.5 7.5cm No Chg 8cm +0.5 7.5cm -0.5 7.5cm No chg 7.5 cm No chg 7cm -0.5         Treatment     Phylicia received the following manual therapy techniques were performed to increased myofascial/soft tissue length, mobility and pliability, increase PROM, AROM and function as well as to decrease pain for 62 minutes:      Measurements taken above    Diaphragmatic breathing x 5 reps    Short Neck- held due to thyroid goiter  Clear Healthy Lymph Nodes:  Left Axilla                                                  Right  Groin  Open Anastomoses:  Anterior Axillo-Axillary  (AAA)                                    Axillo-Inguinal     (AI)                                    Posterior Axillo-Axillary  (LEEANN) -not performed today      Right Upper Arm (Lateral and Medial)  Right Antecubital Fossa  Right forearm, dorsal and ventral aspect  Dorsum of R hand  R fingers    Rework R hand   Rework Right UE  Rework Anastomoses, including LEEANN  Rework Healthy lymph Nodes      Diaphragmatic breathing x 5 reps      PT applies  gauze to right hand and fingers,  and stockinette, cotton artiflex, and short stretch bandages to pt's RIGHT/LEFT/BILATERAL: right upper extremity. Pt educated to wear bandaging for 2-3 days unless it causes pain, numbness, or changes in skin color/temperature.  If removed, pt instructed to roll up bandages and cotton padding and bring to next session. If bandages are removed, pt can wear tubigrip and her compression glove.  Pt has Vet glove to cover bandages in the shower and directions on how to care for bandages. Pt verbalizes understanding of all topics.                Home Exercise Program and Patient Education   Education provided re:  - role of PT in multi - disciplinary team, goals for PT  - progress towards  goals       Written Home Exercises Provided: Patient instructed to cont prior HEP.  Exercises were reviewed and Mirian was able to demonstrate them prior to the end of the session.  Mirian demonstrated good  understanding of the education provided.     See EMR under Media for exercises provided 6/8/2022- for self manual lymph drainage techniques.    Pt has no cultural, educational or language barriers to learning provided.    Assessment     Patient tolerated session well and has been compliant with bandaging/tubigrip and use of compression pump. Pt demo's reduction overall in her right arm circumference, but there are a few increases in upper arm. Once her RUE circumference stabilizes, she will benefit from compression sleeve and compression glove at 20-30mmHg. Will progress as tolerated.    Pt prognosis is Good. Pt will continue to benefit from skilled outpatient physical therapy to address the deficits listed in the problem list chart on initial evaluation, provide pt/family education and to maximize pt's level of independence in the home and community environment.     Anticipated barriers to physical therapy: none anticipated    Goals as follows:  Short Term Goals (6 weeks)  1. Pt will demo decrease in girth in right upper extremity by >/= 1cm to allow for improved UE symmetry, clothing choice, ROM, and UE function. (met, 8/18/22  2. Patient will demonstrate 100% knowledge of lymphedema precautions and signs of infection to allow for reduced risk of lymphedema, reduced risk of infection, and/or exacerbation.  (met)  3. Patient will perform self lymph drainage techniques to enhance lymphatic drainage and mobility.  (met, 7/6/22)  4. Patient will tolerate daily activities with multilayered bandaging to enhance lymphatic drainage and skin elasticity.  (met, 7/6/22)  5. Pt will tolerate HEP for improved strength, functional mobility, ROM, posture, and endurance. (met, 7/6/22)        Long Term Goals: ( 12  weeks)  1.  Patient to show decreased girth in Right upper extremity by >/= 2cm to allow for improved UE symmetry, clothing choice, ROM, and UE function.  (met partially, 8/18/22)  2. Patient will show reduction in density to mild or less with improved contour of limb to allow for improved cosmesis, improved lymphatic drainage, and functional mobility.  (met, 9/2/22)  3. Patient to mary/doff compression garment with daily compliance to support lymphatic mobility and skin elasticity.  (progressing, not met)  4. Pt to show improved postural awareness and alignment for improved functional mobility.  (progressing, not met)  5. Pt to be independent and compliant with HEP to allow for improved ROM, reach and carry with functional endurance and strength.    (met, 10/4/22)           Plan   Outpatient physical therapy 2x week for 5 weeks to include the following:   Manual Therapy, Neuromuscular Re-ed, Orthotic Management and Training, Patient Education, Self Care, Therapeutic Activities and Therapeutic Exercise.     Plan of care Certification Period: 10/4/22 to 11/11/22       Therapist: Marcela Price, PT  11/2/2022

## 2022-11-07 ENCOUNTER — CLINICAL SUPPORT (OUTPATIENT)
Dept: REHABILITATION | Facility: HOSPITAL | Age: 52
End: 2022-11-07
Attending: INTERNAL MEDICINE
Payer: COMMERCIAL

## 2022-11-07 DIAGNOSIS — I89.0 LYMPHEDEMA OF RIGHT ARM: Primary | ICD-10-CM

## 2022-11-07 DIAGNOSIS — R29.898 WEAKNESS OF LEFT LOWER EXTREMITY: Primary | ICD-10-CM

## 2022-11-07 DIAGNOSIS — R26.89 IMPAIRMENT OF BALANCE: ICD-10-CM

## 2022-11-07 DIAGNOSIS — Z74.09 IMPAIRED FUNCTIONAL MOBILITY AND ACTIVITY TOLERANCE: ICD-10-CM

## 2022-11-07 DIAGNOSIS — R26.9 ABNORMALITY OF GAIT: ICD-10-CM

## 2022-11-07 PROCEDURE — 97110 THERAPEUTIC EXERCISES: CPT

## 2022-11-07 PROCEDURE — 97140 MANUAL THERAPY 1/> REGIONS: CPT

## 2022-11-07 PROCEDURE — 97112 NEUROMUSCULAR REEDUCATION: CPT

## 2022-11-07 NOTE — PROGRESS NOTES
Physical Therapy Daily Treatment Note       Date: 11/7/2022   Name: Phylicia Vásquez  Clinic Number: [id number]    Therapy Diagnosis:   Encounter Diagnoses   Name Primary?    Lymphedema of right arm Yes    Impaired functional mobility and activity tolerance      Physician: Negrito Verde MD    Physician Orders: PT Eval and Treat   Medical Diagnosis: Carcinoma of axillary tail of right breast in female, estrogen receptor positive [C50.611, Z17.0], Localized edema      Evaluation Date: 6/3/2022  Authorization Period Expiration: 12/31/22  Updated Plan of Care Certification Period: 11/11/22   Visit # / Visits authorized: 17/ 20 (plus eval)  Insurance: [employer]  FOTO: 3/3     Time In: 12:10 PM  Time Out:  12:34 PM  Total Billable Time: 24minutes     Precautions: Standard, cancer and Right UE lymphedema      Subjective     Pt reports: Bandages lasted 3 days. No new complaints.  She was compliant with compression pump  Response to previous treatment: no adverse reaction  Pain Scale: Phylicia rates pain on a scale of 0/10 on VAS.   Pain location: N/A     Fatigue: not assessed  Functional change: none stated  Treatment:   Chemotherapy:  4 cycles of AC and 4 cycles of Taxol patrick adjuvantly  Radiation: completed radiation therapy on 8/20/20.   Endocrine therapy: On 11/1/2020 started on Anastrozole with with Dr. Verde     Surgery date: Pt is s/p bilateral mastectomy with R SLNB, TE reconstruction in 4/2021. pt will have permanent reconstruction with ROWAN flap, but she states she needs to lose weight first.      Objective     Objective Measures updated at progress report unless specified.     LANDMARK RIGHT UE LEFT UE DIFF RUE Diff RUE Diff RUE Diff RUE Diff RUE Diff RUE Diff RUE Diff RUE Diff RUE Diff RUE Diff RUE Diff RUE Diff RUE Diff RUE Diff   Date 6/3/22 6/3/22 6/3/22 6/22/22 6/22/22 6/29/22 6/29/22 7/6/22 7/6/22 7/20/22 7/20/22 7/27/22 7/27/22 8/18/22 8/18/22  "8/24/22 8/24/22 9/2/22 9/2/22 9/19/22 9/19/22 9/26/22 9/26/22 10/4/22 10/4/22 10/17/22 10/17/22 10/24/22 10/24/22 11/2/22 11/2/22   E + 8" 47 cm 46.5 cm 0.5 cm 46.5cm -0.5 44cm -2.5 45 cm +1 47 cm +2 45cm -2 43.5cm -1.5 43.5cm No chg 44cm +0.5 46cm +2 44.5cm -1.5 44.5 cm No chg 45 cm +0.5 44.5 cm -0.5 46 cm +1.5   E + 6" 44 cm 42.5 cm 1.5 cm 44 cm No chg 44cm No chg 43.5cm -0.5 45cm +1.5 42cm -3 41cm -1 43cm +2 42.5cm -0.5 43.5cm +1 44.5cm +1 45cm +0.5 43.5 cm -1.5 43 cm -0.5 43 cm No chg   E + 4" 39.5 cm 37 cm 2.5 cm 39cm -0.5 40.5 cm +1.5 40.5cm No chg 41cm +0.5 39 cm -2 39cm No chg 39.5cm +0.5 40cm +0.5 39cm -1 41.5cm +2.5 40cm -1.5 40cm No chg 40cm No chg 40cm No chg   E + 2" 34.5 cm 33 cm 1.5 cm 34.5 cm No chg 35 cm +0.5 34.5cm -0.5 35cm +0.5 34cm -1 34.5cm +0.5 34.5cm No chg 35cm +0.5 34cm -1 35cm +1 35cm No chg 34 cm -1 35 cm +1 35.5 cm +0.5   Elbow 28 cm 28 cm 0 cm 27 cm -1 27.5 cm +0.5 28 cm +0.5 28cm No chg 27cm -1 27cm No chg 27.5cm +0.5 27cm -0.5 27.5cm +0.5 27.5cm No chg 27cm -0.5 27 cm No chg 27 cm No chg 27 cm No chg   W+ 8" 29 cm  28.5cm 0.5 cm 28cm -1 28 cm No chg 29.5cm +1.5 29cm -0.5 28.5cm -0.5 28cm -0.5 29.5cm +1.5 29cm -0.5 28.5cm -0.5 29cm +0.5 28cm -1 29.5cm +1.5 30 cm +0.5 28cm -2   W +  6" 29.5 cm 27 cm 2.5 cm 29cm -0.5 29cm No chg 29cm No chg 29cm No chg 29cm No chg 29cm No chg 29.5cm +0.5 28.5cm -1 29cm +0.5 29cm No chg 29cm No chg 29 cm No chg 29 cm No chg 28.5cm -0.5   W + 4" 25.5 cm 23.5 cm 2 cm 25cm -0.5 24.5 cm -0.5 25.5cm +1 25 cm -0.5 25cm No chg 24.5cm -0.5 25.5cm +1 24.5cm -1 25cm +0.5 25.5cm +0.5  24.5cm -1 25 cm +0.5 25.5 cm +0.5 25 cm -0.5   Wrist 18 cm 17.5 cm 0.5 cm 18.5cm +0.5 18 cm -0.5 18.5cm +0.5 18cm -0.5 18cm No chg 18cm No chg 18cm No chg 18cm No chg 18cm No chg 18cm No chg 18cm No chg 18 cm No chg 18 cm No chg 18 cm No chg   DPC 21.5 cm 20 cm 1.5 cm 20cm -1.5 20.5cm +0.5 21 cm +0.5 21cm -0.5 20.5cm -0.5 21cm +0.5 21.5cm +0.5 21cm -0.5 21cm No chg 20.5 cm -0.5 21.5cm " +1 20.5cm -1 20.5 cm No chg 20.5 cm No chg   IP Thumb 7.5 cm 7 cm 0.5 cm 8cm +0.5 7.5cm -0.5 8 cm +0.5 8cm No chg 7.5cm -0.5 7.5cm No chg 8cm +0.5 7.5cm -0.5 7.5cm No Chg 8cm +0.5 7.5cm -0.5 7.5cm No chg 7.5 cm No chg 7cm -0.5         Treatment     Phylicia received the following manual therapy techniques were performed to increased myofascial/soft tissue length, mobility and pliability, increase PROM, AROM and function as well as to decrease pain for 24 minutes:      Diaphragmatic breathing x 5 reps    Short Neck- held due to thyroid goiter  Clear Healthy Lymph Nodes:  Left Axilla                                                  Right  Groin  Open Anastomoses:  Anterior Axillo-Axillary  (AAA)                                    Axillo-Inguinal     (AI)                                    Posterior Axillo-Axillary  (LEEANN) -not performed today      Right Upper Arm (Lateral and Medial)  Right Antecubital Fossa  Right forearm, dorsal and ventral aspect  Dorsum of R hand    Rework Right UE  Rework Anastomoses   Rework Healthy lymph Nodes      Diaphragmatic breathing x 5 reps      PT applies  gauze to right hand and fingers,  and stockinette, cotton artiflex, and short stretch bandages to pt's RIGHT/LEFT/BILATERAL: right upper extremity. Pt educated to wear bandaging until next session unless it causes pain, numbness, changes in skin color/temperature, or if bandaging starts to fall down.  If removed, pt instructed to roll up bandages and cotton padding and bring to next session. If bandages are removed, pt can wear tubigrip and her compression glove.  Pt has Vet glove to cover bandages in the shower and directions on how to care for bandages. Pt verbalizes understanding of all topics.                Home Exercise Program and Patient Education   Education provided re:  - role of PT in multi - disciplinary team, goals for PT  - progress towards goals       Written Home Exercises Provided: Patient instructed to cont prior  HEP.  Exercises were reviewed and Mirian was able to demonstrate them prior to the end of the session.  Mirian demonstrated good  understanding of the education provided.     See EMR under Media for exercises provided 6/8/2022- for self manual lymph drainage techniques.    Pt has no cultural, educational or language barriers to learning provided.    Assessment     Patient tolerated session well and has been compliant with bandaging/tubigrip and use of compression pump. Pt tolerated complete decongestive therapy without issue in clinic. Once her RUE circumference stabilizes, she will benefit from compression sleeve and compression glove at 20-30mmHg. Will progress as tolerated.    Pt prognosis is Good. Pt will continue to benefit from skilled outpatient physical therapy to address the deficits listed in the problem list chart on initial evaluation, provide pt/family education and to maximize pt's level of independence in the home and community environment.     Anticipated barriers to physical therapy: none anticipated    Goals as follows:  Short Term Goals (6 weeks)  1. Pt will demo decrease in girth in right upper extremity by >/= 1cm to allow for improved UE symmetry, clothing choice, ROM, and UE function. (met, 8/18/22  2. Patient will demonstrate 100% knowledge of lymphedema precautions and signs of infection to allow for reduced risk of lymphedema, reduced risk of infection, and/or exacerbation.  (met)  3. Patient will perform self lymph drainage techniques to enhance lymphatic drainage and mobility.  (met, 7/6/22)  4. Patient will tolerate daily activities with multilayered bandaging to enhance lymphatic drainage and skin elasticity.  (met, 7/6/22)  5. Pt will tolerate HEP for improved strength, functional mobility, ROM, posture, and endurance. (met, 7/6/22)        Long Term Goals: ( 12  weeks)  1. Patient to show decreased girth in Right upper extremity by >/= 2cm to allow for improved UE symmetry, clothing choice,  ROM, and UE function.  (met partially, 8/18/22)  2. Patient will show reduction in density to mild or less with improved contour of limb to allow for improved cosmesis, improved lymphatic drainage, and functional mobility.  (met, 9/2/22)  3. Patient to mary/doff compression garment with daily compliance to support lymphatic mobility and skin elasticity.  (progressing, not met)  4. Pt to show improved postural awareness and alignment for improved functional mobility.  (progressing, not met)  5. Pt to be independent and compliant with HEP to allow for improved ROM, reach and carry with functional endurance and strength.    (met, 10/4/22)           Plan   Outpatient physical therapy 2x week for 5 weeks to include the following:   Manual Therapy, Neuromuscular Re-ed, Orthotic Management and Training, Patient Education, Self Care, Therapeutic Activities and Therapeutic Exercise.     Plan of care Certification Period: 10/4/22 to 11/11/22       Therapist: Marcela Price, PT  11/7/2022

## 2022-11-07 NOTE — PROGRESS NOTES
Physical Therapy Daily Treatment Note     Name: Phylicia Vásquez  Clinic Number: [id number]    Therapy Diagnosis:   Encounter Diagnoses   Name Primary?    Weakness of left lower extremity Yes    Abnormality of gait     Impairment of balance      Physician: Marcela Calvillo PA-C    Visit Date: 2022    Physician Orders: PT Eval and Treat   Medical Diagnosis from Referral: M21.379 (ICD-10-CM) - Foot-drop, unspecified laterality  Evaluation Date: 10/18/2022  Authorization Period Expiration: 2022  Plan of Care Expiration: 2022  Visit # / Visits authorized:  (plus eval)  Insurance: [id number]ESS [employer]US  FOTO: 1/3     Time In: 11:10 am  Time Out: 12:05  Total Billable Time: 55 minutes    Precautions:   Standard, cancer, and R UE lymphedema    Subjective     Pt reports: no pain this morning, but she did have some pain in her left ankle that shot up her leg last night. She tried to wear a small heel dress shoe on Saturday but felt a pressure in her ankle and ended up wearing tennis shoes to the . She has also been having some pain in her ankle with standing a long time. She would like to do the taping again today.   She was compliant with home exercise program.  Response to previous treatment: no adverse events  Functional change: nothing yet    Pain: 3/10  Location: left ankle      Objective     Objective Measures updated at progress report unless specified.     B/P: 123/91; HR: 69 bpm; O2: 98%    Lab Values: 22  Platelets: 216  ANC: 4.9  Hematacrit: 37.3  Hemoglobin: 12.3  Treatment       Mirian received therapeutic exercises to develop strength, endurance, ROM, flexibility, posture and core stabilization for 47 minutes including:  Aerobic/Cardio Activity: Stationary bike, seat 6, level 1, 10 minutes  Supine ankle DF, 1 x 15  Supine ankle inversion 1 x 15  Supine ankle eversion, 1 x 15  Supine ankle PF 1 x 15, red band  Calf raises, 2 x 10  Toe raise, 2 x 10    Patient was screened for  use of kinesiotape and was cleared for use.  1. Has the patient ever had a reaction to the adhesive in bandaids? Yes/No  2. Has the patient ever uses athletic/kinesiotape in the past? Yes/No  3. Is the patient hemodynamically impaired (PE, DVT, CHF, Kidney failure)? Yes/No  4. Can the PT/PTA apply the tape to your skin? Yes/No    Patient was instructed on duration to wear the tape, proper drying techniques for the tape, and to remove the tape if he/she had any issues with it.    Patient verbalized understanding of these instructions.    Mirian participated in neuromuscular re-education activities to improve: Balance, Coordination, Proprioception and Posture for 8 minutes. The following activities were included:  Tandem stance, 2 x 30 sec/LE  Single leg stance, 2 x 30 sec/LE  Feet together on airex pad, eyes open, 3 x 30 sec  Feet together on airex pad, eyes closed, 3 x 30 sec    Home Exercises Provided and Patient Education Provided     Education provided:   - compliance with HEP  - role of PT and goals for PT     Written Home Exercises Provided: Patient instructed to cont prior HEP.  Exercises were reviewed and Mirian was able to demonstrate them prior to the end of the session.  Mirian demonstrated good  understanding of the education provided.     See EMR under Patient Instructions for exercises provided prior visit.    Assessment     Patient is responding well to physical therapy. She was able to perform all of today's progressions and new exercises with no increase in symptoms prior to leaving the clinic. She requires Min A to move through her full available ROM with ankle DF.  While performing balance activities she relied on ankle strategies with occasional light touch on the table to maintain her balance with single leg balance and tandem stance. Will progress as tolerated.  Mirian Is progressing well towards her goals.   Pt prognosis is Good.     Pt will continue to benefit from skilled outpatient physical therapy to  address the deficits listed in the problem list box on initial evaluation, provide pt/family education and to maximize pt's level of independence in the home and community environment.     Pt's spiritual, cultural and educational needs considered and pt agreeable to plan of care and goals.     Anticipated barriers to physical therapy: none anticipated    Goals:   Short Term Goals:  4 weeks  1. Pt will increase AROM of left ankle to neutral dorsiflexion to promote greater ease with performing normal gait pattern. (progressing, not met)  2. Pt. to demonstrate increased strength of left lower extremity to 4/5  to increase stability during community ambulation (progressing, not met)  3. Pt. will improve TUG test score to 12 seconds or less in order to perform safe transfers and for patient to be in low/moderate risk for falls category (progressing, not met)  4. Pt will tolerate 6 minute walk test. (progressing, not met)  5. Pt will initiate home exercise program to improve strength, flexibility, endurance, mobility & balance to return pt to PLOF (progressing, not met)  6. Pt will tolerate 10 min or greater of time in light-->moderate intensity cardio (I.e. Bike, NuStep) to improve endurance to assist in performing her usual work activities (progressing, not met)  7. Pt to demonstrate tandem stance 30 sec or greater w/out UE support in order to improve balance to progress to singe leg stance to decrease fall risk in performance of ADL's (progressing, not met)     Long Term Goals: 8 weeks  1. Pt will increase AROM of left ankle dorsiflexion to 5 degrees in order to perform normal gait pattern when ambulating on uneven ground (progressing, not met)  2. Pt. will increase strength of left lower extremity to 5/5  to increase stability during ambulation on uneven surfaces,to increase tolerance for ADL and work activities. (progressing, not met)  3. Pt will be independent with HEP to improve ROM, strength, balance,  and  independence with ADL's (progressing, not met)  4. Pt. will improve TUG test score to 10 seconds or less in order to ambulate safely in community and for patient to be in low risk for falls category (progressing, not met)  5. Pt. will improve 6 minute walk test score by 30 meters in order for patient to be in low risk for falls category (progressing, not met)  6. Patient will report compliance with walking program 5x week for 10 -30min each day to improve overall cardiovascular function and decrease cancer related fatigue at discharge. (progressing, not met)    Plan     Plan of care Certification: 10/18/2022 to 12/18/2022.     Outpatient Physical Therapy 2 times weekly for 8 weeks to include the following interventions: Gait Training, Manual Therapy, Neuromuscular Re-ed, Patient Education, Self Care, Therapeutic Activities, Therapeutic Exercise, and IASTM . Dry needling with manual therapy techniques to decrease pain, inflammation and swelling, increase circulation and promote healing process.      Gail Gutierrez, PT

## 2022-11-10 ENCOUNTER — CLINICAL SUPPORT (OUTPATIENT)
Dept: REHABILITATION | Facility: HOSPITAL | Age: 52
End: 2022-11-10
Attending: INTERNAL MEDICINE
Payer: COMMERCIAL

## 2022-11-10 DIAGNOSIS — Z74.09 IMPAIRED FUNCTIONAL MOBILITY AND ACTIVITY TOLERANCE: ICD-10-CM

## 2022-11-10 DIAGNOSIS — I89.0 LYMPHEDEMA OF RIGHT ARM: Primary | ICD-10-CM

## 2022-11-10 PROCEDURE — 97140 MANUAL THERAPY 1/> REGIONS: CPT

## 2022-11-10 PROCEDURE — 97110 THERAPEUTIC EXERCISES: CPT

## 2022-11-10 NOTE — PROGRESS NOTES
Physical Therapy Daily Treatment Note       Date: 11/10/2022   Name: Phylicia Vásquez  Clinic Number: [id number]    Therapy Diagnosis:   Encounter Diagnoses   Name Primary?    Lymphedema of right arm Yes    Impaired functional mobility and activity tolerance      Physician: Negrito Verde MD    Physician Orders: PT Eval and Treat   Medical Diagnosis: Carcinoma of axillary tail of right breast in female, estrogen receptor positive [C50.611, Z17.0], Localized edema      Evaluation Date: 6/3/2022  Authorization Period Expiration: 12/31/22  Updated Plan of Care Certification Period: 11/11/22   Visit # / Visits authorized: 18/ 20 (plus eval)  Insurance: [employer]  FOTO: 3/3     Time In: 7:02 AM  Time Out:  8:05 AM  Total Billable Time: 63minutes     Precautions: Standard, cancer and Right UE lymphedema      Subjective     Pt reports: Bandages started falling down yesterday. She got her AFO on Tuesday.  She was compliant with compression pump  Response to previous treatment: no adverse reaction  Pain Scale: Phylicia rates pain on a scale of 0/10 on VAS.   Pain location: N/A     Fatigue: not assessed  Functional change: none stated  Treatment:   Chemotherapy:  4 cycles of AC and 4 cycles of Taxol patrick adjuvantly  Radiation: completed radiation therapy on 8/20/20.   Endocrine therapy: On 11/1/2020 started on Anastrozole with with Dr. Verde     Surgery date: Pt is s/p bilateral mastectomy with R SLNB, TE reconstruction in 4/2021. pt will have permanent reconstruction with ROWAN flap, but she states she needs to lose weight first.      Objective     Objective Measures updated at progress report unless specified.     LANDMARK RIGHT UE LEFT UE DIFF RUE Diff RUE Diff RUE Diff RUE Diff RUE Diff RUE Diff RUE Diff RUE Diff RUE Diff RUE Diff RUE Diff RUE Diff RUE Diff RUE Diff RUE Diff   Date 6/3/22 6/3/22 6/3/22 6/22/22 6/22/22 6/29/22 6/29/22 7/6/22 7/6/22 7/20/22 7/20/22  "7/27/22 7/27/22 8/18/22 8/18/22 8/24/22 8/24/22 9/2/22 9/2/22 9/19/22 9/19/22 9/26/22 9/26/22 10/4/22 10/4/22 10/17/22 10/17/22 10/24/22 10/24/22 11/2/22 11/2/22 11/10/22 11/10/22   E + 8" 47 cm 46.5 cm 0.5 cm 46.5cm -0.5 44cm -2.5 45 cm +1 47 cm +2 45cm -2 43.5cm -1.5 43.5cm No chg 44cm +0.5 46cm +2 44.5cm -1.5 44.5 cm No chg 45 cm +0.5 44.5 cm -0.5 46 cm +1.5 44.5 cm -1.5   E + 6" 44 cm 42.5 cm 1.5 cm 44 cm No chg 44cm No chg 43.5cm -0.5 45cm +1.5 42cm -3 41cm -1 43cm +2 42.5cm -0.5 43.5cm +1 44.5cm +1 45cm +0.5 43.5 cm -1.5 43 cm -0.5 43 cm No chg 44.5 cm +1.5   E + 4" 39.5 cm 37 cm 2.5 cm 39cm -0.5 40.5 cm +1.5 40.5cm No chg 41cm +0.5 39 cm -2 39cm No chg 39.5cm +0.5 40cm +0.5 39cm -1 41.5cm +2.5 40cm -1.5 40cm No chg 40cm No chg 40cm No chg 40 cm No chg   E + 2" 34.5 cm 33 cm 1.5 cm 34.5 cm No chg 35 cm +0.5 34.5cm -0.5 35cm +0.5 34cm -1 34.5cm +0.5 34.5cm No chg 35cm +0.5 34cm -1 35cm +1 35cm No chg 34 cm -1 35 cm +1 35.5 cm +0.5 34.5 cm -1   Elbow 28 cm 28 cm 0 cm 27 cm -1 27.5 cm +0.5 28 cm +0.5 28cm No chg 27cm -1 27cm No chg 27.5cm +0.5 27cm -0.5 27.5cm +0.5 27.5cm No chg 27cm -0.5 27 cm No chg 27 cm No chg 27 cm No chg 26 cm -1   W+ 8" 29 cm  28.5cm 0.5 cm 28cm -1 28 cm No chg 29.5cm +1.5 29cm -0.5 28.5cm -0.5 28cm -0.5 29.5cm +1.5 29cm -0.5 28.5cm -0.5 29cm +0.5 28cm -1 29.5cm +1.5 30 cm +0.5 28cm -2 28.5 cm +0.5   W +  6" 29.5 cm 27 cm 2.5 cm 29cm -0.5 29cm No chg 29cm No chg 29cm No chg 29cm No chg 29cm No chg 29.5cm +0.5 28.5cm -1 29cm +0.5 29cm No chg 29cm No chg 29 cm No chg 29 cm No chg 28.5cm -0.5 28.5 cm No chg   W + 4" 25.5 cm 23.5 cm 2 cm 25cm -0.5 24.5 cm -0.5 25.5cm +1 25 cm -0.5 25cm No chg 24.5cm -0.5 25.5cm +1 24.5cm -1 25cm +0.5 25.5cm +0.5  24.5cm -1 25 cm +0.5 25.5 cm +0.5 25 cm -0.5 24.5 cm -0.5   Wrist 18 cm 17.5 cm 0.5 cm 18.5cm +0.5 18 cm -0.5 18.5cm +0.5 18cm -0.5 18cm No chg 18cm No chg 18cm No chg 18cm No chg 18cm No chg 18cm No chg 18cm No chg 18 cm No chg 18 cm No chg 18 cm No " chg 18 cm  No chg   DPC 21.5 cm 20 cm 1.5 cm 20cm -1.5 20.5cm +0.5 21 cm +0.5 21cm -0.5 20.5cm -0.5 21cm +0.5 21.5cm +0.5 21cm -0.5 21cm No chg 20.5 cm -0.5 21.5cm +1 20.5cm -1 20.5 cm No chg 20.5 cm No chg 20 cm -0.5   IP Thumb 7.5 cm 7 cm 0.5 cm 8cm +0.5 7.5cm -0.5 8 cm +0.5 8cm No chg 7.5cm -0.5 7.5cm No chg 8cm +0.5 7.5cm -0.5 7.5cm No Chg 8cm +0.5 7.5cm -0.5 7.5cm No chg 7.5 cm No chg 7cm -0.5 7.5 cm +0.5         Treatment     Phylicia received the following manual therapy techniques were performed to increased myofascial/soft tissue length, mobility and pliability, increase PROM, AROM and function as well as to decrease pain for 50 minutes:    Measurements taken above        Short Neck- held due to thyroid goiter  Clear Healthy Lymph Nodes:  Left Axilla                                                  Right  Groin  Open Anastomoses:  Anterior Axillo-Axillary  (AAA)                                    Axillo-Inguinal     (AI)                                    Posterior Axillo-Axillary  (LEEANN)      Right Upper Arm (Lateral and Medial)  Right Antecubital Fossa  Right forearm, dorsal and ventral aspect  Dorsum of R hand  R fingers    Rework right hand  Rework Right UE  Rework Anastomoses   Rework Healthy lymph Nodes      PT applies  gauze to right hand and fingers,  and stockinette, cotton artiflex, and short stretch bandages to pt's RIGHT/LEFT/BILATERAL: right upper extremity. Pt educated to wear bandaging until next session unless it causes pain, numbness, changes in skin color/temperature, or if bandaging starts to fall down.  If removed, pt instructed to roll up bandages and cotton padding and bring to next session. If bandages are removed, pt can wear tubigrip and her compression glove.  Pt has Vet glove to cover bandages in the shower and directions on how to care for bandages. Pt verbalizes understanding of all topics.      Phylicia received therapeutic exercises to develop posture and lymphatic mobility for  13 minutes including:     Diaphragmatic breathing, 2 x 5 reps  Scapular retractions, 2 x 10 reps  Bicep curls x 10 reps  Shoulder rolls, 10 reps each fwd and back              Home Exercise Program and Patient Education   Education provided re:  - role of PT in multi - disciplinary team, goals for PT  - progress towards goals       Written Home Exercises Provided: Patient instructed to cont prior HEP.  Exercises were reviewed and Mirian was able to demonstrate them prior to the end of the session.  Mirian demonstrated good  understanding of the education provided.     See EMR under Media for exercises provided 6/8/2022- for self manual lymph drainage techniques.    Pt has no cultural, educational or language barriers to learning provided.    Assessment     Patient tolerated session well and has been compliant with bandaging/tubigrip and use of compression pump. Pt tolerated complete decongestive therapy without issue in clinic. She continues to present with moderate reductions in her right arm circumference. Once her RUE circumference stabilizes, she will benefit from compression sleeve and compression glove at 20-30mmHg. Will progress as tolerated.    Pt prognosis is Good. Pt will continue to benefit from skilled outpatient physical therapy to address the deficits listed in the problem list chart on initial evaluation, provide pt/family education and to maximize pt's level of independence in the home and community environment.     Anticipated barriers to physical therapy: none anticipated    Goals as follows:  Short Term Goals (6 weeks)  1. Pt will demo decrease in girth in right upper extremity by >/= 1cm to allow for improved UE symmetry, clothing choice, ROM, and UE function. (met, 8/18/22  2. Patient will demonstrate 100% knowledge of lymphedema precautions and signs of infection to allow for reduced risk of lymphedema, reduced risk of infection, and/or exacerbation.  (met)  3. Patient will perform self lymph  drainage techniques to enhance lymphatic drainage and mobility.  (met, 7/6/22)  4. Patient will tolerate daily activities with multilayered bandaging to enhance lymphatic drainage and skin elasticity.  (met, 7/6/22)  5. Pt will tolerate HEP for improved strength, functional mobility, ROM, posture, and endurance. (met, 7/6/22)        Long Term Goals: ( 12  weeks)  1. Patient to show decreased girth in Right upper extremity by >/= 2cm to allow for improved UE symmetry, clothing choice, ROM, and UE function.  (met partially, 8/18/22)  2. Patient will show reduction in density to mild or less with improved contour of limb to allow for improved cosmesis, improved lymphatic drainage, and functional mobility.  (met, 9/2/22)  3. Patient to mary/doff compression garment with daily compliance to support lymphatic mobility and skin elasticity.  (progressing, not met)  4. Pt to show improved postural awareness and alignment for improved functional mobility.  (progressing, not met)  5. Pt to be independent and compliant with HEP to allow for improved ROM, reach and carry with functional endurance and strength.    (met, 10/4/22)           Plan   Outpatient physical therapy 2x week for 5 weeks to include the following:   Manual Therapy, Neuromuscular Re-ed, Orthotic Management and Training, Patient Education, Self Care, Therapeutic Activities and Therapeutic Exercise.     Plan of care Certification Period: 10/4/22 to 11/11/22       Therapist: Marcela Price, PT  11/10/2022

## 2022-11-16 ENCOUNTER — OFFICE VISIT (OUTPATIENT)
Dept: PODIATRY | Facility: CLINIC | Age: 52
End: 2022-11-16
Payer: COMMERCIAL

## 2022-11-16 ENCOUNTER — DOCUMENTATION ONLY (OUTPATIENT)
Dept: REHABILITATION | Facility: HOSPITAL | Age: 52
End: 2022-11-16

## 2022-11-16 ENCOUNTER — APPOINTMENT (OUTPATIENT)
Dept: RADIOLOGY | Facility: OTHER | Age: 52
End: 2022-11-16
Attending: PODIATRIST
Payer: COMMERCIAL

## 2022-11-16 VITALS
WEIGHT: 241 LBS | DIASTOLIC BLOOD PRESSURE: 89 MMHG | HEIGHT: 63 IN | HEART RATE: 105 BPM | SYSTOLIC BLOOD PRESSURE: 161 MMHG | BODY MASS INDEX: 42.7 KG/M2

## 2022-11-16 DIAGNOSIS — R22.42 MASS OF FOOT, LEFT: ICD-10-CM

## 2022-11-16 DIAGNOSIS — M79.672 FOOT PAIN, LEFT: ICD-10-CM

## 2022-11-16 DIAGNOSIS — M25.572 LEFT ANKLE PAIN, UNSPECIFIED CHRONICITY: ICD-10-CM

## 2022-11-16 DIAGNOSIS — M79.672 FOOT PAIN, LEFT: Primary | ICD-10-CM

## 2022-11-16 PROCEDURE — 4010F ACE/ARB THERAPY RXD/TAKEN: CPT | Mod: CPTII,S$GLB,, | Performed by: PODIATRIST

## 2022-11-16 PROCEDURE — 99999 PR PBB SHADOW E&M-EST. PATIENT-LVL IV: CPT | Mod: PBBFAC,,, | Performed by: PODIATRIST

## 2022-11-16 PROCEDURE — 1159F PR MEDICATION LIST DOCUMENTED IN MEDICAL RECORD: ICD-10-PCS | Mod: CPTII,S$GLB,, | Performed by: PODIATRIST

## 2022-11-16 PROCEDURE — 3077F PR MOST RECENT SYSTOLIC BLOOD PRESSURE >= 140 MM HG: ICD-10-PCS | Mod: CPTII,S$GLB,, | Performed by: PODIATRIST

## 2022-11-16 PROCEDURE — 3008F PR BODY MASS INDEX (BMI) DOCUMENTED: ICD-10-PCS | Mod: CPTII,S$GLB,, | Performed by: PODIATRIST

## 2022-11-16 PROCEDURE — 3079F DIAST BP 80-89 MM HG: CPT | Mod: CPTII,S$GLB,, | Performed by: PODIATRIST

## 2022-11-16 PROCEDURE — 3079F PR MOST RECENT DIASTOLIC BLOOD PRESSURE 80-89 MM HG: ICD-10-PCS | Mod: CPTII,S$GLB,, | Performed by: PODIATRIST

## 2022-11-16 PROCEDURE — 73610 X-RAY EXAM OF ANKLE: CPT | Mod: TC,LT

## 2022-11-16 PROCEDURE — 3077F SYST BP >= 140 MM HG: CPT | Mod: CPTII,S$GLB,, | Performed by: PODIATRIST

## 2022-11-16 PROCEDURE — 99999 PR PBB SHADOW E&M-EST. PATIENT-LVL IV: ICD-10-PCS | Mod: PBBFAC,,, | Performed by: PODIATRIST

## 2022-11-16 PROCEDURE — 99214 OFFICE O/P EST MOD 30 MIN: CPT | Mod: S$GLB,,, | Performed by: PODIATRIST

## 2022-11-16 PROCEDURE — 1159F MED LIST DOCD IN RCRD: CPT | Mod: CPTII,S$GLB,, | Performed by: PODIATRIST

## 2022-11-16 PROCEDURE — 73610 XR ANKLE COMPLETE 3 VIEW LEFT: ICD-10-PCS | Mod: 26,LT,, | Performed by: RADIOLOGY

## 2022-11-16 PROCEDURE — 99214 PR OFFICE/OUTPT VISIT, EST, LEVL IV, 30-39 MIN: ICD-10-PCS | Mod: S$GLB,,, | Performed by: PODIATRIST

## 2022-11-16 PROCEDURE — 73610 X-RAY EXAM OF ANKLE: CPT | Mod: 26,LT,, | Performed by: RADIOLOGY

## 2022-11-16 PROCEDURE — 4010F PR ACE/ARB THEARPY RXD/TAKEN: ICD-10-PCS | Mod: CPTII,S$GLB,, | Performed by: PODIATRIST

## 2022-11-16 PROCEDURE — 3008F BODY MASS INDEX DOCD: CPT | Mod: CPTII,S$GLB,, | Performed by: PODIATRIST

## 2022-11-16 RX ORDER — LIDOCAINE HYDROCHLORIDE 20 MG/ML
JELLY TOPICAL
Qty: 30 ML | Refills: 2 | Status: SHIPPED | OUTPATIENT
Start: 2022-11-16 | End: 2023-11-03

## 2022-11-16 NOTE — PROGRESS NOTES
Pt did not attend today's scheduled physical therapy visit. She did not call to cancel or reschedule. PT called and left voicemail for pt to call/schedule.

## 2022-11-16 NOTE — PROGRESS NOTES
"Subjective:      Patient ID: Phylicia Vásquez is a 52 y.o. female.    Chief Complaint: Ankle Pain (Left Ankle)    Intermittent aching sometimes sharp pain from bump left foot/ankle.  Gradual onset, worsening over past several weeks, aggravated by increased weight bearing, shoe gear, pressure.  No previous medical treatment.  OTC pain med not helping.   AFO left for "footdrop" post chemotherapy.  Review of Systems   Constitutional: Negative for chills, diaphoresis, fever, malaise/fatigue and night sweats.   Cardiovascular:  Negative for claudication, cyanosis, leg swelling and syncope.   Skin:  Positive for suspicious lesions. Negative for color change, dry skin, nail changes, rash and unusual hair distribution.   Musculoskeletal:  Negative for falls, joint pain, joint swelling, muscle cramps, muscle weakness and stiffness.   Gastrointestinal:  Negative for constipation, diarrhea, nausea and vomiting.   Neurological:  Negative for brief paralysis, disturbances in coordination, focal weakness, numbness, paresthesias, sensory change and tremors.         Objective:      Physical Exam  Constitutional:       General: She is not in acute distress.     Appearance: She is well-developed. She is not diaphoretic.   Cardiovascular:      Pulses:           Popliteal pulses are 2+ on the right side and 2+ on the left side.        Dorsalis pedis pulses are 2+ on the right side and 2+ on the left side.        Posterior tibial pulses are 2+ on the right side and 2+ on the left side.      Comments: Capillary refill 3 seconds all toes/distal feet, all toes/both feet warm to touch.      Negative lymphadenopathy bilateral popliteal fossa and tarsal tunnel.      Negavie lower extremity edema bilateral.    Musculoskeletal:      Right ankle: No swelling, deformity, ecchymosis or lacerations. Normal range of motion. Normal pulse.      Right Achilles Tendon: Normal. No defects. Bryant's test negative.      Comments: Firm nonmobile mass " over sinus tarsi and lateranterior ankle left with TTP without gross deformity or signs acute trauma.    Gastrosoleal equinus left more than right with anterior group weakness left       Lymphadenopathy:      Lower Body: No right inguinal adenopathy. No left inguinal adenopathy.      Comments: Negative lymphadenopathy bilateral popliteal fossa and tarsal tunnel.    Negative lymphangitic streaking bilateral feet/ankles/legs.   Skin:     General: Skin is warm and dry.      Capillary Refill: Capillary refill takes 2 to 3 seconds.      Coloration: Skin is not pale.      Findings: No abrasion, bruising, burn, ecchymosis, erythema, laceration, lesion or rash.      Nails: There is no clubbing.      Comments:      Neurological:      Mental Status: She is alert and oriented to person, place, and time.      Sensory: No sensory deficit.      Motor: No tremor, atrophy or abnormal muscle tone.      Gait: Gait normal.      Deep Tendon Reflexes:      Reflex Scores:       Patellar reflexes are 2+ on the right side and 2+ on the left side.       Achilles reflexes are 2+ on the right side and 2+ on the left side.     Comments: Negative allodynia    Negative tinel sign to percussion sural, superficial peroneal, deep peroneal, saphenous, and posterior tibial nerves right and left ankles and feet.     Psychiatric:         Behavior: Behavior is cooperative.           Assessment:       Encounter Diagnoses   Name Primary?    Foot pain, left Yes    Left ankle pain, unspecified chronicity     Mass of foot, left          Plan:       Phylicia was seen today for ankle pain.    Diagnoses and all orders for this visit:    Foot pain, left  -     X-Ray Foot Complete Left; Future  -     X-Ray Ankle Complete Left; Future  -     MRI Foot ([facility]) Left W W/O Contrast; Future    Left ankle pain, unspecified chronicity  -     X-Ray Foot Complete Left; Future  -     X-Ray Ankle Complete Left; Future  -     MRI Foot ([facility]) Left W W/O Contrast;  Future    Mass of foot, left  -     X-Ray Foot Complete Left; Future  -     X-Ray Ankle Complete Left; Future  -     MRI Foot ([facility]) Left W W/O Contrast; Future    Other orders  -     LIDOcaine HCL 2% (XYLOCAINE) 2 % jelly; Apply topically as needed. Apply topically once nightly to affected part of foot/feet.      I counseled the patient on her conditions, their implications and medical management.    Discussed very low chance of cancer with any mass in body only disprovable by biopsy and pathology.  Patient verbalizes understanding and declines biopsy/immaging today.       Xrays, mri    Lidocaine gel          No follow-ups on file.

## 2022-11-17 ENCOUNTER — OFFICE VISIT (OUTPATIENT)
Dept: INTERNAL MEDICINE | Facility: CLINIC | Age: 52
End: 2022-11-17
Payer: COMMERCIAL

## 2022-11-17 VITALS
SYSTOLIC BLOOD PRESSURE: 126 MMHG | HEART RATE: 97 BPM | DIASTOLIC BLOOD PRESSURE: 68 MMHG | OXYGEN SATURATION: 98 % | WEIGHT: 241.19 LBS | HEIGHT: 63 IN | TEMPERATURE: 99 F | BODY MASS INDEX: 42.73 KG/M2

## 2022-11-17 DIAGNOSIS — U07.1 COVID-19 VIRUS INFECTION: ICD-10-CM

## 2022-11-17 DIAGNOSIS — I89.0 LYMPHEDEMA OF RIGHT UPPER EXTREMITY: ICD-10-CM

## 2022-11-17 DIAGNOSIS — B34.9 ACUTE VIRAL SYNDROME: Primary | ICD-10-CM

## 2022-11-17 DIAGNOSIS — Z85.3 HISTORY OF BREAST CANCER: Chronic | ICD-10-CM

## 2022-11-17 LAB
CTP QC/QA: YES
CTP QC/QA: YES
FLUAV AG NPH QL: NEGATIVE
FLUBV AG NPH QL: NEGATIVE
SARS-COV-2 RDRP RESP QL NAA+PROBE: POSITIVE

## 2022-11-17 PROCEDURE — 1160F RVW MEDS BY RX/DR IN RCRD: CPT | Mod: CPTII,S$GLB,, | Performed by: INTERNAL MEDICINE

## 2022-11-17 PROCEDURE — 87804 POCT INFLUENZA A/B: ICD-10-PCS | Mod: 59,QW,S$GLB, | Performed by: INTERNAL MEDICINE

## 2022-11-17 PROCEDURE — 3074F PR MOST RECENT SYSTOLIC BLOOD PRESSURE < 130 MM HG: ICD-10-PCS | Mod: CPTII,S$GLB,, | Performed by: INTERNAL MEDICINE

## 2022-11-17 PROCEDURE — 99214 PR OFFICE/OUTPT VISIT, EST, LEVL IV, 30-39 MIN: ICD-10-PCS | Mod: S$GLB,,, | Performed by: INTERNAL MEDICINE

## 2022-11-17 PROCEDURE — 3008F BODY MASS INDEX DOCD: CPT | Mod: CPTII,S$GLB,, | Performed by: INTERNAL MEDICINE

## 2022-11-17 PROCEDURE — 87804 INFLUENZA ASSAY W/OPTIC: CPT | Mod: QW,S$GLB,, | Performed by: INTERNAL MEDICINE

## 2022-11-17 PROCEDURE — 1159F MED LIST DOCD IN RCRD: CPT | Mod: CPTII,S$GLB,, | Performed by: INTERNAL MEDICINE

## 2022-11-17 PROCEDURE — 3078F DIAST BP <80 MM HG: CPT | Mod: CPTII,S$GLB,, | Performed by: INTERNAL MEDICINE

## 2022-11-17 PROCEDURE — 4010F ACE/ARB THERAPY RXD/TAKEN: CPT | Mod: CPTII,S$GLB,, | Performed by: INTERNAL MEDICINE

## 2022-11-17 PROCEDURE — 87635 SARS-COV-2 COVID-19 AMP PRB: CPT | Mod: QW,S$GLB,, | Performed by: INTERNAL MEDICINE

## 2022-11-17 PROCEDURE — 99999 PR PBB SHADOW E&M-EST. PATIENT-LVL V: ICD-10-PCS | Mod: PBBFAC,,, | Performed by: INTERNAL MEDICINE

## 2022-11-17 PROCEDURE — 1159F PR MEDICATION LIST DOCUMENTED IN MEDICAL RECORD: ICD-10-PCS | Mod: CPTII,S$GLB,, | Performed by: INTERNAL MEDICINE

## 2022-11-17 PROCEDURE — 87635: ICD-10-PCS | Mod: QW,S$GLB,, | Performed by: INTERNAL MEDICINE

## 2022-11-17 PROCEDURE — 4010F PR ACE/ARB THEARPY RXD/TAKEN: ICD-10-PCS | Mod: CPTII,S$GLB,, | Performed by: INTERNAL MEDICINE

## 2022-11-17 PROCEDURE — 3078F PR MOST RECENT DIASTOLIC BLOOD PRESSURE < 80 MM HG: ICD-10-PCS | Mod: CPTII,S$GLB,, | Performed by: INTERNAL MEDICINE

## 2022-11-17 PROCEDURE — 99214 OFFICE O/P EST MOD 30 MIN: CPT | Mod: S$GLB,,, | Performed by: INTERNAL MEDICINE

## 2022-11-17 PROCEDURE — 1160F PR REVIEW ALL MEDS BY PRESCRIBER/CLIN PHARMACIST DOCUMENTED: ICD-10-PCS | Mod: CPTII,S$GLB,, | Performed by: INTERNAL MEDICINE

## 2022-11-17 PROCEDURE — 3008F PR BODY MASS INDEX (BMI) DOCUMENTED: ICD-10-PCS | Mod: CPTII,S$GLB,, | Performed by: INTERNAL MEDICINE

## 2022-11-17 PROCEDURE — 99999 PR PBB SHADOW E&M-EST. PATIENT-LVL V: CPT | Mod: PBBFAC,,, | Performed by: INTERNAL MEDICINE

## 2022-11-17 PROCEDURE — 3074F SYST BP LT 130 MM HG: CPT | Mod: CPTII,S$GLB,, | Performed by: INTERNAL MEDICINE

## 2022-11-17 RX ORDER — PROMETHAZINE HYDROCHLORIDE AND DEXTROMETHORPHAN HYDROBROMIDE 6.25; 15 MG/5ML; MG/5ML
5 SYRUP ORAL 2 TIMES DAILY PRN
Qty: 240 ML | Refills: 0 | Status: SHIPPED | OUTPATIENT
Start: 2022-11-17 | End: 2022-12-11

## 2022-11-17 RX ORDER — BENZONATATE 200 MG/1
200 CAPSULE ORAL 3 TIMES DAILY PRN
Qty: 30 CAPSULE | Refills: 0 | Status: SHIPPED | OUTPATIENT
Start: 2022-11-17 | End: 2022-11-27

## 2022-11-17 NOTE — PATIENT INSTRUCTIONS
Take Flonase, Zyrtec, Mucinex, Ibuprofen/Tylenol as directed on package to help with symptoms.      Drink hot tea with turmeric/cj, gargle with warm salt water multiple times a day.      Stay home for 5 days and isolate from others in your home.  Wear a well-fitting mask if you must be around others in your home.  Do not travel.     End isolation after 5 full days if you are fever-free for 24 hours (without the use of fever-reducing medication) and your symptoms are improving.

## 2022-11-17 NOTE — PROGRESS NOTES
INTERNAL MEDICINE ESTABLISHED PATIENT VISIT NOTE    Subjective:     Chief Complaint: Follow-up       Patient ID: Phylicia Vásquez is a 52 y.o. female with HTN, prediabetes, R breast cancer s/p R mastectomy/chemotherapy/prophylactic L mastectomy on Anastrazole, adjustment disorder, goiter, last seen by me 5/2022, here today for follow-up.    Having chronic R breast and RUE swelling related to lymphedema. Undergoing therapy for lymphedema.     Has been having cough, congestion, body aches and fever of 102 for past 24 hours. Not vaccinated against flu. Sister recently sick with flu. Has been taking OTC medications and cough drops. Tolerating fluids.     Past Medical History:  Past Medical History:   Diagnosis Date    Anxiety     Back pain     Goiter     HTN (hypertension) 10/18/2012    Hx antineoplastic chemo     last dose 4/23/20    Hx of psychiatric care     Ambien, Klonopin    Insomnia 10/18/2012    Malignant neoplasm of axillary tail of right breast in female, estrogen receptor positive 11/24/2019    right    Neck mass     right posterior    Primary invasive malignant neoplasm of female breast, right 11/21/2019    right    Psychiatric problem     S/P abdominal supracervical subtotal hysterectomy, continues to have regular menses.  4/2/2014    Spinal cord cyst, L1 2014     Thoracic radiculopathy, bulging disc at T10-11 and T11-12           Review of Systems:  Review of Systems   Constitutional:  Positive for chills and fever.   HENT:  Negative for congestion.    Respiratory:  Positive for cough. Negative for shortness of breath.    Cardiovascular:  Negative for chest pain.   Gastrointestinal:  Positive for nausea. Negative for constipation and vomiting.   Genitourinary:  Negative for hematuria and urgency.   Musculoskeletal:  Positive for myalgias. Negative for falls.   Skin:  Negative for rash.   Neurological:  Negative for dizziness and loss of consciousness.     Health Maintenance:   Immunizations:  "  Influenza - obtain later  Tdap - 10/2017  Covid 19 - complete  HPV  Prevnar rec at 65 - Prevnar 11/2021  Shingrix rec at 50 - complete     Cancer Screening:  PAP: 1/2022 NILM  Mammogram:  s/p bilateral mastectomy; 4/2022 L Breast BIRAD -2, repeat per clinical discretion  Colonoscopy:  7/2021 WNL, repeat 7/2031   DEXA:  n/a    Objective:   /68 (BP Location: Left arm, Patient Position: Sitting, BP Method: Large (Manual))   Pulse 97   Temp 98.8 °F (37.1 °C)   Ht 5' 3" (1.6 m)   Wt 109.4 kg (241 lb 2.9 oz)   LMP 08/14/2014   SpO2 98%   BMI 42.72 kg/m²      General: AAO x3, no apparent distress  HEENT: PERRL, + rhinorrhea, + congestion  CV: RRR, no m/r/g  Pulm: Lungs CTAB, no crackles, no wheezes  Abd: s/NT/ND +BS    Labs:       Assessment/Plan     Acute viral syndrome  -     POCT COVID-19 Rapid Screening  -     POCT Influenza A/B    COVID-19 virus infection  POCT positive for Covid-19  Reviewed isolation measures, medication administration  -     nirmatrelvir-ritonavir 300 mg (150 mg x 2)-100 mg copackaged tablets (EUA); Take 3 tablets by mouth 2 (two) times daily for 5 days. Each dose contains 2 nirmatrelvir (pink tablets) and 1 ritonavir (white tablet). Take all 3 tablets together. Hold Zolpidem.  Dispense: 30 tablet; Refill: 0  -     benzonatate (TESSALON) 200 MG capsule; Take 1 capsule (200 mg total) by mouth 3 (three) times daily as needed for Cough.  Dispense: 30 capsule; Refill: 0  -     promethazine-dextromethorphan (PROMETHAZINE-DM) 6.25-15 mg/5 mL Syrp; Take 5 mLs by mouth 2 (two) times daily as needed (cough).  Dispense: 240 mL; Refill: 0    Lymphedema of right upper extremity  Continue lymphedema therapy  9/2022 US Soft Tissue Chest - No significant sonographic abnormality.    History of breast cancer  s/p R mastectomy/chemotherapy/prophylactic L mastectomy on Anastrazole    HM as above    Zoe Lynn MD  Department of Internal Medicine - [facility]  11/17/2022      "

## 2022-11-17 NOTE — LETTER
"     November 17, 2022    Phylicia Vásquez  [address] [facility] [place] [address]             [facility]  [address]  [place] [address]  Phone: [phone]  Fax: [phone] To Whom It May Concern,     Ms. Vásquez was seen in my office for medical care 11/17/2022. She has tested positive for Covid-19.    Please excuse Phylicia "Mirian"Fahad from work for five days starting today due to being in a high risk group for COVID-19 infection. Per CDC recommendations,  she can end isolation after 5 full days if  she is fever-free for 24 hours (without the use of fever-reducing medication) and her symptoms are improving. She must remain masked around others for additional 5 days.       If you have any questions or concerns, please don't hesitate to call.    Sincerely,            Tayo Lynn MD     "

## 2022-11-18 ENCOUNTER — TELEPHONE (OUTPATIENT)
Dept: REHABILITATION | Facility: HOSPITAL | Age: 52
End: 2022-11-18
Payer: COMMERCIAL

## 2022-11-28 ENCOUNTER — CLINICAL SUPPORT (OUTPATIENT)
Dept: REHABILITATION | Facility: HOSPITAL | Age: 52
End: 2022-11-28
Attending: INTERNAL MEDICINE
Payer: COMMERCIAL

## 2022-11-28 DIAGNOSIS — R26.9 ABNORMALITY OF GAIT: ICD-10-CM

## 2022-11-28 DIAGNOSIS — I89.0 LYMPHEDEMA OF RIGHT ARM: Primary | ICD-10-CM

## 2022-11-28 DIAGNOSIS — Z74.09 IMPAIRED FUNCTIONAL MOBILITY AND ACTIVITY TOLERANCE: ICD-10-CM

## 2022-11-28 DIAGNOSIS — R29.898 WEAKNESS OF LEFT LOWER EXTREMITY: Primary | ICD-10-CM

## 2022-11-28 DIAGNOSIS — R26.89 IMPAIRMENT OF BALANCE: ICD-10-CM

## 2022-11-28 PROCEDURE — 97140 MANUAL THERAPY 1/> REGIONS: CPT

## 2022-11-28 PROCEDURE — 97110 THERAPEUTIC EXERCISES: CPT

## 2022-11-28 NOTE — PROGRESS NOTES
Physical Therapy Daily Treatment Note     Name: Phylicia Vásquez  Clinic Number: [id number]    Therapy Diagnosis:   Encounter Diagnoses   Name Primary?    Weakness of left lower extremity Yes    Abnormality of gait     Impairment of balance      Physician: Marcela Calvillo PA-C    Visit Date: 11/28/2022    Physician Orders: PT Eval and Treat   Medical Diagnosis from Referral: M21.379 (ICD-10-CM) - Foot-drop, unspecified laterality  Evaluation Date: 10/18/2022  Authorization Period Expiration: 12/31/2022  Plan of Care Expiration: 12/18/2022  Visit # / Visits authorized: 2/ 20 (plus eval)  Insurance: [employer] OPEN ACCESS PLUS  FOTO: 1/3     Time In: 2:00 pm  Time Out: 2:30 pm  Total Billable Time: 30 minutes    Precautions:   Standard, cancer, and R UE lymphedema    Subjective     Pt reports: ankle doing alight today. She saw the podiatrist recently and was told to stop wearing her AFO as it may be causing the swelling. She was also prescribed a new medication for the pain in her ankle and is waiting on the pharmacy to get it. She needs to leave therapy early to get her nephew off the school bus.  She was not compliant with home exercise program due to fatigue from covid  Response to previous treatment: no adverse events  Functional change: nothing yet    Pain: 2/10  Location: left ankle      Objective     Objective Measures updated at progress report unless specified.     B/P: 121/92; HR: 64 bpm; O2: 100%    Lab Values: 9/12/22  Platelets: 216  ANC: 4.9  Hematacrit: 37.3  Hemoglobin: 12.3  Treatment       Mirian received therapeutic exercises to develop strength, endurance, ROM, flexibility, posture and core stabilization for 30 minutes including:  Aerobic/Cardio Activity: Stationary bike, seat 6, level 1, 10 minutes  Supine ankle DF, 2 x 10  Supine ankle inversion 2 x 10  Supine ankle eversion, 2 x 10, yellow band  Supine ankle PF 2 x 10, red band  Calf raises, 2 x 10  Toe raise, 2 x 10    Mirian participated in  neuromuscular re-education activities to improve: Balance, Coordination, Proprioception and Posture for 0 minutes. The following activities were included:    Home Exercises Provided and Patient Education Provided     Education provided:   - compliance with HEP  - role of PT and goals for PT     Written Home Exercises Provided: Patient instructed to cont prior HEP.  Exercises were reviewed and Mirian was able to demonstrate them prior to the end of the session.  Mirian demonstrated good  understanding of the education provided.     See EMR under Patient Instructions for exercises provided prior visit.    Assessment     Patient is responding well to physical therapy. Patient did not perform her standing balance activities today due to time constraints as she needed to leave therapy early. She was able to demonstrate increased AROM of left ankle DF during the session when seated but continues to have difficulty with toe raises in standing. She performed all of today's activities with no increase in symptoms prior to leaving the clinic. Will progress as tolerated.  Mirian Is progressing well towards her goals.   Pt prognosis is Good.     Pt will continue to benefit from skilled outpatient physical therapy to address the deficits listed in the problem list box on initial evaluation, provide pt/family education and to maximize pt's level of independence in the home and community environment.     Pt's spiritual, cultural and educational needs considered and pt agreeable to plan of care and goals.     Anticipated barriers to physical therapy: none anticipated    Goals:   Short Term Goals:  4 weeks  1. Pt will increase AROM of left ankle to neutral dorsiflexion to promote greater ease with performing normal gait pattern. (progressing, not met)  2. Pt. to demonstrate increased strength of left lower extremity to 4/5  to increase stability during community ambulation (progressing, not met)  3. Pt. will improve TUG test score to 12  seconds or less in order to perform safe transfers and for patient to be in low/moderate risk for falls category (progressing, not met)  4. Pt will tolerate 6 minute walk test. (progressing, not met)  5. Pt will initiate home exercise program to improve strength, flexibility, endurance, mobility & balance to return pt to PLOF (progressing, not met)  6. Pt will tolerate 10 min or greater of time in light-->moderate intensity cardio (I.e. Bike, NuStep) to improve endurance to assist in performing her usual work activities (progressing, not met)  7. Pt to demonstrate tandem stance 30 sec or greater w/out UE support in order to improve balance to progress to singe leg stance to decrease fall risk in performance of ADL's (progressing, not met)     Long Term Goals: 8 weeks  1. Pt will increase AROM of left ankle dorsiflexion to 5 degrees in order to perform normal gait pattern when ambulating on uneven ground (progressing, not met)  2. Pt. will increase strength of left lower extremity to 5/5  to increase stability during ambulation on uneven surfaces,to increase tolerance for ADL and work activities. (progressing, not met)  3. Pt will be independent with HEP to improve ROM, strength, balance,  and independence with ADL's (progressing, not met)  4. Pt. will improve TUG test score to 10 seconds or less in order to ambulate safely in community and for patient to be in low risk for falls category (progressing, not met)  5. Pt. will improve 6 minute walk test score by 30 meters in order for patient to be in low risk for falls category (progressing, not met)  6. Patient will report compliance with walking program 5x week for 10 -30min each day to improve overall cardiovascular function and decrease cancer related fatigue at discharge. (progressing, not met)    Plan     Plan of care Certification: 10/18/2022 to 12/18/2022.     Outpatient Physical Therapy 2 times weekly for 8 weeks to include the following interventions: Gait  Training, Manual Therapy, Neuromuscular Re-ed, Patient Education, Self Care, Therapeutic Activities, Therapeutic Exercise, and IASTM . Dry needling with manual therapy techniques to decrease pain, inflammation and swelling, increase circulation and promote healing process.      Gail Gutierrez, PT

## 2022-11-28 NOTE — PROGRESS NOTES
Physical Therapy Daily Treatment Note       Date: 11/28/2022   Name: Phylicia Vásquez  Clinic Number: [id number]    Therapy Diagnosis:   Encounter Diagnoses   Name Primary?    Lymphedema of right arm Yes    Impaired functional mobility and activity tolerance      Physician: Negrito Verde MD    Physician Orders: PT Eval and Treat   Medical Diagnosis: Carcinoma of axillary tail of right breast in female, estrogen receptor positive [C50.611, Z17.0], Localized edema      Evaluation Date: 6/3/2022  Authorization Period Expiration: 12/31/22  Updated Plan of Care Certification Period: 12/30/22   Visit # / Visits authorized: 18/ 20 (plus eval)  Insurance: [employer]  FOTO: 3/3     Time In: 1:10 PM  Time Out:  2:00 PM  Total Billable Time: 55minutes     Precautions: Standard, cancer and Right UE lymphedema      Subjective     Pt reports: She forgot her bandages at home, but she would like to still have a session. Pt states she had increased swelling in her right breast during her recent COVID infection. Pt states her breast swelling has improved since she's been recovering from COVID. Pt was using her compression pump 2x/day and wearing tubigrip.  She was compliant with compression pump  Response to previous treatment: no adverse reaction  Pain Scale: Phylicia rates pain on a scale of 0/10 on VAS.   Pain location: N/A     Fatigue: not assessed  Functional change: household chores are easier.  Treatment:   Chemotherapy:  4 cycles of AC and 4 cycles of Taxol patrick adjuvantly  Radiation: completed radiation therapy on 8/20/20.   Endocrine therapy: On 11/1/2020 started on Anastrozole with with Dr. Verde     Surgery date: Pt is s/p bilateral mastectomy with R SLNB, TE reconstruction in 4/2021. pt will have permanent reconstruction with ROWAN flap, but she states she needs to lose weight first.      Objective     Objective Measures updated at progress report unless specified.  "    LANDMARK RIGHT UE LEFT UE DIFF RUE Diff RUE Diff RUE Diff RUE Diff RUE Diff RUE Diff RUE Diff RUE Diff RUE Diff RUE Diff RUE Diff RUE Diff RUE Diff RUE Diff RUE Diff RUE Diff   Date 6/3/22 6/3/22 6/3/22 6/22/22 6/22/22 6/29/22 6/29/22 7/6/22 7/6/22 7/20/22 7/20/22 7/27/22 7/27/22 8/18/22 8/18/22 8/24/22 8/24/22 9/2/22 9/2/22 9/19/22 9/19/22 9/26/22 9/26/22 10/4/22 10/4/22 10/17/22 10/17/22 10/24/22 10/24/22 11/2/22 11/2/22 11/10/22 11/10/22 11/28/22 11/28/22   E + 8" 47 cm 46.5 cm 0.5 cm 46.5cm -0.5 44cm -2.5 45 cm +1 47 cm +2 45cm -2 43.5cm -1.5 43.5cm No chg 44cm +0.5 46cm +2 44.5cm -1.5 44.5 cm No chg 45 cm +0.5 44.5 cm -0.5 46 cm +1.5 44.5 cm -1.5 45.5 cm +1   E + 6" 44 cm 42.5 cm 1.5 cm 44 cm No chg 44cm No chg 43.5cm -0.5 45cm +1.5 42cm -3 41cm -1 43cm +2 42.5cm -0.5 43.5cm +1 44.5cm +1 45cm +0.5 43.5 cm -1.5 43 cm -0.5 43 cm No chg 44.5 cm +1.5 44.5 cm No chg   E + 4" 39.5 cm 37 cm 2.5 cm 39cm -0.5 40.5 cm +1.5 40.5cm No chg 41cm +0.5 39 cm -2 39cm No chg 39.5cm +0.5 40cm +0.5 39cm -1 41.5cm +2.5 40cm -1.5 40cm No chg 40cm No chg 40cm No chg 40 cm No chg 40.5 cm No chg   E + 2" 34.5 cm 33 cm 1.5 cm 34.5 cm No chg 35 cm +0.5 34.5cm -0.5 35cm +0.5 34cm -1 34.5cm +0.5 34.5cm No chg 35cm +0.5 34cm -1 35cm +1 35cm No chg 34 cm -1 35 cm +1 35.5 cm +0.5 34.5 cm -1 35 cm +0.5   Elbow 28 cm 28 cm 0 cm 27 cm -1 27.5 cm +0.5 28 cm +0.5 28cm No chg 27cm -1 27cm No chg 27.5cm +0.5 27cm -0.5 27.5cm +0.5 27.5cm No chg 27cm -0.5 27 cm No chg 27 cm No chg 27 cm No chg 26 cm -1 27 cm +1   W+ 8" 29 cm  28.5cm 0.5 cm 28cm -1 28 cm No chg 29.5cm +1.5 29cm -0.5 28.5cm -0.5 28cm -0.5 29.5cm +1.5 29cm -0.5 28.5cm -0.5 29cm +0.5 28cm -1 29.5cm +1.5 30 cm +0.5 28cm -2 28.5 cm +0.5 29 cm +0.5   W +  6" 29.5 cm 27 cm 2.5 cm 29cm -0.5 29cm No chg 29cm No chg 29cm No chg 29cm No chg 29cm No chg 29.5cm +0.5 28.5cm -1 29cm +0.5 29cm No chg 29cm No chg 29 cm No chg 29 cm No chg 28.5cm -0.5 28.5 cm No chg 29 cm +0.5   W + 4" 25.5 cm 23.5 " cm 2 cm 25cm -0.5 24.5 cm -0.5 25.5cm +1 25 cm -0.5 25cm No chg 24.5cm -0.5 25.5cm +1 24.5cm -1 25cm +0.5 25.5cm +0.5  24.5cm -1 25 cm +0.5 25.5 cm +0.5 25 cm -0.5 24.5 cm -0.5 24.5 cm No chg   Wrist 18 cm 17.5 cm 0.5 cm 18.5cm +0.5 18 cm -0.5 18.5cm +0.5 18cm -0.5 18cm No chg 18cm No chg 18cm No chg 18cm No chg 18cm No chg 18cm No chg 18cm No chg 18 cm No chg 18 cm No chg 18 cm No chg 18 cm  No chg 18 cm No chg   DPC 21.5 cm 20 cm 1.5 cm 20cm -1.5 20.5cm +0.5 21 cm +0.5 21cm -0.5 20.5cm -0.5 21cm +0.5 21.5cm +0.5 21cm -0.5 21cm No chg 20.5 cm -0.5 21.5cm +1 20.5cm -1 20.5 cm No chg 20.5 cm No chg 20 cm -0.5 20cm No chg   IP Thumb 7.5 cm 7 cm 0.5 cm 8cm +0.5 7.5cm -0.5 8 cm +0.5 8cm No chg 7.5cm -0.5 7.5cm No chg 8cm +0.5 7.5cm -0.5 7.5cm No Chg 8cm +0.5 7.5cm -0.5 7.5cm No chg 7.5 cm No chg 7cm -0.5 7.5 cm +0.5 7.5 cm No chg         Treatment     Phylicia received the following manual therapy techniques were performed to increased myofascial/soft tissue length, mobility and pliability, increase PROM, AROM and function as well as to decrease pain for 45 minutes:    Measurements taken above        Short Neck- held due to thyroid goiter  Clear Healthy Lymph Nodes:  Left Axilla                                                  Right  Groin  Open Anastomoses:  Anterior Axillo-Axillary  (AAA)                                    Axillo-Inguinal     (AI)                                    Posterior Axillo-Axillary  (LEEANN)    J-stroke at lateral chest wall  Queen's wave R breast  Inferior trunk slide  Repeat J-stroke    Right Upper Arm (Lateral and Medial)  Right Antecubital Fossa  Right forearm, dorsal and ventral aspect  Dorsum of R hand  R fingers    Rework right hand  Rework Right UE  Rework Anastomoses   Rework Healthy lymph Nodes    Pt dons tubigrip to provide gentle compression in  the meantime, as pt left her compression bandages at home.    Phylicia received therapeutic exercises to develop posture and lymphatic  motility for 5 minutes including:     Diaphragmatic breathing, 2 x 5 reps  Shoulder rolls, 10 reps fwd and back  Scap retractions x 10                      Home Exercise Program and Patient Education   Education provided re:  - role of PT in multi - disciplinary team, goals for PT  - progress towards goals       Written Home Exercises Provided: Patient instructed to cont prior HEP.  Exercises were reviewed and Mirian was able to demonstrate them prior to the end of the session.  Mirian demonstrated good  understanding of the education provided.     See EMR under Media for exercises provided 6/8/2022- for self manual lymph drainage techniques.    Pt has no cultural, educational or language barriers to learning provided.    Assessment     Patient tolerated session well. Increased time since last session due to recent COVID infection. Pt endorses increased breast swelling during COVID infection, so therapist incorporated breast lymphatic drainage today.  Pt tolerated complete decongestive therapy without issue in clinic. Despite increased time since last session, only mild increase in right arm circumference noted.  Pt has met goals as noted below. Once her RUE circumference stabilizes, she will benefit from compression sleeve and compression glove at 20-30mmHg. Will progress as tolerated.     Pt prognosis is Good. Pt will continue to benefit from skilled outpatient physical therapy to address the deficits listed in the problem list chart on initial evaluation, provide pt/family education and to maximize pt's level of independence in the home and community environment.     Anticipated barriers to physical therapy: none anticipated    Goals as follows:  Short Term Goals (6 weeks)  1. Pt will demo decrease in girth in right upper extremity by >/= 1cm to allow for improved UE symmetry, clothing choice, ROM, and UE function. (met, 8/18/22  2. Patient will demonstrate 100% knowledge of lymphedema precautions and signs of  infection to allow for reduced risk of lymphedema, reduced risk of infection, and/or exacerbation.  (met)  3. Patient will perform self lymph drainage techniques to enhance lymphatic drainage and mobility.  (met, 7/6/22)  4. Patient will tolerate daily activities with multilayered bandaging to enhance lymphatic drainage and skin elasticity.  (met, 7/6/22)  5. Pt will tolerate HEP for improved strength, functional mobility, ROM, posture, and endurance. (met, 7/6/22)        Long Term Goals: ( 12  weeks)  1. Patient to show decreased girth in Right upper extremity by >/= 2cm to allow for improved UE symmetry, clothing choice, ROM, and UE function.  (met partially, 8/18/22)  2. Patient will show reduction in density to mild or less with improved contour of limb to allow for improved cosmesis, improved lymphatic drainage, and functional mobility.  (met, 9/2/22)  3. Patient to mary/doff compression garment with daily compliance to support lymphatic mobility and skin elasticity.  (progressing, not met)  4. Pt to show improved postural awareness and alignment for improved functional mobility.  (progressing, not met)  5. Pt to be independent and compliant with HEP to allow for improved ROM, reach and carry with functional endurance and strength.    (met, 10/4/22)           Plan   Outpatient physical therapy 2x week for 4 weeks to include the following:   Manual Therapy, Neuromuscular Re-ed, Orthotic Management and Training, Patient Education, Self Care, Therapeutic Activities and Therapeutic Exercise.            Therapist: Marcela Price, PT  11/28/2022

## 2022-11-30 NOTE — PLAN OF CARE
"  Outpatient Therapy Updated Plan of Care     Visit Date: 11/28/2022  Name: Phylicia Vásquez  Clinic Number: [id number]    Therapy Diagnosis:   Encounter Diagnoses   Name Primary?    Lymphedema of right arm Yes    Impaired functional mobility and activity tolerance      Physician: Negrito Verde MD      Physician Orders: PT Eval and Treat   Medical Diagnosis: Carcinoma of axillary tail of right breast in female, estrogen receptor positive [C50.611, Z17.0], Localized edema    Evaluation Date: 6/3/2022    Total Visits Received: 19  Cancelled Visits: unknown  No Show Visits: 0    Current Certification Period:   9/30/22 to 11/11/22  Precautions:  Standard, Fall, RUE lymphedema  Visits from Evaluation Date:  18      Subjective     Update: Pt reports: She forgot her bandages at home, but she would like to still have a session. Pt states she had increased swelling in her right breast during her recent COVID infection. Pt states her breast swelling has improved since she's been recovering from COVID. Pt was using her compression pump 2x/day and wearing tubigrip.  She was compliant with compression pump  Response to previous treatment: no adverse reaction  Pain Scale: Phylicia rates pain on a scale of 0/10 on VAS.   Pain location: N/A     Fatigue: not assessed  Functional change: household chores are easier.    Objective     Update:       LANDMARK RIGHT UE LEFT UE DIFF RUE Diff RUE Diff RUE Diff RUE Diff RUE Diff RUE Diff RUE Diff RUE Diff RUE Diff RUE Diff RUE Diff RUE Diff RUE Diff RUE Diff RUE Diff RUE Diff   Date 6/3/22 6/3/22 6/3/22 6/22/22 6/22/22 6/29/22 6/29/22 7/6/22 7/6/22 7/20/22 7/20/22 7/27/22 7/27/22 8/18/22 8/18/22 8/24/22 8/24/22 9/2/22 9/2/22 9/19/22 9/19/22 9/26/22 9/26/22 10/4/22 10/4/22 10/17/22 10/17/22 10/24/22 10/24/22 11/2/22 11/2/22 11/10/22 11/10/22 11/28/22 11/28/22   E + 8" 47 cm 46.5 cm 0.5 cm 46.5cm -0.5 44cm -2.5 45 cm +1 47 cm +2 45cm -2 43.5cm -1.5 43.5cm No chg 44cm +0.5 46cm +2 44.5cm " "-1.5 44.5 cm No chg 45 cm +0.5 44.5 cm -0.5 46 cm +1.5 44.5 cm -1.5 45.5 cm +1   E + 6" 44 cm 42.5 cm 1.5 cm 44 cm No chg 44cm No chg 43.5cm -0.5 45cm +1.5 42cm -3 41cm -1 43cm +2 42.5cm -0.5 43.5cm +1 44.5cm +1 45cm +0.5 43.5 cm -1.5 43 cm -0.5 43 cm No chg 44.5 cm +1.5 44.5 cm No chg   E + 4" 39.5 cm 37 cm 2.5 cm 39cm -0.5 40.5 cm +1.5 40.5cm No chg 41cm +0.5 39 cm -2 39cm No chg 39.5cm +0.5 40cm +0.5 39cm -1 41.5cm +2.5 40cm -1.5 40cm No chg 40cm No chg 40cm No chg 40 cm No chg 40.5 cm No chg   E + 2" 34.5 cm 33 cm 1.5 cm 34.5 cm No chg 35 cm +0.5 34.5cm -0.5 35cm +0.5 34cm -1 34.5cm +0.5 34.5cm No chg 35cm +0.5 34cm -1 35cm +1 35cm No chg 34 cm -1 35 cm +1 35.5 cm +0.5 34.5 cm -1 35 cm +0.5   Elbow 28 cm 28 cm 0 cm 27 cm -1 27.5 cm +0.5 28 cm +0.5 28cm No chg 27cm -1 27cm No chg 27.5cm +0.5 27cm -0.5 27.5cm +0.5 27.5cm No chg 27cm -0.5 27 cm No chg 27 cm No chg 27 cm No chg 26 cm -1 27 cm +1   W+ 8" 29 cm  28.5cm 0.5 cm 28cm -1 28 cm No chg 29.5cm +1.5 29cm -0.5 28.5cm -0.5 28cm -0.5 29.5cm +1.5 29cm -0.5 28.5cm -0.5 29cm +0.5 28cm -1 29.5cm +1.5 30 cm +0.5 28cm -2 28.5 cm +0.5 29 cm +0.5   W +  6" 29.5 cm 27 cm 2.5 cm 29cm -0.5 29cm No chg 29cm No chg 29cm No chg 29cm No chg 29cm No chg 29.5cm +0.5 28.5cm -1 29cm +0.5 29cm No chg 29cm No chg 29 cm No chg 29 cm No chg 28.5cm -0.5 28.5 cm No chg 29 cm +0.5   W + 4" 25.5 cm 23.5 cm 2 cm 25cm -0.5 24.5 cm -0.5 25.5cm +1 25 cm -0.5 25cm No chg 24.5cm -0.5 25.5cm +1 24.5cm -1 25cm +0.5 25.5cm +0.5  24.5cm -1 25 cm +0.5 25.5 cm +0.5 25 cm -0.5 24.5 cm -0.5 24.5 cm No chg   Wrist 18 cm 17.5 cm 0.5 cm 18.5cm +0.5 18 cm -0.5 18.5cm +0.5 18cm -0.5 18cm No chg 18cm No chg 18cm No chg 18cm No chg 18cm No chg 18cm No chg 18cm No chg 18 cm No chg 18 cm No chg 18 cm No chg 18 cm  No chg 18 cm No chg   DPC 21.5 cm 20 cm 1.5 cm 20cm -1.5 20.5cm +0.5 21 cm +0.5 21cm -0.5 20.5cm -0.5 21cm +0.5 21.5cm +0.5 21cm -0.5 21cm No chg 20.5 cm -0.5 21.5cm +1 20.5cm -1 20.5 cm No chg 20.5 " cm No chg 20 cm -0.5 20cm No chg   IP Thumb 7.5 cm 7 cm 0.5 cm 8cm +0.5 7.5cm -0.5 8 cm +0.5 8cm No chg 7.5cm -0.5 7.5cm No chg 8cm +0.5 7.5cm -0.5 7.5cm No Chg 8cm +0.5 7.5cm -0.5 7.5cm No chg 7.5 cm No chg 7cm -0.5 7.5 cm +0.5 7.5 cm No chg         Assessment     Update: Patient tolerated session well. Increased time since last session due to recent COVID infection. Pt endorses increased breast swelling during COVID infection, so therapist incorporated breast lymphatic drainage today.  Pt tolerated complete decongestive therapy without issue in clinic. Despite increased time since last session, only mild increase in right arm circumference noted.  Pt has met goals as noted in treatment note. Once her RUE circumference stabilizes, she will benefit from compression sleeve and compression glove at 20-30mmHg. Will progress as tolerated.     Previous Short Term Goals Status:   met partially  New Short Term Goals Status:   N/A  Long Term Goal Status:   continue per initial plan of care.  Reasons for Recertification of Therapy:   pt with recent increase in arm circumference following COVID infection. She will benefit from continued therapy to reduce arm circumference and eventually be fit with compression sleeve and glove.    Plan     Updated Certification Period: 11/28/2022 to 12/30/22  Recommended Treatment Plan: 2 times per week for 4 weeks: Manual Therapy, Neuromuscular Re-ed, Orthotic Management and Training, Patient Education, Self Care, Therapeutic Activities, and Therapeutic Exercise  Other Recommendations: none anticipated    Marcela Price, PT  11/28/2022      I CERTIFY THE NEED FOR THESE SERVICES FURNISHED UNDER THIS PLAN OF TREATMENT AND WHILE UNDER MY CARE    Physician's comments:        Physician's Signature: ___________________________________________________

## 2022-12-15 ENCOUNTER — CLINICAL SUPPORT (OUTPATIENT)
Dept: REHABILITATION | Facility: HOSPITAL | Age: 52
End: 2022-12-15
Attending: INTERNAL MEDICINE
Payer: COMMERCIAL

## 2022-12-15 DIAGNOSIS — I89.0 LYMPHEDEMA OF RIGHT ARM: Primary | ICD-10-CM

## 2022-12-15 DIAGNOSIS — Z74.09 IMPAIRED FUNCTIONAL MOBILITY AND ACTIVITY TOLERANCE: ICD-10-CM

## 2022-12-15 PROCEDURE — 97140 MANUAL THERAPY 1/> REGIONS: CPT

## 2022-12-15 NOTE — PROGRESS NOTES
Physical Therapy Daily Treatment Note       Date: 12/15/2022   Name: Phylicia Vásquez  Clinic Number: [id number]    Therapy Diagnosis:   Encounter Diagnoses   Name Primary?    Lymphedema of right arm Yes    Impaired functional mobility and activity tolerance      Physician: Marcela Calvillo PA-C    Physician Orders: PT Eval and Treat   Medical Diagnosis: Carcinoma of axillary tail of right breast in female, estrogen receptor positive [C50.611, Z17.0], Localized edema      Evaluation Date: 6/3/2022  Authorization Period Expiration: 12/31/22  Updated Plan of Care Certification Period: 12/30/22   Visit # / Visits authorized: 19/ 20 (plus eval)  Insurance: [employer]  FOTO: 3/3     Time In: 7:06 AM  Time Out:  8:04 AM  Total Billable Time: 58 minutes     Precautions: Standard, cancer and Right UE lymphedema      Subjective     Pt reports: Arm's been feeling alright, but she forgot to wear her tubigrip at work last night. Endorses R knee pain.  She was compliant with compression pump  Response to previous treatment: no adverse reaction  Pain Scale: Phylicia rates pain on a scale of 0/10 on VAS.   Pain location: N/A     Fatigue: not assessed  Functional change: household chores are easier.  Treatment:   Chemotherapy:  4 cycles of AC and 4 cycles of Taxol patrick adjuvantly  Radiation: completed radiation therapy on 8/20/20.   Endocrine therapy: On 11/1/2020 started on Anastrozole with with Dr. Verde     Surgery date: Pt is s/p bilateral mastectomy with R SLNB, TE reconstruction in 4/2021. pt will have permanent reconstruction with ROWAN flap, but she states she needs to lose weight first.      Objective     Objective Measures updated at progress report unless specified.     LANDMARK RIGHT UE LEFT UE DIFF RUE Diff RUE Diff RUE Diff RUE Diff RUE Diff RUE Diff RUE Diff RUE Diff RUE Diff RUE Diff RUE Diff RUE Diff RUE Diff RUE Diff RUE Diff RUE Diff RUE Diff   Date 6/3/22  "6/3/22 6/3/22 6/22/22 6/22/22 6/29/22 6/29/22 7/6/22 7/6/22 7/20/22 7/20/22 7/27/22 7/27/22 8/18/22 8/18/22 8/24/22 8/24/22 9/2/22 9/2/22 9/19/22 9/19/22 9/26/22 9/26/22 10/4/22 10/4/22 10/17/22 10/17/22 10/24/22 10/24/22 11/2/22 11/2/22 11/10/22 11/10/22 11/28/22 11/28/22 12/15/22 12/15/22   E + 8" 47 cm 46.5 cm 0.5 cm 46.5cm -0.5 44cm -2.5 45 cm +1 47 cm +2 45cm -2 43.5cm -1.5 43.5cm No chg 44cm +0.5 46cm +2 44.5cm -1.5 44.5 cm No chg 45 cm +0.5 44.5 cm -0.5 46 cm +1.5 44.5 cm -1.5 45.5 cm +1 44.5cm -1   E + 6" 44 cm 42.5 cm 1.5 cm 44 cm No chg 44cm No chg 43.5cm -0.5 45cm +1.5 42cm -3 41cm -1 43cm +2 42.5cm -0.5 43.5cm +1 44.5cm +1 45cm +0.5 43.5 cm -1.5 43 cm -0.5 43 cm No chg 44.5 cm +1.5 44.5 cm No chg 45 cm +0.5   E + 4" 39.5 cm 37 cm 2.5 cm 39cm -0.5 40.5 cm +1.5 40.5cm No chg 41cm +0.5 39 cm -2 39cm No chg 39.5cm +0.5 40cm +0.5 39cm -1 41.5cm +2.5 40cm -1.5 40cm No chg 40cm No chg 40cm No chg 40 cm No chg 40.5 cm No chg 41.5 cm +1   E + 2" 34.5 cm 33 cm 1.5 cm 34.5 cm No chg 35 cm +0.5 34.5cm -0.5 35cm +0.5 34cm -1 34.5cm +0.5 34.5cm No chg 35cm +0.5 34cm -1 35cm +1 35cm No chg 34 cm -1 35 cm +1 35.5 cm +0.5 34.5 cm -1 35 cm +0.5 36cm +1   Elbow 28 cm 28 cm 0 cm 27 cm -1 27.5 cm +0.5 28 cm +0.5 28cm No chg 27cm -1 27cm No chg 27.5cm +0.5 27cm -0.5 27.5cm +0.5 27.5cm No chg 27cm -0.5 27 cm No chg 27 cm No chg 27 cm No chg 26 cm -1 27 cm +1 27.5 cm +0.5   W+ 8" 29 cm  28.5cm 0.5 cm 28cm -1 28 cm No chg 29.5cm +1.5 29cm -0.5 28.5cm -0.5 28cm -0.5 29.5cm +1.5 29cm -0.5 28.5cm -0.5 29cm +0.5 28cm -1 29.5cm +1.5 30 cm +0.5 28cm -2 28.5 cm +0.5 29 cm +0.5 29.5 cm +0.5   W +  6" 29.5 cm 27 cm 2.5 cm 29cm -0.5 29cm No chg 29cm No chg 29cm No chg 29cm No chg 29cm No chg 29.5cm +0.5 28.5cm -1 29cm +0.5 29cm No chg 29cm No chg 29 cm No chg 29 cm No chg 28.5cm -0.5 28.5 cm No chg 29 cm +0.5 29 cm No chg   W + 4" 25.5 cm 23.5 cm 2 cm 25cm -0.5 24.5 cm -0.5 25.5cm +1 25 cm -0.5 25cm No chg 24.5cm -0.5 25.5cm +1 24.5cm -1 " 25cm +0.5 25.5cm +0.5  24.5cm -1 25 cm +0.5 25.5 cm +0.5 25 cm -0.5 24.5 cm -0.5 24.5 cm No chg 24.5 cm No chg   Wrist 18 cm 17.5 cm 0.5 cm 18.5cm +0.5 18 cm -0.5 18.5cm +0.5 18cm -0.5 18cm No chg 18cm No chg 18cm No chg 18cm No chg 18cm No chg 18cm No chg 18cm No chg 18 cm No chg 18 cm No chg 18 cm No chg 18 cm  No chg 18 cm No chg 18 cm No chg   DPC 21.5 cm 20 cm 1.5 cm 20cm -1.5 20.5cm +0.5 21 cm +0.5 21cm -0.5 20.5cm -0.5 21cm +0.5 21.5cm +0.5 21cm -0.5 21cm No chg 20.5 cm -0.5 21.5cm +1 20.5cm -1 20.5 cm No chg 20.5 cm No chg 20 cm -0.5 20cm No chg 20 cm No chg   IP Thumb 7.5 cm 7 cm 0.5 cm 8cm +0.5 7.5cm -0.5 8 cm +0.5 8cm No chg 7.5cm -0.5 7.5cm No chg 8cm +0.5 7.5cm -0.5 7.5cm No Chg 8cm +0.5 7.5cm -0.5 7.5cm No chg 7.5 cm No chg 7cm -0.5 7.5 cm +0.5 7.5 cm No chg 7.5 cm No chg         Treatment     Phylicia received the following manual therapy techniques were performed to increased myofascial/soft tissue length, mobility and pliability, increase PROM, AROM and function as well as to decrease pain for 53 minutes:    Measurements taken above        Short Neck- held due to thyroid goiter  Clear Healthy Lymph Nodes:  Left Axilla                                                  Right  Groin  Open Anastomoses:  Anterior Axillo-Axillary  (AAA)                                    Axillo-Inguinal     (AI)                                    Posterior Axillo-Axillary  (LEEANN)    Right Upper Arm (Lateral and Medial)  Right Antecubital Fossa  Right forearm, dorsal and ventral aspect  Dorsum of R hand  R fingers    Rework right hand  Rework Right UE  Rework Anastomoses   Rework Healthy lymph Nodes    PT applies  gauze to R hand and fingers and stockinette, cotton artiflex, and short stretch bandages to pt's RIGHT/LEFT/BILATERAL: right upper extremity. Pt educated to wear bandaging for 2-3 days unless it causes pain, numbness, changes in skin color/temperature, or it starts to fall down.  If removed, pt instructed to roll up  bandages and cotton padding and bring to next session. If bandages are removed, pt can wear tubigrip.  Pt has Vet glove to cover bandages in the shower and directions on how to care for bandages. Pt verbalizes understanding of all topics.          Phylicia received therapeutic exercises to develop posture and lymphatic motility for 5 minutes including:     Diaphragmatic breathing, 2 x 5 reps  Shoulder rolls, 10 reps fwd and back  Scap retractions x 20                      Home Exercise Program and Patient Education   Education provided re:  - role of PT in multi - disciplinary team, goals for PT  - progress towards goals       Written Home Exercises Provided: Patient instructed to cont prior HEP.  Exercises were reviewed and Mirian was able to demonstrate them prior to the end of the session.  Mirian demonstrated good  understanding of the education provided.     See EMR under Media for exercises provided 6/8/2022- for self manual lymph drainage techniques.    Pt has no cultural, educational or language barriers to learning provided.    Assessment     Patient tolerated session well. Increased time since last session due to pt's work schedule and family obligations. Pt with increased RUE arm circumference today.  Pt tolerated complete decongestive therapy without issue in clinic.  Pt states she would like to take a break from therapy during holidays and resume after Rosey, so she can be more consistent with attending therapy. Once her RUE circumference stabilizes, she will benefit from compression sleeve and compression glove at 20-30mmHg. Will progress as tolerated.     Pt prognosis is Good. Pt will continue to benefit from skilled outpatient physical therapy to address the deficits listed in the problem list chart on initial evaluation, provide pt/family education and to maximize pt's level of independence in the home and community environment.     Anticipated barriers to physical therapy: none anticipated    Goals as  follows:  Short Term Goals (6 weeks)  1. Pt will demo decrease in girth in right upper extremity by >/= 1cm to allow for improved UE symmetry, clothing choice, ROM, and UE function. (met, 8/18/22  2. Patient will demonstrate 100% knowledge of lymphedema precautions and signs of infection to allow for reduced risk of lymphedema, reduced risk of infection, and/or exacerbation.  (met)  3. Patient will perform self lymph drainage techniques to enhance lymphatic drainage and mobility.  (met, 7/6/22)  4. Patient will tolerate daily activities with multilayered bandaging to enhance lymphatic drainage and skin elasticity.  (met, 7/6/22)  5. Pt will tolerate HEP for improved strength, functional mobility, ROM, posture, and endurance. (met, 7/6/22)        Long Term Goals: ( 12  weeks)  1. Patient to show decreased girth in Right upper extremity by >/= 2cm to allow for improved UE symmetry, clothing choice, ROM, and UE function.  (met partially, 8/18/22)  2. Patient will show reduction in density to mild or less with improved contour of limb to allow for improved cosmesis, improved lymphatic drainage, and functional mobility.  (met, 9/2/22)  3. Patient to mary/doff compression garment with daily compliance to support lymphatic mobility and skin elasticity.  (progressing, not met)  4. Pt to show improved postural awareness and alignment for improved functional mobility.  (progressing, not met)  5. Pt to be independent and compliant with HEP to allow for improved ROM, reach and carry with functional endurance and strength.    (met, 10/4/22)           Plan   Outpatient physical therapy 2x week for 4 weeks to include the following:   Manual Therapy, Neuromuscular Re-ed, Orthotic Management and Training, Patient Education, Self Care, Therapeutic Activities and Therapeutic Exercise.            Therapist: Marcela Price, PT  12/15/2022

## 2022-12-16 NOTE — PROGRESS NOTES
Subjective:       Patient ID: Phylicia Vásquez is a 52 y.o. female.    Chief Complaint: No chief complaint on file.    HPI   Mrs Lua returns today for follow-up.   I had last seen her in mid August 2022.     As of November 1, 2020 she has been on anastrozole in the adjuvant setting.  Of note, her estradiol level and her FSH level suggested that she is postmenopausal, hence initiation of anastrozole.  After her last visit in early October 2021 she underwent a mammogram that was read as BI-RADS 4 and led to a biopsy of a left axillary lymph node that was unremarkable.   On April 21, 2021 she underwent a contralateral prophylactic mastectomy with ROWAN flap reconstructions bilaterally.      Briefly, she is a 52 year old AA female who was found to have a carcinoma that is ER positive, VT positive, and HER 2 equivocal, but negative by  FISH.  An axillary node biopsy was also positive.  She received standard neoadjuvant chemotherapy consisting of 4 cycles of AC and 4 cycles of Taxol prior to undergoing a mastectomy.  At the time of her mastectomy she had a 3 cm residual tumor while the 2 sentinel lymph nodes were positive.  A subsequent axillary node dissection showed no evidence of further chandler involvement.   She completed radiation therapy, and as of November 1, 2020 she has been on adjuvant anastrozole.      Review of Systems    Overall she feels OK.  She admits to hot flashes and occasional myalgias.  She still has residual neuropathy, manifested by numbness primarily on her toes.  ECOG PS is 1. She denies any depression, easy bruising, fevers, chills, night  sweats, weight loss, vomiting, diarrhea, constipation, diplopia, blurred vision, headache, chest pain, palpitations, shortness of breath, left breast pain, abdominal pain, extremity pain, or difficulty ambulating.  The remainder of the ten-point ROS, including general, skin, lymph, H/N, cardiorespiratory, GI, , Neuro, Endocrine, and psychiatric is  negative.       Objective:      Physical Exam      She is alert, oriented to time, place, person, pleasant, well      nourished, in no acute physical distress.                                   VITAL SIGNS:  Reviewed                                      HEENT:  Normal.  There are no nasal, oral, lip, gingival, auricular, lid,    or conjunctival lesions.  Mucosae are moist and pink, and there is no        thrush.  Pupils are equal, reactive to light and accommodation.              Extraocular muscle movements are intact.  Dentition is good.  There is no frontal or maxillary tenderness.                                     NECK:  Supple without JVD, adenopathy, or thyromegaly.                       LUNGS:  Clear to auscultation without wheezing, rales, or rhonchi.           CARDIOVASCULAR:  Reveals an S1, S2, no murmurs, no rubs, no gallops.         ABDOMEN:  Soft, nontender, without organomegaly.  Bowel sounds are    present.                                                                         EXTREMITIES:  No cyanosis, clubbing, but she does have 1+ edema primarily on the distal right upper extremity                               BREASTS:   she is now status post bilateral mastectomies with ROWAN flap reconstructions.  There is an area of fat necrosis had the 7:00 a.m. in the left breast.  There is also an area of probable fat necrosis on the right reconstructed breast at the 4 o'clock position.    I do not palpate any axillary lymph nodes in either side.    LYMPHATIC:  There is no cervical, axillary, or supraclavicular adenopathy.   SKIN:  Warm and moist, without petechiae, rashes, induration, or ecchymoses.           NEUROLOGIC:  DTRs are 0-1+ bilaterally, symmetrical, motor function is 5/5,  and cranial nerves are  within normal limits.    Assessment:       1. Carcinoma of axillary tail of right breast in female, estrogen receptor positive, pathologically T2N1M0 after neoadjuvant chemotherapy     2.    Axillary  node metastases.  3.      Status post recent left prophylactic mastectomy with bilateral ROWAN flap reconstructions  4.      Extensive fat necrosis bilaterally.  Of note, a recent left mammogram was unremarkable.  5.      Mild lymphedema, right upper extremity.    Plan:        I had a long discussion with her.    She will remain on anastrozole through the end of October 2025, at which point I will recommend that she extend her treatment for an additional two years to complete 7 years of adjuvant hormonal therapy with AIs.    We will obtain bilateral mammograms to document the areas of fat necrosis.  RTC 4 months.  Her multiple questions were answered to her satisfaction.    Route Chart for Scheduling    Med Onc Chart Routing      Follow up with physician 4 months. Bilateral mammograms ASAP.  See me in 4 months   Follow up with DANIEL    Infusion scheduling note    Injection scheduling note    Labs    Imaging Mammogram      Pharmacy appointment    Other referrals

## 2022-12-19 ENCOUNTER — OFFICE VISIT (OUTPATIENT)
Dept: HEMATOLOGY/ONCOLOGY | Facility: CLINIC | Age: 52
End: 2022-12-19
Payer: COMMERCIAL

## 2022-12-19 ENCOUNTER — TELEPHONE (OUTPATIENT)
Dept: RADIOLOGY | Facility: HOSPITAL | Age: 52
End: 2022-12-19
Payer: COMMERCIAL

## 2022-12-19 VITALS
HEART RATE: 105 BPM | WEIGHT: 248 LBS | DIASTOLIC BLOOD PRESSURE: 81 MMHG | HEIGHT: 63 IN | TEMPERATURE: 99 F | OXYGEN SATURATION: 97 % | RESPIRATION RATE: 18 BRPM | BODY MASS INDEX: 43.94 KG/M2 | SYSTOLIC BLOOD PRESSURE: 140 MMHG

## 2022-12-19 DIAGNOSIS — Z79.811 PROPHYLACTIC USE OF ANASTROZOLE (ARIMIDEX): Primary | ICD-10-CM

## 2022-12-19 DIAGNOSIS — Z17.0 CARCINOMA OF AXILLARY TAIL OF RIGHT BREAST IN FEMALE, ESTROGEN RECEPTOR POSITIVE: ICD-10-CM

## 2022-12-19 DIAGNOSIS — C50.611 CARCINOMA OF AXILLARY TAIL OF RIGHT BREAST IN FEMALE, ESTROGEN RECEPTOR POSITIVE: ICD-10-CM

## 2022-12-19 PROCEDURE — 3079F DIAST BP 80-89 MM HG: CPT | Mod: CPTII,S$GLB,, | Performed by: INTERNAL MEDICINE

## 2022-12-19 PROCEDURE — 99213 OFFICE O/P EST LOW 20 MIN: CPT | Mod: S$GLB,,, | Performed by: INTERNAL MEDICINE

## 2022-12-19 PROCEDURE — 3008F BODY MASS INDEX DOCD: CPT | Mod: CPTII,S$GLB,, | Performed by: INTERNAL MEDICINE

## 2022-12-19 PROCEDURE — 3077F PR MOST RECENT SYSTOLIC BLOOD PRESSURE >= 140 MM HG: ICD-10-PCS | Mod: CPTII,S$GLB,, | Performed by: INTERNAL MEDICINE

## 2022-12-19 PROCEDURE — 1160F RVW MEDS BY RX/DR IN RCRD: CPT | Mod: CPTII,S$GLB,, | Performed by: INTERNAL MEDICINE

## 2022-12-19 PROCEDURE — 99999 PR PBB SHADOW E&M-EST. PATIENT-LVL IV: CPT | Mod: PBBFAC,,, | Performed by: INTERNAL MEDICINE

## 2022-12-19 PROCEDURE — 99213 PR OFFICE/OUTPT VISIT, EST, LEVL III, 20-29 MIN: ICD-10-PCS | Mod: S$GLB,,, | Performed by: INTERNAL MEDICINE

## 2022-12-19 PROCEDURE — 3079F PR MOST RECENT DIASTOLIC BLOOD PRESSURE 80-89 MM HG: ICD-10-PCS | Mod: CPTII,S$GLB,, | Performed by: INTERNAL MEDICINE

## 2022-12-19 PROCEDURE — 1159F MED LIST DOCD IN RCRD: CPT | Mod: CPTII,S$GLB,, | Performed by: INTERNAL MEDICINE

## 2022-12-19 PROCEDURE — 1160F PR REVIEW ALL MEDS BY PRESCRIBER/CLIN PHARMACIST DOCUMENTED: ICD-10-PCS | Mod: CPTII,S$GLB,, | Performed by: INTERNAL MEDICINE

## 2022-12-19 PROCEDURE — 3077F SYST BP >= 140 MM HG: CPT | Mod: CPTII,S$GLB,, | Performed by: INTERNAL MEDICINE

## 2022-12-19 PROCEDURE — 3008F PR BODY MASS INDEX (BMI) DOCUMENTED: ICD-10-PCS | Mod: CPTII,S$GLB,, | Performed by: INTERNAL MEDICINE

## 2022-12-19 PROCEDURE — 4010F ACE/ARB THERAPY RXD/TAKEN: CPT | Mod: CPTII,S$GLB,, | Performed by: INTERNAL MEDICINE

## 2022-12-19 PROCEDURE — 99999 PR PBB SHADOW E&M-EST. PATIENT-LVL IV: ICD-10-PCS | Mod: PBBFAC,,, | Performed by: INTERNAL MEDICINE

## 2022-12-19 PROCEDURE — 1159F PR MEDICATION LIST DOCUMENTED IN MEDICAL RECORD: ICD-10-PCS | Mod: CPTII,S$GLB,, | Performed by: INTERNAL MEDICINE

## 2022-12-19 PROCEDURE — 4010F PR ACE/ARB THEARPY RXD/TAKEN: ICD-10-PCS | Mod: CPTII,S$GLB,, | Performed by: INTERNAL MEDICINE

## 2022-12-19 NOTE — TELEPHONE ENCOUNTER
----- Message from Xochitl Howard sent at 12/19/2022 12:16 PM CST -----  Good afternoon,  Can we get this pt scheduled for a MMG asap per Dr. Simpson's request?  Thanks,  Xochitl Howard    Follow up with physician 4 months. Bilateral mammograms ASAP

## 2022-12-21 ENCOUNTER — HOSPITAL ENCOUNTER (OUTPATIENT)
Dept: RADIOLOGY | Facility: HOSPITAL | Age: 52
Discharge: HOME | End: 2022-12-21
Attending: INTERNAL MEDICINE
Payer: COMMERCIAL

## 2022-12-21 VITALS — HEIGHT: 63 IN | WEIGHT: 248 LBS | BODY MASS INDEX: 43.94 KG/M2

## 2022-12-21 DIAGNOSIS — C50.611 CARCINOMA OF AXILLARY TAIL OF RIGHT BREAST IN FEMALE, ESTROGEN RECEPTOR POSITIVE: ICD-10-CM

## 2022-12-21 DIAGNOSIS — Z17.0 CARCINOMA OF AXILLARY TAIL OF RIGHT BREAST IN FEMALE, ESTROGEN RECEPTOR POSITIVE: ICD-10-CM

## 2022-12-21 DIAGNOSIS — Z79.811 PROPHYLACTIC USE OF ANASTROZOLE (ARIMIDEX): ICD-10-CM

## 2022-12-21 PROCEDURE — 77066 DX MAMMO INCL CAD BI: CPT | Mod: 26,,, | Performed by: RADIOLOGY

## 2022-12-21 PROCEDURE — 77066 DX MAMMO INCL CAD BI: CPT | Mod: TC

## 2022-12-21 PROCEDURE — 77066 MAMMO DIGITAL DIAGNOSTIC BILAT WITH TOMO: ICD-10-PCS | Mod: 26,,, | Performed by: RADIOLOGY

## 2022-12-21 PROCEDURE — 77062 BREAST TOMOSYNTHESIS BI: CPT | Mod: 26,,, | Performed by: RADIOLOGY

## 2022-12-21 PROCEDURE — 77062 MAMMO DIGITAL DIAGNOSTIC BILAT WITH TOMO: ICD-10-PCS | Mod: 26,,, | Performed by: RADIOLOGY

## 2022-12-21 NOTE — PROGRESS NOTES
"Subjective:      Patient ID: Phylicia Vásquez is a 52 y.o. female.    Chief Complaint: No chief complaint on file.    HPI    Patient is a 51 year old female who presents to clinic with chief complaint of a-traumatic right knee pain x 2 weeks. Patient stated that she mainly notices her right lateral knee pain and stiffness. Increase with activity. SShe has tried OTC tylenol and aleve, mobic helps. Injection helped in the past, would like to repeat./     Lab Results   Component Value Date    HGBA1C 5.8 (H) 11/17/2021        Estimated body mass index is 43.93 kg/m² as calculated from the following:    Height as of this encounter: 5' 3" (1.6 m).    Weight as of this encounter: 112.5 kg (248 lb 0.3 oz).      Review of Systems   Constitutional: Negative for chills and fever.   Cardiovascular:  Negative for chest pain.   Respiratory:  Negative for cough and shortness of breath.    Skin:  Negative for color change, dry skin, itching, nail changes, poor wound healing and rash.   Musculoskeletal:         Right knee pain   Neurological:  Negative for dizziness.   Psychiatric/Behavioral:  Negative for altered mental status. The patient is not nervous/anxious.    All other systems reviewed and are negative.      Objective:      General    Constitutional: She is oriented to person, place, and time. She appears well-developed and well-nourished. No distress.   HENT:   Head: Atraumatic.   Eyes: Conjunctivae are normal.   Cardiovascular:  Normal rate.            Pulmonary/Chest: Effort normal.   Neurological: She is alert and oriented to person, place, and time.   Psychiatric: She has a normal mood and affect. Her behavior is normal.         Right Ankle/Foot Exam     Range of Motion   Ankle Joint   Dorsiflexion:  normal   Plantar flexion:  normal   Subtalar Joint   Inversion:  normal   Eversion:  normal     Other   Sensation: normal      Right Knee Exam     Inspection   Swelling: absent  Bruising: absent    Tenderness   The " patient is tender to palpation of the lateral joint line.    Range of Motion   The patient has normal right knee ROM.    Tests   Ligament Examination   Lachman: normal (-1 to 2mm)   PCL-Posterior Drawer: normal (0 to 2mm)     MCL - Valgus: normal (0 to 2mm)  LCL - Varus: normal    Other   Sensation: normal    Left Knee Exam   Left knee exam is normal.    Right Hip Exam   Right hip exam is normal.       Muscle Strength   Right Lower Extremity   Quadriceps:  4/5   Hamstrin/5     Vascular Exam     Right Pulses  Dorsalis Pedis:      2+        RADS: reviewed by myself:     21: Knee ortho right: No fracture dislocation bone destruction seen.  There is mild DJD.        Assessment:       Encounter Diagnosis   Name Primary?    Osteoarthritis of right knee, unspecified osteoarthritis type Yes            Plan:       Discussed plan with the patient. At this time she would like repeat injection. She is to return to clinic as needed.     PROCEDURE:  I have explained the risks, benefits, and alternatives of the procedure in detail.  The patient voices understanding and all questions have been answered.  The patient agrees to proceed as planned. So after I performed a sterile prep of the skin in the normal fashion the right knee is injected using a 22 gauge needle from the anterolateral approach with a combination of 4cc 1% plain lidocaine and 40 mg of kenalog.  The patient is cautioned and immediate relief of pain is secondary to the local anesthetic and will be temporary.  After the anesthetic wears off there may be a increase in pain that may last for a few hours or a few days and they should use ice to help alleviate this flair up of pain.

## 2022-12-22 ENCOUNTER — OFFICE VISIT (OUTPATIENT)
Dept: ORTHOPEDICS | Facility: CLINIC | Age: 52
End: 2022-12-22
Payer: COMMERCIAL

## 2022-12-22 VITALS
WEIGHT: 248 LBS | HEIGHT: 63 IN | SYSTOLIC BLOOD PRESSURE: 132 MMHG | RESPIRATION RATE: 18 BRPM | BODY MASS INDEX: 43.94 KG/M2 | DIASTOLIC BLOOD PRESSURE: 90 MMHG | HEART RATE: 90 BPM

## 2022-12-22 DIAGNOSIS — M17.11 OSTEOARTHRITIS OF RIGHT KNEE, UNSPECIFIED OSTEOARTHRITIS TYPE: Primary | ICD-10-CM

## 2022-12-22 PROCEDURE — 3075F SYST BP GE 130 - 139MM HG: CPT | Mod: CPTII,S$GLB,, | Performed by: PHYSICIAN ASSISTANT

## 2022-12-22 PROCEDURE — 1159F MED LIST DOCD IN RCRD: CPT | Mod: CPTII,S$GLB,, | Performed by: PHYSICIAN ASSISTANT

## 2022-12-22 PROCEDURE — 20610 PR DRAIN/INJECT LARGE JOINT/BURSA: ICD-10-PCS | Mod: RT,S$GLB,, | Performed by: PHYSICIAN ASSISTANT

## 2022-12-22 PROCEDURE — 4010F PR ACE/ARB THEARPY RXD/TAKEN: ICD-10-PCS | Mod: CPTII,S$GLB,, | Performed by: PHYSICIAN ASSISTANT

## 2022-12-22 PROCEDURE — 99499 UNLISTED E&M SERVICE: CPT | Mod: S$GLB,,, | Performed by: PHYSICIAN ASSISTANT

## 2022-12-22 PROCEDURE — 3080F DIAST BP >= 90 MM HG: CPT | Mod: CPTII,S$GLB,, | Performed by: PHYSICIAN ASSISTANT

## 2022-12-22 PROCEDURE — 3008F BODY MASS INDEX DOCD: CPT | Mod: CPTII,S$GLB,, | Performed by: PHYSICIAN ASSISTANT

## 2022-12-22 PROCEDURE — 3080F PR MOST RECENT DIASTOLIC BLOOD PRESSURE >= 90 MM HG: ICD-10-PCS | Mod: CPTII,S$GLB,, | Performed by: PHYSICIAN ASSISTANT

## 2022-12-22 PROCEDURE — 3008F PR BODY MASS INDEX (BMI) DOCUMENTED: ICD-10-PCS | Mod: CPTII,S$GLB,, | Performed by: PHYSICIAN ASSISTANT

## 2022-12-22 PROCEDURE — 20610 DRAIN/INJ JOINT/BURSA W/O US: CPT | Mod: RT,S$GLB,, | Performed by: PHYSICIAN ASSISTANT

## 2022-12-22 PROCEDURE — 4010F ACE/ARB THERAPY RXD/TAKEN: CPT | Mod: CPTII,S$GLB,, | Performed by: PHYSICIAN ASSISTANT

## 2022-12-22 PROCEDURE — 1159F PR MEDICATION LIST DOCUMENTED IN MEDICAL RECORD: ICD-10-PCS | Mod: CPTII,S$GLB,, | Performed by: PHYSICIAN ASSISTANT

## 2022-12-22 PROCEDURE — 99999 PR PBB SHADOW E&M-EST. PATIENT-LVL III: CPT | Mod: PBBFAC,,, | Performed by: PHYSICIAN ASSISTANT

## 2022-12-22 PROCEDURE — 99999 PR PBB SHADOW E&M-EST. PATIENT-LVL III: ICD-10-PCS | Mod: PBBFAC,,, | Performed by: PHYSICIAN ASSISTANT

## 2022-12-22 PROCEDURE — 99499 NO LOS: ICD-10-PCS | Mod: S$GLB,,, | Performed by: PHYSICIAN ASSISTANT

## 2022-12-22 PROCEDURE — 3075F PR MOST RECENT SYSTOLIC BLOOD PRESS GE 130-139MM HG: ICD-10-PCS | Mod: CPTII,S$GLB,, | Performed by: PHYSICIAN ASSISTANT

## 2022-12-22 RX ORDER — TRIAMCINOLONE ACETONIDE 40 MG/ML
40 INJECTION, SUSPENSION INTRA-ARTICULAR; INTRAMUSCULAR
Status: COMPLETED | OUTPATIENT
Start: 2022-12-22 | End: 2022-12-22

## 2022-12-22 RX ORDER — MELOXICAM 7.5 MG/1
7.5 TABLET ORAL DAILY
Qty: 30 TABLET | Refills: 1 | Status: SHIPPED | OUTPATIENT
Start: 2022-12-22 | End: 2023-03-31 | Stop reason: SDUPTHER

## 2022-12-22 RX ADMIN — TRIAMCINOLONE ACETONIDE 40 MG: 40 INJECTION, SUSPENSION INTRA-ARTICULAR; INTRAMUSCULAR at 09:12

## 2022-12-27 ENCOUNTER — IMMUNIZATION (OUTPATIENT)
Dept: PHARMACY | Facility: CLINIC | Age: 52
End: 2022-12-27
Payer: COMMERCIAL

## 2022-12-27 DIAGNOSIS — Z23 NEED FOR VACCINATION: Primary | ICD-10-CM

## 2023-01-05 ENCOUNTER — CLINICAL SUPPORT (OUTPATIENT)
Dept: REHABILITATION | Facility: HOSPITAL | Age: 53
End: 2023-01-05
Payer: COMMERCIAL

## 2023-01-05 DIAGNOSIS — R26.9 ABNORMALITY OF GAIT: ICD-10-CM

## 2023-01-05 DIAGNOSIS — R26.89 IMPAIRMENT OF BALANCE: ICD-10-CM

## 2023-01-05 DIAGNOSIS — R29.898 WEAKNESS OF LEFT LOWER EXTREMITY: Primary | ICD-10-CM

## 2023-01-05 DIAGNOSIS — I89.0 LYMPHEDEMA OF RIGHT ARM: ICD-10-CM

## 2023-01-05 DIAGNOSIS — Z74.09 IMPAIRED FUNCTIONAL MOBILITY AND ACTIVITY TOLERANCE: ICD-10-CM

## 2023-01-05 PROCEDURE — 97110 THERAPEUTIC EXERCISES: CPT

## 2023-01-05 PROCEDURE — 97140 MANUAL THERAPY 1/> REGIONS: CPT

## 2023-01-05 PROCEDURE — 97750 PHYSICAL PERFORMANCE TEST: CPT

## 2023-01-05 NOTE — PROGRESS NOTES
Physical Therapy Daily Treatment Note     Name: Phylicia Vásquez  Clinic Number: [id number]    Therapy Diagnosis:   Encounter Diagnoses   Name Primary?    Weakness of left lower extremity Yes    Abnormality of gait     Impairment of balance     Lymphedema of right arm     Impaired functional mobility and activity tolerance      Physician: Negrito Verde MD    Visit Date: 1/5/2023    Physician Orders: PT Eval and Treat   Medical Diagnosis from Referral: M21.379 (ICD-10-CM) - Foot-drop, unspecified laterality  Evaluation Date: 10/18/2022  Authorization Period Expiration: 12/31/2022  Plan of Care Expiration: 12/18/2022  Visit # / Visits authorized: 2/ 20 (plus eval)  Insurance: [employer] OPEN ACCESS PLUS  FOTO: 2/3     Time In: 9:00 am  Time Out: 2:30 pm  Total Billable Time: 30 minutes    Precautions:   Standard, cancer, and R UE lymphedema    Subjective     Pt reports: using the AFO and doing good, feels like her foot is getting better.  She was compliant with home exercise program.  Response to previous treatment: no adverse events  Functional change: not stomping as much    Pain: 0/10  Location: left ankle      Objective     Objective Measures updated at progress report unless specified.     B/P: 145/98; HR: 63 bpm; O2: 100%    Lab Values: 9/12/22  Platelets: 216  ANC: 4.9  Hematacrit: 37.3  Hemoglobin: 12.3    Ankle Right Left Pain/Dysfunction with Movement   Plantarflexion 10/15 10(-40 to -30) /10 N/A   Dorsiflexion 45/47 50/45 N/A   Inversion 30/35 20/30 N/A   Eversion 20/30 70/20 N/A            Strength: manual muscle test grades below       Lower Extremity Strength  Right LE   Left LE     Hip Flexion: 5/5 Hip Flexion: 4+/5   Hip Extension:  5/5 Hip Extension: 4+/5   Hip Abduction: 5/5 Hip Abduction: 5/5   Hip Adduction: 4+/5 Hip Adduction 4+/5   Knee Extension: 5/5 Knee Extension: 5/5   Knee Flexion: 5/5 Knee Flexion: 5/5   Ankle Dorsiflexion: 5/5 Ankle Dorsiflexion: 3/5   Ankle Plantarflexion: 5/5 Ankle  Plantarflexion: 5/5   Ankle Inversion: 5/5 Ankle Inversion: 4+/5   Ankle Eversion: 5/5 Ankle Eversion: 4+/5           Balance Assessment:       Evaluation 1/5/23   Single Limb Stance R LE 1.91 sec  (<10 sec = HIGH FALL RISK) 2.35   Single Limb Stance L LE 1.09 sec  (<10 sec = HIGH FALL RISK) 1.16 sec        Evaluation 1/5/23   Timed Up and Go 14.11 sec  < 20 sec safe for independent transfers, < 30 sec safe for dependent transfers/assist required 13.60 sec         Table: Population Norms for TUG    Age  Average TUG    60 - 69 years  8.1 seconds    70 - 79 years  9.2 seconds    80 - 99 years  11.3 seconds          Endurance:     6 Minute Walk Test Distance in meters: TBA     - AD used: none  - Assistance: none  - Distance: 60 feet     GAIT DEVIATIONS:  Phylicia displays the following deviations with ambulation: increased hip flexion on L LE, decreased heel strike and toe off L LE, wide DANGELO     Impairments contributing to deviations: impaired motor control, muscle weakness, neuropathy     Endurance Assessment:    Evaluation 1/5/23   30 second Chair Rise  (adults > 61 y/o) TBA  10 with no arms             CMS Impairment/Limitation/Restriction for FOTO Ankle Survey     Therapist reviewed FOTO scores for Phylicia Vásquez on 10/18/2022.   FOTO documents entered into [employer] - see Media section.     Limitation Score: 56%  Category: Other            Treatment       Mirian received therapeutic exercises to develop strength, endurance, ROM, flexibility, posture and core stabilization for 30 minutes including:  Aerobic/Cardio Activity: Stationary bike, seat 6, level 1, 10 minutes  Supine ankle DF, 2 x 10  Supine ankle inversion 2 x 10  Supine ankle eversion, 2 x 10, yellow band  Supine ankle PF 2 x 10, red band  Calf raises, 2 x 10    Mirian participated in neuromuscular re-education activities to improve: Balance, Coordination, Proprioception and Posture for 0 minutes. The following activities were included:    Home Exercises  Provided and Patient Education Provided     Education provided:   - compliance with HEP  - role of PT and goals for PT     Written Home Exercises Provided: Patient instructed to cont prior HEP.  Exercises were reviewed and Mirian was able to demonstrate them prior to the end of the session.  Mirian demonstrated good  understanding of the education provided.     See EMR under Patient Instructions for exercises provided prior visit.    Assessment     Patient is responding well to physical therapy. Patient did not perform her standing balance activities today due to time constraints as she needed to leave therapy early. She was able to demonstrate increased AROM of left ankle DF during the session when seated but continues to have difficulty with toe raises in standing. She performed all of today's activities with no increase in symptoms prior to leaving the clinic. Will progress as tolerated.  Mirian Is progressing well towards her goals.   Pt prognosis is Good.     Pt will continue to benefit from skilled outpatient physical therapy to address the deficits listed in the problem list box on initial evaluation, provide pt/family education and to maximize pt's level of independence in the home and community environment.     Pt's spiritual, cultural and educational needs considered and pt agreeable to plan of care and goals.     Anticipated barriers to physical therapy: none anticipated    Goals:   Short Term Goals:  4 weeks  1. Pt will increase AROM of left ankle to neutral dorsiflexion to promote greater ease with performing normal gait pattern. (progressing, not met)  2. Pt. to demonstrate increased strength of left lower extremity to 4/5  to increase stability during community ambulation (progressing, not met)  3. Pt. will improve TUG test score to 12 seconds or less in order to perform safe transfers and for patient to be in low/moderate risk for falls category (progressing, not met)  4. Pt will tolerate 6 minute walk test.  (progressing, not met)  5. Pt will initiate home exercise program to improve strength, flexibility, endurance, mobility & balance to return pt to PLOF (progressing, not met)  6. Pt will tolerate 10 min or greater of time in light-->moderate intensity cardio (I.e. Bike, NuStep) to improve endurance to assist in performing her usual work activities (progressing, not met)  7. Pt to demonstrate tandem stance 30 sec or greater w/out UE support in order to improve balance to progress to singe leg stance to decrease fall risk in performance of ADL's (progressing, not met)     Long Term Goals: 8 weeks  1. Pt will increase AROM of left ankle dorsiflexion to 5 degrees in order to perform normal gait pattern when ambulating on uneven ground (progressing, not met)  2. Pt. will increase strength of left lower extremity to 5/5  to increase stability during ambulation on uneven surfaces,to increase tolerance for ADL and work activities. (progressing, not met)  3. Pt will be independent with HEP to improve ROM, strength, balance,  and independence with ADL's (progressing, not met)  4. Pt. will improve TUG test score to 10 seconds or less in order to ambulate safely in community and for patient to be in low risk for falls category (progressing, not met)  5. Pt. will improve 6 minute walk test score by 30 meters in order for patient to be in low risk for falls category (progressing, not met)  6. Patient will report compliance with walking program 5x week for 10 -30min each day to improve overall cardiovascular function and decrease cancer related fatigue at discharge. (progressing, not met)    Plan     Plan of care Certification: 10/18/2022 to 12/18/2022.     Outpatient Physical Therapy 2 times weekly for 8 weeks to include the following interventions: Gait Training, Manual Therapy, Neuromuscular Re-ed, Patient Education, Self Care, Therapeutic Activities, Therapeutic Exercise, and IASTM . Dry needling with manual therapy techniques  to decrease pain, inflammation and swelling, increase circulation and promote healing process.      Gail Gutierrez, PT

## 2023-01-05 NOTE — PLAN OF CARE
Outpatient Therapy Updated Plan of Care     Visit Date: 1/5/2023  Name: Phylicia Vásquez  Clinic Number: [id number]    Therapy Diagnosis:   Encounter Diagnoses   Name Primary?    Weakness of left lower extremity Yes    Abnormality of gait     Impairment of balance      Physician: Marcela Calvillo PA-C    Physician Orders: PT Eval and Treat   Medical Diagnosis from Referral: M21.379 (ICD-10-CM) - Foot-drop, unspecified laterality  Evaluation Date: 10/18/2022    Total Visits Received: 5  Cancelled Visits: 5  No Show Visits: 0    Current Certification Period:  10/18/2022 to 12/18/2022  Precautions:  Standard, cancer, and R UE lymphedema  Visits from Evaluation Date:  9    Subjective     Update: Pt reports: using the AFO and doing good, feels like her foot is getting better.  She was compliant with home exercise program  Response to previous treatment: no adverse events  Functional change: not stomping as much     Pain: 0/10  Location: left ankle      Objective     Update:   Active/Passive ROM: (measured in degrees)   B UE AROM WFL, B hip and knee AROM WFL     Ankle Right Left Pain/Dysfunction with Movement   Plantarflexion 10/15 10(-40 to -30) /10 N/A   Dorsiflexion 50/50 50/50 N/A   Inversion 30/35 20/30 N/A   Eversion 20/30 20/20 N/A            Strength: manual muscle test grades below      Lower Extremity Strength  Right LE   Left LE     Hip Flexion: 5/5 Hip Flexion: 4+/5   Hip Extension:  5/5 Hip Extension: 4+/5   Hip Abduction: 5/5 Hip Abduction: 5/5   Hip Adduction: 4+/5 Hip Adduction 4+/5   Knee Extension: 5/5 Knee Extension: 5/5   Knee Flexion: 5/5 Knee Flexion: 5/5   Ankle Dorsiflexion: 5/5 Ankle Dorsiflexion: 3/5   Ankle Plantarflexion: 5/5 Ankle Plantarflexion: 5/5   Ankle Inversion: 5/5 Ankle Inversion: 4+/5   Ankle Eversion: 5/5 Ankle Eversion: 4+/5            Balance Assessment:       Evaluation 1/5/23   Single Limb Stance R LE 1.91 sec  (<10 sec = HIGH FALL RISK) 2.35 sec   Single Limb Stance L LE  1.09 sec  (<10 sec = HIGH FALL RISK) 1.16 sec        Evaluation 1/5/23   Timed Up and Go 14.11 sec  < 20 sec safe for independent transfers, < 30 sec safe for dependent transfers/assist required 13.60 sec         Table: Population Norms for TUG    Age  Average TUG    60 - 69 years  8.1 seconds    70 - 79 years  9.2 seconds    80 - 99 years  11.3 seconds          Endurance:     6 Minute Walk Test Distance in meters: TBA  1/5/23 - not assessed due to time limitations  - AD used: none  - Assistance: none  - Distance: 60 feet     GAIT DEVIATIONS:  Phylicia displays the following deviations with ambulation: increased hip flexion on L LE, decreased heel strike and toe off L LE, wide DANGELO     Impairments contributing to deviations: impaired motor control, muscle weakness, neuropathy     Endurance Assessment:    Evaluation 1/5/23   30 second Chair Rise  (adults > 59 y/o) TBA  10 without arms         CMS Impairment/Limitation/Restriction for FOTO Ankle Survey     Therapist reviewed FOTO scores for Phylicia Vásquez on 1/5/2023.   FOTO documents entered into [employer] - see Media section.     Limitation Score: 39%  Category: Mobility             Assessment     Update: Patient demonstrated an increase in L LE strength as displayed increased AROM of her left ankle. She continues to have an abnormal gait pattern due to continued weakness in ankle dorsiflexion which improves when she is using the AFO. Her single leg balance scores continue to indicate she is at risk for falls but have improved compared to her initial evaluation. Her score on 30 second sit to stand test indicates decreased endurance. Her score on TUG has improved but continues to indicate she is at risk for falls. She would benefit from continued physical therapy in order to improve her strength, balance, gait, and endurance.     Previous Short Term Goals Status:   Short Term Goals:  4 weeks  1. Pt will increase AROM of left ankle to neutral dorsiflexion to promote  greater ease with performing normal gait pattern. (progressing, not met)  2. Pt. to demonstrate increased strength of left lower extremity to 4/5  to increase stability during community ambulation (progressing, not met)  3. Pt. will improve TUG test score to 12 seconds or less in order to perform safe transfers and for patient to be in low/moderate risk for falls category (progressing, not met)  4. Pt will tolerate 6 minute walk test. (progressing, not met)  5. Pt will initiate home exercise program to improve strength, flexibility, endurance, mobility & balance to return pt to OF (met, 1/5/23)  6. Pt will tolerate 10 min or greater of time in light-->moderate intensity cardio (I.e. Bike, NuStep) to improve endurance to assist in performing her usual work activities (progressing, not met)  7. Pt to demonstrate tandem stance 30 sec or greater w/out UE support in order to improve balance to progress to singe leg stance to decrease fall risk in performance of ADL's (progressing, not met)     Long Term Goals: 8 weeks  1. Pt will increase AROM of left ankle dorsiflexion to 5 degrees in order to perform normal gait pattern when ambulating on uneven ground (progressing, not met)  2. Pt. will increase strength of left lower extremity to 5/5  to increase stability during ambulation on uneven surfaces,to increase tolerance for ADL and work activities. (progressing, not met)  3. Pt will be independent with HEP to improve ROM, strength, balance,  and independence with ADL's (progressing, not met)  4. Pt. will improve TUG test score to 10 seconds or less in order to ambulate safely in community and for patient to be in low risk for falls category (progressing, not met)  5. Pt. will improve 6 minute walk test score by 30 meters in order for patient to be in low risk for falls category (progressing, not met)  6. Patient will report compliance with walking program 5x week for 10 -30min each day to improve overall cardiovascular  function and decrease cancer related fatigue at discharge. (progressing, not met)  New Short Term Goals Status:   N/A  Long Term Goal Status:   continue per initial plan of care.  Reasons for Recertification of Therapy:   weakness, balance issues, decreased endurance, decreased functional mobility    Plan     Updated Certification Period: 1/5/2023 to 3/3/2023  Recommended Treatment Plan: 2 times per week for 4 weeks:  Gait Training, Manual Therapy, Neuromuscular Re-ed, Patient Education, Self Care, Therapeutic Activities, Therapeutic Exercise, and IASTM . Dry needling with manual therapy techniques to decrease pain, inflammation and swelling, increase circulation and promote healing process.  Other Recommendations: none    Gail Gutierrez, PT  1/5/2023      I CERTIFY THE NEED FOR THESE SERVICES FURNISHED UNDER THIS PLAN OF TREATMENT AND WHILE UNDER MY CARE    Physician's comments:        Physician's Signature: ___________________________________________________

## 2023-01-05 NOTE — PROGRESS NOTES
Physical Therapy Daily Treatment Note       Date: 1/5/2023   Name: Phylicia Vásquez  Clinic Number: [id number]    Therapy Diagnosis:   Encounter Diagnoses   Name Primary?    Weakness of left lower extremity Yes    Abnormality of gait     Impairment of balance     Lymphedema of right arm     Impaired functional mobility and activity tolerance        Physician: Negrito Verde MD    Physician Orders: PT Eval and Treat   Medical Diagnosis: Carcinoma of axillary tail of right breast in female, estrogen receptor positive [C50.611, Z17.0], Localized edema      Evaluation Date: 6/3/2022  Authorization Period Expiration: 12/31/22  Updated Plan of Care Certification Period: 2/3/23  Visit # / Visits authorized: 1/20 (plus 20 from last year)  Insurance: [employer]  FOTO: 3/3     Time In: 8:06 AM  Time Out:  9:03 AM  Total Billable Time: 57 minutes     Precautions: Standard, cancer and Right UE lymphedema      Subjective     Pt reports: Arm's been the same. She denies major swelling.   She was compliant with compression pump  Response to previous treatment: no adverse reaction  Pain Scale: Phylicia rates pain on a scale of 0/10 on VAS.   Pain location: N/A     Fatigue: not assessed  Functional change: none stated  Treatment:   Chemotherapy:  4 cycles of AC and 4 cycles of Taxol patrick adjuvantly  Radiation: completed radiation therapy on 8/20/20.   Endocrine therapy: On 11/1/2020 started on Anastrozole with with Dr. Verde     Surgery date: Pt is s/p bilateral mastectomy with R SLNB, TE reconstruction in 4/2021. pt will have permanent reconstruction with ROWAN flap, but she states she needs to lose weight first.      Objective     Objective Measures updated at progress report unless specified.     LANDMARK RIGHT UE LEFT UE DIFF RUE Diff RUE Diff RUE Diff RUE Diff RUE Diff RUE Diff RUE Diff RUE Diff RUE Diff RUE Diff RUE Diff RUE Diff RUE Diff RUE Diff RUE Diff RUE Diff RUE  "Diff RUE Diff   Date 6/3/22 6/3/22 6/3/22 6/22/22 6/22/22 6/29/22 6/29/22 7/6/22 7/6/22 7/20/22 7/20/22 7/27/22 7/27/22 8/18/22 8/18/22 8/24/22 8/24/22 9/2/22 9/2/22 9/19/22 9/19/22 9/26/22 9/26/22 10/4/22 10/4/22 10/17/22 10/17/22 10/24/22 10/24/22 11/2/22 11/2/22 11/10/22 11/10/22 11/28/22 11/28/22 12/15/22 12/15/22 1/5/23 1/5/23   E + 8" 47 cm 46.5 cm 0.5 cm 46.5cm -0.5 44cm -2.5 45 cm +1 47 cm +2 45cm -2 43.5cm -1.5 43.5cm No chg 44cm +0.5 46cm +2 44.5cm -1.5 44.5 cm No chg 45 cm +0.5 44.5 cm -0.5 46 cm +1.5 44.5 cm -1.5 45.5 cm +1 44.5cm -1 45 cm +0.5   E + 6" 44 cm 42.5 cm 1.5 cm 44 cm No chg 44cm No chg 43.5cm -0.5 45cm +1.5 42cm -3 41cm -1 43cm +2 42.5cm -0.5 43.5cm +1 44.5cm +1 45cm +0.5 43.5 cm -1.5 43 cm -0.5 43 cm No chg 44.5 cm +1.5 44.5 cm No chg 45 cm +0.5 43 cm -2   E + 4" 39.5 cm 37 cm 2.5 cm 39cm -0.5 40.5 cm +1.5 40.5cm No chg 41cm +0.5 39 cm -2 39cm No chg 39.5cm +0.5 40cm +0.5 39cm -1 41.5cm +2.5 40cm -1.5 40cm No chg 40cm No chg 40cm No chg 40 cm No chg 40.5 cm No chg 41.5 cm +1 40.5 cm -1   E + 2" 34.5 cm 33 cm 1.5 cm 34.5 cm No chg 35 cm +0.5 34.5cm -0.5 35cm +0.5 34cm -1 34.5cm +0.5 34.5cm No chg 35cm +0.5 34cm -1 35cm +1 35cm No chg 34 cm -1 35 cm +1 35.5 cm +0.5 34.5 cm -1 35 cm +0.5 36cm +1 35 cm -1   Elbow 28 cm 28 cm 0 cm 27 cm -1 27.5 cm +0.5 28 cm +0.5 28cm No chg 27cm -1 27cm No chg 27.5cm +0.5 27cm -0.5 27.5cm +0.5 27.5cm No chg 27cm -0.5 27 cm No chg 27 cm No chg 27 cm No chg 26 cm -1 27 cm +1 27.5 cm +0.5 27 cm -0.5   W+ 8" 29 cm  28.5cm 0.5 cm 28cm -1 28 cm No chg 29.5cm +1.5 29cm -0.5 28.5cm -0.5 28cm -0.5 29.5cm +1.5 29cm -0.5 28.5cm -0.5 29cm +0.5 28cm -1 29.5cm +1.5 30 cm +0.5 28cm -2 28.5 cm +0.5 29 cm +0.5 29.5 cm +0.5 29.5 cm No chg   W +  6" 29.5 cm 27 cm 2.5 cm 29cm -0.5 29cm No chg 29cm No chg 29cm No chg 29cm No chg 29cm No chg 29.5cm +0.5 28.5cm -1 29cm +0.5 29cm No chg 29cm No chg 29 cm No chg 29 cm No chg 28.5cm -0.5 28.5 cm No chg 29 cm +0.5 29 cm No chg 29 cm No " "chg   W + 4" 25.5 cm 23.5 cm 2 cm 25cm -0.5 24.5 cm -0.5 25.5cm +1 25 cm -0.5 25cm No chg 24.5cm -0.5 25.5cm +1 24.5cm -1 25cm +0.5 25.5cm +0.5  24.5cm -1 25 cm +0.5 25.5 cm +0.5 25 cm -0.5 24.5 cm -0.5 24.5 cm No chg 24.5 cm No chg 24 cm -0.5   Wrist 18 cm 17.5 cm 0.5 cm 18.5cm +0.5 18 cm -0.5 18.5cm +0.5 18cm -0.5 18cm No chg 18cm No chg 18cm No chg 18cm No chg 18cm No chg 18cm No chg 18cm No chg 18 cm No chg 18 cm No chg 18 cm No chg 18 cm  No chg 18 cm No chg 18 cm No chg 17.5 cm -0.5   DPC 21.5 cm 20 cm 1.5 cm 20cm -1.5 20.5cm +0.5 21 cm +0.5 21cm -0.5 20.5cm -0.5 21cm +0.5 21.5cm +0.5 21cm -0.5 21cm No chg 20.5 cm -0.5 21.5cm +1 20.5cm -1 20.5 cm No chg 20.5 cm No chg 20 cm -0.5 20cm No chg 20 cm No chg 19.5 cm -0.5   IP Thumb 7.5 cm 7 cm 0.5 cm 8cm +0.5 7.5cm -0.5 8 cm +0.5 8cm No chg 7.5cm -0.5 7.5cm No chg 8cm +0.5 7.5cm -0.5 7.5cm No Chg 8cm +0.5 7.5cm -0.5 7.5cm No chg 7.5 cm No chg 7cm -0.5 7.5 cm +0.5 7.5 cm No chg 7.5 cm No chg 7.5 cm No chg         Treatment     Phylicia received the following manual therapy techniques were performed to increased myofascial/soft tissue length, mobility and pliability, increase PROM, AROM and function as well as to decrease pain for 53 minutes:    Measurements taken above        Short Neck- held due to thyroid goiter  Clear Healthy Lymph Nodes:  Left Axilla                                                  Right  Groin  Open Anastomoses:  Anterior Axillo-Axillary  (AAA)                                    Axillo-Inguinal     (AI)                                    Posterior Axillo-Axillary  (LEEANN)    Right Upper Arm (Lateral and Medial)  Right Antecubital Fossa  Right forearm, dorsal and ventral aspect  Dorsum of R hand  R fingers    Rework right hand  Rework Right UE  Rework Anastomoses   Rework Healthy lymph Nodes    PT applies  gauze to R hand and fingers and stockinette, cotton artiflex, and short stretch bandages to pt's RIGHT/LEFT/BILATERAL: right upper extremity. Pt " educated to wear bandaging for 2-3 days unless it causes pain, numbness, changes in skin color/temperature, or it starts to fall down.  If removed, pt instructed to roll up bandages and cotton padding and bring to next session. If bandages are removed, pt can wear tubigrip.  Pt has Vet glove to cover bandages in the shower and directions on how to care for bandages. Pt verbalizes understanding of all topics.          Phylicia received therapeutic exercises to develop posture and lymphatic motility for 4 minutes including:     Diaphragmatic breathing, 2 x 5 reps  Shoulder rolls, 10 reps fwd and back  Scap retractions x 10                      Home Exercise Program and Patient Education   Education provided re:  - role of PT in multi - disciplinary team, goals for PT  - progress towards goals       Written Home Exercises Provided: Patient instructed to cont prior HEP.  Exercises were reviewed and Mirian was able to demonstrate them prior to the end of the session.  Mirian demonstrated good  understanding of the education provided.     See EMR under Media for exercises provided 6/8/2022- for self manual lymph drainage techniques.    Pt has no cultural, educational or language barriers to learning provided.    Assessment     Patient tolerated session well. Increased time since last session due to holidays, but despite this, she demo's reduced right UE circumference overall.  Pt tolerated complete decongestive therapy without issue in clinic.  Once her RUE circumference stabilizes, she will benefit from compression sleeve and compression glove at 20-30mmHg. She has met goals a noted below and will progress as tolerated. Will work towards remaining goals.    Pt prognosis is Good. Pt will continue to benefit from skilled outpatient physical therapy to address the deficits listed in the problem list chart on initial evaluation, provide pt/family education and to maximize pt's level of independence in the home and community  environment.     Anticipated barriers to physical therapy: none anticipated    Goals as follows:  Short Term Goals (6 weeks)  1. Pt will demo decrease in girth in right upper extremity by >/= 1cm to allow for improved UE symmetry, clothing choice, ROM, and UE function. (met, 8/18/22  2. Patient will demonstrate 100% knowledge of lymphedema precautions and signs of infection to allow for reduced risk of lymphedema, reduced risk of infection, and/or exacerbation.  (met)  3. Patient will perform self lymph drainage techniques to enhance lymphatic drainage and mobility.  (met, 7/6/22)  4. Patient will tolerate daily activities with multilayered bandaging to enhance lymphatic drainage and skin elasticity.  (met, 7/6/22)  5. Pt will tolerate HEP for improved strength, functional mobility, ROM, posture, and endurance. (met, 7/6/22)        Long Term Goals: ( 12  weeks)  1. Patient to show decreased girth in Right upper extremity by >/= 2cm to allow for improved UE symmetry, clothing choice, ROM, and UE function.  (met partially, 8/18/22)  2. Patient will show reduction in density to mild or less with improved contour of limb to allow for improved cosmesis, improved lymphatic drainage, and functional mobility.  (met, 9/2/22)  3. Patient to mary/doff compression garment with daily compliance to support lymphatic mobility and skin elasticity.  (progressing, not met)  4. Pt to show improved postural awareness and alignment for improved functional mobility.  (progressing, not met)  5. Pt to be independent and compliant with HEP to allow for improved ROM, reach and carry with functional endurance and strength.    (met, 10/4/22)           Plan   Outpatient physical therapy 2x week for 4 weeks to include the following:   Manual Therapy, Neuromuscular Re-ed, Orthotic Management and Training, Patient Education, Self Care, Therapeutic Activities and Therapeutic Exercise.            Therapist: Marcela Price,  PT  1/5/2023

## 2023-01-05 NOTE — PROGRESS NOTES
Physical Therapy Daily Treatment Note     Name: Phylicia Vásquez  Clinic Number: [id number]    Therapy Diagnosis:   Encounter Diagnoses   Name Primary?    Weakness of left lower extremity Yes    Abnormality of gait     Impairment of balance      Physician: Marcela Calvillo PA-C    Visit Date: 1/5/2023    Physician Orders: PT Eval and Treat   Medical Diagnosis from Referral: M21.379 (ICD-10-CM) - Foot-drop, unspecified laterality  Evaluation Date: 10/18/2022  Authorization Period Expiration: 12/31/2023  Plan of Care Expiration: 3/3/2023  Visit # / Visits authorized: 1/ 20 (pending review) (4 visits in 2022)  Insurance: [employer] OPEN ACCESS PLUS  FOTO: 2/3     Time In: 9:00 am  Time Out: 9:55 am  Total Billable Time: 55 minutes    Precautions:   Standard, cancer, and R UE lymphedema    Subjective     Pt reports: using the AFO and doing good, feels like her foot is getting better.  She was compliant with home exercise program  Response to previous treatment: no adverse events  Functional change: not stomping as much    Pain: 0/10  Location: left ankle      Objective     Objective Measures updated at progress report unless specified.     B/P: 145/98; HR: 63 bpm; O2: 100%    Lab Values: 9/12/22  Platelets: 216  ANC: 4.9  Hematacrit: 37.3  Hemoglobin: 12.3    Active/Passive ROM: (measured in degrees)   B UE AROM WFL, B hip and knee AROM WFL     Ankle Right Left Pain/Dysfunction with Movement   Plantarflexion 10/15 10(-40 to -30) /10 N/A   Dorsiflexion 50/50 50/50 N/A   Inversion 30/35 20/30 N/A   Eversion 20/30 20/20 N/A            Strength: manual muscle test grades below      Lower Extremity Strength  Right LE   Left LE     Hip Flexion: 5/5 Hip Flexion: 4+/5   Hip Extension:  5/5 Hip Extension: 4+/5   Hip Abduction: 5/5 Hip Abduction: 5/5   Hip Adduction: 4+/5 Hip Adduction 4+/5   Knee Extension: 5/5 Knee Extension: 5/5   Knee Flexion: 5/5 Knee Flexion: 5/5   Ankle Dorsiflexion: 5/5 Ankle Dorsiflexion: 3/5   Ankle  Plantarflexion: 5/5 Ankle Plantarflexion: 5/5   Ankle Inversion: 5/5 Ankle Inversion: 4+/5   Ankle Eversion: 5/5 Ankle Eversion: 4+/5           Balance Assessment:       Evaluation 1/5/23   Single Limb Stance R LE 1.91 sec  (<10 sec = HIGH FALL RISK) 2.35 sec   Single Limb Stance L LE 1.09 sec  (<10 sec = HIGH FALL RISK) 1.16 sec        Evaluation 1/5/23   Timed Up and Go 14.11 sec  < 20 sec safe for independent transfers, < 30 sec safe for dependent transfers/assist required 13.60 sec         Table: Population Norms for TUG    Age  Average TUG    60 - 69 years  8.1 seconds    70 - 79 years  9.2 seconds    80 - 99 years  11.3 seconds          Endurance:     6 Minute Walk Test Distance in meters: TBA  1/5/23 - not assessed due to time limitations  - AD used: none  - Assistance: none  - Distance: 60 feet     GAIT DEVIATIONS:  Phylicia displays the following deviations with ambulation: increased hip flexion on L LE, decreased heel strike and toe off L LE, wide DANGELO     Impairments contributing to deviations: impaired motor control, muscle weakness, neuropathy     Endurance Assessment:    Evaluation 1/5/23   30 second Chair Rise  (adults > 59 y/o) TBA  10 without arms               CMS Impairment/Limitation/Restriction for FOTO Ankle Survey     Therapist reviewed FOTO scores for Phylicia Vásquez on 1/5/2023.   FOTO documents entered into [employer] - see Media section.     Limitation Score: 39%  Category: Mobility            Treatment     Mirian received a physical performance test with report for 35 minutes:   See objective section for measurements    Mirian received therapeutic exercises to develop strength, endurance, ROM, flexibility, posture and core stabilization for 20 minutes including:    Supine ankle DF, 2 x 10  Supine ankle inversion, yellow band, 2 x 10  Supine ankle eversion, 2 x 10, yellow band  Supine ankle PF 2 x 10, red band  Calf raises, 2 x 10    Mirian participated in neuromuscular re-education activities to  improve: Balance, Coordination, Proprioception and Posture for 0 minutes. The following activities were included:    Home Exercises Provided and Patient Education Provided     Education provided:   - compliance with HEP  - role of PT and goals for PT     Written Home Exercises Provided: Patient instructed to cont prior HEP.  Exercises were reviewed and Mirian was able to demonstrate them prior to the end of the session.  Mirian demonstrated good  understanding of the education provided.     See EMR under Patient Instructions for exercises provided prior visit.    Assessment     Patient demonstrated an increase in L LE strength as displayed increased AROM of her left ankle. She continues to have an abnormal gait pattern due to continued weakness in ankle dorsiflexion which improves when she is using the AFO. Her single leg balance scores continue to indicate she is at risk for falls but have improved compared to her initial evaluation. Her score on 30 second sit to stand test indicates decreased endurance. Her score on TUG has improved but continues to indicate she is at risk for falls. She would benefit from continued physical therapy in order to improve her strength, balance, gait, and endurance. She was able to perform all of today's progressions with no increase in symptoms prior to leaving the clinic. Will progress as tolerated.  Mirian Is progressing well towards her goals.   Pt prognosis is Good.     Pt will continue to benefit from skilled outpatient physical therapy to address the deficits listed in the problem list box on initial evaluation, provide pt/family education and to maximize pt's level of independence in the home and community environment.     Pt's spiritual, cultural and educational needs considered and pt agreeable to plan of care and goals.     Anticipated barriers to physical therapy: none anticipated    Goals:   Short Term Goals:  4 weeks  1. Pt will increase AROM of left ankle to neutral dorsiflexion  to promote greater ease with performing normal gait pattern. (progressing, not met)  2. Pt. to demonstrate increased strength of left lower extremity to 4/5  to increase stability during community ambulation (progressing, not met)  3. Pt. will improve TUG test score to 12 seconds or less in order to perform safe transfers and for patient to be in low/moderate risk for falls category (progressing, not met)  4. Pt will tolerate 6 minute walk test. (progressing, not met)  5. Pt will initiate home exercise program to improve strength, flexibility, endurance, mobility & balance to return pt to PLOF (met, 1/5/23)  6. Pt will tolerate 10 min or greater of time in light-->moderate intensity cardio (I.e. Bike, NuStep) to improve endurance to assist in performing her usual work activities (progressing, not met)  7. Pt to demonstrate tandem stance 30 sec or greater w/out UE support in order to improve balance to progress to singe leg stance to decrease fall risk in performance of ADL's (progressing, not met)     Long Term Goals: 8 weeks  1. Pt will increase AROM of left ankle dorsiflexion to 5 degrees in order to perform normal gait pattern when ambulating on uneven ground (progressing, not met)  2. Pt. will increase strength of left lower extremity to 5/5  to increase stability during ambulation on uneven surfaces,to increase tolerance for ADL and work activities. (progressing, not met)  3. Pt will be independent with HEP to improve ROM, strength, balance,  and independence with ADL's (progressing, not met)  4. Pt. will improve TUG test score to 10 seconds or less in order to ambulate safely in community and for patient to be in low risk for falls category (progressing, not met)  5. Pt. will improve 6 minute walk test score by 30 meters in order for patient to be in low risk for falls category (progressing, not met)  6. Patient will report compliance with walking program 5x week for 10 -30min each day to improve overall  cardiovascular function and decrease cancer related fatigue at discharge. (progressing, not met)    Plan     Updated Plan of care Certification: 1/5/2023 to 3/3/2023.     Outpatient Physical Therapy 2 times weekly for 8 weeks to include the following interventions: Gait Training, Manual Therapy, Neuromuscular Re-ed, Patient Education, Self Care, Therapeutic Activities, Therapeutic Exercise, and IASTM . Dry needling with manual therapy techniques to decrease pain, inflammation and swelling, increase circulation and promote healing process.      Gail Gutierrez, PT

## 2023-01-09 NOTE — PLAN OF CARE
"  Outpatient Therapy Updated Plan of Care     Visit Date: 1/5/2023  Name: Phylicia Vásquez  Clinic Number: [id number]    Therapy Diagnosis:   Encounter Diagnoses   Name Primary?    Weakness of left lower extremity Yes    Abnormality of gait     Impairment of balance     Lymphedema of right arm     Impaired functional mobility and activity tolerance      Physician: Negrito Verde MD      Physician Orders: PT Eval and Treat   Medical Diagnosis: Carcinoma of axillary tail of right breast in female, estrogen receptor positive [C50.611, Z17.0], Localized edema    Evaluation Date: 6/3/2022    Total Visits Received: 21  Cancelled Visits: unknown  No Show Visits: 0    Current Certification Period: 11/11/22 to 12/30/22  Precautions:  Standard, Fall, RUE lymphedema  Visits from Evaluation Date:  20      Subjective     Update: Pt reports: Arm's been the same. She denies major swelling.   She was compliant with compression pump  Response to previous treatment: no adverse reaction  Pain Scale: Phylicia rates pain on a scale of 0/10 on VAS.   Pain location: N/A      Fatigue: not assessed  Functional change: none stated    Objective     Update:     LANDMARK RIGHT UE LEFT UE DIFF RUE Diff RUE Diff RUE Diff RUE Diff RUE Diff RUE Diff RUE Diff RUE Diff RUE Diff RUE Diff RUE Diff RUE Diff RUE Diff RUE Diff RUE Diff RUE Diff RUE Diff RUE Diff   Date 6/3/22 6/3/22 6/3/22 6/22/22 6/22/22 6/29/22 6/29/22 7/6/22 7/6/22 7/20/22 7/20/22 7/27/22 7/27/22 8/18/22 8/18/22 8/24/22 8/24/22 9/2/22 9/2/22 9/19/22 9/19/22 9/26/22 9/26/22 10/4/22 10/4/22 10/17/22 10/17/22 10/24/22 10/24/22 11/2/22 11/2/22 11/10/22 11/10/22 11/28/22 11/28/22 12/15/22 12/15/22 1/5/23 1/5/23   E + 8" 47 cm 46.5 cm 0.5 cm 46.5cm -0.5 44cm -2.5 45 cm +1 47 cm +2 45cm -2 43.5cm -1.5 43.5cm No chg 44cm +0.5 46cm +2 44.5cm -1.5 44.5 cm No chg 45 cm +0.5 44.5 cm -0.5 46 cm +1.5 44.5 cm -1.5 45.5 cm +1 44.5cm -1 45 cm +0.5   E + 6" 44 cm 42.5 cm 1.5 cm 44 cm No chg 44cm No " "chg 43.5cm -0.5 45cm +1.5 42cm -3 41cm -1 43cm +2 42.5cm -0.5 43.5cm +1 44.5cm +1 45cm +0.5 43.5 cm -1.5 43 cm -0.5 43 cm No chg 44.5 cm +1.5 44.5 cm No chg 45 cm +0.5 43 cm -2   E + 4" 39.5 cm 37 cm 2.5 cm 39cm -0.5 40.5 cm +1.5 40.5cm No chg 41cm +0.5 39 cm -2 39cm No chg 39.5cm +0.5 40cm +0.5 39cm -1 41.5cm +2.5 40cm -1.5 40cm No chg 40cm No chg 40cm No chg 40 cm No chg 40.5 cm No chg 41.5 cm +1 40.5 cm -1   E + 2" 34.5 cm 33 cm 1.5 cm 34.5 cm No chg 35 cm +0.5 34.5cm -0.5 35cm +0.5 34cm -1 34.5cm +0.5 34.5cm No chg 35cm +0.5 34cm -1 35cm +1 35cm No chg 34 cm -1 35 cm +1 35.5 cm +0.5 34.5 cm -1 35 cm +0.5 36cm +1 35 cm -1   Elbow 28 cm 28 cm 0 cm 27 cm -1 27.5 cm +0.5 28 cm +0.5 28cm No chg 27cm -1 27cm No chg 27.5cm +0.5 27cm -0.5 27.5cm +0.5 27.5cm No chg 27cm -0.5 27 cm No chg 27 cm No chg 27 cm No chg 26 cm -1 27 cm +1 27.5 cm +0.5 27 cm -0.5   W+ 8" 29 cm  28.5cm 0.5 cm 28cm -1 28 cm No chg 29.5cm +1.5 29cm -0.5 28.5cm -0.5 28cm -0.5 29.5cm +1.5 29cm -0.5 28.5cm -0.5 29cm +0.5 28cm -1 29.5cm +1.5 30 cm +0.5 28cm -2 28.5 cm +0.5 29 cm +0.5 29.5 cm +0.5 29.5 cm No chg   W +  6" 29.5 cm 27 cm 2.5 cm 29cm -0.5 29cm No chg 29cm No chg 29cm No chg 29cm No chg 29cm No chg 29.5cm +0.5 28.5cm -1 29cm +0.5 29cm No chg 29cm No chg 29 cm No chg 29 cm No chg 28.5cm -0.5 28.5 cm No chg 29 cm +0.5 29 cm No chg 29 cm No chg   W + 4" 25.5 cm 23.5 cm 2 cm 25cm -0.5 24.5 cm -0.5 25.5cm +1 25 cm -0.5 25cm No chg 24.5cm -0.5 25.5cm +1 24.5cm -1 25cm +0.5 25.5cm +0.5  24.5cm -1 25 cm +0.5 25.5 cm +0.5 25 cm -0.5 24.5 cm -0.5 24.5 cm No chg 24.5 cm No chg 24 cm -0.5   Wrist 18 cm 17.5 cm 0.5 cm 18.5cm +0.5 18 cm -0.5 18.5cm +0.5 18cm -0.5 18cm No chg 18cm No chg 18cm No chg 18cm No chg 18cm No chg 18cm No chg 18cm No chg 18 cm No chg 18 cm No chg 18 cm No chg 18 cm  No chg 18 cm No chg 18 cm No chg 17.5 cm -0.5   DPC 21.5 cm 20 cm 1.5 cm 20cm -1.5 20.5cm +0.5 21 cm +0.5 21cm -0.5 20.5cm -0.5 21cm +0.5 21.5cm +0.5 21cm -0.5 21cm No " chg 20.5 cm -0.5 21.5cm +1 20.5cm -1 20.5 cm No chg 20.5 cm No chg 20 cm -0.5 20cm No chg 20 cm No chg 19.5 cm -0.5   IP Thumb 7.5 cm 7 cm 0.5 cm 8cm +0.5 7.5cm -0.5 8 cm +0.5 8cm No chg 7.5cm -0.5 7.5cm No chg 8cm +0.5 7.5cm -0.5 7.5cm No Chg 8cm +0.5 7.5cm -0.5 7.5cm No chg 7.5 cm No chg 7cm -0.5 7.5 cm +0.5 7.5 cm No chg 7.5 cm No chg 7.5 cm No chg         Assessment     Update: Patient tolerated session well. Increased time since last session due to holidays, but despite this, she demo's reduced right UE circumference overall.  Pt tolerated complete decongestive therapy without issue in clinic.  Once her RUE circumference stabilizes, she will benefit from compression sleeve and compression glove at 20-30mmHg. She has met goals a noted in today's note and will progress as tolerated. Will work towards remaining goals.    Previous Short Term Goals Status:   met partially  New Short Term Goals Status:   N/A  Long Term Goal Status:   continue per initial plan of care.  Reasons for Recertification of Therapy:   pt with increased time since last session due to the holidays, but she demo's good reductions in right arm circumference overall. She will benefit from continued therapy to reduce arm circumference and eventually be fit with compression sleeve and glove.    Plan     Updated Certification Period: 1/5/2023 to 2/3/22  Recommended Treatment Plan: 2 times per week for 4 weeks: Manual Therapy, Neuromuscular Re-ed, Orthotic Management and Training, Patient Education, Self Care, Therapeutic Activities, and Therapeutic Exercise  Other Recommendations: none anticipated    Marcela Price, PT  1/5/2023      I CERTIFY THE NEED FOR THESE SERVICES FURNISHED UNDER THIS PLAN OF TREATMENT AND WHILE UNDER MY CARE    Physician's comments:        Physician's Signature: ___________________________________________________

## 2023-01-12 ENCOUNTER — CLINICAL SUPPORT (OUTPATIENT)
Dept: REHABILITATION | Facility: HOSPITAL | Age: 53
End: 2023-01-12
Payer: COMMERCIAL

## 2023-01-12 DIAGNOSIS — I89.0 LYMPHEDEMA OF RIGHT ARM: Primary | ICD-10-CM

## 2023-01-12 DIAGNOSIS — Z74.09 IMPAIRED FUNCTIONAL MOBILITY AND ACTIVITY TOLERANCE: ICD-10-CM

## 2023-01-12 PROCEDURE — 97140 MANUAL THERAPY 1/> REGIONS: CPT

## 2023-01-12 NOTE — PROGRESS NOTES
Physical Therapy Daily Treatment Note       Date: 1/12/2023   Name: Phylicia Vásquez  Clinic Number: [id number]    Therapy Diagnosis:   Encounter Diagnoses   Name Primary?    Lymphedema of right arm Yes    Impaired functional mobility and activity tolerance        Physician: Negrito Verde MD    Physician Orders: PT Eval and Treat   Medical Diagnosis: Carcinoma of axillary tail of right breast in female, estrogen receptor positive [C50.611, Z17.0], Localized edema      Evaluation Date: 6/3/2022  Authorization Period Expiration: 12/31/22  Updated Plan of Care Certification Period: 2/3/23  Visit # / Visits authorized: 2/20 (plus 20 from last year)  Insurance: [employer]  FOTO: 3/3     Time In: 8:05 AM  Time Out:  9:05 AM  Total Billable Time: 60 minutes     Precautions: Standard, cancer and Right UE lymphedema      Subjective     Pt reports: Felt more swollen in her upper arm recently. Pt's bandages started falling down after 3 days.  She was compliant with compression pump  Response to previous treatment: no adverse reaction  Pain Scale: Phylicia rates pain on a scale of 0/10 on VAS.   Pain location: N/A     Fatigue: not assessed  Functional change: none stated  Treatment:   Chemotherapy:  4 cycles of AC and 4 cycles of Taxol patrick adjuvantly  Radiation: completed radiation therapy on 8/20/20.   Endocrine therapy: On 11/1/2020 started on Anastrozole with with Dr. Verde     Surgery date: Pt is s/p bilateral mastectomy with R SLNB, TE reconstruction in 4/2021. pt will have permanent reconstruction with ROWAN flap, but she states she needs to lose weight first.      Objective     Objective Measures updated at progress report unless specified.     LANDMARK RIGHT UE LEFT UE DIFF RUE Diff RUE Diff RUE Diff RUE Diff RUE Diff RUE Diff RUE Diff RUE Diff RUE Diff RUE Diff RUE Diff RUE Diff RUE Diff RUE Diff RUE Diff RUE Diff RUE Diff RUE Diff RUE Diff   Date 6/3/22 6/3/22  "6/3/22 6/22/22 6/22/22 6/29/22 6/29/22 7/6/22 7/6/22 7/20/22 7/20/22 7/27/22 7/27/22 8/18/22 8/18/22 8/24/22 8/24/22 9/2/22 9/2/22 9/19/22 9/19/22 9/26/22 9/26/22 10/4/22 10/4/22 10/17/22 10/17/22 10/24/22 10/24/22 11/2/22 11/2/22 11/10/22 11/10/22 11/28/22 11/28/22 12/15/22 12/15/22 1/5/23 1/5/23 1/12/23 1/12/23   E + 8" 47 cm 46.5 cm 0.5 cm 46.5cm -0.5 44cm -2.5 45 cm +1 47 cm +2 45cm -2 43.5cm -1.5 43.5cm No chg 44cm +0.5 46cm +2 44.5cm -1.5 44.5 cm No chg 45 cm +0.5 44.5 cm -0.5 46 cm +1.5 44.5 cm -1.5 45.5 cm +1 44.5cm -1 45 cm +0.5 44.5 cm -0.5   E + 6" 44 cm 42.5 cm 1.5 cm 44 cm No chg 44cm No chg 43.5cm -0.5 45cm +1.5 42cm -3 41cm -1 43cm +2 42.5cm -0.5 43.5cm +1 44.5cm +1 45cm +0.5 43.5 cm -1.5 43 cm -0.5 43 cm No chg 44.5 cm +1.5 44.5 cm No chg 45 cm +0.5 43 cm -2 43 cm No chg   E + 4" 39.5 cm 37 cm 2.5 cm 39cm -0.5 40.5 cm +1.5 40.5cm No chg 41cm +0.5 39 cm -2 39cm No chg 39.5cm +0.5 40cm +0.5 39cm -1 41.5cm +2.5 40cm -1.5 40cm No chg 40cm No chg 40cm No chg 40 cm No chg 40.5 cm No chg 41.5 cm +1 40.5 cm -1 40.5cm No chg   E + 2" 34.5 cm 33 cm 1.5 cm 34.5 cm No chg 35 cm +0.5 34.5cm -0.5 35cm +0.5 34cm -1 34.5cm +0.5 34.5cm No chg 35cm +0.5 34cm -1 35cm +1 35cm No chg 34 cm -1 35 cm +1 35.5 cm +0.5 34.5 cm -1 35 cm +0.5 36cm +1 35 cm -1 34.5 cm -0.5   Elbow 28 cm 28 cm 0 cm 27 cm -1 27.5 cm +0.5 28 cm +0.5 28cm No chg 27cm -1 27cm No chg 27.5cm +0.5 27cm -0.5 27.5cm +0.5 27.5cm No chg 27cm -0.5 27 cm No chg 27 cm No chg 27 cm No chg 26 cm -1 27 cm +1 27.5 cm +0.5 27 cm -0.5 26.5 cm -0.5   W+ 8" 29 cm  28.5cm 0.5 cm 28cm -1 28 cm No chg 29.5cm +1.5 29cm -0.5 28.5cm -0.5 28cm -0.5 29.5cm +1.5 29cm -0.5 28.5cm -0.5 29cm +0.5 28cm -1 29.5cm +1.5 30 cm +0.5 28cm -2 28.5 cm +0.5 29 cm +0.5 29.5 cm +0.5 29.5 cm No chg 29 cm -0.5   W +  6" 29.5 cm 27 cm 2.5 cm 29cm -0.5 29cm No chg 29cm No chg 29cm No chg 29cm No chg 29cm No chg 29.5cm +0.5 28.5cm -1 29cm +0.5 29cm No chg 29cm No chg 29 cm No chg 29 cm No chg " "28.5cm -0.5 28.5 cm No chg 29 cm +0.5 29 cm No chg 29 cm No chg 29 cm No chg   W + 4" 25.5 cm 23.5 cm 2 cm 25cm -0.5 24.5 cm -0.5 25.5cm +1 25 cm -0.5 25cm No chg 24.5cm -0.5 25.5cm +1 24.5cm -1 25cm +0.5 25.5cm +0.5  24.5cm -1 25 cm +0.5 25.5 cm +0.5 25 cm -0.5 24.5 cm -0.5 24.5 cm No chg 24.5 cm No chg 24 cm -0.5 24.5 cm +0.5   Wrist 18 cm 17.5 cm 0.5 cm 18.5cm +0.5 18 cm -0.5 18.5cm +0.5 18cm -0.5 18cm No chg 18cm No chg 18cm No chg 18cm No chg 18cm No chg 18cm No chg 18cm No chg 18 cm No chg 18 cm No chg 18 cm No chg 18 cm  No chg 18 cm No chg 18 cm No chg 17.5 cm -0.5 17.5 cm No chg   DPC 21.5 cm 20 cm 1.5 cm 20cm -1.5 20.5cm +0.5 21 cm +0.5 21cm -0.5 20.5cm -0.5 21cm +0.5 21.5cm +0.5 21cm -0.5 21cm No chg 20.5 cm -0.5 21.5cm +1 20.5cm -1 20.5 cm No chg 20.5 cm No chg 20 cm -0.5 20cm No chg 20 cm No chg 19.5 cm -0.5 19.5 cm No chg   IP Thumb 7.5 cm 7 cm 0.5 cm 8cm +0.5 7.5cm -0.5 8 cm +0.5 8cm No chg 7.5cm -0.5 7.5cm No chg 8cm +0.5 7.5cm -0.5 7.5cm No Chg 8cm +0.5 7.5cm -0.5 7.5cm No chg 7.5 cm No chg 7cm -0.5 7.5 cm +0.5 7.5 cm No chg 7.5 cm No chg 7.5 cm No chg 7.5 cm No chg         Treatment     Phylicia received the following manual therapy techniques were performed to increased myofascial/soft tissue length, mobility and pliability, increase PROM, AROM and function as well as to decrease pain for 53 minutes:    Measurements taken above    Reviewed process and options for where pt can get fitted for a compression sleeve or where she can purchase online with assist from therapist to measure for size. PT explains that therapist can't ensure correct fit if pt purchases online. Pt provided with list of vendors in person and online    Short Neck- held due to thyroid goiter  Clear Healthy Lymph Nodes:  Left Axilla                                                  Right  Groin  Open Anastomoses:  Anterior Axillo-Axillary  (AAA)                                    Axillo-Inguinal     (AI)                              "       Posterior Axillo-Axillary  (LEEANN)    Right Upper Arm (Lateral and Medial)  Right Antecubital Fossa  Right forearm, dorsal and ventral aspect  Dorsum of R hand  R fingers    Rework right hand  Rework Right UE  Rework Anastomoses   Rework Healthy lymph Nodes    Diaphragmatic breathing, x 5 reps    PT applies  gauze to R hand and fingers and stockinette, cotton artiflex, and short stretch bandages to pt's RIGHT/LEFT/BILATERAL: right upper extremity. Pt educated to wear bandaging for 2-3 days unless it causes pain, numbness, changes in skin color/temperature, or it starts to fall down.  If removed, pt instructed to roll up bandages and cotton padding and bring to next session. If bandages are removed, pt can wear tubigrip.  Pt has Vet glove to cover bandages in the shower and directions on how to care for bandages. Pt verbalizes understanding of all topics.                 Home Exercise Program and Patient Education   Education provided re:  - role of PT in multi - disciplinary team, goals for PT  - progress towards goals       Written Home Exercises Provided: Patient instructed to cont prior HEP.  Exercises were reviewed and Mirian was able to demonstrate them prior to the end of the session.  Mirian demonstrated good  understanding of the education provided.     See EMR under Media for exercises provided 6/8/2022- for self manual lymph drainage techniques.    Pt has no cultural, educational or language barriers to learning provided.    Assessment     Patient tolerated session well. Overall, pt presents with reductions in her right arm circumference, and it appears to be stabilizing.  Pt tolerated complete decongestive therapy without issue in clinic.  Once her RUE circumference stabilizes, she will benefit from compression sleeve and compression glove at 20-30mmHg. Will print her prescription for compression sleeve and glove at next session. She has met goals a noted below and will progress as tolerated. Will work  towards remaining goals.    Pt prognosis is Good. Pt will continue to benefit from skilled outpatient physical therapy to address the deficits listed in the problem list chart on initial evaluation, provide pt/family education and to maximize pt's level of independence in the home and community environment.     Anticipated barriers to physical therapy: none anticipated    Goals as follows:  Short Term Goals (6 weeks)  1. Pt will demo decrease in girth in right upper extremity by >/= 1cm to allow for improved UE symmetry, clothing choice, ROM, and UE function. (met, 8/18/22  2. Patient will demonstrate 100% knowledge of lymphedema precautions and signs of infection to allow for reduced risk of lymphedema, reduced risk of infection, and/or exacerbation.  (met)  3. Patient will perform self lymph drainage techniques to enhance lymphatic drainage and mobility.  (met, 7/6/22)  4. Patient will tolerate daily activities with multilayered bandaging to enhance lymphatic drainage and skin elasticity.  (met, 7/6/22)  5. Pt will tolerate HEP for improved strength, functional mobility, ROM, posture, and endurance. (met, 7/6/22)        Long Term Goals: ( 12  weeks)  1. Patient to show decreased girth in Right upper extremity by >/= 2cm to allow for improved UE symmetry, clothing choice, ROM, and UE function.  (met partially, 8/18/22)  2. Patient will show reduction in density to mild or less with improved contour of limb to allow for improved cosmesis, improved lymphatic drainage, and functional mobility.  (met, 9/2/22)  3. Patient to mary/doff compression garment with daily compliance to support lymphatic mobility and skin elasticity.  (progressing, not met)  4. Pt to show improved postural awareness and alignment for improved functional mobility.  (progressing, not met)  5. Pt to be independent and compliant with HEP to allow for improved ROM, reach and carry with functional endurance and strength.    (met, 10/4/22)            Plan   Outpatient physical therapy 2x week for 4 weeks to include the following:   Manual Therapy, Neuromuscular Re-ed, Orthotic Management and Training, Patient Education, Self Care, Therapeutic Activities and Therapeutic Exercise.            Therapist: Marcela Price, PT  1/12/2023

## 2023-01-17 ENCOUNTER — CLINICAL SUPPORT (OUTPATIENT)
Dept: REHABILITATION | Facility: HOSPITAL | Age: 53
End: 2023-01-17
Payer: COMMERCIAL

## 2023-01-17 DIAGNOSIS — I89.0 LYMPHEDEMA OF RIGHT ARM: Primary | ICD-10-CM

## 2023-01-17 DIAGNOSIS — Z74.09 IMPAIRED FUNCTIONAL MOBILITY AND ACTIVITY TOLERANCE: ICD-10-CM

## 2023-01-17 PROCEDURE — 97140 MANUAL THERAPY 1/> REGIONS: CPT

## 2023-01-17 NOTE — PATIENT INSTRUCTIONS
Based on today's measurements:    You'd be a size medium in [employer] EASE 20-30mmHg compression glove.  (Right wrist is 17.5cm and hand is 20 cm)    Nancy Flowers, 20-30mmHg Arm Sleeve, size 8, length regular, color black.        RUE  Wrist (c): 17.5 cm  Elbow (e): 26.5 cm  Mid-lower arm is :28.5 cm  Upper arm (g): 43.5 cm    Arm length: 43cm

## 2023-01-17 NOTE — PROGRESS NOTES
Physical Therapy Daily Treatment Note       Date: 1/17/2023   Name: Phylicia Vásquez  Clinic Number: [id number]    Therapy Diagnosis:   Encounter Diagnoses   Name Primary?    Lymphedema of right arm Yes    Impaired functional mobility and activity tolerance        Physician: Negrito Verde MD    Physician Orders: PT Eval and Treat   Medical Diagnosis: Carcinoma of axillary tail of right breast in female, estrogen receptor positive [C50.611, Z17.0], Localized edema      Evaluation Date: 6/3/2022  Authorization Period Expiration: 12/31/22  Updated Plan of Care Certification Period: 2/3/23  Visit # / Visits authorized: 3/20 (plus 20 from last year)  Insurance: [employer]  FOTO: 3/3     Time In: 10:10 AM (late arrival)  Time Out:  11:15 AM  Total Billable Time: 65 minutes     Precautions: Standard, cancer and Right UE lymphedema      Subjective     Pt reports: No new complaints. Pt's bandages lasted 3 days. She is late due to difficulty finding parking. She is interested in buying compression garments online with assist from PT.  She was compliant with compression pump  Response to previous treatment: no adverse reaction  Pain Scale: Phylicia rates pain on a scale of 0/10 on VAS.   Pain location: N/A     Fatigue: not assessed  Functional change: none stated  Treatment:   Chemotherapy:  4 cycles of AC and 4 cycles of Taxol patrick adjuvantly  Radiation: completed radiation therapy on 8/20/20.   Endocrine therapy: On 11/1/2020 started on Anastrozole with with Dr. Verde     Surgery date: Pt is s/p bilateral mastectomy with R SLNB, TE reconstruction in 4/2021. pt will have permanent reconstruction with ROWAN flap, but she states she needs to lose weight first.      Objective     Objective Measures updated at progress report unless specified.     LANDMARK RIGHT UE LEFT UE DIFF RUE Diff RUE Diff RUE Diff RUE Diff RUE Diff RUE Diff RUE Diff RUE Diff RUE Diff RUE Diff RUE  "Diff RUE Diff RUE Diff RUE Diff RUE Diff RUE Diff RUE Diff RUE Diff RUE Diff   Date 6/3/22 6/3/22 6/3/22 6/22/22 6/22/22 6/29/22 6/29/22 7/6/22 7/6/22 7/20/22 7/20/22 7/27/22 7/27/22 8/18/22 8/18/22 8/24/22 8/24/22 9/2/22 9/2/22 9/19/22 9/19/22 9/26/22 9/26/22 10/4/22 10/4/22 10/17/22 10/17/22 10/24/22 10/24/22 11/2/22 11/2/22 11/10/22 11/10/22 11/28/22 11/28/22 12/15/22 12/15/22 1/5/23 1/5/23 1/12/23 1/12/23   E + 8" 47 cm 46.5 cm 0.5 cm 46.5cm -0.5 44cm -2.5 45 cm +1 47 cm +2 45cm -2 43.5cm -1.5 43.5cm No chg 44cm +0.5 46cm +2 44.5cm -1.5 44.5 cm No chg 45 cm +0.5 44.5 cm -0.5 46 cm +1.5 44.5 cm -1.5 45.5 cm +1 44.5cm -1 45 cm +0.5 44.5 cm -0.5   E + 6" 44 cm 42.5 cm 1.5 cm 44 cm No chg 44cm No chg 43.5cm -0.5 45cm +1.5 42cm -3 41cm -1 43cm +2 42.5cm -0.5 43.5cm +1 44.5cm +1 45cm +0.5 43.5 cm -1.5 43 cm -0.5 43 cm No chg 44.5 cm +1.5 44.5 cm No chg 45 cm +0.5 43 cm -2 43 cm No chg   E + 4" 39.5 cm 37 cm 2.5 cm 39cm -0.5 40.5 cm +1.5 40.5cm No chg 41cm +0.5 39 cm -2 39cm No chg 39.5cm +0.5 40cm +0.5 39cm -1 41.5cm +2.5 40cm -1.5 40cm No chg 40cm No chg 40cm No chg 40 cm No chg 40.5 cm No chg 41.5 cm +1 40.5 cm -1 40.5cm No chg   E + 2" 34.5 cm 33 cm 1.5 cm 34.5 cm No chg 35 cm +0.5 34.5cm -0.5 35cm +0.5 34cm -1 34.5cm +0.5 34.5cm No chg 35cm +0.5 34cm -1 35cm +1 35cm No chg 34 cm -1 35 cm +1 35.5 cm +0.5 34.5 cm -1 35 cm +0.5 36cm +1 35 cm -1 34.5 cm -0.5   Elbow 28 cm 28 cm 0 cm 27 cm -1 27.5 cm +0.5 28 cm +0.5 28cm No chg 27cm -1 27cm No chg 27.5cm +0.5 27cm -0.5 27.5cm +0.5 27.5cm No chg 27cm -0.5 27 cm No chg 27 cm No chg 27 cm No chg 26 cm -1 27 cm +1 27.5 cm +0.5 27 cm -0.5 26.5 cm -0.5   W+ 8" 29 cm  28.5cm 0.5 cm 28cm -1 28 cm No chg 29.5cm +1.5 29cm -0.5 28.5cm -0.5 28cm -0.5 29.5cm +1.5 29cm -0.5 28.5cm -0.5 29cm +0.5 28cm -1 29.5cm +1.5 30 cm +0.5 28cm -2 28.5 cm +0.5 29 cm +0.5 29.5 cm +0.5 29.5 cm No chg 29 cm -0.5   W +  6" 29.5 cm 27 cm 2.5 cm 29cm -0.5 29cm No chg 29cm No chg 29cm No chg 29cm No chg " "29cm No chg 29.5cm +0.5 28.5cm -1 29cm +0.5 29cm No chg 29cm No chg 29 cm No chg 29 cm No chg 28.5cm -0.5 28.5 cm No chg 29 cm +0.5 29 cm No chg 29 cm No chg 29 cm No chg   W + 4" 25.5 cm 23.5 cm 2 cm 25cm -0.5 24.5 cm -0.5 25.5cm +1 25 cm -0.5 25cm No chg 24.5cm -0.5 25.5cm +1 24.5cm -1 25cm +0.5 25.5cm +0.5  24.5cm -1 25 cm +0.5 25.5 cm +0.5 25 cm -0.5 24.5 cm -0.5 24.5 cm No chg 24.5 cm No chg 24 cm -0.5 24.5 cm +0.5   Wrist 18 cm 17.5 cm 0.5 cm 18.5cm +0.5 18 cm -0.5 18.5cm +0.5 18cm -0.5 18cm No chg 18cm No chg 18cm No chg 18cm No chg 18cm No chg 18cm No chg 18cm No chg 18 cm No chg 18 cm No chg 18 cm No chg 18 cm  No chg 18 cm No chg 18 cm No chg 17.5 cm -0.5 17.5 cm No chg   DPC 21.5 cm 20 cm 1.5 cm 20cm -1.5 20.5cm +0.5 21 cm +0.5 21cm -0.5 20.5cm -0.5 21cm +0.5 21.5cm +0.5 21cm -0.5 21cm No chg 20.5 cm -0.5 21.5cm +1 20.5cm -1 20.5 cm No chg 20.5 cm No chg 20 cm -0.5 20cm No chg 20 cm No chg 19.5 cm -0.5 19.5 cm No chg   IP Thumb 7.5 cm 7 cm 0.5 cm 8cm +0.5 7.5cm -0.5 8 cm +0.5 8cm No chg 7.5cm -0.5 7.5cm No chg 8cm +0.5 7.5cm -0.5 7.5cm No Chg 8cm +0.5 7.5cm -0.5 7.5cm No chg 7.5 cm No chg 7cm -0.5 7.5 cm +0.5 7.5 cm No chg 7.5 cm No chg 7.5 cm No chg 7.5 cm No chg     Measurements taken below on 1/17/23  RUE  Wrist (c): 17.5 cm  Elbow (e): 26.5 cm  Mid-lower arm is :28.5 cm  Upper arm (g): 43.5 cm    Arm length: 43cm    Treatment     Phylicia received the following manual therapy techniques were performed to increased myofascial/soft tissue length, mobility and pliability, increase PROM, AROM and function as well as to decrease pain for 65 minutes:    Refer to measurements in objective section.    Reviewed process and options for where pt can get fitted for a compression sleeve or where she can purchase online with assist from therapist to measure for size. PT explains that therapist can't ensure correct fit if pt purchases online. Pt states she wishes to order compression sleeves online, so increased time " was spent taking measurements to make appropriate recommendations. Please refer to pt instructions for brand and sizes that therapist recommended based on today's measurements.    Short Neck- held due to thyroid goiter  Clear Healthy Lymph Nodes:  Left Axilla                                                  Right  Groin  Open Anastomoses:  Anterior Axillo-Axillary  (AAA)                                    Axillo-Inguinal     (AI)                                    Posterior Axillo-Axillary  (LEEANN)    Right Upper Arm (Lateral and Medial)  Right Antecubital Fossa  Right forearm, dorsal and ventral aspect  Dorsum of R hand  R fingers    Rework right hand  Rework Right UE  Rework Anastomoses   Rework Healthy lymph Nodes    Diaphragmatic breathing, x 5 reps    PT applies  gauze to R hand and fingers and stockinette, cotton artiflex, and short stretch bandages to pt's RIGHT/LEFT/BILATERAL: right upper extremity. Pt educated to wear bandaging for 2-3 days unless it causes pain, numbness, changes in skin color/temperature, or it starts to fall down.  If removed, pt instructed to roll up bandages and cotton padding and bring to next session. If bandages are removed, pt can wear tubigrip.  Pt has Vet glove to cover bandages in the shower and directions on how to care for bandages. Pt verbalizes understanding of all topics.                 Home Exercise Program and Patient Education   Education provided re:  - role of PT in multi - disciplinary team, goals for PT  - progress towards goals       Written Home Exercises Provided: Patient instructed to cont prior HEP.  Exercises were reviewed and Mirian was able to demonstrate them prior to the end of the session.  Mirian demonstrated good  understanding of the education provided.     See EMR under Media for exercises provided 6/8/2022- for self manual lymph drainage techniques.    Pt has no cultural, educational or language barriers to learning provided.    Assessment     Patient  tolerated session well. Increased time spent measuring pt's arm and recommending sizes for compression glove and arm sleeve. Pt was provided with print out of her compression sleeve prescription.  Pt tolerated complete decongestive therapy without issue in clinic. Once she received her compression garments, we'll gauge if size is correct and move forward as appropriate. She has met goals a noted below and will progress as tolerated.   Pt prognosis is Good. Pt will continue to benefit from skilled outpatient physical therapy to address the deficits listed in the problem list chart on initial evaluation, provide pt/family education and to maximize pt's level of independence in the home and community environment.     Anticipated barriers to physical therapy: none anticipated    Goals as follows:  Short Term Goals (6 weeks)  1. Pt will demo decrease in girth in right upper extremity by >/= 1cm to allow for improved UE symmetry, clothing choice, ROM, and UE function. (met, 8/18/22  2. Patient will demonstrate 100% knowledge of lymphedema precautions and signs of infection to allow for reduced risk of lymphedema, reduced risk of infection, and/or exacerbation.  (met)  3. Patient will perform self lymph drainage techniques to enhance lymphatic drainage and mobility.  (met, 7/6/22)  4. Patient will tolerate daily activities with multilayered bandaging to enhance lymphatic drainage and skin elasticity.  (met, 7/6/22)  5. Pt will tolerate HEP for improved strength, functional mobility, ROM, posture, and endurance. (met, 7/6/22)        Long Term Goals: ( 12  weeks)  1. Patient to show decreased girth in Right upper extremity by >/= 2cm to allow for improved UE symmetry, clothing choice, ROM, and UE function.  (met partially, 8/18/22)  2. Patient will show reduction in density to mild or less with improved contour of limb to allow for improved cosmesis, improved lymphatic drainage, and functional mobility.  (met, 9/2/22)  3.  Patient to mary/doff compression garment with daily compliance to support lymphatic mobility and skin elasticity.  (progressing, not met)  4. Pt to show improved postural awareness and alignment for improved functional mobility.  (progressing, not met)  5. Pt to be independent and compliant with HEP to allow for improved ROM, reach and carry with functional endurance and strength.    (met, 10/4/22)           Plan   Outpatient physical therapy 2x week for 4 weeks to include the following:   Manual Therapy, Neuromuscular Re-ed, Orthotic Management and Training, Patient Education, Self Care, Therapeutic Activities and Therapeutic Exercise.            Therapist: Marcela Price, PT  1/17/2023

## 2023-01-19 ENCOUNTER — CLINICAL SUPPORT (OUTPATIENT)
Dept: REHABILITATION | Facility: HOSPITAL | Age: 53
End: 2023-01-19
Payer: COMMERCIAL

## 2023-01-19 DIAGNOSIS — R26.9 ABNORMALITY OF GAIT: ICD-10-CM

## 2023-01-19 DIAGNOSIS — I89.0 LYMPHEDEMA OF RIGHT ARM: Primary | ICD-10-CM

## 2023-01-19 DIAGNOSIS — Z74.09 IMPAIRED FUNCTIONAL MOBILITY AND ACTIVITY TOLERANCE: ICD-10-CM

## 2023-01-19 DIAGNOSIS — R29.898 WEAKNESS OF LEFT LOWER EXTREMITY: Primary | ICD-10-CM

## 2023-01-19 DIAGNOSIS — R26.89 IMPAIRMENT OF BALANCE: ICD-10-CM

## 2023-01-19 PROCEDURE — 97140 MANUAL THERAPY 1/> REGIONS: CPT

## 2023-01-19 PROCEDURE — 97110 THERAPEUTIC EXERCISES: CPT

## 2023-01-19 NOTE — PROGRESS NOTES
Physical Therapy Daily Treatment Note     Name: Phylicia Vásquez  Clinic Number: [id number]    Therapy Diagnosis:   Encounter Diagnoses   Name Primary?    Weakness of left lower extremity Yes    Abnormality of gait     Impairment of balance      Physician: Marcela Calvillo PA-C    Visit Date: 1/19/2023    Physician Orders: PT Eval and Treat   Medical Diagnosis from Referral: M21.379 (ICD-10-CM) - Foot-drop, unspecified laterality  Evaluation Date: 10/18/2022  Authorization Period Expiration: 4/4/2023  Plan of Care Expiration: 3/3/2023  Visit # / Visits authorized: 2/ 8 (4 visits in 2022)  Insurance: [employer] OPEN ACCESS PLUS  FOTO: 2/3     Time In: 9:00 am  Time Out: 9:30 am  Total Billable Time: 30 minutes    Precautions:   Standard, cancer, and R UE lymphedema    Subjective     Pt reports: no pain in her ankle today. Her ankle is a little swollen from sitting down at work last night. She has not been wearing the AFO as ortho doc told her it is probably causing the swelling in her knee to go back to  and have them assess it. She would like to do a shortened session today as she has to leave at 9:30.  She was compliant with home exercise program  Response to previous treatment: no adverse events  Functional change: not stomping as much    Pain: 0/10  Location: left ankle      Objective     Objective Measures updated at progress report unless specified.     B/P: 142/96; HR: 59 bpm; O2: 100%    Lab Values: 9/12/22  Platelets: 216  ANC: 4.9  Hematacrit: 37.3  Hemoglobin: 12.3      Treatment     Mirian received therapeutic exercises to develop strength, endurance, ROM, flexibility, posture and core stabilization for 20 minutes including:   Stationary bike, seat 6, level 1  Supine ankle DF, Min A, 2 x 10  Supine ankle inversion, yellow band, 2 x 10  Supine ankle eversion, 2 x 10, yellow band  Supine ankle PF 2 x 10, red band  Toe raises, 1 x 10    Home Exercises Provided and Patient Education Provided     Education  provided:   - compliance with HEP  - role of PT and goals for PT     Written Home Exercises Provided: Patient instructed to cont prior HEP.  Exercises were reviewed and Mirian was able to demonstrate them prior to the end of the session.  Mirian demonstrated good  understanding of the education provided.     See EMR under Patient Instructions for exercises provided prior visit.    Assessment     Patient is tolerating therapy well. She did not resume her usual balance activities due to time constraints. She continues to require Min A to move through her full ROM with ankle DF. She required one tactile cue to avoid substitutions with ankle inversion and eversion exercise. Attempt to resume her balance activities next visit. Will progress as tolerated.  Mirian Is progressing well towards her goals.   Pt prognosis is Good.     Pt will continue to benefit from skilled outpatient physical therapy to address the deficits listed in the problem list box on initial evaluation, provide pt/family education and to maximize pt's level of independence in the home and community environment.     Pt's spiritual, cultural and educational needs considered and pt agreeable to plan of care and goals.     Anticipated barriers to physical therapy: none anticipated    Goals:   Short Term Goals:  4 weeks  1. Pt will increase AROM of left ankle to neutral dorsiflexion to promote greater ease with performing normal gait pattern. (progressing, not met)  2. Pt. to demonstrate increased strength of left lower extremity to 4/5  to increase stability during community ambulation (progressing, not met)  3. Pt. will improve TUG test score to 12 seconds or less in order to perform safe transfers and for patient to be in low/moderate risk for falls category (progressing, not met)  4. Pt will tolerate 6 minute walk test. (progressing, not met)  5. Pt will initiate home exercise program to improve strength, flexibility, endurance, mobility & balance to return pt  to PLOF (met, 1/5/23)  6. Pt will tolerate 10 min or greater of time in light-->moderate intensity cardio (I.e. Bike, NuStep) to improve endurance to assist in performing her usual work activities (progressing, not met)  7. Pt to demonstrate tandem stance 30 sec or greater w/out UE support in order to improve balance to progress to singe leg stance to decrease fall risk in performance of ADL's (progressing, not met)     Long Term Goals: 8 weeks  1. Pt will increase AROM of left ankle dorsiflexion to 5 degrees in order to perform normal gait pattern when ambulating on uneven ground (progressing, not met)  2. Pt. will increase strength of left lower extremity to 5/5  to increase stability during ambulation on uneven surfaces,to increase tolerance for ADL and work activities. (progressing, not met)  3. Pt will be independent with HEP to improve ROM, strength, balance,  and independence with ADL's (progressing, not met)  4. Pt. will improve TUG test score to 10 seconds or less in order to ambulate safely in community and for patient to be in low risk for falls category (progressing, not met)  5. Pt. will improve 6 minute walk test score by 30 meters in order for patient to be in low risk for falls category (progressing, not met)  6. Patient will report compliance with walking program 5x week for 10 -30min each day to improve overall cardiovascular function and decrease cancer related fatigue at discharge. (progressing, not met)    Plan     Updated Plan of care Certification: 1/5/2023 to 3/3/2023.     Outpatient Physical Therapy 2 times weekly for 8 weeks to include the following interventions: Gait Training, Manual Therapy, Neuromuscular Re-ed, Patient Education, Self Care, Therapeutic Activities, Therapeutic Exercise, and IASTM . Dry needling with manual therapy techniques to decrease pain, inflammation and swelling, increase circulation and promote healing process.      Gail Gutierrez, PT

## 2023-01-19 NOTE — PROGRESS NOTES
Physical Therapy Daily Treatment Note       Date: 1/19/2023   Name: Phylicia Vásquez  Clinic Number: [id number]    Therapy Diagnosis:   Encounter Diagnoses   Name Primary?    Lymphedema of right arm Yes    Impaired functional mobility and activity tolerance        Physician: Negrito Verde MD    Physician Orders: PT Eval and Treat   Medical Diagnosis: Carcinoma of axillary tail of right breast in female, estrogen receptor positive [C50.611, Z17.0], Localized edema      Evaluation Date: 6/3/2022  Authorization Period Expiration: 12/31/22  Updated Plan of Care Certification Period: 2/3/23  Visit # / Visits authorized: 4/20 (plus 20 from last year)  Insurance: [employer]  FOTO: 3/3     Time In: 8:05 AM   Time Out:  9:02 AM  Total Billable Time: 57 minutes     Precautions: Standard, cancer and Right UE lymphedema      Subjective     Pt reports: No new complaints. She purchased compression garments online. She removed bandages just prior to PT session.  She was compliant with compression pump  Response to previous treatment: no adverse reaction  Pain Scale: Phylicia rates pain on a scale of 0/10 on VAS.   Pain location: N/A     Fatigue: not assessed  Functional change: none stated  Treatment:   Chemotherapy:  4 cycles of AC and 4 cycles of Taxol patrick adjuvantly  Radiation: completed radiation therapy on 8/20/20.   Endocrine therapy: On 11/1/2020 started on Anastrozole with with Dr. Verde     Surgery date: Pt is s/p bilateral mastectomy with R SLNB, TE reconstruction in 4/2021. pt will have permanent reconstruction with ROWAN flap, but she states she needs to lose weight first.      Objective     Objective Measures updated at progress report unless specified.     [place] RIGHT UE LEFT UE DIFF RUE Diff RUE Diff RUE Diff RUE Diff RUE Diff RUE Diff RUE Diff RUE Diff RUE Diff RUE Diff RUE Diff RUE Diff RUE Diff RUE Diff RUE Diff RUE Diff RUE Diff RUE Diff RUE Diff RUE  "Diff   Date 6/3/22 6/3/22 6/3/22 6/22/22 6/22/22 6/29/22 6/29/22 7/6/22 7/6/22 7/20/22 7/20/22 7/27/22 7/27/22 8/18/22 8/18/22 8/24/22 8/24/22 9/2/22 9/2/22 9/19/22 9/19/22 9/26/22 9/26/22 10/4/22 10/4/22 10/17/22 10/17/22 10/24/22 10/24/22 11/2/22 11/2/22 11/10/22 11/10/22 11/28/22 11/28/22 12/15/22 12/15/22 1/5/23 1/5/23 1/12/23 1/12/23 1/19/23 1/19/23   E + 8" 47 cm 46.5 cm 0.5 cm 46.5cm -0.5 44cm -2.5 45 cm +1 47 cm +2 45cm -2 43.5cm -1.5 43.5cm No chg 44cm +0.5 46cm +2 44.5cm -1.5 44.5 cm No chg 45 cm +0.5 44.5 cm -0.5 46 cm +1.5 44.5 cm -1.5 45.5 cm +1 44.5cm -1 45 cm +0.5 44.5 cm -0.5 44 cm -0.5   E + 6" 44 cm 42.5 cm 1.5 cm 44 cm No chg 44cm No chg 43.5cm -0.5 45cm +1.5 42cm -3 41cm -1 43cm +2 42.5cm -0.5 43.5cm +1 44.5cm +1 45cm +0.5 43.5 cm -1.5 43 cm -0.5 43 cm No chg 44.5 cm +1.5 44.5 cm No chg 45 cm +0.5 43 cm -2 43 cm No chg 43.5 cm +0.5   E + 4" 39.5 cm 37 cm 2.5 cm 39cm -0.5 40.5 cm +1.5 40.5cm No chg 41cm +0.5 39 cm -2 39cm No chg 39.5cm +0.5 40cm +0.5 39cm -1 41.5cm +2.5 40cm -1.5 40cm No chg 40cm No chg 40cm No chg 40 cm No chg 40.5 cm No chg 41.5 cm +1 40.5 cm -1 40.5cm No chg 40 cm -0.5   E + 2" 34.5 cm 33 cm 1.5 cm 34.5 cm No chg 35 cm +0.5 34.5cm -0.5 35cm +0.5 34cm -1 34.5cm +0.5 34.5cm No chg 35cm +0.5 34cm -1 35cm +1 35cm No chg 34 cm -1 35 cm +1 35.5 cm +0.5 34.5 cm -1 35 cm +0.5 36cm +1 35 cm -1 34.5 cm -0.5 34 cm -0.5   Elbow 28 cm 28 cm 0 cm 27 cm -1 27.5 cm +0.5 28 cm +0.5 28cm No chg 27cm -1 27cm No chg 27.5cm +0.5 27cm -0.5 27.5cm +0.5 27.5cm No chg 27cm -0.5 27 cm No chg 27 cm No chg 27 cm No chg 26 cm -1 27 cm +1 27.5 cm +0.5 27 cm -0.5 26.5 cm -0.5 26.5 cm No chg   W+ 8" 29 cm  28.5cm 0.5 cm 28cm -1 28 cm No chg 29.5cm +1.5 29cm -0.5 28.5cm -0.5 28cm -0.5 29.5cm +1.5 29cm -0.5 28.5cm -0.5 29cm +0.5 28cm -1 29.5cm +1.5 30 cm +0.5 28cm -2 28.5 cm +0.5 29 cm +0.5 29.5 cm +0.5 29.5 cm No chg 29 cm -0.5 28 cm -1   W +  6" 29.5 cm 27 cm 2.5 cm 29cm -0.5 29cm No chg 29cm No chg 29cm No " "chg 29cm No chg 29cm No chg 29.5cm +0.5 28.5cm -1 29cm +0.5 29cm No chg 29cm No chg 29 cm No chg 29 cm No chg 28.5cm -0.5 28.5 cm No chg 29 cm +0.5 29 cm No chg 29 cm No chg 29 cm No chg 28.5 cm -0.5   W + 4" 25.5 cm 23.5 cm 2 cm 25cm -0.5 24.5 cm -0.5 25.5cm +1 25 cm -0.5 25cm No chg 24.5cm -0.5 25.5cm +1 24.5cm -1 25cm +0.5 25.5cm +0.5  24.5cm -1 25 cm +0.5 25.5 cm +0.5 25 cm -0.5 24.5 cm -0.5 24.5 cm No chg 24.5 cm No chg 24 cm -0.5 24.5 cm +0.5 24.5 cm No chg   Wrist 18 cm 17.5 cm 0.5 cm 18.5cm +0.5 18 cm -0.5 18.5cm +0.5 18cm -0.5 18cm No chg 18cm No chg 18cm No chg 18cm No chg 18cm No chg 18cm No chg 18cm No chg 18 cm No chg 18 cm No chg 18 cm No chg 18 cm  No chg 18 cm No chg 18 cm No chg 17.5 cm -0.5 17.5 cm No chg 17.5 cm No chg   DPC 21.5 cm 20 cm 1.5 cm 20cm -1.5 20.5cm +0.5 21 cm +0.5 21cm -0.5 20.5cm -0.5 21cm +0.5 21.5cm +0.5 21cm -0.5 21cm No chg 20.5 cm -0.5 21.5cm +1 20.5cm -1 20.5 cm No chg 20.5 cm No chg 20 cm -0.5 20cm No chg 20 cm No chg 19.5 cm -0.5 19.5 cm No chg 20 cm +0.5   IP Thumb 7.5 cm 7 cm 0.5 cm 8cm +0.5 7.5cm -0.5 8 cm +0.5 8cm No chg 7.5cm -0.5 7.5cm No chg 8cm +0.5 7.5cm -0.5 7.5cm No Chg 8cm +0.5 7.5cm -0.5 7.5cm No chg 7.5 cm No chg 7cm -0.5 7.5 cm +0.5 7.5 cm No chg 7.5 cm No chg 7.5 cm No chg 7.5 cm No chg 7.5 cm No chg     Measurements taken below on 1/17/23  RUE  Wrist (c): 17.5 cm  Elbow (e): 26.5 cm  Mid-lower arm is :28.5 cm  Upper arm (g): 43.5 cm    Arm length: 43cm    Treatment     Phylicia received the following manual therapy techniques were performed to increased myofascial/soft tissue length, mobility and pliability, increase PROM, AROM and function as well as to decrease pain for 62 minutes:    Measurements taken above    Diaphragmatic breathing x 5 reps    Short Neck- held due to thyroid goiter  Clear Healthy Lymph Nodes:  Left Axilla                                                  Right  Groin  Open Anastomoses:  Anterior Axillo-Axillary  (AAA)                     "                Axillo-Inguinal     (AI)                                    Posterior Axillo-Axillary  (LEEANN)    Right Upper Arm (Lateral and Medial)  Right Antecubital Fossa  Right forearm, dorsal and ventral aspect  Dorsum of R hand  R fingers    Rework right hand  Rework Right UE  Rework Anastomoses   Rework Healthy lymph Nodes    Diaphragmatic breathing, x 5 reps    PT applies  gauze to R hand and fingers and stockinette, cotton artiflex, and short stretch bandages to pt's RIGHT/LEFT/BILATERAL: right upper extremity. Pt educated to wear bandaging for 2-3 days unless it causes pain, numbness, changes in skin color/temperature, or it starts to fall down.  If removed, pt instructed to roll up bandages and cotton padding and bring to next session. If bandages are removed, pt can wear tubigrip.  Pt has Vet glove to cover bandages in the shower and directions on how to care for bandages. Pt verbalizes understanding of all topics.                 Home Exercise Program and Patient Education   Education provided re:  - role of PT in multi - disciplinary team, goals for PT  - progress towards goals       Written Home Exercises Provided: Patient instructed to cont prior HEP.  Exercises were reviewed and Mirian was able to demonstrate them prior to the end of the session.  Mirian demonstrated good  understanding of the education provided.     See EMR under Media for exercises provided 6/8/2022- for self manual lymph drainage techniques.    Pt has no cultural, educational or language barriers to learning provided.    Assessment     Patient tolerated session well. Pt presents with reductions in her right arm circumference overall.  Pt tolerated complete decongestive therapy without issue in clinic. Once she received her compression garments, we'll gauge if size is correct and move forward as appropriate. She has met goals a noted below and will progress as tolerated.   Pt prognosis is Good. Pt will continue to benefit from skilled  outpatient physical therapy to address the deficits listed in the problem list chart on initial evaluation, provide pt/family education and to maximize pt's level of independence in the home and community environment.     Anticipated barriers to physical therapy: none anticipated    Goals as follows:  Short Term Goals (6 weeks)  1. Pt will demo decrease in girth in right upper extremity by >/= 1cm to allow for improved UE symmetry, clothing choice, ROM, and UE function. (met, 8/18/22  2. Patient will demonstrate 100% knowledge of lymphedema precautions and signs of infection to allow for reduced risk of lymphedema, reduced risk of infection, and/or exacerbation.  (met)  3. Patient will perform self lymph drainage techniques to enhance lymphatic drainage and mobility.  (met, 7/6/22)  4. Patient will tolerate daily activities with multilayered bandaging to enhance lymphatic drainage and skin elasticity.  (met, 7/6/22)  5. Pt will tolerate HEP for improved strength, functional mobility, ROM, posture, and endurance. (met, 7/6/22)        Long Term Goals: ( 12  weeks)  1. Patient to show decreased girth in Right upper extremity by >/= 2cm to allow for improved UE symmetry, clothing choice, ROM, and UE function.  (met partially, 8/18/22)  2. Patient will show reduction in density to mild or less with improved contour of limb to allow for improved cosmesis, improved lymphatic drainage, and functional mobility.  (met, 9/2/22)  3. Patient to mary/doff compression garment with daily compliance to support lymphatic mobility and skin elasticity.  (progressing, not met)  4. Pt to show improved postural awareness and alignment for improved functional mobility.  (progressing, not met)  5. Pt to be independent and compliant with HEP to allow for improved ROM, reach and carry with functional endurance and strength.    (met, 10/4/22)           Plan   Outpatient physical therapy 2x week for 4 weeks to include the following:   Manual  Therapy, Neuromuscular Re-ed, Orthotic Management and Training, Patient Education, Self Care, Therapeutic Activities and Therapeutic Exercise.            Therapist: Marcela Price, PT  1/19/2023

## 2023-01-24 ENCOUNTER — CLINICAL SUPPORT (OUTPATIENT)
Dept: REHABILITATION | Facility: HOSPITAL | Age: 53
End: 2023-01-24
Payer: COMMERCIAL

## 2023-01-24 DIAGNOSIS — I89.0 LYMPHEDEMA OF RIGHT ARM: Primary | ICD-10-CM

## 2023-01-24 DIAGNOSIS — Z74.09 IMPAIRED FUNCTIONAL MOBILITY AND ACTIVITY TOLERANCE: ICD-10-CM

## 2023-01-24 PROCEDURE — 97140 MANUAL THERAPY 1/> REGIONS: CPT

## 2023-01-24 NOTE — PROGRESS NOTES
Physical Therapy Daily Treatment Note       Date: 1/24/2023   Name: Phylicia Vásquez  Clinic Number: [id number]    Therapy Diagnosis:   Encounter Diagnoses   Name Primary?    Lymphedema of right arm Yes    Impaired functional mobility and activity tolerance        Physician: Negrito Verde MD    Physician Orders: PT Eval and Treat   Medical Diagnosis: Carcinoma of axillary tail of right breast in female, estrogen receptor positive [C50.611, Z17.0], Localized edema      Evaluation Date: 6/3/2022  Authorization Period Expiration: 12/31/22  Updated Plan of Care Certification Period: 2/3/23  Visit # / Visits authorized: 5/20 (plus 20 from last year)  Insurance: [employer]  FOTO: 3/3     Time In: 10:02 AM   Time Out:  11:02 AM  Total Billable Time:  60minutes     Precautions: Standard, cancer and Right UE lymphedema      Subjective     Pt reports: She has intermittent right elbow pain x 2 weeks. Pt states she's having pain from R shoulder to elbow today- pt states it may be from doing her hair. It was 7/10 earlier today. Bandages lasted 3 days. She has ordered compression garments online.  She was compliant with compression pump  Response to previous treatment: no adverse reaction  Pain Scale: Phylicia rates pain on a scale of 5/10 on VAS.   Pain location: mostly in R elbow currently     Fatigue: not assessed  Functional change: none stated  Treatment:   Chemotherapy:  4 cycles of AC and 4 cycles of Taxol patrick adjuvantly  Radiation: completed radiation therapy on 8/20/20.   Endocrine therapy: On 11/1/2020 started on Anastrozole with with Dr. Verde     Surgery date: Pt is s/p bilateral mastectomy with R SLNB, TE reconstruction in 4/2021. pt will have permanent reconstruction with ROWAN flap, but she states she needs to lose weight first.      Objective     Objective Measures updated at progress report unless specified.     LANDMARK RIGHT UE LEFT UE DIFF RUE Diff RUE  "Diff RUE Diff RUE Diff RUE Diff RUE Diff RUE Diff RUE Diff RUE Diff RUE Diff RUE Diff RUE Diff RUE Diff RUE Diff RUE Diff RUE Diff RUE Diff RUE Diff RUE Diff RUE Diff RUE Diff   Date 6/3/22 6/3/22 6/3/22 6/22/22 6/22/22 6/29/22 6/29/22 7/6/22 7/6/22 7/20/22 7/20/22 7/27/22 7/27/22 8/18/22 8/18/22 8/24/22 8/24/22 9/2/22 9/2/22 9/19/22 9/19/22 9/26/22 9/26/22 10/4/22 10/4/22 10/17/22 10/17/22 10/24/22 10/24/22 11/2/22 11/2/22 11/10/22 11/10/22 11/28/22 11/28/22 12/15/22 12/15/22 1/5/23 1/5/23 1/12/23 1/12/23 1/19/23 1/19/23 1/24/23 1/24/23   E + 8" 47 cm 46.5 cm 0.5 cm 46.5cm -0.5 44cm -2.5 45 cm +1 47 cm +2 45cm -2 43.5cm -1.5 43.5cm No chg 44cm +0.5 46cm +2 44.5cm -1.5 44.5 cm No chg 45 cm +0.5 44.5 cm -0.5 46 cm +1.5 44.5 cm -1.5 45.5 cm +1 44.5cm -1 45 cm +0.5 44.5 cm -0.5 44 cm -0.5 45 cm +0.5   E + 6" 44 cm 42.5 cm 1.5 cm 44 cm No chg 44cm No chg 43.5cm -0.5 45cm +1.5 42cm -3 41cm -1 43cm +2 42.5cm -0.5 43.5cm +1 44.5cm +1 45cm +0.5 43.5 cm -1.5 43 cm -0.5 43 cm No chg 44.5 cm +1.5 44.5 cm No chg 45 cm +0.5 43 cm -2 43 cm No chg 43.5 cm +0.5 44 cm +0.5   E + 4" 39.5 cm 37 cm 2.5 cm 39cm -0.5 40.5 cm +1.5 40.5cm No chg 41cm +0.5 39 cm -2 39cm No chg 39.5cm +0.5 40cm +0.5 39cm -1 41.5cm +2.5 40cm -1.5 40cm No chg 40cm No chg 40cm No chg 40 cm No chg 40.5 cm No chg 41.5 cm +1 40.5 cm -1 40.5cm No chg 40 cm -0.5 40.5 cm +0.5   E + 2" 34.5 cm 33 cm 1.5 cm 34.5 cm No chg 35 cm +0.5 34.5cm -0.5 35cm +0.5 34cm -1 34.5cm +0.5 34.5cm No chg 35cm +0.5 34cm -1 35cm +1 35cm No chg 34 cm -1 35 cm +1 35.5 cm +0.5 34.5 cm -1 35 cm +0.5 36cm +1 35 cm -1 34.5 cm -0.5 34 cm -0.5 34.5 cm +0.5   Elbow 28 cm 28 cm 0 cm 27 cm -1 27.5 cm +0.5 28 cm +0.5 28cm No chg 27cm -1 27cm No chg 27.5cm +0.5 27cm -0.5 27.5cm +0.5 27.5cm No chg 27cm -0.5 27 cm No chg 27 cm No chg 27 cm No chg 26 cm -1 27 cm +1 27.5 cm +0.5 27 cm -0.5 26.5 cm -0.5 26.5 cm No chg 26.5 cm No chg   W+ 8" 29 cm  28.5cm 0.5 cm 28cm -1 28 cm No chg 29.5cm +1.5 29cm -0.5 " "28.5cm -0.5 28cm -0.5 29.5cm +1.5 29cm -0.5 28.5cm -0.5 29cm +0.5 28cm -1 29.5cm +1.5 30 cm +0.5 28cm -2 28.5 cm +0.5 29 cm +0.5 29.5 cm +0.5 29.5 cm No chg 29 cm -0.5 28 cm -1 27.5 cm -0.5   W +  6" 29.5 cm 27 cm 2.5 cm 29cm -0.5 29cm No chg 29cm No chg 29cm No chg 29cm No chg 29cm No chg 29.5cm +0.5 28.5cm -1 29cm +0.5 29cm No chg 29cm No chg 29 cm No chg 29 cm No chg 28.5cm -0.5 28.5 cm No chg 29 cm +0.5 29 cm No chg 29 cm No chg 29 cm No chg 28.5 cm -0.5 28.5 cm No chg   W + 4" 25.5 cm 23.5 cm 2 cm 25cm -0.5 24.5 cm -0.5 25.5cm +1 25 cm -0.5 25cm No chg 24.5cm -0.5 25.5cm +1 24.5cm -1 25cm +0.5 25.5cm +0.5  24.5cm -1 25 cm +0.5 25.5 cm +0.5 25 cm -0.5 24.5 cm -0.5 24.5 cm No chg 24.5 cm No chg 24 cm -0.5 24.5 cm +0.5 24.5 cm No chg 24.5 cm No chg   Wrist 18 cm 17.5 cm 0.5 cm 18.5cm +0.5 18 cm -0.5 18.5cm +0.5 18cm -0.5 18cm No chg 18cm No chg 18cm No chg 18cm No chg 18cm No chg 18cm No chg 18cm No chg 18 cm No chg 18 cm No chg 18 cm No chg 18 cm  No chg 18 cm No chg 18 cm No chg 17.5 cm -0.5 17.5 cm No chg 17.5 cm No chg 17.5 cm No chg   DPC 21.5 cm 20 cm 1.5 cm 20cm -1.5 20.5cm +0.5 21 cm +0.5 21cm -0.5 20.5cm -0.5 21cm +0.5 21.5cm +0.5 21cm -0.5 21cm No chg 20.5 cm -0.5 21.5cm +1 20.5cm -1 20.5 cm No chg 20.5 cm No chg 20 cm -0.5 20cm No chg 20 cm No chg 19.5 cm -0.5 19.5 cm No chg 20 cm +0.5 20 cm No chg   IP Thumb 7.5 cm 7 cm 0.5 cm 8cm +0.5 7.5cm -0.5 8 cm +0.5 8cm No chg 7.5cm -0.5 7.5cm No chg 8cm +0.5 7.5cm -0.5 7.5cm No Chg 8cm +0.5 7.5cm -0.5 7.5cm No chg 7.5 cm No chg 7cm -0.5 7.5 cm +0.5 7.5 cm No chg 7.5 cm No chg 7.5 cm No chg 7.5 cm No chg 7.5 cm No chg 7 cm -0.5     Measurements taken below on 1/17/23  RUE  Wrist (c): 17.5 cm  Elbow (e): 26.5 cm  Mid-lower arm is :28.5 cm  Upper arm (g): 43.5 cm    Arm length: 43cm    Treatment     Phylicia received the following manual therapy techniques were performed to increased myofascial/soft tissue length, mobility and pliability, increase PROM, AROM and " function as well as to decrease pain for 55 minutes:    Measurements taken above    Diaphragmatic breathing x 5 reps    Short Neck- held due to thyroid goiter  Clear Healthy Lymph Nodes:  Left Axilla                                                  Right  Groin  Open Anastomoses:  Anterior Axillo-Axillary  (AAA)                                    Axillo-Inguinal     (AI)                                    Posterior Axillo-Axillary  (LEEANN) -not performed today  Queen's wave to R breast  J-stroke at lateral chest wall  Inferior trunk slide  Repeat J-stroke    Right Upper Arm (Lateral and Medial)  Right Antecubital Fossa  Right forearm, dorsal and ventral aspect  Dorsum of R hand  R fingers    Rework right hand  Rework Right UE  Rework Anastomoses, including LEEANN  Rework Healthy lymph Nodes    Diaphragmatic breathing, x 5 reps    PT applies  gauze to R hand and fingers and stockinette, cotton artiflex, and short stretch bandages to pt's RIGHT/LEFT/BILATERAL: right upper extremity. Pt educated to wear bandaging for 2-3 days unless it causes pain, numbness, changes in skin color/temperature, or it starts to fall down.  If removed, pt instructed to roll up bandages and cotton padding and bring to next session. If bandages are removed, pt can wear tubigrip.  Pt has Vet glove to cover bandages in the shower and directions on how to care for bandages. Pt verbalizes understanding of all topics.        Phylicia received therapeutic exercises to develop strength and posture for 5 minutes including:     Scapular retractions x 10 reps  Rows with red band x 10 reps  Pt performs wrist flexion and extension stretch       Home Exercise Program and Patient Education   Education provided re:  - role of PT in multi - disciplinary team, goals for PT  - progress towards goals   -postural awareness    Written Home Exercises Provided: Patient instructed to cont prior HEP.  Exercises were reviewed and Mirian was able to demonstrate them prior to  the end of the session.  Mirian demonstrated good  understanding of the education provided.     See EMR under Media for exercises provided 6/8/2022- for self manual lymph drainage techniques.    Pt has no cultural, educational or language barriers to learning provided.    Assessment     Patient tolerated session well. Pt presents with reductions in her right arm circumference overall, but she demo's some increases in her upper right arm today. Added breast and lateral trunk techniques to MLD to address this.  Pt tolerated complete decongestive therapy without issue in clinic. Emphasized postural awareness and exercises to see if this improves R shoulder to elbow pain.  Once she receives her compression garments, we'll gauge if size is correct and move forward as appropriate. will progress as tolerated.   Pt prognosis is Good. Pt will continue to benefit from skilled outpatient physical therapy to address the deficits listed in the problem list chart on initial evaluation, provide pt/family education and to maximize pt's level of independence in the home and community environment.     Anticipated barriers to physical therapy: none anticipated    Goals as follows:  Short Term Goals (6 weeks)  1. Pt will demo decrease in girth in right upper extremity by >/= 1cm to allow for improved UE symmetry, clothing choice, ROM, and UE function. (met, 8/18/22  2. Patient will demonstrate 100% knowledge of lymphedema precautions and signs of infection to allow for reduced risk of lymphedema, reduced risk of infection, and/or exacerbation.  (met)  3. Patient will perform self lymph drainage techniques to enhance lymphatic drainage and mobility.  (met, 7/6/22)  4. Patient will tolerate daily activities with multilayered bandaging to enhance lymphatic drainage and skin elasticity.  (met, 7/6/22)  5. Pt will tolerate HEP for improved strength, functional mobility, ROM, posture, and endurance. (met, 7/6/22)        Long Term Goals: ( 12   weeks)  1. Patient to show decreased girth in Right upper extremity by >/= 2cm to allow for improved UE symmetry, clothing choice, ROM, and UE function.  (met partially, 8/18/22)  2. Patient will show reduction in density to mild or less with improved contour of limb to allow for improved cosmesis, improved lymphatic drainage, and functional mobility.  (met, 9/2/22)  3. Patient to mary/doff compression garment with daily compliance to support lymphatic mobility and skin elasticity.  (progressing, not met)  4. Pt to show improved postural awareness and alignment for improved functional mobility.  (progressing, not met)  5. Pt to be independent and compliant with HEP to allow for improved ROM, reach and carry with functional endurance and strength.    (met, 10/4/22)           Plan   Outpatient physical therapy 2x week for 4 weeks to include the following:   Manual Therapy, Neuromuscular Re-ed, Orthotic Management and Training, Patient Education, Self Care, Therapeutic Activities and Therapeutic Exercise.            Therapist: Marcela Price, PT  1/24/2023

## 2023-01-26 ENCOUNTER — CLINICAL SUPPORT (OUTPATIENT)
Dept: REHABILITATION | Facility: HOSPITAL | Age: 53
End: 2023-01-26
Payer: COMMERCIAL

## 2023-01-26 DIAGNOSIS — I89.0 LYMPHEDEMA OF RIGHT ARM: Primary | ICD-10-CM

## 2023-01-26 DIAGNOSIS — R29.898 WEAKNESS OF LEFT LOWER EXTREMITY: Primary | ICD-10-CM

## 2023-01-26 DIAGNOSIS — R26.89 IMPAIRMENT OF BALANCE: ICD-10-CM

## 2023-01-26 DIAGNOSIS — Z74.09 IMPAIRED FUNCTIONAL MOBILITY AND ACTIVITY TOLERANCE: ICD-10-CM

## 2023-01-26 DIAGNOSIS — R26.9 ABNORMALITY OF GAIT: ICD-10-CM

## 2023-01-26 PROCEDURE — 97140 MANUAL THERAPY 1/> REGIONS: CPT

## 2023-01-26 PROCEDURE — 97112 NEUROMUSCULAR REEDUCATION: CPT

## 2023-01-26 PROCEDURE — 97110 THERAPEUTIC EXERCISES: CPT

## 2023-01-26 NOTE — PROGRESS NOTES
Physical Therapy Daily Treatment Note       Date: 1/26/2023   Name: Phylicia Vsáquez  Clinic Number: [id number]    Therapy Diagnosis:   Encounter Diagnoses   Name Primary?    Lymphedema of right arm Yes    Impaired functional mobility and activity tolerance        Physician: Negrito Verde MD    Physician Orders: PT Eval and Treat   Medical Diagnosis: Carcinoma of axillary tail of right breast in female, estrogen receptor positive [C50.611, Z17.0], Localized edema      Evaluation Date: 6/3/2022  Authorization Period Expiration: 12/31/22  Updated Plan of Care Certification Period: 2/3/23  Visit # / Visits authorized: 6/20 (plus 20 from last year)  Insurance: [employer]  FOTO: 3/3     Time In: 8:13 AM (Late arrival)   Time Out:  9:03 AM  Total Billable Time:  50 minutes     Precautions: Standard, cancer and Right UE lymphedema      Subjective     Pt reports: No new complaints. Denies pain.  She was compliant with compression pump  Response to previous treatment: no adverse reaction  Pain Scale: Phylicia rates pain on a scale of 0/10 on VAS.   Pain location: N/A    Fatigue: not assessed  Functional change: none stated  Treatment:   Chemotherapy:  4 cycles of AC and 4 cycles of Taxol patrick adjuvantly  Radiation: completed radiation therapy on 8/20/20.   Endocrine therapy: On 11/1/2020 started on Anastrozole with with Dr. Verde     Surgery date: Pt is s/p bilateral mastectomy with R SLNB, TE reconstruction in 4/2021. pt will have permanent reconstruction with ROWAN flap, but she states she needs to lose weight first.      Objective     Objective Measures updated at progress report unless specified.     LANDMARK RIGHT UE LEFT UE DIFF RUE Diff RUE Diff RUE Diff RUE Diff RUE Diff RUE Diff RUE Diff RUE Diff RUE Diff RUE Diff RUE Diff RUE Diff RUE Diff RUE Diff RUE Diff RUE Diff RUE Diff RUE Diff RUE Diff RUE Diff RUE Diff   Date 6/3/22 6/3/22 6/3/22 6/22/22 6/22/22  "6/29/22 6/29/22 7/6/22 7/6/22 7/20/22 7/20/22 7/27/22 7/27/22 8/18/22 8/18/22 8/24/22 8/24/22 9/2/22 9/2/22 9/19/22 9/19/22 9/26/22 9/26/22 10/4/22 10/4/22 10/17/22 10/17/22 10/24/22 10/24/22 11/2/22 11/2/22 11/10/22 11/10/22 11/28/22 11/28/22 12/15/22 12/15/22 1/5/23 1/5/23 1/12/23 1/12/23 1/19/23 1/19/23 1/24/23 1/24/23   E + 8" 47 cm 46.5 cm 0.5 cm 46.5cm -0.5 44cm -2.5 45 cm +1 47 cm +2 45cm -2 43.5cm -1.5 43.5cm No chg 44cm +0.5 46cm +2 44.5cm -1.5 44.5 cm No chg 45 cm +0.5 44.5 cm -0.5 46 cm +1.5 44.5 cm -1.5 45.5 cm +1 44.5cm -1 45 cm +0.5 44.5 cm -0.5 44 cm -0.5 45 cm +0.5   E + 6" 44 cm 42.5 cm 1.5 cm 44 cm No chg 44cm No chg 43.5cm -0.5 45cm +1.5 42cm -3 41cm -1 43cm +2 42.5cm -0.5 43.5cm +1 44.5cm +1 45cm +0.5 43.5 cm -1.5 43 cm -0.5 43 cm No chg 44.5 cm +1.5 44.5 cm No chg 45 cm +0.5 43 cm -2 43 cm No chg 43.5 cm +0.5 44 cm +0.5   E + 4" 39.5 cm 37 cm 2.5 cm 39cm -0.5 40.5 cm +1.5 40.5cm No chg 41cm +0.5 39 cm -2 39cm No chg 39.5cm +0.5 40cm +0.5 39cm -1 41.5cm +2.5 40cm -1.5 40cm No chg 40cm No chg 40cm No chg 40 cm No chg 40.5 cm No chg 41.5 cm +1 40.5 cm -1 40.5cm No chg 40 cm -0.5 40.5 cm +0.5   E + 2" 34.5 cm 33 cm 1.5 cm 34.5 cm No chg 35 cm +0.5 34.5cm -0.5 35cm +0.5 34cm -1 34.5cm +0.5 34.5cm No chg 35cm +0.5 34cm -1 35cm +1 35cm No chg 34 cm -1 35 cm +1 35.5 cm +0.5 34.5 cm -1 35 cm +0.5 36cm +1 35 cm -1 34.5 cm -0.5 34 cm -0.5 34.5 cm +0.5   Elbow 28 cm 28 cm 0 cm 27 cm -1 27.5 cm +0.5 28 cm +0.5 28cm No chg 27cm -1 27cm No chg 27.5cm +0.5 27cm -0.5 27.5cm +0.5 27.5cm No chg 27cm -0.5 27 cm No chg 27 cm No chg 27 cm No chg 26 cm -1 27 cm +1 27.5 cm +0.5 27 cm -0.5 26.5 cm -0.5 26.5 cm No chg 26.5 cm No chg   W+ 8" 29 cm  28.5cm 0.5 cm 28cm -1 28 cm No chg 29.5cm +1.5 29cm -0.5 28.5cm -0.5 28cm -0.5 29.5cm +1.5 29cm -0.5 28.5cm -0.5 29cm +0.5 28cm -1 29.5cm +1.5 30 cm +0.5 28cm -2 28.5 cm +0.5 29 cm +0.5 29.5 cm +0.5 29.5 cm No chg 29 cm -0.5 28 cm -1 27.5 cm -0.5   W +  6" 29.5 cm 27 cm 2.5 cm " "29cm -0.5 29cm No chg 29cm No chg 29cm No chg 29cm No chg 29cm No chg 29.5cm +0.5 28.5cm -1 29cm +0.5 29cm No chg 29cm No chg 29 cm No chg 29 cm No chg 28.5cm -0.5 28.5 cm No chg 29 cm +0.5 29 cm No chg 29 cm No chg 29 cm No chg 28.5 cm -0.5 28.5 cm No chg   W + 4" 25.5 cm 23.5 cm 2 cm 25cm -0.5 24.5 cm -0.5 25.5cm +1 25 cm -0.5 25cm No chg 24.5cm -0.5 25.5cm +1 24.5cm -1 25cm +0.5 25.5cm +0.5  24.5cm -1 25 cm +0.5 25.5 cm +0.5 25 cm -0.5 24.5 cm -0.5 24.5 cm No chg 24.5 cm No chg 24 cm -0.5 24.5 cm +0.5 24.5 cm No chg 24.5 cm No chg   Wrist 18 cm 17.5 cm 0.5 cm 18.5cm +0.5 18 cm -0.5 18.5cm +0.5 18cm -0.5 18cm No chg 18cm No chg 18cm No chg 18cm No chg 18cm No chg 18cm No chg 18cm No chg 18 cm No chg 18 cm No chg 18 cm No chg 18 cm  No chg 18 cm No chg 18 cm No chg 17.5 cm -0.5 17.5 cm No chg 17.5 cm No chg 17.5 cm No chg   DPC 21.5 cm 20 cm 1.5 cm 20cm -1.5 20.5cm +0.5 21 cm +0.5 21cm -0.5 20.5cm -0.5 21cm +0.5 21.5cm +0.5 21cm -0.5 21cm No chg 20.5 cm -0.5 21.5cm +1 20.5cm -1 20.5 cm No chg 20.5 cm No chg 20 cm -0.5 20cm No chg 20 cm No chg 19.5 cm -0.5 19.5 cm No chg 20 cm +0.5 20 cm No chg   IP Thumb 7.5 cm 7 cm 0.5 cm 8cm +0.5 7.5cm -0.5 8 cm +0.5 8cm No chg 7.5cm -0.5 7.5cm No chg 8cm +0.5 7.5cm -0.5 7.5cm No Chg 8cm +0.5 7.5cm -0.5 7.5cm No chg 7.5 cm No chg 7cm -0.5 7.5 cm +0.5 7.5 cm No chg 7.5 cm No chg 7.5 cm No chg 7.5 cm No chg 7.5 cm No chg 7 cm -0.5     Measurements taken below on 1/17/23  RUE  Wrist (c): 17.5 cm  Elbow (e): 26.5 cm  Mid-lower arm is :28.5 cm  Upper arm (g): 43.5 cm    Arm length: 43cm    Treatment     Phylicia received the following manual therapy techniques were performed to increased myofascial/soft tissue length, mobility and pliability, increase PROM, AROM and function as well as to decrease pain for 50 minutes:      Short Neck- held due to thyroid goiter  Clear Healthy Lymph Nodes:  Left Axilla                                                  Right  Groin  Open Anastomoses:  " Anterior Axillo-Axillary  (AAA)                                    Axillo-Inguinal     (AI)                                    Posterior Axillo-Axillary  (LEEANN) -not performed today  J-stroke at lateral chest wall  Inferior trunk slide  Repeat J-stroke    Right Upper Arm (Lateral and Medial)  Right Antecubital Fossa  Right forearm, dorsal and ventral aspect  Dorsum of R hand  R fingers    Rework right hand  Rework Right UE  Rework Anastomoses   Rework Healthy lymph Nodes    Diaphragmatic breathing, x 5 reps    PT applies  gauze to R hand and fingers and stockinette, cotton artiflex, and short stretch bandages to pt's RIGHT/LEFT/BILATERAL: right upper extremity. Pt educated to wear bandaging for 2-3 days unless it causes pain, numbness, changes in skin color/temperature, or it starts to fall down.  If removed, pt instructed to roll up bandages and cotton padding and bring to next session. If bandages are removed, pt can wear tubigrip.  Pt has Vet glove to cover bandages in the shower and directions on how to care for bandages. Pt verbalizes understanding of all topics.            Home Exercise Program and Patient Education   Education provided re:  - role of PT in multi - disciplinary team, goals for PT  - progress towards goals   -postural awareness    Written Home Exercises Provided: Patient instructed to cont prior HEP.  Exercises were reviewed and Mirian was able to demonstrate them prior to the end of the session.  Mirian demonstrated good  understanding of the education provided.     See EMR under Media for exercises provided 6/8/2022- for self manual lymph drainage techniques.    Pt has no cultural, educational or language barriers to learning provided.    Assessment     Patient tolerated session well. Session limited due to late arrival. Increased time spent removing bandages at start of session.  Pt tolerated complete decongestive therapy without issue in clinic. She reports less elbow pain since last session.   Once she receives her compression garments, we'll gauge if size is correct and move forward as appropriate. will progress as tolerated.   Pt prognosis is Good. Pt will continue to benefit from skilled outpatient physical therapy to address the deficits listed in the problem list chart on initial evaluation, provide pt/family education and to maximize pt's level of independence in the home and community environment.     Anticipated barriers to physical therapy: none anticipated    Goals as follows:  Short Term Goals (6 weeks)  1. Pt will demo decrease in girth in right upper extremity by >/= 1cm to allow for improved UE symmetry, clothing choice, ROM, and UE function. (met, 8/18/22  2. Patient will demonstrate 100% knowledge of lymphedema precautions and signs of infection to allow for reduced risk of lymphedema, reduced risk of infection, and/or exacerbation.  (met)  3. Patient will perform self lymph drainage techniques to enhance lymphatic drainage and mobility.  (met, 7/6/22)  4. Patient will tolerate daily activities with multilayered bandaging to enhance lymphatic drainage and skin elasticity.  (met, 7/6/22)  5. Pt will tolerate HEP for improved strength, functional mobility, ROM, posture, and endurance. (met, 7/6/22)        Long Term Goals: ( 12  weeks)  1. Patient to show decreased girth in Right upper extremity by >/= 2cm to allow for improved UE symmetry, clothing choice, ROM, and UE function.  (met partially, 8/18/22)  2. Patient will show reduction in density to mild or less with improved contour of limb to allow for improved cosmesis, improved lymphatic drainage, and functional mobility.  (met, 9/2/22)  3. Patient to mary/doff compression garment with daily compliance to support lymphatic mobility and skin elasticity.  (progressing, not met)  4. Pt to show improved postural awareness and alignment for improved functional mobility.  (progressing, not met)  5. Pt to be independent and compliant with HEP to  allow for improved ROM, reach and carry with functional endurance and strength.    (met, 10/4/22)           Plan   Outpatient physical therapy 2x week for 4 weeks to include the following:   Manual Therapy, Neuromuscular Re-ed, Orthotic Management and Training, Patient Education, Self Care, Therapeutic Activities and Therapeutic Exercise.            Therapist: Marcela Price, PT  1/26/2023

## 2023-01-26 NOTE — PROGRESS NOTES
Physical Therapy Daily Treatment Note     Name: Phylicia Vásquez  Clinic Number: [id number]    Therapy Diagnosis:   Encounter Diagnoses   Name Primary?    Weakness of left lower extremity Yes    Abnormality of gait     Impairment of balance        Physician: Marcela Calvillo PA-C    Visit Date: 1/26/2023    Physician Orders: PT Eval and Treat   Medical Diagnosis from Referral: M21.379 (ICD-10-CM) - Foot-drop, unspecified laterality  Evaluation Date: 10/18/2022  Authorization Period Expiration: 4/4/2023  Plan of Care Expiration: 3/3/2023  Visit # / Visits authorized: 3/ 8 (4 visits in 2022)  Insurance: [employer] OPEN ACCESS PLUS  FOTO: 2/3     Time In: 9:05 am  Time Out: 10:00 am  Total Billable Time: 55 minutes    Precautions:   Standard, cancer, and R UE lymphedema    Subjective     Pt reports: ankle doing alright today. She is her wearing AFO at work and has been trying to get into prosthetic place to get AFO adjusted.  She was compliant with home exercise program  Response to previous treatment: no adverse events  Functional change: not stomping as much    Pain: 0/10  Location: left ankle      Objective     Objective Measures updated at progress report unless specified.     B/P: 124/98; HR: 67 bpm; O2: 100%    Lab Values: 9/12/22  Platelets: 216  ANC: 4.9  Hematacrit: 37.3  Hemoglobin: 12.3      Treatment     Mirian received therapeutic exercises to develop strength, endurance, ROM, flexibility, posture and core stabilization for 40 minutes including:  Stationary bike, seat 6, level 1, 11 minutes 30 seconds  Supine ankle DF, Min A  2 sets x 10 reps  Supine ankle inversion, yellow band, 2 sets x 10 reps  Supine ankle eversion, yellow band 2 sets x 10 reps  Supine ankle PF, red band 2 sets x 10 reps  Toe raises, 2 sets x 10 reps  Calf raises, 2 sets x 10 reps  Calf stretch on slant board, 2 x 30 sec    Mirian participated in neuromuscular re-education activities to improve: Balance, Coordination, Proprioception and Posture  for 15 minutes. The following activities were included:  Tandem stance, firm surface 2 x 30 sec  Single leg stance, firm surface 2 x 30 sec  Feet together, eyes closed, firm surface 2 x 30 sec  Heel walking, 2 laps x 7 feet  Tandem walking, 2 laps x 15 feet    Home Exercises Provided and Patient Education Provided     Education provided:   - compliance with HEP  - role of PT and goals for PT     Written Home Exercises Provided: Patient instructed to cont prior HEP.  Exercises were reviewed and Mirian was able to demonstrate them prior to the end of the session.  Mirian demonstrated good  understanding of the education provided.     See EMR under Patient Instructions for exercises provided prior visit.    Assessment     Patient is tolerating therapy well. Patient was able to resume her balance training this visit with no increase in symptoms prior to leaving the clinic. She was able to perform all of today's new exercises with no difficulty but did require one hand on the wall or table to perform tandem walking and heel walking. Will progress as tolerated.  Mirian Is progressing well towards her goals.   Pt prognosis is Good.     Pt will continue to benefit from skilled outpatient physical therapy to address the deficits listed in the problem list box on initial evaluation, provide pt/family education and to maximize pt's level of independence in the home and community environment.     Pt's spiritual, cultural and educational needs considered and pt agreeable to plan of care and goals.     Anticipated barriers to physical therapy: none anticipated    Goals:   Short Term Goals:  4 weeks  1. Pt will increase AROM of left ankle to neutral dorsiflexion to promote greater ease with performing normal gait pattern. (progressing, not met)  2. Pt. to demonstrate increased strength of left lower extremity to 4/5  to increase stability during community ambulation (progressing, not met)  3. Pt. will improve TUG test score to 12 seconds  or less in order to perform safe transfers and for patient to be in low/moderate risk for falls category (progressing, not met)  4. Pt will tolerate 6 minute walk test. (progressing, not met)  5. Pt will initiate home exercise program to improve strength, flexibility, endurance, mobility & balance to return pt to PLOF (met, 1/5/23)  6. Pt will tolerate 10 min or greater of time in light-->moderate intensity cardio (I.e. Bike, NuStep) to improve endurance to assist in performing her usual work activities (progressing, not met)  7. Pt to demonstrate tandem stance 30 sec or greater w/out UE support in order to improve balance to progress to singe leg stance to decrease fall risk in performance of ADL's (progressing, not met)     Long Term Goals: 8 weeks  1. Pt will increase AROM of left ankle dorsiflexion to 5 degrees in order to perform normal gait pattern when ambulating on uneven ground (progressing, not met)  2. Pt. will increase strength of left lower extremity to 5/5  to increase stability during ambulation on uneven surfaces,to increase tolerance for ADL and work activities. (progressing, not met)  3. Pt will be independent with HEP to improve ROM, strength, balance,  and independence with ADL's (progressing, not met)  4. Pt. will improve TUG test score to 10 seconds or less in order to ambulate safely in community and for patient to be in low risk for falls category (progressing, not met)  5. Pt. will improve 6 minute walk test score by 30 meters in order for patient to be in low risk for falls category (progressing, not met)  6. Patient will report compliance with walking program 5x week for 10 -30min each day to improve overall cardiovascular function and decrease cancer related fatigue at discharge. (progressing, not met)    Plan     Updated Plan of care Certification: 1/5/2023 to 3/3/2023.     Outpatient Physical Therapy 2 times weekly for 8 weeks to include the following interventions: Gait Training,  Manual Therapy, Neuromuscular Re-ed, Patient Education, Self Care, Therapeutic Activities, Therapeutic Exercise, and IASTM . Dry needling with manual therapy techniques to decrease pain, inflammation and swelling, increase circulation and promote healing process.      Gail Gutierrez, PT

## 2023-02-03 ENCOUNTER — CLINICAL SUPPORT (OUTPATIENT)
Dept: REHABILITATION | Facility: HOSPITAL | Age: 53
End: 2023-02-03
Payer: COMMERCIAL

## 2023-02-03 DIAGNOSIS — I89.0 LYMPHEDEMA OF RIGHT ARM: Primary | ICD-10-CM

## 2023-02-03 DIAGNOSIS — R29.898 WEAKNESS OF LEFT LOWER EXTREMITY: Primary | ICD-10-CM

## 2023-02-03 DIAGNOSIS — R26.9 ABNORMALITY OF GAIT: ICD-10-CM

## 2023-02-03 DIAGNOSIS — R26.89 IMPAIRMENT OF BALANCE: ICD-10-CM

## 2023-02-03 DIAGNOSIS — Z74.09 IMPAIRED FUNCTIONAL MOBILITY AND ACTIVITY TOLERANCE: ICD-10-CM

## 2023-02-03 PROCEDURE — 97112 NEUROMUSCULAR REEDUCATION: CPT

## 2023-02-03 PROCEDURE — 97110 THERAPEUTIC EXERCISES: CPT

## 2023-02-03 PROCEDURE — 97140 MANUAL THERAPY 1/> REGIONS: CPT

## 2023-02-03 PROCEDURE — 97530 THERAPEUTIC ACTIVITIES: CPT

## 2023-02-03 NOTE — PROGRESS NOTES
Physical Therapy Daily Treatment Note     Name: Phylicia Vásquez  Clinic Number: [id number]    Therapy Diagnosis:   Encounter Diagnoses   Name Primary?    Weakness of left lower extremity Yes    Abnormality of gait     Impairment of balance        Physician: Marcela Calvillo PA-C    Visit Date: 2/3/2023    Physician Orders: PT Eval and Treat   Medical Diagnosis from Referral: M21.379 (ICD-10-CM) - Foot-drop, unspecified laterality  Evaluation Date: 10/18/2022  Authorization Period Expiration: 4/4/2023  Plan of Care Expiration: 3/3/2023  Visit # / Visits authorized: 4/ 8 (4 visits in 2022)  Insurance: [employer] OPEN ACCESS PLUS  FOTO: 2/3     Time In: 11:05 am  Time Out: 12:00 pm  Total Billable Time: 55 minutes    Precautions:   Standard, cancer, and R UE lymphedema    Subjective     Pt reports: having some L foot pain, started a couple of days ago after walking up the ramp in the parking garage at work  She was compliant with home exercise program  Response to previous treatment: no adverse events  Functional change: not stomping as much    Pain: 3/10  Location: left ankle      Objective     Objective Measures updated at progress report unless specified.     B/P: 138/97 mmHg; HR: 65 bpm; O2: 98%    Lab Values: 9/12/22  Platelets: 216  ANC: 4.9  Hematacrit: 37.3  Hemoglobin: 12.3      Treatment     Mirian received therapeutic exercises to develop strength, endurance, ROM, flexibility, posture and core stabilization for 47 minutes including:  Stationary bike, seat 6, level 1, 11 minutes 30 seconds  Supine ankle DF, Min A  2 sets x 10 reps  Supine ankle inversion, yellow band, 2 sets x 10 reps  Supine ankle eversion, yellow band 2 sets x 10 reps  Supine ankle PF, red band 2 sets x 10 reps  Calf stretch on slant board, 3 x 30 sec    Mirian participated in neuromuscular re-education activities to improve: Balance, Coordination, Proprioception and Posture for 8 minutes. The following activities were included:  Tandem stance,  firm surface 2 x 30 sec  Single leg stance, firm surface 2 x 30 sec  Toe raises, 2 sets x 10 reps    Home Exercises Provided and Patient Education Provided     Education provided:   - compliance with HEP  - role of PT and goals for PT     Written Home Exercises Provided: Patient instructed to cont prior HEP.  Exercises were reviewed and Mirian was able to demonstrate them prior to the end of the session.  Mirian demonstrated good  understanding of the education provided.     See EMR under Patient Instructions for exercises provided prior visit.    Assessment     Patient is tolerating therapy well. Patient was able to perform all of today's activities with no increase in symptoms prior to leaving the clinic. She continues to require occasional cues to avoid substitutions with ankle inversion and eversion. Resume dynamic balance activities next visit. Will progress as tolerated.  Mirian Is progressing well towards her goals.   Pt prognosis is Good.     Pt will continue to benefit from skilled outpatient physical therapy to address the deficits listed in the problem list box on initial evaluation, provide pt/family education and to maximize pt's level of independence in the home and community environment.     Pt's spiritual, cultural and educational needs considered and pt agreeable to plan of care and goals.     Anticipated barriers to physical therapy: none anticipated    Goals:   Short Term Goals:  4 weeks  1. Pt will increase AROM of left ankle to neutral dorsiflexion to promote greater ease with performing normal gait pattern. (progressing, not met)  2. Pt. to demonstrate increased strength of left lower extremity to 4/5  to increase stability during community ambulation (progressing, not met)  3. Pt. will improve TUG test score to 12 seconds or less in order to perform safe transfers and for patient to be in low/moderate risk for falls category (progressing, not met)  4. Pt will tolerate 6 minute walk test. (progressing,  not met)  5. Pt will initiate home exercise program to improve strength, flexibility, endurance, mobility & balance to return pt to PLOF (met, 1/5/23)  6. Pt will tolerate 10 min or greater of time in light-->moderate intensity cardio (I.e. Bike, NuStep) to improve endurance to assist in performing her usual work activities (progressing, not met)  7. Pt to demonstrate tandem stance 30 sec or greater w/out UE support in order to improve balance to progress to singe leg stance to decrease fall risk in performance of ADL's (progressing, not met)     Long Term Goals: 8 weeks  1. Pt will increase AROM of left ankle dorsiflexion to 5 degrees in order to perform normal gait pattern when ambulating on uneven ground (progressing, not met)  2. Pt. will increase strength of left lower extremity to 5/5  to increase stability during ambulation on uneven surfaces,to increase tolerance for ADL and work activities. (progressing, not met)  3. Pt will be independent with HEP to improve ROM, strength, balance,  and independence with ADL's (progressing, not met)  4. Pt. will improve TUG test score to 10 seconds or less in order to ambulate safely in community and for patient to be in low risk for falls category (progressing, not met)  5. Pt. will improve 6 minute walk test score by 30 meters in order for patient to be in low risk for falls category (progressing, not met)  6. Patient will report compliance with walking program 5x week for 10 -30min each day to improve overall cardiovascular function and decrease cancer related fatigue at discharge. (progressing, not met)    Plan     Updated Plan of care Certification: 1/5/2023 to 3/3/2023.     Outpatient Physical Therapy 2 times weekly for 8 weeks to include the following interventions: Gait Training, Manual Therapy, Neuromuscular Re-ed, Patient Education, Self Care, Therapeutic Activities, Therapeutic Exercise, and IASTM . Dry needling with manual therapy techniques to decrease pain,  inflammation and swelling, increase circulation and promote healing process.      Gail Gutierrez, PT    2/3/2023

## 2023-02-03 NOTE — PROGRESS NOTES
Physical Therapy Daily Treatment Note       Date: 2/3/2023   Name: Phylicia Vásquez  Clinic Number: [id number]    Therapy Diagnosis:   Encounter Diagnoses   Name Primary?    Lymphedema of right arm Yes    Impaired functional mobility and activity tolerance        Physician: Negrito Verde MD    Physician Orders: PT Eval and Treat   Medical Diagnosis: Carcinoma of axillary tail of right breast in female, estrogen receptor positive [C50.611, Z17.0], Localized edema      Evaluation Date: 6/3/2022  Authorization Period Expiration: 12/31/22  Updated Plan of Care Certification Period: 2/3/23  Visit # / Visits authorized: 7/20 (plus 20 from last year)  Insurance: [employer]  FOTO: 3/3     Time In: 10:00 AM   Time Out:  11:00 AM  Total Billable Time:  60 minutes     Precautions: Standard, cancer and Right UE lymphedema      Subjective     Pt reports: No new complaints. Denies pain. She received her compression garments. She accidentally ordered a 15-20mmHg compression sleeve. She wishes to try it on to make sure the size is OK. Her glove is 20-30mmHg  She was compliant with compression pump  Response to previous treatment: no adverse reaction  Pain Scale: Phylicia rates pain on a scale of 0/10 on VAS.   Pain location: N/A    Fatigue: not assessed  Functional change: none stated  Treatment:   Chemotherapy:  4 cycles of AC and 4 cycles of Taxol patrick adjuvantly  Radiation: completed radiation therapy on 8/20/20.   Endocrine therapy: On 11/1/2020 started on Anastrozole with with Dr. Verde     Surgery date: Pt is s/p bilateral mastectomy with R SLNB, TE reconstruction in 4/2021. pt will have permanent reconstruction with ROWAN flap, but she states she needs to lose weight first.      Objective     Objective Measures updated at progress report unless specified.     LANDMARK RIGHT UE LEFT UE DIFF RUE Diff RUE Diff RUE Diff RUE Diff RUE Diff RUE Diff RUE Diff RUE Diff RUE Diff  "RUE Diff RUE Diff RUE Diff RUE Diff RUE Diff RUE Diff RUE Diff RUE Diff RUE Diff RUE Diff RUE Diff RUE Diff   Date 6/3/22 6/3/22 6/3/22 6/22/22 6/22/22 6/29/22 6/29/22 7/6/22 7/6/22 7/20/22 7/20/22 7/27/22 7/27/22 8/18/22 8/18/22 8/24/22 8/24/22 9/2/22 9/2/22 9/19/22 9/19/22 9/26/22 9/26/22 10/4/22 10/4/22 10/17/22 10/17/22 10/24/22 10/24/22 11/2/22 11/2/22 11/10/22 11/10/22 11/28/22 11/28/22 12/15/22 12/15/22 1/5/23 1/5/23 1/12/23 1/12/23 1/19/23 1/19/23 1/24/23 1/24/23   E + 8" 47 cm 46.5 cm 0.5 cm 46.5cm -0.5 44cm -2.5 45 cm +1 47 cm +2 45cm -2 43.5cm -1.5 43.5cm No chg 44cm +0.5 46cm +2 44.5cm -1.5 44.5 cm No chg 45 cm +0.5 44.5 cm -0.5 46 cm +1.5 44.5 cm -1.5 45.5 cm +1 44.5cm -1 45 cm +0.5 44.5 cm -0.5 44 cm -0.5 45 cm +0.5   E + 6" 44 cm 42.5 cm 1.5 cm 44 cm No chg 44cm No chg 43.5cm -0.5 45cm +1.5 42cm -3 41cm -1 43cm +2 42.5cm -0.5 43.5cm +1 44.5cm +1 45cm +0.5 43.5 cm -1.5 43 cm -0.5 43 cm No chg 44.5 cm +1.5 44.5 cm No chg 45 cm +0.5 43 cm -2 43 cm No chg 43.5 cm +0.5 44 cm +0.5   E + 4" 39.5 cm 37 cm 2.5 cm 39cm -0.5 40.5 cm +1.5 40.5cm No chg 41cm +0.5 39 cm -2 39cm No chg 39.5cm +0.5 40cm +0.5 39cm -1 41.5cm +2.5 40cm -1.5 40cm No chg 40cm No chg 40cm No chg 40 cm No chg 40.5 cm No chg 41.5 cm +1 40.5 cm -1 40.5cm No chg 40 cm -0.5 40.5 cm +0.5   E + 2" 34.5 cm 33 cm 1.5 cm 34.5 cm No chg 35 cm +0.5 34.5cm -0.5 35cm +0.5 34cm -1 34.5cm +0.5 34.5cm No chg 35cm +0.5 34cm -1 35cm +1 35cm No chg 34 cm -1 35 cm +1 35.5 cm +0.5 34.5 cm -1 35 cm +0.5 36cm +1 35 cm -1 34.5 cm -0.5 34 cm -0.5 34.5 cm +0.5   Elbow 28 cm 28 cm 0 cm 27 cm -1 27.5 cm +0.5 28 cm +0.5 28cm No chg 27cm -1 27cm No chg 27.5cm +0.5 27cm -0.5 27.5cm +0.5 27.5cm No chg 27cm -0.5 27 cm No chg 27 cm No chg 27 cm No chg 26 cm -1 27 cm +1 27.5 cm +0.5 27 cm -0.5 26.5 cm -0.5 26.5 cm No chg 26.5 cm No chg   W+ 8" 29 cm  28.5cm 0.5 cm 28cm -1 28 cm No chg 29.5cm +1.5 29cm -0.5 28.5cm -0.5 28cm -0.5 29.5cm +1.5 29cm -0.5 28.5cm -0.5 29cm +0.5 " "28cm -1 29.5cm +1.5 30 cm +0.5 28cm -2 28.5 cm +0.5 29 cm +0.5 29.5 cm +0.5 29.5 cm No chg 29 cm -0.5 28 cm -1 27.5 cm -0.5   W +  6" 29.5 cm 27 cm 2.5 cm 29cm -0.5 29cm No chg 29cm No chg 29cm No chg 29cm No chg 29cm No chg 29.5cm +0.5 28.5cm -1 29cm +0.5 29cm No chg 29cm No chg 29 cm No chg 29 cm No chg 28.5cm -0.5 28.5 cm No chg 29 cm +0.5 29 cm No chg 29 cm No chg 29 cm No chg 28.5 cm -0.5 28.5 cm No chg   W + 4" 25.5 cm 23.5 cm 2 cm 25cm -0.5 24.5 cm -0.5 25.5cm +1 25 cm -0.5 25cm No chg 24.5cm -0.5 25.5cm +1 24.5cm -1 25cm +0.5 25.5cm +0.5  24.5cm -1 25 cm +0.5 25.5 cm +0.5 25 cm -0.5 24.5 cm -0.5 24.5 cm No chg 24.5 cm No chg 24 cm -0.5 24.5 cm +0.5 24.5 cm No chg 24.5 cm No chg   Wrist 18 cm 17.5 cm 0.5 cm 18.5cm +0.5 18 cm -0.5 18.5cm +0.5 18cm -0.5 18cm No chg 18cm No chg 18cm No chg 18cm No chg 18cm No chg 18cm No chg 18cm No chg 18 cm No chg 18 cm No chg 18 cm No chg 18 cm  No chg 18 cm No chg 18 cm No chg 17.5 cm -0.5 17.5 cm No chg 17.5 cm No chg 17.5 cm No chg   DPC 21.5 cm 20 cm 1.5 cm 20cm -1.5 20.5cm +0.5 21 cm +0.5 21cm -0.5 20.5cm -0.5 21cm +0.5 21.5cm +0.5 21cm -0.5 21cm No chg 20.5 cm -0.5 21.5cm +1 20.5cm -1 20.5 cm No chg 20.5 cm No chg 20 cm -0.5 20cm No chg 20 cm No chg 19.5 cm -0.5 19.5 cm No chg 20 cm +0.5 20 cm No chg   IP Thumb 7.5 cm 7 cm 0.5 cm 8cm +0.5 7.5cm -0.5 8 cm +0.5 8cm No chg 7.5cm -0.5 7.5cm No chg 8cm +0.5 7.5cm -0.5 7.5cm No Chg 8cm +0.5 7.5cm -0.5 7.5cm No chg 7.5 cm No chg 7cm -0.5 7.5 cm +0.5 7.5 cm No chg 7.5 cm No chg 7.5 cm No chg 7.5 cm No chg 7.5 cm No chg 7 cm -0.5         Treatment       Pt received 1:1 Therapeutic activity for 15 min    Tries on sleeve and glove- both appear to fit well, but pt was told she needs needs to exchange sleeve for the correct compression level (20-30mmHg).  We reviewed donning technique and how to use donning aid that came with sleeve    Phylicia received the following manual therapy techniques were performed to increased " myofascial/soft tissue length, mobility and pliability, increase PROM, AROM and function as well as to decrease pain for 45 minutes:      Short Neck- held due to thyroid goiter  Clear Healthy Lymph Nodes:  Left Axilla                                                  Right  Groin  Open Anastomoses:  Anterior Axillo-Axillary  (AAA)                                    Axillo-Inguinal     (AI)                                    Posterior Axillo-Axillary  (LEEANN)   J-stroke at lateral chest wall  Inferior trunk slide  Repeat J-stroke    Right Upper Arm (Lateral and Medial)  Right Antecubital Fossa  Right forearm, dorsal and ventral aspect  Dorsum of R hand  R fingers    Rework right hand  Rework Right UE  Rework Anastomoses   Rework Healthy lymph Nodes    Diaphragmatic breathing, x 5 reps    PT applies  gauze to R hand and fingers and stockinette, cotton artiflex, and short stretch bandages to pt's RIGHT/LEFT/BILATERAL: right upper extremity. Pt educated to wear bandaging for 2-3 days unless it causes pain, numbness, changes in skin color/temperature, or it starts to fall down.  If removed, pt instructed to roll up bandages and cotton padding and bring to next session. If bandages are removed, pt can wear tubigrip.  Pt has Vet glove to cover bandages in the shower and directions on how to care for bandages. Pt verbalizes understanding of all topics.            Home Exercise Program and Patient Education   Education provided re:  - role of PT in multi - disciplinary team, goals for PT  - progress towards goals   -postural awareness    Written Home Exercises Provided: Patient instructed to cont prior HEP.  Exercises were reviewed and Mirian was able to demonstrate them prior to the end of the session.  Mirian demonstrated good  understanding of the education provided.     See EMR under Media for exercises provided 6/8/2022- for self manual lymph drainage techniques.    Pt has no cultural, educational or language barriers to  learning provided.    Assessment     Patient tolerated session well. Pt has good fitting compression, but she accidentally ordered incorrect compression strength for sleeve, so pt was advises to make a return. Pt tolerated complete decongestive therapy without issue in clinic. Once she has correct compression level for sleeve, she will likely be appropriate for discharge from PT.   will progress as tolerated.   Pt prognosis is Good. Pt will continue to benefit from skilled outpatient physical therapy to address the deficits listed in the problem list chart on initial evaluation, provide pt/family education and to maximize pt's level of independence in the home and community environment.     Anticipated barriers to physical therapy: none anticipated    Goals as follows:  Short Term Goals (6 weeks)  1. Pt will demo decrease in girth in right upper extremity by >/= 1cm to allow for improved UE symmetry, clothing choice, ROM, and UE function. (met, 8/18/22  2. Patient will demonstrate 100% knowledge of lymphedema precautions and signs of infection to allow for reduced risk of lymphedema, reduced risk of infection, and/or exacerbation.  (met)  3. Patient will perform self lymph drainage techniques to enhance lymphatic drainage and mobility.  (met, 7/6/22)  4. Patient will tolerate daily activities with multilayered bandaging to enhance lymphatic drainage and skin elasticity.  (met, 7/6/22)  5. Pt will tolerate HEP for improved strength, functional mobility, ROM, posture, and endurance. (met, 7/6/22)        Long Term Goals: ( 12  weeks)  1. Patient to show decreased girth in Right upper extremity by >/= 2cm to allow for improved UE symmetry, clothing choice, ROM, and UE function.  (met partially, 8/18/22)  2. Patient will show reduction in density to mild or less with improved contour of limb to allow for improved cosmesis, improved lymphatic drainage, and functional mobility.  (met, 9/2/22)  3. Patient to mary/doff  compression garment with daily compliance to support lymphatic mobility and skin elasticity.  (progressing, not met)  4. Pt to show improved postural awareness and alignment for improved functional mobility.  (progressing, not met)  5. Pt to be independent and compliant with HEP to allow for improved ROM, reach and carry with functional endurance and strength.    (met, 10/4/22)           Plan   Outpatient physical therapy 2x week for 4 weeks to include the following:   Manual Therapy, Neuromuscular Re-ed, Orthotic Management and Training, Patient Education, Self Care, Therapeutic Activities and Therapeutic Exercise.            Therapist: Marcela Price, PT  2/3/2023

## 2023-02-09 ENCOUNTER — CLINICAL SUPPORT (OUTPATIENT)
Dept: REHABILITATION | Facility: HOSPITAL | Age: 53
End: 2023-02-09
Payer: COMMERCIAL

## 2023-02-09 DIAGNOSIS — R26.9 ABNORMALITY OF GAIT: ICD-10-CM

## 2023-02-09 DIAGNOSIS — R26.89 IMPAIRMENT OF BALANCE: ICD-10-CM

## 2023-02-09 DIAGNOSIS — R29.898 WEAKNESS OF LEFT LOWER EXTREMITY: Primary | ICD-10-CM

## 2023-02-09 PROCEDURE — 97110 THERAPEUTIC EXERCISES: CPT

## 2023-02-09 PROCEDURE — 97112 NEUROMUSCULAR REEDUCATION: CPT

## 2023-02-09 NOTE — PROGRESS NOTES
Physical Therapy Daily Treatment Note     Name: Phylicia Vásquez  Clinic Number: [id number]    Therapy Diagnosis:   Encounter Diagnoses   Name Primary?    Weakness of left lower extremity Yes    Abnormality of gait     Impairment of balance        Physician: Negrito Verde MD    Visit Date: 2/9/2023    Physician Orders: PT Eval and Treat   Medical Diagnosis from Referral: M21.379 (ICD-10-CM) - Foot-drop, unspecified laterality  Evaluation Date: 10/18/2022  Authorization Period Expiration: 4/4/2023  Plan of Care Expiration: 3/3/2023  Visit # / Visits authorized: 5/ 8 (4 visits in 2022)  Insurance: [employer] OPEN ACCESS PLUS  FOTO: 2/3     Time In: 9:00 am  Time Out: 9:55 am  Total Billable Time: 55 minutes    Precautions:   Standard, cancer, and R UE lymphedema    Subjective     Pt reports: having pain in R arm from elbow to hand, her ankle is hurting a bit as she was up on her feet a lot last night, still hasn't had a chance to get AFO fitted  She was compliant with home exercise program  Response to previous treatment: no adverse events  Functional change: not stomping as much    Pain: 4/10  Location: left ankle      Objective     Objective Measures updated at progress report unless specified.     B/P: 129/91 mmHg; HR: 70 bpm; O2: 98%    Lab Values: 9/12/22  Platelets: 216  ANC: 4.9  Hematacrit: 37.3  Hemoglobin: 12.3      Treatment     Mirian received therapeutic exercises to develop strength, endurance, ROM, flexibility, posture and core stabilization for 30 minutes including:  Stationary bike, seat 6, level 2, 10 minutes   Supine ankle DF, Min A  2 sets x 10 reps  Supine ankle inversion, red band, 2 sets x 10 reps  Supine ankle eversion, red band 2 sets x 10 reps  Supine ankle PF, green band 2 sets x 10 reps  Calf raise, 2 sets of 10 reps  Gastroc stretch on slant board, 2 x 30 sec  Soleus stretch on slant board, 2 x 30 sec    Mirian participated in neuromuscular re-education activities to improve: Balance,  Coordination, Proprioception and Posture for 25 minutes. The following activities were included:  Tandem stance, firm surface 2 x 30 sec  Single leg stance, firm surface 2 x 30 sec  Toe raises, 2 sets x 10 reps  Sit to stands, feet on airex pad, 1 set of 10 reps  Static standing on airex, eyes closed 2 reps x 30 sec  Tandem walking, 4 laps along side of the table  Heel walking, 3 laps along side of table  Attempted toe walking - held due to increased ankle pain    Home Exercises Provided and Patient Education Provided     Education provided:   - compliance with HEP  - role of PT and goals for PT     Written Home Exercises Provided: Patient instructed to cont prior HEP.  Exercises were reviewed and Mirian was able to demonstrate them prior to the end of the session.  Mirian demonstrated good  understanding of the education provided.     See EMR under Patient Instructions for exercises provided prior visit.    Assessment     Patient is tolerating therapy well. Patient was able to perform all of today's progressions and new exercises with no increase in symptoms prior to leaving the clinic. She was able to perform ankle inversion and eversion exercises without cues today. She endorsed adequate challenge with balance activities on airex pad. Will progress as tolerated.  Mirian Is progressing well towards her goals.   Pt prognosis is Good.     Pt will continue to benefit from skilled outpatient physical therapy to address the deficits listed in the problem list box on initial evaluation, provide pt/family education and to maximize pt's level of independence in the home and community environment.     Pt's spiritual, cultural and educational needs considered and pt agreeable to plan of care and goals.     Anticipated barriers to physical therapy: none anticipated    Goals:   Short Term Goals:  4 weeks  1. Pt will increase AROM of left ankle to neutral dorsiflexion to promote greater ease with performing normal gait pattern.  (progressing, not met)  2. Pt. to demonstrate increased strength of left lower extremity to 4/5  to increase stability during community ambulation (progressing, not met)  3. Pt. will improve TUG test score to 12 seconds or less in order to perform safe transfers and for patient to be in low/moderate risk for falls category (progressing, not met)  4. Pt will tolerate 6 minute walk test. (progressing, not met)  5. Pt will initiate home exercise program to improve strength, flexibility, endurance, mobility & balance to return pt to PLOF (met, 1/5/23)  6. Pt will tolerate 10 min or greater of time in light-->moderate intensity cardio (I.e. Bike, NuStep) to improve endurance to assist in performing her usual work activities (progressing, not met)  7. Pt to demonstrate tandem stance 30 sec or greater w/out UE support in order to improve balance to progress to singe leg stance to decrease fall risk in performance of ADL's (progressing, not met)     Long Term Goals: 8 weeks  1. Pt will increase AROM of left ankle dorsiflexion to 5 degrees in order to perform normal gait pattern when ambulating on uneven ground (progressing, not met)  2. Pt. will increase strength of left lower extremity to 5/5  to increase stability during ambulation on uneven surfaces,to increase tolerance for ADL and work activities. (progressing, not met)  3. Pt will be independent with HEP to improve ROM, strength, balance,  and independence with ADL's (progressing, not met)  4. Pt. will improve TUG test score to 10 seconds or less in order to ambulate safely in community and for patient to be in low risk for falls category (progressing, not met)  5. Pt. will improve 6 minute walk test score by 30 meters in order for patient to be in low risk for falls category (progressing, not met)  6. Patient will report compliance with walking program 5x week for 10 -30min each day to improve overall cardiovascular function and decrease cancer related fatigue at  discharge. (progressing, not met)    Plan     Updated Plan of care Certification: 1/5/2023 to 3/3/2023.     Outpatient Physical Therapy 2 times weekly for 8 weeks to include the following interventions: Gait Training, Manual Therapy, Neuromuscular Re-ed, Patient Education, Self Care, Therapeutic Activities, Therapeutic Exercise, and IASTM . Dry needling with manual therapy techniques to decrease pain, inflammation and swelling, increase circulation and promote healing process.      Gail Gutierrez, PT    2/9/2023

## 2023-02-13 ENCOUNTER — CLINICAL SUPPORT (OUTPATIENT)
Dept: REHABILITATION | Facility: HOSPITAL | Age: 53
End: 2023-02-13
Payer: COMMERCIAL

## 2023-02-13 ENCOUNTER — OFFICE VISIT (OUTPATIENT)
Dept: OPTOMETRY | Facility: CLINIC | Age: 53
End: 2023-02-13
Payer: COMMERCIAL

## 2023-02-13 DIAGNOSIS — I10 ESSENTIAL HYPERTENSION: Primary | ICD-10-CM

## 2023-02-13 DIAGNOSIS — H52.4 PRESBYOPIA: ICD-10-CM

## 2023-02-13 DIAGNOSIS — R26.9 ABNORMALITY OF GAIT: ICD-10-CM

## 2023-02-13 DIAGNOSIS — R29.898 WEAKNESS OF LEFT LOWER EXTREMITY: Primary | ICD-10-CM

## 2023-02-13 DIAGNOSIS — Z74.09 IMPAIRED FUNCTIONAL MOBILITY AND ACTIVITY TOLERANCE: ICD-10-CM

## 2023-02-13 DIAGNOSIS — I89.0 LYMPHEDEMA OF RIGHT ARM: ICD-10-CM

## 2023-02-13 DIAGNOSIS — R26.89 IMPAIRMENT OF BALANCE: ICD-10-CM

## 2023-02-13 PROCEDURE — 97110 THERAPEUTIC EXERCISES: CPT

## 2023-02-13 PROCEDURE — 1159F MED LIST DOCD IN RCRD: CPT | Mod: CPTII,S$GLB,, | Performed by: OPTOMETRIST

## 2023-02-13 PROCEDURE — 92014 PR EYE EXAM, EST PATIENT,COMPREHESV: ICD-10-PCS | Mod: S$GLB,,, | Performed by: OPTOMETRIST

## 2023-02-13 PROCEDURE — 4010F ACE/ARB THERAPY RXD/TAKEN: CPT | Mod: CPTII,S$GLB,, | Performed by: OPTOMETRIST

## 2023-02-13 PROCEDURE — 92015 DETERMINE REFRACTIVE STATE: CPT | Mod: S$GLB,,, | Performed by: OPTOMETRIST

## 2023-02-13 PROCEDURE — 99999 PR PBB SHADOW E&M-EST. PATIENT-LVL III: ICD-10-PCS | Mod: PBBFAC,,, | Performed by: OPTOMETRIST

## 2023-02-13 PROCEDURE — 1159F PR MEDICATION LIST DOCUMENTED IN MEDICAL RECORD: ICD-10-PCS | Mod: CPTII,S$GLB,, | Performed by: OPTOMETRIST

## 2023-02-13 PROCEDURE — 97112 NEUROMUSCULAR REEDUCATION: CPT

## 2023-02-13 PROCEDURE — 92015 PR REFRACTION: ICD-10-PCS | Mod: S$GLB,,, | Performed by: OPTOMETRIST

## 2023-02-13 PROCEDURE — 92014 COMPRE OPH EXAM EST PT 1/>: CPT | Mod: S$GLB,,, | Performed by: OPTOMETRIST

## 2023-02-13 PROCEDURE — 99999 PR PBB SHADOW E&M-EST. PATIENT-LVL III: CPT | Mod: PBBFAC,,, | Performed by: OPTOMETRIST

## 2023-02-13 PROCEDURE — 4010F PR ACE/ARB THEARPY RXD/TAKEN: ICD-10-PCS | Mod: CPTII,S$GLB,, | Performed by: OPTOMETRIST

## 2023-02-13 PROCEDURE — 97140 MANUAL THERAPY 1/> REGIONS: CPT

## 2023-02-13 NOTE — PROGRESS NOTES
Physical Therapy Daily Treatment Note     Name: Phylicia Vásquez  Clinic Number: [id number]    Therapy Diagnosis:   Encounter Diagnoses   Name Primary?    Weakness of left lower extremity Yes    Abnormality of gait     Impairment of balance        Physician: Marcela Calvillo PA-C    Visit Date: 2/13/2023    Physician Orders: PT Eval and Treat   Medical Diagnosis from Referral: M21.379 (ICD-10-CM) - Foot-drop, unspecified laterality  Evaluation Date: 10/18/2022  Authorization Period Expiration: 4/4/2023  Plan of Care Expiration: 3/3/2023  Visit # / Visits authorized: 5/ 8 (4 visits in 2022)  Insurance: [employer] OPEN ACCESS PLUS  FOTO: 2/3     Time In: 10:00 am  Time Out: 11:00 am  Total Billable Time: 60 minutes    Precautions:   Standard, cancer, and R UE lymphedema    Subjective     Pt reports: she has been feeling better since she has been off for the past few days.   She was compliant with home exercise program  Response to previous treatment: no adverse events  Functional change: not stomping as much    Pain: 0/10  Location: left ankle      Objective     Objective Measures updated at progress report unless specified.     B/P: 127/90 mmHg; HR: 56 bpm; O2: 99%    Lab Values: 9/12/22  Platelets: 216  ANC: 4.9  Hematacrit: 37.3  Hemoglobin: 12.3      Treatment     Mirian received therapeutic exercises to develop strength, endurance, ROM, flexibility, posture and core stabilization for 35 minutes including:  Stationary bike, seat 6, level 2, 10 minutes   Supine ankle DF, 2 sets x 10 reps  Supine ankle inversion, red band, 2 sets x 10 reps  Supine ankle eversion, red band 2 sets x 10 reps  Supine ankle PF, green band 2 sets x 10 reps  Single calf raise, 1 sets of 10 reps  Gastroc stretch on slant board, 2 x 30 sec  Soleus stretch on slant board, 2 x 30 sec    Mirian participated in neuromuscular re-education activities to improve: Balance, Coordination, Proprioception and Posture for 25 minutes. The following activities were  included:  Tandem stance, firm surface 2 x 30 sec  Single leg stance, firm surface 2 x 30 sec  Toe raises, 2 sets x 10 reps  Static standing on airex, eyes closed 2 reps x 30 sec  Tandem walking, 4 laps along side of the table  Heel walking, 4 laps along side of table    Home Exercises Provided and Patient Education Provided     Education provided:   - compliance with HEP  - role of PT and goals for PT     Written Home Exercises Provided: Patient instructed to cont prior HEP.  Exercises were reviewed and Mirian was able to demonstrate them prior to the end of the session.  Mirian demonstrated good  understanding of the education provided.     See EMR under Patient Instructions for exercises provided prior visit.    Assessment     Patient is tolerating therapy well. Patient was able to demonstrate increased AROM of with ankle DF as she was able to move through functional ranges. She performed all of today's activities with no increase in symptoms prior to leaving the clinic. She endorsed adequate challenge with single leg calf raise. She also used ankles strategies with minimal light touch in exercise plinth to perform her balance activities. Will progress as tolerated.  Mirian Is progressing well towards her goals.   Pt prognosis is Good.     Pt will continue to benefit from skilled outpatient physical therapy to address the deficits listed in the problem list box on initial evaluation, provide pt/family education and to maximize pt's level of independence in the home and community environment.     Pt's spiritual, cultural and educational needs considered and pt agreeable to plan of care and goals.     Anticipated barriers to physical therapy: none anticipated    Goals:   Short Term Goals:  4 weeks  1. Pt will increase AROM of left ankle to neutral dorsiflexion to promote greater ease with performing normal gait pattern. (progressing, not met)  2. Pt. to demonstrate increased strength of left lower extremity to 4/5  to  increase stability during community ambulation (progressing, not met)  3. Pt. will improve TUG test score to 12 seconds or less in order to perform safe transfers and for patient to be in low/moderate risk for falls category (progressing, not met)  4. Pt will tolerate 6 minute walk test. (progressing, not met)  5. Pt will initiate home exercise program to improve strength, flexibility, endurance, mobility & balance to return pt to OF (met, 1/5/23)  6. Pt will tolerate 10 min or greater of time in light-->moderate intensity cardio (I.e. Bike, NuStep) to improve endurance to assist in performing her usual work activities (progressing, not met)  7. Pt to demonstrate tandem stance 30 sec or greater w/out UE support in order to improve balance to progress to singe leg stance to decrease fall risk in performance of ADL's (progressing, not met)     Long Term Goals: 8 weeks  1. Pt will increase AROM of left ankle dorsiflexion to 5 degrees in order to perform normal gait pattern when ambulating on uneven ground (progressing, not met)  2. Pt. will increase strength of left lower extremity to 5/5  to increase stability during ambulation on uneven surfaces,to increase tolerance for ADL and work activities. (progressing, not met)  3. Pt will be independent with HEP to improve ROM, strength, balance,  and independence with ADL's (progressing, not met)  4. Pt. will improve TUG test score to 10 seconds or less in order to ambulate safely in community and for patient to be in low risk for falls category (progressing, not met)  5. Pt. will improve 6 minute walk test score by 30 meters in order for patient to be in low risk for falls category (progressing, not met)  6. Patient will report compliance with walking program 5x week for 10 -30min each day to improve overall cardiovascular function and decrease cancer related fatigue at discharge. (progressing, not met)    Plan     Updated Plan of care Certification: 1/5/2023 to  3/3/2023.     Outpatient Physical Therapy 2 times weekly for 8 weeks to include the following interventions: Gait Training, Manual Therapy, Neuromuscular Re-ed, Patient Education, Self Care, Therapeutic Activities, Therapeutic Exercise, and IASTM . Dry needling with manual therapy techniques to decrease pain, inflammation and swelling, increase circulation and promote healing process.      Gail Gutierrez, PT    2/13/2023

## 2023-02-13 NOTE — PROGRESS NOTES
HPI    Last eye exam was 7/30/21 with Dr. Dunham.  Patient states no vision changes with most recent glasses rx.  Patient denies diplopia, headaches, flashes/floaters, and pain.    Last edited by Marcela Cruz MA on 2/13/2023  7:56 AM.            Assessment /Plan     For exam results, see Encounter Report.    Essential hypertension    Presbyopia            No retinopathy--monitor yearly.  BP control.  Eye health normal OU.  Bifocal rx given.  Retina flat and intact OU--no holes, tears, breaks, or RDs.                      Nutrition Plan:     1. Continue with expressed breast milk, offering 4oz bottles every 3-4 hours   A. Goal of 28oz expressed breast milk daily     2. Continue to offer age appropriate solids 2-3x/day between bottles    A. Will consult with feeding teams regarding progression of solids     3. Add multivitamin once daily - poly-vi-sol with Iron    A. Discontinue with vitamin D if taking multivitamin once daily     4. Track intake of breast milk over 5-7 days and message Mana with intake goals    A. May increase caloric density of breast milk of intake less than 28oz daily     Mana Crowe, TINY, LDN  Pediatric Dietitian  [facility]   [phone]

## 2023-02-13 NOTE — PROGRESS NOTES
Physical Therapy Daily Treatment Note       Date: 2/13/2023   Name: Phylicia Vásquez  Clinic Number: [id number]    Therapy Diagnosis:   Encounter Diagnoses   Name Primary?    Weakness of left lower extremity Yes    Abnormality of gait     Impairment of balance     Lymphedema of right arm     Impaired functional mobility and activity tolerance        Physician: Marcela Calvillo PA-C    Physician Orders: PT Eval and Treat   Medical Diagnosis: Carcinoma of axillary tail of right breast in female, estrogen receptor positive [C50.611, Z17.0], Localized edema      Evaluation Date: 6/3/2022  Authorization Period Expiration: 12/31/22  Updated Plan of Care Certification Period: 3/17/23  Visit # / Visits authorized: 8/20 (plus 20 from last year)  Insurance: [employer]  FOTO: 3/3     Time In: 9:04 AM   Time Out:  10:00 AM  Total Billable Time:  56 minutes     Precautions: Standard, cancer and Right UE lymphedema      Subjective     Pt reports: No new complaints. Denies pain. She is returning her compression sleeve this week.  She was compliant with compression pump  Response to previous treatment: no adverse reaction  Pain Scale: Phylicia rates pain on a scale of 0/10 on VAS.   Pain location: N/A    Fatigue: not assessed  Functional change: none stated  Treatment:   Chemotherapy:  4 cycles of AC and 4 cycles of Taxol patrick adjuvantly  Radiation: completed radiation therapy on 8/20/20.   Endocrine therapy: On 11/1/2020 started on Anastrozole with with Dr. Verde     Surgery date: Pt is s/p bilateral mastectomy with R SLNB, TE reconstruction in 4/2021. pt will have permanent reconstruction with ROWAN flap, but she states she needs to lose weight first.      Objective     Objective Measures updated at progress report unless specified.     LANDMARK RIGHT UE LEFT UE DIFF RUE Diff RUE Diff RUE Diff RUE Diff RUE Diff RUE Diff RUE Diff RUE Diff RUE Diff RUE Diff RUE Diff RUE Diff RUE  "Diff RUE Diff RUE Diff RUE Diff RUE Diff RUE Diff RUE Diff RUE Diff RUE Diff   Date 6/3/22 6/3/22 6/3/22 6/22/22 6/22/22 6/29/22 6/29/22 7/6/22 7/6/22 7/20/22 7/20/22 7/27/22 7/27/22 8/18/22 8/18/22 8/24/22 8/24/22 9/2/22 9/2/22 9/19/22 9/19/22 9/26/22 9/26/22 10/4/22 10/4/22 10/17/22 10/17/22 10/24/22 10/24/22 11/2/22 11/2/22 11/10/22 11/10/22 11/28/22 11/28/22 12/15/22 12/15/22 1/5/23 1/5/23 1/12/23 1/12/23 1/19/23 1/19/23 1/24/23 1/24/23   E + 8" 47 cm 46.5 cm 0.5 cm 46.5cm -0.5 44cm -2.5 45 cm +1 47 cm +2 45cm -2 43.5cm -1.5 43.5cm No chg 44cm +0.5 46cm +2 44.5cm -1.5 44.5 cm No chg 45 cm +0.5 44.5 cm -0.5 46 cm +1.5 44.5 cm -1.5 45.5 cm +1 44.5cm -1 45 cm +0.5 44.5 cm -0.5 44 cm -0.5 45 cm +0.5   E + 6" 44 cm 42.5 cm 1.5 cm 44 cm No chg 44cm No chg 43.5cm -0.5 45cm +1.5 42cm -3 41cm -1 43cm +2 42.5cm -0.5 43.5cm +1 44.5cm +1 45cm +0.5 43.5 cm -1.5 43 cm -0.5 43 cm No chg 44.5 cm +1.5 44.5 cm No chg 45 cm +0.5 43 cm -2 43 cm No chg 43.5 cm +0.5 44 cm +0.5   E + 4" 39.5 cm 37 cm 2.5 cm 39cm -0.5 40.5 cm +1.5 40.5cm No chg 41cm +0.5 39 cm -2 39cm No chg 39.5cm +0.5 40cm +0.5 39cm -1 41.5cm +2.5 40cm -1.5 40cm No chg 40cm No chg 40cm No chg 40 cm No chg 40.5 cm No chg 41.5 cm +1 40.5 cm -1 40.5cm No chg 40 cm -0.5 40.5 cm +0.5   E + 2" 34.5 cm 33 cm 1.5 cm 34.5 cm No chg 35 cm +0.5 34.5cm -0.5 35cm +0.5 34cm -1 34.5cm +0.5 34.5cm No chg 35cm +0.5 34cm -1 35cm +1 35cm No chg 34 cm -1 35 cm +1 35.5 cm +0.5 34.5 cm -1 35 cm +0.5 36cm +1 35 cm -1 34.5 cm -0.5 34 cm -0.5 34.5 cm +0.5   Elbow 28 cm 28 cm 0 cm 27 cm -1 27.5 cm +0.5 28 cm +0.5 28cm No chg 27cm -1 27cm No chg 27.5cm +0.5 27cm -0.5 27.5cm +0.5 27.5cm No chg 27cm -0.5 27 cm No chg 27 cm No chg 27 cm No chg 26 cm -1 27 cm +1 27.5 cm +0.5 27 cm -0.5 26.5 cm -0.5 26.5 cm No chg 26.5 cm No chg   W+ 8" 29 cm  28.5cm 0.5 cm 28cm -1 28 cm No chg 29.5cm +1.5 29cm -0.5 28.5cm -0.5 28cm -0.5 29.5cm +1.5 29cm -0.5 28.5cm -0.5 29cm +0.5 28cm -1 29.5cm +1.5 30 cm +0.5 " "28cm -2 28.5 cm +0.5 29 cm +0.5 29.5 cm +0.5 29.5 cm No chg 29 cm -0.5 28 cm -1 27.5 cm -0.5   W +  6" 29.5 cm 27 cm 2.5 cm 29cm -0.5 29cm No chg 29cm No chg 29cm No chg 29cm No chg 29cm No chg 29.5cm +0.5 28.5cm -1 29cm +0.5 29cm No chg 29cm No chg 29 cm No chg 29 cm No chg 28.5cm -0.5 28.5 cm No chg 29 cm +0.5 29 cm No chg 29 cm No chg 29 cm No chg 28.5 cm -0.5 28.5 cm No chg   W + 4" 25.5 cm 23.5 cm 2 cm 25cm -0.5 24.5 cm -0.5 25.5cm +1 25 cm -0.5 25cm No chg 24.5cm -0.5 25.5cm +1 24.5cm -1 25cm +0.5 25.5cm +0.5  24.5cm -1 25 cm +0.5 25.5 cm +0.5 25 cm -0.5 24.5 cm -0.5 24.5 cm No chg 24.5 cm No chg 24 cm -0.5 24.5 cm +0.5 24.5 cm No chg 24.5 cm No chg   Wrist 18 cm 17.5 cm 0.5 cm 18.5cm +0.5 18 cm -0.5 18.5cm +0.5 18cm -0.5 18cm No chg 18cm No chg 18cm No chg 18cm No chg 18cm No chg 18cm No chg 18cm No chg 18 cm No chg 18 cm No chg 18 cm No chg 18 cm  No chg 18 cm No chg 18 cm No chg 17.5 cm -0.5 17.5 cm No chg 17.5 cm No chg 17.5 cm No chg   DPC 21.5 cm 20 cm 1.5 cm 20cm -1.5 20.5cm +0.5 21 cm +0.5 21cm -0.5 20.5cm -0.5 21cm +0.5 21.5cm +0.5 21cm -0.5 21cm No chg 20.5 cm -0.5 21.5cm +1 20.5cm -1 20.5 cm No chg 20.5 cm No chg 20 cm -0.5 20cm No chg 20 cm No chg 19.5 cm -0.5 19.5 cm No chg 20 cm +0.5 20 cm No chg   IP Thumb 7.5 cm 7 cm 0.5 cm 8cm +0.5 7.5cm -0.5 8 cm +0.5 8cm No chg 7.5cm -0.5 7.5cm No chg 8cm +0.5 7.5cm -0.5 7.5cm No Chg 8cm +0.5 7.5cm -0.5 7.5cm No chg 7.5 cm No chg 7cm -0.5 7.5 cm +0.5 7.5 cm No chg 7.5 cm No chg 7.5 cm No chg 7.5 cm No chg 7.5 cm No chg 7 cm -0.5         Treatment       Phylicia received the following manual therapy techniques were performed to increased myofascial/soft tissue length, mobility and pliability, increase PROM, AROM and function as well as to decrease pain for 56 minutes:    Diaphragmatic breathing x 5 reps    Short Neck- held due to thyroid goiter  Clear Healthy Lymph Nodes:  Left Axilla                                                  Right  Groin  Open " Anastomoses:  Anterior Axillo-Axillary  (AAA)                                    Axillo-Inguinal     (AI)                                    Posterior Axillo-Axillary  (LEEANN)   J-stroke at lateral chest wall  Inferior trunk slide  Repeat J-stroke    Right Upper Arm (Lateral and Medial)  Right Antecubital Fossa  Right forearm, dorsal and ventral aspect  Dorsum of R hand  R fingers    Rework right hand  Rework Right UE  Rework Anastomoses   Rework Healthy lymph Nodes    Diaphragmatic breathing, x 5 reps    PT applies  gauze to R hand and fingers and stockinette, cotton artiflex, and short stretch bandages to pt's RIGHT/LEFT/BILATERAL: right upper extremity. Pt educated to wear bandaging for 2-3 days unless it causes pain, numbness, changes in skin color/temperature, or it starts to fall down.  If removed, pt instructed to roll up bandages and cotton padding and bring to next session. If bandages are removed, pt can wear tubigrip.  Pt has Vet glove to cover bandages in the shower and directions on how to care for bandages. Pt verbalizes understanding of all topics.            Home Exercise Program and Patient Education   Education provided re:  - role of PT in multi - disciplinary team, goals for PT  - progress towards goals   -postural awareness    Written Home Exercises Provided: Patient instructed to cont prior HEP.  Exercises were reviewed and Mirian was able to demonstrate them prior to the end of the session.  Mirian demonstrated good  understanding of the education provided.     See EMR under Media for exercises provided 6/8/2022- for self manual lymph drainage techniques.    Pt has no cultural, educational or language barriers to learning provided.    Assessment     Patient tolerated session well. She hasn't had a chance to exchange her compression sleeve for the proper compression level.  She tolerated complete decongestive therapy well.  She has met goals as noted below. Once she has correct compression level for  sleeve, she will likely be appropriate for discharge from PT.   will progress as tolerated.   Pt prognosis is Good. Pt will continue to benefit from skilled outpatient physical therapy to address the deficits listed in the problem list chart on initial evaluation, provide pt/family education and to maximize pt's level of independence in the home and community environment.     Anticipated barriers to physical therapy: none anticipated    Goals as follows:  Short Term Goals (6 weeks)  1. Pt will demo decrease in girth in right upper extremity by >/= 1cm to allow for improved UE symmetry, clothing choice, ROM, and UE function. (met, 8/18/22  2. Patient will demonstrate 100% knowledge of lymphedema precautions and signs of infection to allow for reduced risk of lymphedema, reduced risk of infection, and/or exacerbation.  (met)  3. Patient will perform self lymph drainage techniques to enhance lymphatic drainage and mobility.  (met, 7/6/22)  4. Patient will tolerate daily activities with multilayered bandaging to enhance lymphatic drainage and skin elasticity.  (met, 7/6/22)  5. Pt will tolerate HEP for improved strength, functional mobility, ROM, posture, and endurance. (met, 7/6/22)        Long Term Goals: ( 12  weeks)  1. Patient to show decreased girth in Right upper extremity by >/= 2cm to allow for improved UE symmetry, clothing choice, ROM, and UE function.  (met partially, 8/18/22)  2. Patient will show reduction in density to mild or less with improved contour of limb to allow for improved cosmesis, improved lymphatic drainage, and functional mobility.  (met, 9/2/22)  3. Patient to mary/doff compression garment with daily compliance to support lymphatic mobility and skin elasticity.  (progressing, not met)  4. Pt to show improved postural awareness and alignment for improved functional mobility.  (progressing, not met)  5. Pt to be independent and compliant with HEP to allow for improved ROM, reach and carry  with functional endurance and strength.    (met, 10/4/22)           Plan   Outpatient physical therapy 2x week for 4 weeks to include the following:   Manual Therapy, Neuromuscular Re-ed, Orthotic Management and Training, Patient Education, Self Care, Therapeutic Activities and Therapeutic Exercise.            Therapist: Marcela Price, PT  2/13/2023

## 2023-02-13 NOTE — PLAN OF CARE
"  Outpatient Therapy Updated Plan of Care     Visit Date: 2/13/2023  Name: Phylicia Vásquez  Clinic Number: [id number]    Therapy Diagnosis:   Encounter Diagnoses   Name Primary?    Weakness of left lower extremity Yes    Abnormality of gait     Impairment of balance     Lymphedema of right arm     Impaired functional mobility and activity tolerance      Physician: Marcela Calvillo PA-C      Physician Orders: PT Eval and Treat   Medical Diagnosis: Carcinoma of axillary tail of right breast in female, estrogen receptor positive [C50.611, Z17.0], Localized edema    Evaluation Date: 6/3/2022    Total Visits Received: 27  Cancelled Visits: unknown  No Show Visits: 0    Current Certification Period: 12/30/22 to 2/3/23  Precautions:  Standard, Fall, RUE lymphedema  Visits from Evaluation Date:  26      Subjective     Update: Pt reports: No new complaints. Denies pain. She is returning her compression sleeve this week.  She was compliant with compression pump  Response to previous treatment: no adverse reaction  Pain Scale: Phylicia rates pain on a scale of 0/10 on VAS.   Pain location: N/A     Fatigue: not assessed  Functional change: none stated    Objective     Update:   LANDMARK RIGHT UE LEFT UE DIFF RUE Diff RUE Diff RUE Diff RUE Diff RUE Diff RUE Diff RUE Diff RUE Diff RUE Diff RUE Diff RUE Diff RUE Diff RUE Diff RUE Diff RUE Diff RUE Diff RUE Diff RUE Diff RUE Diff RUE Diff RUE Diff   Date 6/3/22 6/3/22 6/3/22 6/22/22 6/22/22 6/29/22 6/29/22 7/6/22 7/6/22 7/20/22 7/20/22 7/27/22 7/27/22 8/18/22 8/18/22 8/24/22 8/24/22 9/2/22 9/2/22 9/19/22 9/19/22 9/26/22 9/26/22 10/4/22 10/4/22 10/17/22 10/17/22 10/24/22 10/24/22 11/2/22 11/2/22 11/10/22 11/10/22 11/28/22 11/28/22 12/15/22 12/15/22 1/5/23 1/5/23 1/12/23 1/12/23 1/19/23 1/19/23 1/24/23 1/24/23   E + 8" 47 cm 46.5 cm 0.5 cm 46.5cm -0.5 44cm -2.5 45 cm +1 47 cm +2 45cm -2 43.5cm -1.5 43.5cm No chg 44cm +0.5 46cm +2 44.5cm -1.5 44.5 cm No chg 45 cm +0.5 44.5 cm -0.5 " "46 cm +1.5 44.5 cm -1.5 45.5 cm +1 44.5cm -1 45 cm +0.5 44.5 cm -0.5 44 cm -0.5 45 cm +0.5   E + 6" 44 cm 42.5 cm 1.5 cm 44 cm No chg 44cm No chg 43.5cm -0.5 45cm +1.5 42cm -3 41cm -1 43cm +2 42.5cm -0.5 43.5cm +1 44.5cm +1 45cm +0.5 43.5 cm -1.5 43 cm -0.5 43 cm No chg 44.5 cm +1.5 44.5 cm No chg 45 cm +0.5 43 cm -2 43 cm No chg 43.5 cm +0.5 44 cm +0.5   E + 4" 39.5 cm 37 cm 2.5 cm 39cm -0.5 40.5 cm +1.5 40.5cm No chg 41cm +0.5 39 cm -2 39cm No chg 39.5cm +0.5 40cm +0.5 39cm -1 41.5cm +2.5 40cm -1.5 40cm No chg 40cm No chg 40cm No chg 40 cm No chg 40.5 cm No chg 41.5 cm +1 40.5 cm -1 40.5cm No chg 40 cm -0.5 40.5 cm +0.5   E + 2" 34.5 cm 33 cm 1.5 cm 34.5 cm No chg 35 cm +0.5 34.5cm -0.5 35cm +0.5 34cm -1 34.5cm +0.5 34.5cm No chg 35cm +0.5 34cm -1 35cm +1 35cm No chg 34 cm -1 35 cm +1 35.5 cm +0.5 34.5 cm -1 35 cm +0.5 36cm +1 35 cm -1 34.5 cm -0.5 34 cm -0.5 34.5 cm +0.5   Elbow 28 cm 28 cm 0 cm 27 cm -1 27.5 cm +0.5 28 cm +0.5 28cm No chg 27cm -1 27cm No chg 27.5cm +0.5 27cm -0.5 27.5cm +0.5 27.5cm No chg 27cm -0.5 27 cm No chg 27 cm No chg 27 cm No chg 26 cm -1 27 cm +1 27.5 cm +0.5 27 cm -0.5 26.5 cm -0.5 26.5 cm No chg 26.5 cm No chg   W+ 8" 29 cm  28.5cm 0.5 cm 28cm -1 28 cm No chg 29.5cm +1.5 29cm -0.5 28.5cm -0.5 28cm -0.5 29.5cm +1.5 29cm -0.5 28.5cm -0.5 29cm +0.5 28cm -1 29.5cm +1.5 30 cm +0.5 28cm -2 28.5 cm +0.5 29 cm +0.5 29.5 cm +0.5 29.5 cm No chg 29 cm -0.5 28 cm -1 27.5 cm -0.5   W +  6" 29.5 cm 27 cm 2.5 cm 29cm -0.5 29cm No chg 29cm No chg 29cm No chg 29cm No chg 29cm No chg 29.5cm +0.5 28.5cm -1 29cm +0.5 29cm No chg 29cm No chg 29 cm No chg 29 cm No chg 28.5cm -0.5 28.5 cm No chg 29 cm +0.5 29 cm No chg 29 cm No chg 29 cm No chg 28.5 cm -0.5 28.5 cm No chg   W + 4" 25.5 cm 23.5 cm 2 cm 25cm -0.5 24.5 cm -0.5 25.5cm +1 25 cm -0.5 25cm No chg 24.5cm -0.5 25.5cm +1 24.5cm -1 25cm +0.5 25.5cm +0.5  24.5cm -1 25 cm +0.5 25.5 cm +0.5 25 cm -0.5 24.5 cm -0.5 24.5 cm No chg 24.5 cm No chg 24 cm -0.5 " 24.5 cm +0.5 24.5 cm No chg 24.5 cm No chg   Wrist 18 cm 17.5 cm 0.5 cm 18.5cm +0.5 18 cm -0.5 18.5cm +0.5 18cm -0.5 18cm No chg 18cm No chg 18cm No chg 18cm No chg 18cm No chg 18cm No chg 18cm No chg 18 cm No chg 18 cm No chg 18 cm No chg 18 cm  No chg 18 cm No chg 18 cm No chg 17.5 cm -0.5 17.5 cm No chg 17.5 cm No chg 17.5 cm No chg   DPC 21.5 cm 20 cm 1.5 cm 20cm -1.5 20.5cm +0.5 21 cm +0.5 21cm -0.5 20.5cm -0.5 21cm +0.5 21.5cm +0.5 21cm -0.5 21cm No chg 20.5 cm -0.5 21.5cm +1 20.5cm -1 20.5 cm No chg 20.5 cm No chg 20 cm -0.5 20cm No chg 20 cm No chg 19.5 cm -0.5 19.5 cm No chg 20 cm +0.5 20 cm No chg   IP Thumb 7.5 cm 7 cm 0.5 cm 8cm +0.5 7.5cm -0.5 8 cm +0.5 8cm No chg 7.5cm -0.5 7.5cm No chg 8cm +0.5 7.5cm -0.5 7.5cm No Chg 8cm +0.5 7.5cm -0.5 7.5cm No chg 7.5 cm No chg 7cm -0.5 7.5 cm +0.5 7.5 cm No chg 7.5 cm No chg 7.5 cm No chg 7.5 cm No chg 7.5 cm No chg 7 cm -0.5                                                     Assessment     Update: Patient tolerated session well. She hasn't had a chance to exchange her compression sleeve for the proper compression level.  She tolerated complete decongestive therapy well.  She has met goals as noted in treatment note. Once she has correct compression level for sleeve, she will likely be appropriate for discharge from PT.   will progress as tolerated.     Previous Short Term Goals Status:   met partially  New Short Term Goals Status:   N/A  Long Term Goal Status:   continue per initial plan of care.  Reasons for Recertification of Therapy:   Pt is ready for compression garments, but she accidentally ordered incorrect compression level, and will send it for exchange today. She will benefit from continued therapy to manage her lymphedema until she has correct compression sleeve.  Plan     Updated Certification Period: 2/13/2023 to 3/17/23  Recommended Treatment Plan: 2 times per week for 4 weeks: Manual Therapy, Neuromuscular Re-ed, Orthotic Management and Training,  Patient Education, Self Care, Therapeutic Activities, and Therapeutic Exercise  Other Recommendations: none anticipated    Marcela Price, PT  2/13/2023      I CERTIFY THE NEED FOR THESE SERVICES FURNISHED UNDER THIS PLAN OF TREATMENT AND WHILE UNDER MY CARE    Physician's comments:        Physician's Signature: ___________________________________________________

## 2023-02-16 ENCOUNTER — PATIENT MESSAGE (OUTPATIENT)
Dept: ADMINISTRATIVE | Facility: OTHER | Age: 53
End: 2023-02-16
Payer: COMMERCIAL

## 2023-02-23 ENCOUNTER — PATIENT MESSAGE (OUTPATIENT)
Dept: HEMATOLOGY/ONCOLOGY | Facility: CLINIC | Age: 53
End: 2023-02-23
Payer: COMMERCIAL

## 2023-02-27 ENCOUNTER — CLINICAL SUPPORT (OUTPATIENT)
Dept: REHABILITATION | Facility: HOSPITAL | Age: 53
End: 2023-02-27
Payer: COMMERCIAL

## 2023-02-27 ENCOUNTER — PATIENT MESSAGE (OUTPATIENT)
Dept: HEMATOLOGY/ONCOLOGY | Facility: CLINIC | Age: 53
End: 2023-02-27
Payer: COMMERCIAL

## 2023-02-27 DIAGNOSIS — I89.0 LYMPHEDEMA OF RIGHT ARM: Primary | ICD-10-CM

## 2023-02-27 DIAGNOSIS — R29.898 WEAKNESS OF LEFT LOWER EXTREMITY: Primary | ICD-10-CM

## 2023-02-27 DIAGNOSIS — R26.9 ABNORMALITY OF GAIT: ICD-10-CM

## 2023-02-27 DIAGNOSIS — Z74.09 IMPAIRED FUNCTIONAL MOBILITY AND ACTIVITY TOLERANCE: ICD-10-CM

## 2023-02-27 DIAGNOSIS — R26.89 IMPAIRMENT OF BALANCE: ICD-10-CM

## 2023-02-27 PROCEDURE — 97140 MANUAL THERAPY 1/> REGIONS: CPT

## 2023-02-27 PROCEDURE — 97112 NEUROMUSCULAR REEDUCATION: CPT

## 2023-02-27 PROCEDURE — 97110 THERAPEUTIC EXERCISES: CPT

## 2023-02-27 NOTE — PROGRESS NOTES
Physical Therapy Daily Treatment Note       Date: 2/27/2023   Name: Phylicia Vásquez  Clinic Number: [id number]    Therapy Diagnosis:   Encounter Diagnoses   Name Primary?    Lymphedema of right arm Yes    Impaired functional mobility and activity tolerance        Physician: Negrito Verde MD    Physician Orders: PT Eval and Treat   Medical Diagnosis: Carcinoma of axillary tail of right breast in female, estrogen receptor positive [C50.611, Z17.0], Localized edema      Evaluation Date: 6/3/2022  Authorization Period Expiration: 12/31/22  Updated Plan of Care Certification Period: 3/17/23  Visit # / Visits authorized: 9/20 (plus 20 from last year)  Insurance: [employer]  FOTO: 3/3     Time In: 9:05 AM   Time Out:  9:55 AM  Total Billable Time:  50 minutes     Precautions: Standard, cancer and Right UE lymphedema      Subjective     Pt reports: No new complaints. She has returned her compression sleeve and is awaiting for correct one to arrive. She endorses intermittent right lateral elbow pain but denies any currently  She was compliant with compression pump  Response to previous treatment: no adverse reaction  Pain Scale: Phylicia rates pain on a scale of 0/10 on VAS.   Pain location: N/A    Fatigue: not assessed  Functional change: none stated  Treatment:   Chemotherapy:  4 cycles of AC and 4 cycles of Taxol patrick adjuvantly  Radiation: completed radiation therapy on 8/20/20.   Endocrine therapy: On 11/1/2020 started on Anastrozole with with Dr. Verde     Surgery date: Pt is s/p bilateral mastectomy with R SLNB, TE reconstruction in 4/2021. pt will have permanent reconstruction with ROWAN flap, but she states she needs to lose weight first.      Objective     Objective Measures updated at progress report unless specified.     LANDMARK RIGHT UE LEFT UE DIFF RUE Diff RUE Diff RUE Diff RUE Diff RUE Diff RUE Diff RUE Diff RUE Diff RUE Diff RUE Diff RUE Diff RUE  "Diff RUE Diff RUE Diff RUE Diff RUE Diff RUE Diff RUE Diff RUE Diff RUE Diff RUE Diff RUE Diff   Date 6/3/22 6/3/22 6/3/22 6/22/22 6/22/22 6/29/22 6/29/22 7/6/22 7/6/22 7/20/22 7/20/22 7/27/22 7/27/22 8/18/22 8/18/22 8/24/22 8/24/22 9/2/22 9/2/22 9/19/22 9/19/22 9/26/22 9/26/22 10/4/22 10/4/22 10/17/22 10/17/22 10/24/22 10/24/22 11/2/22 11/2/22 11/10/22 11/10/22 11/28/22 11/28/22 12/15/22 12/15/22 1/5/23 1/5/23 1/12/23 1/12/23 1/19/23 1/19/23 1/24/23 1/24/23 2/27/23 2/27/23   E + 8" 47 cm 46.5 cm 0.5 cm 46.5cm -0.5 44cm -2.5 45 cm +1 47 cm +2 45cm -2 43.5cm -1.5 43.5cm No chg 44cm +0.5 46cm +2 44.5cm -1.5 44.5 cm No chg 45 cm +0.5 44.5 cm -0.5 46 cm +1.5 44.5 cm -1.5 45.5 cm +1 44.5cm -1 45 cm +0.5 44.5 cm -0.5 44 cm -0.5 45 cm +0.5 45.5 cm +0.5   E + 6" 44 cm 42.5 cm 1.5 cm 44 cm No chg 44cm No chg 43.5cm -0.5 45cm +1.5 42cm -3 41cm -1 43cm +2 42.5cm -0.5 43.5cm +1 44.5cm +1 45cm +0.5 43.5 cm -1.5 43 cm -0.5 43 cm No chg 44.5 cm +1.5 44.5 cm No chg 45 cm +0.5 43 cm -2 43 cm No chg 43.5 cm +0.5 44 cm +0.5 44.5 cm +0.5   E + 4" 39.5 cm 37 cm 2.5 cm 39cm -0.5 40.5 cm +1.5 40.5cm No chg 41cm +0.5 39 cm -2 39cm No chg 39.5cm +0.5 40cm +0.5 39cm -1 41.5cm +2.5 40cm -1.5 40cm No chg 40cm No chg 40cm No chg 40 cm No chg 40.5 cm No chg 41.5 cm +1 40.5 cm -1 40.5cm No chg 40 cm -0.5 40.5 cm +0.5 40.5 cm No chg   E + 2" 34.5 cm 33 cm 1.5 cm 34.5 cm No chg 35 cm +0.5 34.5cm -0.5 35cm +0.5 34cm -1 34.5cm +0.5 34.5cm No chg 35cm +0.5 34cm -1 35cm +1 35cm No chg 34 cm -1 35 cm +1 35.5 cm +0.5 34.5 cm -1 35 cm +0.5 36cm +1 35 cm -1 34.5 cm -0.5 34 cm -0.5 34.5 cm +0.5 35 cm +0.5   Elbow 28 cm 28 cm 0 cm 27 cm -1 27.5 cm +0.5 28 cm +0.5 28cm No chg 27cm -1 27cm No chg 27.5cm +0.5 27cm -0.5 27.5cm +0.5 27.5cm No chg 27cm -0.5 27 cm No chg 27 cm No chg 27 cm No chg 26 cm -1 27 cm +1 27.5 cm +0.5 27 cm -0.5 26.5 cm -0.5 26.5 cm No chg 26.5 cm No chg 26.5 cm No chg   W+ 8" 29 cm  28.5cm 0.5 cm 28cm -1 28 cm No chg 29.5cm +1.5 29cm -0.5 " "28.5cm -0.5 28cm -0.5 29.5cm +1.5 29cm -0.5 28.5cm -0.5 29cm +0.5 28cm -1 29.5cm +1.5 30 cm +0.5 28cm -2 28.5 cm +0.5 29 cm +0.5 29.5 cm +0.5 29.5 cm No chg 29 cm -0.5 28 cm -1 27.5 cm -0.5 28.5 cm +1   W +  6" 29.5 cm 27 cm 2.5 cm 29cm -0.5 29cm No chg 29cm No chg 29cm No chg 29cm No chg 29cm No chg 29.5cm +0.5 28.5cm -1 29cm +0.5 29cm No chg 29cm No chg 29 cm No chg 29 cm No chg 28.5cm -0.5 28.5 cm No chg 29 cm +0.5 29 cm No chg 29 cm No chg 29 cm No chg 28.5 cm -0.5 28.5 cm No chg 28.5 cm No chg   W + 4" 25.5 cm 23.5 cm 2 cm 25cm -0.5 24.5 cm -0.5 25.5cm +1 25 cm -0.5 25cm No chg 24.5cm -0.5 25.5cm +1 24.5cm -1 25cm +0.5 25.5cm +0.5  24.5cm -1 25 cm +0.5 25.5 cm +0.5 25 cm -0.5 24.5 cm -0.5 24.5 cm No chg 24.5 cm No chg 24 cm -0.5 24.5 cm +0.5 24.5 cm No chg 24.5 cm No chg 24.5 cm No chg   Wrist 18 cm 17.5 cm 0.5 cm 18.5cm +0.5 18 cm -0.5 18.5cm +0.5 18cm -0.5 18cm No chg 18cm No chg 18cm No chg 18cm No chg 18cm No chg 18cm No chg 18cm No chg 18 cm No chg 18 cm No chg 18 cm No chg 18 cm  No chg 18 cm No chg 18 cm No chg 17.5 cm -0.5 17.5 cm No chg 17.5 cm No chg 17.5 cm No chg 17.5 cm No chg   DPC 21.5 cm 20 cm 1.5 cm 20cm -1.5 20.5cm +0.5 21 cm +0.5 21cm -0.5 20.5cm -0.5 21cm +0.5 21.5cm +0.5 21cm -0.5 21cm No chg 20.5 cm -0.5 21.5cm +1 20.5cm -1 20.5 cm No chg 20.5 cm No chg 20 cm -0.5 20cm No chg 20 cm No chg 19.5 cm -0.5 19.5 cm No chg 20 cm +0.5 20 cm No chg 19.5 cm -0.5   IP Thumb 7.5 cm 7 cm 0.5 cm 8cm +0.5 7.5cm -0.5 8 cm +0.5 8cm No chg 7.5cm -0.5 7.5cm No chg 8cm +0.5 7.5cm -0.5 7.5cm No Chg 8cm +0.5 7.5cm -0.5 7.5cm No chg 7.5 cm No chg 7cm -0.5 7.5 cm +0.5 7.5 cm No chg 7.5 cm No chg 7.5 cm No chg 7.5 cm No chg 7.5 cm No chg 7 cm -0.5 7.5 cm +0.5         Treatment       Phylicia received the following manual therapy techniques were performed to increased myofascial/soft tissue length, mobility and pliability, increase PROM, AROM and function as well as to decrease pain for 42 minutes:    Measurements " taken above         Short Neck- held due to thyroid goiter  Clear Healthy Lymph Nodes:  Left Axilla                                                  Right  Groin  Open Anastomoses:  Anterior Axillo-Axillary  (AAA)                                    Axillo-Inguinal     (AI)                                    Posterior Axillo-Axillary  (LEEANN)   J-stroke at lateral chest wall  Inferior trunk slide  Repeat J-stroke    Right Upper Arm (Lateral and Medial)  Right Antecubital Fossa  Right forearm, dorsal and ventral aspect  Dorsum of R hand  R fingers    Rework right hand  Rework Right UE  Rework Anastomoses   Rework Healthy lymph Nodes    Diaphragmatic breathing, x 5 reps    PT applies  gauze to R hand and fingers and stockinette, cotton artiflex, and short stretch bandages to pt's RIGHT/LEFT/BILATERAL: right upper extremity. Pt educated to wear bandaging for 2-3 days unless it causes pain, numbness, changes in skin color/temperature, or it starts to fall down.  If removed, pt instructed to roll up bandages and cotton padding and bring to next session. If bandages are removed, pt can wear tubigrip.  Pt has Vet glove to cover bandages in the shower and directions on how to care for bandages. Pt verbalizes understanding of all topics.    Phylicia received therapeutic exercises to develop ROM, posture, and lymphatic motility for 8 minutes including:     Diaphragmatic breathing x 5 reps  Scapular retractions, 2 x 10 reps  Shoulder rolls, 10 reps each fwd and back              Home Exercise Program and Patient Education   Education provided re:  - role of PT in multi - disciplinary team, goals for PT  - progress towards goals   -postural awareness    Written Home Exercises Provided: Patient instructed to cont prior HEP.  Exercises were reviewed and Mirian was able to demonstrate them prior to the end of the session.  Mirian demonstrated good  understanding of the education provided.     See EMR under Media for exercises provided  6/8/2022- for self manual lymph drainage techniques.    Pt has no cultural, educational or language barriers to learning provided.    Assessment     Patient tolerated session well. Increased time since last session, so some mild increases in her right arm circumference noted today.  She tolerated complete decongestive therapy well.  She is awaiting a new compression sleeve. Once she has correct compression level for sleeve, she will likely be appropriate for discharge from PT.   Will progress as tolerated.   Pt prognosis is Good. Pt will continue to benefit from skilled outpatient physical therapy to address the deficits listed in the problem list chart on initial evaluation, provide pt/family education and to maximize pt's level of independence in the home and community environment.     Anticipated barriers to physical therapy: none anticipated    Goals as follows:  Short Term Goals (6 weeks)  1. Pt will demo decrease in girth in right upper extremity by >/= 1cm to allow for improved UE symmetry, clothing choice, ROM, and UE function. (met, 8/18/22  2. Patient will demonstrate 100% knowledge of lymphedema precautions and signs of infection to allow for reduced risk of lymphedema, reduced risk of infection, and/or exacerbation.  (met)  3. Patient will perform self lymph drainage techniques to enhance lymphatic drainage and mobility.  (met, 7/6/22)  4. Patient will tolerate daily activities with multilayered bandaging to enhance lymphatic drainage and skin elasticity.  (met, 7/6/22)  5. Pt will tolerate HEP for improved strength, functional mobility, ROM, posture, and endurance. (met, 7/6/22)        Long Term Goals: ( 12  weeks)  1. Patient to show decreased girth in Right upper extremity by >/= 2cm to allow for improved UE symmetry, clothing choice, ROM, and UE function.  (met partially, 8/18/22)  2. Patient will show reduction in density to mild or less with improved contour of limb to allow for improved cosmesis,  improved lymphatic drainage, and functional mobility.  (met, 9/2/22)  3. Patient to mary/doff compression garment with daily compliance to support lymphatic mobility and skin elasticity.  (progressing, not met)  4. Pt to show improved postural awareness and alignment for improved functional mobility.  (progressing, not met)  5. Pt to be independent and compliant with HEP to allow for improved ROM, reach and carry with functional endurance and strength.    (met, 10/4/22)           Plan   Outpatient physical therapy 2x week for 4 weeks to include the following:   Manual Therapy, Neuromuscular Re-ed, Orthotic Management and Training, Patient Education, Self Care, Therapeutic Activities and Therapeutic Exercise.            Therapist: Marcela Price, PT  2/27/2023

## 2023-02-27 NOTE — PROGRESS NOTES
Physical Therapy Daily Treatment Note     Name: Phylicia Vásquez  Clinic Number: [id number]    Therapy Diagnosis:   Encounter Diagnoses   Name Primary?    Weakness of left lower extremity Yes    Abnormality of gait     Impairment of balance        Physician: Marcela Calvillo PA-C    Visit Date: 2/27/2023    Physician Orders: PT Eval and Treat   Medical Diagnosis from Referral: M21.379 (ICD-10-CM) - Foot-drop, unspecified laterality  Evaluation Date: 10/18/2022  Authorization Period Expiration: 4/4/2023  Plan of Care Expiration: 3/3/2023  Visit # / Visits authorized: 6/ 8 (4 visits in 2022)  Insurance: [employer] OPEN ACCESS PLUS  FOTO: 2/3     Time In: 10:30 am  Time Out:11:25 am  Total Billable Time: 55 minutes    Precautions:   Standard, cancer, and R UE lymphedema    Subjective     Pt reports: feeling fine, has been off the past few days. She has been having a little pain the past few days as she has been on her feet more at work due to [employer].    She was compliant with home exercise program  Response to previous treatment: felt fine  Functional change: not stomping as much    Pain: 0/10  Location: left ankle      Objective     Objective Measures updated at progress report unless specified.     B/P: 136/99 mmHg; HR: 73 bpm; O2: 99%    Lab Values: 9/12/22  Platelets: 216  ANC: 4.9  Hematacrit: 37.3  Hemoglobin: 12.3      Treatment     Mirian received therapeutic exercises to develop strength, endurance, ROM, flexibility, posture and core stabilization for 30 minutes including:  Stationary bike, seat 6, level 2, 10 minutes   Supine ankle DF, 2 sets x 10 reps  Supine ankle inversion, red band, 2 sets x 10 reps  Supine ankle eversion, red band 2 sets x 10 reps  Supine ankle PF, green band 2 sets x 10 reps  Calf raise, 2 sets of 10 reps  Gastroc stretch on slant board, 2 x 30 sec  Soleus stretch on slant board, 2 x 30 sec    Mirian participated in neuromuscular re-education activities to improve: Balance, Coordination,  Proprioception and Posture for 25 minutes. The following activities were included:  Tandem stance, firm surface 3 x 30 sec  Single leg stance, firm surface 2 x 30 sec  Toe raises, 2 sets x 10 reps  Static standing on airex, eyes closed 2 reps x 30 sec  Tandem walking, 4 laps along side of the table  Heel walking, 4 laps along side of table    Home Exercises Provided and Patient Education Provided     Education provided:   - compliance with HEP  - role of PT and goals for PT     Written Home Exercises Provided: Patient instructed to cont prior HEP.  Exercises were reviewed and Mirian was able to demonstrate them prior to the end of the session.  Mirian demonstrated good  understanding of the education provided.     See EMR under Patient Instructions for exercises provided prior visit.    Assessment     Patient is tolerating therapy well. Patient was able to perform all of today's activities with no increase in symptoms prior to leaving the clinic. She required occasional cues for posture with standing stretching. She also required occasional cues with ankle inversion/eversion exercises to avoid substitutions. She required light touch of both hands on the table to maintain her balance with tandem stance and single leg balance activities. Will progress as tolerated.  Mirian Is progressing well towards her goals.   Pt prognosis is Good.     Pt will continue to benefit from skilled outpatient physical therapy to address the deficits listed in the problem list box on initial evaluation, provide pt/family education and to maximize pt's level of independence in the home and community environment.     Pt's spiritual, cultural and educational needs considered and pt agreeable to plan of care and goals.     Anticipated barriers to physical therapy: none anticipated    Goals:   Short Term Goals:  4 weeks  1. Pt will increase AROM of left ankle to neutral dorsiflexion to promote greater ease with performing normal gait pattern.  (progressing, not met)  2. Pt. to demonstrate increased strength of left lower extremity to 4/5  to increase stability during community ambulation (progressing, not met)  3. Pt. will improve TUG test score to 12 seconds or less in order to perform safe transfers and for patient to be in low/moderate risk for falls category (progressing, not met)  4. Pt will tolerate 6 minute walk test. (progressing, not met)  5. Pt will initiate home exercise program to improve strength, flexibility, endurance, mobility & balance to return pt to PLOF (met, 1/5/23)  6. Pt will tolerate 10 min or greater of time in light-->moderate intensity cardio (I.e. Bike, NuStep) to improve endurance to assist in performing her usual work activities (progressing, not met)  7. Pt to demonstrate tandem stance 30 sec or greater w/out UE support in order to improve balance to progress to singe leg stance to decrease fall risk in performance of ADL's (progressing, not met)     Long Term Goals: 8 weeks  1. Pt will increase AROM of left ankle dorsiflexion to 5 degrees in order to perform normal gait pattern when ambulating on uneven ground (progressing, not met)  2. Pt. will increase strength of left lower extremity to 5/5  to increase stability during ambulation on uneven surfaces,to increase tolerance for ADL and work activities. (progressing, not met)  3. Pt will be independent with HEP to improve ROM, strength, balance,  and independence with ADL's (progressing, not met)  4. Pt. will improve TUG test score to 10 seconds or less in order to ambulate safely in community and for patient to be in low risk for falls category (progressing, not met)  5. Pt. will improve 6 minute walk test score by 30 meters in order for patient to be in low risk for falls category (progressing, not met)  6. Patient will report compliance with walking program 5x week for 10 -30min each day to improve overall cardiovascular function and decrease cancer related fatigue at  discharge. (progressing, not met)    Plan     Updated Plan of care Certification: 1/5/2023 to 3/3/2023.     Outpatient Physical Therapy 2 times weekly for 8 weeks to include the following interventions: Gait Training, Manual Therapy, Neuromuscular Re-ed, Patient Education, Self Care, Therapeutic Activities, Therapeutic Exercise, and IASTM . Dry needling with manual therapy techniques to decrease pain, inflammation and swelling, increase circulation and promote healing process.      Gail Gutierrez, PT    2/27/2023

## 2023-03-23 ENCOUNTER — PATIENT MESSAGE (OUTPATIENT)
Dept: ORTHOPEDICS | Facility: CLINIC | Age: 53
End: 2023-03-23
Payer: COMMERCIAL

## 2023-03-24 ENCOUNTER — HOSPITAL ENCOUNTER (EMERGENCY)
Facility: HOSPITAL | Age: 53
Discharge: HOME | End: 2023-03-24
Attending: EMERGENCY MEDICINE
Payer: COMMERCIAL

## 2023-03-24 VITALS
WEIGHT: 230 LBS | SYSTOLIC BLOOD PRESSURE: 138 MMHG | HEART RATE: 71 BPM | DIASTOLIC BLOOD PRESSURE: 75 MMHG | HEIGHT: 63 IN | TEMPERATURE: 98 F | OXYGEN SATURATION: 96 % | RESPIRATION RATE: 16 BRPM | BODY MASS INDEX: 40.75 KG/M2

## 2023-03-24 DIAGNOSIS — R10.9 ABDOMINAL PAIN, UNSPECIFIED ABDOMINAL LOCATION: Primary | ICD-10-CM

## 2023-03-24 DIAGNOSIS — R10.9 ABDOMINAL PAIN: ICD-10-CM

## 2023-03-24 LAB
ALBUMIN SERPL BCP-MCNC: 3.6 G/DL (ref 3.5–5.2)
ALP SERPL-CCNC: 129 U/L (ref 55–135)
ALT SERPL W/O P-5'-P-CCNC: 21 U/L (ref 10–44)
ANION GAP SERPL CALC-SCNC: 8 MMOL/L (ref 8–16)
AST SERPL-CCNC: 18 U/L (ref 10–40)
BASOPHILS # BLD AUTO: 0.04 K/UL (ref 0–0.2)
BASOPHILS NFR BLD: 0.5 % (ref 0–1.9)
BILIRUB SERPL-MCNC: 0.3 MG/DL (ref 0.1–1)
BILIRUB UR QL STRIP: NEGATIVE
BILIRUB UR QL STRIP: NEGATIVE
BNP SERPL-MCNC: <10 PG/ML (ref 0–99)
BUN SERPL-MCNC: 10 MG/DL (ref 6–20)
BUN SERPL-MCNC: 10 MG/DL (ref 6–30)
CALCIUM SERPL-MCNC: 9.6 MG/DL (ref 8.7–10.5)
CHLORIDE SERPL-SCNC: 104 MMOL/L (ref 95–110)
CHLORIDE SERPL-SCNC: 105 MMOL/L (ref 95–110)
CLARITY UR REFRACT.AUTO: ABNORMAL
CLARITY UR REFRACT.AUTO: CLEAR
CO2 SERPL-SCNC: 27 MMOL/L (ref 23–29)
COLOR UR AUTO: ABNORMAL
COLOR UR AUTO: YELLOW
CREAT SERPL-MCNC: 0.8 MG/DL (ref 0.5–1.4)
CREAT SERPL-MCNC: 0.8 MG/DL (ref 0.5–1.4)
D DIMER PPP IA.FEU-MCNC: 1.83 MG/L FEU
DIFFERENTIAL METHOD: ABNORMAL
EOSINOPHIL # BLD AUTO: 0.2 K/UL (ref 0–0.5)
EOSINOPHIL NFR BLD: 2.6 % (ref 0–8)
ERYTHROCYTE [DISTWIDTH] IN BLOOD BY AUTOMATED COUNT: 15.9 % (ref 11.5–14.5)
EST. GFR  (NO RACE VARIABLE): >60 ML/MIN/1.73 M^2
GLUCOSE SERPL-MCNC: 111 MG/DL (ref 70–110)
GLUCOSE SERPL-MCNC: 116 MG/DL (ref 70–110)
GLUCOSE UR QL STRIP: NEGATIVE
GLUCOSE UR QL STRIP: NEGATIVE
HCT VFR BLD AUTO: 38.4 % (ref 37–48.5)
HCT VFR BLD CALC: 39 %PCV (ref 36–54)
HGB BLD-MCNC: 11.7 G/DL (ref 12–16)
HGB UR QL STRIP: NEGATIVE
HGB UR QL STRIP: NEGATIVE
IMM GRANULOCYTES # BLD AUTO: 0.04 K/UL (ref 0–0.04)
IMM GRANULOCYTES NFR BLD AUTO: 0.5 % (ref 0–0.5)
KETONES UR QL STRIP: NEGATIVE
KETONES UR QL STRIP: NEGATIVE
LACTATE SERPL-SCNC: 1.2 MMOL/L (ref 0.5–2.2)
LEUKOCYTE ESTERASE UR QL STRIP: NEGATIVE
LEUKOCYTE ESTERASE UR QL STRIP: NEGATIVE
LIPASE SERPL-CCNC: 40 U/L (ref 4–60)
LYMPHOCYTES # BLD AUTO: 2 K/UL (ref 1–4.8)
LYMPHOCYTES NFR BLD: 26.6 % (ref 18–48)
MCH RBC QN AUTO: 25.7 PG (ref 27–31)
MCHC RBC AUTO-ENTMCNC: 30.5 G/DL (ref 32–36)
MCV RBC AUTO: 84 FL (ref 82–98)
MONOCYTES # BLD AUTO: 0.4 K/UL (ref 0.3–1)
MONOCYTES NFR BLD: 4.9 % (ref 4–15)
NEUTROPHILS # BLD AUTO: 4.9 K/UL (ref 1.8–7.7)
NEUTROPHILS NFR BLD: 64.9 % (ref 38–73)
NITRITE UR QL STRIP: NEGATIVE
NITRITE UR QL STRIP: NEGATIVE
NRBC BLD-RTO: 0 /100 WBC
PH UR STRIP: 6 [PH] (ref 5–8)
PH UR STRIP: 8 [PH] (ref 5–8)
PLATELET # BLD AUTO: 204 K/UL (ref 150–450)
PMV BLD AUTO: 12.2 FL (ref 9.2–12.9)
POC IONIZED CALCIUM: 1.12 MMOL/L (ref 1.06–1.42)
POC TCO2 (MEASURED): 28 MMOL/L (ref 23–29)
POTASSIUM BLD-SCNC: 3.8 MMOL/L (ref 3.5–5.1)
POTASSIUM SERPL-SCNC: 3.9 MMOL/L (ref 3.5–5.1)
PROT SERPL-MCNC: 7.4 G/DL (ref 6–8.4)
PROT UR QL STRIP: NEGATIVE
PROT UR QL STRIP: NEGATIVE
RBC # BLD AUTO: 4.55 M/UL (ref 4–5.4)
SAMPLE: ABNORMAL
SODIUM BLD-SCNC: 140 MMOL/L (ref 136–145)
SODIUM SERPL-SCNC: 140 MMOL/L (ref 136–145)
SP GR UR STRIP: 1.01 (ref 1–1.03)
SP GR UR STRIP: 1.02 (ref 1–1.03)
TROPONIN I SERPL DL<=0.01 NG/ML-MCNC: 0.01 NG/ML (ref 0–0.03)
URN SPEC COLLECT METH UR: ABNORMAL
URN SPEC COLLECT METH UR: NORMAL
WBC # BLD AUTO: 7.62 K/UL (ref 3.9–12.7)

## 2023-03-24 PROCEDURE — 96361 HYDRATE IV INFUSION ADD-ON: CPT

## 2023-03-24 PROCEDURE — 83605 ASSAY OF LACTIC ACID: CPT | Performed by: STUDENT IN AN ORGANIZED HEALTH CARE EDUCATION/TRAINING PROGRAM

## 2023-03-24 PROCEDURE — 81003 URINALYSIS AUTO W/O SCOPE: CPT | Performed by: EMERGENCY MEDICINE

## 2023-03-24 PROCEDURE — 83880 ASSAY OF NATRIURETIC PEPTIDE: CPT | Performed by: STUDENT IN AN ORGANIZED HEALTH CARE EDUCATION/TRAINING PROGRAM

## 2023-03-24 PROCEDURE — 81003 URINALYSIS AUTO W/O SCOPE: CPT | Mod: 91 | Performed by: STUDENT IN AN ORGANIZED HEALTH CARE EDUCATION/TRAINING PROGRAM

## 2023-03-24 PROCEDURE — 85025 COMPLETE CBC W/AUTO DIFF WBC: CPT | Performed by: STUDENT IN AN ORGANIZED HEALTH CARE EDUCATION/TRAINING PROGRAM

## 2023-03-24 PROCEDURE — 25500020 PHARM REV CODE 255: Performed by: EMERGENCY MEDICINE

## 2023-03-24 PROCEDURE — 25000003 PHARM REV CODE 250: Performed by: STUDENT IN AN ORGANIZED HEALTH CARE EDUCATION/TRAINING PROGRAM

## 2023-03-24 PROCEDURE — 93005 ELECTROCARDIOGRAM TRACING: CPT

## 2023-03-24 PROCEDURE — 84484 ASSAY OF TROPONIN QUANT: CPT | Performed by: STUDENT IN AN ORGANIZED HEALTH CARE EDUCATION/TRAINING PROGRAM

## 2023-03-24 PROCEDURE — 99284 PR EMERGENCY DEPT VISIT,LEVEL IV: ICD-10-PCS | Mod: ,,, | Performed by: EMERGENCY MEDICINE

## 2023-03-24 PROCEDURE — 99285 EMERGENCY DEPT VISIT HI MDM: CPT | Mod: 25

## 2023-03-24 PROCEDURE — 96375 TX/PRO/DX INJ NEW DRUG ADDON: CPT | Mod: 59

## 2023-03-24 PROCEDURE — 99284 EMERGENCY DEPT VISIT MOD MDM: CPT | Mod: ,,, | Performed by: EMERGENCY MEDICINE

## 2023-03-24 PROCEDURE — 83690 ASSAY OF LIPASE: CPT | Performed by: STUDENT IN AN ORGANIZED HEALTH CARE EDUCATION/TRAINING PROGRAM

## 2023-03-24 PROCEDURE — 63600175 PHARM REV CODE 636 W HCPCS: Performed by: STUDENT IN AN ORGANIZED HEALTH CARE EDUCATION/TRAINING PROGRAM

## 2023-03-24 PROCEDURE — 93010 EKG 12-LEAD: ICD-10-PCS | Mod: ,,, | Performed by: INTERNAL MEDICINE

## 2023-03-24 PROCEDURE — 80047 BASIC METABLC PNL IONIZED CA: CPT

## 2023-03-24 PROCEDURE — 93010 ELECTROCARDIOGRAM REPORT: CPT | Mod: ,,, | Performed by: INTERNAL MEDICINE

## 2023-03-24 PROCEDURE — 80053 COMPREHEN METABOLIC PANEL: CPT | Performed by: STUDENT IN AN ORGANIZED HEALTH CARE EDUCATION/TRAINING PROGRAM

## 2023-03-24 PROCEDURE — 85379 FIBRIN DEGRADATION QUANT: CPT | Performed by: STUDENT IN AN ORGANIZED HEALTH CARE EDUCATION/TRAINING PROGRAM

## 2023-03-24 PROCEDURE — 96374 THER/PROPH/DIAG INJ IV PUSH: CPT

## 2023-03-24 RX ORDER — MORPHINE SULFATE 4 MG/ML
4 INJECTION, SOLUTION INTRAMUSCULAR; INTRAVENOUS
Status: COMPLETED | OUTPATIENT
Start: 2023-03-24 | End: 2023-03-24

## 2023-03-24 RX ORDER — ONDANSETRON 2 MG/ML
4 INJECTION INTRAMUSCULAR; INTRAVENOUS
Status: COMPLETED | OUTPATIENT
Start: 2023-03-24 | End: 2023-03-24

## 2023-03-24 RX ADMIN — SODIUM CHLORIDE 1000 ML: 9 INJECTION, SOLUTION INTRAVENOUS at 06:03

## 2023-03-24 RX ADMIN — IOHEXOL 100 ML: 350 INJECTION, SOLUTION INTRAVENOUS at 10:03

## 2023-03-24 RX ADMIN — ONDANSETRON 4 MG: 2 INJECTION INTRAMUSCULAR; INTRAVENOUS at 06:03

## 2023-03-24 RX ADMIN — MORPHINE SULFATE 4 MG: 4 INJECTION INTRAVENOUS at 06:03

## 2023-03-24 NOTE — ED TRIAGE NOTES
Phylicia Vásquez, a 53 y.o. female presents to the ED w/ complaint of abdominal pain in RUQ and nausea x 1 week, starting to radiate to left side.     Triage note:  Chief Complaint   Patient presents with    Abdominal Pain     RUQ abdominal pain x 1 week. +nausea.      Review of patient's allergies indicates:   Allergen Reactions    Butalbital Other (See Comments)     Chest pain       Past Medical History:   Diagnosis Date    Anxiety     Back pain     Goiter     HTN (hypertension) 10/18/2012    Hx antineoplastic chemo     last dose 4/23/20    Hx of psychiatric care     Ambien, Klonopin    Insomnia 10/18/2012    Malignant neoplasm of axillary tail of right breast in female, estrogen receptor positive 11/24/2019    right    Neck mass     right posterior    Primary invasive malignant neoplasm of female breast, right 11/21/2019    right    Psychiatric problem     S/P abdominal supracervical subtotal hysterectomy, continues to have regular menses.  4/2/2014    Spinal cord cyst, L1 2014     Thoracic radiculopathy, bulging disc at T10-11 and T11-12

## 2023-03-24 NOTE — DISCHARGE INSTRUCTIONS
Thank you for coming to our Emergency Department today. It is important to remember that some problems or medical conditions are difficult to diagnose and may not be found or addressed during your Emergency Department visit.     Be sure to follow up with your primary care doctor and review all labs/imaging/tests that were performed during your ER visit with them. Some labs/imaging/tests may be outside of the normal range, and require non-emergent follow-up and/or further investigation/treatment/procedures/testing to help diagnose/exclude/prevent complications or other potentially serious medical conditions that were not discussed or addressed during your ER visit.    If you do not have a primary care doctor, you may contact the one listed on your discharge paperwork or you may also call the [facility] Appointment Desk at [phone] to schedule an appointment and establish care with one. It is important to your health that you have a primary care doctor.    Please take all medications as directed. All medications may potentially have side-effects and it is impossible to predict which medications may give you side-effects or what side-effects (if any) they will give you.. If you feel that you are having a negative effect or side-effect of any medication you should immediately stop taking them and seek medical attention. If you feel that you are having a life-threatening reaction call 911.    Return to the ER with any questions/concerns, new/concerning symptoms, worsening or failure to improve.     Do not drive, swim, climb to height, take a bath, operate heavy machinery, drink alcohol or take potentially sedating medications, sign any legal documents or make any important decisions for 24 hours if you have received any pain medications, sedatives or mood altering drugs during your ER visit or within 24 hours of taking them if they have been prescribed to you.     You can find additional resources for Dentists,  hearing aids, durable medical equipment, low cost pharmacies and other resources at [email or web address]    BELOW THIS LINE ONLY APPLIES IF YOU HAVE A COVID TEST PENDING OR IF YOU HAVE BEEN DIAGNOSED WITH COVID:  Please access [email or web address] to review the results of your test. Until the results of your COVID test return, you should isolate yourself so as not to potentially spread illness to others.   If your COVID test returns positive, you should isolate yourself so as not to spread illness to others. After five full days, if you are feeling better and you have not had fever for 24 hours, you can return to your typical daily activities, but you must wear a mask around others for an additional 5 days.   If your COVID test returns negative and you are either unvaccinated or more than six months out from your two-dose vaccine and are not yet boosted, you should still quarantine for 5 full days followed by strict mask use for an additional 5 full days.   If your COVID test returns negative and you have received your 2-dose initial vaccine as well as a booster, you should continue strict mask use for 10 full days after the exposure.  For all those exposed, best practice includes a test at day 5 after the exposure. This can be a home test or a test through one of the many testing centers throughout our community.   Masking is always advised to limit the spread of COVID. Cdc.gov is an excellent site to obtain the latest up to date recommendations regarding COVID and COVID testing.     CDC Testing and Quarantine Guidelines for patients with exposure to a known-positive COVID-19 person:  A close exposure is defined as anyone who has had an exposure (masked or unmasked) to a known COVID -19 positive person within 6 feet of someone for a cumulative total of 15 minutes or more over a 24-hour period.   Vaccinated and/or if you recently had a positive covid test within 90 days do NOT need to quarantine after contact with someone  who had COVID-19 unless you develop symptoms.   Fully vaccinated people who have not had a positive test within 90 days, should get tested 3-5 days after their exposure, even if they don't have symptoms and wear a mask indoors in public for 14 days following exposure or until their test result is negative.      Unvaccinated and/or NOT had a positive test within 90 days and meet close exposure  You are required by CDC guidelines to quarantine for at least 5 days from time of exposure followed by 5 days of strict masking. It is recommended, but not required to test after 5 days, unless you develop symptoms, in which case you should test at that time.  If you get tested after 5 days and your test is positive, your 5 day period of isolation starts the day of the positive test.    If your exposure does not meet the above definition, you can return to your normal daily activities to include social distancing, wearing a mask and frequent handwashing.      Here is a link to guidance from the CDC:  https://www.cdc.gov/media/releases/2021/s1227-isolation-quarantine-guidance.html      [facility] Testing Sites:  https://ldh.la.gov/page/3934      [facility] website with testing locations and guidance:  [email or web address]

## 2023-03-24 NOTE — ED NOTES
I-STAT Chem-8+ Results:   Value Reference Range   Sodium 140 136-145 mmol/L   Potassium  3.8 3.5-5.1 mmol/L   Chloride 104  mmol/L   Ionized Calcium 1.12 1.06-1.42 mmol/L   CO2 (measured) 28 23-29 mmol/L   Glucose 116  mg/dL   BUN 10 6-30 mg/dL   Creatinine 0.8 0.5-1.4 mg/dL   Hematocrit 39 36-54%         work

## 2023-03-24 NOTE — ED PROVIDER NOTES
Encounter Date: 3/24/2023       History     Chief Complaint   Patient presents with    Abdominal Pain     RUQ abdominal pain x 1 week. +nausea.      Ms. Vásquez is a 53 year old female with a history of Breast CA (s/p bilateral mastectomy on adjuvant anastrazole), HTN, s/p cholecystectomy presenting with RUQ abdominal/chest wall pain for the past 5 days. The pain is intermittent, rated 10/10 in severity, is pleuritic, non-exertional, and not associated with meals. The RUQ/right chest is tender to palpation and the pain is minimally relieved with flexeril. She acknowledges abdominal fullness and mild shortness of breath. She denies any recent trauma, falls, or heavy lifting. She denies any fever, vomiting, diarrhea, dysuria, substernal chest pain, or other medical complaints at this time.    The history is provided by the patient and medical records.   Review of patient's allergies indicates:   Allergen Reactions    Butalbital Other (See Comments)     Chest pain       Past Medical History:   Diagnosis Date    Anxiety     Back pain     Goiter     HTN (hypertension) 10/18/2012    Hx antineoplastic chemo     last dose 4/23/20    Hx of psychiatric care     Ambien, Klonopin    Insomnia 10/18/2012    Malignant neoplasm of axillary tail of right breast in female, estrogen receptor positive 11/24/2019    right    Neck mass     right posterior    Primary invasive malignant neoplasm of female breast, right 11/21/2019    right    Psychiatric problem     S/P abdominal supracervical subtotal hysterectomy, continues to have regular menses.  4/2/2014    Spinal cord cyst, L1 2014     Thoracic radiculopathy, bulging disc at T10-11 and T11-12      Past Surgical History:   Procedure Laterality Date    AXILLARY NODE DISSECTION Right 06/05/2020    Procedure: LYMPHADENECTOMY, AXILLARY;  Surgeon: Lelo Guaman MD;  Location: [facility] OR [address] [place] [address];  Service: General;  Laterality: Right;    BREAST BIOPSY Right 2015    + CA    BREAST BIOPSY  Left     BREAST RECONSTRUCTION Bilateral 2021     SECTION      CHOLECYSTECTOMY      COLONOSCOPY N/A 10/05/2015    Procedure: COLONOSCOPY;  Surgeon: JERONIMO Cancino MD;  Location: [facility] ENDO (4TH FLR);  Service: Endoscopy;  Laterality: N/A;    COLONOSCOPY N/A 2021    Procedure: COLONOSCOPY;  Surgeon: JERONIMO Cancino MD;  Location: [facility] ENDO (4TH FLR);  Service: Endoscopy;  Laterality: N/A;  fully vaccinated-GT    HYSTERECTOMY  2007    BC--SUPRACERVICAL    INJECTION FOR SENTINEL NODE IDENTIFICATION Right 2020    Procedure: INJECTION, FOR SENTINEL NODE IDENTIFICATION;  Surgeon: Lelo Guaman MD;  Location: [facility] OR;  Service: General;  Laterality: Right;    INSERTION OF BREAST TISSUE EXPANDER Right 2020    Procedure: INSERTION, TISSUE EXPANDER, BREAST;  Surgeon: Pablo Ramos MD;  Location: [facility];  Service: Plastics;  Laterality: Right;    INSERTION OF TUNNELED CENTRAL VENOUS CATHETER (CVC) WITH SUBCUTANEOUS PORT Right 2019    Procedure: [id number]-X-CATH Fluoro needed;  Surgeon: Lelo Guaman MD;  Location: [facility] 2ND FLR;  Service: General;  Laterality: Right;  Right internal jugular.     LIPOMA RESECTION  2021    MASTECTOMY Bilateral 2021    with reconstruction    NEEDLE LOCALIZATION Left 2021    Procedure: NEEDLE LOCALIZATION;  Surgeon: Kraig Mello MD;  Location: [facility] CATH LAB;  Service: Radiology;  Laterality: Left;    RECONSTRUCTION OF BREAST WITH DEEP INFERIOR EPIGASTRIC ARTERY  (ROWAN) FREE FLAP Bilateral 2021    Procedure: RECONSTRUCTION, BREAST, USING ROWAN FREE FLAP;  Surgeon: Pablo Ramos MD;  Location: [facility] OR;  Service: Plastics;  Laterality: Bilateral;    SENTINEL LYMPH NODE BIOPSY Right 2020    Procedure: BIOPSY, LYMPH NODE, SENTINEL;  Surgeon: Lelo Guaman MD;  Location: [facility];  Service: General;  Laterality: Right;    TISSUE EXPANDER REMOVAL Right 2021    Procedure: REMOVAL, TISSUE EXPANDER;   Surgeon: Pablo Ramos MD;  Location: [facility];  Service: Plastics;  Laterality: Right;    UNILATERAL MASTECTOMY Right 05/20/2020    Procedure: MASTECTOMY, UNILATERAL;  Surgeon: Lelo Guaman MD;  Location: [facility];  Service: General;  Laterality: Right;    UNILATERAL MASTECTOMY Left 04/21/2021    Procedure: MASTECTOMY, UNILATERAL PROPHYLACTIC;  Surgeon: Lelo Guaman MD;  Location: [facility];  Service: General;  Laterality: Left;     Family History   Problem Relation Age of Onset    Cancer Paternal Aunt     Lupus Paternal Aunt     Hypertension Mother     Depression Mother     Liver disease Father     Alcohol abuse Father     Cancer Father         Lung and prostate cancer    Colon cancer Paternal Uncle     Depression Son     Breast cancer Neg Hx     Ovarian cancer Neg Hx     Melanoma Neg Hx     Psoriasis Neg Hx     Eczema Neg Hx      Social History     Tobacco Use    Smoking status: Never    Smokeless tobacco: Never   Substance Use Topics    Alcohol use: Yes     Comment: rarely    Drug use: No     Review of Systems   Constitutional:  Positive for fatigue. Negative for chills, diaphoresis and fever.   HENT:  Negative for congestion, rhinorrhea and sore throat.    Eyes:  Negative for visual disturbance.   Respiratory:  Positive for shortness of breath. Negative for cough and chest tightness.    Cardiovascular:  Negative for chest pain.   Gastrointestinal:  Positive for abdominal distention, abdominal pain and nausea. Negative for blood in stool, constipation, diarrhea and vomiting.   Genitourinary:  Negative for dysuria, hematuria and urgency.   Musculoskeletal:  Negative for back pain.   Skin:  Negative for rash.   Neurological:  Negative for seizures and syncope.   Hematological:  Does not bruise/bleed easily.   Psychiatric/Behavioral:  Negative for agitation and hallucinations.      Physical Exam     Initial Vitals [03/24/23 0550]   BP Pulse Resp Temp SpO2   (!) 141/94 109 (!) 24 98.7 °F (37.1 °C) 100 %      MAP        --         Physical Exam     Nursing note and vitals reviewed.  Constitutional: Patient appears well-developed and well-nourished. No distress. AxOx3, NAD, well nourished, appears stated age  HENT:   Head: Normocephalic and atraumatic.   Eyes: Conjunctivae and EOM are normal. Pupils are equal, round, and reactive to light. no scleral icterus, no periorbital edema or ecchymosis  Neck: Neck supple. no stridor, no masses, no drooling or voice changes  Normal range of motion.  Cardiovascular: Tachycardia, regular rhythm, normal heart sounds and intact distal pulses. no m/r/g  Pulmonary/Chest: Tachypnea. CTAB, no wheezes, rales or rhonchi, no increased work of breathing  Abdominal:  Distended abdomen, abdomen diffusively tender but tender in the RUQ especially.   Musculoskeletal:      Cervical back: Normal range of motion and neck supple.   Neurological: Patient is alert and oriented to person, place, and time. No cranial nerve deficit. Gait normal. GCS score is 15. Moving all extremities, gait intact, face grossly symmetric  Skin: Skin is warm and dry.  Ext: no edema, no lesions, rashes, or deformity  Psych: Normal mood/affect,cooperative, well groomed, makes good eye contact        ED Course   Procedures  Labs Reviewed   CBC W/ AUTO DIFFERENTIAL - Abnormal; Notable for the following components:       Result Value    Hemoglobin 11.7 (*)     MCH 25.7 (*)     MCHC 30.5 (*)     RDW 15.9 (*)     All other components within normal limits    Narrative:     Add on BNP per Tamika Fair RN on  03/24/2023  07:08 [id number]    add on troponin per Tamika Fair RN/Jessica Hall MD order# [id number]   03/24/2023 @ 07:15    COMPREHENSIVE METABOLIC PANEL - Abnormal; Notable for the following components:    Glucose 111 (*)     All other components within normal limits    Narrative:     Add on BNP per Tamika Fair RN on  03/24/2023  07:08 [id number]    add on troponin per Tamika Fair RN/Jessica Hall MD order# [id number]   03/24/2023 @ 07:15     URINALYSIS, REFLEX TO URINE CULTURE - Abnormal; Notable for the following components:    Appearance, UA Hazy (*)     All other components within normal limits    Narrative:     Specimen Source->Urine   D DIMER, QUANTITATIVE - Abnormal; Notable for the following components:    D-Dimer 1.83 (*)     All other components within normal limits    Narrative:     Add on BNP per Tamika Fair RN on  03/24/2023  07:08 [id number]    add on troponin per Tamika Fair RN/Jessica Hall MD order# [id number]   03/24/2023 @ 07:15    ISTAT PROCEDURE - Abnormal; Notable for the following components:    POC Glucose 116 (*)     All other components within normal limits   URINALYSIS, REFLEX TO URINE CULTURE    Narrative:     Specimen Source->Urine   LIPASE    Narrative:     Add on BNP per Tamika Fair RN on  03/24/2023  07:08 [id number]    add on troponin per Tamika Fair RN/Jessica Hall MD order# [id number]   03/24/2023 @ 07:15    LACTIC ACID, PLASMA    Narrative:     Add on BNP per Tamika Fair RN on  03/24/2023  07:08 [id number]    add on troponin per Tamika Fair RN/Jesscia Hall MD order# [id number]   03/24/2023 @ 07:15    B-TYPE NATRIURETIC PEPTIDE   TROPONIN I   B-TYPE NATRIURETIC PEPTIDE    Narrative:     Add on BNP per Tamika Fair RN on  03/24/2023  07:08 [id number]    add on troponin per Tamika Fair RN/Jessica Hall MD order# [id number]   03/24/2023 @ 07:15    TROPONIN I    Narrative:     Add on BNP per Tamika Fair RN on  03/24/2023  07:08 [id number]    add on troponin per Tamika Fair RN/Jessica Hall MD order# [id number]   03/24/2023 @ 07:15    ISTAT CHEM8     EKG Readings: (Independently Interpreted)   Initial Reading: No STEMI. Rhythm: Normal Sinus Rhythm. Heart Rate: 93. Conduction: Normal.   ECG Results              EKG 12-lead (Final result)  Result time 03/24/23 08:58:50      Final result by Interface, Lab In [facility] (03/24/23 08:58:50)                   Narrative:    Test Reason : R10.9,    Vent. Rate : 093 BPM     Atrial Rate : 093 BPM     P-R Int  : 144 ms          QRS Dur : 074 ms      QT Int : 344 ms       P-R-T Axes : 044 -07 -13 degrees     QTc Int : 427 ms    Normal sinus rhythm  Minimal voltage criteria for LVH, may be normal variant ( R in aVL )  Nonspecific T wave abnormality  Abnormal ECG  When compared with ECG of 12-SEP-2022 04:43,  Nonspecific T wave abnormality now evident in Anterior-lateral leads  Confirmed by Trav GOULD MD ([id number]) on 3/24/2023 8:58:42 AM    Referred By: [email or web address]   SELF           Confirmed By:Trav GOULD MD                                  Imaging Results              CTA Chest Non-Coronary (PE Studies) (Final result)  Result time 03/24/23 10:56:27      Final result by Ra Woodward MD (03/24/23 10:56:27)                   Impression:      1. CTA chest examination considered diagnostic to the proximal segmental pulmonary arterial level without convincing evidence of acute pulmonary embolism.  2. No definite acute findings identified in the chest, abdomen, or pelvis.  3. Postsurgical and posttreatment changes are noted in the chest in patient with reported history of breast cancer.  Continued dedicated breast imaging surveillance recommended, as per clinical protocol.  Skin thickening of the anterior chest wall may be treatment related, however correlation with direct visualization would be recommended exclude the possibility of infectious or inflammatory etiology.  4. Borderline fusiform dilatation of the ascending thoracic aorta to 4 cm transverse dimension.  5. Additional details of chronic and incidental findings, as provided in the body of the report.      Electronically signed by: Ra Woodward  Date:    03/24/2023  Time:    10:56               Narrative:    EXAMINATION:  CTA CHEST NON CORONARY (PE STUDIES); CT ABDOMEN PELVIS WITH CONTRAST    CLINICAL HISTORY:  Pulmonary embolism (PE) suspected, unknown D-dimer;; Abdominal pain, acute, nonlocalized;    TECHNIQUE:  Low dose axial images, sagittal and coronal  reformations were obtained from the thoracic inlet to the lung bases following the IV administration of 100 mL of Omnipaque 350.  Contrast timing was optimized to evaluate the pulmonary arteries.  MIP images were performed.    Subsequently, contrast-enhanced CT imaging of the abdomen pelvis performed.    COMPARISON:  CT chest performed 02/02/2021.  CT of the abdomen pelvis performed 01/16/2021    FINDINGS:  CTA chest:    Exam quality: Examination considered diagnostic to the proximal segmental pulmonary arterial level.    Pulmonary embolism: No acute or chronic pulmonary embolism.    Central pulmonary arteries: Nonspecific item mild enlargement of the main pulmonary artery to 3.5 cm transverse dimension, finding which may be seen the setting pulmonary arterial hypertension.    Aorta: Borderline fusiform dilatation of the ascending thoracic aorta to 4 cm transverse dimension.  Anatomic variant short segment common origin of brachiocephalic left common carotid arteries.  Left-sided arch.  Relatively mild atherosclerosis.    Heart: No right heart strain. Normal size.    Coronary arteries: No calcifications.    Pericardium: Normal. No effusion, thickening, or calcification.    Support tubes and lines: None.    Base of neck/thyroid: Normal.    Lymph nodes: No supraclavicular, axillary, internal mammary, mediastinal, or hilar adenopathy.    Esophagus: Normal.    Pleura: No effusion, thickening, or calcification.    Body wall: Postoperative sequela in the anterior chest wall in keeping reported history of bilateral mastectomy.  Findings suggesting fat necrosis in the left chest wall.  Postsurgical sequela in the axillary regions compatible with lymph node dissection nonspecific skin thickening in the anterior chest wall may be treatment related, however correlation with physical examination would be recommended.    Airways: Central airways appear patent.    Lungs: Assessment of the lungs is limited due to respiratory  motion.  Fibrotic changes in the right lung appears somewhat less conspicuous when compared to prior chest CT performed 02/02/2021, possibly evolving post radiation/posttreatment effect.  Several sub 5 mm solid pulmonary nodules are noted, none of which appear to definitely increased in size since the 02/02/2021 CT.    Bones: No focal lesion.    CT abdomen/pelvis:    Liver: Normal contour.  Redemonstrated nonspecific coarse calcifications along the right hepatic lobe.  Geographic focus of relative hypoattenuation without mass effect upon the regional vasculature margin the falciform ligament suggests focal fat deposition.    Gallbladder and bile ducts: Status post cholecystectomy.  No new biliary duct dilatation appreciated.    Pancreas: Unremarkable.    Spleen: Unremarkable.    Adrenals: Unremarkable.    Kidneys: Subcentimeter hypoattenuating lesions in the kidneys too small to definitely characterize.    Lymph nodes: As before, there is clustered nonspecific prominent in number mesenteric lymph nodes at the mesenteric root with adjoining ill-defined increased attenuation in the fat planes, relatively similar appearance to 01/16/2021 CT.    Bowel and mesentery: No definite evidence of acute bowel obstruction.  Minor colonic diverticulosis suggested.  Normal appendix.    Abdominal aorta: Unremarkable.    Inferior vena cava: Unremarkable.    Free fluid or free air: None.    Pelvis: Unremarkable.    Body wall: Remote operative sequela are noted in the anterior abdominal wall.  Mild body wall edema.  Small fat containing umbilical hernia.    Bones: No definite acute change.  Degenerative findings are noted involving the spine and hips.                                       CT Abdomen Pelvis With Contrast (Final result)  Result time 03/24/23 10:56:27      Final result by Ra Woodward MD (03/24/23 10:56:27)                   Impression:      1. CTA chest examination considered diagnostic to the proximal segmental  pulmonary arterial level without convincing evidence of acute pulmonary embolism.  2. No definite acute findings identified in the chest, abdomen, or pelvis.  3. Postsurgical and posttreatment changes are noted in the chest in patient with reported history of breast cancer.  Continued dedicated breast imaging surveillance recommended, as per clinical protocol.  Skin thickening of the anterior chest wall may be treatment related, however correlation with direct visualization would be recommended exclude the possibility of infectious or inflammatory etiology.  4. Borderline fusiform dilatation of the ascending thoracic aorta to 4 cm transverse dimension.  5. Additional details of chronic and incidental findings, as provided in the body of the report.      Electronically signed by: Ra Woodward  Date:    03/24/2023  Time:    10:56               Narrative:    EXAMINATION:  CTA CHEST NON CORONARY (PE STUDIES); CT ABDOMEN PELVIS WITH CONTRAST    CLINICAL HISTORY:  Pulmonary embolism (PE) suspected, unknown D-dimer;; Abdominal pain, acute, nonlocalized;    TECHNIQUE:  Low dose axial images, sagittal and coronal reformations were obtained from the thoracic inlet to the lung bases following the IV administration of 100 mL of Omnipaque 350.  Contrast timing was optimized to evaluate the pulmonary arteries.  MIP images were performed.    Subsequently, contrast-enhanced CT imaging of the abdomen pelvis performed.    COMPARISON:  CT chest performed 02/02/2021.  CT of the abdomen pelvis performed 01/16/2021    FINDINGS:  CTA chest:    Exam quality: Examination considered diagnostic to the proximal segmental pulmonary arterial level.    Pulmonary embolism: No acute or chronic pulmonary embolism.    Central pulmonary arteries: Nonspecific item mild enlargement of the main pulmonary artery to 3.5 cm transverse dimension, finding which may be seen the setting pulmonary arterial hypertension.    Aorta: Borderline fusiform dilatation  of the ascending thoracic aorta to 4 cm transverse dimension.  Anatomic variant short segment common origin of brachiocephalic left common carotid arteries.  Left-sided arch.  Relatively mild atherosclerosis.    Heart: No right heart strain. Normal size.    Coronary arteries: No calcifications.    Pericardium: Normal. No effusion, thickening, or calcification.    Support tubes and lines: None.    Base of neck/thyroid: Normal.    Lymph nodes: No supraclavicular, axillary, internal mammary, mediastinal, or hilar adenopathy.    Esophagus: Normal.    Pleura: No effusion, thickening, or calcification.    Body wall: Postoperative sequela in the anterior chest wall in keeping reported history of bilateral mastectomy.  Findings suggesting fat necrosis in the left chest wall.  Postsurgical sequela in the axillary regions compatible with lymph node dissection nonspecific skin thickening in the anterior chest wall may be treatment related, however correlation with physical examination would be recommended.    Airways: Central airways appear patent.    Lungs: Assessment of the lungs is limited due to respiratory motion.  Fibrotic changes in the right lung appears somewhat less conspicuous when compared to prior chest CT performed 02/02/2021, possibly evolving post radiation/posttreatment effect.  Several sub 5 mm solid pulmonary nodules are noted, none of which appear to definitely increased in size since the 02/02/2021 CT.    Bones: No focal lesion.    CT abdomen/pelvis:    Liver: Normal contour.  Redemonstrated nonspecific coarse calcifications along the right hepatic lobe.  Geographic focus of relative hypoattenuation without mass effect upon the regional vasculature margin the falciform ligament suggests focal fat deposition.    Gallbladder and bile ducts: Status post cholecystectomy.  No new biliary duct dilatation appreciated.    Pancreas: Unremarkable.    Spleen: Unremarkable.    Adrenals: Unremarkable.    Kidneys:  Subcentimeter hypoattenuating lesions in the kidneys too small to definitely characterize.    Lymph nodes: As before, there is clustered nonspecific prominent in number mesenteric lymph nodes at the mesenteric root with adjoining ill-defined increased attenuation in the fat planes, relatively similar appearance to 01/16/2021 CT.    Bowel and mesentery: No definite evidence of acute bowel obstruction.  Minor colonic diverticulosis suggested.  Normal appendix.    Abdominal aorta: Unremarkable.    Inferior vena cava: Unremarkable.    Free fluid or free air: None.    Pelvis: Unremarkable.    Body wall: Remote operative sequela are noted in the anterior abdominal wall.  Mild body wall edema.  Small fat containing umbilical hernia.    Bones: No definite acute change.  Degenerative findings are noted involving the spine and hips.                                       X-Ray Chest AP Portable (Final result)  Result time 03/24/23 07:18:10      Final result by Tung Quinn MD (03/24/23 07:18:10)                   Impression:      No significant intrathoracic abnormality.  Allowing for differences in projection, no significant detrimental interval change in the appearance of the chest since 11/07/2021 is appreciated.      Electronically signed by: Tung Quinn MD  Date:    03/24/2023  Time:    07:18               Narrative:    EXAMINATION:  XR CHEST AP PORTABLE    TECHNIQUE:  One view    COMPARISON:  Comparison is made to 11/07/2021.  Clinical information of abdominal pain.    FINDINGS:  Heart size and the appearance of the cardiomediastinal silhouette have not changed appreciably since the examination referenced above.  Lung zones are stable as well, and are free of significant air space consolidation or volume loss.  No pleural fluid of any substantial volume is seen on either side.  No pneumothorax.  Surgical clips are again seen relating to the soft tissues of both breasts, the right axillary soft tissues, and the right upper  abdominal quadrant.                                    X-Rays:   Independently Interpreted Readings:   Chest X-Ray: Normal heart size. Normal vessels.   Medications   ondansetron injection 4 mg (4 mg Intravenous Given 3/24/23 0646)   sodium chloride 0.9% bolus 1,000 mL 1,000 mL (0 mLs Intravenous Stopped 3/24/23 0737)   morphine injection 4 mg (4 mg Intravenous Given 3/24/23 0647)   iohexoL (OMNIPAQUE 350) injection 100 mL (100 mLs Intravenous Given 3/24/23 1004)     Medical Decision Making:   Initial Assessment:   Ms. Vásquez is a 53 year old female with a history of Breast CA (s/p bilateral mastectomy on adjuvant anastrazole), HTN, s/p cholecystectomy presenting with RUQ abdominal/chest wall pain for the past 5 days.   Differential Diagnosis:   Malignancy  Intra-abdominal abscess  Pulmonary embolism  Sepsis  Independently Interpreted Test(s):   I have ordered and independently interpreted X-rays - see prior notes.  I have ordered and independently interpreted EKG Reading(s) - see prior notes  Clinical Tests:   Lab Tests: Ordered and Reviewed  Radiological Study: Ordered and Reviewed  Medical Tests: Ordered and Reviewed  ED Management:  Patient was thoroughly examined,  Labs were significant for an elevated D-dimer  Given that patient was tachycardic and tachypneic I was suspicious for pulmonary embolism and so CTA was ordered which did not show significant findings  Patient's abdomen was distended and tender and so CT was obtained due to my concern for possible malignancy but her CT scan did not show significant findings except for constipation  Patient was advised on a bowel regimen and given follow up with GI  She was discharged in stable condition and return precautions were given.                         Clinical Impression:   Final diagnoses:  [R10.9] Abdominal pain  [R10.9] Abdominal pain, unspecified abdominal location (Primary)        ED Disposition Condition    Discharge Stable          ED Prescriptions     None       Follow-up Information       Follow up With Specialties Details Why Contact Info Additional Information    Tayo Lynn MD Internal Medicine Schedule an appointment as soon as possible for a visit in 3 days  [address] [facility] [address]  [phone]       [facility] Gastroenterology Schedule an appointment as soon as possible for a visit in 3 days  [address] [facility] [address] [phone] [facility] - Main Building, 4th Floor Please park in [facility] and take Atrium elevator             Tabatha Jain MD  Resident  03/24/23 [id number]

## 2023-03-24 NOTE — PROVIDER PROGRESS NOTES - EMERGENCY DEPT.
Encounter Date: 3/24/2023    ED Physician Progress Notes          Physician Attestation Statement for Resident:  As the supervising MD   Physician Attestation Statement: I have personally seen and examined this patient.       I agree with the history unless otherwise noted.     As the supervising MD I agree with the PE unless otherwise noted.      I have reviewed and agree with the residents interpretation of the following unless otherwise noted:   I have personally reviewed and interpreted the patients laboratory studies: ddimer elevated, trop neg, BNP WNL,   I have personally reviewed and interpreted imaging studies: CXR: No evidence of consolidation, cardiomegaly, pulmonary edema, pneumothroax  I have personally reviewed and interpreted the patient's EKG: NSR with TWI in III similar to previous      I have also reviewed the following:   old records at this facility: + history of RUQ lymphedema  old EKGs, TWI III     As the supervising MD I agree with the treatment, course, plan, and disposition unless otherwise noted.    Differential diagnosis is broad:   Considered appendicitis, less likely without focal RLQ abd TTP   Considered diverticulitis, less likely without focal LLQ abd TTP  Considered cystitis, no evidence on UA   Considered pyelonephritis, no evidence of UTI on UA, no flank TTP makes this less likely.   Considered pancreatitis, less likely with unremarkable lipase.

## 2023-03-31 ENCOUNTER — HOSPITAL ENCOUNTER (OUTPATIENT)
Dept: RADIOLOGY | Facility: HOSPITAL | Age: 53
Discharge: HOME | End: 2023-03-31
Attending: PHYSICIAN ASSISTANT
Payer: COMMERCIAL

## 2023-03-31 ENCOUNTER — OFFICE VISIT (OUTPATIENT)
Dept: ORTHOPEDICS | Facility: CLINIC | Age: 53
End: 2023-03-31
Payer: COMMERCIAL

## 2023-03-31 VITALS — BODY MASS INDEX: 40.74 KG/M2 | HEIGHT: 63 IN | WEIGHT: 229.94 LBS

## 2023-03-31 DIAGNOSIS — M17.11 OSTEOARTHRITIS OF RIGHT KNEE, UNSPECIFIED OSTEOARTHRITIS TYPE: ICD-10-CM

## 2023-03-31 DIAGNOSIS — M25.572 ACUTE LEFT ANKLE PAIN: ICD-10-CM

## 2023-03-31 DIAGNOSIS — M17.11 OSTEOARTHRITIS OF RIGHT KNEE, UNSPECIFIED OSTEOARTHRITIS TYPE: Primary | ICD-10-CM

## 2023-03-31 PROCEDURE — 99999 PR PBB SHADOW E&M-EST. PATIENT-LVL III: ICD-10-PCS | Mod: PBBFAC,,, | Performed by: PHYSICIAN ASSISTANT

## 2023-03-31 PROCEDURE — 4010F PR ACE/ARB THEARPY RXD/TAKEN: ICD-10-PCS | Mod: CPTII,S$GLB,, | Performed by: PHYSICIAN ASSISTANT

## 2023-03-31 PROCEDURE — 1159F PR MEDICATION LIST DOCUMENTED IN MEDICAL RECORD: ICD-10-PCS | Mod: CPTII,S$GLB,, | Performed by: PHYSICIAN ASSISTANT

## 2023-03-31 PROCEDURE — 73564 XR KNEE ORTHO RIGHT WITH FLEXION: ICD-10-PCS | Mod: 26,RT,, | Performed by: RADIOLOGY

## 2023-03-31 PROCEDURE — 99213 PR OFFICE/OUTPT VISIT, EST, LEVL III, 20-29 MIN: ICD-10-PCS | Mod: S$GLB,,, | Performed by: PHYSICIAN ASSISTANT

## 2023-03-31 PROCEDURE — 73564 X-RAY EXAM KNEE 4 OR MORE: CPT | Mod: TC,RT

## 2023-03-31 PROCEDURE — 73562 XR KNEE ORTHO RIGHT WITH FLEXION: ICD-10-PCS | Mod: 26,LT,, | Performed by: RADIOLOGY

## 2023-03-31 PROCEDURE — 99213 OFFICE O/P EST LOW 20 MIN: CPT | Mod: S$GLB,,, | Performed by: PHYSICIAN ASSISTANT

## 2023-03-31 PROCEDURE — 3008F BODY MASS INDEX DOCD: CPT | Mod: CPTII,S$GLB,, | Performed by: PHYSICIAN ASSISTANT

## 2023-03-31 PROCEDURE — 73562 X-RAY EXAM OF KNEE 3: CPT | Mod: 26,LT,, | Performed by: RADIOLOGY

## 2023-03-31 PROCEDURE — 73610 X-RAY EXAM OF ANKLE: CPT | Mod: 26,LT,, | Performed by: RADIOLOGY

## 2023-03-31 PROCEDURE — 1159F MED LIST DOCD IN RCRD: CPT | Mod: CPTII,S$GLB,, | Performed by: PHYSICIAN ASSISTANT

## 2023-03-31 PROCEDURE — 73564 X-RAY EXAM KNEE 4 OR MORE: CPT | Mod: 26,RT,, | Performed by: RADIOLOGY

## 2023-03-31 PROCEDURE — 73610 X-RAY EXAM OF ANKLE: CPT | Mod: TC,LT

## 2023-03-31 PROCEDURE — 4010F ACE/ARB THERAPY RXD/TAKEN: CPT | Mod: CPTII,S$GLB,, | Performed by: PHYSICIAN ASSISTANT

## 2023-03-31 PROCEDURE — 3008F PR BODY MASS INDEX (BMI) DOCUMENTED: ICD-10-PCS | Mod: CPTII,S$GLB,, | Performed by: PHYSICIAN ASSISTANT

## 2023-03-31 PROCEDURE — 73610 XR ANKLE COMPLETE 3 VIEW LEFT: ICD-10-PCS | Mod: 26,LT,, | Performed by: RADIOLOGY

## 2023-03-31 PROCEDURE — 99999 PR PBB SHADOW E&M-EST. PATIENT-LVL III: CPT | Mod: PBBFAC,,, | Performed by: PHYSICIAN ASSISTANT

## 2023-03-31 RX ORDER — MELOXICAM 7.5 MG/1
7.5 TABLET ORAL DAILY
Qty: 30 TABLET | Refills: 1 | Status: SHIPPED | OUTPATIENT
Start: 2023-03-31 | End: 2023-05-31

## 2023-03-31 NOTE — PROGRESS NOTES
"Subjective:      Patient ID: Phylicia Vásquez is a 53 y.o. female.    Chief Complaint: Pain of the Left Ankle and Follow-up (RIGHT KNEE)    HPI    Patient is a 51 year old female who presents to clinic with chief complaint of a-traumatic right anterior knee pain. Patient stated that she mainly notices  pain and stiffness intermittently. Increase with activity. She has tried OTC tylenol and aleve, mobic helps.      Lab Results   Component Value Date    HGBA1C 5.8 (H) 11/17/2021        Estimated body mass index is 40.73 kg/m² as calculated from the following:    Height as of this encounter: 5' 3" (1.6 m).    Weight as of this encounter: 104.3 kg (229 lb 15 oz).      Review of Systems   Constitutional: Negative for chills and fever.   Cardiovascular:  Negative for chest pain.   Respiratory:  Negative for cough and shortness of breath.    Skin:  Negative for color change, dry skin, itching, nail changes, poor wound healing and rash.   Musculoskeletal:         Right knee pain   Neurological:  Negative for dizziness.   Psychiatric/Behavioral:  Negative for altered mental status. The patient is not nervous/anxious.    All other systems reviewed and are negative.      Objective:      General    Constitutional: She is oriented to person, place, and time. She appears well-developed and well-nourished. No distress.   HENT:   Head: Atraumatic.   Eyes: Conjunctivae are normal.   Cardiovascular:  Normal rate.            Pulmonary/Chest: Effort normal.   Neurological: She is alert and oriented to person, place, and time.   Psychiatric: She has a normal mood and affect. Her behavior is normal.         Right Ankle/Foot Exam     Range of Motion   Ankle Joint   Dorsiflexion:  normal   Plantar flexion:  normal   Subtalar Joint   Inversion:  normal   Eversion:  normal     Other   Sensation: normal      Right Knee Exam     Inspection   Swelling: absent  Bruising: absent    Range of Motion   The patient has normal right knee " ROM.    Tests   Ligament Examination   Lachman: normal (-1 to 2mm)   PCL-Posterior Drawer: normal (0 to 2mm)     MCL - Valgus: normal (0 to 2mm)  LCL - Varus: normal    Other   Sensation: normal    Left Knee Exam   Left knee exam is normal.    Right Hip Exam   Right hip exam is normal.       Muscle Strength   Right Lower Extremity   Quadriceps:  4/5   Hamstrin/5     Vascular Exam     Right Pulses  Dorsalis Pedis:      2+        RADS: reviewed by myself:     21: Knee ortho right: No fracture dislocation bone destruction seen.  There is mild DJD.        Assessment:       Encounter Diagnoses   Name Primary?    Osteoarthritis of right knee, unspecified osteoarthritis type Yes    Acute left ankle pain             Plan:       Discussed plan with the patient. Symptoms controlled with Mobic. Will refill. RTc as needed.

## 2023-04-05 ENCOUNTER — CLINICAL SUPPORT (OUTPATIENT)
Dept: REHABILITATION | Facility: HOSPITAL | Age: 53
End: 2023-04-05
Payer: COMMERCIAL

## 2023-04-05 DIAGNOSIS — I89.0 LYMPHEDEMA OF RIGHT ARM: Primary | ICD-10-CM

## 2023-04-05 DIAGNOSIS — Z74.09 IMPAIRED FUNCTIONAL MOBILITY AND ACTIVITY TOLERANCE: ICD-10-CM

## 2023-04-05 DIAGNOSIS — R26.9 ABNORMALITY OF GAIT: ICD-10-CM

## 2023-04-05 DIAGNOSIS — R26.89 IMPAIRMENT OF BALANCE: ICD-10-CM

## 2023-04-05 DIAGNOSIS — R29.898 WEAKNESS OF LEFT LOWER EXTREMITY: Primary | ICD-10-CM

## 2023-04-05 PROCEDURE — 97530 THERAPEUTIC ACTIVITIES: CPT

## 2023-04-05 PROCEDURE — 97140 MANUAL THERAPY 1/> REGIONS: CPT

## 2023-04-05 PROCEDURE — 97110 THERAPEUTIC EXERCISES: CPT

## 2023-04-05 PROCEDURE — 97750 PHYSICAL PERFORMANCE TEST: CPT | Mod: 59

## 2023-04-05 NOTE — PROGRESS NOTES
Physical Therapy Daily Treatment Note       Date: 4/5/2023   Name: Phylicia Vásquez  Clinic Number: [id number]    Therapy Diagnosis:   Encounter Diagnoses   Name Primary?    Lymphedema of right arm Yes    Impaired functional mobility and activity tolerance        Physician: Negrito Verde MD    Physician Orders: PT Eval and Treat   Medical Diagnosis: Carcinoma of axillary tail of right breast in female, estrogen receptor positive [C50.611, Z17.0], Localized edema      Evaluation Date: 6/3/2022  Authorization Period Expiration: 12/31/22  Updated Plan of Care Certification Period: 5/5/23  Visit # / Visits authorized: 10/20 (plus 20 from last year)  Insurance: [employer]  FOTO: 3/3     Time In: 10:12 AM   Time Out:  11:10 AM  Total Billable Time:  58 minutes     Precautions: Standard, cancer and Right UE lymphedema      Subjective     Pt reports: No new complaints. She received her new sleeve that is the proper compression level. She tried it on once, but it was rolling down.  She has been wearing tubigrip since.  She was compliant with compression pump  Response to previous treatment: no adverse reaction  Pain Scale: Phylicia rates pain on a scale of 0/10 on VAS.   Pain location: N/A    Fatigue: not assessed  Functional change: none stated  Treatment:   Chemotherapy:  4 cycles of AC and 4 cycles of Taxol aptrick adjuvantly  Radiation: completed radiation therapy on 8/20/20.   Endocrine therapy: On 11/1/2020 started on Anastrozole with with Dr. Verde     Surgery date: Pt is s/p bilateral mastectomy with R SLNB, TE reconstruction in 4/2021. pt will have permanent reconstruction with ROWAN flap, but she states she needs to lose weight first.      Objective     Objective Measures updated at progress report unless specified.     LANDMARK RIGHT UE LEFT UE DIFF RUE Diff RUE Diff RUE Diff RUE Diff RUE Diff RUE Diff RUE Diff RUE Diff RUE Diff RUE Diff RUE Diff RUE Diff RUE  "Diff RUE Diff RUE Diff RUE Diff RUE Diff RUE Diff RUE Diff RUE Diff RUE Diff RUE Diff RUE Diff   Date 6/3/22 6/3/22 6/3/22 6/22/22 6/22/22 6/29/22 6/29/22 7/6/22 7/6/22 7/20/22 7/20/22 7/27/22 7/27/22 8/18/22 8/18/22 8/24/22 8/24/22 9/2/22 9/2/22 9/19/22 9/19/22 9/26/22 9/26/22 10/4/22 10/4/22 10/17/22 10/17/22 10/24/22 10/24/22 11/2/22 11/2/22 11/10/22 11/10/22 11/28/22 11/28/22 12/15/22 12/15/22 1/5/23 1/5/23 1/12/23 1/12/23 1/19/23 1/19/23 1/24/23 1/24/23 2/27/23 2/27/23 4/5/23 4/5/23   E + 8" 47 cm 46.5 cm 0.5 cm 46.5cm -0.5 44cm -2.5 45 cm +1 47 cm +2 45cm -2 43.5cm -1.5 43.5cm No chg 44cm +0.5 46cm +2 44.5cm -1.5 44.5 cm No chg 45 cm +0.5 44.5 cm -0.5 46 cm +1.5 44.5 cm -1.5 45.5 cm +1 44.5cm -1 45 cm +0.5 44.5 cm -0.5 44 cm -0.5 45 cm +0.5 45.5 cm +0.5 46.5 cm +1   E + 6" 44 cm 42.5 cm 1.5 cm 44 cm No chg 44cm No chg 43.5cm -0.5 45cm +1.5 42cm -3 41cm -1 43cm +2 42.5cm -0.5 43.5cm +1 44.5cm +1 45cm +0.5 43.5 cm -1.5 43 cm -0.5 43 cm No chg 44.5 cm +1.5 44.5 cm No chg 45 cm +0.5 43 cm -2 43 cm No chg 43.5 cm +0.5 44 cm +0.5 44.5 cm +0.5 44.5 cm No chg   E + 4" 39.5 cm 37 cm 2.5 cm 39cm -0.5 40.5 cm +1.5 40.5cm No chg 41cm +0.5 39 cm -2 39cm No chg 39.5cm +0.5 40cm +0.5 39cm -1 41.5cm +2.5 40cm -1.5 40cm No chg 40cm No chg 40cm No chg 40 cm No chg 40.5 cm No chg 41.5 cm +1 40.5 cm -1 40.5cm No chg 40 cm -0.5 40.5 cm +0.5 40.5 cm No chg 40.5 cm No chg   E + 2" 34.5 cm 33 cm 1.5 cm 34.5 cm No chg 35 cm +0.5 34.5cm -0.5 35cm +0.5 34cm -1 34.5cm +0.5 34.5cm No chg 35cm +0.5 34cm -1 35cm +1 35cm No chg 34 cm -1 35 cm +1 35.5 cm +0.5 34.5 cm -1 35 cm +0.5 36cm +1 35 cm -1 34.5 cm -0.5 34 cm -0.5 34.5 cm +0.5 35 cm +0.5 34.5 cm -0.5   Elbow 28 cm 28 cm 0 cm 27 cm -1 27.5 cm +0.5 28 cm +0.5 28cm No chg 27cm -1 27cm No chg 27.5cm +0.5 27cm -0.5 27.5cm +0.5 27.5cm No chg 27cm -0.5 27 cm No chg 27 cm No chg 27 cm No chg 26 cm -1 27 cm +1 27.5 cm +0.5 27 cm -0.5 26.5 cm -0.5 26.5 cm No chg 26.5 cm No chg 26.5 cm No chg 27 " "cm +0.5   W+ 8" 29 cm  28.5cm 0.5 cm 28cm -1 28 cm No chg 29.5cm +1.5 29cm -0.5 28.5cm -0.5 28cm -0.5 29.5cm +1.5 29cm -0.5 28.5cm -0.5 29cm +0.5 28cm -1 29.5cm +1.5 30 cm +0.5 28cm -2 28.5 cm +0.5 29 cm +0.5 29.5 cm +0.5 29.5 cm No chg 29 cm -0.5 28 cm -1 27.5 cm -0.5 28.5 cm +1 28.5 cm No chg   W +  6" 29.5 cm 27 cm 2.5 cm 29cm -0.5 29cm No chg 29cm No chg 29cm No chg 29cm No chg 29cm No chg 29.5cm +0.5 28.5cm -1 29cm +0.5 29cm No chg 29cm No chg 29 cm No chg 29 cm No chg 28.5cm -0.5 28.5 cm No chg 29 cm +0.5 29 cm No chg 29 cm No chg 29 cm No chg 28.5 cm -0.5 28.5 cm No chg 28.5 cm No chg 28 cm -0.5   W + 4" 25.5 cm 23.5 cm 2 cm 25cm -0.5 24.5 cm -0.5 25.5cm +1 25 cm -0.5 25cm No chg 24.5cm -0.5 25.5cm +1 24.5cm -1 25cm +0.5 25.5cm +0.5  24.5cm -1 25 cm +0.5 25.5 cm +0.5 25 cm -0.5 24.5 cm -0.5 24.5 cm No chg 24.5 cm No chg 24 cm -0.5 24.5 cm +0.5 24.5 cm No chg 24.5 cm No chg 24.5 cm No chg 23.5 cm -1   Wrist 18 cm 17.5 cm 0.5 cm 18.5cm +0.5 18 cm -0.5 18.5cm +0.5 18cm -0.5 18cm No chg 18cm No chg 18cm No chg 18cm No chg 18cm No chg 18cm No chg 18cm No chg 18 cm No chg 18 cm No chg 18 cm No chg 18 cm  No chg 18 cm No chg 18 cm No chg 17.5 cm -0.5 17.5 cm No chg 17.5 cm No chg 17.5 cm No chg 17.5 cm No chg 17.5 cm No chg   DPC 21.5 cm 20 cm 1.5 cm 20cm -1.5 20.5cm +0.5 21 cm +0.5 21cm -0.5 20.5cm -0.5 21cm +0.5 21.5cm +0.5 21cm -0.5 21cm No chg 20.5 cm -0.5 21.5cm +1 20.5cm -1 20.5 cm No chg 20.5 cm No chg 20 cm -0.5 20cm No chg 20 cm No chg 19.5 cm -0.5 19.5 cm No chg 20 cm +0.5 20 cm No chg 19.5 cm -0.5 19.5 cm No chg   IP Thumb 7.5 cm 7 cm 0.5 cm 8cm +0.5 7.5cm -0.5 8 cm +0.5 8cm No chg 7.5cm -0.5 7.5cm No chg 8cm +0.5 7.5cm -0.5 7.5cm No Chg 8cm +0.5 7.5cm -0.5 7.5cm No chg 7.5 cm No chg 7cm -0.5 7.5 cm +0.5 7.5 cm No chg 7.5 cm No chg 7.5 cm No chg 7.5 cm No chg 7.5 cm No chg 7 cm -0.5 7.5 cm +0.5 7 cm -0.5     Noted that compression sleeve does not have silicone dots in band to help keep the sleeve up. " Appears tight at top.    Treatment       Phylicia received the following manual therapy techniques were performed to increased myofascial/soft tissue length, mobility and pliability, increase PROM, AROM and function as well as to decrease pain for 35 minutes:    Measurements taken above         Short Neck- held due to thyroid goiter  Clear Healthy Lymph Nodes:  Left Axilla                                                  Right  Groin  Open Anastomoses:  Anterior Axillo-Axillary  (AAA)                                    Axillo-Inguinal     (AI)                                    Posterior Axillo-Axillary  (LEEANN)     Right Upper Arm (Lateral and Medial)      Rework Anastomoses   Rework Healthy lymph Nodes    Diaphragmatic breathing, x 5 reps    PT applies  gauze to R hand and fingers and stockinette, cotton artiflex, and short stretch bandages to pt's RIGHT/LEFT/BILATERAL: right upper extremity. Pt educated to wear bandaging for 2-3 days unless it causes pain, numbness, changes in skin color/temperature, or it starts to fall down.  If removed, pt instructed to roll up bandages and cotton padding and bring to next session. If bandages are removed, pt can wear tubigrip.  Pt has Vet glove to cover bandages in the shower and directions on how to care for bandages. Pt verbalizes understanding of all topics.    Pt participates in therapeutic activity 1:1 for 23 minutes:    Using today's measurements, PT provided 2 options for compression sleeves she could try, as the current sleeve needs to be exchanged as it rolls down. PT recommends Jobst Marguerite Lite, 20-30mmHg large, regular length. Or the Marguerite Strong Size 8, regular length, 20-30mmHg with silicone top band.   Pt verbalizes understanding.    Reviewed wearing schedule.    Pt's compression glove appears to fit well.              Home Exercise Program and Patient Education   Education provided re:  - role of PT in multi - disciplinary team, goals for PT  - progress towards  goals   -guidance for compression garment selection    Written Home Exercises Provided: Patient instructed to cont prior HEP.  Exercises were reviewed and Mirian was able to demonstrate them prior to the end of the session.  Mirian demonstrated good  understanding of the education provided.     See EMR under Media for exercises provided 6/8/2022- for self manual lymph drainage techniques.    Pt has no cultural, educational or language barriers to learning provided.    Assessment     Patient tolerated session well. She has been compliant with using her compression pump. Increased time since last session, but overall her measurements are relatively stable. She does demo an increase in her most upper arm measurement, but this is likely due to her current compression sleeve not fitting properly. Improper fit may be due lack of silicone in top band. She may benefit from a different style sleeve or same sleeve with silicone to provide better fit. Increased time spent discussing this, as pt needs to return/exchange current sleeve. She verbalizes understanding well.  She tolerated complete decongestive therapy well.  Once she has correct compression level for sleeve, she will likely be appropriate for discharge from PT. Will progress as tolerated.      Pt prognosis is Good. Pt will continue to benefit from skilled outpatient physical therapy to address the deficits listed in the problem list chart on initial evaluation, provide pt/family education and to maximize pt's level of independence in the home and community environment.     Anticipated barriers to physical therapy: none anticipated    Goals as follows:  Short Term Goals (6 weeks)  1. Pt will demo decrease in girth in right upper extremity by >/= 1cm to allow for improved UE symmetry, clothing choice, ROM, and UE function. (met, 8/18/22  2. Patient will demonstrate 100% knowledge of lymphedema precautions and signs of infection to allow for reduced risk of lymphedema,  reduced risk of infection, and/or exacerbation.  (met)  3. Patient will perform self lymph drainage techniques to enhance lymphatic drainage and mobility.  (met, 7/6/22)  4. Patient will tolerate daily activities with multilayered bandaging to enhance lymphatic drainage and skin elasticity.  (met, 7/6/22)  5. Pt will tolerate HEP for improved strength, functional mobility, ROM, posture, and endurance. (met, 7/6/22)        Long Term Goals: ( 12  weeks)  1. Patient to show decreased girth in Right upper extremity by >/= 2cm to allow for improved UE symmetry, clothing choice, ROM, and UE function.  (met partially, 8/18/22)  2. Patient will show reduction in density to mild or less with improved contour of limb to allow for improved cosmesis, improved lymphatic drainage, and functional mobility.  (met, 9/2/22)  3. Patient to mary/doff compression garment with daily compliance to support lymphatic mobility and skin elasticity.  (progressing, not met)  4. Pt to show improved postural awareness and alignment for improved functional mobility.  (progressing, not met)  5. Pt to be independent and compliant with HEP to allow for improved ROM, reach and carry with functional endurance and strength.    (met, 10/4/22)           Plan   Outpatient physical therapy 2x week for 4 weeks to include the following:   Manual Therapy, Neuromuscular Re-ed, Orthotic Management and Training, Patient Education, Self Care, Therapeutic Activities and Therapeutic Exercise.            Therapist: Marcela Price, PT  4/5/2023

## 2023-04-05 NOTE — PLAN OF CARE
"  Outpatient Therapy Updated Plan of Care     Visit Date: 4/5/2023  Name: Phylicia Vásquez  Clinic Number: [id number]    Therapy Diagnosis:   Encounter Diagnoses   Name Primary?    Lymphedema of right arm Yes    Impaired functional mobility and activity tolerance      Physician: Negrito Verde MD      Physician Orders: PT Eval and Treat   Medical Diagnosis: Carcinoma of axillary tail of right breast in female, estrogen receptor positive [C50.611, Z17.0], Localized edema    Evaluation Date: 6/3/2022    Total Visits Received: 30  Cancelled Visits: unknown  No Show Visits: 0    Current Certification Period: 2/3/23 to 3/17/23  Precautions:  Standard, Fall, RUE lymphedema  Visits from Evaluation Date:  29      Subjective     Update: Pt reports: No new complaints. She received her new sleeve that is the proper compression level. She tried it on once, but it was rolling down.  She has been wearing tubigrip since.  She was compliant with compression pump  Response to previous treatment: no adverse reaction  Pain Scale: Phylicia rates pain on a scale of 0/10 on VAS.   Pain location: N/A    Fatigue: not assessed  Functional change: none stated    Objective     Update:   LANDMARK RIGHT UE LEFT UE DIFF RUE Diff RUE Diff RUE Diff RUE Diff RUE Diff RUE Diff RUE Diff RUE Diff RUE Diff RUE Diff RUE Diff RUE Diff RUE Diff RUE Diff RUE Diff RUE Diff RUE Diff RUE Diff RUE Diff RUE Diff RUE Diff RUE Diff RUE Diff   Date 6/3/22 6/3/22 6/3/22 6/22/22 6/22/22 6/29/22 6/29/22 7/6/22 7/6/22 7/20/22 7/20/22 7/27/22 7/27/22 8/18/22 8/18/22 8/24/22 8/24/22 9/2/22 9/2/22 9/19/22 9/19/22 9/26/22 9/26/22 10/4/22 10/4/22 10/17/22 10/17/22 10/24/22 10/24/22 11/2/22 11/2/22 11/10/22 11/10/22 11/28/22 11/28/22 12/15/22 12/15/22 1/5/23 1/5/23 1/12/23 1/12/23 1/19/23 1/19/23 1/24/23 1/24/23 2/27/23 2/27/23 4/5/23 4/5/23   E + 8" 47 cm 46.5 cm 0.5 cm 46.5cm -0.5 44cm -2.5 45 cm +1 47 cm +2 45cm -2 43.5cm -1.5 43.5cm No chg 44cm +0.5 46cm +2 44.5cm " "-1.5 44.5 cm No chg 45 cm +0.5 44.5 cm -0.5 46 cm +1.5 44.5 cm -1.5 45.5 cm +1 44.5cm -1 45 cm +0.5 44.5 cm -0.5 44 cm -0.5 45 cm +0.5 45.5 cm +0.5 46.5 cm +1   E + 6" 44 cm 42.5 cm 1.5 cm 44 cm No chg 44cm No chg 43.5cm -0.5 45cm +1.5 42cm -3 41cm -1 43cm +2 42.5cm -0.5 43.5cm +1 44.5cm +1 45cm +0.5 43.5 cm -1.5 43 cm -0.5 43 cm No chg 44.5 cm +1.5 44.5 cm No chg 45 cm +0.5 43 cm -2 43 cm No chg 43.5 cm +0.5 44 cm +0.5 44.5 cm +0.5 44.5 cm No chg   E + 4" 39.5 cm 37 cm 2.5 cm 39cm -0.5 40.5 cm +1.5 40.5cm No chg 41cm +0.5 39 cm -2 39cm No chg 39.5cm +0.5 40cm +0.5 39cm -1 41.5cm +2.5 40cm -1.5 40cm No chg 40cm No chg 40cm No chg 40 cm No chg 40.5 cm No chg 41.5 cm +1 40.5 cm -1 40.5cm No chg 40 cm -0.5 40.5 cm +0.5 40.5 cm No chg 40.5 cm No chg   E + 2" 34.5 cm 33 cm 1.5 cm 34.5 cm No chg 35 cm +0.5 34.5cm -0.5 35cm +0.5 34cm -1 34.5cm +0.5 34.5cm No chg 35cm +0.5 34cm -1 35cm +1 35cm No chg 34 cm -1 35 cm +1 35.5 cm +0.5 34.5 cm -1 35 cm +0.5 36cm +1 35 cm -1 34.5 cm -0.5 34 cm -0.5 34.5 cm +0.5 35 cm +0.5 34.5 cm -0.5   Elbow 28 cm 28 cm 0 cm 27 cm -1 27.5 cm +0.5 28 cm +0.5 28cm No chg 27cm -1 27cm No chg 27.5cm +0.5 27cm -0.5 27.5cm +0.5 27.5cm No chg 27cm -0.5 27 cm No chg 27 cm No chg 27 cm No chg 26 cm -1 27 cm +1 27.5 cm +0.5 27 cm -0.5 26.5 cm -0.5 26.5 cm No chg 26.5 cm No chg 26.5 cm No chg 27 cm +0.5   W+ 8" 29 cm  28.5cm 0.5 cm 28cm -1 28 cm No chg 29.5cm +1.5 29cm -0.5 28.5cm -0.5 28cm -0.5 29.5cm +1.5 29cm -0.5 28.5cm -0.5 29cm +0.5 28cm -1 29.5cm +1.5 30 cm +0.5 28cm -2 28.5 cm +0.5 29 cm +0.5 29.5 cm +0.5 29.5 cm No chg 29 cm -0.5 28 cm -1 27.5 cm -0.5 28.5 cm +1 28.5 cm No chg   W +  6" 29.5 cm 27 cm 2.5 cm 29cm -0.5 29cm No chg 29cm No chg 29cm No chg 29cm No chg 29cm No chg 29.5cm +0.5 28.5cm -1 29cm +0.5 29cm No chg 29cm No chg 29 cm No chg 29 cm No chg 28.5cm -0.5 28.5 cm No chg 29 cm +0.5 29 cm No chg 29 cm No chg 29 cm No chg 28.5 cm -0.5 28.5 cm No chg 28.5 cm No chg 28 cm -0.5   W + 4" " 25.5 cm 23.5 cm 2 cm 25cm -0.5 24.5 cm -0.5 25.5cm +1 25 cm -0.5 25cm No chg 24.5cm -0.5 25.5cm +1 24.5cm -1 25cm +0.5 25.5cm +0.5  24.5cm -1 25 cm +0.5 25.5 cm +0.5 25 cm -0.5 24.5 cm -0.5 24.5 cm No chg 24.5 cm No chg 24 cm -0.5 24.5 cm +0.5 24.5 cm No chg 24.5 cm No chg 24.5 cm No chg 23.5 cm -1   Wrist 18 cm 17.5 cm 0.5 cm 18.5cm +0.5 18 cm -0.5 18.5cm +0.5 18cm -0.5 18cm No chg 18cm No chg 18cm No chg 18cm No chg 18cm No chg 18cm No chg 18cm No chg 18 cm No chg 18 cm No chg 18 cm No chg 18 cm  No chg 18 cm No chg 18 cm No chg 17.5 cm -0.5 17.5 cm No chg 17.5 cm No chg 17.5 cm No chg 17.5 cm No chg 17.5 cm No chg   DPC 21.5 cm 20 cm 1.5 cm 20cm -1.5 20.5cm +0.5 21 cm +0.5 21cm -0.5 20.5cm -0.5 21cm +0.5 21.5cm +0.5 21cm -0.5 21cm No chg 20.5 cm -0.5 21.5cm +1 20.5cm -1 20.5 cm No chg 20.5 cm No chg 20 cm -0.5 20cm No chg 20 cm No chg 19.5 cm -0.5 19.5 cm No chg 20 cm +0.5 20 cm No chg 19.5 cm -0.5 19.5 cm No chg   IP Thumb 7.5 cm 7 cm 0.5 cm 8cm +0.5 7.5cm -0.5 8 cm +0.5 8cm No chg 7.5cm -0.5 7.5cm No chg 8cm +0.5 7.5cm -0.5 7.5cm No Chg 8cm +0.5 7.5cm -0.5 7.5cm No chg 7.5 cm No chg 7cm -0.5 7.5 cm +0.5 7.5 cm No chg 7.5 cm No chg 7.5 cm No chg 7.5 cm No chg 7.5 cm No chg 7 cm -0.5 7.5 cm +0.5 7 cm -0.5      Noted that compression sleeve does not have silicone dots in band to help keep the sleeve up. Appears tight at top.         Assessment     Update: Patient tolerated session well. She has been compliant with using her compression pump. Increased time since last session, but overall her measurements are relatively stable. She does demo an increase in her most upper arm measurement, but this is likely due to her current compression sleeve not fitting properly. Improper fit may be due lack of silicone in top band. She may benefit from a different style sleeve or same sleeve with silicone to provide better fit. Increased time spent discussing this, as pt needs to return/exchange current sleeve. She verbalizes  understanding well.  She tolerated complete decongestive therapy well.  Once she has correct compression level for sleeve, she will likely be appropriate for discharge from PT. Will progress as tolerated.      Pt prognosis is Good. Pt will continue to benefit from skilled outpatient physical therapy to address the deficits listed in the problem list chart on initial evaluation, provide pt/family education and to maximize pt's level of independence in the home and community environment.        Previous Short Term Goals Status:   met partially  New Short Term Goals Status:   N/A  Long Term Goal Status:   continue per initial plan of care.  Reasons for Recertification of Therapy:   Pt needs better fitting compression sleeve. She will benefit from continued therapy to manage her lymphedema until she has correct compression sleeve.  Plan     Updated Certification Period: 4/5/2023 to 5/5/23  Recommended Treatment Plan: 2 times per week for 4 weeks: Manual Therapy, Neuromuscular Re-ed, Orthotic Management and Training, Patient Education, Self Care, Therapeutic Activities, and Therapeutic Exercise  Other Recommendations: none anticipated    Marcela Price, PT  4/5/2023      I CERTIFY THE NEED FOR THESE SERVICES FURNISHED UNDER THIS PLAN OF TREATMENT AND WHILE UNDER MY CARE    Physician's comments:        Physician's Signature: ___________________________________________________

## 2023-04-05 NOTE — PROGRESS NOTES
Physical Therapy Daily Treatment Note     Name: Phylicia Vásquez  Clinic Number: [id number]    Therapy Diagnosis:   Encounter Diagnoses   Name Primary?    Weakness of left lower extremity Yes    Abnormality of gait     Impairment of balance        Physician: Negrito Verde MD    Visit Date: 4/5/2023    Physician Orders: PT Eval and Treat   Medical Diagnosis from Referral: M21.379 (ICD-10-CM) - Foot-drop, unspecified laterality  Evaluation Date: 10/18/2022  Authorization Period Expiration: 4/4/2023  Plan of Care Expiration: 3/3/2023  Visit # / Visits authorized: 7/ 8 (4 visits in 2022)  Insurance: [employer] OPEN ACCESS PLUS  FOTO: 2/3     Time In: 11:11 am  Time Out: 11:45 am  Total Billable Time: 34 minutes    Precautions:   Standard, cancer, and R UE lymphedema    Subjective     Pt reports: her L ankle is hurting today and she may have overdone it at the gym. She has been wearing the AFO sometimes but wasn't wearing it when she walked on the treadmill at the gym.   She was compliant with home exercise program  Response to previous treatment: no pain or anything.  Functional change: not stomping as much    Pain: 7/10  Location: left ankle      Objective     Objective Measures updated at progress report unless specified.     B/P: 136/99 mmHg; HR: 73 bpm; O2: 99%    Lab Values: 3/24/23  Platelets: 204  ANC: 4.9  Hematacrit: 38.4  Hemoglobin: 11.7(L)     Ankle Right Left Pain/Dysfunction with Movement   Plantarflexion 10(0-10)/15 10(-40 to -30) /15 N/A   Dorsiflexion 50/50 50/50 N/A   Inversion 30/35 30/35 N/A   Eversion 20/30 20/30 N/A     Strength: manual muscle test grades below      Lower Extremity Strength  Right LE   Left LE     Hip Flexion: 5/5 Hip Flexion: 5/5   Hip Extension:  5/5 Hip Extension: 5/5   Hip Abduction: 5/5 Hip Abduction: 5/5   Hip Adduction: 5/5 Hip Adduction 5/5   Knee Extension: 5/5 Knee Extension: 5/5   Knee Flexion: 5/5 Knee Flexion: 5/5   Ankle Dorsiflexion: 5/5 Ankle Dorsiflexion: 3/5    Ankle Plantarflexion: 5/5 Ankle Plantarflexion: 5/5   Ankle Inversion: 5/5 Ankle Inversion: 4+/5   Ankle Eversion: 5/5 Ankle Eversion: 5/5          Balance Assessment:       Evaluation 1/5/23 4/5/23   Single Limb Stance R LE 1.91 sec  (<10 sec = HIGH FALL RISK) 2.35 sec 1.65 sec   Single Limb Stance L LE 1.09 sec  (<10 sec = HIGH FALL RISK) 1.16 sec 0.6 sec        Evaluation 1/5/23 4/5/23   Timed Up and Go 14.11 sec  < 20 sec safe for independent transfers, < 30 sec safe for dependent transfers/assist required 13.60 sec 14.77 sec         Table: Population Norms for TUG    Age  Average TUG    60 - 69 years  8.1 seconds    70 - 79 years  9.2 seconds    80 - 99 years  11.3 seconds          Endurance:     6 Minute Walk Test Distance in meters: TBA  1/5/23 - not assessed due to time limitations  4/5/23 - 242.75 meters  - AD used: none  - Assistance: none  - Distance: 242.75 feet     GAIT DEVIATIONS:  Phylicia displays the following deviations with ambulation: increased hip flexion on L LE, decreased heel strike and toe off L LE, wide DANGELO     Impairments contributing to deviations: impaired motor control, muscle weakness, neuropathy     Endurance Assessment:    Evaluation 1/5/23 4/5/23   30 second Chair Rise  (adults > 61 y/o) TBA  10 without arms 11 without arms     Treatment     Mirian received a physical performance test with report for 26 minutes:  See objective section for measurements.     Mirian received therapeutic exercises to develop strength, endurance, ROM, flexibility, posture and core stabilization for 8 minutes including:  Supine ankle DF, 3 sets x 10 reps  Supine ankle inversion 1 set x 10 reps  Supine ankle eversion 1 set x 10 reps    Home Exercises Provided and Patient Education Provided     Education provided:   - compliance with HEP  - role of PT and goals for PT     Written Home Exercises Provided: Patient instructed to cont prior HEP.  Exercises were reviewed and Mirian was able to demonstrate them prior to  the end of the session.  Mirian demonstrated good  understanding of the education provided.     See EMR under Patient Instructions for exercises provided prior visit.    Assessment     Patient demonstrated an increase in strength of her lower extremities but continues to have limited AROM of left ankle dorsiflexion. Her continued weakness in her anterior tib contributes to her abnormal gait pattern of increased left hip flexion, wide DANGELO, decreased heel strike/toe off. Her scores on TUG and single leg balance indicate she is a risk for falls. Her score on 30 second sit to stand test has improved slightly to demonstrate increased endurance. She was able to tolerate the 6 minute walk test today to further assess her endurance.She is slowly progressing towards her goal and would benefit from continued physical therapy to improve her balance, strength, and endurance. She performed all of today's activities with no increase in symptoms prior to leaving the clinic. Will progress as tolerated.  Mirian Is progressing well towards her goals.   Pt prognosis is Good.     Pt will continue to benefit from skilled outpatient physical therapy to address the deficits listed in the problem list box on initial evaluation, provide pt/family education and to maximize pt's level of independence in the home and community environment.     Pt's spiritual, cultural and educational needs considered and pt agreeable to plan of care and goals.     Anticipated barriers to physical therapy: none anticipated    Goals:   Short Term Goals:  4 weeks  1. Pt will increase AROM of left ankle to neutral dorsiflexion to promote greater ease with performing normal gait pattern. (progressing, not met)  2. Pt. to demonstrate increased strength of left lower extremity to 4/5  to increase stability during community ambulation (progressing, not met)  3. Pt. will improve TUG test score to 12 seconds or less in order to perform safe transfers and for patient to be in  low/moderate risk for falls category (progressing, not met)  4. Pt will tolerate 6 minute walk test. (met, 4/5/23)  5. Pt will initiate home exercise program to improve strength, flexibility, endurance, mobility & balance to return pt to PLOF (met, 1/5/23)  6. Pt will tolerate 10 min or greater of time in light-->moderate intensity cardio (I.e. Bike, NuStep) to improve endurance to assist in performing her usual work activities (progressing, not met)  7. Pt to demonstrate tandem stance 30 sec or greater w/out UE support in order to improve balance to progress to singe leg stance to decrease fall risk in performance of ADL's (progressing, not met)     Long Term Goals: 8 weeks  1. Pt will increase AROM of left ankle dorsiflexion to 5 degrees in order to perform normal gait pattern when ambulating on uneven ground (progressing, not met)  2. Pt. will increase strength of left lower extremity to 5/5  to increase stability during ambulation on uneven surfaces,to increase tolerance for ADL and work activities. (progressing, not met)  3. Pt will be independent with HEP to improve ROM, strength, balance,  and independence with ADL's (progressing, not met)  4. Pt. will improve TUG test score to 10 seconds or less in order to ambulate safely in community and for patient to be in low risk for falls category (progressing, not met)  5. Pt. will improve 6 minute walk test score by 30 meters in order for patient to be in low risk for falls category (progressing, not met)  6. Patient will report compliance with walking program 5x week for 10 -30min each day to improve overall cardiovascular function and decrease cancer related fatigue at discharge. (progressing, not met)    Plan     Updated Plan of care Certification: 4/5/2023 to 6/2/2023.     Outpatient Physical Therapy 2 times weekly for 8 weeks to include the following interventions: Gait Training, Manual Therapy, Neuromuscular Re-ed, Patient Education, Self Care, Therapeutic  Activities, Therapeutic Exercise, and IASTM . Dry needling with manual therapy techniques to decrease pain, inflammation and swelling, increase circulation and promote healing process.      Gail Gutierrez, PT    4/5/2023

## 2023-04-05 NOTE — PLAN OF CARE
Outpatient Therapy Updated Plan of Care     Visit Date: 4/5/2023  Name: Phylicia Vásquez  Clinic Number: [id number]    Therapy Diagnosis:   Encounter Diagnoses   Name Primary?    Weakness of left lower extremity Yes    Abnormality of gait     Impairment of balance      Physician: Negrito Verde MD    Physician Orders: PT Eval and Treat   Medical Diagnosis from Referral: M21.379 (ICD-10-CM) - Foot-drop, unspecified laterality  Evaluation Date: 10/18/2022    Total Visits Received: 11  Cancelled Visits: 7  No Show Visits: 0    Current Certification Period:  1/5/2023 to 3/3/2023  Precautions:   Standard, cancer, and R UE lymphedema  Visits from Evaluation Date:  10    Subjective     Update:   Pt reports: her L ankle is hurting today and she may have overdone it at the gym. She has been wearing the AFO sometimes but wasn't wearing it when she walked on the treadmill at the gym.   She was compliant with home exercise program  Response to previous treatment: no pain or anything.  Functional change: not stomping as much     Pain: 7/10  Location: left ankle      Objective     Update:   Ankle Right Left Pain/Dysfunction with Movement   Plantarflexion 10(0-10)/15 10(-40 to -30) /15 N/A   Dorsiflexion 50/50 50/50 N/A   Inversion 30/35 30/35 N/A   Eversion 20/30 20/30 N/A      Strength: manual muscle test grades below      Lower Extremity Strength  Right LE   Left LE     Hip Flexion: 5/5 Hip Flexion: 5/5   Hip Extension:  5/5 Hip Extension: 5/5   Hip Abduction: 5/5 Hip Abduction: 5/5   Hip Adduction: 5/5 Hip Adduction 5/5   Knee Extension: 5/5 Knee Extension: 5/5   Knee Flexion: 5/5 Knee Flexion: 5/5   Ankle Dorsiflexion: 5/5 Ankle Dorsiflexion: 3/5   Ankle Plantarflexion: 5/5 Ankle Plantarflexion: 5/5   Ankle Inversion: 5/5 Ankle Inversion: 4+/5   Ankle Eversion: 5/5 Ankle Eversion: 5/5          Balance Assessment:       Evaluation 1/5/23 4/5/23   Single Limb Stance R LE 1.91 sec  (<10 sec = HIGH FALL RISK) 2.35 sec  1.65 sec   Single Limb Stance L LE 1.09 sec  (<10 sec = HIGH FALL RISK) 1.16 sec 0.6 sec        Evaluation 1/5/23 4/5/23   Timed Up and Go 14.11 sec  < 20 sec safe for independent transfers, < 30 sec safe for dependent transfers/assist required 13.60 sec 14.77 sec         Table: Population Norms for TUG    Age  Average TUG    60 - 69 years  8.1 seconds    70 - 79 years  9.2 seconds    80 - 99 years  11.3 seconds          Endurance:     6 Minute Walk Test Distance in meters: TBA  1/5/23 - not assessed due to time limitations  4/5/23 - 242.75 meters  - AD used: none  - Assistance: none  - Distance: 242.75 feet     GAIT DEVIATIONS:  Phylicia displays the following deviations with ambulation: increased hip flexion on L LE, decreased heel strike and toe off L LE, wide DANGELO     Impairments contributing to deviations: impaired motor control, muscle weakness, neuropathy     Endurance Assessment:    Evaluation 1/5/23 4/5/23   30 second Chair Rise  (adults > 59 y/o) TBA  10 without arms 11 without arms       Assessment     Update: Patient demonstrated an increase in strength of her lower extremities but continues to have limited AROM of left ankle dorsiflexion. Her continued weakness in her anterior tib contributes to her abnormal gait pattern of increased left hip flexion, wide DANGELO, decreased heel strike/toe off. Her scores on TUG and single leg balance indicate she is a risk for falls. Her score on 30 second sit to stand test has improved slightly to demonstrate increased endurance. She was able to tolerate the 6 minute walk test today to further assess her endurance.She is slowly progressing towards her goal and would benefit from continued physical therapy to improve her balance, strength, and endurance.     Previous Short Term Goals Status:   Short Term Goals:  4 weeks  1. Pt will increase AROM of left ankle to neutral dorsiflexion to promote greater ease with performing normal gait pattern. (progressing, not met)  2. Pt.  to demonstrate increased strength of left lower extremity to 4/5  to increase stability during community ambulation (progressing, not met)  3. Pt. will improve TUG test score to 12 seconds or less in order to perform safe transfers and for patient to be in low/moderate risk for falls category (progressing, not met)  4. Pt will tolerate 6 minute walk test. (met, 4/5/23)  5. Pt will initiate home exercise program to improve strength, flexibility, endurance, mobility & balance to return pt to OF (met, 1/5/23)  6. Pt will tolerate 10 min or greater of time in light-->moderate intensity cardio (I.e. Bike, NuStep) to improve endurance to assist in performing her usual work activities (progressing, not met)  7. Pt to demonstrate tandem stance 30 sec or greater w/out UE support in order to improve balance to progress to singe leg stance to decrease fall risk in performance of ADL's (progressing, not met)     Long Term Goals: 8 weeks  1. Pt will increase AROM of left ankle dorsiflexion to 5 degrees in order to perform normal gait pattern when ambulating on uneven ground (progressing, not met)  2. Pt. will increase strength of left lower extremity to 5/5  to increase stability during ambulation on uneven surfaces,to increase tolerance for ADL and work activities. (progressing, not met)  3. Pt will be independent with HEP to improve ROM, strength, balance,  and independence with ADL's (progressing, not met)  4. Pt. will improve TUG test score to 10 seconds or less in order to ambulate safely in community and for patient to be in low risk for falls category (progressing, not met)  5. Pt. will improve 6 minute walk test score by 30 meters in order for patient to be in low risk for falls category (progressing, not met)  6. Patient will report compliance with walking program 5x week for 10 -30min each day to improve overall cardiovascular function and decrease cancer related fatigue at discharge. (progressing, not met)  New  Short Term Goals Status:   N/A  Long Term Goal Status:   continue per initial plan of care.  Reasons for Recertification of Therapy:   weakness, decreased balance, abnormality of gait, decreased functional mobility    Plan     Updated Certification Period: 4/5/2023 to 6/2/2023  Recommended Treatment Plan: 2 times per week for 8 weeks:  Gait Training, Manual Therapy, Neuromuscular Re-ed, Patient Education, Self Care, Therapeutic Activities, Therapeutic Exercise, and IASTM . Dry needling with manual therapy techniques to decrease pain, inflammation and swelling, increase circulation and promote healing process.    Other Recommendations: none    Gail Gutierrez, PT  4/5/2023      I CERTIFY THE NEED FOR THESE SERVICES FURNISHED UNDER THIS PLAN OF TREATMENT AND WHILE UNDER MY CARE    Physician's comments:        Physician's Signature: ___________________________________________________

## 2023-04-11 ENCOUNTER — CLINICAL SUPPORT (OUTPATIENT)
Dept: REHABILITATION | Facility: HOSPITAL | Age: 53
End: 2023-04-11
Payer: COMMERCIAL

## 2023-04-11 DIAGNOSIS — R29.898 WEAKNESS OF LEFT LOWER EXTREMITY: Primary | ICD-10-CM

## 2023-04-11 DIAGNOSIS — R26.9 ABNORMALITY OF GAIT: ICD-10-CM

## 2023-04-11 DIAGNOSIS — R26.89 IMPAIRMENT OF BALANCE: ICD-10-CM

## 2023-04-11 PROCEDURE — 97110 THERAPEUTIC EXERCISES: CPT

## 2023-04-11 PROCEDURE — 97112 NEUROMUSCULAR REEDUCATION: CPT

## 2023-04-11 NOTE — PROGRESS NOTES
Physical Therapy Daily Treatment Note     Name: Phylicia Vásquez  Clinic Number: [id number]    Therapy Diagnosis:   Encounter Diagnoses   Name Primary?    Weakness of left lower extremity Yes    Abnormality of gait     Impairment of balance        Physician: Marcela Calvillo PA-C    Visit Date: 4/11/2023    Physician Orders: PT Eval and Treat   Medical Diagnosis from Referral: M21.379 (ICD-10-CM) - Foot-drop, unspecified laterality  Evaluation Date: 10/18/2022  Authorization Period Expiration: 4/4/2023  Plan of Care Expiration: 3/3/2023  Visit # / Visits authorized: 7/ 8 (4 visits in 2022)  Insurance: [employer] OPEN ACCESS PLUS  FOTO: 2/3     Time In: 8:34 am  Time Out: 9:30 am  Total Billable Time: 56 minutes    Precautions:   Standard, cancer, and R UE lymphedema    Subjective     Pt reports: her ankle/foot is better today. She wore her brace at work and her ankle feels better and the swell is down too. She has been having pain in her right elbow.   She was compliant with home exercise program  Response to previous treatment: no pain or anything.  Functional change: not stomping as much    Pain: 0/10  Location: left ankle      Objective     Objective Measures updated at progress report unless specified.     B/P: 123/96 mmHg; HR: 87 bpm; O2: 98%    Lab Values: 3/24/23  Platelets: 204  ANC: 4.9  Hematacrit: 38.4  Hemoglobin: 11.7(L)       Treatment       Mirian received therapeutic exercises to develop strength, endurance, ROM, flexibility, posture and core stabilization for 30 minutes including:  Stationary bike, seat 6, level 2,  10 minutes   Supine ankle DF, yellow   1 set x 10 reps  Supine ankle DF, AAROM  2 sets x 10 reps  Supine ankle inversion, red  2 sets x 10 reps  Supine ankle eversion, red  2 sets x 10 reps  Supine ankle PF, green   2 sets x 10 reps  Calf raise,     2 sets of 10 reps  Gastroc stretch on slant board,  2 x 30 sec  Soleus stretch on slant board,  2 x 30 sec    Mirian participated in neuromuscular  re-education activities to improve: Balance, Coordination, Proprioception and Posture for 26 minutes. The following activities were included:  Tandem stance, firm surface 2 x 30 sec  Single leg stance, firm surface 2 x 30 sec  Toe raises, 2 sets x 10 reps  Tandem walking, 4 laps along side of the table  Heel walking, 4 laps along side of table    Home Exercises Provided and Patient Education Provided     Education provided:   - compliance with HEP  - role of PT and goals for PT     Written Home Exercises Provided: Patient instructed to cont prior HEP.  Exercises were reviewed and Mirian was able to demonstrate them prior to the end of the session.  Mirian demonstrated good  understanding of the education provided.     See EMR under Patient Instructions for exercises provided prior visit.    Assessment     Patient is tolerating therapy well. She was able to resume her usual exercises with no difficulty today. She performed today's progressions with no increase in symptoms prior to leaving the clinic. She performed her standing balance activities with light touches on the mat to maintain her balance. When performing single leg balance she required occasional cues to activate her great toe on her left foot to help her maintain her balance. She continues to have difficulty performing toe raises and heel walking due to left anterior tibialis weakness. Will progress as tolerated.  Mirian Is progressing well towards her goals.   Pt prognosis is Good.     Pt will continue to benefit from skilled outpatient physical therapy to address the deficits listed in the problem list box on initial evaluation, provide pt/family education and to maximize pt's level of independence in the home and community environment.     Pt's spiritual, cultural and educational needs considered and pt agreeable to plan of care and goals.     Anticipated barriers to physical therapy: none anticipated    Goals:   Short Term Goals:  4 weeks  1. Pt will increase  AROM of left ankle to neutral dorsiflexion to promote greater ease with performing normal gait pattern. (progressing, not met)  2. Pt. to demonstrate increased strength of left lower extremity to 4/5  to increase stability during community ambulation (progressing, not met)  3. Pt. will improve TUG test score to 12 seconds or less in order to perform safe transfers and for patient to be in low/moderate risk for falls category (progressing, not met)  4. Pt will tolerate 6 minute walk test. (met, 4/5/23)  5. Pt will initiate home exercise program to improve strength, flexibility, endurance, mobility & balance to return pt to PLOF (met, 1/5/23)  6. Pt will tolerate 10 min or greater of time in light-->moderate intensity cardio (I.e. Bike, NuStep) to improve endurance to assist in performing her usual work activities (progressing, not met)  7. Pt to demonstrate tandem stance 30 sec or greater w/out UE support in order to improve balance to progress to singe leg stance to decrease fall risk in performance of ADL's (progressing, not met)     Long Term Goals: 8 weeks  1. Pt will increase AROM of left ankle dorsiflexion to 5 degrees in order to perform normal gait pattern when ambulating on uneven ground (progressing, not met)  2. Pt. will increase strength of left lower extremity to 5/5  to increase stability during ambulation on uneven surfaces,to increase tolerance for ADL and work activities. (progressing, not met)  3. Pt will be independent with HEP to improve ROM, strength, balance,  and independence with ADL's (progressing, not met)  4. Pt. will improve TUG test score to 10 seconds or less in order to ambulate safely in community and for patient to be in low risk for falls category (progressing, not met)  5. Pt. will improve 6 minute walk test score by 30 meters in order for patient to be in low risk for falls category (progressing, not met)  6. Patient will report compliance with walking program 5x week for 10 -30min  each day to improve overall cardiovascular function and decrease cancer related fatigue at discharge. (progressing, not met)    Plan     Updated Plan of care Certification: 4/5/2023 to 6/2/2023.     Outpatient Physical Therapy 2 times weekly for 8 weeks to include the following interventions: Gait Training, Manual Therapy, Neuromuscular Re-ed, Patient Education, Self Care, Therapeutic Activities, Therapeutic Exercise, and IASTM . Dry needling with manual therapy techniques to decrease pain, inflammation and swelling, increase circulation and promote healing process.      Gail Gutierrez, PT    4/11/2023

## 2023-04-17 ENCOUNTER — OFFICE VISIT (OUTPATIENT)
Dept: HEMATOLOGY/ONCOLOGY | Facility: CLINIC | Age: 53
End: 2023-04-17
Payer: COMMERCIAL

## 2023-04-17 VITALS
OXYGEN SATURATION: 96 % | DIASTOLIC BLOOD PRESSURE: 94 MMHG | SYSTOLIC BLOOD PRESSURE: 173 MMHG | HEIGHT: 65 IN | BODY MASS INDEX: 41.29 KG/M2 | HEART RATE: 88 BPM | TEMPERATURE: 98 F | RESPIRATION RATE: 18 BRPM | WEIGHT: 247.81 LBS

## 2023-04-17 DIAGNOSIS — M81.0 OSTEOPOROSIS, UNSPECIFIED OSTEOPOROSIS TYPE, UNSPECIFIED PATHOLOGICAL FRACTURE PRESENCE: ICD-10-CM

## 2023-04-17 DIAGNOSIS — Z79.811 PROPHYLACTIC USE OF ANASTROZOLE (ARIMIDEX): ICD-10-CM

## 2023-04-17 DIAGNOSIS — Z17.0 CARCINOMA OF AXILLARY TAIL OF RIGHT BREAST IN FEMALE, ESTROGEN RECEPTOR POSITIVE: Primary | ICD-10-CM

## 2023-04-17 DIAGNOSIS — C50.611 CARCINOMA OF AXILLARY TAIL OF RIGHT BREAST IN FEMALE, ESTROGEN RECEPTOR POSITIVE: Primary | ICD-10-CM

## 2023-04-17 PROCEDURE — 3008F PR BODY MASS INDEX (BMI) DOCUMENTED: ICD-10-PCS | Mod: CPTII,S$GLB,, | Performed by: INTERNAL MEDICINE

## 2023-04-17 PROCEDURE — 3008F BODY MASS INDEX DOCD: CPT | Mod: CPTII,S$GLB,, | Performed by: INTERNAL MEDICINE

## 2023-04-17 PROCEDURE — 99999 PR PBB SHADOW E&M-EST. PATIENT-LVL V: ICD-10-PCS | Mod: PBBFAC,,, | Performed by: INTERNAL MEDICINE

## 2023-04-17 PROCEDURE — 99999 PR PBB SHADOW E&M-EST. PATIENT-LVL V: CPT | Mod: PBBFAC,,, | Performed by: INTERNAL MEDICINE

## 2023-04-17 PROCEDURE — 3080F PR MOST RECENT DIASTOLIC BLOOD PRESSURE >= 90 MM HG: ICD-10-PCS | Mod: CPTII,S$GLB,, | Performed by: INTERNAL MEDICINE

## 2023-04-17 PROCEDURE — 99214 PR OFFICE/OUTPT VISIT, EST, LEVL IV, 30-39 MIN: ICD-10-PCS | Mod: S$GLB,,, | Performed by: INTERNAL MEDICINE

## 2023-04-17 PROCEDURE — 3080F DIAST BP >= 90 MM HG: CPT | Mod: CPTII,S$GLB,, | Performed by: INTERNAL MEDICINE

## 2023-04-17 PROCEDURE — 1160F PR REVIEW ALL MEDS BY PRESCRIBER/CLIN PHARMACIST DOCUMENTED: ICD-10-PCS | Mod: CPTII,S$GLB,, | Performed by: INTERNAL MEDICINE

## 2023-04-17 PROCEDURE — 3077F SYST BP >= 140 MM HG: CPT | Mod: CPTII,S$GLB,, | Performed by: INTERNAL MEDICINE

## 2023-04-17 PROCEDURE — 4010F ACE/ARB THERAPY RXD/TAKEN: CPT | Mod: CPTII,S$GLB,, | Performed by: INTERNAL MEDICINE

## 2023-04-17 PROCEDURE — 3077F PR MOST RECENT SYSTOLIC BLOOD PRESSURE >= 140 MM HG: ICD-10-PCS | Mod: CPTII,S$GLB,, | Performed by: INTERNAL MEDICINE

## 2023-04-17 PROCEDURE — 1160F RVW MEDS BY RX/DR IN RCRD: CPT | Mod: CPTII,S$GLB,, | Performed by: INTERNAL MEDICINE

## 2023-04-17 PROCEDURE — 1159F PR MEDICATION LIST DOCUMENTED IN MEDICAL RECORD: ICD-10-PCS | Mod: CPTII,S$GLB,, | Performed by: INTERNAL MEDICINE

## 2023-04-17 PROCEDURE — 99214 OFFICE O/P EST MOD 30 MIN: CPT | Mod: S$GLB,,, | Performed by: INTERNAL MEDICINE

## 2023-04-17 PROCEDURE — 4010F PR ACE/ARB THEARPY RXD/TAKEN: ICD-10-PCS | Mod: CPTII,S$GLB,, | Performed by: INTERNAL MEDICINE

## 2023-04-17 PROCEDURE — 1159F MED LIST DOCD IN RCRD: CPT | Mod: CPTII,S$GLB,, | Performed by: INTERNAL MEDICINE

## 2023-04-17 NOTE — PROGRESS NOTES
Subjective:       Patient ID: Phylicia Vásquez is a 53 y.o. female.    Chief Complaint: Carcinoma of axillary tail of right breast in female, estro    HPI   Mrs Lua returns today for follow-up.   I had last seen her in December 2022.     As of November 1, 2020 she has been on anastrozole in the adjuvant setting.  Of note, her estradiol level and her FSH level suggested that she is postmenopausal, hence initiation of anastrozole.    On April 21, 2021 she underwent a contralateral prophylactic mastectomy with ROWAN flap reconstructions bilaterally.      Briefly, she is a 53 year old AA female who was found to have a carcinoma that is ER positive, WA positive, and HER 2 equivocal, but negative by  FISH.  An axillary node biopsy was also positive.  She received standard neoadjuvant chemotherapy consisting of 4 cycles of AC and 4 cycles of Taxol prior to undergoing a mastectomy.  At the time of her mastectomy she had a 3 cm residual tumor while the 2 sentinel lymph nodes were positive.  A subsequent axillary node dissection showed no evidence of further chandler involvement.   She completed radiation therapy, and as of November 1, 2020 she has been on adjuvant anastrozole.      Review of Systems    Overall she feels OK, however, she complains of myalgias involving her thighs and a stiffness involving primarily her fingers and hands.  ECOG PS is 1. She denies any depression, easy bruising, fevers, chills, night  sweats, weight loss, vomiting, diarrhea, constipation, diplopia, blurred vision, headache, chest pain, palpitations, shortness of breath, left breast pain, abdominal pain, or difficulty ambulating.  The remainder of the ten-point ROS, including general, skin, lymph, H/N, cardiorespiratory, GI, , Neuro, Endocrine, and psychiatric is negative.       Objective:      Physical Exam      She is alert, oriented to time, place, person, pleasant, well      nourished, in no acute physical distress.                                    VITAL SIGNS:  Reviewed                                      HEENT:  Normal.  There are no nasal, oral, lip, gingival, auricular, lid,    or conjunctival lesions.  Mucosae are moist and pink, and there is no        thrush.  Pupils are equal, reactive to light and accommodation.              Extraocular muscle movements are intact.  Dentition is good.  There is no frontal or maxillary tenderness.                                     NECK:  Supple without JVD, adenopathy, or thyromegaly.                       LUNGS:  Clear to auscultation without wheezing, rales, or rhonchi.           CARDIOVASCULAR:  Reveals an S1, S2, no murmurs, no rubs, no gallops.         ABDOMEN:  Soft, nontender, without organomegaly.  Bowel sounds are    present.                                                                         EXTREMITIES:  No cyanosis, clubbing, but she does have 1+ edema primarily on the distal right upper extremity                               BREASTS:   she is now status post bilateral mastectomies with ROWAN flap reconstructions.  There is an area of fat necrosis at the 7:00 o clock position in the left breast.  There is also an area of probable fat necrosis on the right reconstructed breast at the 4 o'clock position.    I do not palpate any axillary lymph nodes in either side.    LYMPHATIC:  There is no cervical, axillary, or supraclavicular adenopathy.   SKIN:  Warm and moist, without petechiae, rashes, induration, or ecchymoses.           NEUROLOGIC:  DTRs are 0-1+ bilaterally, symmetrical, motor function is 5/5,  and cranial nerves are  within normal limits.    Assessment:       1. Carcinoma of axillary tail of right breast in female, estrogen receptor positive, pathologically T2N1M0 after neoadjuvant chemotherapy     2.    Axillary node metastases.  3.      Status post recent left prophylactic mastectomy with bilateral ROWAN flap reconstructions  4.      Extensive fat necrosis bilaterally.  Of  note, a recent left mammogram was unremarkable.  5.      Mild lymphedema, right upper extremity.    Plan:        I had a long discussion with her.    She will remain on anastrozole through the end of October 2025, at which point I will recommend that she extend her treatment for an additional two years to complete 7 years of adjuvant hormonal therapy with AIs.    RTC 4 months.  Her multiple questions were answered to her satisfaction.    Route Chart for Scheduling    Med Onc Chart Routing      Follow up with physician 4 months.   Follow up with DANIEL    Infusion scheduling note    Injection scheduling note    Labs    Imaging    Pharmacy appointment    Other referrals

## 2023-04-19 ENCOUNTER — OFFICE VISIT (OUTPATIENT)
Dept: PODIATRY | Facility: CLINIC | Age: 53
End: 2023-04-19
Payer: COMMERCIAL

## 2023-04-19 VITALS
SYSTOLIC BLOOD PRESSURE: 134 MMHG | WEIGHT: 247.81 LBS | BODY MASS INDEX: 41.29 KG/M2 | DIASTOLIC BLOOD PRESSURE: 76 MMHG | HEIGHT: 65 IN | HEART RATE: 109 BPM

## 2023-04-19 DIAGNOSIS — M24.573 EQUINUS CONTRACTURE OF ANKLE: ICD-10-CM

## 2023-04-19 DIAGNOSIS — M79.671 FOOT PAIN, RIGHT: ICD-10-CM

## 2023-04-19 DIAGNOSIS — M72.2 PLANTAR FASCIITIS: Primary | ICD-10-CM

## 2023-04-19 PROCEDURE — 99214 OFFICE O/P EST MOD 30 MIN: CPT | Mod: 25,S$GLB,, | Performed by: PODIATRIST

## 2023-04-19 PROCEDURE — 3008F BODY MASS INDEX DOCD: CPT | Mod: CPTII,S$GLB,, | Performed by: PODIATRIST

## 2023-04-19 PROCEDURE — 20550 NJX 1 TENDON SHEATH/LIGAMENT: CPT | Mod: PBBFAC,PN,RT | Performed by: PODIATRIST

## 2023-04-19 PROCEDURE — 3078F PR MOST RECENT DIASTOLIC BLOOD PRESSURE < 80 MM HG: ICD-10-PCS | Mod: CPTII,S$GLB,, | Performed by: PODIATRIST

## 2023-04-19 PROCEDURE — 99214 PR OFFICE/OUTPT VISIT, EST, LEVL IV, 30-39 MIN: ICD-10-PCS | Mod: 25,S$GLB,, | Performed by: PODIATRIST

## 2023-04-19 PROCEDURE — 99999 PR PBB SHADOW E&M-EST. PATIENT-LVL III: ICD-10-PCS | Mod: PBBFAC,,, | Performed by: PODIATRIST

## 2023-04-19 PROCEDURE — 3078F DIAST BP <80 MM HG: CPT | Mod: CPTII,S$GLB,, | Performed by: PODIATRIST

## 2023-04-19 PROCEDURE — 1159F MED LIST DOCD IN RCRD: CPT | Mod: CPTII,S$GLB,, | Performed by: PODIATRIST

## 2023-04-19 PROCEDURE — 20550 PR INJECT TENDON SHEATH/LIGAMENT: ICD-10-PCS | Mod: RT,S$GLB,, | Performed by: PODIATRIST

## 2023-04-19 PROCEDURE — 3075F PR MOST RECENT SYSTOLIC BLOOD PRESS GE 130-139MM HG: ICD-10-PCS | Mod: CPTII,S$GLB,, | Performed by: PODIATRIST

## 2023-04-19 PROCEDURE — 29540 STRAPPING ANKLE &/FOOT: CPT | Mod: PBBFAC,PN,RT | Performed by: PODIATRIST

## 2023-04-19 PROCEDURE — 1159F PR MEDICATION LIST DOCUMENTED IN MEDICAL RECORD: ICD-10-PCS | Mod: CPTII,S$GLB,, | Performed by: PODIATRIST

## 2023-04-19 PROCEDURE — 3008F PR BODY MASS INDEX (BMI) DOCUMENTED: ICD-10-PCS | Mod: CPTII,S$GLB,, | Performed by: PODIATRIST

## 2023-04-19 PROCEDURE — 99999 PR PBB SHADOW E&M-EST. PATIENT-LVL III: CPT | Mod: PBBFAC,,, | Performed by: PODIATRIST

## 2023-04-19 PROCEDURE — 96372 THER/PROPH/DIAG INJ SC/IM: CPT | Mod: PBBFAC,PN | Performed by: PODIATRIST

## 2023-04-19 PROCEDURE — 99213 OFFICE O/P EST LOW 20 MIN: CPT | Mod: PBBFAC,PN,25 | Performed by: PODIATRIST

## 2023-04-19 PROCEDURE — 3075F SYST BP GE 130 - 139MM HG: CPT | Mod: CPTII,S$GLB,, | Performed by: PODIATRIST

## 2023-04-19 PROCEDURE — 20550 NJX 1 TENDON SHEATH/LIGAMENT: CPT | Mod: RT,S$GLB,, | Performed by: PODIATRIST

## 2023-04-19 PROCEDURE — 4010F ACE/ARB THERAPY RXD/TAKEN: CPT | Mod: CPTII,S$GLB,, | Performed by: PODIATRIST

## 2023-04-19 PROCEDURE — 4010F PR ACE/ARB THEARPY RXD/TAKEN: ICD-10-PCS | Mod: CPTII,S$GLB,, | Performed by: PODIATRIST

## 2023-04-19 RX ORDER — DEXAMETHASONE SODIUM PHOSPHATE 4 MG/ML
4 INJECTION, SOLUTION INTRA-ARTICULAR; INTRALESIONAL; INTRAMUSCULAR; INTRAVENOUS; SOFT TISSUE
Status: COMPLETED | OUTPATIENT
Start: 2023-04-19 | End: 2023-04-19

## 2023-04-19 RX ORDER — TRIAMCINOLONE ACETONIDE 40 MG/ML
40 INJECTION, SUSPENSION INTRA-ARTICULAR; INTRAMUSCULAR
Status: COMPLETED | OUTPATIENT
Start: 2023-04-19 | End: 2023-04-19

## 2023-04-19 RX ORDER — LIDOCAINE HYDROCHLORIDE 20 MG/ML
JELLY TOPICAL DAILY
Qty: 30 ML | Refills: 2 | Status: SHIPPED | OUTPATIENT
Start: 2023-04-19 | End: 2023-11-14

## 2023-04-19 RX ADMIN — DEXAMETHASONE SODIUM PHOSPHATE 4 MG: 4 INJECTION, SOLUTION INTRA-ARTICULAR; INTRALESIONAL; INTRAMUSCULAR; INTRAVENOUS; SOFT TISSUE at 07:04

## 2023-04-19 RX ADMIN — TRIAMCINOLONE ACETONIDE 40 MG: 40 INJECTION, SUSPENSION INTRA-ARTICULAR; INTRAMUSCULAR at 07:04

## 2023-04-19 NOTE — PROGRESS NOTES
Subjective:      Patient ID: Phylicia Vásquez is a 53 y.o. female.    Chief Complaint: Heel Pain (R foot)    Sharp deep pain in the bottom of the right heel.  Rapid onset over the last day or 2 worsening during that time.  Aggravated with increased weight-bearing particularly after a period of rest.  No prior medical treatment.  No self-treatment.  Patient denies trauma surgery right foot    Review of Systems   Constitutional: Negative for chills, diaphoresis, fever, malaise/fatigue and night sweats.   Cardiovascular:  Negative for claudication, cyanosis, leg swelling and syncope.   Skin:  Negative for color change, dry skin, nail changes, rash, suspicious lesions and unusual hair distribution.   Musculoskeletal:  Negative for falls, joint pain, joint swelling, muscle cramps, muscle weakness and stiffness.   Gastrointestinal:  Negative for constipation, diarrhea, nausea and vomiting.   Neurological:  Negative for brief paralysis, disturbances in coordination, focal weakness, numbness, paresthesias, sensory change and tremors.         Objective:      Physical Exam  Constitutional:       General: She is not in acute distress.     Appearance: She is well-developed. She is not diaphoretic.   Cardiovascular:      Pulses:           Popliteal pulses are 2+ on the right side and 2+ on the left side.        Dorsalis pedis pulses are 2+ on the right side and 2+ on the left side.        Posterior tibial pulses are 2+ on the right side and 2+ on the left side.      Comments: Capillary refill 3 seconds all toes/distal feet, all toes/both feet warm to touch.      Negative lymphadenopathy bilateral popliteal fossa and tarsal tunnel.      Negavie lower extremity edema bilateral.    Musculoskeletal:      Right ankle: No swelling, deformity, ecchymosis or lacerations. Normal range of motion. Normal pulse.      Right Achilles Tendon: Normal. No defects. Bryant's test negative.      Comments: Sharp deep pain to palpation inferior  heel right at medial calcaneal tubercle without ecchymosis, erythema, edema, or cardinal signs infection, and no signs of trauma.Ankle dorsiflexion decreased at <10 degrees bilateral with moderate increase with knee flexion bilateral.    Otherwise, Normal angle, base, station of gait. All ten toes without clubbing, cyanosis, or signs of ischemia.  No pain to palpation bilateral lower extremities.  Range of motion, stability, muscle strength, and muscle tone normal bilateral feet and legs.    Lymphadenopathy:      Lower Body: No right inguinal adenopathy. No left inguinal adenopathy.      Comments: Negative lymphadenopathy bilateral popliteal fossa and tarsal tunnel.    Negative lymphangitic streaking bilateral feet/ankles/legs.   Skin:     General: Skin is warm and dry.      Capillary Refill: Capillary refill takes 2 to 3 seconds.      Coloration: Skin is not pale.      Findings: No abrasion, bruising, burn, ecchymosis, erythema, laceration, lesion or rash.      Nails: There is no clubbing.      Comments:   Skin is normal age and health appropriate color, turgor, texture, and temperature bilateral lower extremities without ulceration, hyperpigmentation, discoloration, masses nodules or cords palpated.  No ecchymosis, erythema, edema, or cardinal signs of infection bilateral lower extremities.     Neurological:      Mental Status: She is alert and oriented to person, place, and time.      Sensory: Sensory deficit present.      Motor: No tremor, atrophy or abnormal muscle tone.      Gait: Gait normal.      Comments: Subjective numbness both forefeet she attributes to chemo without loss of protective sensation to 10 g monofilament.   Psychiatric:         Behavior: Behavior is cooperative.           Assessment:       No diagnosis found.      Plan:       There are no diagnoses linked to this encounter.  I counseled the patient on her conditions, their implications and medical management.        Patient will stretch the  tendo achilles complex three times daily as demonstrated in the office.  Literature was dispensed illustrating proper stretching technique.    I applied a plantar rest strapping to the patient's right foot to offload symptomatic area, support the arch, and relieve pain.    Patient will obtain over the counter arch supports and wear them in shoes whenever possible.  Athletic shoes intended for walking or running are usually best.    The patient was advised that NSAID-type medications have two very important potential side effects: gastrointestinal irritation including hemorrhage and renal injuries. She was asked to take the medication with food and to stop if she experiences any GI upset. I asked her to call for vomiting, abdominal pain or black/bloody stools. The patient expresses understanding of these issues and questions were answered.    Discussed conservative treatment with shoes of adequate dimensions, material, and style to alleviate symptoms and delay or prevent surgical intervention.    Counseled the patient on the potential complications of local injection of corticosteroid including, but not limited to:  Discoloration of skin, erosion of soft tissue, and increased likelihood of rupture of a soft tissue structure (ie. Plantar fascia, muscle, tendon, ligament, or capsule in the area of injection).  Patient indicates understanding of my explanation, that any questions have been answered to patient satisfaction, and patient gives verbal consent for injection of affected area.    After sterile skin prep, steroid injection was performed with patient verbal consent and timeout procedure with patient identifiers, site markings, and procedures in agreement to all present, at right plantar fascia using 20 mg of Kenalog, 4mg dexamethazone sodium phosphate, and 1mL 1% Lidocaine plain. This was well tolerated.    Lidocaine gel    Continue meloxicam          No follow-ups on file.

## 2023-05-19 ENCOUNTER — OFFICE VISIT (OUTPATIENT)
Dept: INTERNAL MEDICINE | Facility: CLINIC | Age: 53
End: 2023-05-19
Payer: COMMERCIAL

## 2023-05-19 ENCOUNTER — TELEPHONE (OUTPATIENT)
Dept: DERMATOLOGY | Facility: CLINIC | Age: 53
End: 2023-05-19
Payer: COMMERCIAL

## 2023-05-19 ENCOUNTER — LAB VISIT (OUTPATIENT)
Dept: LAB | Facility: HOSPITAL | Age: 53
End: 2023-05-19
Payer: COMMERCIAL

## 2023-05-19 VITALS
BODY MASS INDEX: 42.93 KG/M2 | OXYGEN SATURATION: 98 % | HEART RATE: 74 BPM | DIASTOLIC BLOOD PRESSURE: 80 MMHG | SYSTOLIC BLOOD PRESSURE: 130 MMHG | WEIGHT: 242.31 LBS | HEIGHT: 63 IN

## 2023-05-19 DIAGNOSIS — R73.03 PREDIABETES: ICD-10-CM

## 2023-05-19 DIAGNOSIS — Z13.220 SCREENING FOR LIPID DISORDERS: ICD-10-CM

## 2023-05-19 DIAGNOSIS — L98.9 SKIN LESION: ICD-10-CM

## 2023-05-19 DIAGNOSIS — I10 ESSENTIAL HYPERTENSION: ICD-10-CM

## 2023-05-19 DIAGNOSIS — G62.9 NEUROPATHY: ICD-10-CM

## 2023-05-19 DIAGNOSIS — Z00.00 ENCOUNTER FOR ANNUAL PHYSICAL EXAM: Primary | ICD-10-CM

## 2023-05-19 DIAGNOSIS — E04.2 MULTINODULAR NON-TOXIC GOITER: ICD-10-CM

## 2023-05-19 DIAGNOSIS — M54.14 THORACIC RADICULOPATHY: ICD-10-CM

## 2023-05-19 LAB
ALBUMIN SERPL BCP-MCNC: 3.9 G/DL (ref 3.5–5.2)
ALP SERPL-CCNC: 88 U/L (ref 55–135)
ALT SERPL W/O P-5'-P-CCNC: 26 U/L (ref 10–44)
ANION GAP SERPL CALC-SCNC: 15 MMOL/L (ref 8–16)
AST SERPL-CCNC: 19 U/L (ref 10–40)
BASOPHILS # BLD AUTO: 0.05 K/UL (ref 0–0.2)
BASOPHILS NFR BLD: 0.5 % (ref 0–1.9)
BILIRUB SERPL-MCNC: 0.2 MG/DL (ref 0.1–1)
BUN SERPL-MCNC: 15 MG/DL (ref 6–20)
CALCIUM SERPL-MCNC: 10.2 MG/DL (ref 8.7–10.5)
CHLORIDE SERPL-SCNC: 103 MMOL/L (ref 95–110)
CHOLEST SERPL-MCNC: 187 MG/DL (ref 120–199)
CHOLEST/HDLC SERPL: 3 {RATIO} (ref 2–5)
CO2 SERPL-SCNC: 25 MMOL/L (ref 23–29)
CREAT SERPL-MCNC: 0.7 MG/DL (ref 0.5–1.4)
DIFFERENTIAL METHOD: ABNORMAL
EOSINOPHIL # BLD AUTO: 0.2 K/UL (ref 0–0.5)
EOSINOPHIL NFR BLD: 1.7 % (ref 0–8)
ERYTHROCYTE [DISTWIDTH] IN BLOOD BY AUTOMATED COUNT: 15.9 % (ref 11.5–14.5)
EST. GFR  (NO RACE VARIABLE): >60 ML/MIN/1.73 M^2
ESTIMATED AVG GLUCOSE: 126 MG/DL (ref 68–131)
GLUCOSE SERPL-MCNC: 80 MG/DL (ref 70–110)
HBA1C MFR BLD: 6 % (ref 4–5.6)
HCT VFR BLD AUTO: 39.7 % (ref 37–48.5)
HDLC SERPL-MCNC: 63 MG/DL (ref 40–75)
HDLC SERPL: 33.7 % (ref 20–50)
HGB BLD-MCNC: 12.2 G/DL (ref 12–16)
IMM GRANULOCYTES # BLD AUTO: 0.03 K/UL (ref 0–0.04)
IMM GRANULOCYTES NFR BLD AUTO: 0.3 % (ref 0–0.5)
LDLC SERPL CALC-MCNC: 109.2 MG/DL (ref 63–159)
LYMPHOCYTES # BLD AUTO: 2.6 K/UL (ref 1–4.8)
LYMPHOCYTES NFR BLD: 27.9 % (ref 18–48)
MCH RBC QN AUTO: 26.3 PG (ref 27–31)
MCHC RBC AUTO-ENTMCNC: 30.7 G/DL (ref 32–36)
MCV RBC AUTO: 86 FL (ref 82–98)
MONOCYTES # BLD AUTO: 0.5 K/UL (ref 0.3–1)
MONOCYTES NFR BLD: 5.1 % (ref 4–15)
NEUTROPHILS # BLD AUTO: 6.1 K/UL (ref 1.8–7.7)
NEUTROPHILS NFR BLD: 64.5 % (ref 38–73)
NONHDLC SERPL-MCNC: 124 MG/DL
NRBC BLD-RTO: 0 /100 WBC
PLATELET # BLD AUTO: 216 K/UL (ref 150–450)
PMV BLD AUTO: 12 FL (ref 9.2–12.9)
POTASSIUM SERPL-SCNC: 3.8 MMOL/L (ref 3.5–5.1)
PROT SERPL-MCNC: 7.6 G/DL (ref 6–8.4)
RBC # BLD AUTO: 4.64 M/UL (ref 4–5.4)
SODIUM SERPL-SCNC: 143 MMOL/L (ref 136–145)
TRIGL SERPL-MCNC: 74 MG/DL (ref 30–150)
TSH SERPL DL<=0.005 MIU/L-ACNC: 1.39 UIU/ML (ref 0.4–4)
WBC # BLD AUTO: 9.43 K/UL (ref 3.9–12.7)

## 2023-05-19 PROCEDURE — 84443 ASSAY THYROID STIM HORMONE: CPT | Performed by: INTERNAL MEDICINE

## 2023-05-19 PROCEDURE — 4010F PR ACE/ARB THEARPY RXD/TAKEN: ICD-10-PCS | Mod: CPTII,S$GLB,, | Performed by: INTERNAL MEDICINE

## 2023-05-19 PROCEDURE — 80061 LIPID PANEL: CPT | Performed by: INTERNAL MEDICINE

## 2023-05-19 PROCEDURE — 3008F BODY MASS INDEX DOCD: CPT | Mod: CPTII,S$GLB,, | Performed by: INTERNAL MEDICINE

## 2023-05-19 PROCEDURE — 85025 COMPLETE CBC W/AUTO DIFF WBC: CPT | Performed by: INTERNAL MEDICINE

## 2023-05-19 PROCEDURE — 3008F PR BODY MASS INDEX (BMI) DOCUMENTED: ICD-10-PCS | Mod: CPTII,S$GLB,, | Performed by: INTERNAL MEDICINE

## 2023-05-19 PROCEDURE — 99396 PR PREVENTIVE VISIT,EST,40-64: ICD-10-PCS | Mod: S$GLB,,, | Performed by: INTERNAL MEDICINE

## 2023-05-19 PROCEDURE — 99999 PR PBB SHADOW E&M-EST. PATIENT-LVL III: ICD-10-PCS | Mod: PBBFAC,,, | Performed by: INTERNAL MEDICINE

## 2023-05-19 PROCEDURE — 83036 HEMOGLOBIN GLYCOSYLATED A1C: CPT | Performed by: INTERNAL MEDICINE

## 2023-05-19 PROCEDURE — 99396 PREV VISIT EST AGE 40-64: CPT | Mod: S$GLB,,, | Performed by: INTERNAL MEDICINE

## 2023-05-19 PROCEDURE — 3075F SYST BP GE 130 - 139MM HG: CPT | Mod: CPTII,S$GLB,, | Performed by: INTERNAL MEDICINE

## 2023-05-19 PROCEDURE — 80053 COMPREHEN METABOLIC PANEL: CPT | Performed by: INTERNAL MEDICINE

## 2023-05-19 PROCEDURE — 3079F DIAST BP 80-89 MM HG: CPT | Mod: CPTII,S$GLB,, | Performed by: INTERNAL MEDICINE

## 2023-05-19 PROCEDURE — 4010F ACE/ARB THERAPY RXD/TAKEN: CPT | Mod: CPTII,S$GLB,, | Performed by: INTERNAL MEDICINE

## 2023-05-19 PROCEDURE — 36415 COLL VENOUS BLD VENIPUNCTURE: CPT | Performed by: INTERNAL MEDICINE

## 2023-05-19 PROCEDURE — 3079F PR MOST RECENT DIASTOLIC BLOOD PRESSURE 80-89 MM HG: ICD-10-PCS | Mod: CPTII,S$GLB,, | Performed by: INTERNAL MEDICINE

## 2023-05-19 PROCEDURE — 3075F PR MOST RECENT SYSTOLIC BLOOD PRESS GE 130-139MM HG: ICD-10-PCS | Mod: CPTII,S$GLB,, | Performed by: INTERNAL MEDICINE

## 2023-05-19 PROCEDURE — 99999 PR PBB SHADOW E&M-EST. PATIENT-LVL III: CPT | Mod: PBBFAC,,, | Performed by: INTERNAL MEDICINE

## 2023-05-19 RX ORDER — GABAPENTIN 100 MG/1
300 CAPSULE ORAL EVERY MORNING
Qty: 270 CAPSULE | Refills: 3 | Status: SHIPPED | OUTPATIENT
Start: 2023-05-19 | End: 2023-11-13

## 2023-05-19 RX ORDER — CYCLOBENZAPRINE HCL 10 MG
10 TABLET ORAL 3 TIMES DAILY PRN
Qty: 90 TABLET | Refills: 3 | Status: SHIPPED | OUTPATIENT
Start: 2023-05-19 | End: 2023-10-06

## 2023-05-19 NOTE — TELEPHONE ENCOUNTER
Spoke with pt, scheduled in acute derm clinic. Explained will see Dr. Fitch and her resident. Pt thanked me for call     ----- Message from Francisca Sanchez sent at 5/19/2023  8:44 AM CDT -----  Regarding: Scheduling  Patient has a referral from Dr. Campos for Skin lesion [L98.9]. Please call patient to assist with scheduling.    Thank you

## 2023-05-19 NOTE — PROGRESS NOTES
Subjective:       Patient ID: Phylicia Vásquez is a 53 y.o. female.    Chief Complaint: Establish Care    HPI    Presents to establish care.     PMHx  Chronic thoracic back has done PT and Jennifer in the past on gabapentin and flexeril   HTN well controlled on amlodipine-valsartan.   Hx of breast cancer dx in 2019. S/p mastectomy with axillary node dissection, chemotherapy and XRT. She was started on Anastrozole as she appeared to be post menopausal on labs. She will be on anastrozole until October 2025  Lymphedema due to surgery  completed PT and has sleeve and compression machione   Chemo induced neuropathy. Has been on gabapentin but ran out recently.   Insomnia related to anastrozole. On ambien.     Health Maintenance:  Colon Cancer Screening: normal colonoscopy in 2021. Due again in 2031   DEXA: scheduled in 6/24/2023   Mammogram: last done 12. 2022.   HIV: negative 2022   Hep C: negative 2022   Lipids: order today   Vaccines:  up to date       Review of Systems   Constitutional:  Negative for activity change, appetite change and chills.   HENT:  Negative for ear pain, sinus pressure/congestion and sneezing.    Respiratory:  Negative for cough and shortness of breath.    Cardiovascular:  Negative for chest pain, palpitations and leg swelling.   Gastrointestinal:  Negative for abdominal distention, abdominal pain, constipation, diarrhea, nausea and vomiting.   Genitourinary:  Negative for dysuria and hematuria.   Musculoskeletal:  Negative for arthralgias, back pain and myalgias.   Neurological:  Negative for dizziness and headaches.   Psychiatric/Behavioral:  Negative for agitation. The patient is not nervous/anxious.          Past Medical History:   Diagnosis Date    Anxiety     Back pain     Goiter     HTN (hypertension) 10/18/2012    Hx antineoplastic chemo     last dose 4/23/20    Hx of psychiatric care     Ambien, Klonopin    Insomnia 10/18/2012    Malignant neoplasm of axillary tail of right breast in  female, estrogen receptor positive 2019    right    Neck mass     right posterior    Primary invasive malignant neoplasm of female breast, right 2019    right    Psychiatric problem     S/P abdominal supracervical subtotal hysterectomy, continues to have regular menses. -2014    Spinal cord cyst, L1      Thoracic radiculopathy, bulging disc at T10-11 and T11-12      Past Surgical History:   Procedure Laterality Date    AXILLARY NODE DISSECTION Right 2020    Procedure: LYMPHADENECTOMY, AXILLARY;  Surgeon: Lelo Guaman MD;  Location: [facility] OR 2ND FLR;  Service: General;  Laterality: Right;    BREAST BIOPSY Right     + CA    BREAST BIOPSY Left     BREAST RECONSTRUCTION Bilateral 2021     SECTION      CHOLECYSTECTOMY      COLONOSCOPY N/A 10/05/2015    Procedure: COLONOSCOPY;  Surgeon: JERONIMO Cancino MD;  Location: [facility] ENDO (4TH FLR);  Service: Endoscopy;  Laterality: N/A;    COLONOSCOPY N/A 2021    Procedure: COLONOSCOPY;  Surgeon: JERONIMO Cancino MD;  Location: [facility] ENDO (4TH FLR);  Service: Endoscopy;  Laterality: N/A;  fully vaccinated-GT    HYSTERECTOMY      BC--SUPRACERVICAL    INJECTION FOR SENTINEL NODE IDENTIFICATION Right 2020    Procedure: INJECTION, FOR SENTINEL NODE IDENTIFICATION;  Surgeon: Lelo Guaman MD;  Location: Psychiatric;  Service: General;  Laterality: Right;    INSERTION OF BREAST TISSUE EXPANDER Right 2020    Procedure: INSERTION, TISSUE EXPANDER, BREAST;  Surgeon: Pablo Ramos MD;  Location: [facility] OR;  Service: Plastics;  Laterality: Right;    INSERTION OF TUNNELED CENTRAL VENOUS CATHETER (CVC) WITH SUBCUTANEOUS PORT Right 2019    Procedure: [id number] Fluoro needed;  Surgeon: Lelo Guaman MD;  Location: [facility] OR 2ND FLR;  Service: General;  Laterality: Right;  Right internal jugular.     LIPOMA RESECTION  2021    MASTECTOMY Bilateral 2021    with reconstruction    NEEDLE LOCALIZATION Left  05/31/2021    Procedure: NEEDLE LOCALIZATION;  Surgeon: Kraig Mello MD;  Location: [facility] CATH LAB;  Service: Radiology;  Laterality: Left;    RECONSTRUCTION OF BREAST WITH DEEP INFERIOR EPIGASTRIC ARTERY  (ROWAN) FREE FLAP Bilateral 04/21/2021    Procedure: RECONSTRUCTION, BREAST, USING ROWAN FREE FLAP;  Surgeon: Pablo Ramos MD;  Location: [facility] OR;  Service: Plastics;  Laterality: Bilateral;    SENTINEL LYMPH NODE BIOPSY Right 05/20/2020    Procedure: BIOPSY, LYMPH NODE, SENTINEL;  Surgeon: Lelo Guaman MD;  Location: [facility] OR;  Service: General;  Laterality: Right;    TISSUE EXPANDER REMOVAL Right 04/21/2021    Procedure: REMOVAL, TISSUE EXPANDER;  Surgeon: Pablo Ramos MD;  Location: [facility] OR;  Service: Plastics;  Laterality: Right;    UNILATERAL MASTECTOMY Right 05/20/2020    Procedure: MASTECTOMY, UNILATERAL;  Surgeon: Lelo Guaman MD;  Location: [facility] OR;  Service: General;  Laterality: Right;    UNILATERAL MASTECTOMY Left 04/21/2021    Procedure: MASTECTOMY, UNILATERAL PROPHYLACTIC;  Surgeon: Lelo Guaman MD;  [facility];  Service: General;  Laterality: Left;      Patient Active Problem List   Diagnosis    Psychophysiological insomnia    Multinodular non-toxic goiter, Subcentimeter nodules May 2012, anterior fat pad.    BMI 40.0-44.9, adult    S/P abdominal supracervical subtotal hysterectomy, continues to have regular menses.     Seborrheic dermatitis of scalp and face    Spinal cord cyst, 11 mm CSF cyst, in the conus medullaris on the right side at L1 level. .    Thoracic radiculopathy    Fatty liver    Bulge of thoracic disc T10-11 and T11-12    Thoracic radiculitis    Screen for colon cancer,     Papilloma of right breast    Morbid obesity    Essential hypertension    Prediabetes    Carcinoma of axillary tail of right breast in female, estrogen receptor positive    Malignant neoplasm of axillary tail of right female breast    Adjustment disorder with  mixed anxiety and depressed mood    Antineoplastic chemotherapy induced anemia    Neuropathy, from chemo 2019    Preop testing    Prophylactic use of anastrozole (Arimidex)    Periumbilical hernia    Abnormal CT of the abdomen, 1/16/2021 ground glass opacities in right middle and upper lobes    Rib pain    History of breast cancer    Seroma of breast    Soft tissue mass, right occiput    Toenail fungus    Lymphedema of right arm    Impaired functional mobility and activity tolerance    Weakness of left lower extremity    Abnormality of gait    Impairment of balance        Objective:      Physical Exam  Constitutional:       Appearance: Normal appearance.   HENT:      Head: Normocephalic.      Right Ear: Tympanic membrane normal.      Left Ear: Tympanic membrane normal.      Nose: Nose normal.   Neck:      Thyroid: Thyromegaly present.   Cardiovascular:      Rate and Rhythm: Normal rate and regular rhythm.      Pulses: Normal pulses.      Heart sounds: Normal heart sounds.   Pulmonary:      Effort: Pulmonary effort is normal.      Breath sounds: Normal breath sounds.   Abdominal:      General: Abdomen is flat. Bowel sounds are normal.      Palpations: Abdomen is soft.   Musculoskeletal:         General: Normal range of motion.      Cervical back: Normal range of motion and neck supple.   Skin:     General: Skin is warm and dry.   Neurological:      General: No focal deficit present.      Mental Status: She is alert and oriented to person, place, and time.   Psychiatric:         Mood and Affect: Mood normal.       Assessment:       Problem List Items Addressed This Visit          Neuro    Thoracic radiculopathy    Relevant Medications    cyclobenzaprine (FLEXERIL) 10 MG tablet    Neuropathy, from chemo 2019 (Chronic)    Relevant Medications    gabapentin (NEURONTIN) 100 MG capsule       Cardiac/Vascular    Essential hypertension (Chronic)    Relevant Orders    Comprehensive Metabolic Panel    CBC Auto Differential     TSH       Endocrine    Prediabetes    Relevant Orders    Hemoglobin A1C    Multinodular non-toxic goiter, Subcentimeter nodules May 2012, anterior fat pad. (Chronic)    Relevant Orders    US Soft Tissue Head Neck Thyroid     Other Visit Diagnoses       Encounter for annual physical exam    -  Primary    Screening for lipid disorders        Relevant Orders    Lipid Panel    Skin lesion        Relevant Orders    Ambulatory referral/consult to Dermatology            Plan:         Phylicia was seen today for establish care.    Diagnoses and all orders for this visit:    Encounter for annual physical exam  Colon Cancer Screening: normal colonoscopy in 2021. Due again in 2031   DEXA: scheduled in 6/24/2023   Mammogram: last done 12. 2022.   HIV: negative 2022   Hep C: negative 2022   Lipids: order today   Vaccines:  up to date     Neuropathy, from chemo 2019  -     gabapentin (NEURONTIN) 100 MG capsule; Take 3 capsules (300 mg total) by mouth every morning. Take in the morning for neuropathy    Thoracic radiculopathy  -     cyclobenzaprine (FLEXERIL) 10 MG tablet; Take 1 tablet (10 mg total) by mouth 3 (three) times daily as needed for Muscle spasms.    Essential hypertension  Well controlled on current meds     Prediabetes  -     Hemoglobin A1C; Future    Screening for lipid disorders  -     Lipid Panel; Future    Skin lesion  Would like skin tag removed from face   -     Ambulatory referral/consult to Dermatology; Future    Multinodular non-toxic goiter  -     US Soft Tissue Head Neck Thyroid; Future  Enlarged thyroid on exam. Has US in the past but will re-evaluate                Claudia Campos MD   Internal Medicine   Primary Care

## 2023-05-20 ENCOUNTER — PATIENT MESSAGE (OUTPATIENT)
Dept: INTERNAL MEDICINE | Facility: CLINIC | Age: 53
End: 2023-05-20
Payer: COMMERCIAL

## 2023-05-31 RX ORDER — MELOXICAM 7.5 MG/1
TABLET ORAL
Qty: 30 TABLET | Refills: 1 | Status: SHIPPED | OUTPATIENT
Start: 2023-05-31 | End: 2023-08-07

## 2023-06-02 ENCOUNTER — HOSPITAL ENCOUNTER (OUTPATIENT)
Dept: RADIOLOGY | Facility: OTHER | Age: 53
Discharge: HOME | End: 2023-06-02
Attending: INTERNAL MEDICINE
Payer: COMMERCIAL

## 2023-06-02 ENCOUNTER — PATIENT MESSAGE (OUTPATIENT)
Dept: INTERNAL MEDICINE | Facility: CLINIC | Age: 53
End: 2023-06-02
Payer: COMMERCIAL

## 2023-06-02 DIAGNOSIS — E04.2 MULTINODULAR NON-TOXIC GOITER: ICD-10-CM

## 2023-06-02 PROCEDURE — 76536 US SOFT TISSUE HEAD NECK THYROID: ICD-10-PCS | Mod: 26,,, | Performed by: RADIOLOGY

## 2023-06-02 PROCEDURE — 76536 US EXAM OF HEAD AND NECK: CPT | Mod: TC

## 2023-06-02 PROCEDURE — 76536 US EXAM OF HEAD AND NECK: CPT | Mod: 26,,, | Performed by: RADIOLOGY

## 2023-06-06 ENCOUNTER — OFFICE VISIT (OUTPATIENT)
Dept: DERMATOLOGY | Facility: CLINIC | Age: 53
End: 2023-06-06
Payer: COMMERCIAL

## 2023-06-06 DIAGNOSIS — L68.0 HIRSUTISM: Primary | ICD-10-CM

## 2023-06-06 DIAGNOSIS — D48.9 NEOPLASM OF UNCERTAIN BEHAVIOR: ICD-10-CM

## 2023-06-06 PROCEDURE — 3044F PR MOST RECENT HEMOGLOBIN A1C LEVEL <7.0%: ICD-10-PCS | Mod: CPTII,S$GLB,, | Performed by: DERMATOLOGY

## 2023-06-06 PROCEDURE — 99999 PR PBB SHADOW E&M-EST. PATIENT-LVL III: ICD-10-PCS | Mod: PBBFAC,,,

## 2023-06-06 PROCEDURE — 3044F HG A1C LEVEL LT 7.0%: CPT | Mod: CPTII,S$GLB,, | Performed by: DERMATOLOGY

## 2023-06-06 PROCEDURE — 1160F PR REVIEW ALL MEDS BY PRESCRIBER/CLIN PHARMACIST DOCUMENTED: ICD-10-PCS | Mod: CPTII,S$GLB,, | Performed by: DERMATOLOGY

## 2023-06-06 PROCEDURE — 11102 PR TANGENTIAL BIOPSY, SKIN, SINGLE LESION: ICD-10-PCS | Mod: S$GLB,,, | Performed by: DERMATOLOGY

## 2023-06-06 PROCEDURE — 11102 TANGNTL BX SKIN SINGLE LES: CPT | Mod: S$GLB,,, | Performed by: DERMATOLOGY

## 2023-06-06 PROCEDURE — 1159F MED LIST DOCD IN RCRD: CPT | Mod: CPTII,S$GLB,, | Performed by: DERMATOLOGY

## 2023-06-06 PROCEDURE — 88305 TISSUE EXAM BY PATHOLOGIST: CPT | Performed by: PATHOLOGY

## 2023-06-06 PROCEDURE — 99203 OFFICE O/P NEW LOW 30 MIN: CPT | Mod: 25,S$GLB,, | Performed by: DERMATOLOGY

## 2023-06-06 PROCEDURE — 4010F PR ACE/ARB THEARPY RXD/TAKEN: ICD-10-PCS | Mod: CPTII,S$GLB,, | Performed by: DERMATOLOGY

## 2023-06-06 PROCEDURE — 99999 PR PBB SHADOW E&M-EST. PATIENT-LVL III: CPT | Mod: PBBFAC,,,

## 2023-06-06 PROCEDURE — 99203 PR OFFICE/OUTPT VISIT, NEW, LEVL III, 30-44 MIN: ICD-10-PCS | Mod: 25,S$GLB,, | Performed by: DERMATOLOGY

## 2023-06-06 PROCEDURE — 4010F ACE/ARB THERAPY RXD/TAKEN: CPT | Mod: CPTII,S$GLB,, | Performed by: DERMATOLOGY

## 2023-06-06 PROCEDURE — 1159F PR MEDICATION LIST DOCUMENTED IN MEDICAL RECORD: ICD-10-PCS | Mod: CPTII,S$GLB,, | Performed by: DERMATOLOGY

## 2023-06-06 PROCEDURE — 1160F RVW MEDS BY RX/DR IN RCRD: CPT | Mod: CPTII,S$GLB,, | Performed by: DERMATOLOGY

## 2023-06-06 PROCEDURE — 88305 TISSUE EXAM BY PATHOLOGIST: CPT | Mod: 26,,, | Performed by: PATHOLOGY

## 2023-06-06 PROCEDURE — 88305 TISSUE EXAM BY PATHOLOGIST: ICD-10-PCS | Mod: 26,,, | Performed by: PATHOLOGY

## 2023-06-06 NOTE — PROGRESS NOTES
Subjective:      Patient ID:  Phylicia Vásquez is a 53 y.o. female who presents for   Chief Complaint   Patient presents with    Growth     53F PMH breast cancer, now in remission, presenting to dermatology clinic as a new patient for growth on the face. Onset 2.5 years ago, has been growing. Denies pain, pruritus. Also was wondering if there was anything we could do for the hair growing on her chin.     Growth    Review of Systems   Constitutional: Negative.      Objective:   Physical Exam   Constitutional: She appears well-developed and well-nourished. No distress.   Psychiatric: She has a normal mood and affect.   Skin:          In the submental region, diffuse silver-black terminal hair without papules.       Diagram Legend     Erythematous scaling macule/papule c/w actinic keratosis       Vascular papule c/w angioma      Pigmented verrucoid papule/plaque c/w seborrheic keratosis      Yellow umbilicated papule c/w sebaceous hyperplasia      Irregularly shaped tan macule c/w lentigo     1-2 mm smooth white papules consistent with Milia      Movable subcutaneous cyst with punctum c/w epidermal inclusion cyst      Subcutaneous movable cyst c/w pilar cyst      Firm pink to brown papule c/w dermatofibroma      Pedunculated fleshy papule(s) c/w skin tag(s)      Evenly pigmented macule c/w junctional nevus     Mildly variegated pigmented, slightly irregular-bordered macule c/w mildly atypical nevus      Flesh colored to evenly pigmented papule c/w intradermal nevus       Pink pearly papule/plaque c/w basal cell carcinoma      Erythematous hyperkeratotic cursted plaque c/w SCC      Surgical scar with no sign of skin cancer recurrence      Open and closed comedones      Inflammatory papules and pustules      Verrucoid papule consistent consistent with wart     Erythematous eczematous patches and plaques     Dystrophic onycholytic nail with subungual debris c/w onychomycosis     Umbilicated papule     Erythematous-base heme-crusted tan verrucoid plaque consistent with inflamed seborrheic keratosis     Erythematous Silvery Scaling Plaque c/w Psoriasis     See annotation        Assessment / Plan:        Hirsutism  Patient with hirsutism of the submental region. Discussed that laser hair removal would not be appropriate given that patient's hairs are silver. Discussed other treatment options (Vaniqa, Tristan, Conair hair removal device). Patient defers other treatment options and will continue to shave the area.     Neoplasm of uncertain behavior  -     Ambulatory referral/consult to Dermatology    Ddx: verruca vulgaris vs other. Elected to shave biopsy today to confirm etiology. We will call the patient in 1-2 weeks with the results.     Shave biopsy procedure note:    Shave biopsy performed after verbal consent including risk of infection, scar, recurrence, need for additional treatment of site. Area prepped with alcohol, anesthetized with approximately 0.5cc of 1% lidocaine with epinephrine. Lesional tissue shaved with iris scissors. Hemostasis achieved with application of aluminum chloride. No complications. Dressing applied. Wound care explained.         RTC PRN     Radha German MD  [facility]ermat[facility], PGY-II

## 2023-06-12 ENCOUNTER — TELEPHONE (OUTPATIENT)
Dept: DERMATOLOGY | Facility: CLINIC | Age: 53
End: 2023-06-12
Payer: COMMERCIAL

## 2023-06-12 ENCOUNTER — PATIENT MESSAGE (OUTPATIENT)
Dept: HEMATOLOGY/ONCOLOGY | Facility: CLINIC | Age: 53
End: 2023-06-12
Payer: COMMERCIAL

## 2023-06-12 DIAGNOSIS — C50.611 CARCINOMA OF AXILLARY TAIL OF RIGHT BREAST IN FEMALE, ESTROGEN RECEPTOR POSITIVE: ICD-10-CM

## 2023-06-12 DIAGNOSIS — Z17.0 CARCINOMA OF AXILLARY TAIL OF RIGHT BREAST IN FEMALE, ESTROGEN RECEPTOR POSITIVE: ICD-10-CM

## 2023-06-12 LAB
FINAL PATHOLOGIC DIAGNOSIS: NORMAL
GROSS: NORMAL
Lab: NORMAL
MICROSCOPIC EXAM: NORMAL

## 2023-06-12 RX ORDER — ZOLPIDEM TARTRATE 10 MG/1
10 TABLET ORAL NIGHTLY PRN
Qty: 30 TABLET | Refills: 2 | Status: SHIPPED | OUTPATIENT
Start: 2023-06-12 | End: 2023-06-14 | Stop reason: SDUPTHER

## 2023-06-12 NOTE — TELEPHONE ENCOUNTER
Discussed biopsy results with patient, answered all questions.     Final Pathologic Diagnosis Skin, right cheek, shave biopsy:   -VERRUCA VULGARIS     This lesion is benign.        Radha German MD   [facility] Dermatology, PGY-II

## 2023-06-13 ENCOUNTER — PATIENT MESSAGE (OUTPATIENT)
Dept: HEMATOLOGY/ONCOLOGY | Facility: CLINIC | Age: 53
End: 2023-06-13
Payer: COMMERCIAL

## 2023-06-13 DIAGNOSIS — Z79.811 USE OF AROMATASE INHIBITORS: ICD-10-CM

## 2023-06-13 RX ORDER — ANASTROZOLE 1 MG/1
1 TABLET ORAL DAILY
Qty: 90 TABLET | Refills: 3 | Status: SHIPPED | OUTPATIENT
Start: 2023-06-13 | End: 2024-06-12

## 2023-06-14 ENCOUNTER — HOSPITAL ENCOUNTER (OUTPATIENT)
Dept: RADIOLOGY | Facility: CLINIC | Age: 53
Discharge: HOME | End: 2023-06-14
Attending: INTERNAL MEDICINE
Payer: COMMERCIAL

## 2023-06-14 DIAGNOSIS — M81.0 OSTEOPOROSIS, UNSPECIFIED OSTEOPOROSIS TYPE, UNSPECIFIED PATHOLOGICAL FRACTURE PRESENCE: ICD-10-CM

## 2023-06-14 DIAGNOSIS — Z17.0 CARCINOMA OF AXILLARY TAIL OF RIGHT BREAST IN FEMALE, ESTROGEN RECEPTOR POSITIVE: ICD-10-CM

## 2023-06-14 DIAGNOSIS — C50.611 CARCINOMA OF AXILLARY TAIL OF RIGHT BREAST IN FEMALE, ESTROGEN RECEPTOR POSITIVE: ICD-10-CM

## 2023-06-14 PROCEDURE — 77080 DXA BONE DENSITY AXIAL SKELETON 1 OR MORE SITES: ICD-10-PCS | Mod: 26,,, | Performed by: INTERNAL MEDICINE

## 2023-06-14 PROCEDURE — 77080 DXA BONE DENSITY AXIAL: CPT | Mod: TC

## 2023-06-14 PROCEDURE — 77080 DXA BONE DENSITY AXIAL: CPT | Mod: 26,,, | Performed by: INTERNAL MEDICINE

## 2023-06-14 RX ORDER — ZOLPIDEM TARTRATE 10 MG/1
10 TABLET ORAL NIGHTLY PRN
Qty: 30 TABLET | Refills: 2 | Status: SHIPPED | OUTPATIENT
Start: 2023-06-14 | End: 2023-12-20 | Stop reason: SDUPTHER

## 2023-07-09 ENCOUNTER — OFFICE VISIT (OUTPATIENT)
Dept: URGENT CARE | Facility: CLINIC | Age: 53
End: 2023-07-09
Payer: COMMERCIAL

## 2023-07-09 VITALS
RESPIRATION RATE: 17 BRPM | WEIGHT: 242.5 LBS | DIASTOLIC BLOOD PRESSURE: 91 MMHG | TEMPERATURE: 99 F | OXYGEN SATURATION: 99 % | HEART RATE: 75 BPM | SYSTOLIC BLOOD PRESSURE: 149 MMHG | BODY MASS INDEX: 42.97 KG/M2 | HEIGHT: 63 IN

## 2023-07-09 DIAGNOSIS — J06.9 UPPER RESPIRATORY TRACT INFECTION, UNSPECIFIED TYPE: Primary | ICD-10-CM

## 2023-07-09 LAB
CTP QC/QA: YES
SARS-COV-2 AG RESP QL IA.RAPID: NEGATIVE

## 2023-07-09 PROCEDURE — 87811 SARS-COV-2 COVID19 W/OPTIC: CPT | Mod: QW,S$GLB,, | Performed by: FAMILY MEDICINE

## 2023-07-09 PROCEDURE — 99213 OFFICE O/P EST LOW 20 MIN: CPT | Mod: S$GLB,,, | Performed by: FAMILY MEDICINE

## 2023-07-09 PROCEDURE — 87811 SARS CORONAVIRUS 2 ANTIGEN POCT, MANUAL READ: ICD-10-PCS | Mod: QW,S$GLB,, | Performed by: FAMILY MEDICINE

## 2023-07-09 PROCEDURE — 99213 PR OFFICE/OUTPT VISIT, EST, LEVL III, 20-29 MIN: ICD-10-PCS | Mod: S$GLB,,, | Performed by: FAMILY MEDICINE

## 2023-07-09 RX ORDER — AZELASTINE HYDROCHLORIDE, FLUTICASONE PROPIONATE 137; 50 UG/1; UG/1
1 SPRAY, METERED NASAL 2 TIMES DAILY
Qty: 23 G | Refills: 0 | Status: SHIPPED | OUTPATIENT
Start: 2023-07-09

## 2023-07-09 RX ORDER — LIDOCAINE HYDROCHLORIDE 20 MG/ML
JELLY TOPICAL NIGHTLY
COMMUNITY
Start: 2023-05-17

## 2023-07-09 NOTE — LETTER
July 9, 2023      [facility]gent [facility] [address] [address]  Phone: [phone]  Fax: [phone]       Patient: Phylicia Vásquez   YOB: 1970  Date of Visit: 07/09/2023    To Whom It May Concern:    Shawn Vásquez  was at [facility] on 07/09/2023.  Please excuse her for missing work on July 8-9. The patient may return to work/school on Monday night of July 10, 2023 with no restrictions. If you have any questions or concerns, or if I can be of further assistance, please do not hesitate to contact me.    Sincerely,    Nisha Bello MD

## 2023-07-09 NOTE — PROGRESS NOTES
"Subjective:      Patient ID: Phylicia Vásquez is a 53 y.o. female.    Vitals:  height is 5' 3" (1.6 m) and weight is 110 kg (242 lb 8.1 oz). Her oral temperature is 98.5 °F (36.9 °C). Her blood pressure is 149/91 (abnormal) and her pulse is 75. Her respiration is 17 and oxygen saturation is 99%.     Chief Complaint: Sinus Problem    Pt presents today w/ nasal congestion accompanied w/ sinus pressure, sore throat and headache; onset yesterday 07/08/23. Pt c/o chills at night, sneezing and minimal cough and nausea. Pt denies no fever or any trouble swallowing.  Pt tried aleve,Dayquil and Nyquil; no relief.    Sinus Problem  This is a new problem. The current episode started yesterday. The problem has been gradually worsening since onset. There has been no fever. Her pain is at a severity of 10/10. The pain is severe. Associated symptoms include chills, congestion, coughing, headaches, a hoarse voice, sinus pressure, sneezing, a sore throat and swollen glands. Pertinent negatives include no ear pain, neck pain or shortness of breath. Treatments tried: aleve,dayquil,nyquil. The treatment provided no relief.     Constitution: Positive for chills.   HENT:  Positive for congestion, sinus pressure and sore throat. Negative for ear pain.    Neck: Negative for neck pain.   Respiratory:  Positive for cough. Negative for shortness of breath.    Allergic/Immunologic: Positive for sneezing.   Neurological:  Positive for headaches.    Objective:     Physical Exam   Constitutional: She is oriented to person, place, and time.  Non-toxic appearance. She appears ill.   HENT:   Head: Normocephalic.   Ears:   Right Ear: External ear normal.   Left Ear: External ear normal.   Nose: Rhinorrhea present.   Mouth/Throat: Mucous membranes are moist. Posterior oropharyngeal erythema present.   Eyes: Conjunctivae are normal.   Cardiovascular: Normal rate.   Pulmonary/Chest: Effort normal.   Musculoskeletal: Normal range of motion.         " General: Normal range of motion.   Neurological: She is alert and oriented to person, place, and time.   Skin: Skin is dry.   Psychiatric: Her behavior is normal.     Assessment:     1. Upper respiratory tract infection, unspecified type      Results for orders placed or performed in visit on 07/09/23   SARS Coronavirus 2 Antigen, POCT Manual Read   Result Value Ref Range    SARS Coronavirus 2 Antigen Negative Negative     Acceptable Yes      *Note: Due to a large number of results and/or encounters for the requested time period, some results have not been displayed. A complete set of results can be found in Results Review.       Plan:       Upper respiratory tract infection, unspecified type  -     SARS Coronavirus 2 Antigen, POCT Manual Read  -     azelastine-fluticasone (DYMISTA) 137-50 mcg/spray Spry nassal spray; 1 spray by Each Nostril route 2 (two) times daily.  Dispense: 23 g; Refill: 0      Rec Coricidin HBP Flu and sinus, cepacol, prn afrin.   Work note given,.   RTC as needed.

## 2023-07-22 ENCOUNTER — HOSPITAL ENCOUNTER (EMERGENCY)
Facility: HOSPITAL | Age: 53
Discharge: HOME | End: 2023-07-22
Attending: EMERGENCY MEDICINE
Payer: COMMERCIAL

## 2023-07-22 VITALS
TEMPERATURE: 98 F | SYSTOLIC BLOOD PRESSURE: 128 MMHG | DIASTOLIC BLOOD PRESSURE: 85 MMHG | HEART RATE: 82 BPM | OXYGEN SATURATION: 97 % | RESPIRATION RATE: 18 BRPM

## 2023-07-22 DIAGNOSIS — R11.15 EMESIS, PERSISTENT: ICD-10-CM

## 2023-07-22 DIAGNOSIS — R07.9 CHEST PAIN: ICD-10-CM

## 2023-07-22 LAB
ALBUMIN SERPL BCP-MCNC: 3.8 G/DL (ref 3.5–5.2)
ALP SERPL-CCNC: 79 U/L (ref 55–135)
ALT SERPL W/O P-5'-P-CCNC: 23 U/L (ref 10–44)
ANION GAP SERPL CALC-SCNC: 10 MMOL/L (ref 8–16)
AST SERPL-CCNC: 16 U/L (ref 10–40)
BASOPHILS # BLD AUTO: 0.05 K/UL (ref 0–0.2)
BASOPHILS NFR BLD: 0.6 % (ref 0–1.9)
BILIRUB SERPL-MCNC: 0.3 MG/DL (ref 0.1–1)
BNP SERPL-MCNC: <10 PG/ML (ref 0–99)
BUN SERPL-MCNC: 8 MG/DL (ref 6–20)
CALCIUM SERPL-MCNC: 9.7 MG/DL (ref 8.7–10.5)
CHLORIDE SERPL-SCNC: 105 MMOL/L (ref 95–110)
CO2 SERPL-SCNC: 27 MMOL/L (ref 23–29)
CREAT SERPL-MCNC: 0.7 MG/DL (ref 0.5–1.4)
DIFFERENTIAL METHOD: ABNORMAL
EOSINOPHIL # BLD AUTO: 0.1 K/UL (ref 0–0.5)
EOSINOPHIL NFR BLD: 1.5 % (ref 0–8)
ERYTHROCYTE [DISTWIDTH] IN BLOOD BY AUTOMATED COUNT: 15.3 % (ref 11.5–14.5)
EST. GFR  (NO RACE VARIABLE): >60 ML/MIN/1.73 M^2
GLUCOSE SERPL-MCNC: 112 MG/DL (ref 70–110)
HCT VFR BLD AUTO: 37.7 % (ref 37–48.5)
HGB BLD-MCNC: 11.7 G/DL (ref 12–16)
IMM GRANULOCYTES # BLD AUTO: 0.03 K/UL (ref 0–0.04)
IMM GRANULOCYTES NFR BLD AUTO: 0.4 % (ref 0–0.5)
LYMPHOCYTES # BLD AUTO: 2.2 K/UL (ref 1–4.8)
LYMPHOCYTES NFR BLD: 27.2 % (ref 18–48)
MCH RBC QN AUTO: 26.1 PG (ref 27–31)
MCHC RBC AUTO-ENTMCNC: 31 G/DL (ref 32–36)
MCV RBC AUTO: 84 FL (ref 82–98)
MONOCYTES # BLD AUTO: 0.5 K/UL (ref 0.3–1)
MONOCYTES NFR BLD: 6.2 % (ref 4–15)
NEUTROPHILS # BLD AUTO: 5.2 K/UL (ref 1.8–7.7)
NEUTROPHILS NFR BLD: 64.1 % (ref 38–73)
NRBC BLD-RTO: 0 /100 WBC
PLATELET # BLD AUTO: 213 K/UL (ref 150–450)
PMV BLD AUTO: 12.1 FL (ref 9.2–12.9)
POC CARDIAC TROPONIN I: 0 NG/ML (ref 0–0.08)
POTASSIUM SERPL-SCNC: 3.7 MMOL/L (ref 3.5–5.1)
PROT SERPL-MCNC: 7.5 G/DL (ref 6–8.4)
RBC # BLD AUTO: 4.48 M/UL (ref 4–5.4)
SAMPLE: NORMAL
SODIUM SERPL-SCNC: 142 MMOL/L (ref 136–145)
TROPONIN I SERPL DL<=0.01 NG/ML-MCNC: 0.01 NG/ML (ref 0–0.03)
WBC # BLD AUTO: 8.08 K/UL (ref 3.9–12.7)

## 2023-07-22 PROCEDURE — 84484 ASSAY OF TROPONIN QUANT: CPT

## 2023-07-22 PROCEDURE — 93005 ELECTROCARDIOGRAM TRACING: CPT

## 2023-07-22 PROCEDURE — 99285 EMERGENCY DEPT VISIT HI MDM: CPT | Mod: 25

## 2023-07-22 PROCEDURE — 83880 ASSAY OF NATRIURETIC PEPTIDE: CPT

## 2023-07-22 PROCEDURE — 96374 THER/PROPH/DIAG INJ IV PUSH: CPT

## 2023-07-22 PROCEDURE — 63600175 PHARM REV CODE 636 W HCPCS

## 2023-07-22 PROCEDURE — 93010 ELECTROCARDIOGRAM REPORT: CPT | Mod: ,,, | Performed by: INTERNAL MEDICINE

## 2023-07-22 PROCEDURE — 93010 EKG 12-LEAD: ICD-10-PCS | Mod: ,,, | Performed by: INTERNAL MEDICINE

## 2023-07-22 PROCEDURE — 85025 COMPLETE CBC W/AUTO DIFF WBC: CPT

## 2023-07-22 PROCEDURE — 80053 COMPREHEN METABOLIC PANEL: CPT

## 2023-07-22 RX ORDER — ONDANSETRON 2 MG/ML
4 INJECTION INTRAMUSCULAR; INTRAVENOUS
Status: COMPLETED | OUTPATIENT
Start: 2023-07-22 | End: 2023-07-22

## 2023-07-22 RX ORDER — ONDANSETRON 4 MG/1
4 TABLET, FILM COATED ORAL EVERY 6 HOURS
Qty: 12 TABLET | Refills: 0 | Status: SHIPPED | OUTPATIENT
Start: 2023-07-22

## 2023-07-22 RX ADMIN — ONDANSETRON 4 MG: 2 INJECTION INTRAMUSCULAR; INTRAVENOUS at 04:07

## 2023-07-22 NOTE — ED PROVIDER NOTES
Encounter Date: 7/22/2023       History     Chief Complaint   Patient presents with    Emesis     Pt arrives c/o emesis since yesterday.     53-year-old female with a past medical history of breast cancer in remission, hypertension, back pain, anxiety presents with a chief complaint of nausea and vomiting.  She says that it began earlier this evening prior to her going to work.  She said she initially threw up her dinner, but then had multiple episodes of vomiting of yellow mucus.  She denies any abdominal pain or diarrhea.  She also denies any fevers, but says that she has had some right-sided chest pain and dyspnea on exertion since 2 days ago.  She says that she is been under a lot of stress due to family struggles.    The history is provided by the patient. No  was used.   Review of patient's allergies indicates:   Allergen Reactions    Butalbital Other (See Comments)     Chest pain       Past Medical History:   Diagnosis Date    Anxiety     Back pain     Goiter     HTN (hypertension) 10/18/2012    Hx antineoplastic chemo     last dose 4/23/20    Hx of psychiatric care     Ambien, Klonopin    Insomnia 10/18/2012    Malignant neoplasm of axillary tail of right breast in female, estrogen receptor positive 11/24/2019    right    Neck mass     right posterior    Primary invasive malignant neoplasm of female breast, right 11/21/2019    right    Psychiatric problem     S/P abdominal supracervical subtotal hysterectomy, continues to have regular menses.  4/2/2014    Spinal cord cyst, L1 2014     Thoracic radiculopathy, bulging disc at T10-11 and T11-12      Past Surgical History:   Procedure Laterality Date    AXILLARY NODE DISSECTION Right 06/05/2020    Procedure: LYMPHADENECTOMY, AXILLARY;  Surgeon: Lelo Guaman MD;  Location: [facility] OR [address] [place] [address];  Service: General;  Laterality: Right;    BREAST BIOPSY Right 2015    + CA    BREAST BIOPSY Left 2021    BREAST RECONSTRUCTION Bilateral 04/2021      SECTION      CHOLECYSTECTOMY      COLONOSCOPY N/A 10/05/2015    Procedure: COLONOSCOPY;  Surgeon: JERONIMO Cancino MD;  Location: [facility] ENDO (4TH FLR);  Service: Endoscopy;  Laterality: N/A;    COLONOSCOPY N/A 2021    Procedure: COLONOSCOPY;  Surgeon: JERONIMO Cancino MD;  Location: [facility] ENDO (4TH FLR);  Service: Endoscopy;  Laterality: N/A;  fully vaccinated-GT    HYSTERECTOMY  2007    BC--SUPRACERVICAL    INJECTION FOR SENTINEL NODE IDENTIFICATION Right 2020    Procedure: INJECTION, FOR SENTINEL NODE IDENTIFICATION;  Surgeon: Lelo Guaman MD;  Location: [facility] OR;  Service: General;  Laterality: Right;    INSERTION OF BREAST TISSUE EXPANDER Right 2020    Procedure: INSERTION, TISSUE EXPANDER, BREAST;  Surgeon: Pablo Ramos MD;  Location: [facility];  Service: Plastics;  Laterality: Right;    INSERTION OF TUNNELED CENTRAL VENOUS CATHETER (CVC) WITH SUBCUTANEOUS PORT Right 2019    Procedure: [id number]-[id number] Fluoro needed;  Surgeon: Lelo Guaman MD;  Location: [facility] 2ND FLR;  Service: General;  Laterality: Right;  Right internal jugular.     LIPOMA RESECTION  2021    MASTECTOMY Bilateral 2021    with reconstruction    NEEDLE LOCALIZATION Left 2021    Procedure: NEEDLE LOCALIZATION;  Surgeon: Kraig Mello MD;  Location: [facility] CATH LAB;  Service: Radiology;  Laterality: Left;    RECONSTRUCTION OF BREAST WITH DEEP INFERIOR EPIGASTRIC ARTERY  (ROWAN) FREE FLAP Bilateral 2021    Procedure: RECONSTRUCTION, BREAST, USING ROWAN FREE FLAP;  Surgeon: Pablo Ramos MD;  Location: [facility];  Service: Plastics;  Laterality: Bilateral;    SENTINEL LYMPH NODE BIOPSY Right 2020    Procedure: BIOPSY, LYMPH NODE, SENTINEL;  Surgeon: Lelo Guaman MD;  Location: [facility] OR;  Service: General;  Laterality: Right;    TISSUE EXPANDER REMOVAL Right 2021    Procedure: REMOVAL, TISSUE EXPANDER;  Surgeon: Pablo Ramos MD;  Location: [facility] OR;  Service:  Plastics;  Laterality: Right;    UNILATERAL MASTECTOMY Right 05/20/2020    Procedure: MASTECTOMY, UNILATERAL;  Surgeon: Lelo Guaman MD;  Location: [facility] OR;  Service: General;  Laterality: Right;    UNILATERAL MASTECTOMY Left 04/21/2021    Procedure: MASTECTOMY, UNILATERAL PROPHYLACTIC;  Surgeon: Lelo Guaman MD;  Location: [facility] OR;  Service: General;  Laterality: Left;     Family History   Problem Relation Age of Onset    Cancer Paternal Aunt     Lupus Paternal Aunt     Hypertension Mother     Depression Mother     Liver disease Father     Alcohol abuse Father     Cancer Father         Lung and prostate cancer    Colon cancer Paternal Uncle     Depression Son     Breast cancer Neg Hx     Ovarian cancer Neg Hx     Melanoma Neg Hx     Psoriasis Neg Hx     Eczema Neg Hx      Social History     Tobacco Use    Smoking status: Never    Smokeless tobacco: Never   Substance Use Topics    Alcohol use: Yes     Comment: rarely    Drug use: No     Review of Systems    Physical Exam     Initial Vitals [07/22/23 0353]   BP Pulse Resp Temp SpO2   128/85 82 18 98.2 °F (36.8 °C) 97 %      MAP       --         Physical Exam    Nursing note and vitals reviewed.  Constitutional: She appears well-developed and well-nourished. She is not diaphoretic. No distress.   HENT:   Head: Normocephalic and atraumatic.   Eyes: EOM are normal. Pupils are equal, round, and reactive to light.   Neck: Neck supple.   Normal range of motion.  Cardiovascular:  Normal rate, regular rhythm, normal heart sounds and intact distal pulses.           Pulmonary/Chest: Breath sounds normal. She has no wheezes. She has no rhonchi. She has no rales. She exhibits tenderness.   Abdominal: Abdomen is soft. There is no abdominal tenderness. There is no rebound and no guarding.   Musculoskeletal:         General: No tenderness or edema. Normal range of motion.      Cervical back: Normal range of motion and neck supple.     Neurological: She is alert and oriented to  person, place, and time. She has normal strength.   Skin: Skin is warm and dry. Capillary refill takes less than 2 seconds. No rash noted. No erythema. No pallor.   Psychiatric: She has a normal mood and affect. Her behavior is normal. Judgment and thought content normal.       ED Course   Procedures  Labs Reviewed   CBC W/ AUTO DIFFERENTIAL - Abnormal; Notable for the following components:       Result Value    Hemoglobin 11.7 (*)     MCH 26.1 (*)     MCHC 31.0 (*)     RDW 15.3 (*)     All other components within normal limits   COMPREHENSIVE METABOLIC PANEL - Abnormal; Notable for the following components:    Glucose 112 (*)     All other components within normal limits   TROPONIN I   B-TYPE NATRIURETIC PEPTIDE   TROPONIN ISTAT   POCT TROPONIN          Imaging Results              X-Ray Chest AP Portable (In process)                      Medications   ondansetron injection 4 mg (4 mg Intravenous Given 7/22/23 5141)     Medical Decision Making:   History:   Old Medical Records: I decided to obtain old medical records.  Old Records Summarized: records from clinic visits and records from previous admission(s).       <> Summary of Records: Prior records reviewed for past medical history and current medications  Initial Assessment:   53-year-old female in no acute distress.  I treated her nausea with Zofran.  Differential Diagnosis:   Food poisoning verses AGE versus ACS  Independently Interpreted Test(s):   I have ordered and independently interpreted X-rays - see summary below.       <> Summary of X-Ray Reading(s): No acute cardiopulmonary process on my independent review  I have ordered and independently interpreted EKG Reading(s) - see summary below       <> Summary of EKG Reading(s): Normal sinus rhythm at 66, all intervals within normal limits, no ST elevations or depressions  Clinical Tests:   Lab Tests: Reviewed  Radiological Study: Reviewed  Medical Tests: Reviewed  ED Management:  Patient's troponin and BNP  were negative.  Chest x-ray was unremarkable.  Based on the time course of the patient's symptoms, very low likelihood that this is ACS in this patient with reproducible chest pain.    She says that her nausea was significantly improved after Zofran.    I advised of the patient's symptoms are most likely attributable to food poisoning.  I prescribed her short course of Zofran, and discussed return precautions with the patient.  Answered all questions, provided discharge paperwork and she was discharged in stable condition.                        Clinical Impression:   Final diagnoses:  [R11.15] Emesis, persistent  [R07.9] Chest pain        ED Disposition Condition    Discharge Stable          ED Prescriptions       Medication Sig Dispense Start Date End Date Auth. Provider    ondansetron (ZOFRAN) 4 MG tablet Take 1 tablet (4 mg total) by mouth every 6 (six) hours. 12 tablet 7/22/2023 -- Aleksey Glasgow MD          Follow-up Information       Follow up With Specialties Details Why Contact Info    Claudia Campos MD Internal Medicine  As needed, If symptoms worsen [address]  [place] [address]  [phone]               Aleksey Glasgow MD  Resident  07/22/23 4671

## 2023-07-22 NOTE — DISCHARGE INSTRUCTIONS
Diagnosis: Nausea and Vomiting    Tests you had showed: EKG and labs did not show a heart attack.    Home Care Instructions:  - Continue taking your home medications as prescribed    Take ibuprofen (also called Advil, Motrin) for your pain. This medicine is available over-the-counter in 200 mg tablets.  - You may take 600 mg every 6 hours, or 800 mg every 8 hours as needed   - Do not take more than this amount, as it can cause kidney problems, bleeding in your stomach, and other serious problems.   - Do not also take naproxen (Aleve) at the same time or on the same day  - If you have heart problems or uncontrolled high blood pressure, you should not take ibuprofen for more than 3 days without discussing with your doctor    If your pain is not controlled with ibuprofen, you may also take acetaminophen (also called Tylenol).  - You may take up to 1,000 mg of Tylenol every 6 hours as needed  - Do not take more than 4,000 mg in 24 hours (1 day) as this may cause liver damage  - Many other medicines include acetaminophen (Tylenol) such as: Norco, Vicodin, Tylenol #3, many cold medicines, etc.  - Please read all labels carefully and do not combine medicines that include acetaminophen.  - If you have a history of liver disease or drink alcohol heavily, do not take acetaminophen (Tylenol) since it can damage your liver    Follow-Up Plan:  - Follow-up with: Primary care doctor within 3 - 5 days  - Follow-up for additional testing and/or evaluation as directed by your primary doctor    Return to the Emergency Department for symptoms including but not limited to: worsening symptoms, shortness of breath or chest pain, vomiting with inability to hold down fluids, fevers greater than 100.4°F, dizziness, passing out/fainting/unconsciousness, or other concerning symptoms.

## 2023-07-22 NOTE — ED NOTES
Phylicia Vásquez, a 53 y.o. female presents to the ED w/ complaint of N/V    N/V x1day.     Triage note:  Chief Complaint   Patient presents with    Emesis     Pt arrives c/o emesis since yesterday.     Review of patient's allergies indicates:   Allergen Reactions    Butalbital Other (See Comments)     Chest pain       Past Medical History:   Diagnosis Date    Anxiety     Back pain     Goiter     HTN (hypertension) 10/18/2012    Hx antineoplastic chemo     last dose 4/23/20    Hx of psychiatric care     Ambien, Klonopin    Insomnia 10/18/2012    Malignant neoplasm of axillary tail of right breast in female, estrogen receptor positive 11/24/2019    right    Neck mass     right posterior    Primary invasive malignant neoplasm of female breast, right 11/21/2019    right    Psychiatric problem     S/P abdominal supracervical subtotal hysterectomy, continues to have regular menses.  4/2/2014    Spinal cord cyst, L1 2014     Thoracic radiculopathy, bulging disc at T10-11 and T11-12     Patient identifiers for Phylicia Vásquez checked and correct.    LOC: The patient is awake, alert and aware of environment with an appropriate affect, the patient is oriented x 4 and speaking appropriately.    APPEARANCE: Patient resting comfortably and in no acute distress, patient is clean and well groomed, patient's clothing is properly fastened.    SKIN: The skin is warm and dry, color consistent with ethnicity, patient has normal skin turgor and moist mucus membranes, skin intact, no breakdown or bruising noted.    MUSCULOSKELETAL: Patient moving all extremities well, no obvious swelling or deformities noted.    RESPIRATORY: Airway is open and patent, respirations are spontaneous and even, patient has a normal effort and rate.    CARDIAC: Patient has a normal rate and rhythm, no periphreal edema noted, capillary refill < 3 seconds.    ABDOMEN: Soft and non tender to palpation, no distention noted. C/o nausea and vomiting  x1day denies taking medication at home      NEUROLOGIC: Eyes open spontaneously, PERRL, behavior appropriate to situation, follows commands, facial expression symmetrical, bilateral hand grasp equal and even, purposeful motor response noted, normal sensation in all extremities.     HEENT: No abnormalities noted. White sclera and pupils equal round and reactive to light. Denies headache, dizziness.     : Pt voids independently, denies dysuria, hematuria, frequency.

## 2023-08-07 RX ORDER — MELOXICAM 7.5 MG/1
TABLET ORAL
Qty: 30 TABLET | Refills: 1 | Status: SHIPPED | OUTPATIENT
Start: 2023-08-07 | End: 2023-10-06

## 2023-08-09 ENCOUNTER — TELEPHONE (OUTPATIENT)
Dept: HEMATOLOGY/ONCOLOGY | Facility: CLINIC | Age: 53
End: 2023-08-09
Payer: COMMERCIAL

## 2023-08-09 NOTE — TELEPHONE ENCOUNTER
LVM WITH DETAILED MESSAGE NEW DATE AND TIME FOR APPOINTMENT  ASK  THE PATIENT TO CONFIRM APPOINTMENT      NICO CRUZ

## 2023-08-11 ENCOUNTER — PATIENT MESSAGE (OUTPATIENT)
Dept: ADMINISTRATIVE | Facility: OTHER | Age: 53
End: 2023-08-11
Payer: COMMERCIAL

## 2023-08-14 ENCOUNTER — TELEPHONE (OUTPATIENT)
Dept: HEMATOLOGY/ONCOLOGY | Facility: CLINIC | Age: 53
End: 2023-08-14
Payer: COMMERCIAL

## 2023-08-17 ENCOUNTER — OFFICE VISIT (OUTPATIENT)
Dept: HEMATOLOGY/ONCOLOGY | Facility: CLINIC | Age: 53
End: 2023-08-17
Payer: COMMERCIAL

## 2023-08-17 VITALS
HEIGHT: 63 IN | BODY MASS INDEX: 43.68 KG/M2 | DIASTOLIC BLOOD PRESSURE: 88 MMHG | OXYGEN SATURATION: 98 % | WEIGHT: 246.5 LBS | RESPIRATION RATE: 17 BRPM | TEMPERATURE: 97 F | SYSTOLIC BLOOD PRESSURE: 146 MMHG | HEART RATE: 91 BPM

## 2023-08-17 DIAGNOSIS — Z17.0 CARCINOMA OF AXILLARY TAIL OF RIGHT BREAST IN FEMALE, ESTROGEN RECEPTOR POSITIVE: Primary | ICD-10-CM

## 2023-08-17 DIAGNOSIS — C50.611 CARCINOMA OF AXILLARY TAIL OF RIGHT BREAST IN FEMALE, ESTROGEN RECEPTOR POSITIVE: Primary | ICD-10-CM

## 2023-08-17 DIAGNOSIS — Z79.811 PROPHYLACTIC USE OF ANASTROZOLE (ARIMIDEX): ICD-10-CM

## 2023-08-17 PROCEDURE — 3044F PR MOST RECENT HEMOGLOBIN A1C LEVEL <7.0%: ICD-10-PCS | Mod: CPTII,S$GLB,, | Performed by: PHYSICIAN ASSISTANT

## 2023-08-17 PROCEDURE — 3008F BODY MASS INDEX DOCD: CPT | Mod: CPTII,S$GLB,, | Performed by: PHYSICIAN ASSISTANT

## 2023-08-17 PROCEDURE — 1159F MED LIST DOCD IN RCRD: CPT | Mod: CPTII,S$GLB,, | Performed by: PHYSICIAN ASSISTANT

## 2023-08-17 PROCEDURE — 99999 PR PBB SHADOW E&M-EST. PATIENT-LVL IV: ICD-10-PCS | Mod: PBBFAC,,, | Performed by: PHYSICIAN ASSISTANT

## 2023-08-17 PROCEDURE — 3077F SYST BP >= 140 MM HG: CPT | Mod: CPTII,S$GLB,, | Performed by: PHYSICIAN ASSISTANT

## 2023-08-17 PROCEDURE — 3044F HG A1C LEVEL LT 7.0%: CPT | Mod: CPTII,S$GLB,, | Performed by: PHYSICIAN ASSISTANT

## 2023-08-17 PROCEDURE — 4010F PR ACE/ARB THEARPY RXD/TAKEN: ICD-10-PCS | Mod: CPTII,S$GLB,, | Performed by: PHYSICIAN ASSISTANT

## 2023-08-17 PROCEDURE — 99213 OFFICE O/P EST LOW 20 MIN: CPT | Mod: S$GLB,,, | Performed by: PHYSICIAN ASSISTANT

## 2023-08-17 PROCEDURE — 1159F PR MEDICATION LIST DOCUMENTED IN MEDICAL RECORD: ICD-10-PCS | Mod: CPTII,S$GLB,, | Performed by: PHYSICIAN ASSISTANT

## 2023-08-17 PROCEDURE — 3079F DIAST BP 80-89 MM HG: CPT | Mod: CPTII,S$GLB,, | Performed by: PHYSICIAN ASSISTANT

## 2023-08-17 PROCEDURE — 4010F ACE/ARB THERAPY RXD/TAKEN: CPT | Mod: CPTII,S$GLB,, | Performed by: PHYSICIAN ASSISTANT

## 2023-08-17 PROCEDURE — 99213 PR OFFICE/OUTPT VISIT, EST, LEVL III, 20-29 MIN: ICD-10-PCS | Mod: S$GLB,,, | Performed by: PHYSICIAN ASSISTANT

## 2023-08-17 PROCEDURE — 99999 PR PBB SHADOW E&M-EST. PATIENT-LVL IV: CPT | Mod: PBBFAC,,, | Performed by: PHYSICIAN ASSISTANT

## 2023-08-17 PROCEDURE — 3008F PR BODY MASS INDEX (BMI) DOCUMENTED: ICD-10-PCS | Mod: CPTII,S$GLB,, | Performed by: PHYSICIAN ASSISTANT

## 2023-08-17 PROCEDURE — 3077F PR MOST RECENT SYSTOLIC BLOOD PRESSURE >= 140 MM HG: ICD-10-PCS | Mod: CPTII,S$GLB,, | Performed by: PHYSICIAN ASSISTANT

## 2023-08-17 PROCEDURE — 3079F PR MOST RECENT DIASTOLIC BLOOD PRESSURE 80-89 MM HG: ICD-10-PCS | Mod: CPTII,S$GLB,, | Performed by: PHYSICIAN ASSISTANT

## 2023-08-17 NOTE — LETTER
August 17, 2023    Phylicia Vásquez  [address] [facility] [place] [address]             [facility] 3rd Fl  [address] [facility] [place] [address]  Phone: [phone] To Whom It May Concern:    Ms. Mirian Vásquez is under my care for breast cancer. She is on a medication that causes significant arthritic complaints that make it difficult for her to go up and down stairs.  Given that limitation, please have her park on the first floor.    If you have any questions or concerns, please don't hesitate to call.    Sincerely,        Kendra Benavidez PA-C

## 2023-08-17 NOTE — PROGRESS NOTES
Subjective     Patient ID: Phylicia Vásquez is a 53 y.o. female.    Chief Complaint: Carcinoma of axillary tail of right breast in female, estro    Mrs Lua returns today for follow-up.  She was last seen by Dr. Simpson in 4/2023.     As of November 1, 2020 she has been on anastrozole in the adjuvant setting.  Of note, her estradiol level and her FSH level suggested that she is postmenopausal, hence initiation of anastrozole.     On April 21, 2021 she underwent a contralateral prophylactic mastectomy with ROWAN flap reconstructions bilaterally.       Briefly, she is a 53 year old AA female who was found to have a carcinoma that is ER positive, ND positive, and HER 2 equivocal, but negative by  FISH.  An axillary node biopsy was also positive.  She received standard neoadjuvant chemotherapy consisting of 4 cycles of AC and 4 cycles of Taxol prior to undergoing a mastectomy.  At the time of her mastectomy she had a 3 cm residual tumor while the 2 sentinel lymph nodes were positive.  A subsequent axillary node dissection showed no evidence of further chandler involvement.   She completed radiation therapy, and as of November 1, 2020 she has been on adjuvant anastrozole.    Bone density from 6/2023 is as follows, patient remains on Vitamin D and Calcium:  Impression:     *Normal bone mineral density  *Compared with previous DXA, BMD at the lumbar spine has declined by 2.6%, and BMD at the total hip has declined by 3.4%.     RECOMMENDATIONS:  *Daily calcium intake 2967-9043 mg, dietary sources preferred; Vitamin D 0902-7974 IU daily.  *Weight bearing exercise and fall precautions.  *No additional pharmacologic therapy recommended at this time.  *Repeat BMD in 2 years        Review of Systems     Overall she feels OK, however, She continues to have generalized myalgias/arthralgias and notes difficulty going up and down stairs.  She denies fever, chills, nausea, vomiting, shortness of breath or pain.  Appetite is good.         Review of Systems   Constitutional:  Negative for appetite change and unexpected weight change.   HENT:  Negative for mouth sores.    Eyes:  Negative for visual disturbance.   Respiratory:  Negative for cough and shortness of breath.    Cardiovascular:  Negative for chest pain.   Gastrointestinal:  Negative for abdominal pain and diarrhea.   Genitourinary:  Negative for frequency.   Musculoskeletal:  Positive for arthralgias (see HPI). Negative for back pain.   Integumentary:  Negative for rash.   Neurological:  Negative for headaches.   Hematological:  Negative for adenopathy.   Psychiatric/Behavioral:  The patient is not nervous/anxious.           Objective     Physical Exam  Vitals reviewed.   Constitutional:       General: She is not in acute distress.     Appearance: She is well-developed.      Comments: Presents along  ECOG 0   HENT:      Head: Normocephalic.      Mouth/Throat:      Pharynx: No oropharyngeal exudate.   Eyes:      General: No scleral icterus.     Conjunctiva/sclera: Conjunctivae normal.      Pupils: Pupils are equal, round, and reactive to light.   Neck:      Thyroid: No thyromegaly.   Cardiovascular:      Rate and Rhythm: Normal rate and regular rhythm.      Heart sounds: Normal heart sounds.   Pulmonary:      Effort: Pulmonary effort is normal. No respiratory distress.      Breath sounds: Normal breath sounds.      Comments: S/p bilateral mastecomies with ROWAN flap reconstruction.  There is an area of induration, likely fat necrosis, in the lower inner quadrant of the left breast.  No axillary or supraclavicular adenopathy.     s  Abdominal:      General: Bowel sounds are normal. There is no distension.      Palpations: Abdomen is soft. There is no mass.      Tenderness: There is no abdominal tenderness.   Musculoskeletal:         General: No tenderness. Normal range of motion.      Cervical back: Normal range of motion and neck supple.   Lymphadenopathy:      Cervical: No cervical  adenopathy.   Skin:     General: Skin is warm and dry.   Neurological:      Mental Status: She is alert and oriented to person, place, and time.      Cranial Nerves: No cranial nerve deficit.   Psychiatric:         Behavior: Behavior normal.         Thought Content: Thought content normal.            Assessment and Plan     1. Carcinoma of axillary tail of right breast in female, estrogen receptor positive    2. Prophylactic use of anastrozole (Arimidex)      Patient with a pathologic T2N1M0  following neoadjuvant chemotherapy. Patient will remain on anastrozole through the end of October 2025, at which point Dr. Verde will recommend that she extend her treatment for an additional two years to complete 7 years of adjuvant hormonal therapy with AIs.    RTC 4 months.         No follow-ups on file.

## 2023-10-06 DIAGNOSIS — M54.14 THORACIC RADICULOPATHY: ICD-10-CM

## 2023-10-06 RX ORDER — MELOXICAM 7.5 MG/1
TABLET ORAL
Qty: 30 TABLET | Refills: 1 | Status: SHIPPED | OUTPATIENT
Start: 2023-10-06 | End: 2023-12-01

## 2023-10-06 RX ORDER — CYCLOBENZAPRINE HCL 10 MG
TABLET ORAL
Qty: 90 TABLET | Refills: 3 | Status: SHIPPED | OUTPATIENT
Start: 2023-10-06 | End: 2024-02-05

## 2023-10-06 NOTE — TELEPHONE ENCOUNTER
No care due was identified.  [facility] Embedded Care Due Messages. Reference number: [id number].   10/06/2023 12:49:33 AM CDT

## 2023-10-06 NOTE — TELEPHONE ENCOUNTER
Refill Routing Note     Refill Routing Note   Medication(s) are not appropriate for processing by [facility] for the following reason(s):      Medication outside of protocol  Patient seen in ED/Hospital since LOV with provider    [facility] action(s):  Route Care Due:  None identified            Appointments  past 12m or future 3m with PCP    Date Provider   Last Visit   5/19/2023 Claudia Campos MD   Next Visit   10/6/2023 Claudia Campos MD   ED visits in past 90 days: 1        Note composed:6:10 AM 10/06/2023

## 2023-10-10 ENCOUNTER — TELEPHONE (OUTPATIENT)
Dept: HEMATOLOGY/ONCOLOGY | Facility: CLINIC | Age: 53
End: 2023-10-10
Payer: COMMERCIAL

## 2023-10-24 ENCOUNTER — PATIENT MESSAGE (OUTPATIENT)
Dept: HEMATOLOGY/ONCOLOGY | Facility: CLINIC | Age: 53
End: 2023-10-24
Payer: COMMERCIAL

## 2023-10-25 ENCOUNTER — OFFICE VISIT (OUTPATIENT)
Dept: HEMATOLOGY/ONCOLOGY | Facility: CLINIC | Age: 53
End: 2023-10-25
Payer: COMMERCIAL

## 2023-10-25 VITALS
DIASTOLIC BLOOD PRESSURE: 97 MMHG | HEART RATE: 98 BPM | RESPIRATION RATE: 18 BRPM | SYSTOLIC BLOOD PRESSURE: 162 MMHG | BODY MASS INDEX: 43.71 KG/M2 | TEMPERATURE: 98 F | WEIGHT: 246.69 LBS | HEIGHT: 63 IN | OXYGEN SATURATION: 100 %

## 2023-10-25 DIAGNOSIS — C50.611 CARCINOMA OF AXILLARY TAIL OF RIGHT BREAST IN FEMALE, ESTROGEN RECEPTOR POSITIVE: Primary | ICD-10-CM

## 2023-10-25 DIAGNOSIS — Z79.811 PROPHYLACTIC USE OF ANASTROZOLE (ARIMIDEX): ICD-10-CM

## 2023-10-25 DIAGNOSIS — Z17.0 CARCINOMA OF AXILLARY TAIL OF RIGHT BREAST IN FEMALE, ESTROGEN RECEPTOR POSITIVE: Primary | ICD-10-CM

## 2023-10-25 PROCEDURE — 99214 OFFICE O/P EST MOD 30 MIN: CPT | Mod: S$GLB,,, | Performed by: INTERNAL MEDICINE

## 2023-10-25 PROCEDURE — 3008F PR BODY MASS INDEX (BMI) DOCUMENTED: ICD-10-PCS | Mod: CPTII,S$GLB,, | Performed by: INTERNAL MEDICINE

## 2023-10-25 PROCEDURE — 3008F BODY MASS INDEX DOCD: CPT | Mod: CPTII,S$GLB,, | Performed by: INTERNAL MEDICINE

## 2023-10-25 PROCEDURE — 3077F PR MOST RECENT SYSTOLIC BLOOD PRESSURE >= 140 MM HG: ICD-10-PCS | Mod: CPTII,S$GLB,, | Performed by: INTERNAL MEDICINE

## 2023-10-25 PROCEDURE — 99999 PR PBB SHADOW E&M-EST. PATIENT-LVL V: ICD-10-PCS | Mod: PBBFAC,,, | Performed by: INTERNAL MEDICINE

## 2023-10-25 PROCEDURE — 99214 PR OFFICE/OUTPT VISIT, EST, LEVL IV, 30-39 MIN: ICD-10-PCS | Mod: S$GLB,,, | Performed by: INTERNAL MEDICINE

## 2023-10-25 PROCEDURE — 4010F ACE/ARB THERAPY RXD/TAKEN: CPT | Mod: CPTII,S$GLB,, | Performed by: INTERNAL MEDICINE

## 2023-10-25 PROCEDURE — 99999 PR PBB SHADOW E&M-EST. PATIENT-LVL V: CPT | Mod: PBBFAC,,, | Performed by: INTERNAL MEDICINE

## 2023-10-25 PROCEDURE — 3044F HG A1C LEVEL LT 7.0%: CPT | Mod: CPTII,S$GLB,, | Performed by: INTERNAL MEDICINE

## 2023-10-25 PROCEDURE — 3080F DIAST BP >= 90 MM HG: CPT | Mod: CPTII,S$GLB,, | Performed by: INTERNAL MEDICINE

## 2023-10-25 PROCEDURE — 3080F PR MOST RECENT DIASTOLIC BLOOD PRESSURE >= 90 MM HG: ICD-10-PCS | Mod: CPTII,S$GLB,, | Performed by: INTERNAL MEDICINE

## 2023-10-25 PROCEDURE — 1159F MED LIST DOCD IN RCRD: CPT | Mod: CPTII,S$GLB,, | Performed by: INTERNAL MEDICINE

## 2023-10-25 PROCEDURE — 3077F SYST BP >= 140 MM HG: CPT | Mod: CPTII,S$GLB,, | Performed by: INTERNAL MEDICINE

## 2023-10-25 PROCEDURE — 1159F PR MEDICATION LIST DOCUMENTED IN MEDICAL RECORD: ICD-10-PCS | Mod: CPTII,S$GLB,, | Performed by: INTERNAL MEDICINE

## 2023-10-25 PROCEDURE — 3044F PR MOST RECENT HEMOGLOBIN A1C LEVEL <7.0%: ICD-10-PCS | Mod: CPTII,S$GLB,, | Performed by: INTERNAL MEDICINE

## 2023-10-25 PROCEDURE — 4010F PR ACE/ARB THEARPY RXD/TAKEN: ICD-10-PCS | Mod: CPTII,S$GLB,, | Performed by: INTERNAL MEDICINE

## 2023-10-25 NOTE — PROGRESS NOTES
Subjective:       Patient ID: Phylicia Vásquez is a 53 y.o. female.    Chief Complaint: No chief complaint on file.    HPI   Mrs Lua returns today for follow-up.   I had last seen her in April 2023 and she was seen in August by our nurse practitioner.     As of November 1, 2020 she has been on anastrozole in the adjuvant setting.  Of note, her estradiol level and her FSH level suggested that she is postmenopausal, hence initiation of anastrozole.    On April 21, 2021 she underwent a contralateral prophylactic mastectomy with ROWAN flap reconstructions bilaterally.      Briefly, she is a 53 year old AA female who was found to have a carcinoma that is ER positive, TN positive, and HER 2 equivocal, but negative by  FISH.  An axillary node biopsy was also positive.  She received standard neoadjuvant chemotherapy consisting of 4 cycles of AC and 4 cycles of Taxol prior to undergoing a mastectomy.  At the time of her mastectomy she had a 3 cm residual tumor while the 2 sentinel lymph nodes were positive.  A subsequent axillary node dissection showed no evidence of further chandler involvement.   She completed radiation therapy, and as of November 1, 2020 she has been on adjuvant anastrozole.      Review of Systems    Overall she feels OK, however, she complains of significant pain in the right reconstruction hence the visit here today.  Pain has been present for several weeks.  She also complains of myalgias involving her thighs and a stiffness involving primarily her fingers and hands.  ECOG PS is 1. She denies any depression, easy bruising, fevers, chills, night  sweats, weight loss, vomiting, diarrhea, constipation, diplopia, blurred vision, headache, chest pain, palpitations, shortness of breath, left breast pain, abdominal pain, or difficulty ambulating.  The remainder of the ten-point ROS, including general, skin, lymph, H/N, cardiorespiratory, GI, , Neuro, Endocrine, and psychiatric is negative.        Objective:      Physical Exam      She is alert, oriented to time, place, person, pleasant, well      nourished, in no acute physical distress.                                   VITAL SIGNS:  Reviewed                                      HEENT:  Normal.  There are no nasal, oral, lip, gingival, auricular, lid,    or conjunctival lesions.  Mucosae are moist and pink, and there is no        thrush.  Pupils are equal, reactive to light and accommodation.              Extraocular muscle movements are intact.  Dentition is good.  There is no frontal or maxillary tenderness.                                     NECK:  Supple without JVD, adenopathy, or thyromegaly.                       LUNGS:  Clear to auscultation without wheezing, rales, or rhonchi.           CARDIOVASCULAR:  Reveals an S1, S2, no murmurs, no rubs, no gallops.         ABDOMEN:  Soft, nontender, without organomegaly.  Bowel sounds are    present.                                                                         EXTREMITIES:  No cyanosis, clubbing, but she does have 1+ edema primarily on the distal right upper extremity                               BREASTS:   she is now status post bilateral mastectomies with ROWAN flap reconstructions.  There is a hard area c/w fat necrosis at the 7:00 o clock position in the left breast.  I do not palpate any masses in the right reconstruction, however, the examination elicits tenderness.      I do not palpate any axillary lymph nodes in either side.  LYMPHATIC:  There is no cervical, axillary, or supraclavicular adenopathy.   SKIN:  Warm and moist, without petechiae, rashes, induration, or ecchymoses.           NEUROLOGIC:  DTRs are 0-1+ bilaterally, symmetrical, motor function is 5/5,  and cranial nerves are  within normal limits.    Assessment:       1. Carcinoma of axillary tail of right breast in female, estrogen receptor positive, pathologically T2N1M0 after neoadjuvant chemotherapy     2.    Axillary  node metastases.  3.      Status post recent left prophylactic mastectomy with bilateral ROWAN flap reconstructions  4.      Extensive fat necrosis .  5.      Mild lymphedema, right upper extremity.    Plan:        I had a long discussion with her.    She will remain on anastrozole through the end of October 2025, at which point I will recommend that she extend her treatment for an additional two years to complete 7 years of adjuvant hormonal therapy with AIs.   Out of abundance of caution we will proceed with bilateral imaging of her reconstructions.    Assuming that her mammogram is negative, she will return to see me in 4 months.  Her multiple questions were answered to her satisfaction.    Route Chart for Scheduling    Med Onc Chart Routing      Follow up with physician 4 months. Please schedule a bilateral mammogram ASAP this week.  See me in 4 months   Follow up with DANIEL    Infusion scheduling note    Injection scheduling note    Labs    Imaging Mammogram      Pharmacy appointment    Other referrals

## 2023-10-31 PROBLEM — M54.50 ACUTE LEFT-SIDED LOW BACK PAIN WITHOUT SCIATICA: Status: ACTIVE | Noted: 2023-10-31

## 2023-11-03 ENCOUNTER — OFFICE VISIT (OUTPATIENT)
Dept: INTERNAL MEDICINE | Facility: CLINIC | Age: 53
End: 2023-11-03
Payer: COMMERCIAL

## 2023-11-03 VITALS
OXYGEN SATURATION: 98 % | SYSTOLIC BLOOD PRESSURE: 146 MMHG | WEIGHT: 254.63 LBS | BODY MASS INDEX: 45.12 KG/M2 | HEIGHT: 63 IN | HEART RATE: 110 BPM | DIASTOLIC BLOOD PRESSURE: 89 MMHG

## 2023-11-03 DIAGNOSIS — C50.611 MALIGNANT NEOPLASM OF AXILLARY TAIL OF RIGHT FEMALE BREAST, UNSPECIFIED ESTROGEN RECEPTOR STATUS: ICD-10-CM

## 2023-11-03 DIAGNOSIS — M54.50 ACUTE LEFT-SIDED LOW BACK PAIN WITHOUT SCIATICA: Primary | ICD-10-CM

## 2023-11-03 DIAGNOSIS — I10 ESSENTIAL HYPERTENSION: ICD-10-CM

## 2023-11-03 DIAGNOSIS — R73.03 PREDIABETES: ICD-10-CM

## 2023-11-03 PROCEDURE — 4010F PR ACE/ARB THEARPY RXD/TAKEN: ICD-10-PCS | Mod: CPTII,S$GLB,, | Performed by: INTERNAL MEDICINE

## 2023-11-03 PROCEDURE — 99999 PR PBB SHADOW E&M-EST. PATIENT-LVL III: ICD-10-PCS | Mod: PBBFAC,,, | Performed by: INTERNAL MEDICINE

## 2023-11-03 PROCEDURE — 3079F PR MOST RECENT DIASTOLIC BLOOD PRESSURE 80-89 MM HG: ICD-10-PCS | Mod: CPTII,S$GLB,, | Performed by: INTERNAL MEDICINE

## 2023-11-03 PROCEDURE — 99999 PR PBB SHADOW E&M-EST. PATIENT-LVL III: CPT | Mod: PBBFAC,,, | Performed by: INTERNAL MEDICINE

## 2023-11-03 PROCEDURE — 3008F PR BODY MASS INDEX (BMI) DOCUMENTED: ICD-10-PCS | Mod: CPTII,S$GLB,, | Performed by: INTERNAL MEDICINE

## 2023-11-03 PROCEDURE — 3079F DIAST BP 80-89 MM HG: CPT | Mod: CPTII,S$GLB,, | Performed by: INTERNAL MEDICINE

## 2023-11-03 PROCEDURE — 3077F SYST BP >= 140 MM HG: CPT | Mod: CPTII,S$GLB,, | Performed by: INTERNAL MEDICINE

## 2023-11-03 PROCEDURE — 3044F PR MOST RECENT HEMOGLOBIN A1C LEVEL <7.0%: ICD-10-PCS | Mod: CPTII,S$GLB,, | Performed by: INTERNAL MEDICINE

## 2023-11-03 PROCEDURE — 4010F ACE/ARB THERAPY RXD/TAKEN: CPT | Mod: CPTII,S$GLB,, | Performed by: INTERNAL MEDICINE

## 2023-11-03 PROCEDURE — 3008F BODY MASS INDEX DOCD: CPT | Mod: CPTII,S$GLB,, | Performed by: INTERNAL MEDICINE

## 2023-11-03 PROCEDURE — 99214 PR OFFICE/OUTPT VISIT, EST, LEVL IV, 30-39 MIN: ICD-10-PCS | Mod: S$GLB,,, | Performed by: INTERNAL MEDICINE

## 2023-11-03 PROCEDURE — 99214 OFFICE O/P EST MOD 30 MIN: CPT | Mod: S$GLB,,, | Performed by: INTERNAL MEDICINE

## 2023-11-03 PROCEDURE — 3077F PR MOST RECENT SYSTOLIC BLOOD PRESSURE >= 140 MM HG: ICD-10-PCS | Mod: CPTII,S$GLB,, | Performed by: INTERNAL MEDICINE

## 2023-11-03 PROCEDURE — 3044F HG A1C LEVEL LT 7.0%: CPT | Mod: CPTII,S$GLB,, | Performed by: INTERNAL MEDICINE

## 2023-11-03 RX ORDER — AMLODIPINE AND VALSARTAN 10; 320 MG/1; MG/1
1 TABLET ORAL DAILY
Qty: 90 TABLET | Refills: 3 | Status: SHIPPED | OUTPATIENT
Start: 2023-11-03 | End: 2024-11-02

## 2023-11-03 NOTE — PROGRESS NOTES
Subjective:       Patient ID: Phylicia Vásquez is a 53 y.o. female.    Chief Complaint: follow up     HPI    Recently went ED after falling and having lumbar back pain. Imaging was normal.     BP is elevated today and has been elevated at previous visits.   PMHx  Chronic thoracic back has done PT and Jennifer in the past on gabapentin and flexeril     HTN  on amlodipine-valsartan.     Hx of breast cancer dx in 2019. S/p mastectomy with axillary node dissection, chemotherapy and XRT. She was started on Anastrozole as she appeared to be post menopausal on labs.   She will be on anastrozole until October 2025    Lymphedema due to surgery  completed PT and has sleeve and compression machione     Chemo induced neuropathy. Has been on gabapentin but ran out recently.     Insomnia related to anastrozole. On ambien.     Prediabetes last A1C is 6.0 in May 2023      Health Maintenance:  Colon Cancer Screening: normal colonoscopy in 2021. Due again in 2031   DEXA: normal  6/24/2023   Mammogram: last done 12. 2022.   HIV: negative 2022   Hep C: negative 2022   Lipids: order today   Vaccines:  up to date      Works overnight at  of [employer]         Review of Systems   Constitutional:  Negative for activity change, appetite change and chills.   HENT:  Negative for ear pain, sinus pressure/congestion and sneezing.    Respiratory:  Negative for cough and shortness of breath.    Cardiovascular:  Negative for chest pain, palpitations and leg swelling.   Gastrointestinal:  Negative for abdominal distention, abdominal pain, constipation, diarrhea, nausea and vomiting.   Genitourinary:  Negative for dysuria and hematuria.   Musculoskeletal:  Negative for arthralgias, back pain and myalgias.   Neurological:  Negative for dizziness and headaches.   Psychiatric/Behavioral:  Negative for agitation. The patient is not nervous/anxious.            Past Medical History:   Diagnosis Date    Anxiety     Back pain     Goiter      HTN (hypertension) 10/18/2012    Hx antineoplastic chemo     last dose 20    Hx of psychiatric care     Ambien, Klonopin    Insomnia 10/18/2012    Malignant neoplasm of axillary tail of right breast in female, estrogen receptor positive 2019    right    Neck mass     right posterior    Primary invasive malignant neoplasm of female breast, right 2019    right    Psychiatric problem     S/P abdominal supracervical subtotal hysterectomy, continues to have regular menses.     Spinal cord cyst, L1 2014     Thoracic radiculopathy, bulging disc at T10-11 and T11-12      Past Surgical History:   Procedure Laterality Date    AXILLARY NODE DISSECTION Right 2020    Procedure: LYMPHADENECTOMY, AXILLARY;  Surgeon: Lelo Guaman MD;  Location: [facility] OR 2ND FLR;  Service: General;  Laterality: Right;    BREAST BIOPSY Right     + CA    BREAST BIOPSY Left     BREAST RECONSTRUCTION Bilateral 2021     SECTION      CHOLECYSTECTOMY      COLONOSCOPY N/A 10/05/2015    Procedure: COLONOSCOPY;  Surgeon: JERONIMO Cancino MD;  Location: [facility] ENDO (4TH FLR);  Service: Endoscopy;  Laterality: N/A;    COLONOSCOPY N/A 2021    Procedure: COLONOSCOPY;  Surgeon: JERONIMO Cancino MD;  Location: [facility] ENDO (4TH FLR);  Service: Endoscopy;  Laterality: N/A;  fully vaccinated-GT    HYSTERECTOMY      BC--SUPRACERVICAL    INJECTION FOR SENTINEL NODE IDENTIFICATION Right 2020    Procedure: INJECTION, FOR SENTINEL NODE IDENTIFICATION;  Surgeon: Lelo Guaman MD;  Location: [facility] OR;  Service: General;  Laterality: Right;    INSERTION OF BREAST TISSUE EXPANDER Right 2020    Procedure: INSERTION, TISSUE EXPANDER, BREAST;  Surgeon: Pablo Ramos MD;  Location: [facility] OR;  Service: Plastics;  Laterality: Right;    INSERTION OF TUNNELED CENTRAL VENOUS CATHETER (CVC) WITH SUBCUTANEOUS PORT Right 2019    Procedure: [id number]-H-CATH Fluoro needed;  Surgeon: Lelo Guaman MD;   Location: [facility] OR [facility] FLR;  Service: General;  Laterality: Right;  Right internal jugular.     LIPOMA RESECTION  12/2021    MASTECTOMY Bilateral 04/2021    with reconstruction    NEEDLE LOCALIZATION Left 05/31/2021    Procedure: NEEDLE LOCALIZATION;  Surgeon: Kraig Mello MD;  Location: [facility] CATH LAB;  Service: Radiology;  Laterality: Left;    RECONSTRUCTION OF BREAST WITH DEEP INFERIOR EPIGASTRIC ARTERY  (ROWAN) FREE FLAP Bilateral 04/21/2021    Procedure: RECONSTRUCTION, BREAST, USING ROWAN FREE FLAP;  Surgeon: Pablo Ramos MD;  Location: [facility] OR;  Service: Plastics;  Laterality: Bilateral;    SENTINEL LYMPH NODE BIOPSY Right 05/20/2020    Procedure: BIOPSY, LYMPH NODE, SENTINEL;  Surgeon: Lelo Guaman MD;  Location: [facility];  Service: General;  Laterality: Right;    TISSUE EXPANDER REMOVAL Right 04/21/2021    Procedure: REMOVAL, TISSUE EXPANDER;  Surgeon: Pablo Ramos MD;  Location: [facility] OR;  Service: Plastics;  Laterality: Right;    UNILATERAL MASTECTOMY Right 05/20/2020    Procedure: MASTECTOMY, UNILATERAL;  Surgeon: Lelo Guaman MD;  Location: [facility] OR;  Service: General;  Laterality: Right;    UNILATERAL MASTECTOMY Left 04/21/2021    Procedure: MASTECTOMY, UNILATERAL PROPHYLACTIC;  Surgeon: Lelo Guaman MD;  Location: [facility];  Service: General;  Laterality: Left;      Patient Active Problem List   Diagnosis    Psychophysiological insomnia    Multinodular non-toxic goiter, Subcentimeter nodules May 2012, anterior fat pad.    BMI 40.0-44.9, adult    S/P abdominal supracervical subtotal hysterectomy, continues to have regular menses.     Seborrheic dermatitis of scalp and face    Spinal cord cyst, 11 mm CSF cyst, in the conus medullaris on the right side at L1 level. .    Thoracic radiculopathy    Fatty liver    Bulge of thoracic disc T10-11 and T11-12    Thoracic radiculitis    Screen for colon cancer,     Papilloma of right breast    Morbid obesity    Essential  hypertension    Prediabetes    Carcinoma of axillary tail of right breast in female, estrogen receptor positive    Malignant neoplasm of axillary tail of right female breast    Adjustment disorder with mixed anxiety and depressed mood    Antineoplastic chemotherapy induced anemia    Neuropathy, from chemo 2019    Preop testing    Prophylactic use of anastrozole (Arimidex)    Periumbilical hernia    Abnormal CT of the abdomen, 1/16/2021 ground glass opacities in right middle and upper lobes    Rib pain    History of breast cancer    Seroma of breast    Soft tissue mass, right occiput    Toenail fungus    Lymphedema of right arm    Impaired functional mobility and activity tolerance    Weakness of left lower extremity    Abnormality of gait    Impairment of balance    Acute left-sided low back pain without sciatica        Objective:      Physical Exam  Constitutional:       Appearance: Normal appearance.   HENT:      Head: Normocephalic.   Cardiovascular:      Rate and Rhythm: Normal rate and regular rhythm.      Pulses: Normal pulses.      Heart sounds: Normal heart sounds.   Pulmonary:      Effort: Pulmonary effort is normal.      Breath sounds: Normal breath sounds.   Abdominal:      General: Abdomen is flat. Bowel sounds are normal.      Palpations: Abdomen is soft.   Musculoskeletal:         General: Normal range of motion.      Cervical back: Normal range of motion and neck supple.   Skin:     General: Skin is warm and dry.   Neurological:      General: No focal deficit present.      Mental Status: She is alert and oriented to person, place, and time.   Psychiatric:         Mood and Affect: Mood normal.         Assessment:       Problem List Items Addressed This Visit          Cardiac/Vascular    Essential hypertension (Chronic)       Oncology    Malignant neoplasm of axillary tail of right female breast       Endocrine    Prediabetes       Orthopedic    Acute left-sided low back pain without sciatica - Primary     Relevant Orders    Ambulatory referral/consult to Pain Clinic       Plan:         Phylicia was seen today for temporomandibular joint pain.    Diagnoses and all orders for this visit:    Acute left-sided low back pain without sciatica  -     Ambulatory referral/consult to Pain Clinic; Future  Letters for work written today     Essential hypertension  -     amlodipine-valsartan (EXFORGE)  mg per tablet; Take 1 tablet by mouth once daily.  increase amlodipine to 10 mg daily.   Follow up in 6 weeks.     Prediabetes  Stable on last labs     Malignant neoplasm of axillary tail of right female breast, unspecified estrogen receptor status  Follow up with oncology                  Claudia Campos MD   Internal Medicine   Primary Care

## 2023-11-06 ENCOUNTER — PATIENT MESSAGE (OUTPATIENT)
Dept: HEMATOLOGY/ONCOLOGY | Facility: CLINIC | Age: 53
End: 2023-11-06
Payer: COMMERCIAL

## 2023-11-07 ENCOUNTER — HOSPITAL ENCOUNTER (OUTPATIENT)
Dept: RADIOLOGY | Facility: HOSPITAL | Age: 53
Discharge: HOME | End: 2023-11-07
Attending: INTERNAL MEDICINE
Payer: COMMERCIAL

## 2023-11-07 DIAGNOSIS — Z17.0 CARCINOMA OF AXILLARY TAIL OF RIGHT BREAST IN FEMALE, ESTROGEN RECEPTOR POSITIVE: ICD-10-CM

## 2023-11-07 DIAGNOSIS — C50.611 CARCINOMA OF AXILLARY TAIL OF RIGHT BREAST IN FEMALE, ESTROGEN RECEPTOR POSITIVE: ICD-10-CM

## 2023-11-07 PROCEDURE — 77062 BREAST TOMOSYNTHESIS BI: CPT | Mod: 26,,, | Performed by: RADIOLOGY

## 2023-11-07 PROCEDURE — 77066 DX MAMMO INCL CAD BI: CPT | Mod: TC

## 2023-11-07 PROCEDURE — 77066 MAMMO DIGITAL DIAGNOSTIC BILAT WITH TOMO: ICD-10-PCS | Mod: 26,,, | Performed by: RADIOLOGY

## 2023-11-07 PROCEDURE — 77066 DX MAMMO INCL CAD BI: CPT | Mod: 26,,, | Performed by: RADIOLOGY

## 2023-11-07 PROCEDURE — 77062 MAMMO DIGITAL DIAGNOSTIC BILAT WITH TOMO: ICD-10-PCS | Mod: 26,,, | Performed by: RADIOLOGY

## 2023-11-10 DIAGNOSIS — G62.9 NEUROPATHY: ICD-10-CM

## 2023-11-10 NOTE — TELEPHONE ENCOUNTER
No care due was identified.  [facility] Embedded Care Due Messages. Reference number: [id number].   11/10/2023 2:06:12 PM CST

## 2023-11-10 NOTE — TELEPHONE ENCOUNTER
Refill Routing Note   Medication(s) are not appropriate for processing by [facility] for the following reason(s):      Medication outside of protocol    [facility] action(s):  Route Care Due:  None identified              Appointments  past 12m or future 3m with PCP    Date Provider   Last Visit   11/3/2023 Claudia Campos MD   Next Visit   11/20/2023 Claudia Campos MD   ED visits in past 90 days: 1        Note composed:2:13 PM 11/10/2023

## 2023-11-13 RX ORDER — GABAPENTIN 100 MG/1
CAPSULE ORAL
Qty: 270 CAPSULE | Refills: 3 | Status: SHIPPED | OUTPATIENT
Start: 2023-11-13

## 2023-11-14 ENCOUNTER — OFFICE VISIT (OUTPATIENT)
Dept: SPINE | Facility: CLINIC | Age: 53
End: 2023-11-14
Attending: ANESTHESIOLOGY
Payer: COMMERCIAL

## 2023-11-14 ENCOUNTER — PATIENT MESSAGE (OUTPATIENT)
Dept: INTERNAL MEDICINE | Facility: CLINIC | Age: 53
End: 2023-11-14
Payer: COMMERCIAL

## 2023-11-14 VITALS
SYSTOLIC BLOOD PRESSURE: 145 MMHG | BODY MASS INDEX: 44.99 KG/M2 | TEMPERATURE: 98 F | OXYGEN SATURATION: 100 % | WEIGHT: 254 LBS | RESPIRATION RATE: 18 BRPM | DIASTOLIC BLOOD PRESSURE: 80 MMHG | HEART RATE: 101 BPM

## 2023-11-14 DIAGNOSIS — M54.9 DORSALGIA, UNSPECIFIED: ICD-10-CM

## 2023-11-14 DIAGNOSIS — M54.16 LUMBAR RADICULOPATHY, CHRONIC: ICD-10-CM

## 2023-11-14 DIAGNOSIS — M48.062 SPINAL STENOSIS OF LUMBAR REGION WITH NEUROGENIC CLAUDICATION: Primary | ICD-10-CM

## 2023-11-14 DIAGNOSIS — M54.50 ACUTE LEFT-SIDED LOW BACK PAIN WITHOUT SCIATICA: ICD-10-CM

## 2023-11-14 DIAGNOSIS — M47.816 LUMBAR SPONDYLOSIS: ICD-10-CM

## 2023-11-14 DIAGNOSIS — M54.16 LUMBAR RADICULOPATHY: Primary | ICD-10-CM

## 2023-11-14 DIAGNOSIS — M51.36 DDD (DEGENERATIVE DISC DISEASE), LUMBAR: ICD-10-CM

## 2023-11-14 DIAGNOSIS — M54.16 LUMBAR RADICULOPATHY: ICD-10-CM

## 2023-11-14 DIAGNOSIS — M43.16 SPONDYLOLISTHESIS OF LUMBAR REGION: ICD-10-CM

## 2023-11-14 PROCEDURE — 3077F PR MOST RECENT SYSTOLIC BLOOD PRESSURE >= 140 MM HG: ICD-10-PCS | Mod: CPTII,S$GLB,, | Performed by: ANESTHESIOLOGY

## 2023-11-14 PROCEDURE — 3044F HG A1C LEVEL LT 7.0%: CPT | Mod: CPTII,S$GLB,, | Performed by: ANESTHESIOLOGY

## 2023-11-14 PROCEDURE — 3079F PR MOST RECENT DIASTOLIC BLOOD PRESSURE 80-89 MM HG: ICD-10-PCS | Mod: CPTII,S$GLB,, | Performed by: ANESTHESIOLOGY

## 2023-11-14 PROCEDURE — 1160F PR REVIEW ALL MEDS BY PRESCRIBER/CLIN PHARMACIST DOCUMENTED: ICD-10-PCS | Mod: CPTII,S$GLB,, | Performed by: ANESTHESIOLOGY

## 2023-11-14 PROCEDURE — 3008F PR BODY MASS INDEX (BMI) DOCUMENTED: ICD-10-PCS | Mod: CPTII,S$GLB,, | Performed by: ANESTHESIOLOGY

## 2023-11-14 PROCEDURE — 1160F RVW MEDS BY RX/DR IN RCRD: CPT | Mod: CPTII,S$GLB,, | Performed by: ANESTHESIOLOGY

## 2023-11-14 PROCEDURE — 99204 OFFICE O/P NEW MOD 45 MIN: CPT | Mod: S$GLB,,, | Performed by: ANESTHESIOLOGY

## 2023-11-14 PROCEDURE — 3077F SYST BP >= 140 MM HG: CPT | Mod: CPTII,S$GLB,, | Performed by: ANESTHESIOLOGY

## 2023-11-14 PROCEDURE — 1159F MED LIST DOCD IN RCRD: CPT | Mod: CPTII,S$GLB,, | Performed by: ANESTHESIOLOGY

## 2023-11-14 PROCEDURE — 3044F PR MOST RECENT HEMOGLOBIN A1C LEVEL <7.0%: ICD-10-PCS | Mod: CPTII,S$GLB,, | Performed by: ANESTHESIOLOGY

## 2023-11-14 PROCEDURE — 99999 PR PBB SHADOW E&M-EST. PATIENT-LVL V: CPT | Mod: PBBFAC,,, | Performed by: ANESTHESIOLOGY

## 2023-11-14 PROCEDURE — 3008F BODY MASS INDEX DOCD: CPT | Mod: CPTII,S$GLB,, | Performed by: ANESTHESIOLOGY

## 2023-11-14 PROCEDURE — 1159F PR MEDICATION LIST DOCUMENTED IN MEDICAL RECORD: ICD-10-PCS | Mod: CPTII,S$GLB,, | Performed by: ANESTHESIOLOGY

## 2023-11-14 PROCEDURE — 99204 PR OFFICE/OUTPT VISIT, NEW, LEVL IV, 45-59 MIN: ICD-10-PCS | Mod: S$GLB,,, | Performed by: ANESTHESIOLOGY

## 2023-11-14 PROCEDURE — 99999 PR PBB SHADOW E&M-EST. PATIENT-LVL V: ICD-10-PCS | Mod: PBBFAC,,, | Performed by: ANESTHESIOLOGY

## 2023-11-14 PROCEDURE — 4010F PR ACE/ARB THEARPY RXD/TAKEN: ICD-10-PCS | Mod: CPTII,S$GLB,, | Performed by: ANESTHESIOLOGY

## 2023-11-14 PROCEDURE — 3079F DIAST BP 80-89 MM HG: CPT | Mod: CPTII,S$GLB,, | Performed by: ANESTHESIOLOGY

## 2023-11-14 PROCEDURE — 4010F ACE/ARB THERAPY RXD/TAKEN: CPT | Mod: CPTII,S$GLB,, | Performed by: ANESTHESIOLOGY
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
## 2023-11-14 NOTE — H&P (VIEW-ONLY)
Subjective:     Patient ID: Phylicia Vásquez is a 53 y.o. female.    Chief Complaint: Low-back Pain    Referred by: Claudia Campos MD     HPI: Phylicia Vásquez is a 53 y.o. female with multiple comorbidities including hx of breast cancer s/p chemo and radiation with peripheral neuropathy who presents to pain clinic for evaluation of low back pain. Symptoms worsened after a fall 10/30. Has peripheral neuropathy 2/2 chemo and poor balance. Lost her balance and fell to the floor. CT scan 10/31/23 without fracture, does demonstrate transitional anatomy, disc disease with multilevel herniations and lumbar spondylosis with multi level spinal stenosis, moderate at L3/4.      Original Pain Description:  The pain is located in the midline low back and she does not feel like it radiates. She has separate anterior thigh pain that developed after chemo in 2019 that she feels is unrelated to her back pain. The pain is described as severe aching and throbbing pain. Exacerbating factors: Sitting and Standing. Mitigating factors laying down and leaning on a shopping cart. Symptoms interfere with daily activity, sleeping, and work. 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 PCP and onchologist provided a note. The patient feels like symptoms have been worsening. She does have ataxia related to peripheral neuropathy with multiple falls. Patient denies saddle anesthesia, bladder/bowel incontinence, and acute or signifcant limb weakness    Additional Treatments:  PT: Back, Peripheral neuropathy, and lymphedema therapy: ~ February 2023 to July 2023 with relief.  HEP: Has continued HEP at the gym was interrupted by recent back pain exacerbation       Treatments / Medications: (Ice/Heat/NSAIDS/APAP/etc):  - Gabapentin 100mg TID  - flexeril which is sedating  - Aleve        Interventional Pain History  - NATHALIE 2014, did not help  Past Medical History:    Diagnosis Date    Anxiety     Back pain     Goiter     HTN (hypertension) 10/18/2012    Hx antineoplastic chemo     last dose 20    Hx of psychiatric care     Ambien, Klonopin    Insomnia 10/18/2012    Malignant neoplasm of axillary tail of right breast in female, estrogen receptor positive 2019    right    Neck mass     right posterior    Primary invasive malignant neoplasm of female breast, right 2019    right    Psychiatric problem     S/P abdominal supracervical subtotal hysterectomy, continues to have regular menses. -2014    Spinal cord cyst, L1 2014     Thoracic radiculopathy, bulging disc at T10-11 and T11-12        Past Surgical History:   Procedure Laterality Date    AXILLARY NODE DISSECTION Right 2020    Procedure: LYMPHADENECTOMY, AXILLARY;  Surgeon: Lelo Guaman MD;  Location: [facility] OR 2ND FLR;  Service: General;  Laterality: Right;    BREAST BIOPSY Right     + CA    BREAST BIOPSY Left     BREAST RECONSTRUCTION Bilateral 2021     SECTION      CHOLECYSTECTOMY      COLONOSCOPY N/A 10/05/2015    Procedure: COLONOSCOPY;  Surgeon: JERONIMO Cancino MD;  Location: [facility] ENDO (4TH FLR);  Service: Endoscopy;  Laterality: N/A;    COLONOSCOPY N/A 2021    Procedure: COLONOSCOPY;  Surgeon: JERONIMO Cancino MD;  Location: [facility] ENDO (4TH FLR);  Service: Endoscopy;  Laterality: N/A;  fully vaccinated-GT    HYSTERECTOMY      BC--SUPRACERVICAL    INJECTION FOR SENTINEL NODE IDENTIFICATION Right 2020    Procedure: INJECTION, FOR SENTINEL NODE IDENTIFICATION;  Surgeon: Lelo Guaman MD;  Location: [facility] OR;  Service: General;  Laterality: Right;    INSERTION OF BREAST TISSUE EXPANDER Right 2020    Procedure: INSERTION, TISSUE EXPANDER, BREAST;  Surgeon: Pablo Ramos MD;  Location: [facility] OR;  Service: Plastics;  Laterality: Right;    INSERTION OF TUNNELED CENTRAL VENOUS CATHETER (CVC) WITH SUBCUTANEOUS PORT Right 2019    Procedure:  [id number]-CATH Fluoro needed;  Surgeon: Lelo Guaman MD;  Location: [address] FLR;  Service: General;  Laterality: Right;  Right internal jugular.     LIPOMA RESECTION  12/2021    MASTECTOMY Bilateral 04/2021    with reconstruction    NEEDLE LOCALIZATION Left 05/31/2021    Procedure: NEEDLE LOCALIZATION;  Surgeon: Kraig Mello MD;  Location: [facility] [place] CATH LAB;  Service: Radiology;  Laterality: Left;    RECONSTRUCTION OF BREAST WITH DEEP INFERIOR EPIGASTRIC ARTERY  (ROWAN) FREE FLAP Bilateral 04/21/2021    Procedure: RECONSTRUCTION, BREAST, USING ROWAN FREE FLAP;  Surgeon: Pablo Ramos MD;  Location: [facility];  Service: Plastics;  Laterality: Bilateral;    SENTINEL LYMPH NODE BIOPSY Right 05/20/2020    Procedure: BIOPSY, LYMPH NODE, SENTINEL;  Surgeon: Lelo Guaman MD;  Location: [facility];  Service: General;  Laterality: Right;    TISSUE EXPANDER REMOVAL Right 04/21/2021    Procedure: REMOVAL, TISSUE EXPANDER;  Surgeon: Pablo Ramos MD;  Location: [facility];  Service: Plastics;  Laterality: Right;    UNILATERAL MASTECTOMY Right 05/20/2020    Procedure: MASTECTOMY, UNILATERAL;  Surgeon: Lelo Guaman MD;  Location: [facility];  Service: General;  Laterality: Right;    UNILATERAL MASTECTOMY Left 04/21/2021    Procedure: MASTECTOMY, UNILATERAL PROPHYLACTIC;  Surgeon: Lelo Guaman MD;  Location: [facility];  Service: General;  Laterality: Left;       Review of patient's allergies indicates:   Allergen Reactions    Butalbital Other (See Comments)     Chest pain         Current Outpatient Medications   Medication Sig Dispense Refill    albuterol (PROVENTIL/VENTOLIN HFA) 90 mcg/actuation inhaler Inhale 1-2 puffs into the lungs every 6 (six) hours as needed for Wheezing. Rescue 18 g 0    amlodipine-valsartan (EXFORGE)  mg per tablet Take 1 tablet by mouth once daily. 90 tablet 3    anastrozole (ARIMIDEX) 1 mg Tab Take 1 tablet (1 mg total) by mouth once daily. 90 tablet 3    ascorbic acid  (VITAMIN C ORAL) Take 500 mg by mouth every morning.       azelastine-fluticasone (DYMISTA) 137-50 mcg/spray Spry nassal spray 1 spray by Each Nostril route 2 (two) times daily. 23 g 0    cyanocobalamin, vitamin B-12, (VITAMIN B-12 ORAL) Take by mouth daily as needed.       cyclobenzaprine (FLEXERIL) 10 MG tablet TAKE 1 TABLET BY MOUTH THREE TIMES A DAY AS NEEDED FOR MUSCLE SPASMS 90 tablet 3    gabapentin (NEURONTIN) 100 MG capsule TAKE 3 CAPSULES BY MOUTH EVERY DAY IN THE MORNING FOR NEUROPATHY 270 capsule 3    LIDOcaine HCl 2% (XYLOCAINE) 2 % JelP urojet Apply topically every evening.      meloxicam (MOBIC) 7.5 MG tablet TAKE 1 TABLET BY MOUTH EVERY DAY 30 tablet 1    ondansetron (ZOFRAN) 4 MG tablet Take 1 tablet (4 mg total) by mouth every 6 (six) hours. 12 tablet 0    oxyCODONE-acetaminophen (PERCOCET) 5-325 mg per tablet Take 1 tablet by mouth every 6 (six) hours as needed for Pain. 12 tablet 0    venlafaxine (EFFEXOR XR) 75 MG 24 hr capsule Take 1 capsule (75 mg total) by mouth once daily. In the morning 30 capsule 11    vitamin E 200 UNIT capsule Take 200 Units by mouth daily as needed.       zolpidem (AMBIEN) 10 mg Tab Take 1 tablet (10 mg total) by mouth nightly as needed (Sleep). 30 tablet 2    LIDOcaine HCL 2% (XYLOCAINE) 2 % jelly Apply topically once daily. Apply topically once nightly to affected area foot/feet for pain relief. 30 mL 2     No current facility-administered medications for this visit.       Family History   Problem Relation Age of Onset    Cancer Paternal Aunt     Lupus Paternal Aunt     Hypertension Mother     Depression Mother     Liver disease Father     Alcohol abuse Father     Cancer Father         Lung and prostate cancer    Colon cancer Paternal Uncle     Depression Son     Breast cancer Neg Hx     Ovarian cancer Neg Hx     Melanoma Neg Hx     Psoriasis Neg Hx     Eczema Neg Hx        Social History     Socioeconomic History    Marital status: Single    Number of children: 2    Tobacco Use    Smoking status: Never    Smokeless tobacco: Never   Substance and Sexual Activity    Alcohol use: Yes     Comment: rarely    Drug use: No    Sexual activity: Yes     Partners: Male   Social History Narrative    Has worked at the [employer] since it opened and stayed working through [employer].    2011  from her  and moved to [facility] with her sons to live with her aunt.    Her mother is very helpful and involved in her life.    She has never been a smoker and does not drink.        April 2016    Her eldest son has been working as a  at the [employer] for one year now.  He is doing well in this capacity.  He is thinking about making this his career.    Her youngest son is doing better academically.  She would like to remain living on the [facility] for his schooling.    Her commuting to work is difficult.        October 2017.  For the first time in a long time, she feels happy.  She has a new boyfriend.    November 2108.  Her eldest son has moved to [place], is living with his 1st cousin and has a full-time job with [employer].    Her youngest son is 16 and doing well in school.    She continues to date a very nice man.  She is enjoying living in [place] and commutes to the [facility] where she continues to enjoy her employment.        February 2020. Living with her mother due to her breast cancer treatment. Both sons living together in [place]. Younger son struggling emotionally and academically.        12/2021 - Does not exercise. Would like to return to walking.      Social Determinants of Health     Financial Resource Strain: Low Risk  (10/29/2023)    Overall Financial Resource Strain (CARDIA)     Difficulty of Paying Living Expenses: Not very hard   Recent Concern: Financial Resource Strain - Medium Risk (10/24/2023)    Overall Financial Resource Strain (CARDIA)     Difficulty of Paying Living Expenses: Somewhat hard   Food Insecurity: No Food Insecurity  (10/29/2023)    Hunger Vital Sign     Worried About Running Out of Food in the Last Year: Never true     Ran Out of Food in the Last Year: Never true   Recent Concern: Food Insecurity - Food Insecurity Present (10/24/2023)    Hunger Vital Sign     Worried About Running Out of Food in the Last Year: Sometimes true     Ran Out of Food in the Last Year: Never true   Transportation Needs: No Transportation Needs (10/29/2023)    PRAPARE - Transportation     Lack of Transportation (Medical): No     Lack of Transportation (Non-Medical): No   Physical Activity: Insufficiently Active (10/29/2023)    Exercise Vital Sign     Days of Exercise per Week: 1 day     Minutes of Exercise per Session: 30 min   Stress: Stress Concern Present (10/29/2023)    [employer] [place] [employer] - Occupational Stress Questionnaire     Feeling of Stress : To some extent   Social Connections: Unknown (10/29/2023)    Social Connection and Isolation Panel [NHANES]     Frequency of Communication with Friends and Family: More than three times a week     Frequency of Social Gatherings with Friends and Family: Three times a week     Active Member of Clubs or Organizations: Yes     Attends Club or Organization Meetings: 1 to 4 times per year     Marital Status:    Housing Stability: Low Risk  (10/29/2023)    Housing Stability Vital Sign     Unable to Pay for Housing in the Last Year: No     Number of Places Lived in the Last Year: 0     Unstable Housing in the Last Year: No   Recent Concern: Housing Stability - High Risk (8/11/2023)    Housing Stability Vital Sign     Unable to Pay for Housing in the Last Year: Yes     Number of Places Lived in the Last Year: 0     Unstable Housing in the Last Year: No        ROS:  GENERAL: No fever. No chills. History of cancer   HEENT: Denies headaches. Denies loss of vision.  CV: Denies chest pain.   PULM: Denies of shortness of breath.  GI: Denies abdominal pain. Denies blood in stool. Denies  Melena  HEME: Denies bleeding problems. Not using blood thinners and anticoagulant use.  : Denies incontinence.   MS: See H&P  SKIN: Denies rash.   NEURO: Denies seizures.  PSYCH:  Anxiety. Denies depression. No suicidal thoughts.       Objective:   PHYSICAL EXAM:   GENERAL: Well appearing, in no acute distress, alert and oriented x3.  PSYCH:  Mood and affect appropriate.  SKIN: Skin color, texture, turgor normal, no rashes or lesions.  HEENT:  Normocephalic, atraumatic. Cranial nerves grossly intact.  PULM: No evidence of respiratory difficulty, symmetric chest rise.  GI:  Non-distended  BACK: Reduced ROM of w/lumbar flexion and extension, pain on end range. Facet loading positive. Pain with light percussion to L spine. Spinous process, facets, and paraspinals tender. SIJs mildly tender.  Straight leg raising in the supine position is negative to radicular pain.   EXTREMITIES: No deformities, edema, or skin discoloration.   MUSCULOSKELETAL:  No atrophy is noted.  NEURO: Sensation is equal and appropriate bilaterally. 4/5 left ADF, EHL, and APF. 4+/5 right ADF, EHL, and APF. Otherwise 5/5 throughout BL LE. 1+ BL patella, unable to elicit BL achilles reflexes. Plantar response are downgoing.   GAIT: Antalgic.      Assessment:   Phylicia Vásquez presents with pain consistent with:    Encounter Diagnoses   Name Primary?    Acute left-sided low back pain without sciatica     Spinal stenosis of lumbar region with neurogenic claudication Yes    Spondylolisthesis of lumbar region     Lumbar radiculopathy     Lumbar spondylosis     DDD (degenerative disc disease), lumbar     Dorsalgia, unspecified     Lumbar radiculopathy, chronic        Plan:     - MRI L spine to further characterize spine anatomy. Indicated in the setting of history of breast cancer.     - X-ray flex extension to assess for instability in the setting of L5/S1 anterolisthesis on CT scan post trauma and focal pain over that region    - Schedule L3/4  ILESI to address lumbar radicular component of pain. She has back pain in her low back with pain in BL L3 dermatomal distribution that is concordant with CT findings multilevel herniations and lumbar spondylosis with multi level foraminal and spinal stenosis, moderate in both at L3/4.     - Increase gabapentin to 300mg TID. Educated on dose escalation.     - Rx for TENS unit and LSO brace.     - Return to clinic 2 weeks after NATHALIE        Ruddy Tee MD  [facility] PM&R Resident    I have personally taken the history and examined this patient and agree with the resident's note as stated above.

## 2023-11-16 ENCOUNTER — PATIENT MESSAGE (OUTPATIENT)
Dept: PAIN MEDICINE | Facility: OTHER | Age: 53
End: 2023-11-16
Payer: COMMERCIAL

## 2023-11-17 ENCOUNTER — HOSPITAL ENCOUNTER (OUTPATIENT)
Dept: RADIOLOGY | Facility: OTHER | Age: 53
Discharge: HOME | End: 2023-11-17
Attending: STUDENT IN AN ORGANIZED HEALTH CARE EDUCATION/TRAINING PROGRAM
Payer: COMMERCIAL

## 2023-11-17 DIAGNOSIS — M48.062 SPINAL STENOSIS OF LUMBAR REGION WITH NEUROGENIC CLAUDICATION: ICD-10-CM

## 2023-11-17 DIAGNOSIS — M43.16 SPONDYLOLISTHESIS OF LUMBAR REGION: ICD-10-CM

## 2023-11-17 PROCEDURE — 72110 X-RAY EXAM L-2 SPINE 4/>VWS: CPT | Mod: 26,,, | Performed by: RADIOLOGY

## 2023-11-17 PROCEDURE — 72110 X-RAY EXAM L-2 SPINE 4/>VWS: CPT | Mod: TC,FY

## 2023-11-17 PROCEDURE — 72110 XR LUMBAR SPINE AP AND LAT WITH FLEX/EXT: ICD-10-PCS | Mod: 26,,, | Performed by: RADIOLOGY

## 2023-11-20 ENCOUNTER — OFFICE VISIT (OUTPATIENT)
Dept: INTERNAL MEDICINE | Facility: CLINIC | Age: 53
End: 2023-11-20
Payer: COMMERCIAL

## 2023-11-20 VITALS
HEIGHT: 63 IN | BODY MASS INDEX: 45 KG/M2 | DIASTOLIC BLOOD PRESSURE: 84 MMHG | HEART RATE: 100 BPM | SYSTOLIC BLOOD PRESSURE: 126 MMHG | OXYGEN SATURATION: 99 % | WEIGHT: 254 LBS

## 2023-11-20 DIAGNOSIS — Z23 NEED FOR VACCINATION: Primary | ICD-10-CM

## 2023-11-20 DIAGNOSIS — M54.50 ACUTE LEFT-SIDED LOW BACK PAIN WITHOUT SCIATICA: ICD-10-CM

## 2023-11-20 DIAGNOSIS — I10 ESSENTIAL HYPERTENSION: ICD-10-CM

## 2023-11-20 PROCEDURE — 4010F PR ACE/ARB THEARPY RXD/TAKEN: ICD-10-PCS | Mod: CPTII,S$GLB,, | Performed by: INTERNAL MEDICINE

## 2023-11-20 PROCEDURE — 90686 IIV4 VACC NO PRSV 0.5 ML IM: CPT | Mod: S$GLB,,, | Performed by: INTERNAL MEDICINE

## 2023-11-20 PROCEDURE — 1159F PR MEDICATION LIST DOCUMENTED IN MEDICAL RECORD: ICD-10-PCS | Mod: CPTII,S$GLB,, | Performed by: INTERNAL MEDICINE

## 2023-11-20 PROCEDURE — 3074F PR MOST RECENT SYSTOLIC BLOOD PRESSURE < 130 MM HG: ICD-10-PCS | Mod: CPTII,S$GLB,, | Performed by: INTERNAL MEDICINE

## 2023-11-20 PROCEDURE — 3008F PR BODY MASS INDEX (BMI) DOCUMENTED: ICD-10-PCS | Mod: CPTII,S$GLB,, | Performed by: INTERNAL MEDICINE

## 2023-11-20 PROCEDURE — 4010F ACE/ARB THERAPY RXD/TAKEN: CPT | Mod: CPTII,S$GLB,, | Performed by: INTERNAL MEDICINE

## 2023-11-20 PROCEDURE — 1159F MED LIST DOCD IN RCRD: CPT | Mod: CPTII,S$GLB,, | Performed by: INTERNAL MEDICINE

## 2023-11-20 PROCEDURE — 90471 FLU VACCINE (QUAD) GREATER THAN OR EQUAL TO 3YO PRESERVATIVE FREE IM: ICD-10-PCS | Mod: S$GLB,,, | Performed by: INTERNAL MEDICINE

## 2023-11-20 PROCEDURE — 3079F PR MOST RECENT DIASTOLIC BLOOD PRESSURE 80-89 MM HG: ICD-10-PCS | Mod: CPTII,S$GLB,, | Performed by: INTERNAL MEDICINE

## 2023-11-20 PROCEDURE — 90686 FLU VACCINE (QUAD) GREATER THAN OR EQUAL TO 3YO PRESERVATIVE FREE IM: ICD-10-PCS | Mod: S$GLB,,, | Performed by: INTERNAL MEDICINE

## 2023-11-20 PROCEDURE — 99214 OFFICE O/P EST MOD 30 MIN: CPT | Mod: 25,S$GLB,, | Performed by: INTERNAL MEDICINE

## 2023-11-20 PROCEDURE — 90471 IMMUNIZATION ADMIN: CPT | Mod: S$GLB,,, | Performed by: INTERNAL MEDICINE

## 2023-11-20 PROCEDURE — 3074F SYST BP LT 130 MM HG: CPT | Mod: CPTII,S$GLB,, | Performed by: INTERNAL MEDICINE

## 2023-11-20 PROCEDURE — 3079F DIAST BP 80-89 MM HG: CPT | Mod: CPTII,S$GLB,, | Performed by: INTERNAL MEDICINE

## 2023-11-20 PROCEDURE — 3044F HG A1C LEVEL LT 7.0%: CPT | Mod: CPTII,S$GLB,, | Performed by: INTERNAL MEDICINE

## 2023-11-20 PROCEDURE — 99999 PR PBB SHADOW E&M-EST. PATIENT-LVL IV: CPT | Mod: PBBFAC,,, | Performed by: INTERNAL MEDICINE

## 2023-11-20 PROCEDURE — 3044F PR MOST RECENT HEMOGLOBIN A1C LEVEL <7.0%: ICD-10-PCS | Mod: CPTII,S$GLB,, | Performed by: INTERNAL MEDICINE

## 2023-11-20 PROCEDURE — 3008F BODY MASS INDEX DOCD: CPT | Mod: CPTII,S$GLB,, | Performed by: INTERNAL MEDICINE

## 2023-11-20 PROCEDURE — 99999 PR PBB SHADOW E&M-EST. PATIENT-LVL IV: ICD-10-PCS | Mod: PBBFAC,,, | Performed by: INTERNAL MEDICINE

## 2023-11-20 PROCEDURE — 99214 PR OFFICE/OUTPT VISIT, EST, LEVL IV, 30-39 MIN: ICD-10-PCS | Mod: 25,S$GLB,, | Performed by: INTERNAL MEDICINE

## 2023-11-20 NOTE — PROGRESS NOTES
Subjective:       Patient ID: Phylicia Vásquez is a 53 y.o. female.    Chief Complaint: Follow-up    During previous visit BP was elevated. We increased her amlodipine to 10 mg daily and has improved.     Continues to have lumbar back pain. Has TENS unit. Getting injection next week.     PMHx  Chronic thoracic back has done PT and Jennifer in the past on gabapentin and flexeril      HTN  on amlodipine-valsartan.      Hx of breast cancer dx in 2019. S/p mastectomy with axillary node dissection, chemotherapy and XRT. She was started on Anastrozole as she appeared to be post menopausal on labs.   She will be on anastrozole until October 2025     Lymphedema due to surgery  completed PT and has sleeve and compression machione      Chemo induced neuropathy. Has been on gabapentin but ran out recently.      Insomnia related to anastrozole. On ambien.      Prediabetes last A1C is 6.0 in May 2023      Health Maintenance:  Colon Cancer Screening: normal colonoscopy in 2021. Due again in 2031   DEXA: normal  6/24/2023   Mammogram: Normal November 2023   HIV: negative 2022   Hep C: negative 2022   Lipids: normal   Vaccines:  up to date      Works overnight at  of [employer]                Follow-up  Pertinent negatives include no abdominal pain, arthralgias, chest pain, chills, coughing, headaches, myalgias, nausea or vomiting.     Review of Systems   Constitutional:  Negative for activity change, appetite change and chills.   HENT:  Negative for ear pain, sinus pressure/congestion and sneezing.    Respiratory:  Negative for cough and shortness of breath.    Cardiovascular:  Negative for chest pain, palpitations and leg swelling.   Gastrointestinal:  Negative for abdominal distention, abdominal pain, constipation, diarrhea, nausea and vomiting.   Genitourinary:  Negative for dysuria and hematuria.   Musculoskeletal:  Negative for arthralgias, back pain and myalgias.   Neurological:  Negative for dizziness and  headaches.   Psychiatric/Behavioral:  Negative for agitation. The patient is not nervous/anxious.            Past Medical History:   Diagnosis Date    Anxiety     Back pain     Goiter     HTN (hypertension) 10/18/2012    Hx antineoplastic chemo     last dose 20    Hx of psychiatric care     Ambien, Klonopin    Insomnia 10/18/2012    Malignant neoplasm of axillary tail of right breast in female, estrogen receptor positive 2019    right    Neck mass     right posterior    Primary invasive malignant neoplasm of female breast, right 2019    right    Psychiatric problem     S/P abdominal supracervical subtotal hysterectomy, continues to have regular menses. 2014    Spinal cord cyst, L1      Thoracic radiculopathy, bulging disc at T10-11 and T11-12      Past Surgical History:   Procedure Laterality Date    AXILLARY NODE DISSECTION Right 2020    Procedure: LYMPHADENECTOMY, AXILLARY;  Surgeon: Lelo Guaman MD;  [facility];  Service: General;  Laterality: Right;    BREAST BIOPSY Right     + CA    BREAST BIOPSY Left     BREAST RECONSTRUCTION Bilateral 2021     SECTION      CHOLECYSTECTOMY      COLONOSCOPY N/A 10/05/2015    Procedure: COLONOSCOPY;  Surgeon: JERONIMO Cancino MD;  Location: [facility] (4TH FLR);  Service: Endoscopy;  Laterality: N/A;    COLONOSCOPY N/A 2021    Procedure: COLONOSCOPY;  Surgeon: JERONIMO Cancino MD;  Location: [facility] (4TH FLR);  Service: Endoscopy;  Laterality: N/A;  fully vaccinated-GT    HYSTERECTOMY      BC--SUPRACERVICAL    INJECTION FOR SENTINEL NODE IDENTIFICATION Right 2020    Procedure: INJECTION, FOR SENTINEL NODE IDENTIFICATION;  Surgeon: Lelo Guaman MD;  Location: [facility] OR;  Service: General;  Laterality: Right;    INSERTION OF BREAST TISSUE EXPANDER Right 2020    Procedure: INSERTION, TISSUE EXPANDER, BREAST;  Surgeon: Pablo Ramos MD;  Location: [facility] OR;  Service: Plastics;   Laterality: Right;    INSERTION OF TUNNELED CENTRAL VENOUS CATHETER (CVC) WITH SUBCUTANEOUS PORT Right 11/21/2019    Procedure: [id number]-CATH Fluoro needed;  Surgeon: Lelo Guaman MD;  Location: [address];  Service: General;  Laterality: Right;  Right internal jugular.     LIPOMA RESECTION  12/2021    MASTECTOMY Bilateral 04/2021    with reconstruction    NEEDLE LOCALIZATION Left 05/31/2021    Procedure: NEEDLE LOCALIZATION;  Surgeon: Kraig Mello MD;  Location: [facility] CATH LAB;  Service: Radiology;  Laterality: Left;    RECONSTRUCTION OF BREAST WITH DEEP INFERIOR EPIGASTRIC ARTERY  (ROWAN) FREE FLAP Bilateral 04/21/2021    Procedure: RECONSTRUCTION, BREAST, USING ROWAN FREE FLAP;  Surgeon: Pablo Ramos MD;  Location: [facility];  Service: Plastics;  Laterality: Bilateral;    SENTINEL LYMPH NODE BIOPSY Right 05/20/2020    Procedure: BIOPSY, LYMPH NODE, SENTINEL;  Surgeon: Lelo Guaman MD;  Location: [facility];  Service: General;  Laterality: Right;    TISSUE EXPANDER REMOVAL Right 04/21/2021    Procedure: REMOVAL, TISSUE EXPANDER;  Surgeon: Pablo Ramos MD;  Location: [facility];  Service: Plastics;  Laterality: Right;    UNILATERAL MASTECTOMY Right 05/20/2020    Procedure: MASTECTOMY, UNILATERAL;  Surgeon: Lelo Guaman MD;  Location: [facility];  Service: General;  Laterality: Right;    UNILATERAL MASTECTOMY Left 04/21/2021    Procedure: MASTECTOMY, UNILATERAL PROPHYLACTIC;  Surgeon: Lelo Guaman MD;  Location: [facility];  Service: General;  Laterality: Left;      Patient Active Problem List   Diagnosis    Psychophysiological insomnia    Multinodular non-toxic goiter, Subcentimeter nodules May 2012, anterior fat pad.    BMI 40.0-44.9, adult    S/P abdominal supracervical subtotal hysterectomy, continues to have regular menses.     Seborrheic dermatitis of scalp and face    Spinal cord cyst, 11 mm CSF cyst, in the conus medullaris on the right side at L1 level. .     Thoracic radiculopathy    Fatty liver    Bulge of thoracic disc T10-11 and T11-12    Thoracic radiculitis    Screen for colon cancer,     Papilloma of right breast    Morbid obesity    Essential hypertension    Prediabetes    Carcinoma of axillary tail of right breast in female, estrogen receptor positive    Malignant neoplasm of axillary tail of right female breast    Adjustment disorder with mixed anxiety and depressed mood    Antineoplastic chemotherapy induced anemia    Neuropathy, from chemo 2019    Preop testing    Prophylactic use of anastrozole (Arimidex)    Periumbilical hernia    Abnormal CT of the abdomen, 1/16/2021 ground glass opacities in right middle and upper lobes    Rib pain    History of breast cancer    Seroma of breast    Soft tissue mass, right occiput    Toenail fungus    Lymphedema of right arm    Impaired functional mobility and activity tolerance    Weakness of left lower extremity    Abnormality of gait    Impairment of balance    Acute left-sided low back pain without sciatica        Objective:      Physical Exam  Constitutional:       Appearance: Normal appearance.   HENT:      Head: Normocephalic.   Cardiovascular:      Rate and Rhythm: Normal rate and regular rhythm.      Pulses: Normal pulses.      Heart sounds: Normal heart sounds.   Pulmonary:      Effort: Pulmonary effort is normal.      Breath sounds: Normal breath sounds.   Abdominal:      General: Abdomen is flat. Bowel sounds are normal.      Palpations: Abdomen is soft.   Musculoskeletal:         General: Normal range of motion.      Cervical back: Normal range of motion and neck supple.   Skin:     General: Skin is warm and dry.   Neurological:      General: No focal deficit present.      Mental Status: She is alert and oriented to person, place, and time.   Psychiatric:         Mood and Affect: Mood normal.         Assessment:       Problem List Items Addressed This Visit          Cardiac/Vascular    Essential  hypertension (Chronic)       Orthopedic    Acute left-sided low back pain without sciatica     Other Visit Diagnoses       Need for vaccination    -  Primary    Relevant Orders    Influenza - Quadrivalent (PF) (Completed)            Plan:         Phylicia was seen today for follow-up.    Diagnoses and all orders for this visit:    Need for vaccination  -     Influenza - Quadrivalent (PF)    Acute left-sided low back pain without sciatica  Improving with TENS unit.   Disability paperwork signed today for accomodation at work.     Essential hypertension  Improved since increasing amlodipine to 10 mg    Continue current meds             Claudia Campos MD   Internal Medicine   Primary Care

## 2023-11-21 ENCOUNTER — TELEPHONE (OUTPATIENT)
Dept: PAIN MEDICINE | Facility: CLINIC | Age: 53
End: 2023-11-21
Payer: COMMERCIAL

## 2023-12-01 ENCOUNTER — PATIENT MESSAGE (OUTPATIENT)
Dept: INTERNAL MEDICINE | Facility: CLINIC | Age: 53
End: 2023-12-01
Payer: COMMERCIAL

## 2023-12-01 RX ORDER — MELOXICAM 7.5 MG/1
TABLET ORAL
Qty: 30 TABLET | Refills: 1 | Status: SHIPPED | OUTPATIENT
Start: 2023-12-01 | End: 2024-02-05

## 2023-12-07 ENCOUNTER — HOSPITAL ENCOUNTER (OUTPATIENT)
Facility: OTHER | Age: 53
Discharge: HOME | End: 2023-12-07
Attending: ANESTHESIOLOGY | Admitting: ANESTHESIOLOGY
Payer: COMMERCIAL

## 2023-12-07 VITALS
DIASTOLIC BLOOD PRESSURE: 94 MMHG | HEART RATE: 80 BPM | HEIGHT: 63 IN | WEIGHT: 240 LBS | BODY MASS INDEX: 42.52 KG/M2 | RESPIRATION RATE: 16 BRPM | TEMPERATURE: 98 F | OXYGEN SATURATION: 97 % | SYSTOLIC BLOOD PRESSURE: 154 MMHG

## 2023-12-07 DIAGNOSIS — M51.36 DDD (DEGENERATIVE DISC DISEASE), LUMBAR: Primary | ICD-10-CM

## 2023-12-07 DIAGNOSIS — M54.16 LUMBAR RADICULOPATHY: ICD-10-CM

## 2023-12-07 DIAGNOSIS — G89.29 CHRONIC PAIN: ICD-10-CM

## 2023-12-07 PROCEDURE — 62323 PR INJ LUMBAR/SACRAL, W/IMAGING GUIDANCE: ICD-10-PCS | Mod: ,,, | Performed by: ANESTHESIOLOGY

## 2023-12-07 PROCEDURE — 62323 NJX INTERLAMINAR LMBR/SAC: CPT | Performed by: ANESTHESIOLOGY

## 2023-12-07 PROCEDURE — 25000003 PHARM REV CODE 250: Performed by: ANESTHESIOLOGY

## 2023-12-07 PROCEDURE — 62323 NJX INTERLAMINAR LMBR/SAC: CPT | Mod: ,,, | Performed by: ANESTHESIOLOGY

## 2023-12-07 PROCEDURE — 63600175 PHARM REV CODE 636 W HCPCS: Performed by: ANESTHESIOLOGY

## 2023-12-07 PROCEDURE — 25500020 PHARM REV CODE 255: Performed by: ANESTHESIOLOGY

## 2023-12-07 RX ORDER — LIDOCAINE HYDROCHLORIDE 10 MG/ML
INJECTION, SOLUTION EPIDURAL; INFILTRATION; INTRACAUDAL; PERINEURAL
Status: DISCONTINUED | OUTPATIENT
Start: 2023-12-07 | End: 2023-12-07 | Stop reason: HOSPADM

## 2023-12-07 RX ORDER — SODIUM CHLORIDE 9 MG/ML
INJECTION, SOLUTION INTRAVENOUS CONTINUOUS
Status: DISCONTINUED | OUTPATIENT
Start: 2023-12-07 | End: 2023-12-07 | Stop reason: HOSPADM

## 2023-12-07 RX ORDER — ALPRAZOLAM 0.5 MG/1
1 TABLET, ORALLY DISINTEGRATING ORAL ONCE
Status: COMPLETED | OUTPATIENT
Start: 2023-12-07 | End: 2023-12-07

## 2023-12-07 RX ORDER — LIDOCAINE HYDROCHLORIDE 20 MG/ML
INJECTION, SOLUTION INFILTRATION; PERINEURAL
Status: DISCONTINUED | OUTPATIENT
Start: 2023-12-07 | End: 2023-12-07 | Stop reason: HOSPADM

## 2023-12-07 RX ORDER — DEXAMETHASONE SODIUM PHOSPHATE 10 MG/ML
INJECTION INTRAMUSCULAR; INTRAVENOUS
Status: DISCONTINUED | OUTPATIENT
Start: 2023-12-07 | End: 2023-12-07 | Stop reason: HOSPADM

## 2023-12-07 RX ADMIN — ALPRAZOLAM 1 MG: 0.5 TABLET, ORALLY DISINTEGRATING ORAL at 09:12

## 2023-12-07 NOTE — OP NOTE
Lumbar Interlaminar Epidural Steroid Injection under Fluoroscopic Guidance    The procedure, risks, benefits, and options were discussed with the patient. There are no contraindications to the procedure. The patent expressed understanding and agreed to the procedure. Informed written consent was obtained prior to the start of the procedure and can be found in the patient's chart.    PATIENT NAME: Phylicia Váqsuez   MRN: [id number]     DATE OF PROCEDURE: 12/07/2023    PROCEDURE: Lumbar Interlaminar Epidural Steroid Injection L3/L4 under Fluoroscopic Guidance    PRE-OP DIAGNOSIS: Lumbar radiculopathy [M54.16] Lumbar radiculopathy [M54.16]    POST-OP DIAGNOSIS: Same    PHYSICIAN: Kashmir Weiss MD    ASSISTANTS: Asuncion, Miguel Arellano, in the room     MEDICATIONS INJECTED: Preservative-free Decadron 10mg with 4cc of Lidocaine 1% MPF and preservative free normal saline    LOCAL ANESTHETIC INJECTED: Xylocaine 2%     SEDATION: None  Provided Alproazolam     ESTIMATED BLOOD LOSS: None    COMPLICATIONS: None    TECHNIQUE: Time-out was performed to identify the patient and procedure to be performed. With the patient laying in a prone position, the surgical area was prepped and draped in the usual sterile fashion using ChloraPrep and a fenestrated drape. The level was determined under fluoroscopy guidance. Skin anesthesia was achieved by injecting Lidocaine 2% over the injection site. The interlaminar space was then approached with a 20 gauge,  3.5 inch Tuohy needle that was introduced under fluoroscopic guidance in the AP, lateral and/or contralateral oblique imaging. Once the Ligamentum flavum was encountered loss of resistance to saline was used to enter the epidural space. With positive loss of resistance and negative aspiration for CSF or Blood, contrast dye Omnipaque (300mg/mL) was injected to confirm placement and there was no vascular runoff. 5 mL of the medication mixture listed above was injected slowly. Displacement of the  radio opaque contrast after injection of the medication confirmed that the medication went into the area of the epidural space. The needles were removed and bleeding was nil. A sterile dressing was applied. No specimens collected. The patient tolerated the procedure well.     PRE-PROCEDURE PAIN SCORE: 7/10    POST-PROCEDURE PAIN SCORE: 5/10    The patient was monitored after the procedure in the recovery area. They were given post-procedure and discharge instructions to follow at home. The patient was discharged in a stable condition.    I reviewed and edited the fellow's note. I conducted my own interview and physical examination. I agree with the findings. I was present and supervising all critical portions of the procedure.    Miguel Arellano MD

## 2023-12-07 NOTE — DISCHARGE SUMMARY
Discharge Note  Short Stay      SUMMARY     Admit Date: 12/7/2023    Attending Physician: DAKOTA PALENCIA       Discharge Physician: Miguel Arellano      Discharge Date: 12/7/2023 10:55 AM    Procedure(s) (LRB):  LUMBAR L3/4 IL NATHALIE (N/A)    Final Diagnosis: Lumbar radiculopathy [M54.16]    Disposition: Home or self care    Patient Instructions:   Current Discharge Medication List        CONTINUE these medications which have NOT CHANGED    Details   albuterol (PROVENTIL/VENTOLIN HFA) 90 mcg/actuation inhaler Inhale 1-2 puffs into the lungs every 6 (six) hours as needed for Wheezing. Rescue  Qty: 18 g, Refills: 0    Associated Diagnoses: Acute bronchitis, unspecified organism      amlodipine-valsartan (EXFORGE)  mg per tablet Take 1 tablet by mouth once daily.  Qty: 90 tablet, Refills: 3    Comments: .      anastrozole (ARIMIDEX) 1 mg Tab Take 1 tablet (1 mg total) by mouth once daily.  Qty: 90 tablet, Refills: 3    Associated Diagnoses: Use of aromatase inhibitors      ascorbic acid (VITAMIN C ORAL) Take 500 mg by mouth every morning.       azelastine-fluticasone (DYMISTA) 137-50 mcg/spray Spry nassal spray 1 spray by Each Nostril route 2 (two) times daily.  Qty: 23 g, Refills: 0    Associated Diagnoses: Upper respiratory tract infection, unspecified type      cyanocobalamin, vitamin B-12, (VITAMIN B-12 ORAL) Take by mouth daily as needed.       cyclobenzaprine (FLEXERIL) 10 MG tablet TAKE 1 TABLET BY MOUTH THREE TIMES A DAY AS NEEDED FOR MUSCLE SPASMS  Qty: 90 tablet, Refills: 3    Associated Diagnoses: Thoracic radiculopathy      gabapentin (NEURONTIN) 100 MG capsule TAKE 3 CAPSULES BY MOUTH EVERY DAY IN THE MORNING FOR NEUROPATHY  Qty: 270 capsule, Refills: 3    Associated Diagnoses: Neuropathy      LIDOcaine HCl 2% (XYLOCAINE) 2 % JelP urojet Apply topically every evening.      meloxicam (MOBIC) 7.5 MG tablet TAKE 1 TABLET BY MOUTH EVERY DAY  Qty: 30 tablet, Refills: 1      ondansetron (ZOFRAN) 4 MG tablet Take  1 tablet (4 mg total) by mouth every 6 (six) hours.  Qty: 12 tablet, Refills: 0      oxyCODONE-acetaminophen (PERCOCET) 5-325 mg per tablet Take 1 tablet by mouth every 6 (six) hours as needed for Pain.  Qty: 12 tablet, Refills: 0    Comments: Quantity prescribed more than 7 day supply? No  Associated Diagnoses: Acute left-sided low back pain without sciatica      venlafaxine (EFFEXOR XR) 75 MG 24 hr capsule Take 1 capsule (75 mg total) by mouth once daily. In the morning  Qty: 30 capsule, Refills: 11    Associated Diagnoses: Menopausal symptoms      vitamin E 200 UNIT capsule Take 200 Units by mouth daily as needed.       zolpidem (AMBIEN) 10 mg Tab Take 1 tablet (10 mg total) by mouth nightly as needed (Sleep).  Qty: 30 tablet, Refills: 2    Associated Diagnoses: Carcinoma of axillary tail of right breast in female, estrogen receptor positive                 Discharge Diagnosis: Lumbar radiculopathy [M54.16]  Condition on Discharge: Stable with no complications to procedure   Diet on Discharge: Same as before.  Activity: as per instruction sheet.  Discharge to: Home with a responsible adult.  Follow up: 2-4 weeks       Please call my office or pager at [phone] if experienced any weakness or loss of sensation, fever > 101.5, pain uncontrolled with oral medications, persistent nausea/vomiting/or diarrhea, redness or drainage from the incisions, or any other worrisome concerns. If physician on call was not reached or could not communicate with our office for any reason please go to the nearest emergency department

## 2023-12-11 ENCOUNTER — TELEPHONE (OUTPATIENT)
Dept: PAIN MEDICINE | Facility: CLINIC | Age: 53
End: 2023-12-11
Payer: COMMERCIAL

## 2023-12-11 NOTE — TELEPHONE ENCOUNTER
Staff spoke to patient who says she is still having pain since her procedure on 12/07/23. Staff explained that this is normal due to the added inflammation and she should use ice to help with the swelling and OTC medication. Patient understood and has a f/u scheduled on 12/26/23.

## 2023-12-11 NOTE — TELEPHONE ENCOUNTER
----- Message from Alena Garcia sent at 12/11/2023  8:08 AM CST -----  Name of Who is Calling: HAYLEE BUCHANAN [[id number]]            What is the request in detail: Patient is requesting call back to get pain relief rx              Can the clinic reply by [facility]: no              [phone]

## 2023-12-14 ENCOUNTER — PATIENT MESSAGE (OUTPATIENT)
Dept: INTERNAL MEDICINE | Facility: CLINIC | Age: 53
End: 2023-12-14
Payer: COMMERCIAL

## 2023-12-14 NOTE — TELEPHONE ENCOUNTER
Pt stated that she need in writing that she is able to work she will like paper work to say she need to be sitting down to work.

## 2023-12-20 DIAGNOSIS — C50.611 CARCINOMA OF AXILLARY TAIL OF RIGHT BREAST IN FEMALE, ESTROGEN RECEPTOR POSITIVE: ICD-10-CM

## 2023-12-20 DIAGNOSIS — Z17.0 CARCINOMA OF AXILLARY TAIL OF RIGHT BREAST IN FEMALE, ESTROGEN RECEPTOR POSITIVE: ICD-10-CM

## 2023-12-20 RX ORDER — ZOLPIDEM TARTRATE 10 MG/1
10 TABLET ORAL NIGHTLY PRN
Qty: 30 TABLET | Refills: 2 | Status: SHIPPED | OUTPATIENT
Start: 2023-12-20 | End: 2024-06-19

## 2023-12-22 ENCOUNTER — TELEPHONE (OUTPATIENT)
Dept: INTERNAL MEDICINE | Facility: CLINIC | Age: 53
End: 2023-12-22
Payer: COMMERCIAL

## 2023-12-30 NOTE — PLAN OF CARE
Pt received Doxorubicin & Cytoxan today and tolerated well, without complications. Educated patient about Doxorubicin & Cytoxan (indications, side effects, possible reactions, chemotherapy precautions) and verbalized understanding.  VSS. Port positive for blood return, saline flushed, Heparin flush instilled to dwell and de accessed prior to DC. Pt DC with no distress noted, ambulated off unit with family.    cough

## 2024-01-03 DIAGNOSIS — N95.1 MENOPAUSAL SYMPTOMS: ICD-10-CM

## 2024-01-03 RX ORDER — VENLAFAXINE HYDROCHLORIDE 75 MG/1
75 CAPSULE, EXTENDED RELEASE ORAL EVERY MORNING
Qty: 90 CAPSULE | Refills: 3 | Status: SHIPPED | OUTPATIENT
Start: 2024-01-03

## 2024-01-10 NOTE — PROGRESS NOTES
Subjective:       Patient ID: Phylicia Vásquez is a 53 y.o. female.    Chief Complaint: No chief complaint on file.    HPI   Mrs Lua returns today for follow-up.   I had last seen her on October 25, 2023 and she was seen in August by our nurse practitioner.     As of November 1, 2020 she has been on anastrozole in the adjuvant setting.  Of note, her estradiol level and her FSH level suggested that she is postmenopausal, hence initiation of anastrozole.    On April 21, 2021 she underwent a contralateral prophylactic mastectomy with ROWAN flap reconstructions bilaterally.      Briefly, she is a 53 year old AA female who was found to have a carcinoma that is ER positive, VA positive, and HER 2 equivocal, but negative by  FISH.  An axillary node biopsy was also positive.  She received standard neoadjuvant chemotherapy consisting of 4 cycles of AC and 4 cycles of Taxol prior to undergoing a mastectomy.  At the time of her mastectomy she had a 3 cm residual tumor while the 2 sentinel lymph nodes were positive.  A subsequent axillary node dissection showed no evidence of further chandler involvement.   She completed radiation therapy, and as of November 1, 2020 she has been on adjuvant anastrozole.      Review of Systems    Overall she feels OK, however, she complains of significant neuropathic symptoms involving her lower extremities.  She states that she has sustained several falls.  She is on gabapentin 100 mg t.i.d..  PShe also complains of myalgias involving her thighs and a stiffness involving primarily her fingers and hands.  ECOG PS is 1. She denies any depression, easy bruising, fevers, chills, night  sweats, weight loss, vomiting, diarrhea, constipation, diplopia, blurred vision, headache, chest pain, palpitations, shortness of breath, left breast pain, abdominal pain, or difficulty ambulating.  The remainder of the ten-point ROS, including general, skin, lymph, H/N, cardiorespiratory, GI, , Neuro,  Endocrine, and psychiatric is negative.       Objective:      Physical Exam      She is alert, oriented to time, place, person, pleasant, well      nourished, in no acute physical distress.                                   VITAL SIGNS:  Reviewed                                      HEENT:  Normal.  There are no nasal, oral, lip, gingival, auricular, lid,    or conjunctival lesions.  Mucosae are moist and pink, and there is no        thrush.  Pupils are equal, reactive to light and accommodation.              Extraocular muscle movements are intact.  Dentition is good.  There is no frontal or maxillary tenderness.                                     NECK:  Supple without JVD, adenopathy, or thyromegaly.                       LUNGS:  Clear to auscultation without wheezing, rales, or rhonchi.           CARDIOVASCULAR:  Reveals an S1, S2, no murmurs, no rubs, no gallops.         ABDOMEN:  Soft, nontender, without organomegaly.  Bowel sounds are    present.                                                                         EXTREMITIES:  No cyanosis, clubbing, but she does have 1+ edema primarily on the distal right upper extremity                               BREASTS:   she is now status post bilateral mastectomies with ROWAN flap reconstructions.  There is a hard area c/w fat necrosis at the 7:00 o clock position in the left breast.  This has been noted previously and appears unchanged I do not palpate any masses in the right reconstruction.      I do not palpate any axillary lymph nodes in either side.  LYMPHATIC:  There is no cervical, axillary, or supraclavicular adenopathy.   SKIN:  Warm and moist, without petechiae, rashes, induration, or ecchymoses.           NEUROLOGIC:  DTRs are 0-1+ bilaterally, symmetrical, motor function is 5/5,  and cranial nerves are  within normal limits.    Assessment:       1. Carcinoma of axillary tail of right breast in female, estrogen receptor positive, pathologically T2N1M0  after neoadjuvant chemotherapy     2.    Axillary node metastases.  3.      Status post recent left prophylactic mastectomy with bilateral ROWAN flap reconstructions  4.      Extensive fat necrosis .  5.      Mild lymphedema, right upper extremity.    6.      Chemotherapy induced neuropathy  Plan:        I had a long discussion with her.    In regards to her neuropathy, we will initiate a referral to our neurology colleagues.  I pointed out to her that her dose of gabapentin is extremely small and can certainly be increased, however, I will defer the management to the Neurology colleagues  She will remain on anastrozole through the end of October 2025, at which point I will recommend that she extend her treatment for an additional two years to complete 7 years of adjuvant hormonal therapy with AIs.   She will return to see me in 4 months.  Her DXA scan will be repeated in 2025.  Her multiple questions were answered to her satisfaction.    Route Chart for Scheduling    Med Onc Chart Routing  Urgent    Follow up with physician 4 months.   Follow up with DANIEL    Infusion scheduling note    Injection scheduling note    Labs    Imaging    Pharmacy appointment    Other referrals       Additional referrals needed  Neurology

## 2024-01-11 ENCOUNTER — OFFICE VISIT (OUTPATIENT)
Dept: HEMATOLOGY/ONCOLOGY | Facility: CLINIC | Age: 54
End: 2024-01-11
Payer: COMMERCIAL

## 2024-01-11 VITALS
WEIGHT: 250.44 LBS | BODY MASS INDEX: 44.38 KG/M2 | DIASTOLIC BLOOD PRESSURE: 84 MMHG | HEART RATE: 85 BPM | OXYGEN SATURATION: 96 % | RESPIRATION RATE: 17 BRPM | HEIGHT: 63 IN | SYSTOLIC BLOOD PRESSURE: 156 MMHG

## 2024-01-11 DIAGNOSIS — C50.611 CARCINOMA OF AXILLARY TAIL OF RIGHT BREAST IN FEMALE, ESTROGEN RECEPTOR POSITIVE: Primary | ICD-10-CM

## 2024-01-11 DIAGNOSIS — Z17.0 CARCINOMA OF AXILLARY TAIL OF RIGHT BREAST IN FEMALE, ESTROGEN RECEPTOR POSITIVE: Primary | ICD-10-CM

## 2024-01-11 PROCEDURE — 99214 OFFICE O/P EST MOD 30 MIN: CPT | Mod: S$GLB,,, | Performed by: INTERNAL MEDICINE

## 2024-01-11 PROCEDURE — 3008F BODY MASS INDEX DOCD: CPT | Mod: CPTII,S$GLB,, | Performed by: INTERNAL MEDICINE

## 2024-01-11 PROCEDURE — 99999 PR PBB SHADOW E&M-EST. PATIENT-LVL V: CPT | Mod: PBBFAC,,, | Performed by: INTERNAL MEDICINE

## 2024-01-11 PROCEDURE — 3077F SYST BP >= 140 MM HG: CPT | Mod: CPTII,S$GLB,, | Performed by: INTERNAL MEDICINE

## 2024-01-11 PROCEDURE — 1159F MED LIST DOCD IN RCRD: CPT | Mod: CPTII,S$GLB,, | Performed by: INTERNAL MEDICINE

## 2024-01-11 PROCEDURE — 3079F DIAST BP 80-89 MM HG: CPT | Mod: CPTII,S$GLB,, | Performed by: INTERNAL MEDICINE

## 2024-02-04 DIAGNOSIS — M54.14 THORACIC RADICULOPATHY: ICD-10-CM

## 2024-02-04 NOTE — TELEPHONE ENCOUNTER
Refill Routing Note   Medication(s) are not appropriate for processing by [facility] for the following reason(s):        Outside of protocol    [facility] action(s):  Route               Appointments  past 12m or future 3m with PCP    Date Provider   Last Visit   11/20/2023 Claudia Campos MD   Next Visit   Visit date not found Claudia Campos MD   ED visits in past 90 days: 0        Note composed:9:52 AM 02/04/2024

## 2024-02-04 NOTE — TELEPHONE ENCOUNTER
No care due was identified.  [facility] Embedded Care Due Messages. Reference number: [id number].   2/04/2024 7:07:18 AM CST

## 2024-02-05 RX ORDER — CYCLOBENZAPRINE HCL 10 MG
TABLET ORAL
Qty: 90 TABLET | Refills: 3 | Status: SHIPPED | OUTPATIENT
Start: 2024-02-05 | End: 2024-06-03

## 2024-02-05 RX ORDER — MELOXICAM 7.5 MG/1
TABLET ORAL
Qty: 30 TABLET | Refills: 1 | Status: SHIPPED | OUTPATIENT
Start: 2024-02-05 | End: 2024-04-05

## 2024-02-22 ENCOUNTER — TELEPHONE (OUTPATIENT)
Dept: PAIN MEDICINE | Facility: CLINIC | Age: 54
End: 2024-02-22
Payer: MEDICAID

## 2024-02-22 NOTE — TELEPHONE ENCOUNTER
----- Message from Sheri Rain sent at 2/22/2024  8:26 AM CST -----  Regarding: call back  Name of caller: pt       What is the requesting detail: pt is requesting a call back to schedule an injection. Please advise       Can the clinic reply by [facility]:       What number to call back:[phone]

## 2024-03-21 ENCOUNTER — PATIENT MESSAGE (OUTPATIENT)
Dept: HEMATOLOGY/ONCOLOGY | Facility: CLINIC | Age: 54
End: 2024-03-21
Payer: MEDICAID

## 2024-04-05 RX ORDER — MELOXICAM 7.5 MG/1
TABLET ORAL
Qty: 30 TABLET | Refills: 1 | Status: SHIPPED | OUTPATIENT
Start: 2024-04-05 | End: 2024-06-03

## 2024-05-07 ENCOUNTER — OFFICE VISIT (OUTPATIENT)
Dept: HEMATOLOGY/ONCOLOGY | Facility: CLINIC | Age: 54
End: 2024-05-07
Payer: MEDICAID

## 2024-05-07 ENCOUNTER — TELEPHONE (OUTPATIENT)
Dept: INTERNAL MEDICINE | Facility: CLINIC | Age: 54
End: 2024-05-07
Payer: MEDICAID

## 2024-05-07 DIAGNOSIS — Z17.0 CARCINOMA OF AXILLARY TAIL OF RIGHT BREAST IN FEMALE, ESTROGEN RECEPTOR POSITIVE: Primary | ICD-10-CM

## 2024-05-07 DIAGNOSIS — Z79.811 PROPHYLACTIC USE OF ANASTROZOLE (ARIMIDEX): ICD-10-CM

## 2024-05-07 DIAGNOSIS — C50.611 CARCINOMA OF AXILLARY TAIL OF RIGHT BREAST IN FEMALE, ESTROGEN RECEPTOR POSITIVE: Primary | ICD-10-CM

## 2024-05-07 PROCEDURE — 1160F RVW MEDS BY RX/DR IN RCRD: CPT | Mod: CPTII,,, | Performed by: INTERNAL MEDICINE

## 2024-05-07 PROCEDURE — 99212 OFFICE O/P EST SF 10 MIN: CPT | Mod: PBBFAC | Performed by: INTERNAL MEDICINE

## 2024-05-07 PROCEDURE — 4010F ACE/ARB THERAPY RXD/TAKEN: CPT | Mod: CPTII,,, | Performed by: INTERNAL MEDICINE

## 2024-05-07 PROCEDURE — 1159F MED LIST DOCD IN RCRD: CPT | Mod: CPTII,,, | Performed by: INTERNAL MEDICINE

## 2024-05-07 PROCEDURE — 99214 OFFICE O/P EST MOD 30 MIN: CPT | Mod: S$PBB,,, | Performed by: INTERNAL MEDICINE

## 2024-05-07 PROCEDURE — 99999 PR PBB SHADOW E&M-EST. PATIENT-LVL II: CPT | Mod: PBBFAC,,, | Performed by: INTERNAL MEDICINE

## 2024-05-07 NOTE — TELEPHONE ENCOUNTER
----- Message from Jayla Medrano sent at 5/7/2024 11:36 AM CDT -----  Contact: Pt  [phone]  Caller is requesting an earlier appointment than what we can offer.  Caller declined first available appointment listed below.  Caller will not accept being placed on the waitlist and is requesting a message be sent to doctor.    Did you offer to schedule the next available appt and put the patient on the wait list:  No  When is the first available appointment: None (schedule not showing)  Preference of timeframe to be scheduled:  On or after May 19th  Symptoms: Annual  Would the patient prefer a call back or a response via [facility]:  Call back   Additional Information:      Pt is trying to schedule her annual however there is no appt times pulling for the provider.

## 2024-05-07 NOTE — PROGRESS NOTES
Subjective:       Patient ID: Phylicia Vásquez is a 54 y.o. female.    Chief Complaint: No chief complaint on file.    HPI   Mrs Lua returns today for follow-up.   I had last seen her in November 2023..     As of November 1, 2020 she has been on anastrozole in the adjuvant setting.  Of note, her estradiol level and her FSH level suggested that she is postmenopausal, hence initiation of anastrozole.    On April 21, 2021 she underwent a contralateral prophylactic mastectomy with ROWAN flap reconstructions bilaterally.      Briefly, she is a 54 year old AA female who was found to have a carcinoma that is ER positive, SD positive, and HER 2 equivocal, but negative by  FISH.  An axillary node biopsy was also positive.  She received standard neoadjuvant chemotherapy consisting of 4 cycles of AC and 4 cycles of Taxol prior to undergoing a mastectomy.  At the time of her mastectomy she had a 3 cm residual tumor while the 2 sentinel lymph nodes were positive.  A subsequent axillary node dissection showed no evidence of further chandler involvement.   She completed radiation therapy, and as of November 1, 2020 she has been on adjuvant anastrozole.      Review of Systems    Overall she feels OK, however, she again mentions the neuropathic symptoms involving her lower extremities.  She missed her appointment with the neurology clinic and she is asking if we can initiate a 2nd referral.  Her symptoms consist primarily of tingling of the lower extremities.  She is on gabapentin 100 mg t.i.d..  She also complains of myalgias involving her thighs and a stiffness involving primarily her fingers and hands.  ECOG PS is 1. She denies any depression, easy bruising, fevers, chills, night  sweats, weight loss, vomiting, diarrhea, constipation, diplopia, blurred vision, headache, chest pain, palpitations, shortness of breath, left breast pain, abdominal pain, or difficulty ambulating.  The remainder of the ten-point ROS, including  general, skin, lymph, H/N, cardiorespiratory, GI, , Neuro, Endocrine, and psychiatric is negative.       Objective:      Physical Exam      She is alert, oriented to time, place, person, pleasant, well      nourished, in no acute physical distress.                                   VITAL SIGNS:  Reviewed                                      HEENT:  Normal.  There are no nasal, oral, lip, gingival, auricular, lid,    or conjunctival lesions.  Mucosae are moist and pink, and there is no        thrush.  Pupils are equal, reactive to light and accommodation.              Extraocular muscle movements are intact.  Dentition is good.  There is no frontal or maxillary tenderness.                                     NECK:  Supple without JVD, adenopathy, or thyromegaly.                       LUNGS:  Clear to auscultation without wheezing, rales, or rhonchi.           CARDIOVASCULAR:  Reveals an S1, S2, no murmurs, no rubs, no gallops.         ABDOMEN:  Soft, nontender, without organomegaly.  Bowel sounds are    present.                                                                         EXTREMITIES:  No cyanosis, clubbing, but she does have 1+ edema primarily on the distal right upper extremity                               BREASTS:   she is now status post bilateral mastectomies with ROWAN flap reconstructions.  There is a hard area c/w fat necrosis at the 7:00 o clock position in the left breast, and a larger area in the upper outer quadrant of the reconstruction again compatible with fat necrosis..  I do not palpate any masses in the right reconstruction.    According to the patient, the left breast has not changed over the years  I do not palpate any axillary lymph nodes in either side.  LYMPHATIC:  There is no cervical, axillary, or supraclavicular adenopathy.   SKIN:  Warm and moist, without petechiae, rashes, induration, or ecchymoses.           NEUROLOGIC:  DTRs are 0-1+ bilaterally, symmetrical, motor function  is 5/5,  and cranial nerves are  within normal limits.    Assessment:       1. Carcinoma of axillary tail of right breast in female, estrogen receptor positive, pathologically T2N1M0 after neoadjuvant chemotherapy     2.    Axillary node metastases.  3.      Status post recent left prophylactic mastectomy with bilateral ROWAN flap reconstructions  4.      Extensive fat necrosis .  5.      Mild lymphedema, right upper extremity.    6.      Chemotherapy induced neuropathy  Plan:        I had a long discussion with her.    In regards to her neuropathy, we will re-initiate a referral to our Neurology colleagues.  I pointed out to her that her dose of gabapentin is extremely small and can certainly be increased, however, I will defer the management to the Neurology colleagues.  Of note, she works as a  at a hotel during the night shift.  She will remain on anastrozole through the end of October 2025, at which point I will recommend that she extend her treatment for an additional two years to complete 7 years of adjuvant hormonal therapy with AIs.   She will return to see me in 4 months.  Her DXA scan will be repeated in 2025.  Her multiple questions were answered to her satisfaction.  I spent approximately 20 minutes reviewing the available records and 10 minutes evaluating the patient and coordinating her care.      Route Chart for Scheduling    Med Onc Chart Routing      Follow up with physician 4 months. Please initiate a referral to Neurology   Follow up with DANIEL    Infusion scheduling note    Injection scheduling note    Labs    Imaging    Pharmacy appointment    Other referrals                   [phone]

## 2024-05-20 ENCOUNTER — OFFICE VISIT (OUTPATIENT)
Dept: INTERNAL MEDICINE | Facility: CLINIC | Age: 54
End: 2024-05-20
Payer: MEDICAID

## 2024-05-20 ENCOUNTER — PATIENT MESSAGE (OUTPATIENT)
Dept: INTERNAL MEDICINE | Facility: CLINIC | Age: 54
End: 2024-05-20

## 2024-05-20 VITALS
SYSTOLIC BLOOD PRESSURE: 138 MMHG | BODY MASS INDEX: 44.57 KG/M2 | HEIGHT: 63 IN | DIASTOLIC BLOOD PRESSURE: 89 MMHG | WEIGHT: 251.56 LBS | OXYGEN SATURATION: 99 % | HEART RATE: 101 BPM

## 2024-05-20 DIAGNOSIS — E04.1 THYROID NODULE: ICD-10-CM

## 2024-05-20 DIAGNOSIS — Z00.00 ENCOUNTER FOR ANNUAL PHYSICAL EXAM: Primary | ICD-10-CM

## 2024-05-20 DIAGNOSIS — C50.611 CARCINOMA OF AXILLARY TAIL OF RIGHT BREAST IN FEMALE, ESTROGEN RECEPTOR POSITIVE: ICD-10-CM

## 2024-05-20 DIAGNOSIS — Z17.0 CARCINOMA OF AXILLARY TAIL OF RIGHT BREAST IN FEMALE, ESTROGEN RECEPTOR POSITIVE: ICD-10-CM

## 2024-05-20 DIAGNOSIS — R73.03 PREDIABETES: ICD-10-CM

## 2024-05-20 DIAGNOSIS — G62.9 NEUROPATHY: ICD-10-CM

## 2024-05-20 DIAGNOSIS — I10 ESSENTIAL HYPERTENSION: ICD-10-CM

## 2024-05-20 PROCEDURE — 3079F DIAST BP 80-89 MM HG: CPT | Mod: CPTII,,, | Performed by: INTERNAL MEDICINE

## 2024-05-20 PROCEDURE — 99396 PREV VISIT EST AGE 40-64: CPT | Mod: S$PBB,,, | Performed by: INTERNAL MEDICINE

## 2024-05-20 PROCEDURE — 3008F BODY MASS INDEX DOCD: CPT | Mod: CPTII,,, | Performed by: INTERNAL MEDICINE

## 2024-05-20 PROCEDURE — 99213 OFFICE O/P EST LOW 20 MIN: CPT | Mod: PBBFAC | Performed by: INTERNAL MEDICINE

## 2024-05-20 PROCEDURE — 99999 PR PBB SHADOW E&M-EST. PATIENT-LVL III: CPT | Mod: PBBFAC,,, | Performed by: INTERNAL MEDICINE

## 2024-05-20 PROCEDURE — 4010F ACE/ARB THERAPY RXD/TAKEN: CPT | Mod: CPTII,,, | Performed by: INTERNAL MEDICINE

## 2024-05-20 PROCEDURE — 3075F SYST BP GE 130 - 139MM HG: CPT | Mod: CPTII,,, | Performed by: INTERNAL MEDICINE

## 2024-05-20 RX ORDER — ZOLPIDEM TARTRATE 10 MG/1
10 TABLET ORAL NIGHTLY PRN
Qty: 30 TABLET | Refills: 2 | Status: SHIPPED | OUTPATIENT
Start: 2024-05-20 | End: 2024-11-18

## 2024-05-20 RX ORDER — ZOLPIDEM TARTRATE 10 MG/1
10 TABLET ORAL NIGHTLY PRN
Qty: 30 TABLET | Refills: 2 | Status: CANCELLED | OUTPATIENT
Start: 2024-05-20 | End: 2024-11-18

## 2024-05-20 RX ORDER — GABAPENTIN 100 MG/1
100 CAPSULE ORAL 3 TIMES DAILY
Qty: 270 CAPSULE | Refills: 3 | Status: SHIPPED | OUTPATIENT
Start: 2024-05-20

## 2024-05-20 NOTE — TELEPHONE ENCOUNTER
Care Due:                  Date            Visit Type   Department     Provider  --------------------------------------------------------------------------------                                EP -                              PRIMARY      [facility]  Last Visit: 05-      CARE ([facility])   MEDICINE       Claudia Campos  Next Visit: None Scheduled  None         None Found                                                            Last  Test          Frequency    Reason                     Performed    Due Date  --------------------------------------------------------------------------------    CMP.........  12 months..  amlodipine-valsartan.....  07- 07-    [facility] Embedded Care Due Messages. Reference number: [id number].   5/20/2024 12:26:20 PM CDT

## 2024-05-20 NOTE — TELEPHONE ENCOUNTER
"Pt seen in clinic today. Pt requesting "cream for face" and ambien rx, pended ambien for your review. Pt also requesting a referral to derm. Pended  "

## 2024-05-20 NOTE — PROGRESS NOTES
Subjective:       Patient ID: Phylicia Vásquez is a 54 y.o. female.    Chief Complaint: Annual Exam    Presents for annual.     Has been having recent difficulty sleeping. Believes this is related to the anastrozole.     Also works nights.     Worsening neuropathy due to chemo.     PMHx  Chronic thoracic back has done PT and Jennifer in the past on gabapentin and flexeril      HTN  on amlodipine-valsartan.      Hx of breast cancer dx in 2019. S/p mastectomy with axillary node dissection, chemotherapy and XRT. She was started on Anastrozole as she appeared to be post menopausal on labs.   She will be on anastrozole until October 2025     Lymphedema due to surgery  completed PT and has sleeve and compression machione      Chemo induced neuropathy. Has been on gabapentin but ran out recently.      Insomnia related to anastrozole. On ambien.      Prediabetes last A1C is 6.0 in May 2023      Health Maintenance:  Colon Cancer Screening: normal colonoscopy in 2021. Due again in 2031   DEXA: normal  6/24/2023   Mammogram: Normal November 2023   HIV: negative 2022   Hep C: negative 2022   Lipids: normal   Vaccines:  up to date      Works overnight at  of [employer]                Follow-up  Pertinent negatives include no abdominal pain, arthralgias, chest pain, chills, coughing, headaches, myalgias, nausea or vomiting.     Review of Systems   Constitutional:  Negative for activity change, appetite change and chills.   HENT:  Negative for ear pain, sinus pressure/congestion and sneezing.    Respiratory:  Negative for cough and shortness of breath.    Cardiovascular:  Negative for chest pain, palpitations and leg swelling.   Gastrointestinal:  Negative for abdominal distention, abdominal pain, constipation, diarrhea, nausea and vomiting.   Genitourinary:  Negative for dysuria and hematuria.   Musculoskeletal:  Negative for arthralgias, back pain and myalgias.   Neurological:  Negative for dizziness and headaches.    Psychiatric/Behavioral:  Negative for agitation. The patient is not nervous/anxious.            Past Medical History:   Diagnosis Date    Anxiety     Back pain     Goiter     HTN (hypertension) 10/18/2012    Hx antineoplastic chemo     last dose 20    Hx of psychiatric care     Ambien, Klonopin    Insomnia 10/18/2012    Malignant neoplasm of axillary tail of right breast in female, estrogen receptor positive 2019    right    Neck mass     right posterior    Primary invasive malignant neoplasm of female breast, right 2019    right    Psychiatric problem     S/P abdominal supracervical subtotal hysterectomy, continues to have regular menses. 2014    Spinal cord cyst, L1      Thoracic radiculopathy, bulging disc at T10-11 and T11-12      Past Surgical History:   Procedure Laterality Date    AXILLARY NODE DISSECTION Right 2020    Procedure: LYMPHADENECTOMY, AXILLARY;  Surgeon: Lelo Guaman MD;  Location: [facility] 2ND FLR;  Service: General;  Laterality: Right;    BREAST BIOPSY Right     + CA    BREAST BIOPSY Left     BREAST RECONSTRUCTION Bilateral 2021     SECTION      CHOLECYSTECTOMY      COLONOSCOPY N/A 10/05/2015    Procedure: COLONOSCOPY;  Surgeon: JERONIMO Cancino MD;  Location: [facility] (4TH FLR);  Service: Endoscopy;  Laterality: N/A;    COLONOSCOPY N/A 2021    Procedure: COLONOSCOPY;  Surgeon: JERONIMO Cancino MD;  Location: [facility] (4TH FLR);  Service: Endoscopy;  Laterality: N/A;  fully vaccinated-GT    EPIDURAL STEROID INJECTION N/A 2023    Procedure: LUMBAR L3/4 IL NATHALIE;  Surgeon: Kashmir Weiss MD;  Location: [facility] PAIN MGT;  Service: Pain Management;  Laterality: N/A;  2 WK F/U DANIEL    HYSTERECTOMY      BC--SUPRACERVICAL    INJECTION FOR SENTINEL NODE IDENTIFICATION Right 2020    Procedure: INJECTION, FOR SENTINEL NODE IDENTIFICATION;  Surgeon: Lelo Guaman MD;  Location: [facility] OR;  Service: General;  Laterality: Right;     INSERTION OF BREAST TISSUE EXPANDER Right 05/20/2020    Procedure: INSERTION, TISSUE EXPANDER, BREAST;  Surgeon: Pablo Ramos MD;  Location: [facility] OR;  Service: Plastics;  Laterality: Right;    INSERTION OF TUNNELED CENTRAL VENOUS CATHETER (CVC) WITH SUBCUTANEOUS PORT Right 11/21/2019    Procedure: [id number] Fluoro needed;  Surgeon: Lelo Guaman MD;  [facility];  Service: General;  Laterality: Right;  Right internal jugular.     LIPOMA RESECTION  12/2021    MASTECTOMY Bilateral 04/2021    with reconstruction    NEEDLE LOCALIZATION Left 05/31/2021    Procedure: NEEDLE LOCALIZATION;  Surgeon: Kraig Mello MD;  Location: [facility] CATH LAB;  Service: Radiology;  Laterality: Left;    RECONSTRUCTION OF BREAST WITH DEEP INFERIOR EPIGASTRIC ARTERY  (ROWAN) FREE FLAP Bilateral 04/21/2021    Procedure: RECONSTRUCTION, BREAST, USING ROWAN FREE FLAP;  Surgeon: Pablo Ramos MD;  Location: [facility];  Service: Plastics;  Laterality: Bilateral;    SENTINEL LYMPH NODE BIOPSY Right 05/20/2020    Procedure: BIOPSY, LYMPH NODE, SENTINEL;  Surgeon: Lelo Guaman MD;  Location: [facility];  Service: General;  Laterality: Right;    TISSUE EXPANDER REMOVAL Right 04/21/2021    Procedure: REMOVAL, TISSUE EXPANDER;  Surgeon: Pablo Ramos MD;  Location: [facility];  Service: Plastics;  Laterality: Right;    UNILATERAL MASTECTOMY Right 05/20/2020    Procedure: MASTECTOMY, UNILATERAL;  Surgeon: Lelo Guaman MD;  Location: [facility];  Service: General;  Laterality: Right;    UNILATERAL MASTECTOMY Left 04/21/2021    Procedure: MASTECTOMY, UNILATERAL PROPHYLACTIC;  Surgeon: Lelo Guaman MD;  Location: [facility];  Service: General;  Laterality: Left;      Patient Active Problem List   Diagnosis    Psychophysiological insomnia    Multinodular non-toxic goiter, Subcentimeter nodules May 2012, anterior fat pad.    BMI 40.0-44.9, adult    S/P abdominal supracervical subtotal hysterectomy, continues to have  regular menses.     Seborrheic dermatitis of scalp and face    Spinal cord cyst, 11 mm CSF cyst, in the conus medullaris on the right side at L1 level. .    Thoracic radiculopathy    Fatty liver    Bulge of thoracic disc T10-11 and T11-12    Thoracic radiculitis    Screen for colon cancer,     Papilloma of right breast    Morbid obesity    Essential hypertension    Prediabetes    Carcinoma of axillary tail of right breast in female, estrogen receptor positive    Malignant neoplasm of axillary tail of right female breast    Adjustment disorder with mixed anxiety and depressed mood    Antineoplastic chemotherapy induced anemia    Neuropathy, from chemo 2019    Preop testing    Prophylactic use of anastrozole (Arimidex)    Periumbilical hernia    Abnormal CT of the abdomen, 1/16/2021 ground glass opacities in right middle and upper lobes    Rib pain    History of breast cancer    Seroma of breast    Soft tissue mass, right occiput    Toenail fungus    Lymphedema of right arm    Impaired functional mobility and activity tolerance    Weakness of left lower extremity    Abnormality of gait    Impairment of balance    Acute left-sided low back pain without sciatica        Objective:      Physical Exam  Constitutional:       Appearance: Normal appearance.   HENT:      Head: Normocephalic.   Cardiovascular:      Rate and Rhythm: Normal rate and regular rhythm.      Pulses: Normal pulses.      Heart sounds: Normal heart sounds.   Pulmonary:      Effort: Pulmonary effort is normal.      Breath sounds: Normal breath sounds.   Abdominal:      General: Abdomen is flat. Bowel sounds are normal.      Palpations: Abdomen is soft.   Musculoskeletal:         General: Normal range of motion.      Cervical back: Normal range of motion and neck supple.   Skin:     General: Skin is warm and dry.   Neurological:      General: No focal deficit present.      Mental Status: She is alert and oriented to person, place, and  time.   Psychiatric:         Mood and Affect: Mood normal.         Assessment:       Problem List Items Addressed This Visit    None        Plan:         Encounter for annual physical exam  Colon Cancer Screening: normal colonoscopy in 2021. Due again in 2031   DEXA: normal  6/24/2023   Mammogram: Normal November 2023   HIV: negative 2022   Hep C: negative 2022   Lipids: normal   Vaccines:  up to date   Annual wellness exam completed.     All medications, histories, and concerns reviewed, reconciled, and addressed.     Appropriate Screenings per pt's sex and age have been reviewed and discussed with pt.       Prediabetes  -     Lipid Panel; Future; Expected date: 05/20/2024  -     Hemoglobin A1C; Future; Expected date: 05/20/2024  Check labs today     Essential hypertension  -     Comprehensive Metabolic Panel; Future; Expected date: 05/20/2024  -     CBC Auto Differential; Future; Expected date: 05/20/2024  Borderline high today.   Continue current meds she will monitor her BP at home more closely.     Thyroid nodule  -     US Soft Tissue Head Neck; Future; Expected date: 05/20/2024  -     TSH; Future; Expected date: 05/20/2024  -     T4, FREE; Future; Expected date: 05/20/2024    Neuropathy, from chemo 2019  -     gabapentin (NEURONTIN) 100 MG capsule; Take 1 capsule (100 mg total) by mouth 3 (three) times daily.  Dispense: 270 capsule; Refill: 3  Increase gabapentin to TID.     Carcinoma of axillary tail of right breast in female, estrogen receptor positive  -     zolpidem (AMBIEN) 10 mg Tab; Take 1 tablet (10 mg total) by mouth nightly as needed (Sleep).  Dispense: 30 tablet; Refill: 2  Insomnia due to anastrozole.   Refill Ambien today                Claudia Campos MD   Internal Medicine   Primary Care

## 2024-05-21 ENCOUNTER — LAB VISIT (OUTPATIENT)
Dept: LAB | Facility: HOSPITAL | Age: 54
End: 2024-05-21
Payer: MEDICAID

## 2024-05-21 DIAGNOSIS — I10 ESSENTIAL HYPERTENSION: ICD-10-CM

## 2024-05-21 DIAGNOSIS — R73.03 PREDIABETES: ICD-10-CM

## 2024-05-21 DIAGNOSIS — E04.1 THYROID NODULE: ICD-10-CM

## 2024-05-21 LAB
ALBUMIN SERPL BCP-MCNC: 3.9 G/DL (ref 3.5–5.2)
ALP SERPL-CCNC: 79 U/L (ref 55–135)
ALT SERPL W/O P-5'-P-CCNC: 33 U/L (ref 10–44)
ANION GAP SERPL CALC-SCNC: 9 MMOL/L (ref 8–16)
AST SERPL-CCNC: 22 U/L (ref 10–40)
BASOPHILS # BLD AUTO: 0.06 K/UL (ref 0–0.2)
BASOPHILS NFR BLD: 0.8 % (ref 0–1.9)
BILIRUB SERPL-MCNC: 0.4 MG/DL (ref 0.1–1)
BUN SERPL-MCNC: 6 MG/DL (ref 6–20)
CALCIUM SERPL-MCNC: 9.8 MG/DL (ref 8.7–10.5)
CHLORIDE SERPL-SCNC: 103 MMOL/L (ref 95–110)
CHOLEST SERPL-MCNC: 184 MG/DL (ref 120–199)
CHOLEST/HDLC SERPL: 3.4 {RATIO} (ref 2–5)
CO2 SERPL-SCNC: 29 MMOL/L (ref 23–29)
CREAT SERPL-MCNC: 0.8 MG/DL (ref 0.5–1.4)
DIFFERENTIAL METHOD BLD: ABNORMAL
EOSINOPHIL # BLD AUTO: 0.2 K/UL (ref 0–0.5)
EOSINOPHIL NFR BLD: 2.6 % (ref 0–8)
ERYTHROCYTE [DISTWIDTH] IN BLOOD BY AUTOMATED COUNT: 15.6 % (ref 11.5–14.5)
EST. GFR  (NO RACE VARIABLE): >60 ML/MIN/1.73 M^2
ESTIMATED AVG GLUCOSE: 134 MG/DL (ref 68–131)
GLUCOSE SERPL-MCNC: 108 MG/DL (ref 70–110)
HBA1C MFR BLD: 6.3 % (ref 4–5.6)
HCT VFR BLD AUTO: 40 % (ref 37–48.5)
HDLC SERPL-MCNC: 54 MG/DL (ref 40–75)
HDLC SERPL: 29.3 % (ref 20–50)
HGB BLD-MCNC: 12.2 G/DL (ref 12–16)
IMM GRANULOCYTES # BLD AUTO: 0.02 K/UL (ref 0–0.04)
IMM GRANULOCYTES NFR BLD AUTO: 0.3 % (ref 0–0.5)
LDLC SERPL CALC-MCNC: 111.6 MG/DL (ref 63–159)
LYMPHOCYTES # BLD AUTO: 2.5 K/UL (ref 1–4.8)
LYMPHOCYTES NFR BLD: 33.5 % (ref 18–48)
MCH RBC QN AUTO: 25.4 PG (ref 27–31)
MCHC RBC AUTO-ENTMCNC: 30.5 G/DL (ref 32–36)
MCV RBC AUTO: 83 FL (ref 82–98)
MONOCYTES # BLD AUTO: 0.4 K/UL (ref 0.3–1)
MONOCYTES NFR BLD: 5.1 % (ref 4–15)
NEUTROPHILS # BLD AUTO: 4.3 K/UL (ref 1.8–7.7)
NEUTROPHILS NFR BLD: 57.7 % (ref 38–73)
NONHDLC SERPL-MCNC: 130 MG/DL
NRBC BLD-RTO: 0 /100 WBC
PLATELET # BLD AUTO: 233 K/UL (ref 150–450)
PMV BLD AUTO: 12 FL (ref 9.2–12.9)
POTASSIUM SERPL-SCNC: 3.6 MMOL/L (ref 3.5–5.1)
PROT SERPL-MCNC: 7.6 G/DL (ref 6–8.4)
RBC # BLD AUTO: 4.8 M/UL (ref 4–5.4)
SODIUM SERPL-SCNC: 141 MMOL/L (ref 136–145)
T4 FREE SERPL-MCNC: 0.86 NG/DL (ref 0.71–1.51)
TRIGL SERPL-MCNC: 92 MG/DL (ref 30–150)
TSH SERPL DL<=0.005 MIU/L-ACNC: 2.35 UIU/ML (ref 0.4–4)
WBC # BLD AUTO: 7.4 K/UL (ref 3.9–12.7)

## 2024-05-21 PROCEDURE — 80053 COMPREHEN METABOLIC PANEL: CPT | Performed by: INTERNAL MEDICINE

## 2024-05-21 PROCEDURE — 36415 COLL VENOUS BLD VENIPUNCTURE: CPT | Performed by: INTERNAL MEDICINE

## 2024-05-21 PROCEDURE — 83036 HEMOGLOBIN GLYCOSYLATED A1C: CPT | Performed by: INTERNAL MEDICINE

## 2024-05-21 PROCEDURE — 85025 COMPLETE CBC W/AUTO DIFF WBC: CPT | Performed by: INTERNAL MEDICINE

## 2024-05-21 PROCEDURE — 84443 ASSAY THYROID STIM HORMONE: CPT | Performed by: INTERNAL MEDICINE

## 2024-05-21 PROCEDURE — 80061 LIPID PANEL: CPT | Performed by: INTERNAL MEDICINE

## 2024-05-21 PROCEDURE — 84439 ASSAY OF FREE THYROXINE: CPT | Performed by: INTERNAL MEDICINE

## 2024-05-27 ENCOUNTER — PATIENT MESSAGE (OUTPATIENT)
Dept: INTERNAL MEDICINE | Facility: CLINIC | Age: 54
End: 2024-05-27
Payer: MEDICAID

## 2024-05-30 DIAGNOSIS — Z79.811 USE OF AROMATASE INHIBITORS: ICD-10-CM

## 2024-05-30 RX ORDER — ANASTROZOLE 1 MG/1
1 TABLET ORAL
Qty: 90 TABLET | Refills: 3 | Status: SHIPPED | OUTPATIENT
Start: 2024-05-30

## 2024-06-01 DIAGNOSIS — M54.14 THORACIC RADICULOPATHY: ICD-10-CM

## 2024-06-01 NOTE — TELEPHONE ENCOUNTER
No care due was identified.  [facility] Embedded Care Due Messages. Reference number: [id number].   6/01/2024 7:24:30 AM CDT

## 2024-06-02 NOTE — TELEPHONE ENCOUNTER
Refill Routing Note   Medication(s) are not appropriate for processing by [facility] for the following reason(s):        Outside of protocol    [facility] action(s):  Route               Appointments  past 12m or future 3m with PCP    Date Provider   Last Visit   5/20/2024 Claudia Campos MD   Next Visit   Visit date not found Claudia Campos MD   ED visits in past 90 days: 0        Note composed:9:28 PM 06/01/2024

## 2024-06-03 RX ORDER — MELOXICAM 7.5 MG/1
TABLET ORAL
Qty: 30 TABLET | Refills: 1 | Status: SHIPPED | OUTPATIENT
Start: 2024-06-03

## 2024-06-03 RX ORDER — CYCLOBENZAPRINE HCL 10 MG
TABLET ORAL
Qty: 90 TABLET | Refills: 3 | Status: SHIPPED | OUTPATIENT
Start: 2024-06-03

## 2024-06-10 ENCOUNTER — PATIENT MESSAGE (OUTPATIENT)
Dept: INTERNAL MEDICINE | Facility: CLINIC | Age: 54
End: 2024-06-10
Payer: MEDICAID

## 2024-06-11 ENCOUNTER — HOSPITAL ENCOUNTER (OUTPATIENT)
Dept: RADIOLOGY | Facility: HOSPITAL | Age: 54
Discharge: HOME | End: 2024-06-11
Attending: INTERNAL MEDICINE
Payer: MEDICAID

## 2024-06-11 DIAGNOSIS — E04.1 THYROID NODULE: ICD-10-CM

## 2024-06-11 PROCEDURE — 76536 US EXAM OF HEAD AND NECK: CPT | Mod: 26,,, | Performed by: INTERNAL MEDICINE

## 2024-06-11 PROCEDURE — 76536 US EXAM OF HEAD AND NECK: CPT | Mod: TC

## 2024-07-29 RX ORDER — MELOXICAM 7.5 MG/1
TABLET ORAL
Qty: 30 TABLET | Refills: 1 | Status: SHIPPED | OUTPATIENT
Start: 2024-07-29

## 2024-08-05 RX ORDER — MELOXICAM 7.5 MG/1
TABLET ORAL
Qty: 30 TABLET | Refills: 1 | Status: SHIPPED | OUTPATIENT
Start: 2024-08-05

## 2024-09-10 ENCOUNTER — OFFICE VISIT (OUTPATIENT)
Dept: HEMATOLOGY/ONCOLOGY | Facility: CLINIC | Age: 54
End: 2024-09-10
Payer: MEDICAID

## 2024-09-10 VITALS
HEIGHT: 63 IN | SYSTOLIC BLOOD PRESSURE: 159 MMHG | BODY MASS INDEX: 43.6 KG/M2 | HEART RATE: 96 BPM | OXYGEN SATURATION: 97 % | DIASTOLIC BLOOD PRESSURE: 96 MMHG | WEIGHT: 246.06 LBS | TEMPERATURE: 98 F

## 2024-09-10 DIAGNOSIS — Z79.811 PROPHYLACTIC USE OF ANASTROZOLE (ARIMIDEX): ICD-10-CM

## 2024-09-10 DIAGNOSIS — C50.611 CARCINOMA OF AXILLARY TAIL OF RIGHT BREAST IN FEMALE, ESTROGEN RECEPTOR POSITIVE: Primary | ICD-10-CM

## 2024-09-10 DIAGNOSIS — L30.4 INTERTRIGO: ICD-10-CM

## 2024-09-10 DIAGNOSIS — Z17.0 CARCINOMA OF AXILLARY TAIL OF RIGHT BREAST IN FEMALE, ESTROGEN RECEPTOR POSITIVE: Primary | ICD-10-CM

## 2024-09-10 PROCEDURE — G2211 COMPLEX E/M VISIT ADD ON: HCPCS | Mod: S$PBB,,, | Performed by: INTERNAL MEDICINE

## 2024-09-10 PROCEDURE — 1159F MED LIST DOCD IN RCRD: CPT | Mod: CPTII,,, | Performed by: INTERNAL MEDICINE

## 2024-09-10 PROCEDURE — 3044F HG A1C LEVEL LT 7.0%: CPT | Mod: CPTII,,, | Performed by: INTERNAL MEDICINE

## 2024-09-10 PROCEDURE — 3080F DIAST BP >= 90 MM HG: CPT | Mod: CPTII,,, | Performed by: INTERNAL MEDICINE

## 2024-09-10 PROCEDURE — 99213 OFFICE O/P EST LOW 20 MIN: CPT | Mod: PBBFAC | Performed by: INTERNAL MEDICINE

## 2024-09-10 PROCEDURE — 4010F ACE/ARB THERAPY RXD/TAKEN: CPT | Mod: CPTII,,, | Performed by: INTERNAL MEDICINE

## 2024-09-10 PROCEDURE — 1160F RVW MEDS BY RX/DR IN RCRD: CPT | Mod: CPTII,,, | Performed by: INTERNAL MEDICINE

## 2024-09-10 PROCEDURE — 99999 PR PBB SHADOW E&M-EST. PATIENT-LVL III: CPT | Mod: PBBFAC,,, | Performed by: INTERNAL MEDICINE

## 2024-09-10 PROCEDURE — 3077F SYST BP >= 140 MM HG: CPT | Mod: CPTII,,, | Performed by: INTERNAL MEDICINE

## 2024-09-10 PROCEDURE — 3008F BODY MASS INDEX DOCD: CPT | Mod: CPTII,,, | Performed by: INTERNAL MEDICINE

## 2024-09-10 PROCEDURE — 99215 OFFICE O/P EST HI 40 MIN: CPT | Mod: S$PBB,,, | Performed by: INTERNAL MEDICINE

## 2024-09-10 RX ORDER — KETOCONAZOLE 20 MG/G
CREAM TOPICAL
Qty: 30 G | Refills: 1 | Status: SHIPPED | OUTPATIENT
Start: 2024-09-10 | End: 2024-09-12 | Stop reason: SDUPTHER

## 2024-09-10 NOTE — PROGRESS NOTES
Subjective:       Patient ID: Phylicia Vásquez is a 54 y.o. female.    Chief Complaint: No chief complaint on file.    HPI   Mrs Lua returns today for follow-up.   I had last seen her on May 7, 2024.     As of November 1, 2020 she has been on anastrozole in the adjuvant setting.  Of note, her estradiol level and her FSH level suggested that she is postmenopausal, hence initiation of anastrozole.    On April 21, 2021 she underwent a contralateral prophylactic mastectomy with ROWAN flap reconstructions bilaterally.      Briefly, she is a 54 year old AA female who was found to have a carcinoma that is ER positive, VT positive, and HER 2 equivocal, but negative by  FISH.  An axillary node biopsy was also positive.  She received standard neoadjuvant chemotherapy consisting of 4 cycles of AC and 4 cycles of Taxol prior to undergoing a mastectomy.  At the time of her mastectomy she had a 3 cm residual tumor while the 2 sentinel lymph nodes were positive.  A subsequent axillary node dissection showed no evidence of further chandler involvement.   She completed radiation therapy, and as of November 1, 2020 she has been on adjuvant anastrozole.      Review of Systems    Overall she feels OK, however, she again mentions the neuropathic symptoms involving her lower extremities.    Her symptoms consist primarily of tingling of the lower extremities.  She is on gabapentin 100 mg t.i.d..  Had initiated a referral to Neurology, however, she was scheduled for [facility] and obviously she did not go.  She also complains of myalgias involving her thighs and a stiffness involving primarily her fingers and hands.  In addition, over the last few weeks she has been experiencing low back pain.  Despite the above symptoms, her  ECOG PS is 1. She denies any depression, easy bruising, fevers, chills, night  sweats, weight loss, vomiting, diarrhea, constipation, diplopia, blurred vision, headache, chest pain, palpitations,  shortness of breath, left breast pain, abdominal pain, or difficulty ambulating.  The remainder of the ten-point ROS, including general, skin, lymph, H/N, cardiorespiratory, GI, , Neuro, Endocrine, and psychiatric is negative.       Objective:      Physical Exam      She is alert, oriented to time, place, person, pleasant, well      nourished, in no acute physical distress.                                   VITAL SIGNS:  Reviewed                                      HEENT:  Normal.  There are no nasal, oral, lip, gingival, auricular, lid,    or conjunctival lesions.  Mucosae are moist and pink, and there is no        thrush.  Pupils are equal, reactive to light and accommodation.              Extraocular muscle movements are intact.  Dentition is good.  There is no frontal or maxillary tenderness.                                     NECK:  Supple without JVD, adenopathy, or thyromegaly.                       LUNGS:  Clear to auscultation without wheezing, rales, or rhonchi.           CARDIOVASCULAR:  Reveals an S1, S2, no murmurs, no rubs, no gallops.         ABDOMEN:  Soft, nontender, without organomegaly.  Bowel sounds are    present.                                                                         EXTREMITIES:  No cyanosis, clubbing, but she does have 1+ edema primarily on the distal right upper extremity                               BREASTS:   she is now status post bilateral mastectomies with ROWAN flap reconstructions.  There is a hard area c/w fat necrosis at the 7:00 o clock position in the left breast, and a larger area in the upper outer quadrant of the reconstruction again compatible with fat necrosis.  I do not palpate any masses in the right reconstruction.    The patient had a left-sided mammogram in October 20, 2023 that showed extensive fat necrosis.  There is intertrigo in the left inframammary fold.  I do not palpate any axillary lymph nodes in either side.  LYMPHATIC:  There is no  cervical, axillary, or supraclavicular adenopathy.   SKIN:  Warm and moist, without petechiae, rashes, induration, or ecchymoses.           NEUROLOGIC:  DTRs are 0-1+ bilaterally, symmetrical, motor function is 5/5,  and cranial nerves are  within normal limits.    Assessment:       1. Carcinoma of axillary tail of right breast in female, estrogen receptor positive, pathologically T2N1M0 after neoadjuvant chemotherapy     2.    Axillary node metastases.  3.      Status post recent left prophylactic mastectomy with bilateral ROWAN flap reconstructions  4.      Extensive fat necrosis, left reconstruction  5.      Mild lymphedema, right upper extremity.    6.      Chemotherapy induced neuropathy  7.      Intertrigo, left inframammary folds  Plan:        I had a long discussion with her.    In regards to her neuropathy, we will re-initiate a referral to our Neurology colleagues.    In regards to her new onset low back pain, out of abundance of caution we will proceed with a bone scan.  In regards to the intertrigo, she will be treated with ketoconazole.  Prescription was sent to her pharmacy  She will remain on anastrozole through the end of October 2025, at which point I will recommend that she extend her treatment for an additional two years to complete 7 years of adjuvant hormonal therapy with AIs.   Assuming her bone scan is negative, she will return to see me in 4 months.  Her DXA scan will be repeated in 2025.  Her multiple questions were answered to her satisfaction.  I spent approximately 25 minutes reviewing the available records and 15 minutes evaluating the patient and coordinating her care.  Visit today included increased complexity associated with the care of the her breast cancer with new onset low back pain, persistent neuropathy,. and intertrigo      Route Chart for Scheduling    Med Onc Chart Routing  Urgent    Follow up with physician 4 months. Needs a bone scan ASAP.  She also needs a referral to a  neurologist in [place].  She got a referral for [facility] which is not acceptable   Follow up with DANIEL    Infusion scheduling note    Injection scheduling note    Labs    Imaging    Pharmacy appointment    Other referrals       Additional referrals needed  NO NEurology

## 2024-09-12 DIAGNOSIS — L30.4 INTERTRIGO: ICD-10-CM

## 2024-09-13 RX ORDER — KETOCONAZOLE 20 MG/G
CREAM TOPICAL
Qty: 30 G | Refills: 1 | Status: SHIPPED | OUTPATIENT
Start: 2024-09-13 | End: 2025-09-12

## 2024-09-13 NOTE — TELEPHONE ENCOUNTER
Please see the attached refill request.     Can you please resend to [facility] [id number] instead?

## 2024-09-19 ENCOUNTER — TELEPHONE (OUTPATIENT)
Dept: HEMATOLOGY/ONCOLOGY | Facility: CLINIC | Age: 54
End: 2024-09-19
Payer: MEDICAID

## 2024-09-28 DIAGNOSIS — M54.14 THORACIC RADICULOPATHY: ICD-10-CM

## 2024-09-28 NOTE — TELEPHONE ENCOUNTER
No care due was identified.  [facility] Embedded Care Due Messages. Reference number: [id number].   9/28/2024 9:47:18 AM CDT

## 2024-09-29 NOTE — TELEPHONE ENCOUNTER
Refill Routing Note   Medication(s) are not appropriate for processing by [facility] for the following reason(s):        Outside of protocol    [facility] action(s):  Route             Appointments  past 12m or future 3m with PCP    Date Provider   Last Visit   5/20/2024 Claudia Campos MD   Next Visit   Visit date not found Claudia Campos MD   ED visits in past 90 days: 0        Note composed:8:16 PM 09/28/2024

## 2024-09-30 ENCOUNTER — TELEPHONE (OUTPATIENT)
Dept: HEMATOLOGY/ONCOLOGY | Facility: CLINIC | Age: 54
End: 2024-09-30
Payer: MEDICAID

## 2024-09-30 NOTE — TELEPHONE ENCOUNTER
Left voicemail letting patient know that her 4 month follow up was scheduled. She can call the office if the day or time does not work.

## 2024-10-02 RX ORDER — CYCLOBENZAPRINE HCL 10 MG
TABLET ORAL
Qty: 90 TABLET | Refills: 3 | Status: SHIPPED | OUTPATIENT
Start: 2024-10-02

## 2024-10-05 NOTE — TELEPHONE ENCOUNTER
No care due was identified.  [facility] Embedded Care Due Messages. Reference number: [id number].   10/05/2024 7:59:44 AM CDT

## 2024-10-06 NOTE — TELEPHONE ENCOUNTER
Refill Routing Note   Medication(s) are not appropriate for processing by [facility] for the following reason(s):        Required vitals abnormal  09/10/24 (!) 159/96       [facility] action(s):  Defer        Medication Therapy Plan: 09/10/24 (!) 159/96      Appointments  past 12m or future 3m with PCP    Date Provider   Last Visit   5/20/2024 Claudia Campos MD   Next Visit   Visit date not found Claudia Campos MD   ED visits in past 90 days: 0        Note composed:8:23 AM 10/06/2024

## 2024-10-07 RX ORDER — AMLODIPINE AND VALSARTAN 10; 320 MG/1; MG/1
1 TABLET ORAL
Qty: 90 TABLET | Refills: 2 | Status: SHIPPED | OUTPATIENT
Start: 2024-10-07

## 2024-10-08 ENCOUNTER — HOSPITAL ENCOUNTER (OUTPATIENT)
Dept: RADIOLOGY | Facility: HOSPITAL | Age: 54
Discharge: HOME | End: 2024-10-08
Attending: INTERNAL MEDICINE
Payer: MEDICAID

## 2024-10-08 ENCOUNTER — PATIENT MESSAGE (OUTPATIENT)
Dept: NEUROLOGY | Facility: CLINIC | Age: 54
End: 2024-10-08
Payer: MEDICAID

## 2024-10-08 DIAGNOSIS — Z79.811 USE OF AROMATASE INHIBITORS: ICD-10-CM

## 2024-10-08 DIAGNOSIS — Z79.811 PROPHYLACTIC USE OF ANASTROZOLE (ARIMIDEX): ICD-10-CM

## 2024-10-08 DIAGNOSIS — C50.611 CARCINOMA OF AXILLARY TAIL OF RIGHT BREAST IN FEMALE, ESTROGEN RECEPTOR POSITIVE: Primary | ICD-10-CM

## 2024-10-08 DIAGNOSIS — Z17.0 CARCINOMA OF AXILLARY TAIL OF RIGHT BREAST IN FEMALE, ESTROGEN RECEPTOR POSITIVE: Primary | ICD-10-CM

## 2024-10-08 DIAGNOSIS — C50.611 CARCINOMA OF AXILLARY TAIL OF RIGHT BREAST IN FEMALE, ESTROGEN RECEPTOR POSITIVE: ICD-10-CM

## 2024-10-08 DIAGNOSIS — Z17.0 CARCINOMA OF AXILLARY TAIL OF RIGHT BREAST IN FEMALE, ESTROGEN RECEPTOR POSITIVE: ICD-10-CM

## 2024-10-08 PROCEDURE — A9503 TC99M MEDRONATE: HCPCS | Performed by: INTERNAL MEDICINE

## 2024-10-08 PROCEDURE — 78306 BONE IMAGING WHOLE BODY: CPT | Mod: TC

## 2024-10-08 PROCEDURE — 78306 BONE IMAGING WHOLE BODY: CPT | Mod: 26,,, | Performed by: NUCLEAR MEDICINE

## 2024-10-08 RX ORDER — TC 99M MEDRONATE 20 MG/10ML
20 INJECTION, POWDER, LYOPHILIZED, FOR SOLUTION INTRAVENOUS
Status: COMPLETED | OUTPATIENT
Start: 2024-10-08 | End: 2024-10-08

## 2024-10-08 RX ADMIN — TECHNETIUM TC 99M MEDRONATE 21.6 MILLICURIE: 20 INJECTION, POWDER, LYOPHILIZED, FOR SOLUTION INTRAVENOUS at 11:10

## 2024-10-24 ENCOUNTER — LAB VISIT (OUTPATIENT)
Dept: LAB | Facility: HOSPITAL | Age: 54
End: 2024-10-24
Payer: MEDICAID

## 2024-10-24 ENCOUNTER — OFFICE VISIT (OUTPATIENT)
Dept: NEUROLOGY | Facility: CLINIC | Age: 54
End: 2024-10-24
Payer: MEDICAID

## 2024-10-24 VITALS — HEART RATE: 100 BPM | DIASTOLIC BLOOD PRESSURE: 98 MMHG | SYSTOLIC BLOOD PRESSURE: 138 MMHG

## 2024-10-24 DIAGNOSIS — M54.9 DORSALGIA, UNSPECIFIED: Primary | ICD-10-CM

## 2024-10-24 DIAGNOSIS — G62.9 NEUROPATHY: ICD-10-CM

## 2024-10-24 DIAGNOSIS — G96.89 SPINAL CORD CYSTS: Chronic | ICD-10-CM

## 2024-10-24 DIAGNOSIS — G60.9 IDIOPATHIC PERIPHERAL NEUROPATHY: ICD-10-CM

## 2024-10-24 LAB
FERRITIN SERPL-MCNC: 124 NG/ML (ref 20–300)
FOLATE SERPL-MCNC: 8 NG/ML (ref 4–24)
IRON SERPL-MCNC: 53 UG/DL (ref 30–160)
SATURATED IRON: 13 % (ref 20–50)
TOTAL IRON BINDING CAPACITY: 411 UG/DL (ref 250–450)
TRANSFERRIN SERPL-MCNC: 278 MG/DL (ref 200–375)

## 2024-10-24 PROCEDURE — 82607 VITAMIN B-12: CPT

## 2024-10-24 PROCEDURE — 3080F DIAST BP >= 90 MM HG: CPT | Mod: CPTII,,,

## 2024-10-24 PROCEDURE — 84207 ASSAY OF VITAMIN B-6: CPT

## 2024-10-24 PROCEDURE — 82728 ASSAY OF FERRITIN: CPT

## 2024-10-24 PROCEDURE — 36415 COLL VENOUS BLD VENIPUNCTURE: CPT

## 2024-10-24 PROCEDURE — 82746 ASSAY OF FOLIC ACID SERUM: CPT

## 2024-10-24 PROCEDURE — 3075F SYST BP GE 130 - 139MM HG: CPT | Mod: CPTII,,,

## 2024-10-24 PROCEDURE — 84425 ASSAY OF VITAMIN B-1: CPT

## 2024-10-24 PROCEDURE — 99999 PR PBB SHADOW E&M-EST. PATIENT-LVL III: CPT | Mod: PBBFAC,,,

## 2024-10-24 PROCEDURE — 4010F ACE/ARB THERAPY RXD/TAKEN: CPT | Mod: CPTII,,,

## 2024-10-24 PROCEDURE — 99213 OFFICE O/P EST LOW 20 MIN: CPT | Mod: PBBFAC

## 2024-10-24 PROCEDURE — 83540 ASSAY OF IRON: CPT

## 2024-10-24 PROCEDURE — 99205 OFFICE O/P NEW HI 60 MIN: CPT | Mod: S$PBB,,,

## 2024-10-24 PROCEDURE — 1159F MED LIST DOCD IN RCRD: CPT | Mod: CPTII,,,

## 2024-10-24 PROCEDURE — 3044F HG A1C LEVEL LT 7.0%: CPT | Mod: CPTII,,,

## 2024-10-24 RX ORDER — GABAPENTIN 100 MG/1
300 CAPSULE ORAL 3 TIMES DAILY
Qty: 270 CAPSULE | Refills: 3 | Status: SHIPPED | OUTPATIENT
Start: 2024-10-24

## 2024-10-24 NOTE — PROGRESS NOTES
[facility] - NEUROLOGY 7TH [place] [facility], [place]    Date: 10/24/24  Patient Name: Phylicia Vásquez   MRN: [id number]   PCP: Claudia Campos  Referring Provider: Other    Assessment:   Phylicia Vásquez is a 54 y.o. female presenting for evaluation of neuropathy. Possible that her neuropathy is secondary to chemo-induced neuropathy, but will evaluate for metabolic causes as well. She also has weakness of bilateral feet of uncertain etiology. 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 Also will pursue EMG/NCS.    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 Phylicia Vásquez is a 54 y.o. female presenting for evaluation of neuropathy  Referred by Other    Has been present since 2021 after finishing chemo  Feels like numbness- started by feeling like a tingling pain followed by just numbness - no more tingling  Seems to be aggravated by things touching her feet  Has been on gabapentin 100mg TID- but takes them all at once after getting off work- doesn't seem to make a difference  Was on 300mg before but has been lowered- unsure why  Has done cayene pepper/castor oil  Had been on venlafaxine but not anymore  Used to take B12 but  doesn't anymore  Had stopped eating meat during her cancer treatments    R foot turns outwards when walking- has tried exercising and vapor rub  Balance is off- fell 3x last year  Has done PT for about a year at the cancer center- may have helped some, has the ball with spikes to roll feet on    Keeps her awake at night  Has insomnia since finding out about cancer    Has seen pain management for back pain and gotten NATHALIE    PAST MEDICAL HISTORY:  Past Medical History:   Diagnosis Date    Anxiety     Back pain     Goiter     HTN (hypertension) 10/18/2012    Hx antineoplastic chemo     last dose 20    Hx of psychiatric care     Ambien, Klonopin    Insomnia 10/18/2012    Malignant neoplasm of axillary tail of right breast in female, estrogen receptor positive 2019    right    Neck mass     right posterior    Primary invasive malignant neoplasm of female breast, right 2019    right    Psychiatric problem     S/P abdominal supracervical subtotal hysterectomy, continues to have regular menses. 2014    Spinal cord cyst, L1      Thoracic radiculopathy, bulging disc at T10-11 and T11-12        PAST SURGICAL HISTORY:  Past Surgical History:   Procedure Laterality Date    AXILLARY NODE DISSECTION Right 2020    Procedure: LYMPHADENECTOMY, AXILLARY;  Surgeon: Lelo Guaman MD;  Location: [facility] OR [address] [place] [address];  Service: General;  Laterality: Right;    BREAST BIOPSY Right     + CA    BREAST BIOPSY Left     BREAST RECONSTRUCTION Bilateral 2021     SECTION      CHOLECYSTECTOMY      COLONOSCOPY N/A 10/05/2015    Procedure: COLONOSCOPY;  Surgeon: JERONIMO Cancion MD;  Location: [address]);  Service: Endoscopy;  Laterality: N/A;    COLONOSCOPY N/A 2021    Procedure: COLONOSCOPY;  Surgeon: JERONIMO Cancino MD;  Location: [address]);  Service: Endoscopy;  Laterality: N/A;  fully vaccinated-GT    EPIDURAL STEROID INJECTION N/A 2023    Procedure: LUMBAR L3/4  IL NATHALIE;  Surgeon: Kashmir Weiss MD;  Location: [facility] PAIN MGT;  Service: Pain Management;  Laterality: N/A;  2 WK F/U DANIEL    HYSTERECTOMY  2007    BC--SUPRACERVICAL    INJECTION FOR SENTINEL NODE IDENTIFICATION Right 05/20/2020    Procedure: INJECTION, FOR SENTINEL NODE IDENTIFICATION;  Surgeon: Lelo Guaman MD;  Location: [facility] OR;  Service: General;  Laterality: Right;    INSERTION OF BREAST TISSUE EXPANDER Right 05/20/2020    Procedure: INSERTION, TISSUE EXPANDER, BREAST;  Surgeon: Pablo Ramos MD;  Location: [facility];  Service: Plastics;  Laterality: Right;    INSERTION OF TUNNELED CENTRAL VENOUS CATHETER (CVC) WITH SUBCUTANEOUS PORT Right 11/21/2019    Procedure: [id number] Fluoro needed;  Surgeon: Lelo Guaman MD;  Location: [address];  Service: General;  Laterality: Right;  Right internal jugular.     LIPOMA RESECTION  12/2021    MASTECTOMY Bilateral 04/2021    with reconstruction    NEEDLE LOCALIZATION Left 05/31/2021    Procedure: NEEDLE LOCALIZATION;  Surgeon: Kraig Mello MD;  Location: [facility] CATH LAB;  Service: Radiology;  Laterality: Left;    RECONSTRUCTION OF BREAST WITH DEEP INFERIOR EPIGASTRIC ARTERY  (ROWAN) FREE FLAP Bilateral 04/21/2021    Procedure: RECONSTRUCTION, BREAST, USING ROWAN FREE FLAP;  Surgeon: Pablo Ramos MD;  Location: [facility];  Service: Plastics;  Laterality: Bilateral;    SENTINEL LYMPH NODE BIOPSY Right 05/20/2020    Procedure: BIOPSY, LYMPH NODE, SENTINEL;  Surgeon: Lelo Guaman MD;  Location: [facility];  Service: General;  Laterality: Right;    TISSUE EXPANDER REMOVAL Right 04/21/2021    Procedure: REMOVAL, TISSUE EXPANDER;  Surgeon: Pablo Ramos MD;  Location: [facility];  Service: Plastics;  Laterality: Right;    UNILATERAL MASTECTOMY Right 05/20/2020    Procedure: MASTECTOMY, UNILATERAL;  Surgeon: Lelo Guaman MD;  Location: [facility];  Service: General;  Laterality: Right;    UNILATERAL MASTECTOMY Left 04/21/2021    Procedure:  MASTECTOMY, UNILATERAL PROPHYLACTIC;  Surgeon: Lelo Guaman MD;  Location: [facility];  Service: General;  Laterality: Left;       CURRENT MEDS:  Current Outpatient Medications   Medication Sig Dispense Refill    albuterol (PROVENTIL/VENTOLIN HFA) 90 mcg/actuation inhaler Inhale 1-2 puffs into the lungs every 6 (six) hours as needed for Wheezing. Rescue 18 g 0    amlodipine-valsartan (EXFORGE)  mg per tablet TAKE 1 TABLET BY MOUTH EVERY DAY 90 tablet 2    anastrozole (ARIMIDEX) 1 mg Tab TAKE 1 TABLET BY MOUTH EVERY DAY 90 tablet 3    ascorbic acid (VITAMIN C ORAL) Take 500 mg by mouth every morning.       azelastine-fluticasone (DYMISTA) 137-50 mcg/spray Spry nassal spray 1 spray by Each Nostril route 2 (two) times daily. 23 g 0    cyanocobalamin, vitamin B-12, (VITAMIN B-12 ORAL) Take by mouth daily as needed.       cyclobenzaprine (FLEXERIL) 10 MG tablet TAKE 1 TABLET BY MOUTH THREE TIMES A DAY AS NEEDED FOR MUSCLE SPASM 90 tablet 3    ketoconazole (NIZORAL) 2 % cream Apply to affected area twice daily 30 g 1    LIDOcaine HCl 2% (XYLOCAINE) 2 % JelP urojet Apply topically every evening.      meloxicam (MOBIC) 7.5 MG tablet TAKE 1 TABLET BY MOUTH EVERY DAY 30 tablet 1    ondansetron (ZOFRAN) 4 MG tablet Take 1 tablet (4 mg total) by mouth every 6 (six) hours. 12 tablet 0    oxyCODONE-acetaminophen (PERCOCET) 5-325 mg per tablet Take 1 tablet by mouth every 6 (six) hours as needed for Pain. 12 tablet 0    venlafaxine (EFFEXOR-XR) 75 MG 24 hr capsule TAKE 1 CAPSULE BY MOUTH EVERY DAY IN THE MORNING 90 capsule 3    vitamin E 200 UNIT capsule Take 200 Units by mouth daily as needed.       zolpidem (AMBIEN) 10 mg Tab Take 1 tablet (10 mg total) by mouth nightly as needed (Sleep). 30 tablet 2    gabapentin (NEURONTIN) 100 MG capsule Take 3 capsules (300 mg total) by mouth 3 (three) times daily. 270 capsule 3     No current facility-administered medications for this visit.       ALLERGIES:  Review of patient's  allergies indicates:   Allergen Reactions    Butalbital Other (See Comments)     Chest pain         FAMILY HISTORY:  Family History   Problem Relation Name Age of Onset    Cancer Paternal Aunt      Lupus Paternal Aunt      Hypertension Mother      Depression Mother      Liver disease Father      Alcohol abuse Father      Cancer Father          Lung and prostate cancer    Colon cancer Paternal Uncle      Depression Son      Breast cancer Neg Hx      Ovarian cancer Neg Hx      Melanoma Neg Hx      Psoriasis Neg Hx      Eczema Neg Hx         SOCIAL HISTORY:  Social History     Tobacco Use    Smoking status: Never    Smokeless tobacco: Never   Substance Use Topics    Alcohol use: Yes     Comment: rarely    Drug use: No       Review of Systems:  12 system review of systems is negative except for the symptoms mentioned in HPI.      Objective:     Vitals:    10/24/24 0859   BP: (!) 138/98   BP Location: Left arm   Patient Position: Sitting   Pulse: 100     General: NAD, well-appearing  Eyes: no discharge or erythema   ENT: mucous membranes moist  Neck: Supple, full range of motion  Lungs: Normal work of breathing, not in respiratory distress  Skin: No rash or lesions  Psychiatry: Mood and affect are appropriate   Abdomen: flat, non-distended  Extremeties: No cyanosis or edema.    Neurological Exam:  MENTAL STATUS: Alert and oriented to person, place, and time. Attention and concentration within normal limits. Speech without dysarthria, able to name and repeat without difficulty. Recent and remote memory within normal limits.   CRANIAL NERVES: Visual fields intact. PERRL. EOMI. Facial sensation intact. Facial expression symmetrical. Hearing grossly intact. Full shoulder shrug bilaterally.   SENSORY: Light touch: intact throughout, mildly decreased in LLE below ankle; Pinprick: mildly decreased on bilateral feet; Vibration: mildly decreased in bilateral toes. Negative Romberg.  MOTOR: Normal bulk and tone. 5/5 deltoid,  biceps, triceps, interosseous, hand  bilaterally. 5/5 hip flexion, knee extension/flexion bilaterally. Dorsiflexion 5/5 bilaterally. Plantarflexion 4-/5 LLE, 4+/5 RLE, R foot eversion 4-/5  REFLEXES: Trace in BUE, unable to illicit patellar reflexes  CEREBELLAR/COORDINATION/GAIT: Decreased stride length. Slight eversion of R foot. Finger to nose intact.    Labs/Imaging:  MRI L-spine 2014: Oval cystic lesion distal spinal cord at the conus shows no abnormal contrast enhancement ; the appearance favoring benign process. Consider short interval 3 to 6 month follow-up MRI.      1

## 2024-10-25 PROBLEM — M54.9 DORSALGIA, UNSPECIFIED: Status: ACTIVE | Noted: 2024-10-25

## 2024-10-25 PROBLEM — G60.9 IDIOPATHIC PERIPHERAL NEUROPATHY: Status: ACTIVE | Noted: 2024-10-25

## 2024-10-25 LAB — VIT B12 SERPL-MCNC: >1400 NG/L (ref 180–914)

## 2024-10-25 NOTE — PROGRESS NOTES
I have seen the patient, reviewed the Resident's history and physical, assessment, and plan. I have personally interviewed and examined the patient at bedside and agree with the findings.     Shellie Asehr MD  Neurology - Movement Disorders Division

## 2024-10-30 LAB
PYRIDOXAL SERPL-MCNC: 8 UG/L (ref 5–50)
VIT B1 BLD-MCNC: 51 UG/L (ref 38–122)

## 2024-11-06 ENCOUNTER — TELEPHONE (OUTPATIENT)
Dept: NEUROLOGY | Facility: CLINIC | Age: 54
End: 2024-11-06
Payer: MEDICAID

## 2024-11-06 NOTE — TELEPHONE ENCOUNTER
Spoke to patient in regards to scheduling the EMG Procedure. Patient stated that she would like to be scheduled at our [facility] location and sometime after the beginning of the new year. I informed her that someone from Dr. Banerjee's office on the  will be contacting her within the nest day to schedule her Procedure. Patient verbalized understanding.

## 2024-11-06 NOTE — TELEPHONE ENCOUNTER
----- Message from Luis Daniel Gamez  sent at 10/25/2024 11:19 AM CDT -----  Regarding: EMG Scheduling  Morning Kin,     Would you be able to assist with getting this patient scheduled for an EMG?    It can be with any provider.    Thanks,  Rubén

## 2024-12-10 ENCOUNTER — TELEPHONE (OUTPATIENT)
Dept: INTERNAL MEDICINE | Facility: CLINIC | Age: 54
End: 2024-12-10
Payer: MEDICAID

## 2024-12-10 NOTE — TELEPHONE ENCOUNTER
----- Message from Trice sent at 12/10/2024  9:59 AM CST -----  Contact: patient portal message  [email or web address]     Patient is calling for Medical Advice regarding:Patient is calling . Patient need a letter from  for Jury Duty. Patient is schedule for this Friday     How long has patient had these symptoms:    Pharmacy name and phone#:    Patient wants a call back or thru [facility]:portal     Comments:    Please advise patient replies from provider may take up to 48 hours.

## 2024-12-12 ENCOUNTER — PATIENT MESSAGE (OUTPATIENT)
Dept: HEMATOLOGY/ONCOLOGY | Facility: CLINIC | Age: 54
End: 2024-12-12
Payer: MEDICAID

## 2024-12-28 ENCOUNTER — HOSPITAL ENCOUNTER (EMERGENCY)
Facility: HOSPITAL | Age: 54
Discharge: HOME | End: 2024-12-28
Attending: EMERGENCY MEDICINE
Payer: MEDICAID

## 2024-12-28 VITALS
RESPIRATION RATE: 15 BRPM | OXYGEN SATURATION: 100 % | DIASTOLIC BLOOD PRESSURE: 105 MMHG | HEART RATE: 108 BPM | TEMPERATURE: 99 F | SYSTOLIC BLOOD PRESSURE: 165 MMHG | HEIGHT: 63 IN | WEIGHT: 240 LBS | BODY MASS INDEX: 42.52 KG/M2

## 2024-12-28 DIAGNOSIS — J20.8 VIRAL BRONCHITIS: ICD-10-CM

## 2024-12-28 DIAGNOSIS — R82.998 LEUKOCYTES IN URINE: ICD-10-CM

## 2024-12-28 DIAGNOSIS — J10.1 INFLUENZA A: Primary | ICD-10-CM

## 2024-12-28 LAB
BACTERIA #/AREA URNS HPF: NORMAL /HPF
BILIRUB UR QL STRIP: NEGATIVE
CLARITY UR: CLEAR
COLOR UR: YELLOW
CTP QC/QA: YES
GLUCOSE SERPL-MCNC: 109 MG/DL (ref 70–110)
GLUCOSE UR QL STRIP: NEGATIVE
HGB UR QL STRIP: NEGATIVE
KETONES UR QL STRIP: NEGATIVE
LEUKOCYTE ESTERASE UR QL STRIP: ABNORMAL
MICROSCOPIC COMMENT: NORMAL
MOLECULAR STREP A: NEGATIVE
NITRITE UR QL STRIP: NEGATIVE
PH UR STRIP: 8 [PH] (ref 5–8)
POC MOLECULAR INFLUENZA A AGN: POSITIVE
POC MOLECULAR INFLUENZA B AGN: NEGATIVE
POCT GLUCOSE: 109 MG/DL (ref 70–110)
PROT UR QL STRIP: NEGATIVE
SARS-COV-2 RDRP RESP QL NAA+PROBE: NEGATIVE
SP GR UR STRIP: 1.02 (ref 1–1.03)
SQUAMOUS #/AREA URNS HPF: 13 /HPF
URN SPEC COLLECT METH UR: ABNORMAL
UROBILINOGEN UR STRIP-ACNC: ABNORMAL EU/DL
WBC #/AREA URNS HPF: 4 /HPF (ref 0–5)

## 2024-12-28 PROCEDURE — 82962 GLUCOSE BLOOD TEST: CPT

## 2024-12-28 PROCEDURE — 99284 EMERGENCY DEPT VISIT MOD MDM: CPT | Mod: 25

## 2024-12-28 PROCEDURE — 87651 STREP A DNA AMP PROBE: CPT

## 2024-12-28 PROCEDURE — 25000003 PHARM REV CODE 250: Performed by: PHYSICIAN ASSISTANT

## 2024-12-28 PROCEDURE — 81000 URINALYSIS NONAUTO W/SCOPE: CPT | Performed by: PHYSICIAN ASSISTANT

## 2024-12-28 PROCEDURE — 87502 INFLUENZA DNA AMP PROBE: CPT

## 2024-12-28 PROCEDURE — 87635 SARS-COV-2 COVID-19 AMP PRB: CPT | Performed by: PHYSICIAN ASSISTANT

## 2024-12-28 RX ORDER — OSELTAMIVIR PHOSPHATE 75 MG/1
75 CAPSULE ORAL
Status: COMPLETED | OUTPATIENT
Start: 2024-12-28 | End: 2024-12-28

## 2024-12-28 RX ORDER — ACETAMINOPHEN 500 MG
1000 TABLET ORAL
Status: COMPLETED | OUTPATIENT
Start: 2024-12-28 | End: 2024-12-28

## 2024-12-28 RX ORDER — PREDNISONE 20 MG/1
60 TABLET ORAL DAILY
Qty: 9 TABLET | Refills: 0 | Status: SHIPPED | OUTPATIENT
Start: 2024-12-28 | End: 2024-12-31

## 2024-12-28 RX ORDER — PREDNISONE 20 MG/1
60 TABLET ORAL DAILY
Qty: 9 TABLET | Refills: 0 | Status: SHIPPED | OUTPATIENT
Start: 2024-12-28 | End: 2024-12-28

## 2024-12-28 RX ORDER — AMOXICILLIN AND CLAVULANATE POTASSIUM 875; 125 MG/1; MG/1
1 TABLET, FILM COATED ORAL 2 TIMES DAILY
Qty: 14 TABLET | Refills: 0 | Status: SHIPPED | OUTPATIENT
Start: 2024-12-28 | End: 2024-12-28

## 2024-12-28 RX ORDER — IBUPROFEN 600 MG/1
600 TABLET ORAL
Status: COMPLETED | OUTPATIENT
Start: 2024-12-28 | End: 2024-12-28

## 2024-12-28 RX ORDER — ALBUTEROL SULFATE 90 UG/1
1-2 INHALANT RESPIRATORY (INHALATION) EVERY 6 HOURS PRN
Qty: 6.7 G | Refills: 1 | Status: SHIPPED | OUTPATIENT
Start: 2024-12-28 | End: 2024-12-28

## 2024-12-28 RX ORDER — PROMETHAZINE HYDROCHLORIDE AND DEXTROMETHORPHAN HYDROBROMIDE 6.25; 15 MG/5ML; MG/5ML
5 SYRUP ORAL EVERY 4 HOURS PRN
Qty: 118 ML | Refills: 0 | Status: SHIPPED | OUTPATIENT
Start: 2024-12-28 | End: 2025-01-07

## 2024-12-28 RX ORDER — AMOXICILLIN AND CLAVULANATE POTASSIUM 875; 125 MG/1; MG/1
1 TABLET, FILM COATED ORAL 2 TIMES DAILY
Qty: 14 TABLET | Refills: 0 | Status: SHIPPED | OUTPATIENT
Start: 2024-12-28 | End: 2025-01-04

## 2024-12-28 RX ORDER — ALBUTEROL SULFATE 90 UG/1
1-2 INHALANT RESPIRATORY (INHALATION) EVERY 6 HOURS PRN
Qty: 6.7 G | Refills: 1 | Status: SHIPPED | OUTPATIENT
Start: 2024-12-28

## 2024-12-28 RX ORDER — PROMETHAZINE HYDROCHLORIDE AND DEXTROMETHORPHAN HYDROBROMIDE 6.25; 15 MG/5ML; MG/5ML
5 SYRUP ORAL EVERY 4 HOURS PRN
Qty: 118 ML | Refills: 0 | Status: SHIPPED | OUTPATIENT
Start: 2024-12-28 | End: 2024-12-28

## 2024-12-28 RX ORDER — OXYMETAZOLINE HCL 0.05 %
1 SPRAY, NON-AEROSOL (ML) NASAL 2 TIMES DAILY
Qty: 15 ML | Refills: 0 | Status: SHIPPED | OUTPATIENT
Start: 2024-12-28 | End: 2024-12-28

## 2024-12-28 RX ORDER — OSELTAMIVIR PHOSPHATE 75 MG/1
75 CAPSULE ORAL 2 TIMES DAILY
Qty: 10 CAPSULE | Refills: 0 | Status: SHIPPED | OUTPATIENT
Start: 2024-12-28 | End: 2025-01-02

## 2024-12-28 RX ORDER — OSELTAMIVIR PHOSPHATE 75 MG/1
75 CAPSULE ORAL 2 TIMES DAILY
Qty: 10 CAPSULE | Refills: 0 | Status: SHIPPED | OUTPATIENT
Start: 2024-12-28 | End: 2024-12-28

## 2024-12-28 RX ORDER — OXYMETAZOLINE HCL 0.05 %
1 SPRAY, NON-AEROSOL (ML) NASAL 2 TIMES DAILY
Qty: 15 ML | Refills: 0 | Status: SHIPPED | OUTPATIENT
Start: 2024-12-28 | End: 2024-12-31

## 2024-12-28 RX ADMIN — ACETAMINOPHEN 1000 MG: 500 TABLET, FILM COATED ORAL at 09:12

## 2024-12-28 RX ADMIN — IBUPROFEN 600 MG: 600 TABLET, FILM COATED ORAL at 10:12

## 2024-12-28 RX ADMIN — OSELTAMIVIR PHOSPHATE 75 MG: 75 CAPSULE ORAL at 10:12

## 2024-12-28 NOTE — Clinical Note
"Phylicia Pantoja" Fahad was seen and treated in our emergency department on 12/28/2024.  She may return to work on 12/30/2024.  If afebrile for 24 hours     If you have any questions or concerns, please don't hesitate to call.      Terrence Maldonado PA-C"

## 2024-12-28 NOTE — DISCHARGE INSTRUCTIONS
Her/She Thank you for coming to our Emergency Department today. It is important to remember that some problems or medical conditions are difficult to diagnose and may not be found or addressed during your Emergency Department visit.  These conditions often start with non-specific symptoms and can only be diagnosed on follow up visits with your primary care physician or specialist when the symptoms continue or change. Please remember that all medical conditions can change, and we cannot predict how you will be feeling tomorrow or the next day. Return to the ER with any questions/concerns, new/concerning symptoms, worsening or failure to improve.       Be sure to follow up with your primary care doctor and review all labs/imaging/tests that were performed during your ER visit with them. It is very common for us to identify non-emergent incidental findings which must be followed up with your primary care physician.  Some labs/imaging/tests may be outside of the normal range, and require non-emergent follow-up and/or further investigation/treatment/procedures/testing to help diagnose/exclude/prevent complications or other potentially serious medical conditions. Some abnormalities may not have been discussed or addressed during your ER visit.     An ER visit does not replace a primary care visit, and many screening tests or follow-up tests cannot be ordered by an ER doctor or performed by the ER. Some tests may even require pre-approval.    If you do not have a primary care doctor, you may contact the one listed on your discharge paperwork or you may also call the [facility] Appointment Desk at [phone] , or [address] [place] [address] at  [phone] to schedule an appointment, or establish care with a primary care doctor or even a specialist and to obtain information about local resources. It is important to your health that you have a primary care doctor.    Please take all medications as directed. We have done our best to select  a medication for you that will treat your condition however, all medications may potentially have side-effects and it is impossible to predict which medications may give you side-effects or what those side-effects (if any) those medications may give you.  If you feel that you are having a negative effect or side-effect of any medication you should stop taking those medications immediately and seek medical attention. If you feel that you are having a life-threatening reaction call 911.        Do not drive, swim, climb to height, take a bath, operate heavy machinery, drink alcohol or take potentially sedating medications, sign any legal documents or make any important decisions for 24 hours if you have received any pain medications, sedatives or mood altering drugs during your ER visit or within 24 hours of taking them if they have been prescribed to you.     You can find additional resources for Dentists, hearing aids, durable medical equipment, low cost pharmacies and other resources at [email or web address]

## 2024-12-28 NOTE — ED PROVIDER NOTES
Encounter Date: 2024    SCRIBE #1 NOTE: I, Marco Lavonne, am scribing for, and in the presence of,  Terrnece Maldonado PA-C.       History     Chief Complaint   Patient presents with    Cough     Patient with cough fever, body aches and congestion for 3 days      54 y.o. female with PMHx of HTN, presents to the ED for evaluation of URI symptoms x 3 days. Patient reports of cough, congestion, fever, chills, generalized myalgias, nausea and urinary frequency. Attempted treatment for symptoms with Tylenol, last taken this morning. Denies vomiting, abdominal pain, dysuria, hematuria, or any other associated symptoms.      The history is provided by the patient. No  was used.     Review of patient's allergies indicates:   Allergen Reactions    Butalbital Other (See Comments)     Chest pain       Past Medical History:   Diagnosis Date    Anxiety     Back pain     Goiter     HTN (hypertension) 10/18/2012    Hx antineoplastic chemo     last dose 20    Hx of psychiatric care     Ambien, Klonopin    Insomnia 10/18/2012    Malignant neoplasm of axillary tail of right breast in female, estrogen receptor positive 2019    right    Neck mass     right posterior    Primary invasive malignant neoplasm of female breast, right 2019    right    Psychiatric problem     S/P abdominal supracervical subtotal hysterectomy, continues to have regular menses. -2014    Spinal cord cyst, L1      Thoracic radiculopathy, bulging disc at T10-11 and T11-12      Past Surgical History:   Procedure Laterality Date    AXILLARY NODE DISSECTION Right 2020    Procedure: LYMPHADENECTOMY, AXILLARY;  Surgeon: Lelo Guaman MD;  Location: [facility] OR [address] [place] [address];  Service: General;  Laterality: Right;    BREAST BIOPSY Right     + CA    BREAST BIOPSY Left     BREAST RECONSTRUCTION Bilateral 2021     SECTION      CHOLECYSTECTOMY      COLONOSCOPY N/A 10/05/2015    Procedure: COLONOSCOPY;   Surgeon: JERONIMO Cancino MD;  Location: [facility] ENDO (4TH FLR);  Service: Endoscopy;  Laterality: N/A;    COLONOSCOPY N/A 07/13/2021    Procedure: COLONOSCOPY;  Surgeon: JERONIMO Cancino MD;  Location: [facility] ENDO (4TH FLR);  Service: Endoscopy;  Laterality: N/A;  fully vaccinated-GT    EPIDURAL STEROID INJECTION N/A 12/7/2023    Procedure: LUMBAR L3/4 IL NATHALIE;  Surgeon: Kashmir Weiss MD;  Location: [facility] PAIN MGT;  Service: Pain Management;  Laterality: N/A;  2 WK F/U DANIEL    HYSTERECTOMY  2007    BC--SUPRACERVICAL    INJECTION FOR SENTINEL NODE IDENTIFICATION Right 05/20/2020    Procedure: INJECTION, FOR SENTINEL NODE IDENTIFICATION;  Surgeon: Lelo Guaman MD;  Location: [facility] OR;  Service: General;  Laterality: Right;    INSERTION OF BREAST TISSUE EXPANDER Right 05/20/2020    Procedure: INSERTION, TISSUE EXPANDER, BREAST;  Surgeon: Pablo Ramos MD;  Location: [facility];  Service: Plastics;  Laterality: Right;    INSERTION OF TUNNELED CENTRAL VENOUS CATHETER (CVC) WITH SUBCUTANEOUS PORT Right 11/21/2019    Procedure: [id number] Fluoro needed;  Surgeon: Lelo Guaman MD;  Location: [facility] 2ND FLR;  Service: General;  Laterality: Right;  Right internal jugular.     LIPOMA RESECTION  12/2021    MASTECTOMY Bilateral 04/2021    with reconstruction    NEEDLE LOCALIZATION Left 05/31/2021    Procedure: NEEDLE LOCALIZATION;  Surgeon: Kraig Mello MD;  Location: [facility] CATH LAB;  Service: Radiology;  Laterality: Left;    RECONSTRUCTION OF BREAST WITH DEEP INFERIOR EPIGASTRIC ARTERY  (ROWAN) FREE FLAP Bilateral 04/21/2021    Procedure: RECONSTRUCTION, BREAST, USING ROWAN FREE FLAP;  Surgeon: Pablo Ramos MD;  Location: [facility] OR;  Service: Plastics;  Laterality: Bilateral;    SENTINEL LYMPH NODE BIOPSY Right 05/20/2020    Procedure: BIOPSY, LYMPH NODE, SENTINEL;  Surgeon: Lelo Guaman MD;  Location: [facility];  Service: General;  Laterality: Right;    TISSUE EXPANDER REMOVAL Right 04/21/2021     Procedure: REMOVAL, TISSUE EXPANDER;  Surgeon: Pablo Ramos MD;  Location: [facility];  Service: Plastics;  Laterality: Right;    UNILATERAL MASTECTOMY Right 05/20/2020    Procedure: MASTECTOMY, UNILATERAL;  Surgeon: Lelo Guaman MD;  Location: [facility];  Service: General;  Laterality: Right;    UNILATERAL MASTECTOMY Left 04/21/2021    Procedure: MASTECTOMY, UNILATERAL PROPHYLACTIC;  Surgeon: Lelo Guaman MD;  Location: [facility];  Service: General;  Laterality: Left;     Family History   Problem Relation Name Age of Onset    Cancer Paternal Aunt      Lupus Paternal Aunt      Hypertension Mother      Depression Mother      Liver disease Father      Alcohol abuse Father      Cancer Father          Lung and prostate cancer    Colon cancer Paternal Uncle      Depression Son      Breast cancer Neg Hx      Ovarian cancer Neg Hx      Melanoma Neg Hx      Psoriasis Neg Hx      Eczema Neg Hx       Social History     Tobacco Use    Smoking status: Never    Smokeless tobacco: Never   Substance Use Topics    Alcohol use: Yes     Comment: rarely    Drug use: No     Review of Systems   Constitutional:  Positive for chills and fever.   HENT:  Positive for congestion. Negative for sore throat and trouble swallowing.    Respiratory:  Positive for cough. Negative for shortness of breath.    Cardiovascular:  Negative for chest pain.   Gastrointestinal:  Positive for nausea. Negative for abdominal pain, constipation, diarrhea and vomiting.   Genitourinary:  Positive for frequency. Negative for dysuria, flank pain, hematuria and urgency.   Musculoskeletal:  Positive for myalgias. Negative for back pain.   Skin:  Negative for rash.   Neurological:  Negative for headaches.   All other systems reviewed and are negative.      Physical Exam     Initial Vitals [12/28/24 0914]   BP Pulse Resp Temp SpO2   135/88 (!) 123 18 (!) 101.6 °F (38.7 °C) 96 %      MAP       --         Physical Exam    Nursing note and vitals  reviewed.  Constitutional: She appears well-developed and well-nourished. She is not diaphoretic. No distress.   HENT:   Head: Normocephalic and atraumatic.   Nasal congestion present without active rhinorrhea. No sinus TTP. TMs intact without erythema or swelling; able to discern bony landmarks. No mastoid tenderness or swelling behind the ears. No pain with manipulation of external ears. Dry mucous membranes, but otherwise normal oropharynx; no edema, erythema, tonsillar exudates, uvula deviation, changes in phonation, trismus, drooling, No cervical adenopathy. No meningeal signs.      Eyes: Conjunctivae and EOM are normal. Right eye exhibits no discharge. Left eye exhibits no discharge.   Neck: No tracheal deviation present. No JVD present.   Normal range of motion.  Cardiovascular:  Normal rate and regular rhythm.           Pulmonary/Chest: No stridor. No respiratory distress. She has wheezes (end expiratory).   Abdominal: Abdomen is soft. She exhibits no distension. There is no abdominal tenderness. There is no guarding.   Musculoskeletal:         General: Normal range of motion.      Cervical back: Normal range of motion.     Neurological: She is alert and oriented to person, place, and time. She displays no tremor. She displays no seizure activity. Coordination and gait normal.   Skin: Skin is warm and dry. No pallor.         ED Course   Procedures  Labs Reviewed   URINALYSIS, REFLEX TO URINE CULTURE - Abnormal       Result Value    Specimen UA Urine, Clean Catch      Color, UA Yellow      Appearance, UA Clear      pH, UA 8.0      Specific Gravity, UA 1.020      Protein, UA Negative      Glucose, UA Negative      Ketones, UA Negative      Bilirubin (UA) Negative      Occult Blood UA Negative      Nitrite, UA Negative      Urobilinogen, UA 4.0-6.0 (*)     Leukocytes, UA Trace (*)     Narrative:     Specimen Source->Urine   POCT INFLUENZA A/B MOLECULAR - Abnormal    POC Molecular Influenza A Ag Positive (*)      POC Molecular Influenza B Ag Negative       Acceptable Yes     URINALYSIS MICROSCOPIC    WBC, UA 4      Bacteria Rare      Squam Epithel, UA 13      Microscopic Comment SEE COMMENT      Narrative:     Specimen Source->Urine   POCT STREP A MOLECULAR    Molecular Strep A, POC Negative       Acceptable Yes     SARS-COV-2 RDRP GENE    POC Rapid COVID Negative       Acceptable Yes     POCT GLUCOSE MONITORING CONTINUOUS    POC Glucose 109            Imaging Results              X-Ray Chest AP Portable (Final result)  Result time 12/28/24 10:32:35      Final result by Ra Woodward MD (12/28/24 10:32:35)                   Impression:      Mildly prominent interstitial markings could relate to subsegmental atelectasis, edema, infection, and/or noninfectious inflammatory process.      Electronically signed by: Ra Woodward  Date:    12/28/2024  Time:    10:32               Narrative:    EXAMINATION:  XR CHEST AP PORTABLE    CLINICAL HISTORY:  cough;    TECHNIQUE:  Single frontal view of the chest was performed.    COMPARISON:  Chest radiograph performed 07/22/2023, 06:21 hours.    FINDINGS:  Grossly unchanged cardiac contours    Mildly prominent interstitial markings.    No definite focal airspace consolidation    Chronic elevation the right hemidiaphragm, nonspecific.    No definite pneumothorax or large volume pleural effusion.    No acute findings in the visualized abdomen operative sequela project in the upper abdomen    Osseous and soft tissue structures appear without definite acute change.  Operative sequela project in the chest.                                       Medications   acetaminophen tablet 1,000 mg (1,000 mg Oral Given 12/28/24 0938)   ibuprofen tablet 600 mg (600 mg Oral Given 12/28/24 1041)   oseltamivir capsule 75 mg (75 mg Oral Given 12/28/24 1041)     Medical Decision Making  Influenza a positive without hypoxia or respiratory distress.  Chest x-ray  most consistent with influenza, although given 3rd day of high fever there is concern for possible early/developing bacterial pneumonia.  Only trace leukocytes on UA that appears contaminated but given presence of symptoms, I am inclined to treat empirically.  Will place on Augmentin which should cover for both early pneumonia as well as UTI.  Patient is requesting Tamiflu.  COVID-19 negative.  She is overall well-appearing and vitals do improve in the ED with antipyretics.  She does have wheezing that is likely contributing to her symptoms; will offer albuterol and steroid burst.  Return precautions.    Amount and/or Complexity of Data Reviewed  Labs: ordered. Decision-making details documented in ED Course.  Radiology: ordered. Decision-making details documented in ED Course.    Risk  OTC drugs.  Prescription drug management.            Scribe Attestation:   Scribe #1: I performed the above scribed service and the documentation accurately describes the services I performed. I attest to the accuracy of the note.                             I, Terrence Maldonado PA-C, personally performed the services described in this documentation. All medical record entries made by the scribe were at my direction and in my presence. I have reviewed the chart and agree that the record reflects my personal performance and is accurate and complete.      DISCLAIMER: This note was prepared with [employer] voice recognition transcription software. Garbled syntax, mangled pronouns, and other bizarre constructions may be attributed to that software system.      Clinical Impression:  Final diagnoses:  [J10.1] Influenza A (Primary)  [J20.8] Viral bronchitis  [R82.998] Leukocytes in urine          ED Disposition Condition    Discharge Stable          ED Prescriptions       Medication Sig Dispense Start Date End Date Auth. Provider    promethazine-dextromethorphan (PROMETHAZINE-DM) 6.25-15 mg/5 mL Syrp  (Status: Discontinued) Take 5 mLs by mouth  every 4 (four) hours as needed (cough). 118 mL 12/28/2024 12/28/2024 Terrence Maldonado PA-C    oxymetazoline (AFRIN) 0.05 % nasal spray  (Status: Discontinued) 1 spray by Nasal route 2 (two) times daily. DO NOT EXCEED USE FOR MORE THAN 3 DAYS IN A ROW. for 3 days 15 mL 12/28/2024 12/28/2024 Terrence Maldonado PA-C    oseltamivir (TAMIFLU) 75 MG capsule  (Status: Discontinued) Take 1 capsule (75 mg total) by mouth 2 (two) times daily. for 5 days 10 capsule 12/28/2024 12/28/2024 Terrence Maldonado PA-C    albuterol (PROVENTIL/VENTOLIN HFA) 90 mcg/actuation inhaler  (Status: Discontinued) Inhale 1-2 puffs into the lungs every 6 (six) hours as needed for Wheezing. Rescue 6.7 g 12/28/2024 12/28/2024 Terrence Maldonado PA-C    predniSONE (DELTASONE) 20 MG tablet  (Status: Discontinued) Take 3 tablets (60 mg total) by mouth once daily. for 3 days 9 tablet 12/28/2024 12/28/2024 Terrence Maldonado PA-C    amoxicillin-clavulanate 875-125mg (AUGMENTIN) 875-125 mg per tablet  (Status: Discontinued) Take 1 tablet by mouth 2 (two) times daily. for 7 days 14 tablet 12/28/2024 12/28/2024 Terrence Maldonado PA-C    albuterol (PROVENTIL/VENTOLIN HFA) 90 mcg/actuation inhaler Inhale 1-2 puffs into the lungs every 6 (six) hours as needed for Wheezing. Rescue 6.7 g 12/28/2024 -- Terrence Maldonado PA-RAINA    amoxicillin-clavulanate 875-125mg (AUGMENTIN) 875-125 mg per tablet Take 1 tablet by mouth 2 (two) times daily. for 7 days 14 tablet 12/28/2024 1/4/2025 Terrence Maldonado PA-C    oseltamivir (TAMIFLU) 75 MG capsule Take 1 capsule (75 mg total) by mouth 2 (two) times daily. for 5 days 10 capsule 12/28/2024 1/2/2025 Terrence Maldonado PA-C    oxymetazoline (AFRIN) 0.05 % nasal spray 1 spray by Nasal route 2 (two) times daily. DO NOT EXCEED USE FOR MORE THAN 3 DAYS IN A ROW. for 3 days 15 mL 12/28/2024 12/31/2024 Terrence Maldonado PA-C    predniSONE (DELTASONE) 20 MG tablet Take 3 tablets (60 mg total) by mouth once daily. for 3 days 9  tablet 12/28/2024 12/31/2024 Terrence Maldonado PA-C    promethazine-dextromethorphan (PROMETHAZINE-DM) 6.25-15 mg/5 mL Syrp Take 5 mLs by mouth every 4 (four) hours as needed (cough). 118 mL 12/28/2024 1/7/2025 Terrence Maldonado PA-C          Follow-up Information       Follow up With Specialties Details Why Contact Info    Claudia Campos MD Internal Medicine Schedule an appointment as soon as possible for a visit in 1 day For re-evaluation [address]  [place] [address]  [phone]      [facility] Emergency Medicine Go to  If symptoms worsen or new symptoms develop [address] [facility] [place] [address] [phone]             Terrence Maldonado PA-C  12/28/24 1153

## 2024-12-30 ENCOUNTER — TELEPHONE (OUTPATIENT)
Dept: INTERNAL MEDICINE | Facility: CLINIC | Age: 54
End: 2024-12-30
Payer: COMMERCIAL

## 2024-12-30 NOTE — TELEPHONE ENCOUNTER
----- Message from Gissel sent at 12/30/2024  8:36 AM CST -----  Regarding: Call back  Contact: [phone]  Type:  Sooner Apoointment Request    Caller is requesting a sooner appointment.  Caller declined first available appointment listed below.  Caller will not accept being placed on the waitlist and is requesting a message be sent to doctor.  Name of Caller: PT   When is the first available appointment?   Symptoms: Hospital follow up   Would the patient rather a call back or a [facility]? Call back   Best Call Back Number: [phone]  Additional Information:

## 2025-01-04 DIAGNOSIS — L30.4 INTERTRIGO: ICD-10-CM

## 2025-01-06 RX ORDER — KETOCONAZOLE 20 MG/G
CREAM TOPICAL
Qty: 30 G | Refills: 1 | Status: SHIPPED | OUTPATIENT
Start: 2025-01-06

## 2025-01-08 ENCOUNTER — OFFICE VISIT (OUTPATIENT)
Dept: INTERNAL MEDICINE | Facility: CLINIC | Age: 55
End: 2025-01-08
Payer: COMMERCIAL

## 2025-01-08 VITALS
HEIGHT: 63 IN | OXYGEN SATURATION: 98 % | BODY MASS INDEX: 43.91 KG/M2 | SYSTOLIC BLOOD PRESSURE: 145 MMHG | DIASTOLIC BLOOD PRESSURE: 82 MMHG | HEART RATE: 100 BPM | WEIGHT: 247.81 LBS

## 2025-01-08 DIAGNOSIS — F19.982 DRUG-INDUCED INSOMNIA: ICD-10-CM

## 2025-01-08 DIAGNOSIS — R73.03 PREDIABETES: Primary | ICD-10-CM

## 2025-01-08 DIAGNOSIS — Z85.3 HISTORY OF BREAST CANCER: Chronic | ICD-10-CM

## 2025-01-08 DIAGNOSIS — G62.9 NEUROPATHY: ICD-10-CM

## 2025-01-08 DIAGNOSIS — C50.611 CARCINOMA OF AXILLARY TAIL OF RIGHT BREAST IN FEMALE, ESTROGEN RECEPTOR POSITIVE: ICD-10-CM

## 2025-01-08 DIAGNOSIS — I10 ESSENTIAL HYPERTENSION: Chronic | ICD-10-CM

## 2025-01-08 DIAGNOSIS — Z17.0 CARCINOMA OF AXILLARY TAIL OF RIGHT BREAST IN FEMALE, ESTROGEN RECEPTOR POSITIVE: ICD-10-CM

## 2025-01-08 PROCEDURE — 4010F ACE/ARB THERAPY RXD/TAKEN: CPT | Mod: CPTII,S$GLB,, | Performed by: INTERNAL MEDICINE

## 2025-01-08 PROCEDURE — 3008F BODY MASS INDEX DOCD: CPT | Mod: CPTII,S$GLB,, | Performed by: INTERNAL MEDICINE

## 2025-01-08 PROCEDURE — 3077F SYST BP >= 140 MM HG: CPT | Mod: CPTII,S$GLB,, | Performed by: INTERNAL MEDICINE

## 2025-01-08 PROCEDURE — 3079F DIAST BP 80-89 MM HG: CPT | Mod: CPTII,S$GLB,, | Performed by: INTERNAL MEDICINE

## 2025-01-08 PROCEDURE — 99214 OFFICE O/P EST MOD 30 MIN: CPT | Mod: S$GLB,,, | Performed by: INTERNAL MEDICINE

## 2025-01-08 PROCEDURE — 99999 PR PBB SHADOW E&M-EST. PATIENT-LVL III: CPT | Mod: PBBFAC,,, | Performed by: INTERNAL MEDICINE

## 2025-01-08 RX ORDER — GABAPENTIN 100 MG/1
300 CAPSULE ORAL 3 TIMES DAILY
Qty: 270 CAPSULE | Refills: 3 | Status: SHIPPED | OUTPATIENT
Start: 2025-01-08

## 2025-01-08 RX ORDER — ZOLPIDEM TARTRATE 10 MG/1
10 TABLET ORAL NIGHTLY PRN
Qty: 30 TABLET | Refills: 2 | Status: SHIPPED | OUTPATIENT
Start: 2025-01-08 | End: 2025-07-09

## 2025-01-08 NOTE — PROGRESS NOTES
Subjective:       Patient ID: Phylicia Vásquez is a 54 y.o. female.    Chief Complaint: hospital f/u    Recently went to ER on 12/27 cough. Dx with flu. Feeling much better today.     She works at [employer] and was working the night of the [employer]. She is understandably processing the events of the evening. Offered counseling services today.     PMHx  Chronic thoracic back has done PT and Jennifer in the past on gabapentin and flexeril      HTN  on amlodipine-valsartan.      Hx of breast cancer dx in 2019. S/p mastectomy with axillary node dissection, chemotherapy and XRT. She was started on Anastrozole as she appeared to be post menopausal on labs.   She will be on anastrozole until October 2025     Lymphedema due to surgery  completed PT and has sleeve and compression machione      Chemo induced neuropathy. Has been on gabapentin but ran out recently.      Insomnia related to anastrozole. On ambien.      Prediabetes last A1C is 6.0 in May 2023      Health Maintenance:  Colon Cancer Screening: normal colonoscopy in 2021. Due again in 2031   DEXA: normal  6/24/2023   Mammogram: Normal November 2023   HIV: negative 2022   Hep C: negative 2022   Lipids: normal   Vaccines:  up to date      Works overnight at  of [employer]                Follow-up  Pertinent negatives include no abdominal pain, arthralgias, chest pain, chills, coughing, headaches, myalgias, nausea or vomiting.     Review of Systems   Constitutional:  Negative for activity change, appetite change and chills.   HENT:  Negative for ear pain, sinus pressure/congestion and sneezing.    Respiratory:  Negative for cough and shortness of breath.    Cardiovascular:  Negative for chest pain, palpitations and leg swelling.   Gastrointestinal:  Negative for abdominal distention, abdominal pain, constipation, diarrhea, nausea and vomiting.   Genitourinary:  Negative for dysuria and hematuria.   Musculoskeletal:  Negative for arthralgias, back  pain and myalgias.   Neurological:  Negative for dizziness and headaches.   Psychiatric/Behavioral:  Negative for agitation. The patient is not nervous/anxious.            Past Medical History:   Diagnosis Date    Anxiety     Back pain     Goiter     HTN (hypertension) 10/18/2012    Hx antineoplastic chemo     last dose 20    Hx of psychiatric care     Ambien, Klonopin    Insomnia 10/18/2012    Malignant neoplasm of axillary tail of right breast in female, estrogen receptor positive 2019    right    Neck mass     right posterior    Primary invasive malignant neoplasm of female breast, right 2019    right    Psychiatric problem     S/P abdominal supracervical subtotal hysterectomy, continues to have regular menses. 2014    Spinal cord cyst, L1      Thoracic radiculopathy, bulging disc at T10-11 and T11-12      Past Surgical History:   Procedure Laterality Date    AXILLARY NODE DISSECTION Right 2020    Procedure: LYMPHADENECTOMY, AXILLARY;  Surgeon: Lelo Guaman MD;  Location: [facility] OR [address] [place] [address];  Service: General;  Laterality: Right;    BREAST BIOPSY Right     + CA    BREAST BIOPSY Left     BREAST RECONSTRUCTION Bilateral 2021     SECTION      CHOLECYSTECTOMY      COLONOSCOPY N/A 10/05/2015    Procedure: COLONOSCOPY;  Surgeon: JERONIMO Cancino MD;  [facility]  Service: Endoscopy;  Laterality: N/A;    COLONOSCOPY N/A 2021    Procedure: COLONOSCOPY;  Surgeon: JERONIMO Cancino MD;  Location: [address]);  Service: Endoscopy;  Laterality: N/A;  fully vaccinated-GT    EPIDURAL STEROID INJECTION N/A 2023    Procedure: LUMBAR L3/4 IL NATHALIE;  Surgeon: Kashmir Weiss MD;  Location: [facility] PAIN MGT;  Service: Pain Management;  Laterality: N/A;  2 WK F/U DANIEL    HYSTERECTOMY      BC--SUPRACERVICAL    INJECTION FOR SENTINEL NODE IDENTIFICATION Right 2020    Procedure: INJECTION, FOR SENTINEL NODE IDENTIFICATION;  Surgeon: Lelo ECKERT  MD Deniz;  Location: [facility];  Service: General;  Laterality: Right;    INSERTION OF BREAST TISSUE EXPANDER Right 05/20/2020    Procedure: INSERTION, TISSUE EXPANDER, BREAST;  Surgeon: Pablo Ramos MD;  Location: [facility];  Service: Plastics;  Laterality: Right;    INSERTION OF TUNNELED CENTRAL VENOUS CATHETER (CVC) WITH SUBCUTANEOUS PORT Right 11/21/2019    Procedure: [id number] Fluoro needed;  Surgeon: Lelo Guaman MD;  Location: [address];  Service: General;  Laterality: Right;  Right internal jugular.     LIPOMA RESECTION  12/2021    MASTECTOMY Bilateral 04/2021    with reconstruction    NEEDLE LOCALIZATION Left 05/31/2021    Procedure: NEEDLE LOCALIZATION;  Surgeon: Kraig Mello MD;  Location: [facility] CATH LAB;  Service: Radiology;  Laterality: Left;    RECONSTRUCTION OF BREAST WITH DEEP INFERIOR EPIGASTRIC ARTERY  (ROWAN) FREE FLAP Bilateral 04/21/2021    Procedure: RECONSTRUCTION, BREAST, USING ROWAN FREE FLAP;  Surgeon: Pablo Ramos MD;  Location: [facility];  Service: Plastics;  Laterality: Bilateral;    SENTINEL LYMPH NODE BIOPSY Right 05/20/2020    Procedure: BIOPSY, LYMPH NODE, SENTINEL;  Surgeon: Lelo Guaman MD;  Location: [facility];  Service: General;  Laterality: Right;    TISSUE EXPANDER REMOVAL Right 04/21/2021    Procedure: REMOVAL, TISSUE EXPANDER;  Surgeon: Pablo Ramos MD;  Location: [facility];  Service: Plastics;  Laterality: Right;    UNILATERAL MASTECTOMY Right 05/20/2020    Procedure: MASTECTOMY, UNILATERAL;  Surgeon: Lelo Guaman MD;  Location: [facility];  Service: General;  Laterality: Right;    UNILATERAL MASTECTOMY Left 04/21/2021    Procedure: MASTECTOMY, UNILATERAL PROPHYLACTIC;  Surgeon: Lelo Guaman MD;  Location: [facility];  Service: General;  Laterality: Left;      Patient Active Problem List   Diagnosis    Psychophysiological insomnia    Multinodular non-toxic goiter, Subcentimeter nodules May 2012, anterior fat pad.    BMI 40.0-44.9, adult     S/P abdominal supracervical subtotal hysterectomy, continues to have regular menses.     Seborrheic dermatitis of scalp and face    Spinal cord cyst, 11 mm CSF cyst, in the conus medullaris on the right side at L1 level. .    Thoracic radiculopathy    Fatty liver    Bulge of thoracic disc T10-11 and T11-12    Thoracic radiculitis    Screen for colon cancer,     Papilloma of right breast    Morbid obesity    Essential hypertension    Prediabetes    Carcinoma of axillary tail of right breast in female, estrogen receptor positive    Malignant neoplasm of axillary tail of right female breast    Adjustment disorder with mixed anxiety and depressed mood    Antineoplastic chemotherapy induced anemia    Neuropathy, from chemo 2019    Preop testing    Prophylactic use of anastrozole (Arimidex)    Periumbilical hernia    Abnormal CT of the abdomen, 1/16/2021 ground glass opacities in right middle and upper lobes    Rib pain    History of breast cancer    Seroma of breast    Soft tissue mass, right occiput    Toenail fungus    Lymphedema of right arm    Impaired functional mobility and activity tolerance    Weakness of left lower extremity    Abnormality of gait    Impairment of balance    Acute left-sided low back pain without sciatica    Dorsalgia, unspecified    Idiopathic peripheral neuropathy        Objective:      Physical Exam  Constitutional:       Appearance: Normal appearance.   HENT:      Head: Normocephalic.   Cardiovascular:      Rate and Rhythm: Normal rate and regular rhythm.      Pulses: Normal pulses.      Heart sounds: Normal heart sounds.   Pulmonary:      Effort: Pulmonary effort is normal.      Breath sounds: Normal breath sounds.   Abdominal:      General: Abdomen is flat. Bowel sounds are normal.      Palpations: Abdomen is soft.   Musculoskeletal:         General: Normal range of motion.      Cervical back: Normal range of motion and neck supple.   Skin:     General: Skin is warm  "and dry.   Neurological:      General: No focal deficit present.      Mental Status: She is alert and oriented to person, place, and time.   Psychiatric:         Mood and Affect: Mood normal.         Assessment:       Problem List Items Addressed This Visit          Neuro    Neuropathy, from chemo 2019 (Chronic)    Relevant Medications    gabapentin (NEURONTIN) 100 MG capsule       Cardiac/Vascular    Essential hypertension (Chronic)    Relevant Orders    Comprehensive Metabolic Panel    CBC Auto Differential    Lipid Panel    TSH       Oncology    Carcinoma of axillary tail of right breast in female, estrogen receptor positive    History of breast cancer (Chronic)       Endocrine    Prediabetes - Primary    Relevant Orders    Hemoglobin A1C     Other Visit Diagnoses       Drug-induced insomnia        Relevant Medications    zolpidem (AMBIEN) 10 mg Tab              Plan:         Phylicia Pantoja" was seen today for hospital f/u.    Diagnoses and all orders for this visit:    Prediabetes  -     Hemoglobin A1C; Future  Check labs before next visit    Essential hypertension  Well controlled on current meds    Neuropathy, from chemo 2019  -     gabapentin (NEURONTIN) 100 MG capsule; Take 3 capsules (300 mg total) by mouth 3 (three) times daily.  Well controlled with gabapepentin     History of breast cancer  Carcinoma of axillary tail of right breast in female, estrogen receptor positive  Continue anastrozole.   Follow up oncology     Drug-induced insomnia  -     zolpidem (AMBIEN) 10 mg Tab; Take 1 tablet (10 mg total) by mouth nightly as needed (Sleep).  Refilled today.   Due to anastrozole                   Claudia Campos MD   Internal Medicine   Primary Care      "

## 2025-01-14 ENCOUNTER — OFFICE VISIT (OUTPATIENT)
Dept: HEMATOLOGY/ONCOLOGY | Facility: CLINIC | Age: 55
End: 2025-01-14
Payer: COMMERCIAL

## 2025-01-14 VITALS
HEIGHT: 63 IN | DIASTOLIC BLOOD PRESSURE: 87 MMHG | TEMPERATURE: 98 F | SYSTOLIC BLOOD PRESSURE: 138 MMHG | OXYGEN SATURATION: 96 % | BODY MASS INDEX: 43.98 KG/M2 | WEIGHT: 248.25 LBS | HEART RATE: 106 BPM | RESPIRATION RATE: 18 BRPM

## 2025-01-14 DIAGNOSIS — Z17.0 CARCINOMA OF AXILLARY TAIL OF RIGHT BREAST IN FEMALE, ESTROGEN RECEPTOR POSITIVE: Primary | ICD-10-CM

## 2025-01-14 DIAGNOSIS — Z79.811 PROPHYLACTIC USE OF ANASTROZOLE (ARIMIDEX): ICD-10-CM

## 2025-01-14 DIAGNOSIS — C50.611 CARCINOMA OF AXILLARY TAIL OF RIGHT BREAST IN FEMALE, ESTROGEN RECEPTOR POSITIVE: Primary | ICD-10-CM

## 2025-01-14 PROCEDURE — 1159F MED LIST DOCD IN RCRD: CPT | Mod: CPTII,S$GLB,, | Performed by: INTERNAL MEDICINE

## 2025-01-14 PROCEDURE — 4010F ACE/ARB THERAPY RXD/TAKEN: CPT | Mod: CPTII,S$GLB,, | Performed by: INTERNAL MEDICINE

## 2025-01-14 PROCEDURE — 3075F SYST BP GE 130 - 139MM HG: CPT | Mod: CPTII,S$GLB,, | Performed by: INTERNAL MEDICINE

## 2025-01-14 PROCEDURE — 3079F DIAST BP 80-89 MM HG: CPT | Mod: CPTII,S$GLB,, | Performed by: INTERNAL MEDICINE

## 2025-01-14 PROCEDURE — 99999 PR PBB SHADOW E&M-EST. PATIENT-LVL IV: CPT | Mod: PBBFAC,,, | Performed by: INTERNAL MEDICINE

## 2025-01-14 PROCEDURE — 99214 OFFICE O/P EST MOD 30 MIN: CPT | Mod: S$GLB,,, | Performed by: INTERNAL MEDICINE

## 2025-01-14 PROCEDURE — 3008F BODY MASS INDEX DOCD: CPT | Mod: CPTII,S$GLB,, | Performed by: INTERNAL MEDICINE

## 2025-01-14 NOTE — PROGRESS NOTES
Subjective:       Patient ID: Phylicia Vásquez is a 54 y.o. female.    Chief Complaint: No chief complaint on file.    HPI     Mrs Lua returns today for follow-up.   I had last seen her on September 10, 2024.     As of November 1, 2020 she has been on anastrozole in the adjuvant setting.  Of note, her estradiol level and her FSH level suggested that she is postmenopausal, hence initiation of anastrozole.    On April 21, 2021 she underwent a contralateral prophylactic mastectomy with ROWAN flap reconstructions bilaterally.      Briefly, she is a 54 year old AA female who was found to have a carcinoma that is ER positive, CT positive, and HER 2 equivocal, but negative by  FISH.  An axillary node biopsy was also positive.  She received standard neoadjuvant chemotherapy consisting of 4 cycles of AC and 4 cycles of Taxol prior to undergoing a mastectomy.  At the time of her mastectomy she had a 3 cm residual tumor while the 2 sentinel lymph nodes were positive.  A subsequent axillary node dissection showed no evidence of further chandler involvement.   She completed radiation therapy, and as of November 1, 2020 she has been on adjuvant anastrozole.      Review of Systems    Overall she feels OK, and has tolerated the anastrazole well.    She does complain of myalgias and joint pains involving her legs and stiffness involving primarily her fingers and hands.   Despite the above symptoms, her  ECOG PS is 1. She denies any depression, easy bruising, fevers, chills, night  sweats, weight loss, vomiting, diarrhea, constipation, diplopia, blurred vision, headache, chest pain, palpitations, shortness of breath, left breast pain, abdominal pain, or difficulty ambulating.  The remainder of the ten-point ROS, including general, skin, lymph, H/N, cardiorespiratory, GI, , Neuro, Endocrine, and psychiatric is negative.       Objective:      Physical Exam      She is alert, oriented to time, place, person, pleasant, well       nourished, in no acute physical distress.                                   VITAL SIGNS:  Reviewed                                      HEENT:  Normal.  There are no nasal, oral, lip, gingival, auricular, lid,    or conjunctival lesions.  Mucosae are moist and pink, and there is no        thrush.  Pupils are equal, reactive to light and accommodation.              Extraocular muscle movements are intact.  Dentition is good.  There is no frontal or maxillary tenderness.                                     NECK:  Supple without JVD, adenopathy, or thyromegaly.                       LUNGS:  Clear to auscultation without wheezing, rales, or rhonchi.           CARDIOVASCULAR:  Reveals an S1, S2, no murmurs, no rubs, no gallops.         ABDOMEN:  Soft, nontender, without organomegaly.  Bowel sounds are    present.                                                                         EXTREMITIES:  No cyanosis, clubbing, but she does have 1+ edema primarily on the distal right upper extremity                               BREASTS:   she is now status post bilateral mastectomies with ROWAN flap reconstructions.  There is a hard area c/w fat necrosis at the 7:00 o clock position in the left breast, and a larger area in the upper outer quadrant of the reconstruction again compatible with fat necrosis.  I do not palpate any masses in the right reconstruction.    The patient had a left-sided mammogram in October 20, 2023 that showed extensive fat necrosis.  I do not palpate any axillary lymph nodes in either side.  LYMPHATIC:  There is no cervical, axillary, or supraclavicular adenopathy.   SKIN:  Warm and moist, without petechiae, rashes, induration, or ecchymoses.           NEUROLOGIC:  DTRs are 0-1+ bilaterally, symmetrical, motor function is 5/5,  and cranial nerves are  within normal limits.    Assessment:       1. Carcinoma of axillary tail of right breast in female, estrogen receptor positive, pathologically T2N1M0  after neoadjuvant chemotherapy     2.    Axillary node metastases.  3.      Status post recent left prophylactic mastectomy with bilateral ROWAN flap reconstructions  4.      Extensive fat necrosis, left reconstruction  5.      Mild lymphedema, right upper extremity.      Plan:        I had a long discussion with her.    She will remain on anastrozole through the end of October 2025.  I explained to her the BCI test.  If she is agreeable, we will run the test at the time of her next visit which will be close to the 5 year point of adjuvant hormonal therapy.  If, as I suspect, there is additional benefit, she is willing to extend her treatment to 7 years   She will return to see me in June and her DXA scan will be repeated prior to the visit.  Her multiple questions were answered to her satisfaction.  I spent 20 minutes reviewing the available records and 10 minutes evaluating the patient and coordinating her care.        Route Chart for Scheduling    Med Onc Chart Routing      Follow up with physician . Early June with a DXA scan   Follow up with DANIEL    Infusion scheduling note    Injection scheduling note    Labs    Imaging    Pharmacy appointment    Other referrals

## 2025-01-29 ENCOUNTER — TELEPHONE (OUTPATIENT)
Dept: NEUROLOGY | Facility: CLINIC | Age: 55
End: 2025-01-29
Payer: COMMERCIAL

## 2025-01-29 NOTE — TELEPHONE ENCOUNTER
----- Message from Annette sent at 1/29/2025  2:07 PM CST -----  Regarding: EMG  Type:  Patient Returning Call      Name of who is calling:pt        What is request in detail:pt is requesting a message via [facility] in regards to call she received to either confirm or reschedule her appt on 01/30. Patient would like to reschedule that appt        Can clinic reply by [facility]:yes        What number to call back if not in [facility]: [phone]

## 2025-01-30 ENCOUNTER — TELEPHONE (OUTPATIENT)
Dept: NEUROLOGY | Facility: CLINIC | Age: 55
End: 2025-01-30
Payer: COMMERCIAL

## 2025-01-30 NOTE — TELEPHONE ENCOUNTER
Called patient about rescheduling EMG appt due to provider will not be in clinic. No answer left voicemail

## 2025-02-09 DIAGNOSIS — F19.982 DRUG-INDUCED INSOMNIA: ICD-10-CM

## 2025-02-10 RX ORDER — ZOLPIDEM TARTRATE 10 MG/1
10 TABLET ORAL NIGHTLY PRN
Qty: 30 TABLET | Refills: 2 | Status: SHIPPED | OUTPATIENT
Start: 2025-02-10 | End: 2025-08-11

## 2025-02-10 NOTE — TELEPHONE ENCOUNTER
Refill Routing Note   Medication(s) are not appropriate for processing by [facility] for the following reason(s):        Outside of protocol    [facility] action(s):  Route             Appointments  past 12m or future 3m with PCP    Date Provider   Last Visit   1/8/2025 Claudia Campos MD   Next Visit   5/8/2025 Claudia Campos MD   ED visits in past 90 days: 1        Note composed:9:34 AM 02/10/2025

## 2025-02-10 NOTE — TELEPHONE ENCOUNTER
No care due was identified.  [facility] Embedded Care Due Messages. Reference number: [id number].   2/09/2025 6:32:18 PM CST

## 2025-02-28 ENCOUNTER — PATIENT MESSAGE (OUTPATIENT)
Dept: HEMATOLOGY/ONCOLOGY | Facility: CLINIC | Age: 55
End: 2025-02-28
Payer: COMMERCIAL

## 2025-03-09 DIAGNOSIS — L30.4 INTERTRIGO: ICD-10-CM

## 2025-03-10 ENCOUNTER — HOSPITAL ENCOUNTER (EMERGENCY)
Facility: HOSPITAL | Age: 55
Discharge: HOME | End: 2025-03-10
Attending: EMERGENCY MEDICINE
Payer: COMMERCIAL

## 2025-03-10 VITALS
DIASTOLIC BLOOD PRESSURE: 91 MMHG | BODY MASS INDEX: 42.52 KG/M2 | WEIGHT: 240 LBS | HEART RATE: 72 BPM | OXYGEN SATURATION: 97 % | SYSTOLIC BLOOD PRESSURE: 146 MMHG | TEMPERATURE: 98 F | RESPIRATION RATE: 18 BRPM | HEIGHT: 63 IN

## 2025-03-10 DIAGNOSIS — Z85.3 HISTORY OF BILATERAL BREAST CANCER: ICD-10-CM

## 2025-03-10 DIAGNOSIS — R07.89 CHEST WALL PAIN: ICD-10-CM

## 2025-03-10 DIAGNOSIS — M54.9 BACK PAIN WITHOUT SCIATICA: Primary | ICD-10-CM

## 2025-03-10 LAB
BACTERIA #/AREA URNS AUTO: ABNORMAL /HPF
BILIRUB UR QL STRIP: NEGATIVE
CLARITY UR REFRACT.AUTO: CLEAR
COLOR UR AUTO: COLORLESS
GLUCOSE UR QL STRIP: NEGATIVE
HGB UR QL STRIP: NEGATIVE
KETONES UR QL STRIP: NEGATIVE
LEUKOCYTE ESTERASE UR QL STRIP: ABNORMAL
MICROSCOPIC COMMENT: ABNORMAL
NITRITE UR QL STRIP: NEGATIVE
PH UR STRIP: 7 [PH] (ref 5–8)
PROT UR QL STRIP: NEGATIVE
RBC #/AREA URNS AUTO: 1 /HPF (ref 0–4)
SP GR UR STRIP: 1.01 (ref 1–1.03)
SQUAMOUS #/AREA URNS AUTO: 1 /HPF
URN SPEC COLLECT METH UR: ABNORMAL
WBC #/AREA URNS AUTO: 6 /HPF (ref 0–5)

## 2025-03-10 PROCEDURE — 99284 EMERGENCY DEPT VISIT MOD MDM: CPT | Mod: 25

## 2025-03-10 PROCEDURE — 25000003 PHARM REV CODE 250: Performed by: EMERGENCY MEDICINE

## 2025-03-10 PROCEDURE — 81001 URINALYSIS AUTO W/SCOPE: CPT | Performed by: EMERGENCY MEDICINE

## 2025-03-10 RX ORDER — METHOCARBAMOL 500 MG/1
1000 TABLET, FILM COATED ORAL NIGHTLY PRN
Qty: 10 TABLET | Refills: 0 | Status: SHIPPED | OUTPATIENT
Start: 2025-03-10 | End: 2025-03-15

## 2025-03-10 RX ORDER — OXYCODONE HYDROCHLORIDE 5 MG/1
5 TABLET ORAL
Refills: 0 | Status: COMPLETED | OUTPATIENT
Start: 2025-03-10 | End: 2025-03-10

## 2025-03-10 RX ORDER — METHOCARBAMOL 500 MG/1
1000 TABLET, FILM COATED ORAL NIGHTLY PRN
Qty: 10 TABLET | Refills: 0 | Status: SHIPPED | OUTPATIENT
Start: 2025-03-10 | End: 2025-03-10

## 2025-03-10 RX ORDER — NAPROXEN 375 MG/1
375 TABLET ORAL 2 TIMES DAILY WITH MEALS
Qty: 6 TABLET | Refills: 0 | Status: SHIPPED | OUTPATIENT
Start: 2025-03-10 | End: 2025-03-10

## 2025-03-10 RX ORDER — NAPROXEN 500 MG/1
500 TABLET ORAL
Status: COMPLETED | OUTPATIENT
Start: 2025-03-10 | End: 2025-03-10

## 2025-03-10 RX ORDER — KETOCONAZOLE 20 MG/G
1 CREAM TOPICAL 2 TIMES DAILY
Qty: 30 G | Refills: 1 | Status: SHIPPED | OUTPATIENT
Start: 2025-03-10

## 2025-03-10 RX ORDER — NAPROXEN 375 MG/1
375 TABLET ORAL 2 TIMES DAILY WITH MEALS
Qty: 6 TABLET | Refills: 0 | Status: SHIPPED | OUTPATIENT
Start: 2025-03-10 | End: 2025-03-13

## 2025-03-10 RX ORDER — LIDOCAINE 50 MG/G
1 PATCH TOPICAL
Status: DISCONTINUED | OUTPATIENT
Start: 2025-03-10 | End: 2025-03-10 | Stop reason: HOSPADM

## 2025-03-10 RX ADMIN — LIDOCAINE 1 PATCH: 50 PATCH CUTANEOUS at 07:03

## 2025-03-10 RX ADMIN — OXYCODONE 5 MG: 5 TABLET ORAL at 07:03

## 2025-03-10 RX ADMIN — NAPROXEN 500 MG: 500 TABLET ORAL at 07:03

## 2025-03-10 NOTE — ED PROVIDER NOTES
"Encounter Date: 3/10/2025       History     Chief Complaint   Patient presents with    Back Pain     + back pain noted to the upper thoracic region x 1 week, denies any injury, + tightened back pain, radiates to the front of the stomach, has hx of breast cancer is in remission, + double massectomy     HPI  53 Y/O F C/O atraumatic  "months, but worse 1 week ago" of gradual onset, non-radiating LEFT mid back pain w/no associated urinary  retention, urinary strength or urinary stream caliper changes, dysuria, frequency, hesitancy, urgency or vaginal discharge. No reported perianal  sensory deficits upon post-defecation wiping; no Hx of IVDU or perispinal surgeries; no Hx of trauma or bleeding disorders. She reports pain is aggravated with movement and alleviated with rest.  No rash or hypersensitivity to light touch over pain region. No ROM deficits to B/L UE or LE and ambulating at baseline. Otherwise, no recent illness or other complaints.    Review of patient's allergies indicates:   Allergen Reactions    Butalbital Other (See Comments)     Chest pain       Past Medical History:   Diagnosis Date    Anxiety     Back pain     Goiter     HTN (hypertension) 10/18/2012    Hx antineoplastic chemo     last dose 4/23/20    Hx of psychiatric care     Ambien, Klonopin    Insomnia 10/18/2012    Malignant neoplasm of axillary tail of right breast in female, estrogen receptor positive 11/24/2019    right    Neck mass     right posterior    Primary invasive malignant neoplasm of female breast, right 11/21/2019    right    Psychiatric problem     S/P abdominal supracervical subtotal hysterectomy, continues to have regular menses.  4/2/2014    Spinal cord cyst, L1 2014     Thoracic radiculopathy, bulging disc at T10-11 and T11-12      Past Surgical History:   Procedure Laterality Date    AXILLARY NODE DISSECTION Right 06/05/2020    Procedure: LYMPHADENECTOMY, AXILLARY;  Surgeon: Lelo Guaman MD;  [facility]; "  Service: General;  Laterality: Right;    BREAST BIOPSY Right     + CA    BREAST BIOPSY Left     BREAST RECONSTRUCTION Bilateral 2021     SECTION      CHOLECYSTECTOMY      COLONOSCOPY N/A 10/05/2015    Procedure: COLONOSCOPY;  Surgeon: JERONIMO Cancino MD;  Location: [facility] ENDO (4TH FLR);  Service: Endoscopy;  Laterality: N/A;    COLONOSCOPY N/A 2021    Procedure: COLONOSCOPY;  Surgeon: JERONIMO Cancino MD;  Location: [facility] ENDO (4TH FLR);  Service: Endoscopy;  Laterality: N/A;  fully vaccinated-GT    EPIDURAL STEROID INJECTION N/A 2023    Procedure: LUMBAR L3/4 IL NATHALIE;  Surgeon: Kashmir Weiss MD;  Location: [facility] PAIN MGT;  Service: Pain Management;  Laterality: N/A;  2 WK F/U DANIEL    HYSTERECTOMY  2007    BC--SUPRACERVICAL    INJECTION FOR SENTINEL NODE IDENTIFICATION Right 2020    Procedure: INJECTION, FOR SENTINEL NODE IDENTIFICATION;  Surgeon: Lelo Guaman MD;  Location: [facility] OR;  Service: General;  Laterality: Right;    INSERTION OF BREAST TISSUE EXPANDER Right 2020    Procedure: INSERTION, TISSUE EXPANDER, BREAST;  Surgeon: Pablo Ramos MD;  Location: [facility] OR;  Service: Plastics;  Laterality: Right;    INSERTION OF TUNNELED CENTRAL VENOUS CATHETER (CVC) WITH SUBCUTANEOUS PORT Right 2019    Procedure: [id number] Fluoro needed;  Surgeon: Lelo Guaman MD;  Location: [facility] 2ND FLR;  Service: General;  Laterality: Right;  Right internal jugular.     LIPOMA RESECTION  2021    MASTECTOMY Bilateral 2021    with reconstruction    NEEDLE LOCALIZATION Left 2021    Procedure: NEEDLE LOCALIZATION;  Surgeon: Kraig Mello MD;  Location: [facility] CATH LAB;  Service: Radiology;  Laterality: Left;    RECONSTRUCTION OF BREAST WITH DEEP INFERIOR EPIGASTRIC ARTERY  (ROWAN) FREE FLAP Bilateral 2021    Procedure: RECONSTRUCTION, BREAST, USING ROWAN FREE FLAP;  Surgeon: Pablo Ramos MD;  Location: [facility] OR;  Service: Plastics;   Laterality: Bilateral;    SENTINEL LYMPH NODE BIOPSY Right 05/20/2020    Procedure: BIOPSY, LYMPH NODE, SENTINEL;  Surgeon: Lelo Guaman MD;  Location: [facility] [place] OR;  Service: General;  Laterality: Right;    TISSUE EXPANDER REMOVAL Right 04/21/2021    Procedure: REMOVAL, TISSUE EXPANDER;  Surgeon: Pablo Ramos MD;  Location: [facility] [place] OR;  Service: Plastics;  Laterality: Right;    UNILATERAL MASTECTOMY Right 05/20/2020    Procedure: MASTECTOMY, UNILATERAL;  Surgeon: Lelo Guaman MD;  Location: [facility] [place] OR;  Service: General;  Laterality: Right;    UNILATERAL MASTECTOMY Left 04/21/2021    Procedure: MASTECTOMY, UNILATERAL PROPHYLACTIC;  Surgeon: Lelo Guaman MD;  Location: [facility] [place] OR;  Service: General;  Laterality: Left;     Family History   Problem Relation Name Age of Onset    Cancer Paternal Aunt      Lupus Paternal Aunt      Hypertension Mother      Depression Mother      Liver disease Father      Alcohol abuse Father      Cancer Father          Lung and prostate cancer    Colon cancer Paternal Uncle      Depression Son      Breast cancer Neg Hx      Ovarian cancer Neg Hx      Melanoma Neg Hx      Psoriasis Neg Hx      Eczema Neg Hx       Social History[1]  Review of Systems    Physical Exam     Initial Vitals [03/10/25 0501]   BP Pulse Resp Temp SpO2   (!) 171/110 103 18 98.4 °F (36.9 °C) 99 %      MAP       --         Physical Exam  GENERAL: Well-appearing and Non-Toxic; Well-Nourished; Speaking Full sentences and in mild distress 2/2 pain.  HEENT: AT/NC.  Anicteric.  NECK: Supple, FROM, no accessory muscle use.  HEART: Regular rate and rhythm, no M/G/T.  LUNGS: No Tachypnea, No Retractions, and CTA B/L with no W/R/R.  ABDOMEN: Soft, ND, NTTP.  BACK: Atraumatic, No midline TTP to C/T/LS spine with + TTP over LEFT distal thoracic paravertebral muscle eliciting/reproducing described pain; No CVA tenderness B/L. SLRT NEG.  SKIN: No paresthesia to light tough, asymmetric warmth, rash to thorax/flank.  EXTREMITIES: FROM.  No deformities, Soft compartments with 2+ pulses Prox/Dist Intact & Symm.  NEUROLOGIC: AAOx3, Answering Questions Appropriately, CN/PN Intact, Strength 5/5 to UE & LE Prox & Distally, No Saddle Anesthesia; Sensation Symmetrical to UE & LE, No Ataxia.    ED Course   Procedures  Labs Reviewed   URINALYSIS, REFLEX TO URINE CULTURE - Abnormal       Result Value    Specimen UA Urine, Clean Catch      Color, UA Colorless (*)     Appearance, UA Clear      pH, UA 7.0      Specific Gravity, UA 1.010      Protein, UA Negative      Glucose, UA Negative      Ketones, UA Negative      Bilirubin (UA) Negative      Occult Blood UA Negative      Nitrite, UA Negative      Leukocytes, UA 1+ (*)     Narrative:     Specimen Source->Urine   URINALYSIS MICROSCOPIC - Abnormal    RBC, UA 1      WBC, UA 6 (*)     Bacteria Many (*)     Squam Epithel, UA 1      Microscopic Comment SEE COMMENT      Narrative:     Specimen Source->Urine          Imaging Results              X-Ray Thoracic Spine AP Lateral (Final result)  Result time 03/10/25 07:43:39      Final result by Diaz Hogan MD (03/10/25 07:43:39)                   Impression:      See above      Electronically signed by: Diaz Hogan MD  Date:    03/10/2025  Time:    07:43               Narrative:    EXAMINATION:  XR THORACIC SPINE AP LATERAL    CLINICAL HISTORY:  Personal history of malignant neoplasm of breast    TECHNIQUE:  AP and lateral views of the thoracic spine were performed.    COMPARISON:  12/15/2019    FINDINGS:  Radiographs of the thoracic spine show no obvious collapse or bony destruction.  Surgical clips are seen in the breast.                                       X-Ray Chest PA And Lateral (Final result)  Result time 03/10/25 07:40:46      Final result by Diaz Hogan MD (03/10/25 07:40:46)                   Impression:      See above      Electronically signed by: Diaz Hogan MD  Date:    03/10/2025  Time:    07:40               Narrative:     EXAMINATION:  XR CHEST PA AND LATERAL    CLINICAL HISTORY:  Other chest pain    TECHNIQUE:  PA and lateral views of the chest were performed.    COMPARISON:  12/28/2024    FINDINGS:  Cardiac size is normal.  Lungs are clear and well aerated no infiltrate or pleural effusion.  Surgical clips are seen in the right axilla.                                       Medications   LIDOcaine 5 % patch 1 patch (1 patch Transdermal Patch Applied 3/10/25 7146)   naproxen tablet 500 mg (500 mg Oral Given 3/10/25 0734)   oxyCODONE immediate release tablet 5 mg (5 mg Oral Given 3/10/25 0734)     Medical Decision Making  Well appearing, afebrile, atraumatic and neurovascularly intact female presenting with signs and symptoms of musculoskeletal back pain.  No hyperesthesias, vesicular rash or signs/symptoms that would indicate herpetic zoster.  No migratory back pain or findings that would indicate thoracic aortic dissection.  No red flags warranting emergent MRI at this time.  Performed x-rays despite lack of trauma because of her remote history of breast cancer to assure no pathologic fracture/pathology.  Outpatient physical therapy, weight loss reduction and NSAIDs recommended.  Discussed symptoms warranting ED return, which she acknowledged.  Aware of many bacteria in a negative nitrite urine in an asymptomatic female will wait for urine cultured prior to any treatment.    Amount and/or Complexity of Data Reviewed  Radiology: ordered.    Risk  Prescription drug management.                                      Clinical Impression:  Final diagnoses:  [Z85.3] History of bilateral breast cancer  [R07.89] Chest wall pain  [M54.9] Back pain without sciatica (Primary)       Discussed with patient given her asymptomatic state and despite bacteria in the urine, likely a dirty specimen.  She is in agreement as she is not having any lower abdominal pain, dysuria, frequency, hesitancy, urgency or any other urinary  symptoms.  ____________________  Cornelius Buck MD, [facility]  Emergency Medicine Staff  10:57 AM 3/10/2025              [1]   Social History  Tobacco Use    Smoking status: Never    Smokeless tobacco: Never   Substance Use Topics    Alcohol use: Yes     Comment: rarely    Drug use: No        Vladimir Buck MD  03/10/25 1057

## 2025-03-10 NOTE — DISCHARGE INSTRUCTIONS
Imaging Results              X-Ray Thoracic Spine AP Lateral (Final result)  Result time 03/10/25 07:43:39      Final result by Diaz Hogan MD (03/10/25 07:43:39)                   Impression:      See above      Electronically signed by: Diaz Hogan MD  Date:    03/10/2025  Time:    07:43               Narrative:    EXAMINATION:  XR THORACIC SPINE AP LATERAL    CLINICAL HISTORY:  Personal history of malignant neoplasm of breast    TECHNIQUE:  AP and lateral views of the thoracic spine were performed.    COMPARISON:  12/15/2019    FINDINGS:  Radiographs of the thoracic spine show no obvious collapse or bony destruction.  Surgical clips are seen in the breast.                                       X-Ray Chest PA And Lateral (Final result)  Result time 03/10/25 07:40:46      Final result by Diaz Hogan MD (03/10/25 07:40:46)                   Impression:      See above      Electronically signed by: Diaz Hogan MD  Date:    03/10/2025  Time:    07:40               Narrative:    EXAMINATION:  XR CHEST PA AND LATERAL    CLINICAL HISTORY:  Other chest pain    TECHNIQUE:  PA and lateral views of the chest were performed.    COMPARISON:  12/28/2024    FINDINGS:  Cardiac size is normal.  Lungs are clear and well aerated no infiltrate or pleural effusion.  Surgical clips are seen in the right axilla.

## 2025-03-10 NOTE — ED NOTES
Phylicia Vásquez, a 54 y.o. female presents to the ED w/ complaint of back pain.    Pt reports back pain radiating to l flank x1 week getting progressively worse. Denies injury.     Triage note:  Chief Complaint   Patient presents with    Back Pain     + back pain noted to the upper thoracic region x 1 week, denies any injury, + tightened back pain, radiates to the front of the stomach, has hx of breast cancer is in remission, + double massectomy     Review of patient's allergies indicates:   Allergen Reactions    Butalbital Other (See Comments)     Chest pain       Past Medical History:   Diagnosis Date    Anxiety     Back pain     Goiter     HTN (hypertension) 10/18/2012    Hx antineoplastic chemo     last dose 4/23/20    Hx of psychiatric care     Ambien, Klonopin    Insomnia 10/18/2012    Malignant neoplasm of axillary tail of right breast in female, estrogen receptor positive 11/24/2019    right    Neck mass     right posterior    Primary invasive malignant neoplasm of female breast, right 11/21/2019    right    Psychiatric problem     S/P abdominal supracervical subtotal hysterectomy, continues to have regular menses.  4/2/2014    Spinal cord cyst, L1 2014     Thoracic radiculopathy, bulging disc at T10-11 and T11-12

## 2025-04-27 ENCOUNTER — HOSPITAL ENCOUNTER (EMERGENCY)
Facility: OTHER | Age: 55
Discharge: HOME | End: 2025-04-27
Attending: EMERGENCY MEDICINE
Payer: COMMERCIAL

## 2025-04-27 VITALS
HEART RATE: 90 BPM | TEMPERATURE: 98 F | OXYGEN SATURATION: 100 % | DIASTOLIC BLOOD PRESSURE: 92 MMHG | RESPIRATION RATE: 19 BRPM | SYSTOLIC BLOOD PRESSURE: 152 MMHG
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
## 2025-04-27 DIAGNOSIS — S13.4XXA WHIPLASH INJURY TO NECK, INITIAL ENCOUNTER: ICD-10-CM

## 2025-04-27 PROCEDURE — 99284 EMERGENCY DEPT VISIT MOD MDM: CPT | Mod: 25

## 2025-04-27 PROCEDURE — 63600175 PHARM REV CODE 636 W HCPCS: Mod: JZ,TB | Performed by: PHYSICIAN ASSISTANT

## 2025-04-27 PROCEDURE — 96372 THER/PROPH/DIAG INJ SC/IM: CPT | Performed by: PHYSICIAN ASSISTANT

## 2025-04-27 PROCEDURE — 25000003 PHARM REV CODE 250: Performed by: PHYSICIAN ASSISTANT

## 2025-04-27 RX ORDER — METHOCARBAMOL 500 MG/1
500 TABLET, FILM COATED ORAL
Status: COMPLETED | OUTPATIENT
Start: 2025-04-27 | End: 2025-04-27

## 2025-04-27 RX ORDER — KETOROLAC TROMETHAMINE 30 MG/ML
15 INJECTION, SOLUTION INTRAMUSCULAR; INTRAVENOUS
Status: COMPLETED | OUTPATIENT
Start: 2025-04-27 | End: 2025-04-27

## 2025-04-27 RX ORDER — METHOCARBAMOL 750 MG/1
750 TABLET, FILM COATED ORAL 3 TIMES DAILY PRN
Qty: 15 TABLET | Refills: 0 | Status: SHIPPED | OUTPATIENT
Start: 2025-04-27 | End: 2025-05-02

## 2025-04-27 RX ORDER — IBUPROFEN 600 MG/1
600 TABLET ORAL EVERY 6 HOURS PRN
Qty: 20 TABLET | Refills: 0 | Status: SHIPPED | OUTPATIENT
Start: 2025-04-27

## 2025-04-27 RX ORDER — METHOCARBAMOL 750 MG/1
750 TABLET, FILM COATED ORAL 3 TIMES DAILY PRN
Qty: 15 TABLET | Refills: 0 | Status: SHIPPED | OUTPATIENT
Start: 2025-04-27 | End: 2025-04-27

## 2025-04-27 RX ORDER — IBUPROFEN 600 MG/1
600 TABLET ORAL EVERY 6 HOURS PRN
Qty: 20 TABLET | Refills: 0 | Status: SHIPPED | OUTPATIENT
Start: 2025-04-27 | End: 2025-04-27

## 2025-04-27 RX ADMIN — KETOROLAC TROMETHAMINE 15 MG: 30 INJECTION, SOLUTION INTRAMUSCULAR at 04:04

## 2025-04-27 RX ADMIN — METHOCARBAMOL 500 MG: 500 TABLET ORAL at 04:04

## 2025-04-27 NOTE — Clinical Note
"Phylicia Pantoja" Fahad was seen and treated in our emergency department on 4/27/2025.  She may return to work on 04/30/2025.       If you have any questions or concerns, please don't hesitate to call.      Yadi KELLEY"

## 2025-04-27 NOTE — Clinical Note
"Phylicia Pantoja" Fahad was seen and treated in our emergency department on 4/27/2025.  She may return to work on 04/30/2025.       If you have any questions or concerns, please don't hesitate to call.       LPN    "

## 2025-04-27 NOTE — Clinical Note
"Phylicia Pantoja" Fahad was seen and treated in our emergency department on 4/27/2025.  She may return to work on 04/30/2025.       If you have any questions or concerns, please don't hesitate to call.      Yadi TENA    "
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
## 2025-04-28 ENCOUNTER — TELEPHONE (OUTPATIENT)
Dept: ORTHOPEDICS | Facility: CLINIC | Age: 55
End: 2025-04-28
Payer: COMMERCIAL

## 2025-04-29 ENCOUNTER — HOSPITAL ENCOUNTER (OUTPATIENT)
Dept: RADIOLOGY | Facility: HOSPITAL | Age: 55
Discharge: HOME | End: 2025-04-29
Payer: COMMERCIAL

## 2025-04-29 ENCOUNTER — OFFICE VISIT (OUTPATIENT)
Dept: ORTHOPEDICS | Facility: CLINIC | Age: 55
End: 2025-04-29
Payer: COMMERCIAL

## 2025-04-29 ENCOUNTER — HOSPITAL ENCOUNTER (OUTPATIENT)
Dept: RADIOLOGY | Facility: HOSPITAL | Age: 55
Discharge: HOME | End: 2025-04-29
Attending: PHYSICIAN ASSISTANT
Payer: COMMERCIAL

## 2025-04-29 VITALS — WEIGHT: 240.06 LBS | BODY MASS INDEX: 42.54 KG/M2 | HEIGHT: 63 IN

## 2025-04-29 DIAGNOSIS — M17.11 OSTEOARTHRITIS OF RIGHT KNEE, UNSPECIFIED OSTEOARTHRITIS TYPE: ICD-10-CM

## 2025-04-29 DIAGNOSIS — M17.11 OSTEOARTHRITIS OF RIGHT KNEE, UNSPECIFIED OSTEOARTHRITIS TYPE: Primary | ICD-10-CM

## 2025-04-29 PROCEDURE — 73564 X-RAY EXAM KNEE 4 OR MORE: CPT | Mod: 26,,, | Performed by: RADIOLOGY

## 2025-04-29 PROCEDURE — 3044F HG A1C LEVEL LT 7.0%: CPT | Mod: CPTII,S$GLB,, | Performed by: PHYSICIAN ASSISTANT

## 2025-04-29 PROCEDURE — 73564 X-RAY EXAM KNEE 4 OR MORE: CPT | Mod: TC,50

## 2025-04-29 PROCEDURE — 99999 PR PBB SHADOW E&M-EST. PATIENT-LVL IV: CPT | Mod: PBBFAC,,, | Performed by: PHYSICIAN ASSISTANT

## 2025-04-29 PROCEDURE — 99214 OFFICE O/P EST MOD 30 MIN: CPT | Mod: S$GLB,,, | Performed by: PHYSICIAN ASSISTANT

## 2025-04-29 PROCEDURE — 3008F BODY MASS INDEX DOCD: CPT | Mod: CPTII,S$GLB,, | Performed by: PHYSICIAN ASSISTANT

## 2025-04-29 PROCEDURE — 4010F ACE/ARB THERAPY RXD/TAKEN: CPT | Mod: CPTII,S$GLB,, | Performed by: PHYSICIAN ASSISTANT
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
## 2025-04-30 ENCOUNTER — PATIENT MESSAGE (OUTPATIENT)
Dept: INTERNAL MEDICINE | Facility: CLINIC | Age: 55
End: 2025-04-30
Payer: COMMERCIAL

## 2025-04-30 ENCOUNTER — LAB VISIT (OUTPATIENT)
Dept: LAB | Facility: HOSPITAL | Age: 55
End: 2025-04-30
Payer: COMMERCIAL

## 2025-04-30 DIAGNOSIS — I10 ESSENTIAL HYPERTENSION: ICD-10-CM

## 2025-04-30 DIAGNOSIS — R73.03 PREDIABETES: ICD-10-CM

## 2025-04-30 LAB
ABSOLUTE EOSINOPHIL (OHS): 0.07 K/UL
ABSOLUTE MONOCYTE (OHS): 0.43 K/UL (ref 0.3–1)
ABSOLUTE NEUTROPHIL COUNT (OHS): 3.05 K/UL (ref 1.8–7.7)
ALBUMIN SERPL BCP-MCNC: 3.5 G/DL (ref 3.5–5.2)
ALP SERPL-CCNC: 78 UNIT/L (ref 40–150)
ALT SERPL W/O P-5'-P-CCNC: 26 UNIT/L (ref 10–44)
ANION GAP (OHS): 9 MMOL/L (ref 8–16)
AST SERPL-CCNC: 18 UNIT/L (ref 11–45)
BASOPHILS # BLD AUTO: 0.04 K/UL
BASOPHILS NFR BLD AUTO: 0.8 %
BILIRUB SERPL-MCNC: 0.4 MG/DL (ref 0.1–1)
BUN SERPL-MCNC: 9 MG/DL (ref 6–20)
CALCIUM SERPL-MCNC: 9.2 MG/DL (ref 8.7–10.5)
CHLORIDE SERPL-SCNC: 106 MMOL/L (ref 95–110)
CHOLEST SERPL-MCNC: 163 MG/DL (ref 120–199)
CHOLEST/HDLC SERPL: 3.3 {RATIO} (ref 2–5)
CO2 SERPL-SCNC: 28 MMOL/L (ref 23–29)
CREAT SERPL-MCNC: 0.8 MG/DL (ref 0.5–1.4)
EAG (OHS): 140 MG/DL (ref 68–131)
ERYTHROCYTE [DISTWIDTH] IN BLOOD BY AUTOMATED COUNT: 15.2 % (ref 11.5–14.5)
GFR SERPLBLD CREATININE-BSD FMLA CKD-EPI: >60 ML/MIN/1.73/M2
GLUCOSE SERPL-MCNC: 122 MG/DL (ref 70–110)
HBA1C MFR BLD: 6.5 % (ref 4–5.6)
HCT VFR BLD AUTO: 38.3 % (ref 37–48.5)
HDLC SERPL-MCNC: 49 MG/DL (ref 40–75)
HDLC SERPL: 30.1 % (ref 20–50)
HGB BLD-MCNC: 11.7 GM/DL (ref 12–16)
IMM GRANULOCYTES # BLD AUTO: 0.01 K/UL (ref 0–0.04)
IMM GRANULOCYTES NFR BLD AUTO: 0.2 % (ref 0–0.5)
LDLC SERPL CALC-MCNC: 97.2 MG/DL (ref 63–159)
LYMPHOCYTES # BLD AUTO: 1.31 K/UL (ref 1–4.8)
MCH RBC QN AUTO: 25.2 PG (ref 27–31)
MCHC RBC AUTO-ENTMCNC: 30.5 G/DL (ref 32–36)
MCV RBC AUTO: 83 FL (ref 82–98)
NONHDLC SERPL-MCNC: 114 MG/DL
NUCLEATED RBC (/100WBC) (OHS): 0 /100 WBC
PLATELET # BLD AUTO: 181 K/UL (ref 150–450)
PMV BLD AUTO: 11.8 FL (ref 9.2–12.9)
POTASSIUM SERPL-SCNC: 3.9 MMOL/L (ref 3.5–5.1)
PROT SERPL-MCNC: 7.7 GM/DL (ref 6–8.4)
RBC # BLD AUTO: 4.64 M/UL (ref 4–5.4)
RELATIVE EOSINOPHIL (OHS): 1.4 %
RELATIVE LYMPHOCYTE (OHS): 26.7 % (ref 18–48)
RELATIVE MONOCYTE (OHS): 8.8 % (ref 4–15)
RELATIVE NEUTROPHIL (OHS): 62.1 % (ref 38–73)
SODIUM SERPL-SCNC: 143 MMOL/L (ref 136–145)
TRIGL SERPL-MCNC: 84 MG/DL (ref 30–150)
TSH SERPL-ACNC: 1.53 UIU/ML (ref 0.4–4)
WBC # BLD AUTO: 4.91 K/UL (ref 3.9–12.7)

## 2025-04-30 PROCEDURE — 82465 ASSAY BLD/SERUM CHOLESTEROL: CPT

## 2025-04-30 PROCEDURE — 36415 COLL VENOUS BLD VENIPUNCTURE: CPT

## 2025-04-30 PROCEDURE — 84443 ASSAY THYROID STIM HORMONE: CPT

## 2025-04-30 PROCEDURE — 83036 HEMOGLOBIN GLYCOSYLATED A1C: CPT

## 2025-04-30 PROCEDURE — 85025 COMPLETE CBC W/AUTO DIFF WBC: CPT

## 2025-04-30 PROCEDURE — 84075 ASSAY ALKALINE PHOSPHATASE: CPT

## 2025-05-01 ENCOUNTER — TELEPHONE (OUTPATIENT)
Dept: INTERNAL MEDICINE | Facility: CLINIC | Age: 55
End: 2025-05-01
Payer: COMMERCIAL

## 2025-05-01 ENCOUNTER — OFFICE VISIT (OUTPATIENT)
Dept: INTERNAL MEDICINE | Facility: CLINIC | Age: 55
End: 2025-05-01
Payer: COMMERCIAL

## 2025-05-01 DIAGNOSIS — M25.561 ACUTE PAIN OF RIGHT KNEE: ICD-10-CM

## 2025-05-01 DIAGNOSIS — I10 ESSENTIAL HYPERTENSION: Primary | Chronic | ICD-10-CM

## 2025-05-01 DIAGNOSIS — R73.03 PREDIABETES: ICD-10-CM

## 2025-05-01 PROCEDURE — 3044F HG A1C LEVEL LT 7.0%: CPT | Mod: CPTII,95,, | Performed by: INTERNAL MEDICINE

## 2025-05-01 PROCEDURE — 4010F ACE/ARB THERAPY RXD/TAKEN: CPT | Mod: CPTII,95,, | Performed by: INTERNAL MEDICINE

## 2025-05-01 PROCEDURE — 98005 SYNCH AUDIO-VIDEO EST LOW 20: CPT | Mod: 95,,, | Performed by: INTERNAL MEDICINE

## 2025-05-01 RX ORDER — HYDROCHLOROTHIAZIDE 12.5 MG/1
12.5 TABLET ORAL DAILY
Qty: 30 TABLET | Refills: 11 | Status: SHIPPED | OUTPATIENT
Start: 2025-05-01 | End: 2026-05-01
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
## 2025-05-01 NOTE — TELEPHONE ENCOUNTER
----- Message from Karolina sent at 5/1/2025  3:42 PM CDT -----  Contact: [phone]  Patient called, stated that she has not receive the doctor notes, if that can be sent  to her. Thank you

## 2025-05-06 DIAGNOSIS — L30.4 INTERTRIGO: ICD-10-CM

## 2025-05-06 RX ORDER — KETOCONAZOLE 20 MG/G
1 CREAM TOPICAL 2 TIMES DAILY
Qty: 30 G | Refills: 1 | Status: SHIPPED | OUTPATIENT
Start: 2025-05-06

## 2025-05-07 ENCOUNTER — TELEPHONE (OUTPATIENT)
Dept: ORTHOPEDICS | Facility: CLINIC | Age: 55
End: 2025-05-07
Payer: COMMERCIAL

## 2025-05-07 NOTE — TELEPHONE ENCOUNTER
Confirm appointment for 5/8/25.  Left a voice message.  Pt was informed to wear comfortable clothing with no button, zip, pins, or metal.

## 2025-05-08 ENCOUNTER — OFFICE VISIT (OUTPATIENT)
Dept: ORTHOPEDICS | Facility: CLINIC | Age: 55
End: 2025-05-08
Payer: COMMERCIAL

## 2025-05-08 VITALS
RESPIRATION RATE: 18 BRPM | SYSTOLIC BLOOD PRESSURE: 113 MMHG | HEIGHT: 63 IN | DIASTOLIC BLOOD PRESSURE: 89 MMHG | WEIGHT: 240.06 LBS | HEART RATE: 97 BPM | BODY MASS INDEX: 42.54 KG/M2

## 2025-05-08 DIAGNOSIS — M17.11 OSTEOARTHRITIS OF RIGHT KNEE, UNSPECIFIED OSTEOARTHRITIS TYPE: Primary | ICD-10-CM

## 2025-05-08 PROCEDURE — 99999 PR PBB SHADOW E&M-EST. PATIENT-LVL IV: CPT | Mod: PBBFAC,,, | Performed by: PHYSICIAN ASSISTANT

## 2025-05-08 PROCEDURE — 20610 DRAIN/INJ JOINT/BURSA W/O US: CPT | Mod: RT,S$GLB,, | Performed by: PHYSICIAN ASSISTANT

## 2025-05-08 PROCEDURE — 99499 UNLISTED E&M SERVICE: CPT | Mod: S$GLB,,, | Performed by: PHYSICIAN ASSISTANT

## 2025-05-08 RX ADMIN — TRIAMCINOLONE ACETONIDE 40 MG: 40 INJECTION, SUSPENSION INTRA-ARTICULAR; INTRAMUSCULAR at 07:05

## 2025-05-12 RX ORDER — TRIAMCINOLONE ACETONIDE 40 MG/ML
40 INJECTION, SUSPENSION INTRA-ARTICULAR; INTRAMUSCULAR
Status: COMPLETED | OUTPATIENT
Start: 2025-05-12 | End: 2025-05-08

## 2025-05-12 NOTE — PROGRESS NOTES
Phylicia Vásquez is a 55 y.o. year old her here today for her steroid  injection for degenerative changes of her right knee . she was last seen and treated in the clinic on 4/29/2025. There has been no significant change in her medical status since her last visit. No Fever, chills, malaise, or unexplained weight change.      Allergies, Medications, past medical and surgical history were reviewed .    Examination of the knee demonstrates  No evidence of edema, erythema , echymosis strength and range of motion are unchanged from previous visit.      PROCEDURE:  I have explained the risks, benefits, and alternatives of the procedure in detail.  The patient voices understanding and all questions have been answered.  The patient agrees to proceed as planned. So after I performed a sterile prep of the skin in the normal fashion the right knee is injected using a 22 gauge needle from the anterolateral approach with a combination of 4cc 1% plain lidocaine and 40 mg of kenalog.  The patient is cautioned and immediate relief of pain is secondary to the local anesthetic and will be temporary.  After the anesthetic wears off there may be a increase in pain that may last for a few hours or a few days and they should use ice to help alleviate this flair up of pain.            .

## 2025-05-14 ENCOUNTER — OFFICE VISIT (OUTPATIENT)
Dept: INTERNAL MEDICINE | Facility: CLINIC | Age: 55
End: 2025-05-14
Payer: COMMERCIAL

## 2025-05-14 VITALS
OXYGEN SATURATION: 99 % | BODY MASS INDEX: 42.38 KG/M2 | WEIGHT: 239.19 LBS | HEIGHT: 63 IN | DIASTOLIC BLOOD PRESSURE: 81 MMHG | HEART RATE: 90 BPM | SYSTOLIC BLOOD PRESSURE: 125 MMHG

## 2025-05-14 DIAGNOSIS — G62.9 NEUROPATHY: ICD-10-CM

## 2025-05-14 DIAGNOSIS — D50.9 IRON DEFICIENCY ANEMIA, UNSPECIFIED IRON DEFICIENCY ANEMIA TYPE: ICD-10-CM

## 2025-05-14 DIAGNOSIS — E66.01 CLASS 3 SEVERE OBESITY WITH SERIOUS COMORBIDITY AND BODY MASS INDEX (BMI) OF 40.0 TO 44.9 IN ADULT, UNSPECIFIED OBESITY TYPE: ICD-10-CM

## 2025-05-14 DIAGNOSIS — I10 ESSENTIAL HYPERTENSION: Chronic | ICD-10-CM

## 2025-05-14 DIAGNOSIS — E66.813 CLASS 3 SEVERE OBESITY WITH SERIOUS COMORBIDITY AND BODY MASS INDEX (BMI) OF 40.0 TO 44.9 IN ADULT, UNSPECIFIED OBESITY TYPE: ICD-10-CM

## 2025-05-14 DIAGNOSIS — E11.9 TYPE 2 DIABETES MELLITUS WITHOUT COMPLICATION, WITHOUT LONG-TERM CURRENT USE OF INSULIN: Primary | ICD-10-CM

## 2025-05-14 DIAGNOSIS — M54.14 THORACIC RADICULOPATHY: ICD-10-CM

## 2025-05-14 PROCEDURE — 3074F SYST BP LT 130 MM HG: CPT | Mod: CPTII,S$GLB,, | Performed by: INTERNAL MEDICINE

## 2025-05-14 PROCEDURE — 99999 PR PBB SHADOW E&M-EST. PATIENT-LVL III: CPT | Mod: PBBFAC,,, | Performed by: INTERNAL MEDICINE

## 2025-05-14 PROCEDURE — 4010F ACE/ARB THERAPY RXD/TAKEN: CPT | Mod: CPTII,S$GLB,, | Performed by: INTERNAL MEDICINE

## 2025-05-14 PROCEDURE — 3008F BODY MASS INDEX DOCD: CPT | Mod: CPTII,S$GLB,, | Performed by: INTERNAL MEDICINE

## 2025-05-14 PROCEDURE — 3079F DIAST BP 80-89 MM HG: CPT | Mod: CPTII,S$GLB,, | Performed by: INTERNAL MEDICINE

## 2025-05-14 PROCEDURE — 3044F HG A1C LEVEL LT 7.0%: CPT | Mod: CPTII,S$GLB,, | Performed by: INTERNAL MEDICINE

## 2025-05-14 PROCEDURE — 99214 OFFICE O/P EST MOD 30 MIN: CPT | Mod: S$GLB,,, | Performed by: INTERNAL MEDICINE

## 2025-05-14 RX ORDER — CYCLOBENZAPRINE HCL 10 MG
10 TABLET ORAL DAILY PRN
Qty: 90 TABLET | Refills: 3 | Status: SHIPPED | OUTPATIENT
Start: 2025-05-14

## 2025-05-14 RX ORDER — AMLODIPINE AND VALSARTAN 10; 320 MG/1; MG/1
1 TABLET ORAL DAILY
Qty: 90 TABLET | Refills: 2 | Status: SHIPPED | OUTPATIENT
Start: 2025-05-14

## 2025-05-14 RX ORDER — GABAPENTIN 100 MG/1
300 CAPSULE ORAL 3 TIMES DAILY
Qty: 270 CAPSULE | Refills: 3 | Status: SHIPPED | OUTPATIENT
Start: 2025-05-14
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
## 2025-05-16 ENCOUNTER — PATIENT MESSAGE (OUTPATIENT)
Dept: INTERNAL MEDICINE | Facility: CLINIC | Age: 55
End: 2025-05-16
Payer: COMMERCIAL

## 2025-05-16 NOTE — TELEPHONE ENCOUNTER
LOV with Claudia Campos MD , 5/14/2025    Please see message with attachment requesting PA for Zepbound

## 2025-05-20 ENCOUNTER — PATIENT MESSAGE (OUTPATIENT)
Dept: INTERNAL MEDICINE | Facility: CLINIC | Age: 55
End: 2025-05-20
Payer: COMMERCIAL

## 2025-05-21 ENCOUNTER — PATIENT MESSAGE (OUTPATIENT)
Dept: INTERNAL MEDICINE | Facility: CLINIC | Age: 55
End: 2025-05-21
Payer: COMMERCIAL

## 2025-05-28 ENCOUNTER — PATIENT MESSAGE (OUTPATIENT)
Dept: INTERNAL MEDICINE | Facility: CLINIC | Age: 55
End: 2025-05-28
Payer: COMMERCIAL

## 2025-05-30 ENCOUNTER — TELEPHONE (OUTPATIENT)
Dept: INTERNAL MEDICINE | Facility: CLINIC | Age: 55
End: 2025-05-30
Payer: COMMERCIAL

## 2025-05-30 NOTE — TELEPHONE ENCOUNTER
Called patient and got updated insurance information. PA redone with key [id number] for Mounjaro. Awaiting pending approval from [facility].    Rx [id number]  Rx PCN- ADV  Rx [id number]

## 2025-06-03 ENCOUNTER — OFFICE VISIT (OUTPATIENT)
Dept: HEMATOLOGY/ONCOLOGY | Facility: CLINIC | Age: 55
End: 2025-06-03
Payer: COMMERCIAL

## 2025-06-03 ENCOUNTER — TELEPHONE (OUTPATIENT)
Dept: INTERNAL MEDICINE | Facility: CLINIC | Age: 55
End: 2025-06-03
Payer: COMMERCIAL

## 2025-06-03 VITALS
HEART RATE: 71 BPM | SYSTOLIC BLOOD PRESSURE: 153 MMHG | DIASTOLIC BLOOD PRESSURE: 90 MMHG | WEIGHT: 237 LBS | HEIGHT: 63 IN | BODY MASS INDEX: 41.99 KG/M2 | OXYGEN SATURATION: 99 %

## 2025-06-03 DIAGNOSIS — Z79.811 PROPHYLACTIC USE OF ANASTROZOLE (ARIMIDEX): ICD-10-CM

## 2025-06-03 DIAGNOSIS — C50.611 CARCINOMA OF AXILLARY TAIL OF RIGHT BREAST IN FEMALE, ESTROGEN RECEPTOR POSITIVE: Primary | ICD-10-CM

## 2025-06-03 DIAGNOSIS — Z17.0 CARCINOMA OF AXILLARY TAIL OF RIGHT BREAST IN FEMALE, ESTROGEN RECEPTOR POSITIVE: Primary | ICD-10-CM

## 2025-06-03 DIAGNOSIS — M81.8 OSTEOPOROSIS DUE TO AROMATASE INHIBITOR: ICD-10-CM

## 2025-06-03 DIAGNOSIS — T38.6X5A OSTEOPOROSIS DUE TO AROMATASE INHIBITOR: ICD-10-CM

## 2025-06-03 PROCEDURE — 3080F DIAST BP >= 90 MM HG: CPT | Mod: CPTII,S$GLB,, | Performed by: INTERNAL MEDICINE

## 2025-06-03 PROCEDURE — 4010F ACE/ARB THERAPY RXD/TAKEN: CPT | Mod: CPTII,S$GLB,, | Performed by: INTERNAL MEDICINE

## 2025-06-03 PROCEDURE — 99215 OFFICE O/P EST HI 40 MIN: CPT | Mod: S$GLB,,, | Performed by: INTERNAL MEDICINE

## 2025-06-03 PROCEDURE — 1159F MED LIST DOCD IN RCRD: CPT | Mod: CPTII,S$GLB,, | Performed by: INTERNAL MEDICINE

## 2025-06-03 PROCEDURE — 3044F HG A1C LEVEL LT 7.0%: CPT | Mod: CPTII,S$GLB,, | Performed by: INTERNAL MEDICINE

## 2025-06-03 PROCEDURE — G2211 COMPLEX E/M VISIT ADD ON: HCPCS | Mod: S$GLB,,, | Performed by: INTERNAL MEDICINE

## 2025-06-03 PROCEDURE — 3008F BODY MASS INDEX DOCD: CPT | Mod: CPTII,S$GLB,, | Performed by: INTERNAL MEDICINE

## 2025-06-03 PROCEDURE — 99999 PR PBB SHADOW E&M-EST. PATIENT-LVL IV: CPT | Mod: PBBFAC,,, | Performed by: INTERNAL MEDICINE

## 2025-06-03 PROCEDURE — 3077F SYST BP >= 140 MM HG: CPT | Mod: CPTII,S$GLB,, | Performed by: INTERNAL MEDICINE

## 2025-06-04 ENCOUNTER — TELEPHONE (OUTPATIENT)
Dept: INTERNAL MEDICINE | Facility: CLINIC | Age: 55
End: 2025-06-04
Payer: COMMERCIAL

## 2025-06-04 DIAGNOSIS — E11.9 TYPE 2 DIABETES MELLITUS WITHOUT COMPLICATION, WITHOUT LONG-TERM CURRENT USE OF INSULIN: ICD-10-CM

## 2025-06-05 ENCOUNTER — OFFICE VISIT (OUTPATIENT)
Dept: OBSTETRICS AND GYNECOLOGY | Facility: CLINIC | Age: 55
End: 2025-06-05
Payer: COMMERCIAL

## 2025-06-05 VITALS
SYSTOLIC BLOOD PRESSURE: 120 MMHG | DIASTOLIC BLOOD PRESSURE: 86 MMHG | BODY MASS INDEX: 41.78 KG/M2 | WEIGHT: 235.81 LBS | HEIGHT: 63 IN

## 2025-06-05 DIAGNOSIS — N95.1 MENOPAUSAL SYMPTOMS: ICD-10-CM

## 2025-06-05 DIAGNOSIS — Z12.4 ENCOUNTER FOR PAPANICOLAOU SMEAR FOR CERVICAL CANCER SCREENING: ICD-10-CM

## 2025-06-05 DIAGNOSIS — Z01.419 ENCOUNTER FOR GYNECOLOGICAL EXAMINATION WITHOUT ABNORMAL FINDING: Primary | ICD-10-CM

## 2025-06-05 PROCEDURE — 99999 PR PBB SHADOW E&M-EST. PATIENT-LVL IV: CPT | Mod: PBBFAC,,, | Performed by: PHYSICIAN ASSISTANT

## 2025-06-05 RX ORDER — VENLAFAXINE HYDROCHLORIDE 37.5 MG/1
37.5 CAPSULE, EXTENDED RELEASE ORAL DAILY
Qty: 90 CAPSULE | Refills: 3 | Status: SHIPPED | OUTPATIENT
Start: 2025-06-05 | End: 2026-06-05

## 2025-06-12 ENCOUNTER — RESULTS FOLLOW-UP (OUTPATIENT)
Dept: OBSTETRICS AND GYNECOLOGY | Facility: CLINIC | Age: 55
End: 2025-06-12

## 2025-06-13 ENCOUNTER — TELEPHONE (OUTPATIENT)
Dept: HEMATOLOGY/ONCOLOGY | Facility: CLINIC | Age: 55
End: 2025-06-13
Payer: COMMERCIAL

## 2025-06-13 NOTE — TELEPHONE ENCOUNTER
Copied from CRM #[id number]. Topic: General Inquiry - Patient Advice  >> Jun 13, 2025 11:41 AM Alistair wrote:  Type: Needs Medical Advice  Who Called: [facility]    Best Call Back Number: [phone]  Additional Information: Called regarding fax sent to office requiring more info regarding breast cancer index test FAX: [phone]

## 2025-07-01 ENCOUNTER — TELEPHONE (OUTPATIENT)
Dept: HEMATOLOGY/ONCOLOGY | Facility: CLINIC | Age: 55
End: 2025-07-01
Payer: COMMERCIAL

## 2025-07-01 NOTE — TELEPHONE ENCOUNTER
Copied from CRM #[id number]. Topic: General Inquiry - Patient Update  >> Jul 1, 2025 12:04 PM Chantell wrote:  Type:  Needs Medical Advice    Who Called: [facility]   Symptoms (please be specific):    How long has patient had these symptoms:    Pharmacy name and phone #:    Would the patient rather a call back or a response via [facility]? Call   Best Call Back Number: [phone] fax number [phone]  Additional Information: need imaging report was not sent over for pt,

## 2025-07-05 DIAGNOSIS — L30.4 INTERTRIGO: ICD-10-CM

## 2025-07-09 ENCOUNTER — TELEPHONE (OUTPATIENT)
Dept: HEMATOLOGY/ONCOLOGY | Facility: CLINIC | Age: 55
End: 2025-07-09
Payer: COMMERCIAL

## 2025-07-09 RX ORDER — KETOCONAZOLE 20 MG/G
1 CREAM TOPICAL 2 TIMES DAILY
Qty: 30 G | Refills: 1 | Status: SHIPPED | OUTPATIENT
Start: 2025-07-09

## 2025-07-09 NOTE — TELEPHONE ENCOUNTER
Copied from CRM #[id number]. Topic: General Inquiry - Patient Advice  >> Jul 9, 2025  1:35 PM Alberta wrote:  Type: Needs Medical Advice  Who Called:  Mayra with [facility]  Symptoms (please be specific):    How long has patient had these symptoms:    Pharmacy name and phone #:    Best Call Back Number: [phone]  Additional Information: Mayra  called following up on [facility] test fax that was sent requesting an alternate path. Report.Please call back Fax# [phone]

## 2025-07-15 ENCOUNTER — PATIENT MESSAGE (OUTPATIENT)
Dept: INTERNAL MEDICINE | Facility: CLINIC | Age: 55
End: 2025-07-15
Payer: COMMERCIAL

## 2025-07-15 DIAGNOSIS — F19.982 DRUG-INDUCED INSOMNIA: ICD-10-CM

## 2025-07-15 NOTE — TELEPHONE ENCOUNTER
Pt requesting a refill on zolpidem      2/2025 med last refilled with 2 refills authorized with end date of 8/11/2025    LOV with Claudia Campos MD , 5/14/2025

## 2025-07-15 NOTE — TELEPHONE ENCOUNTER
No care due was identified.  [facility] Embedded Care Due Messages. Reference number: [id number].   7/15/2025 1:16:04 PM CDT

## 2025-07-16 RX ORDER — ZOLPIDEM TARTRATE 10 MG/1
10 TABLET ORAL NIGHTLY PRN
Qty: 30 TABLET | Refills: 2 | Status: SHIPPED | OUTPATIENT
Start: 2025-07-16 | End: 2026-01-14

## 2025-08-08 ENCOUNTER — LAB VISIT (OUTPATIENT)
Dept: LAB | Facility: HOSPITAL | Age: 55
End: 2025-08-08
Payer: COMMERCIAL

## 2025-08-08 DIAGNOSIS — D50.9 IRON DEFICIENCY ANEMIA, UNSPECIFIED IRON DEFICIENCY ANEMIA TYPE: ICD-10-CM

## 2025-08-08 DIAGNOSIS — E11.9 TYPE 2 DIABETES MELLITUS WITHOUT COMPLICATION, WITHOUT LONG-TERM CURRENT USE OF INSULIN: ICD-10-CM

## 2025-08-08 LAB
ABSOLUTE EOSINOPHIL (OHS): 0.15 K/UL
ABSOLUTE MONOCYTE (OHS): 0.3 K/UL (ref 0.3–1)
ABSOLUTE NEUTROPHIL COUNT (OHS): 3.45 K/UL (ref 1.8–7.7)
ALBUMIN SERPL BCP-MCNC: 3.4 G/DL (ref 3.5–5.2)
ALP SERPL-CCNC: 67 UNIT/L (ref 40–150)
ALT SERPL W/O P-5'-P-CCNC: 25 UNIT/L (ref 0–55)
ANION GAP (OHS): 8 MMOL/L (ref 8–16)
AST SERPL-CCNC: 20 UNIT/L (ref 0–50)
BASOPHILS # BLD AUTO: 0.06 K/UL
BASOPHILS NFR BLD AUTO: 0.9 %
BILIRUB SERPL-MCNC: 0.4 MG/DL (ref 0.1–1)
BUN SERPL-MCNC: 9 MG/DL (ref 6–20)
CALCIUM SERPL-MCNC: 9.1 MG/DL (ref 8.7–10.5)
CHLORIDE SERPL-SCNC: 107 MMOL/L (ref 95–110)
CO2 SERPL-SCNC: 28 MMOL/L (ref 23–29)
CREAT SERPL-MCNC: 0.9 MG/DL (ref 0.5–1.4)
EAG (OHS): 117 MG/DL (ref 68–131)
ERYTHROCYTE [DISTWIDTH] IN BLOOD BY AUTOMATED COUNT: 16.1 % (ref 11.5–14.5)
GFR SERPLBLD CREATININE-BSD FMLA CKD-EPI: >60 ML/MIN/1.73/M2
GLUCOSE SERPL-MCNC: 80 MG/DL (ref 70–110)
HBA1C MFR BLD: 5.7 % (ref 4–5.6)
HCT VFR BLD AUTO: 37.2 % (ref 37–48.5)
HGB BLD-MCNC: 11.5 GM/DL (ref 12–16)
IMM GRANULOCYTES # BLD AUTO: 0.02 K/UL (ref 0–0.04)
IMM GRANULOCYTES NFR BLD AUTO: 0.3 % (ref 0–0.5)
IRON SATN MFR SERPL: 26 % (ref 20–50)
IRON SERPL-MCNC: 92 UG/DL (ref 30–160)
LYMPHOCYTES # BLD AUTO: 2.52 K/UL (ref 1–4.8)
MCH RBC QN AUTO: 26.3 PG (ref 27–31)
MCHC RBC AUTO-ENTMCNC: 30.9 G/DL (ref 32–36)
MCV RBC AUTO: 85 FL (ref 82–98)
NUCLEATED RBC (/100WBC) (OHS): 0 /100 WBC
PLATELET # BLD AUTO: 182 K/UL (ref 150–450)
PMV BLD AUTO: 12.6 FL (ref 9.2–12.9)
POTASSIUM SERPL-SCNC: 4 MMOL/L (ref 3.5–5.1)
PROT SERPL-MCNC: 6.6 GM/DL (ref 6–8.4)
RBC # BLD AUTO: 4.37 M/UL (ref 4–5.4)
RELATIVE EOSINOPHIL (OHS): 2.3 %
RELATIVE LYMPHOCYTE (OHS): 38.8 % (ref 18–48)
RELATIVE MONOCYTE (OHS): 4.6 % (ref 4–15)
RELATIVE NEUTROPHIL (OHS): 53.1 % (ref 38–73)
SODIUM SERPL-SCNC: 143 MMOL/L (ref 136–145)
TIBC SERPL-MCNC: 357 UG/DL (ref 250–450)
TRANSFERRIN SERPL-MCNC: 241 MG/DL (ref 200–375)
WBC # BLD AUTO: 6.5 K/UL (ref 3.9–12.7)

## 2025-08-08 PROCEDURE — 36415 COLL VENOUS BLD VENIPUNCTURE: CPT

## 2025-08-08 PROCEDURE — 83540 ASSAY OF IRON: CPT

## 2025-08-08 PROCEDURE — 82435 ASSAY OF BLOOD CHLORIDE: CPT

## 2025-08-08 PROCEDURE — 85025 COMPLETE CBC W/AUTO DIFF WBC: CPT

## 2025-08-08 PROCEDURE — 83036 HEMOGLOBIN GLYCOSYLATED A1C: CPT

## 2025-08-14 ENCOUNTER — OFFICE VISIT (OUTPATIENT)
Dept: INTERNAL MEDICINE | Facility: CLINIC | Age: 55
End: 2025-08-14
Payer: COMMERCIAL

## 2025-08-14 ENCOUNTER — PATIENT MESSAGE (OUTPATIENT)
Dept: INTERNAL MEDICINE | Facility: CLINIC | Age: 55
End: 2025-08-14

## 2025-08-14 VITALS
HEART RATE: 105 BPM | SYSTOLIC BLOOD PRESSURE: 116 MMHG | BODY MASS INDEX: 39.92 KG/M2 | OXYGEN SATURATION: 99 % | HEIGHT: 63 IN | DIASTOLIC BLOOD PRESSURE: 78 MMHG | WEIGHT: 225.31 LBS

## 2025-08-14 DIAGNOSIS — E66.812 CLASS 2 OBESITY WITH BODY MASS INDEX (BMI) OF 39.0 TO 39.9 IN ADULT, UNSPECIFIED OBESITY TYPE, UNSPECIFIED WHETHER SERIOUS COMORBIDITY PRESENT: ICD-10-CM

## 2025-08-14 DIAGNOSIS — E11.9 TYPE 2 DIABETES MELLITUS WITHOUT COMPLICATION, WITHOUT LONG-TERM CURRENT USE OF INSULIN: ICD-10-CM

## 2025-08-14 DIAGNOSIS — K59.03 DRUG-INDUCED CONSTIPATION: ICD-10-CM

## 2025-08-14 DIAGNOSIS — I10 ESSENTIAL HYPERTENSION: Primary | Chronic | ICD-10-CM

## 2025-08-14 DIAGNOSIS — R22.1 FULLNESS OF NECK: ICD-10-CM

## 2025-08-14 PROCEDURE — 1159F MED LIST DOCD IN RCRD: CPT | Mod: CPTII,S$GLB,, | Performed by: INTERNAL MEDICINE

## 2025-08-14 PROCEDURE — 4010F ACE/ARB THERAPY RXD/TAKEN: CPT | Mod: CPTII,S$GLB,, | Performed by: INTERNAL MEDICINE

## 2025-08-14 PROCEDURE — 3074F SYST BP LT 130 MM HG: CPT | Mod: CPTII,S$GLB,, | Performed by: INTERNAL MEDICINE

## 2025-08-14 PROCEDURE — 3078F DIAST BP <80 MM HG: CPT | Mod: CPTII,S$GLB,, | Performed by: INTERNAL MEDICINE

## 2025-08-14 PROCEDURE — 99999 PR PBB SHADOW E&M-EST. PATIENT-LVL V: CPT | Mod: PBBFAC,,, | Performed by: INTERNAL MEDICINE

## 2025-08-14 PROCEDURE — 3044F HG A1C LEVEL LT 7.0%: CPT | Mod: CPTII,S$GLB,, | Performed by: INTERNAL MEDICINE

## 2025-08-14 PROCEDURE — 99214 OFFICE O/P EST MOD 30 MIN: CPT | Mod: S$GLB,,, | Performed by: INTERNAL MEDICINE

## 2025-08-14 PROCEDURE — 3008F BODY MASS INDEX DOCD: CPT | Mod: CPTII,S$GLB,, | Performed by: INTERNAL MEDICINE

## 2025-08-18 ENCOUNTER — PATIENT MESSAGE (OUTPATIENT)
Dept: INTERNAL MEDICINE | Facility: CLINIC | Age: 55
End: 2025-08-18
Payer: COMMERCIAL

## 2025-08-20 ENCOUNTER — PATIENT MESSAGE (OUTPATIENT)
Dept: INTERNAL MEDICINE | Facility: CLINIC | Age: 55
End: 2025-08-20
Payer: COMMERCIAL

## 2025-08-20 DIAGNOSIS — E11.9 TYPE 2 DIABETES MELLITUS WITHOUT COMPLICATION, UNSPECIFIED WHETHER LONG TERM INSULIN USE: ICD-10-CM

## 2025-08-20 DIAGNOSIS — E11.9 TYPE 2 DIABETES MELLITUS WITHOUT COMPLICATION: ICD-10-CM

## 2025-08-31 DIAGNOSIS — L30.4 INTERTRIGO: ICD-10-CM

## 2025-09-02 RX ORDER — KETOCONAZOLE 20 MG/G
1 CREAM TOPICAL 2 TIMES DAILY
Qty: 30 G | Refills: 1 | Status: SHIPPED | OUTPATIENT
Start: 2025-09-02

## (undated) DEVICE — BLANKET HYPER ADULT 24X60IN

## (undated) DEVICE — APPLIER LIGACLIP SM 9.38IN

## (undated) DEVICE — SEE MEDLINE ITEM 152622

## (undated) DEVICE — GAUZE FLUFF XXLG 36X36 2 PLY

## (undated) DEVICE — APPLICATOR CHLORAPREP ORN 26ML

## (undated) DEVICE — ELECTRODE BLADE INSULATED 1 IN

## (undated) DEVICE — SUT STRATAFIX PGAPCL 3 FS-1

## (undated) DEVICE — DRESSING XEROFORM FOIL PK 1X8

## (undated) DEVICE — POSITIONER HEAD DONUT 9IN FOAM

## (undated) DEVICE — ELECTRODE REM PLYHSV RETURN 9

## (undated) DEVICE — DRESSING TRANS 4X4 TEGADERM

## (undated) DEVICE — WARMER DRAPE STERILE LF

## (undated) DEVICE — SUT MONOCRYL 3-0 PS-2 UND

## (undated) DEVICE — SOL NACL 0.9% INJ PF/50151

## (undated) DEVICE — SYS PRINEO SKIN CLOSURE

## (undated) DEVICE — SET DECANTER MEDICHOICE

## (undated) DEVICE — SUT STRATAFIX PGAPCL 4-0 FS

## (undated) DEVICE — SUT 2-0 ETHILON 18 FS

## (undated) DEVICE — CABLE EXT DOPPLER FLOW PROBE

## (undated) DEVICE — EVACUATOR WOUND BULB 100CC

## (undated) DEVICE — SUT 9/0 5IN ETHILON BLK MON

## (undated) DEVICE — PACK UNIVERSAL SPLIT II

## (undated) DEVICE — STAPLER SKIN ROTATING HEAD

## (undated) DEVICE — COVER PROBE SHEATH SURG 6X3IN

## (undated) DEVICE — TRAY MINOR GEN SURG

## (undated) DEVICE — SEE MEDLINE ITEM 157137

## (undated) DEVICE — PROBE FLOW DOPPLER

## (undated) DEVICE — GOWN SURGICAL X-LARGE

## (undated) DEVICE — DRAPE THYROID WITH ARMBOARD

## (undated) DEVICE — DRAPE C ARM 42 X 120 10/BX

## (undated) DEVICE — SUT 2-0 VICRYL / SH (J417)

## (undated) DEVICE — CORD BIPOLAR 12 FOOT

## (undated) DEVICE — SEE MEDLINE ITEM 157131

## (undated) DEVICE — DRAIN CHANNEL ROUND 15FR

## (undated) DEVICE — GLOVE BIOGEL SKINSENSE PI 6.5

## (undated) DEVICE — CLIP DOUBLE MICRO.

## (undated) DEVICE — KIT PROBE COVER WITH GEL

## (undated) DEVICE — APPLIER LIGACLIP MED 11IN

## (undated) DEVICE — POWDER ARISTA AH 1GM

## (undated) DEVICE — GLOVE BIOGEL SKINSENSE PI 7.0

## (undated) DEVICE — SUT ETHILON 3-0 FS-1 30

## (undated) DEVICE — SUT PDS II 0 CT VIL MONO 36

## (undated) DEVICE — SUT VICRYL PLUS 2-0 CT1 18

## (undated) DEVICE — TRAY FOLEY 16FR INFECTION CONT

## (undated) DEVICE — SOL NS 1000CC

## (undated) DEVICE — GLOVE BIOGEL SKINSENSE PI 7.5

## (undated) DEVICE — SPONGE LAP 18X18 PREWASHED

## (undated) DEVICE — SUT 2/0 27IN PDS II VIO MO

## (undated) DEVICE — APPLIER CLIP LIAGCLIP 9.375IN

## (undated) DEVICE — DRESSING GZ SURGICOUNT 4X8

## (undated) DEVICE — WAVEGUIDE BRITEFIELD DISP

## (undated) DEVICE — UNDERGLOVE BIOGEL PI SZ 6.5 LF

## (undated) DEVICE — SUT VICRYL 3-0 27 SH

## (undated) DEVICE — SCRUB 10% POVIDONE IODINE 4OZ

## (undated) DEVICE — PACK UNIV PROCEDURE

## (undated) DEVICE — Device

## (undated) DEVICE — ELECTRODE BLD EXT INSUL 1

## (undated) DEVICE — UNDERGLOVES BIOGEL PI SZ 7 LF

## (undated) DEVICE — SYR ONLY LUER LOCK 20CC

## (undated) DEVICE — DRESSING LEUKOPLAST FLEX 1X3IN

## (undated) DEVICE — PAD PREP 50/CA

## (undated) DEVICE — DRAIN CHANNEL ROUND 19FR

## (undated) DEVICE — SUT ETHILON 2-0 FS 18IN BLK

## (undated) DEVICE — SUT 3-0 12-18IN SILK

## (undated) DEVICE — CARTRIDGE MICROCLIP SFINE BLUE

## (undated) DEVICE — POSITIONER IV ARMBOARD FOAM

## (undated) DEVICE — SYS LABEL CORRECT MED

## (undated) DEVICE — DRAPE INCISE IOBAN 2 23X17IN

## (undated) DEVICE — NDL HYPO REG 25G X 1 1/2

## (undated) DEVICE — DRAIN WND 15FRX3/16X4.7MM TRCR

## (undated) DEVICE — PENCIL ELECTROSURG HOLST W/BLD

## (undated) DEVICE — SEE MEDLINE ITEM 153151

## (undated) DEVICE — BINDER ABDOMINAL 9 30-45

## (undated) DEVICE — SUT VICRYL PLUS 4-0 P3 18IN

## (undated) DEVICE — GOWN SMART IMP BREATHABLE XXLG

## (undated) DEVICE — ADHESIVE DERMABOND ADVANCED

## (undated) DEVICE — RESERVOIR 100ML

## (undated) DEVICE — DRESSING TRANS 4X10 TEGADERM

## (undated) DEVICE — BRA CLASSIC COMFORT 42BLACK

## (undated) DEVICE — SUT MCRYL PLUS 4-0 PS2 27IN

## (undated) DEVICE — NDL PERC ENTRY BSDN 18-7.0

## (undated) DEVICE — SYR 10CC LUER LOCK

## (undated) DEVICE — SEE MEDLINE ITEM 152186

## (undated) DEVICE — PAD ABD 8X10 STERILE

## (undated) DEVICE — CLIPPER BLADE MOD 4406 (CAREF)

## (undated) DEVICE — SPONGE COTTON TRAY 4X4IN

## (undated) DEVICE — GEL AQUASONIC 100 STERILE20GM

## (undated) DEVICE — SUT 3-0 ETHILON 18 FS-1

## (undated) DEVICE — SUT 2/0 30IN SILK BLK BRAI

## (undated) DEVICE — DRESSING XEROFORM 1X8IN

## (undated) DEVICE — NDL SAFETY 22G X 1.5 ECLIPSE

## (undated) DEVICE — STOCKINET DELTA NET 4X25 YDS

## (undated) DEVICE — GLOVE BIOGEL SKINSENSE PI 8.0

## (undated) DEVICE — CONTAINER SPECIMEN STRL 4OZ

## (undated) DEVICE — DRESSING TELFA STRL 4X3 LF

## (undated) DEVICE — CLAMP SINGLE MICRO.

## (undated) DEVICE — SUT ETHILON 2-0 PSLX 30IN

## (undated) DEVICE — DRESSING ANTIMICROBIAL 1 INCH

## (undated) DEVICE — SEE MEDLINE ITEM 157110

## (undated) DEVICE — SEE MEDLINE ITEM 157117

## (undated) DEVICE — HOLDER DRAIN POUCH PINK

## (undated) DEVICE — BLADE SURG CARBON STEEL SZ11

## (undated) DEVICE — SYS CLSR DERMABOND PRINEO 22CM

## (undated) DEVICE — BLADE PEAK PLASMA

## (undated) DEVICE — SEE MEDLINE ITEM 146417

## (undated) DEVICE — SKIN MARKER DEVON 160

## (undated) DEVICE — DRESSING TRANS 2X2 TEGADERM

## (undated) DEVICE — POSITIONER HEEL FOAM CONVOLTD

## (undated) DEVICE — SUT CTD VICRYL 4-0 BR PS-2

## (undated) DEVICE — SEE MEDLINE ITEM 152530